# Patient Record
Sex: MALE | Race: WHITE | NOT HISPANIC OR LATINO | Employment: OTHER | URBAN - METROPOLITAN AREA
[De-identification: names, ages, dates, MRNs, and addresses within clinical notes are randomized per-mention and may not be internally consistent; named-entity substitution may affect disease eponyms.]

---

## 2017-01-10 ENCOUNTER — OFFICE VISIT (OUTPATIENT)
Dept: GASTROENTEROLOGY | Facility: CLINIC | Age: 62
End: 2017-01-10
Payer: COMMERCIAL

## 2017-01-10 VITALS
BODY MASS INDEX: 26.76 KG/M2 | SYSTOLIC BLOOD PRESSURE: 120 MMHG | HEART RATE: 54 BPM | WEIGHT: 191.13 LBS | DIASTOLIC BLOOD PRESSURE: 65 MMHG | HEIGHT: 71 IN

## 2017-01-10 DIAGNOSIS — K21.9 GASTROESOPHAGEAL REFLUX DISEASE WITHOUT ESOPHAGITIS: Primary | ICD-10-CM

## 2017-01-10 PROCEDURE — 1159F MED LIST DOCD IN RCRD: CPT | Mod: S$GLB,,, | Performed by: INTERNAL MEDICINE

## 2017-01-10 PROCEDURE — 99999 PR PBB SHADOW E&M-EST. PATIENT-LVL II: CPT | Mod: PBBFAC,,, | Performed by: INTERNAL MEDICINE

## 2017-01-10 PROCEDURE — 99213 OFFICE O/P EST LOW 20 MIN: CPT | Mod: S$GLB,,, | Performed by: INTERNAL MEDICINE

## 2017-01-10 NOTE — PROGRESS NOTES
Subjective:       Patient ID: Colt Rose is a 61 y.o. male.    This is an established patient.      Chief Complaint: GERD    PAST HISTORY:    Gastroesophageal Reflux   He complains of belching. He reports no chest pain, no coughing, no dysphagia, no heartburn, no nausea or no wheezing. mostly resolved but still with some mild breakthrough at night. This is a recurrent (First occurrence many years ago) problem. The current episode started more than 1 year ago. The problem occurs frequently. The problem has been gradually improving. The heartburn duration is several minutes. The heartburn is of moderate intensity. The heartburn wakes him from sleep. The heartburn does not limit his activity. The heartburn changes with position. The symptoms are aggravated by certain foods. Pertinent negatives include no anemia, fatigue, melena or weight loss. Risk factors include hiatal hernia (Had EGD about 10 years ago with hiatal hernia noted). He has tried a PPI for the symptoms. The treatment provided significant relief. Past procedures include an EGD (hiatal hernia 10 years ago.  No known history of ulcers.  Recent EGD with me showed esophagitis and hiatal hernia.  ).   He denies dysphagia, bleeding or weight loss.  Last colonoscopy was normal in 2011.    INTERVAL HISTORY:    Since last visit, he has had durable response to PPI with only occasional breakthrough.  No emesis, weight loss, GI bleeding, or dysphagia.  He denies any other complaints.  No change in bowel habits.      Review of Systems   Constitutional: Negative for chills, fatigue, fever and weight loss.   Eyes: Negative for pain and visual disturbance.   Respiratory: Negative for cough, shortness of breath and wheezing.    Cardiovascular: Negative for chest pain and palpitations.   Gastrointestinal: Negative for constipation, diarrhea, dysphagia, heartburn, melena, nausea and vomiting.   Musculoskeletal: Negative for arthralgias, myalgias, neck pain and neck  "stiffness.   Skin: Negative for color change and rash.   Neurological: Negative for dizziness, weakness and numbness.   Psychiatric/Behavioral: Negative for agitation and confusion.       Objective:       Vitals:    01/10/17 1608   BP: 120/65   Pulse: (!) 54   Weight: 86.7 kg (191 lb 2.2 oz)   Height: 5' 10.5" (1.791 m)       Physical Exam   Constitutional: He is oriented to person, place, and time. He appears well-developed and well-nourished.   HENT:   Head: Normocephalic and atraumatic.   Eyes: Pupils are equal, round, and reactive to light. No scleral icterus.   Cardiovascular: Normal rate and regular rhythm.    No murmur heard.  Pulmonary/Chest: Effort normal and breath sounds normal. He has no wheezes.   Abdominal: Soft. Bowel sounds are normal. He exhibits no distension. There is no tenderness.   Neurological: He is alert and oriented to person, place, and time.         Lab Results   Component Value Date    WBC 4.05 10/01/2016    HGB 14.2 10/01/2016    HCT 41.6 10/01/2016    MCV 93 10/01/2016     10/01/2016         Chemistry        Component Value Date/Time     10/01/2016 0826    K 4.8 10/01/2016 0826     10/01/2016 0826    CO2 25 10/01/2016 0826    BUN 16 10/01/2016 0826    CREATININE 0.8 10/01/2016 0826     10/01/2016 0826        Component Value Date/Time    CALCIUM 9.0 10/01/2016 0826    ALKPHOS 41 (L) 10/01/2016 0826    AST 23 10/01/2016 0826    ALT 21 10/01/2016 0826    BILITOT 0.4 10/01/2016 0826              Assessment:       1. Gastroesophageal reflux disease without esophagitis        Plan:       1.  Continue PPI  2.  Consider evening H2 blocker versus second dose of PPI for breakthrough symptoms  3.  Avoid NSAIDs  4.  Colonoscopy for screening due in 2021.  5.  Further recommendations to follow after above.  Follow up PRN            "

## 2017-03-29 ENCOUNTER — INITIAL CONSULT (OUTPATIENT)
Dept: ORTHOPEDICS | Facility: CLINIC | Age: 62
End: 2017-03-29
Attending: ORTHOPAEDIC SURGERY
Payer: COMMERCIAL

## 2017-03-29 VITALS — WEIGHT: 191 LBS | BODY MASS INDEX: 26.74 KG/M2 | HEIGHT: 71 IN

## 2017-03-29 DIAGNOSIS — M77.11 LATERAL EPICONDYLITIS OF BOTH ELBOWS: ICD-10-CM

## 2017-03-29 DIAGNOSIS — M77.12 LATERAL EPICONDYLITIS OF BOTH ELBOWS: ICD-10-CM

## 2017-03-29 PROCEDURE — 99203 OFFICE O/P NEW LOW 30 MIN: CPT | Mod: 25,S$GLB,, | Performed by: ORTHOPAEDIC SURGERY

## 2017-03-29 PROCEDURE — 20551 NJX 1 TENDON ORIGIN/INSJ: CPT | Mod: 51,LT,S$GLB, | Performed by: ORTHOPAEDIC SURGERY

## 2017-03-29 PROCEDURE — 99999 PR PBB SHADOW E&M-EST. PATIENT-LVL III: CPT | Mod: PBBFAC,,, | Performed by: ORTHOPAEDIC SURGERY

## 2017-03-29 PROCEDURE — 1160F RVW MEDS BY RX/DR IN RCRD: CPT | Mod: S$GLB,,, | Performed by: ORTHOPAEDIC SURGERY

## 2017-03-29 RX ORDER — TRIAMCINOLONE ACETONIDE 40 MG/ML
40 INJECTION, SUSPENSION INTRA-ARTICULAR; INTRAMUSCULAR
Status: COMPLETED | OUTPATIENT
Start: 2017-03-29 | End: 2017-03-29

## 2017-03-29 RX ADMIN — TRIAMCINOLONE ACETONIDE 40 MG: 40 INJECTION, SUSPENSION INTRA-ARTICULAR; INTRAMUSCULAR at 02:03

## 2017-03-29 NOTE — PROGRESS NOTES
INITIAL VISIT HISTORY:  A 61-year-old male presents for evaluation of bilateral   elbow symptoms.  He reports pain in both elbows for the past 6 to 8 months.    Symptoms seem to be getting worse, particularly in the left elbow.  He did have   an injection about six months ago with some improvement on the left side, but   still having some weakness in both hands.  No numbness or tingling is reported.    No recent trauma reported.    PAST MEDICAL HISTORY:  Significant for cervical disk problems, GERD,   hyperlipidemia and shingles.    PAST SURGICAL HISTORY:  Includes tonsillectomy and cervical fusion.    FAMILY HISTORY:  Positive for coronary artery disease, diabetes and   hypertension.    SOCIAL HISTORY:  The patient does not smoke, drinks about six beers per week.    REVIEW OF SYSTEMS:  Negative for fever, chills or rashes.    CURRENT MEDICATIONS:  Reviewed on chart.    ALLERGIES:  None.    PHYSICAL EXAMINATION:  GENERAL:  A well-developed, well-nourished male, in no acute distress, alert and   oriented x3.  EXTREMITIES:  Examination of the upper extremities significant for the elbows   demonstrating tenderness in both elbows over the lateral epicondylar areas.  No   swelling is noted.  Full range of motion of both elbows without popping,   clicking or instability.  Distally, he has some weakness, particularly in the   left hand with  strength slightly decreased.  There is no atrophy, some pain   with resisted wrist extension is noted.  NEUROLOGIC:  Intact.    X-RAYS:  AP and lateral of the elbows demonstrate no significant abnormalities.    Previous cervical x-rays demonstrate a fusion at C5-C6 level.    IMPRESSION:  1.  Bilateral tennis elbow (lateral epicondylitis).  2.  Possible superimposed cervical radiculopathy.    PLAN:  I explained the nature of problem to the patient.  Recommended and   performed an injection into each elbow laterally with a combination of Kenalog   10 mg and 0.5 mL of Xylocaine,  sterile technique.    I also think he would benefit from some therapy, so I have ordered this for both   elbows to include hands, bilateral for strengthening.    Continue anti-inflammatory medication by mouth.  Follow up in six weeks for   recheck.  If symptoms fail to improve, then we may need to get a nerve   conduction study to evaluate for possible cervical symptoms as an overlay.      DADA/JOSE  dd: 03/29/2017 14:35:57 (CDT)  td: 03/30/2017 13:26:59 (CDT)  Doc ID   #[id number]  Job ID #[id number]    CC:

## 2017-03-29 NOTE — LETTER
March 29, 2017        Aly Godinez MD  [address] [facility] [address]  [facility] [address]             [facility]  [address], [address]  [facility] [address]  Phone: [phone]   Patient: Colt Rsoe   MR Number: [id number]   YOB: 1955   Date of Visit: 3/29/2017       Dear Dr. Godinez:    Thank you for referring Colt Rose to me for evaluation. Below are the relevant portions of my assessment and plan of care.            If you have questions, please do not hesitate to call me. I look forward to following Colt along with you.    Sincerely,      Juan Diego Lopez Jr., MD           CC  No Recipients

## 2017-03-29 NOTE — LETTER
March 29, 2017        Aly Godinez MD  [address] [facility] [address]  [facility] [address]             [facility]  [address]  [facility] [address]  Phone: [phone]   Patient: Colt Rose   MR Number: [id number]   YOB: 1955   Date of Visit: 3/29/2017       Dear Dr. Godinez:    Thank you for referring Colt Rose to me for evaluation. Below are the relevant portions of my assessment and plan of care.            If you have questions, please do not hesitate to call me. I look forward to following Colt along with you.    Sincerely,      Juan Diego Lopez Jr., MD           CC  No Recipients

## 2017-03-29 NOTE — Clinical Note
March 29, 2017      Aly Godinez MD  [address] [facility] [address] [place] [address]           [facility]  [address]  [facility] [address]  Phone: [phone]          Patient: Colt Rose   MR Number: [id number]   YOB: 1955   Date of Visit: 3/29/2017       Dear Dr. Aly Godinez:    Thank you for referring Colt Rose to me for evaluation. Attached you will find relevant portions of my assessment and plan of care.    If you have questions, please do not hesitate to call me. I look forward to following Colt Rose along with you.    Sincerely,    Juan Diego Lopez Jr., MD    Enclosure  CC:  No Recipients    If you would like to receive this communication electronically, please contact [email or web address] or [phone] to request more information on [facility] [employer] access.    For providers and/or their staff who would like to refer a patient to [facility], please contact us through our one-stop-shop provider referral line, [facility], at [phone].    If you feel you have received this communication in error or would no longer like to receive these types of communications, please e-mail [email or web address]

## 2017-04-13 ENCOUNTER — CLINICAL SUPPORT (OUTPATIENT)
Dept: REHABILITATION | Facility: HOSPITAL | Age: 62
End: 2017-04-13
Attending: ORTHOPAEDIC SURGERY
Payer: COMMERCIAL

## 2017-04-13 DIAGNOSIS — M77.12 LATERAL EPICONDYLITIS OF BOTH ELBOWS: Primary | ICD-10-CM

## 2017-04-13 DIAGNOSIS — M77.11 LATERAL EPICONDYLITIS OF BOTH ELBOWS: Primary | ICD-10-CM

## 2017-04-13 PROCEDURE — 97165 OT EVAL LOW COMPLEX 30 MIN: CPT | Mod: PN

## 2017-04-13 PROCEDURE — G8990 OTHER PT/OT CURRENT STATUS: HCPCS | Mod: CJ,PN

## 2017-04-13 PROCEDURE — G8991 OTHER PT/OT GOAL STATUS: HCPCS | Mod: CI,PN

## 2017-04-14 NOTE — PLAN OF CARE
"TIME RECORD    Date: 04/13/2017    Start Time:  1505  Stop Time:  1548    PROCEDURES:    TIMED  Procedure Time Min.   33 Start:1505  Stop:1548 43    Start:  Stop:     Start:  Stop:     Start:  Stop:          UNTIMED  Procedure Time Min.    Start:  Stop:     Start:  Stop:      Total Timed Minutes:  0  Total Timed Units:  0  Total Untimed Units:  1  Charges Billed/# of units:  1    OCCUPATIONAL THERAPY INITIAL EVALUATION & PLAN OF TREATMENT    Patient Name: Colt HCRIS Milton  Physician Name:  John  Primary Diagnosis:  B lateral epicondylitis elbows  Treatment Diagnosis:  B UE weakness, pain  Onset Date:    Eval Date:  4-13-17  Certification Period:  4-13-17 to 7-13-17  Past Medical History:   Past Medical History:   Diagnosis Date    Cervical disc displacement     Degeneration of lumbar intervertebral disc     GERD (gastroesophageal reflux disease)     Hyperlipidemia     Shingles 11/2013     Precautions:  universal  Prior Therapy:  no  Signs of Abuse: no  Medications: Colt Rose has a current medication list which includes the following prescription(s): atorvastatin, methocarbamol, nabumetone, and pantoprazole.  Nutrition:  WNL  Pain: Pt reports L elbow 6/10 pain R elbow 2/10    Prior Level of Function: Independent  Social History:  Pt works full time as a building maintenance supervisior. Heavy, overhead work at times. Independent, active.  Place of Residence (steps/adaptations):  No concerns  Functional Deficits Leading to Referral/Nature of Injury:  Debilitating pain B elbows  Patient Therapy Goals:  Pain reduction and strengthening  Initial Assessment:  Pt received B steroid injections 2 weeks ago. Conservative manual evaluation 2/2 to this fact. Pt with history of cervical fusion 1991: pt states, " I went to the doctor and they said I am due for another surgery." Pt reports B lateral elbow pain for about 1 year, received injections 6 months ago but pain became debilitating again. Relief of pain " initially, but pt reports that L elbow has been more painful in past few days. Pt R dominant.  R  110#     L  80#  R pinch 28#    L pinch 24#  R shoulder MMT 4/5  L shoulder MMT 5/5  Elbow strength 5/5  5/5 Supination/pronation  Rehab Potential: good  Short Term Goals (2x week 4 weeks: 1. Independent with HEP 2. R shoulder strength 5/5 3. Pain decreased in L elbow 4/10 per pt report.  Long Term Goals (8 weeks 2x week: 1. 0/10 pain B UE  Recommended Treatment Plan (2x times per week for 12 weeks): Therapeutic Exercise, Functional Activities, Patient Education, Home Exercise Program,  Ultrasound/Phonophoresis, Electrical Stimulation/TENS/Interferential and Moist Heat/Ice      Therapist's Name: ODILON Cordon   Date: 04/14/2017    I CERTIFY THE NEED FOR THESE SERVICES FURNISHED UNDER THIS PLAN OF TREATMENT AND WHILE UNDER MY CARE    Physician's comments: ________________________________________________________________________________________________________________________________________________      Physician's Name: ___________________________________

## 2017-04-25 ENCOUNTER — CLINICAL SUPPORT (OUTPATIENT)
Dept: REHABILITATION | Facility: HOSPITAL | Age: 62
End: 2017-04-25
Attending: ORTHOPAEDIC SURGERY
Payer: COMMERCIAL

## 2017-04-25 DIAGNOSIS — M25.521 RIGHT ELBOW PAIN: ICD-10-CM

## 2017-04-25 DIAGNOSIS — M25.522 LEFT ELBOW PAIN: Primary | ICD-10-CM

## 2017-04-25 PROCEDURE — 98960 EDU&TRN PT SELF-MGMT NQHP 1: CPT | Mod: PN

## 2017-04-25 NOTE — PROGRESS NOTES
Time in 5  Time out 530      Timed units  1 on 1 education                              Time:5-530        S:understands basic of soft tissue healing and how current HEP will reinforce this  Pain: on r very rare, lateral elbow ache; on l mild with some use, lateral elbow ache    O:  R: resisted wrist df (-)    L: resisted wrist df (+), rd (+), ud (-); palpation ecrl origin (-), ecrb origin (-), ecrb tendon (+), ecrb mt junction (-), edc origin (-)    Issued current HEP for l:    -apply heating pad x 15 min. 2 x day   -do below stretch 10 times, 2 x day, at least 1 min. hold, mild discomfort only  *with elbow by side in L shape and left hand facing up, use right hand to bend left wrist up  -every other day gently massage across sore spot in one direction x 7 min.  -avoid painful use, forceful use  -minimize repetitive elbow bending   -minimize sustained gripping  -decrease caffeine as much as possible      Told to follow activity modifications on r as well        A:eccentric traing on l in future for l wrist dorsiflexors may be beneficial to discourage symptoms in future            P:upgrade HEP prn

## 2017-04-26 ENCOUNTER — OFFICE VISIT (OUTPATIENT)
Dept: ORTHOPEDICS | Facility: CLINIC | Age: 62
End: 2017-04-26
Attending: ORTHOPAEDIC SURGERY
Payer: COMMERCIAL

## 2017-04-26 VITALS — BODY MASS INDEX: 26.74 KG/M2 | HEIGHT: 71 IN | WEIGHT: 191 LBS

## 2017-04-26 DIAGNOSIS — M77.11 LATERAL EPICONDYLITIS OF BOTH ELBOWS: Primary | ICD-10-CM

## 2017-04-26 DIAGNOSIS — M77.12 LATERAL EPICONDYLITIS OF BOTH ELBOWS: Primary | ICD-10-CM

## 2017-04-26 PROCEDURE — 1160F RVW MEDS BY RX/DR IN RCRD: CPT | Mod: S$GLB,,, | Performed by: ORTHOPAEDIC SURGERY

## 2017-04-26 PROCEDURE — 99213 OFFICE O/P EST LOW 20 MIN: CPT | Mod: S$GLB,,, | Performed by: ORTHOPAEDIC SURGERY

## 2017-04-26 PROCEDURE — 99999 PR PBB SHADOW E&M-EST. PATIENT-LVL II: CPT | Mod: PBBFAC,,, | Performed by: ORTHOPAEDIC SURGERY

## 2017-04-26 NOTE — PROGRESS NOTES
Mr. Rose in followup of bilateral elbow symptoms, is doing much better.  He   had about 90% improvement after the previous injection last month.  He is   currently in therapy, but has only had one visit.  Pain is much better.  No neck   problems reported.    PHYSICAL EXAMINATION:  Both elbows look good.  No swelling.  Full range of   motion, minimal tenderness, left worse than right.  Strength is improved.    PLAN:  I would like him to continue therapy for both arms, for stretching,   strengthening and modality treatments.  Additionally, we will start him on some   Pennsaid topical anti-inflammatory cream for both elbows.   I do not think he   needs an injection today.  He is in agreement.  Follow up in six weeks.      DARIUSZ  dd: 04/26/2017 15:39:42 (CDT)  td: 04/27/2017 09:26:21 (CDT)  Doc ID   #[id number]  Job ID #[id number]    CC:

## 2017-04-26 NOTE — MR AVS SNAPSHOT
[facility]  [address]  [facility] [address]  Phone: [phone]                  Colt Rose   2017 3:20 PM   Office Visit    Description:  Male : 1955   Provider:  Juan Diego Lopez Jr., MD   Department:  [facility]           Reason for Visit     Left Elbow - Follow-up     Right Elbow - Follow-up           Diagnoses this Visit        Comments    Lateral epicondylitis of both elbows    -  Primary            To Do List           Future Appointments        Provider Department Dept Phone    2017 5:00 PM Maurizio Damon OT [facility] [phone]    2017 3:00 PM DANIEL Pelaez [facility] [phone]      Goals (5 Years of Data)     None      [facility]     [facility] Nurse Care Line -  Assistance  Unless otherwise directed by your provider, please contact [facility], our nurse care line that is available for  assistance.     Registered nurses in the [facility] provide: appointment scheduling, clinical advisement, health education, and other advisory services.  Call: [phone] (toll free)               Medications           Message regarding Medications     Verify the changes and/or additions to your medication regime listed below are the same as discussed with your clinician today.  If any of these changes or additions are incorrect, please notify your healthcare provider.             Verify that the below list of medications is an accurate representation of the medications you are currently taking.  If none reported, the list may be blank. If incorrect, please contact your healthcare provider. Carry this list with you in case of emergency.           Current Medications     atorvastatin (LIPITOR) 10 MG tablet Take 1 tablet (10 mg total) by mouth once daily.    methocarbamol (ROBAXIN) 750 MG Tab Take 1 tablet (750 mg total) by mouth 4 (four) times daily. As needed for  "back pain    nabumetone (RELAFEN) 500 MG tablet Take 1 tablet (500 mg total) by mouth 2 (two) times daily.    pantoprazole (PROTONIX) 40 MG tablet Take 1 tablet (40 mg total) by mouth once daily.           Clinical Reference Information           Your Vitals Were     Height Weight BMI          5' 10.51" (1.791 m) 86.6 kg (191 lb) 27.01 kg/m2        Allergies as of 4/26/2017     No Known Drug Allergies      Immunizations Administered on Date of Encounter - 4/26/2017     None      Language Assistance Services     ATTENTION: Language assistance services are available, free of charge. Please call [phone].      ATENCIÓN: Si habla jessica, tiene a escobedo disposición servicios gratuitos de asistencia lingüística. Llame al [phone].     CHÚ Ý: N?u b?n nói Ti?ng Vi?t, có các d?ch v? h? tr? ngôn ng? mi?n phí dành cho b?n. G?i s? [phone].         [facility] complies with applicable Federal civil rights laws and does not discriminate on the basis of race, color, national origin, age, disability, or sex.        "

## 2017-05-08 ENCOUNTER — CLINICAL SUPPORT (OUTPATIENT)
Dept: REHABILITATION | Facility: HOSPITAL | Age: 62
End: 2017-05-08
Attending: ORTHOPAEDIC SURGERY
Payer: COMMERCIAL

## 2017-05-08 DIAGNOSIS — M25.522 LEFT ELBOW PAIN: Primary | ICD-10-CM

## 2017-05-08 DIAGNOSIS — M25.521 RIGHT ELBOW PAIN: ICD-10-CM

## 2017-05-08 PROCEDURE — 97110 THERAPEUTIC EXERCISES: CPT | Mod: PN

## 2017-05-08 PROCEDURE — 97035 APP MDLTY 1+ULTRASOUND EA 15: CPT | Mod: PN

## 2017-05-08 NOTE — PROGRESS NOTES
Time in 5  Time out 530  untimed units:   U/s               Time: 5-515    Timed units  1 therex                              Time:515-530    S:says doing tfm daily  Pain: continues to decrease in intensity and frequency    O:  Told pt. To do tfm every other day as previously advised    u/s 15' 0.8 w/cm sqrd pulsed 50% large 3 mhz to lateral epicondyle    Extensor mass stretches x 10        A:eccentric training may be helpful once palpation (-)            P:special tests and palpation

## 2017-05-25 ENCOUNTER — CLINICAL SUPPORT (OUTPATIENT)
Dept: REHABILITATION | Facility: HOSPITAL | Age: 62
End: 2017-05-25
Attending: ORTHOPAEDIC SURGERY
Payer: COMMERCIAL

## 2017-05-25 DIAGNOSIS — M25.522 LEFT ELBOW PAIN: Primary | ICD-10-CM

## 2017-05-25 DIAGNOSIS — M25.521 RIGHT ELBOW PAIN: ICD-10-CM

## 2017-05-25 PROCEDURE — 97035 APP MDLTY 1+ULTRASOUND EA 15: CPT | Mod: PN

## 2017-05-25 PROCEDURE — 97022 WHIRLPOOL THERAPY: CPT | Mod: PN

## 2017-05-25 PROCEDURE — 97140 MANUAL THERAPY 1/> REGIONS: CPT | Mod: PN

## 2017-05-25 NOTE — PROGRESS NOTES
Time in 5  Time out 6    untimed units    fluido                               Time:5-520  u/s                               Time:520-530    Timed units  2 manual                              Time:530-6      S: doing HEP, understands basic concept of soft tissue healing  Pain:reports good reduction in intensity and frequency since last visit    O:  resisted df (+), rd (+), ud (-)    palpation of 5 tennis elbow spots reveals ecrb origin (+)    fluido  U/s as before  tfm to ecrb origin x 10'  Transverse stretching as tolerated-pain free  Reviewed longitudinal stretches        A: continued tx to promote soft tissue healing then transition to eccentric wrist extensor training should help to maximize painless and strong use long term            P:cont POC, consider MWM and transverse stretches

## 2017-05-26 ENCOUNTER — CLINICAL SUPPORT (OUTPATIENT)
Dept: REHABILITATION | Facility: HOSPITAL | Age: 62
End: 2017-05-26
Attending: ORTHOPAEDIC SURGERY
Payer: COMMERCIAL

## 2017-05-26 DIAGNOSIS — M25.522 LEFT ELBOW PAIN: Primary | ICD-10-CM

## 2017-05-26 PROCEDURE — 97035 APP MDLTY 1+ULTRASOUND EA 15: CPT | Mod: PN

## 2017-05-26 PROCEDURE — 97110 THERAPEUTIC EXERCISES: CPT | Mod: PN

## 2017-05-26 PROCEDURE — 97022 WHIRLPOOL THERAPY: CPT | Mod: PN

## 2017-05-26 NOTE — PROGRESS NOTES
Time in 445  Time out 530    untimed units    fluido                               Time:445-505  u/s                               Time:505-515    Timed units  1 therex                              Time:515-530      S: understands rationale of current care and probable tx progression  Pain: continues to decrease in intensity and frequency    O:  Fluido, u/s; transverse stretching, longitudinal stretching as tolerated-pain free            A: correct progression of soft tissue healing should continue with current tx            P: test resisted df then do classic 5 palpation tests; d/c once pain gone and d/c HEP issued

## 2017-06-06 ENCOUNTER — CLINICAL SUPPORT (OUTPATIENT)
Dept: REHABILITATION | Facility: HOSPITAL | Age: 62
End: 2017-06-06
Attending: ORTHOPAEDIC SURGERY
Payer: COMMERCIAL

## 2017-06-06 DIAGNOSIS — M25.522 LEFT ELBOW PAIN: Primary | ICD-10-CM

## 2017-06-06 DIAGNOSIS — M25.521 RIGHT ELBOW PAIN: ICD-10-CM

## 2017-06-06 PROCEDURE — 97035 APP MDLTY 1+ULTRASOUND EA 15: CPT | Mod: PN

## 2017-06-06 PROCEDURE — 97022 WHIRLPOOL THERAPY: CPT | Mod: PN

## 2017-06-06 PROCEDURE — 97110 THERAPEUTIC EXERCISES: CPT | Mod: PN

## 2017-06-06 NOTE — PROGRESS NOTES
Time in 450  Time out 550    untimed units    fluido                               Time:450-510  u/s                               Time:510-520    Timed units  2 therex                              Time:520-550        S: doing all required tasks, says pain is much better than at onset  Pain:1/10 with some tasks    O:  resisted wrist df (+), rd (+), ud (-)  classic palpation of 5 tennis elbow sites shows mild pain at ecrb origin  fluido, tfm ecrb origin x10', longitudinal extensor mass stretch, transverse extensor mass stretch    issued eccentric training to be started in 1 month; told to continue current HEP for 1 month; told to progress use as tolerated-pain free        A: all goals met            P:d/c

## 2017-06-12 ENCOUNTER — OFFICE VISIT (OUTPATIENT)
Dept: ORTHOPEDICS | Facility: CLINIC | Age: 62
End: 2017-06-12
Payer: COMMERCIAL

## 2017-06-12 VITALS
HEART RATE: 58 BPM | BODY MASS INDEX: 26.74 KG/M2 | SYSTOLIC BLOOD PRESSURE: 122 MMHG | DIASTOLIC BLOOD PRESSURE: 71 MMHG | WEIGHT: 191 LBS | RESPIRATION RATE: 18 BRPM | HEIGHT: 71 IN

## 2017-06-12 DIAGNOSIS — M77.12 LATERAL EPICONDYLITIS OF BOTH ELBOWS: Primary | ICD-10-CM

## 2017-06-12 DIAGNOSIS — L08.9: ICD-10-CM

## 2017-06-12 DIAGNOSIS — T49.95XA: ICD-10-CM

## 2017-06-12 DIAGNOSIS — M77.11 LATERAL EPICONDYLITIS OF BOTH ELBOWS: Primary | ICD-10-CM

## 2017-06-12 PROCEDURE — 99999 PR PBB SHADOW E&M-EST. PATIENT-LVL III: CPT | Mod: PBBFAC,,, | Performed by: PHYSICIAN ASSISTANT

## 2017-06-12 PROCEDURE — 99213 OFFICE O/P EST LOW 20 MIN: CPT | Mod: S$GLB,,, | Performed by: PHYSICIAN ASSISTANT

## 2017-06-12 RX ORDER — SILVER SULFADIAZINE 10 G/1000G
CREAM TOPICAL 2 TIMES DAILY
Qty: 1 TUBE | Refills: 1 | Status: SHIPPED | OUTPATIENT
Start: 2017-06-12 | End: 2018-04-10

## 2017-06-20 NOTE — PROGRESS NOTES
Subjective:      Patient ID: Colt Rose is a 61 y.o. male.    Chief Complaint: Pain of the Left Elbow and Pain of the Right Elbow      HPI  Colt Rose is a 61 y.o. male presenting today for follow up of bilateral elbow pain.  He reports that he has noticed improvement in his elbow pain with his injections a few months ago and occupational therapy.  He does report that he was using the Pennsaid cream for 3-4 weeks, but discontinued it 2 weeks ago due to skin irritation in the area of application on the left elbow and forearm.  He says that the skin irritation has increased over the past 2 weeks since he first noticed it and he now has blistering of the skin.  He reports itching in the area of irritation but only mild pain.  He also reports some increased stiffness in the left elbow over the past 2 weeks due to the skin irritation. He was using the Pennsaid predominantly on the left arm, rarely on the right.  He denies any skin problems on the right arm. He denies any known drug allergies.  He denies any exposure to poisonous plants.      Review of patient's allergies indicates:   Allergen Reactions    No known drug allergies          Current Outpatient Prescriptions   Medication Sig Dispense Refill    atorvastatin (LIPITOR) 10 MG tablet Take 1 tablet (10 mg total) by mouth once daily. 90 tablet 3    methocarbamol (ROBAXIN) 750 MG Tab Take 1 tablet (750 mg total) by mouth 4 (four) times daily. As needed for back pain 30 tablet 0    nabumetone (RELAFEN) 500 MG tablet Take 1 tablet (500 mg total) by mouth 2 (two) times daily. 180 tablet 3    pantoprazole (PROTONIX) 40 MG tablet Take 1 tablet (40 mg total) by mouth once daily. 90 tablet 3    silver sulfADIAZINE 1% (SILVADENE) 1 % cream Apply topically 2 (two) times daily. 1 Tube 1     No current facility-administered medications for this visit.        Past Medical History:   Diagnosis Date    Cervical disc displacement     Degeneration of lumbar  "intervertebral disc     GERD (gastroesophageal reflux disease)     Hyperlipidemia     Shingles 11/2013       Past Surgical History:   Procedure Laterality Date    CERVICAL FUSION      bone graft    TONSILLECTOMY, ADENOIDECTOMY           Review of Systems:  Review of Systems   Constitution: Negative for chills and fever.   HENT: Negative for headaches.    Skin: Positive for color change, itching and rash. Negative for suspicious lesions.        See HPI    Musculoskeletal:        See HPI   Neurological: Negative for dizziness, light-headedness, numbness and paresthesias.   Psychiatric/Behavioral: Negative for depression. The patient is not nervous/anxious.          OBJECTIVE:     PHYSICAL EXAM:  Height: 5' 10.51" (179.1 cm) Weight: 86.6 kg (191 lb)     Vitals:    06/12/17 1454   BP: 122/71   Pulse: (!) 58   Resp: 18     General    Vitals reviewed.  Constitutional: He is oriented to person, place, and time. He appears well-developed and well-nourished.   HENT:   Head: Normocephalic and atraumatic.   Neck: Normal range of motion.   Cardiovascular: Normal rate.    Pulmonary/Chest: Effort normal. No respiratory distress.   Neurological: He is alert and oriented to person, place, and time.   Psychiatric: He has a normal mood and affect. His behavior is normal. Judgment and thought content normal.           Musculoskeletal: Full extension noted bilateral elbows. Full flexion of right elbow, decreased flexion of left elbow due to pain and edema associated with rash.  Medial and lateral epicondyles non-tender bilaterally. NVI.  Skin: Erythematous rash with multiple small blisters on the left arm involving the AC fossa, elbow, and proximal forearm. No discharge noted.          ASSESSMENT/PLAN:   Colt was seen today for pain and pain.    Diagnoses and all orders for this visit:    Lateral epicondylitis of both elbows    Skin inflammation due to a topically applied medication  -     silver sulfADIAZINE 1% (SILVADENE) 1 % " cream; Apply topically 2 (two) times daily.        - He will make an urgent appointment with his dermatologist.   - Silver Sulfadiazine cream prescribed to apply to rash  - Call with any increase in rash or if it doesn't improve  - Call if unable to get appointment with Dermatology  - Follow up in 4-6 weeks

## 2017-07-24 ENCOUNTER — OFFICE VISIT (OUTPATIENT)
Dept: ORTHOPEDICS | Facility: CLINIC | Age: 62
End: 2017-07-24
Payer: COMMERCIAL

## 2017-07-24 VITALS
DIASTOLIC BLOOD PRESSURE: 76 MMHG | SYSTOLIC BLOOD PRESSURE: 131 MMHG | WEIGHT: 191 LBS | BODY MASS INDEX: 26.74 KG/M2 | RESPIRATION RATE: 18 BRPM | HEIGHT: 71 IN | HEART RATE: 101 BPM

## 2017-07-24 DIAGNOSIS — R20.0 FINGER NUMBNESS: Primary | ICD-10-CM

## 2017-07-24 DIAGNOSIS — M25.521 PAIN OF BOTH ELBOWS: ICD-10-CM

## 2017-07-24 DIAGNOSIS — M54.2 NECK PAIN: ICD-10-CM

## 2017-07-24 DIAGNOSIS — M25.522 PAIN OF BOTH ELBOWS: ICD-10-CM

## 2017-07-24 PROCEDURE — 99213 OFFICE O/P EST LOW 20 MIN: CPT | Mod: S$GLB,,, | Performed by: PHYSICIAN ASSISTANT

## 2017-07-24 PROCEDURE — 99999 PR PBB SHADOW E&M-EST. PATIENT-LVL III: CPT | Mod: PBBFAC,,, | Performed by: PHYSICIAN ASSISTANT

## 2017-07-24 NOTE — PROGRESS NOTES
Subjective:      Patient ID: Colt Rose is a 62 y.o. male.    Chief Complaint: Pain of the Left Elbow and Pain of the Right Elbow      HPI  Colt Rose is a 62 y.o. male presenting today for follow up of bilateral elbow pain.  He reports that his left arm rash from Pennsaid use has resolved - he was treated in dermatology.  He says that he is still experiencing bilateral elbow pain, but currently the right elbow is more painful than the left.  Right elbow pain is rated as 3-4/10 and left elbow pain is 1-2/10.  He denies any regular finger tingling or numbness.  Pain seems to be improved with rest.  He is taking Robaxin for back pain.      Review of patient's allergies indicates:   Allergen Reactions    No known drug allergies          Current Outpatient Prescriptions   Medication Sig Dispense Refill    atorvastatin (LIPITOR) 10 MG tablet Take 1 tablet (10 mg total) by mouth once daily. 90 tablet 3    methocarbamol (ROBAXIN) 750 MG Tab Take 1 tablet (750 mg total) by mouth 4 (four) times daily. As needed for back pain 30 tablet 0    nabumetone (RELAFEN) 500 MG tablet Take 1 tablet (500 mg total) by mouth 2 (two) times daily. 180 tablet 3    pantoprazole (PROTONIX) 40 MG tablet Take 1 tablet (40 mg total) by mouth once daily. 90 tablet 3    silver sulfADIAZINE 1% (SILVADENE) 1 % cream Apply topically 2 (two) times daily. 1 Tube 1     No current facility-administered medications for this visit.        Past Medical History:   Diagnosis Date    Cervical disc displacement     Degeneration of lumbar intervertebral disc     GERD (gastroesophageal reflux disease)     Hyperlipidemia     Shingles 11/2013       Past Surgical History:   Procedure Laterality Date    CERVICAL FUSION      bone graft    TONSILLECTOMY, ADENOIDECTOMY           Review of Systems:  Review of Systems   Constitution: Negative for chills and fever.   HENT: Negative for headaches.    Skin: Negative for suspicious lesions.        See  "HPI    Musculoskeletal:        See HPI   Neurological: Negative for dizziness, light-headedness, numbness and paresthesias.   Psychiatric/Behavioral: Negative for depression. The patient is not nervous/anxious.          OBJECTIVE:     PHYSICAL EXAM:  Height: 5' 10.5" (179.1 cm) Weight: 86.6 kg (191 lb)     Vitals:    07/24/17 1453   BP: 131/76   Pulse: 101   Resp: 18     General    Vitals reviewed.  Constitutional: He is oriented to person, place, and time. He appears well-developed and well-nourished.   HENT:   Head: Normocephalic and atraumatic.   Neck: Normal range of motion.   Cardiovascular: Normal rate.    Pulmonary/Chest: Effort normal. No respiratory distress.   Neurological: He is alert and oriented to person, place, and time.   Psychiatric: He has a normal mood and affect. His behavior is normal. Judgment and thought content normal.           Musculoskeletal: Full extension and flexion noted bilateral elbows.  Left lateral epicondyle mildly tender to palpation. NVI - good motor and sensory function.  Positive Tinel's at the elbow bilaterally.  Negative Tinel's and the wrist and negative carpal tunnel compression test bilaterally.  Skin: Rash on the left arm has resolved since prior visit.          ASSESSMENT/PLAN:   Colt was seen today for pain and pain.    Diagnoses and all orders for this visit:    Finger numbness  -     EMG w/ Ultrasound; Future    Pain of both elbows  -     EMG w/ Ultrasound; Future    Neck pain  -     EMG w/ Ultrasound; Future        - EMG/US ordered, patient prefers closer location so new order placed for outside clinic  - Continue NSAIDs for pain  - Discussed cubital tunnel brace/night-time elbow blocking with a towel.  - Follow up with Dr. Lopez after EMG    "

## 2017-07-28 ENCOUNTER — TELEPHONE (OUTPATIENT)
Dept: ORTHOPEDICS | Facility: CLINIC | Age: 62
End: 2017-07-28

## 2017-07-28 NOTE — TELEPHONE ENCOUNTER
----- Message from Kristen Young LPN sent at 7/28/2017  7:46 AM CDT -----  Contact: Colt Rose      ----- Message -----  From: Slick Marley  Sent: 7/27/2017   2:27 PM  To: [facility] Staff    X_  1st Request  _  2nd Request  _  3rd Request        Who: Colt Rose    Why: Patient called to state he is still awaiting to set up his nerve test. Please call back to follow up.    What Number to Call Back: [phone]    When to Expect a call back: (With in 24 hours)

## 2017-09-27 ENCOUNTER — PROCEDURE VISIT (OUTPATIENT)
Dept: NEUROLOGY | Facility: CLINIC | Age: 62
End: 2017-09-27
Payer: COMMERCIAL

## 2017-09-27 DIAGNOSIS — M25.521 PAIN OF BOTH ELBOWS: ICD-10-CM

## 2017-09-27 DIAGNOSIS — M54.2 NECK PAIN: ICD-10-CM

## 2017-09-27 DIAGNOSIS — M25.522 PAIN OF BOTH ELBOWS: ICD-10-CM

## 2017-09-27 DIAGNOSIS — R20.0 FINGER NUMBNESS: ICD-10-CM

## 2017-09-27 PROCEDURE — 95912 NRV CNDJ TEST 11-12 STUDIES: CPT | Mod: S$GLB,,, | Performed by: NEUROMUSCULOSKELETAL MEDICINE & OMM

## 2017-09-27 PROCEDURE — 95886 MUSC TEST DONE W/N TEST COMP: CPT | Mod: S$GLB,,, | Performed by: NEUROMUSCULOSKELETAL MEDICINE & OMM

## 2017-09-29 ENCOUNTER — DOCUMENTATION ONLY (OUTPATIENT)
Dept: FAMILY MEDICINE | Facility: CLINIC | Age: 62
End: 2017-09-29

## 2017-09-29 NOTE — PROGRESS NOTES
Pre-Visit Chart Review  For Appointment Scheduled on 10/6/17.    Health Maintenance Due   Topic Date Due    Pneumococcal PCV13 (High Risk) (1 - PCV13 Required) 07/22/1956    Pneumococcal PPSV23 (High Risk) (1) 07/22/1975    Zoster Vaccine  07/22/2017    Influenza Vaccine  08/01/2017

## 2017-10-04 ENCOUNTER — OFFICE VISIT (OUTPATIENT)
Dept: ORTHOPEDICS | Facility: CLINIC | Age: 62
End: 2017-10-04
Attending: ORTHOPAEDIC SURGERY
Payer: COMMERCIAL

## 2017-10-04 VITALS — BODY MASS INDEX: 26.74 KG/M2 | HEIGHT: 71 IN | WEIGHT: 191 LBS

## 2017-10-04 DIAGNOSIS — M19.049 ARTHRITIS OF HAND: ICD-10-CM

## 2017-10-04 DIAGNOSIS — M25.521 RIGHT ELBOW PAIN: Primary | ICD-10-CM

## 2017-10-04 PROCEDURE — 99999 PR PBB SHADOW E&M-EST. PATIENT-LVL II: CPT | Mod: PBBFAC,,, | Performed by: ORTHOPAEDIC SURGERY

## 2017-10-04 PROCEDURE — 20551 NJX 1 TENDON ORIGIN/INSJ: CPT | Mod: RT,S$GLB,, | Performed by: ORTHOPAEDIC SURGERY

## 2017-10-04 PROCEDURE — 99213 OFFICE O/P EST LOW 20 MIN: CPT | Mod: 25,S$GLB,, | Performed by: ORTHOPAEDIC SURGERY

## 2017-10-04 RX ORDER — NABUMETONE 500 MG/1
500 TABLET, FILM COATED ORAL 2 TIMES DAILY
Qty: 180 TABLET | Refills: 3 | Status: SHIPPED | OUTPATIENT
Start: 2017-10-04 | End: 2017-11-08 | Stop reason: SDUPTHER

## 2017-10-04 RX ORDER — TRIAMCINOLONE ACETONIDE 40 MG/ML
40 INJECTION, SUSPENSION INTRA-ARTICULAR; INTRAMUSCULAR
Status: COMPLETED | OUTPATIENT
Start: 2017-10-04 | End: 2017-10-04

## 2017-10-04 RX ADMIN — TRIAMCINOLONE ACETONIDE 40 MG: 40 INJECTION, SUSPENSION INTRA-ARTICULAR; INTRAMUSCULAR at 02:10

## 2017-10-04 NOTE — PROGRESS NOTES
HISTORY OF PRESENT ILLNESS:  Mr. Rose in followup of right elbow symptoms,   recently had a nerve conduction study, the results of which were normal.  I went   over that with him today.  No further numbness or tingling is reported, but he   is still having some elbow pain.  Previously, we tried an injection, which   helped out for a while.  He has also had physical therapy on the elbow in the   past.    PHYSICAL EXAMINATION:  RIGHT ELBOW:  There is tenderness laterally over the lateral epicondylar area,   slight swelling.  Range of motion full.  Some pain with resisted wrist   extension.  No instability.  Tinel sign negative.  Strength intact.    IMPRESSION:  Lateral epicondylitis, right elbow.    PLAN:  We discussed options for treatment including injection versus surgery.    The patient would like to try another injection, which helped previously.  After   pause for timeout, the patient identified the right elbow injected laterally   with combination of Kenalog 10 mg, 0.5 mL Xylocaine, sterile technique.  He   tolerated the procedure well.  Recommended ice, stretching and strengthening for   the elbow and I will go ahead and refill the Relafen, which he was on in the   past for the same problem.  Follow up in 2-3 months for recheck.  If symptoms   persist, we may think about surgical treatment for the right elbow.      DARIUSZ  dd: 10/04/2017 14:06:00 (CDT)  td: 10/05/2017 09:16:11 (CDT)  Doc ID   #[id number]  Job ID #[id number]    CC:

## 2017-10-06 ENCOUNTER — OFFICE VISIT (OUTPATIENT)
Dept: FAMILY MEDICINE | Facility: CLINIC | Age: 62
End: 2017-10-06
Payer: COMMERCIAL

## 2017-10-06 VITALS
BODY MASS INDEX: 27 KG/M2 | HEART RATE: 52 BPM | WEIGHT: 192.88 LBS | HEIGHT: 71 IN | DIASTOLIC BLOOD PRESSURE: 70 MMHG | TEMPERATURE: 98 F | SYSTOLIC BLOOD PRESSURE: 121 MMHG

## 2017-10-06 DIAGNOSIS — Z12.5 SCREENING FOR PROSTATE CANCER: ICD-10-CM

## 2017-10-06 DIAGNOSIS — Z23 IMMUNIZATION DUE: ICD-10-CM

## 2017-10-06 DIAGNOSIS — Z00.00 ROUTINE MEDICAL EXAM: Primary | ICD-10-CM

## 2017-10-06 PROCEDURE — 99396 PREV VISIT EST AGE 40-64: CPT | Mod: 25,S$GLB,, | Performed by: FAMILY MEDICINE

## 2017-10-06 PROCEDURE — 90736 HZV VACCINE LIVE SUBQ: CPT | Mod: S$GLB,,, | Performed by: FAMILY MEDICINE

## 2017-10-06 PROCEDURE — 90686 IIV4 VACC NO PRSV 0.5 ML IM: CPT | Mod: S$GLB,,, | Performed by: FAMILY MEDICINE

## 2017-10-06 PROCEDURE — 90471 IMMUNIZATION ADMIN: CPT | Mod: S$GLB,,, | Performed by: FAMILY MEDICINE

## 2017-10-06 PROCEDURE — 99999 PR PBB SHADOW E&M-EST. PATIENT-LVL III: CPT | Mod: PBBFAC,,, | Performed by: FAMILY MEDICINE

## 2017-10-06 PROCEDURE — 90472 IMMUNIZATION ADMIN EACH ADD: CPT | Mod: S$GLB,,, | Performed by: FAMILY MEDICINE

## 2017-10-06 RX ORDER — PANTOPRAZOLE SODIUM 40 MG/1
40 TABLET, DELAYED RELEASE ORAL DAILY
Qty: 90 TABLET | Refills: 3 | Status: SHIPPED | OUTPATIENT
Start: 2017-10-06 | End: 2017-12-08 | Stop reason: SDUPTHER

## 2017-10-06 RX ORDER — OMEPRAZOLE 20 MG/1
20 TABLET, DELAYED RELEASE ORAL DAILY
COMMUNITY
End: 2017-12-08

## 2017-10-06 RX ORDER — ATORVASTATIN CALCIUM 10 MG/1
10 TABLET, FILM COATED ORAL DAILY
Qty: 90 TABLET | Refills: 3 | Status: SHIPPED | OUTPATIENT
Start: 2017-10-06 | End: 2017-12-08 | Stop reason: SDUPTHER

## 2017-10-06 NOTE — PROGRESS NOTES
CHIEF COMPLAINT:  Routine medical exam      HISTORY OF PRESENT ILLNESS:  Colt Rose is a 62 y.o. male who presents to clinic for routine medical exam. He requests screening PSA. He follows up with his dentist, optometrist. He is due for influenza vaccine and zostavax. He exercises regularly and eats a low fat diet.    REVIEW OF SYSTEMS:  The patient denies any fever, chills, night sweats, headaches, vision changes, difficulty speaking or swallowing, decreased hearing, weight loss, weight gain, chest pain, palpitations, shortness of breath, cough, nausea, vomiting, abdominal pain, dysuria, diarrhea, constipation, hematuria, hematochezia, melena, changes in his hair, skin, nails, numbness or weakness in his extremities, erythema, swelling over of his joints, myalgia, swollen glands, easy bruising, fatigue, edema. He denies any scrotal/testicular masses, penile discharge. He denies any symptoms of anxiety or depression.      MEDICATIONS:   Reviewed and/or reconciled in EPIC    ALLERGIES:  Reviewed and/or reconciled in [facility]    PAST MEDICAL/SURGICAL HISTORY:   Past Medical History:   Diagnosis Date    Cervical disc displacement     Degeneration of lumbar intervertebral disc     GERD (gastroesophageal reflux disease)     Hyperlipidemia     Shingles 11/2013      Past Surgical History:   Procedure Laterality Date    CERVICAL FUSION      bone graft    TONSILLECTOMY, ADENOIDECTOMY         FAMILY HISTORY:    Family History   Problem Relation Age of Onset    Diabetes Mother     Hypertension Mother     Stroke Mother     Coronary artery disease Mother        SOCIAL HISTORY:    Social History     Social History    Marital status:      Spouse name: N/A    Number of children: N/A    Years of education: N/A     Occupational History    Not on file.     Social History Main Topics    Smoking status: Never Smoker    Smokeless tobacco: Never Used    Alcohol use 3.6 oz/week     6 Cans of beer per week       "Comment: weekly    Drug use: No    Sexual activity: Yes     Partners: Female     Other Topics Concern    Not on file     Social History Narrative    No narrative on file        He tries to eat a healthy diet.    PHYSICAL EXAM:  VITAL SIGNS:   Vitals:    10/06/17 1345   BP: 121/70   BP Location: Right arm   Patient Position: Sitting   BP Method: Small (Automatic)   Pulse: (!) 52   Temp: 98.1 °F (36.7 °C)   TempSrc: Oral   Weight: 87.5 kg (192 lb 14.4 oz)   Height: 5' 10.5" (1.791 m)     GENERAL:  Patient appears well nourished, sitting on exam table, in no acute distress.  HEENT:  Atraumatic, normocephalic, PERRLA, EOMI, no conjunctival injection, sclerae are anicteric, normal external auditory canals,TMs clear b/l, gross hearing intact to whisper, MMM, no oropharygneal erythema or exudate.  NECK:  Supple, normal ROM, trachea is midline , no supraclavicular or cervical LAD or masses palpated.  Thyroid gland not palpable.  CARDIOVASCULAR:  RRR, normal S1 and S2, no m/r/g.  RESPIRATORY:  CTA b/l, no wheezes, rhonchi, rales.  No increased work of breathing, no  use of accessory muscles.  ABDOMEN:  Soft, nontender, nondistended, normoactive bowel sounds in all four quadrants, no rebound or guarding, no HSM or masses palpated.  Normal percussion.  EXTREMITIES:  2+ DP pulses b/l, no edema.  SKIN:  Warm, no lesions on exposed skin.  NEUROMUSCULAR:  Cranial nerves II-XII grossly intact.  Strength is 4+/5 over upper and lower extremity flexors/extensors b/l, 2+ biceps and patellar reflexes b/l. No clubbing or cyanosis of digits/nails.  Steady gait.  PSYCH:  Patient is alert and oriented to person, time, place. They are appropriately dressed and groomed. There is normal eye contact. Rate and tone of speech is normal. Normal insight, judgement. Normal thought content and process.     LABORATORY/IMAGING STUDIES: pending    ASSESSMENT/PLAN: This is a 62 y.o. male who presents to clinic for evaluation of hand pain    1. " Routine medical exam, screening prostate cancer: We will check a screening CBC, CMP, TSH, fasting lipid panel, PSA.   Patient will be notified of these results and the need for any further evaluation and treatment. He will follow up with his dentist/optometrist as scheduled. We discussed the importance of continuing with  his exercise, continuing with a healthy, well-balanced diet.   2. immunization due: will receive influenza vaccine and zostavax in clinic today    Anca Owen MD

## 2017-10-14 ENCOUNTER — LAB VISIT (OUTPATIENT)
Dept: LAB | Facility: HOSPITAL | Age: 62
End: 2017-10-14
Attending: FAMILY MEDICINE
Payer: COMMERCIAL

## 2017-10-14 DIAGNOSIS — Z12.5 SCREENING FOR PROSTATE CANCER: ICD-10-CM

## 2017-10-14 DIAGNOSIS — Z00.00 ROUTINE MEDICAL EXAM: ICD-10-CM

## 2017-10-14 LAB
ALBUMIN SERPL BCP-MCNC: 3.6 G/DL
ALP SERPL-CCNC: 45 U/L
ALT SERPL W/O P-5'-P-CCNC: 26 U/L
ANION GAP SERPL CALC-SCNC: 10 MMOL/L
AST SERPL-CCNC: 25 U/L
BASOPHILS # BLD AUTO: 0.05 K/UL
BASOPHILS NFR BLD: 1.2 %
BILIRUB SERPL-MCNC: 0.5 MG/DL
BUN SERPL-MCNC: 26 MG/DL
CALCIUM SERPL-MCNC: 9.7 MG/DL
CHLORIDE SERPL-SCNC: 106 MMOL/L
CHOLEST SERPL-MCNC: 186 MG/DL
CHOLEST/HDLC SERPL: 3.4 {RATIO}
CO2 SERPL-SCNC: 26 MMOL/L
COMPLEXED PSA SERPL-MCNC: 0.44 NG/ML
CREAT SERPL-MCNC: 0.9 MG/DL
DIFFERENTIAL METHOD: ABNORMAL
EOSINOPHIL # BLD AUTO: 0.1 K/UL
EOSINOPHIL NFR BLD: 2.9 %
ERYTHROCYTE [DISTWIDTH] IN BLOOD BY AUTOMATED COUNT: 13.5 %
EST. GFR  (AFRICAN AMERICAN): >60 ML/MIN/1.73 M^2
EST. GFR  (NON AFRICAN AMERICAN): >60 ML/MIN/1.73 M^2
GLUCOSE SERPL-MCNC: 102 MG/DL
HCT VFR BLD AUTO: 41.3 %
HDLC SERPL-MCNC: 55 MG/DL
HDLC SERPL: 29.6 %
HGB BLD-MCNC: 14.1 G/DL
LDLC SERPL CALC-MCNC: 118.6 MG/DL
LYMPHOCYTES # BLD AUTO: 1.6 K/UL
LYMPHOCYTES NFR BLD: 39.3 %
MCH RBC QN AUTO: 31.3 PG
MCHC RBC AUTO-ENTMCNC: 34.1 G/DL
MCV RBC AUTO: 92 FL
MONOCYTES # BLD AUTO: 0.4 K/UL
MONOCYTES NFR BLD: 10.4 %
NEUTROPHILS # BLD AUTO: 1.9 K/UL
NEUTROPHILS NFR BLD: 46.2 %
NONHDLC SERPL-MCNC: 131 MG/DL
PLATELET # BLD AUTO: 208 K/UL
PMV BLD AUTO: 10.7 FL
POTASSIUM SERPL-SCNC: 4.4 MMOL/L
PROT SERPL-MCNC: 6.8 G/DL
RBC # BLD AUTO: 4.51 M/UL
SODIUM SERPL-SCNC: 142 MMOL/L
TRIGL SERPL-MCNC: 62 MG/DL
TSH SERPL DL<=0.005 MIU/L-ACNC: 2.48 UIU/ML
WBC # BLD AUTO: 4.12 K/UL

## 2017-10-14 PROCEDURE — 80061 LIPID PANEL: CPT

## 2017-10-14 PROCEDURE — 84153 ASSAY OF PSA TOTAL: CPT

## 2017-10-14 PROCEDURE — 85025 COMPLETE CBC W/AUTO DIFF WBC: CPT

## 2017-10-14 PROCEDURE — 84443 ASSAY THYROID STIM HORMONE: CPT

## 2017-10-14 PROCEDURE — 36415 COLL VENOUS BLD VENIPUNCTURE: CPT | Mod: PO

## 2017-10-14 PROCEDURE — 80053 COMPREHEN METABOLIC PANEL: CPT

## 2017-11-08 ENCOUNTER — OFFICE VISIT (OUTPATIENT)
Dept: ORTHOPEDICS | Facility: CLINIC | Age: 62
End: 2017-11-08
Attending: ORTHOPAEDIC SURGERY
Payer: COMMERCIAL

## 2017-11-08 VITALS — HEIGHT: 71 IN | BODY MASS INDEX: 26.88 KG/M2 | WEIGHT: 192 LBS

## 2017-11-08 DIAGNOSIS — M77.11 LATERAL EPICONDYLITIS OF BOTH ELBOWS: Primary | ICD-10-CM

## 2017-11-08 DIAGNOSIS — M77.12 LATERAL EPICONDYLITIS OF BOTH ELBOWS: Primary | ICD-10-CM

## 2017-11-08 DIAGNOSIS — M19.049 ARTHRITIS OF HAND: ICD-10-CM

## 2017-11-08 PROCEDURE — 99999 PR PBB SHADOW E&M-EST. PATIENT-LVL II: CPT | Mod: PBBFAC,,, | Performed by: ORTHOPAEDIC SURGERY

## 2017-11-08 PROCEDURE — 99213 OFFICE O/P EST LOW 20 MIN: CPT | Mod: S$GLB,,, | Performed by: ORTHOPAEDIC SURGERY

## 2017-11-08 RX ORDER — NABUMETONE 500 MG/1
500 TABLET, FILM COATED ORAL 2 TIMES DAILY
Qty: 180 TABLET | Refills: 3 | Status: SHIPPED | OUTPATIENT
Start: 2017-11-08 | End: 2018-09-26 | Stop reason: SDUPTHER

## 2017-11-08 NOTE — PROGRESS NOTES
HISTORY OF PRESENT ILLNESS:  Mr. Rose in followup of right elbow lateral   epicondylitis.  He is doing better since the injection last visit.  He is also   on anti-inflammatory medication by mouth.    PHYSICAL EXAMINATION:  RIGHT ELBOW:  Slight tenderness.  No swelling.  Range of motion full.  No pain   with resisted wrist extension.  Sensation intact.    PLAN:  I have recommended that he continue with thumb with anti-inflammatory   medication by mouth, gentle range of motion and observation of symptoms, avoid   repetitive lifting or gripping.  Follow up in two months or p.r.n.      DADA/JOSE  dd: 11/08/2017 13:41:48 (CST)  td: 11/09/2017 08:29:52 (CST)  Doc ID   #[id number]  Job ID #[id number]    CC:

## 2017-12-08 RX ORDER — PANTOPRAZOLE SODIUM 40 MG/1
40 TABLET, DELAYED RELEASE ORAL DAILY
Qty: 90 TABLET | Refills: 3 | Status: SHIPPED | OUTPATIENT
Start: 2017-12-08 | End: 2018-04-10

## 2017-12-08 RX ORDER — ATORVASTATIN CALCIUM 10 MG/1
10 TABLET, FILM COATED ORAL DAILY
Qty: 90 TABLET | Refills: 3 | Status: SHIPPED | OUTPATIENT
Start: 2017-12-08 | End: 2018-12-11 | Stop reason: SDUPTHER

## 2018-04-04 ENCOUNTER — DOCUMENTATION ONLY (OUTPATIENT)
Dept: FAMILY MEDICINE | Facility: CLINIC | Age: 63
End: 2018-04-04

## 2018-04-04 NOTE — PROGRESS NOTES
Pre-Visit Chart Review  For Appointment Scheduled on 4/10/18    There are no preventive care reminders to display for this patient.

## 2018-04-10 ENCOUNTER — OFFICE VISIT (OUTPATIENT)
Dept: FAMILY MEDICINE | Facility: CLINIC | Age: 63
End: 2018-04-10
Payer: COMMERCIAL

## 2018-04-10 ENCOUNTER — TELEPHONE (OUTPATIENT)
Dept: FAMILY MEDICINE | Facility: CLINIC | Age: 63
End: 2018-04-10

## 2018-04-10 VITALS
SYSTOLIC BLOOD PRESSURE: 117 MMHG | BODY MASS INDEX: 27.59 KG/M2 | TEMPERATURE: 98 F | WEIGHT: 197.06 LBS | DIASTOLIC BLOOD PRESSURE: 74 MMHG | HEART RATE: 54 BPM | HEIGHT: 71 IN

## 2018-04-10 DIAGNOSIS — R00.2 PALPITATIONS: ICD-10-CM

## 2018-04-10 DIAGNOSIS — E78.5 HYPERLIPIDEMIA, UNSPECIFIED HYPERLIPIDEMIA TYPE: Primary | ICD-10-CM

## 2018-04-10 PROCEDURE — 93000 ELECTROCARDIOGRAM COMPLETE: CPT | Mod: S$GLB,,, | Performed by: INTERNAL MEDICINE

## 2018-04-10 PROCEDURE — 99999 PR PBB SHADOW E&M-EST. PATIENT-LVL III: CPT | Mod: PBBFAC,,, | Performed by: FAMILY MEDICINE

## 2018-04-10 PROCEDURE — 99215 OFFICE O/P EST HI 40 MIN: CPT | Mod: 25,S$GLB,, | Performed by: FAMILY MEDICINE

## 2018-04-10 NOTE — PROGRESS NOTES
CHIEF COMPLAINT:  Follow up hyperlipdiemia      HISTORY OF PRESENT ILLNESS:  Colt Rose is a 62 y.o. male who presents to clinic for evaluation of the following concerns    1. Hyperlipidemia: patient is on lipitor 10 mg daily. He denies any myalgia, dark colored urine. He gets regular exercise    2. He describes occasional palpitations. He denies nay SOB, CP, lightheadedness, tachycardia.     REVIEW OF SYSTEMS: The patient denies any fever, chills, night sweats, headaches, vision changes, difficulty speaking or swallowing, decreased hearing, weight loss, weight gain, chest pain, shortness of breath, cough, nausea, vomiting, abdominal pain, dysuria, diarrhea, constipation, hematuria, hematochezia, melena, changes in his hair, skin, nails, numbness or weakness in his extremities, erythema, swelling or pain over any of his joints, myalgia, swollen glands, easy bruising, fatigue, edema. He denies any scrotal/testicular masses, penile discharge. He denies any symptoms of anxiety or depression.        MEDICATIONS:   Reviewed and/or reconciled in EPIC    ALLERGIES:  Reviewed and/or reconciled in [facility]    PAST MEDICAL/SURGICAL HISTORY:   Past Medical History:   Diagnosis Date    Cervical disc displacement     Degeneration of lumbar intervertebral disc     GERD (gastroesophageal reflux disease)     Hyperlipidemia     Shingles 11/2013      Past Surgical History:   Procedure Laterality Date    CERVICAL FUSION      bone graft    TONSILLECTOMY, ADENOIDECTOMY         FAMILY HISTORY:    Family History   Problem Relation Age of Onset    Diabetes Mother     Hypertension Mother     Stroke Mother     Coronary artery disease Mother     Stroke Father        SOCIAL HISTORY:    Social History     Social History    Marital status:      Spouse name: N/A    Number of children: N/A    Years of education: N/A     Occupational History    Not on file.     Social History Main Topics    Smoking status: Never Smoker     Smokeless tobacco: Never Used    Alcohol use 3.6 oz/week     6 Cans of beer per week      Comment: weekly    Drug use: No    Sexual activity: Yes     Partners: Female     Other Topics Concern    Not on file     Social History Narrative    No narrative on file       PHYSICAL EXAM:  VITAL SIGNS:   There were no vitals filed for this visit.  GENERAL:  Patient appears well nourished, sitting on exam table, in no acute distress.  HEENT:  Atraumatic, normocephalic, PERRLA, EOMI, no conjunctival injection, sclerae are anicteric, normal external auditory canals,TMs clear b/l, gross hearing intact to whisper, MMM, no oropharygneal erythema or exudate.  NECK:  Supple, normal ROM, trachea is midline , no supraclavicular or cervical LAD or masses palpated.  Thyroid gland not palpable.  CARDIOVASCULAR:  RRR, normal S1 and S2, no m/r/g.  RESPIRATORY:  CTA b/l, no wheezes, rhonchi, rales.  No increased work of breathing, no  use of accessory muscles.  ABDOMEN:  Soft, nontender, nondistended, normoactive bowel sounds in all four quadrants, no rebound or guarding, no HSM or masses palpated.  Normal percussion.  EXTREMITIES:  2+ DP pulses b/l, no edema.  SKIN:  Warm, no lesions on exposed skin.  NEUROMUSCULAR:  Cranial nerves II-XII grossly intact.   2+ biceps and patellar reflexes b/l. No clubbing or cyanosis of digits/nails.  Steady gait.  PSYCH:  Patient is alert and oriented to person, time, place. They are appropriately dressed and groomed. There is normal eye contact. Rate and tone of speech is normal. Normal insight, judgement. Normal thought content and process.     LABORATORY/IMAGING STUDIES: I have reviewed a 12 lead EKG which demonstrates a rate of 61 bpm, sinus bradycardia    ASSESSMENT/PLAN: This is a 62 y.o. male who presents to clinic for evaluation of the following concerns   1. Hyperlipidemia: cmp, lipid panel  2. Palpitations: cmp, magnesium level, if these continue may need echocardiogram, holter monitor.            Anca Owen MD

## 2018-04-10 NOTE — TELEPHONE ENCOUNTER
Patient states that protonix is no longer covered by his insurance, can we call them and find out what alternative I can prescribe.

## 2018-04-11 NOTE — TELEPHONE ENCOUNTER
Spoke with patient, advised him to call ins[employer] Co and find out what medication is covered, then let us know.

## 2018-04-21 ENCOUNTER — LAB VISIT (OUTPATIENT)
Dept: LAB | Facility: HOSPITAL | Age: 63
End: 2018-04-21
Attending: FAMILY MEDICINE
Payer: COMMERCIAL

## 2018-04-21 ENCOUNTER — TELEPHONE (OUTPATIENT)
Dept: FAMILY MEDICINE | Facility: CLINIC | Age: 63
End: 2018-04-21

## 2018-04-21 DIAGNOSIS — E78.5 HYPERLIPIDEMIA, UNSPECIFIED HYPERLIPIDEMIA TYPE: ICD-10-CM

## 2018-04-21 DIAGNOSIS — R00.2 PALPITATIONS: ICD-10-CM

## 2018-04-21 LAB
ALBUMIN SERPL BCP-MCNC: 3.8 G/DL
ALP SERPL-CCNC: 47 U/L
ALT SERPL W/O P-5'-P-CCNC: 30 U/L
ANION GAP SERPL CALC-SCNC: 7 MMOL/L
AST SERPL-CCNC: 25 U/L
BILIRUB SERPL-MCNC: 0.6 MG/DL
BUN SERPL-MCNC: 15 MG/DL
CALCIUM SERPL-MCNC: 9.2 MG/DL
CHLORIDE SERPL-SCNC: 105 MMOL/L
CHOLEST SERPL-MCNC: 203 MG/DL
CHOLEST/HDLC SERPL: 3.5 {RATIO}
CO2 SERPL-SCNC: 28 MMOL/L
CREAT SERPL-MCNC: 0.8 MG/DL
EST. GFR  (AFRICAN AMERICAN): >60 ML/MIN/1.73 M^2
EST. GFR  (NON AFRICAN AMERICAN): >60 ML/MIN/1.73 M^2
GLUCOSE SERPL-MCNC: 100 MG/DL
HDLC SERPL-MCNC: 58 MG/DL
HDLC SERPL: 28.6 %
LDLC SERPL CALC-MCNC: 132.4 MG/DL
MAGNESIUM SERPL-MCNC: 2.1 MG/DL
NONHDLC SERPL-MCNC: 145 MG/DL
POTASSIUM SERPL-SCNC: 4.2 MMOL/L
PROT SERPL-MCNC: 6.8 G/DL
SODIUM SERPL-SCNC: 140 MMOL/L
TRIGL SERPL-MCNC: 63 MG/DL

## 2018-04-21 PROCEDURE — 83735 ASSAY OF MAGNESIUM: CPT

## 2018-04-21 PROCEDURE — 36415 COLL VENOUS BLD VENIPUNCTURE: CPT | Mod: PO

## 2018-04-21 PROCEDURE — 80061 LIPID PANEL: CPT

## 2018-04-21 PROCEDURE — 80053 COMPREHEN METABOLIC PANEL: CPT

## 2018-04-21 NOTE — TELEPHONE ENCOUNTER
Final EKG read demonstrates a wandering atrial rhythm, which means that the center of electrical conduction in the heart switches from the SA to the AV node. This is usually not a cause of concern. However if he continues to notice palpitations, irregular heart beat, very fast or slow heart beat he needs to let me know and will need to see cardiology.

## 2018-09-26 DIAGNOSIS — M19.049 ARTHRITIS OF HAND: ICD-10-CM

## 2018-09-26 RX ORDER — NABUMETONE 500 MG/1
500 TABLET, FILM COATED ORAL 2 TIMES DAILY
Qty: 180 TABLET | Refills: 3 | Status: SHIPPED | OUTPATIENT
Start: 2018-09-26 | End: 2018-12-11 | Stop reason: SDUPTHER

## 2018-09-28 ENCOUNTER — TELEPHONE (OUTPATIENT)
Dept: FAMILY MEDICINE | Facility: CLINIC | Age: 63
End: 2018-09-28

## 2018-10-08 ENCOUNTER — DOCUMENTATION ONLY (OUTPATIENT)
Dept: FAMILY MEDICINE | Facility: CLINIC | Age: 63
End: 2018-10-08

## 2018-10-10 ENCOUNTER — OFFICE VISIT (OUTPATIENT)
Dept: FAMILY MEDICINE | Facility: CLINIC | Age: 63
End: 2018-10-10
Payer: COMMERCIAL

## 2018-10-10 VITALS
DIASTOLIC BLOOD PRESSURE: 70 MMHG | BODY MASS INDEX: 26.29 KG/M2 | TEMPERATURE: 98 F | HEIGHT: 71 IN | HEART RATE: 46 BPM | SYSTOLIC BLOOD PRESSURE: 117 MMHG | WEIGHT: 187.81 LBS

## 2018-10-10 DIAGNOSIS — Z23 IMMUNIZATION DUE: ICD-10-CM

## 2018-10-10 DIAGNOSIS — H61.21 IMPACTED CERUMEN OF RIGHT EAR: ICD-10-CM

## 2018-10-10 DIAGNOSIS — Z00.00 ROUTINE MEDICAL EXAM: Primary | ICD-10-CM

## 2018-10-10 DIAGNOSIS — Z12.5 SCREENING FOR PROSTATE CANCER: ICD-10-CM

## 2018-10-10 PROCEDURE — 99999 PR PBB SHADOW E&M-EST. PATIENT-LVL IV: CPT | Mod: PBBFAC,,, | Performed by: FAMILY MEDICINE

## 2018-10-10 PROCEDURE — 90471 IMMUNIZATION ADMIN: CPT | Mod: S$GLB,,, | Performed by: FAMILY MEDICINE

## 2018-10-10 PROCEDURE — 90686 IIV4 VACC NO PRSV 0.5 ML IM: CPT | Mod: S$GLB,,, | Performed by: FAMILY MEDICINE

## 2018-10-10 PROCEDURE — 99396 PREV VISIT EST AGE 40-64: CPT | Mod: 25,S$GLB,, | Performed by: FAMILY MEDICINE

## 2018-10-10 RX ORDER — PANTOPRAZOLE SODIUM 40 MG/1
40 TABLET, DELAYED RELEASE ORAL DAILY
Refills: 3 | COMMUNITY
Start: 2018-10-04 | End: 2019-01-01 | Stop reason: SDUPTHER

## 2018-10-10 NOTE — PROGRESS NOTES
CHIEF COMPLAINT:  Routine medical exam      HISTORY OF PRESENT ILLNESS:  Colt Rose is a 63 y.o. male who presents to clinic for routine medical exam. He requests a screening PSA. He follows up with his dentist, optometrist. He is due for influenza vaccine. He exercises regularly and eats a low fat diet.    REVIEW OF SYSTEMS:  The patient denies any fever, chills, night sweats, headaches, vision changes, difficulty speaking or swallowing, decreased hearing, weight loss, weight gain, chest pain, palpitations, shortness of breath, cough, nausea, vomiting, abdominal pain, dysuria, diarrhea, constipation, hematuria, hematochezia, melena, changes in his hair, skin, nails, numbness or weakness in his extremities, erythema, swelling over of his joints, myalgia, swollen glands, easy bruising, fatigue, edema. He denies any scrotal/testicular masses, penile discharge. He denies any symptoms of anxiety or depression.      MEDICATIONS:   Reviewed and/or reconciled in EPIC    ALLERGIES:  Reviewed and/or reconciled in [facility]    PAST MEDICAL/SURGICAL HISTORY:   Past Medical History:   Diagnosis Date    Cervical disc displacement     Degeneration of lumbar intervertebral disc     GERD (gastroesophageal reflux disease)     Hyperlipidemia     Shingles 11/2013      Past Surgical History:   Procedure Laterality Date    CERVICAL FUSION      bone graft    ESOPHAGOGASTRODUODENOSCOPY (EGD) N/A 9/9/2015    Performed by Tyler [facility] at [facility]    TONSILLECTOMY, ADENOIDECTOMY         FAMILY HISTORY:    Family History   Problem Relation Age of Onset    Diabetes Mother     Hypertension Mother     Stroke Mother     Coronary artery disease Mother     Stroke Father        SOCIAL HISTORY:    Social History     Socioeconomic History    Marital status:      Spouse name: Not on file    Number of children: Not on file    Years of education: Not on file    Highest education level: Not on file   Social Needs     "Financial resource strain: Not on file    Food insecurity - worry: Not on file    Food insecurity - inability: Not on file    Transportation needs - medical: Not on file    Transportation needs - non-medical: Not on file   Occupational History    Not on file   Tobacco Use    Smoking status: Never Smoker    Smokeless tobacco: Never Used   Substance and Sexual Activity    Alcohol use: Yes     Alcohol/week: 3.6 oz     Types: 6 Cans of beer per week     Comment: weekly    Drug use: No    Sexual activity: Yes     Partners: Female   Other Topics Concern    Not on file   Social History Narrative    Not on file        He tries to eat a healthy diet.    PHYSICAL EXAM:  VITAL SIGNS:   Vitals:    10/10/18 1559   BP: 117/70   BP Location: Left arm   Patient Position: Sitting   BP Method: Medium (Automatic)   Pulse: (!) 46   Temp: 98.2 °F (36.8 °C)   TempSrc: Oral   Weight: 85.2 kg (187 lb 13.3 oz)   Height: 5' 10.5" (1.791 m)     GENERAL:  Patient appears well nourished, sitting on exam table, in no acute distress.  HEENT:  Atraumatic, normocephalic, PERRLA, EOMI, no conjunctival injection, sclerae are anicteric, normal external auditory canals,left TM clear, right cerumen impaction gross hearing intact to whisper, MMM, no oropharygneal erythema or exudate.  NECK:  Supple, normal ROM, trachea is midline , no supraclavicular or cervical LAD or masses palpated.  Thyroid gland not palpable.  CARDIOVASCULAR:  RRR, normal S1 and S2, no m/r/g.  RESPIRATORY:  CTA b/l, no wheezes, rhonchi, rales.  No increased work of breathing, no  use of accessory muscles.  ABDOMEN:  Soft, nontender, nondistended, normoactive bowel sounds in all four quadrants, no rebound or guarding, no HSM or masses palpated.  Normal percussion.  EXTREMITIES:  2+ DP pulses b/l, no edema.  SKIN:  Warm, no lesions on exposed skin.  NEUROMUSCULAR:  Cranial nerves II-XII grossly intact.  Strength is 4+/5 over upper and lower extremity flexors/extensors b/l, " 2+ biceps and patellar reflexes b/l. No clubbing or cyanosis of digits/nails.  Steady gait.  PSYCH:  Patient is alert and oriented to person, time, place. They are appropriately dressed and groomed. There is normal eye contact. Rate and tone of speech is normal. Normal insight, judgement. Normal thought content and process.     LABORATORY/IMAGING STUDIES: pending    ASSESSMENT/PLAN: This is a 63 y.o. male who presents to clinic for evaluation of hand pain    1. Routine medical exam, screening prostate cancer: We will check a screening CBC, CMP, TSH, fasting lipid panel, PSA.   Patient will be notified of these results and the need for any further evaluation and treatment. He will follow up with his dentist/optometrist as scheduled. We discussed the importance of continuing with  his exercise, continuing with a healthy, well-balanced diet.   2. immunization due: will receive influenza vaccine in  inic today  3. Cerumen impaction: refer to ENT      Anca Owen MD

## 2018-10-10 NOTE — PROGRESS NOTES
Administered Flu IM. Patient tolerated well. No bleeding at insertion site noted. Pain scale 0/10 with injection. Two patient identifiers used (Name and ). Asceptic technique maintained.

## 2018-10-11 DIAGNOSIS — M19.049 ARTHRITIS OF HAND: ICD-10-CM

## 2018-10-11 RX ORDER — NABUMETONE 500 MG/1
TABLET, FILM COATED ORAL
Qty: 180 TABLET | Refills: 0 | Status: SHIPPED | OUTPATIENT
Start: 2018-10-11 | End: 2018-10-16 | Stop reason: SDUPTHER

## 2018-10-16 ENCOUNTER — OFFICE VISIT (OUTPATIENT)
Dept: OTOLARYNGOLOGY | Facility: CLINIC | Age: 63
End: 2018-10-16
Payer: COMMERCIAL

## 2018-10-16 VITALS — HEIGHT: 71 IN | BODY MASS INDEX: 26.33 KG/M2 | WEIGHT: 188.06 LBS

## 2018-10-16 DIAGNOSIS — R09.A9: ICD-10-CM

## 2018-10-16 DIAGNOSIS — H61.23 BILATERAL IMPACTED CERUMEN: Primary | ICD-10-CM

## 2018-10-16 PROCEDURE — 69210 REMOVE IMPACTED EAR WAX UNI: CPT | Mod: S$GLB,,, | Performed by: NURSE PRACTITIONER

## 2018-10-16 PROCEDURE — 3008F BODY MASS INDEX DOCD: CPT | Mod: CPTII,S$GLB,, | Performed by: NURSE PRACTITIONER

## 2018-10-16 PROCEDURE — 99999 PR PBB SHADOW E&M-EST. PATIENT-LVL III: CPT | Mod: PBBFAC,,, | Performed by: NURSE PRACTITIONER

## 2018-10-16 PROCEDURE — 99203 OFFICE O/P NEW LOW 30 MIN: CPT | Mod: 25,S$GLB,, | Performed by: NURSE PRACTITIONER

## 2018-10-16 NOTE — PROGRESS NOTES
Subjective:       Patient ID: Colt Rose is a 63 y.o. male.    Chief Complaint: Cerumen Impaction    HPI   Patient states he feels his ears drain. When he is doing his floor exercises and lays on his side, he can feel something run from his ear. When he digs in his ears, he gets out dry flaky skin/wax. No otalgia. No gracie otorrhea. No hearing loss. No other ENT complaint at this time.     Review of Systems   Constitutional: Negative.    HENT: Negative.    Eyes: Negative.    Respiratory: Negative.    Cardiovascular: Negative.    Gastrointestinal: Negative.    Musculoskeletal: Negative.    Skin: Negative.    Neurological: Negative.    Hematological: Negative.    Psychiatric/Behavioral: Negative.        Objective:      Physical Exam   Constitutional: He is oriented to person, place, and time. Vital signs are normal. He appears well-developed and well-nourished. He is cooperative. He does not appear ill. No distress.   HENT:   Head: Normocephalic and atraumatic.   Right Ear: Hearing, tympanic membrane, external ear and ear canal normal. Tympanic membrane is not erythematous. No middle ear effusion.   Left Ear: Hearing, tympanic membrane, external ear and ear canal normal. Tympanic membrane is not erythematous.  No middle ear effusion.   Nose: Nose normal.   Mouth/Throat: Uvula is midline, oropharynx is clear and moist and mucous membranes are normal.     SEPARATE PROCEDURE IN OFFICE:   Procedure: Removal of impacted cerumen, bilateral   Pre Procedure Diagnosis: Cerumen Impaction   Post Procedure Diagnosis: Cerumen Impaction   Verbal informed consent in regards to risk of trauma to ear canal, ear drum or hearing, discomfort during procedure and/or inability to remove cerumen impaction in one session or unforeseen events or complications.   No anesthesia.     Procedure in detail:   Ear canal visualized bilateral with appropriate size ear speculum utilizing Operating Head Binocular Otomicroscope   Utilizing the  following: Ring curet, delicate alligator forceps, and/or suction cannula. The impacted cerumen of the ear canals was removed atraumatically. The TM and EAC were then inspected and found to be clear of wax. See description of TMs/EACs in PE above.   Complications: No   Condition: Improved/Good     Eyes: EOM and lids are normal. Right eye exhibits no discharge. Left eye exhibits no discharge. No scleral icterus.   Neck: Trachea normal and normal range of motion. Neck supple. No tracheal deviation present.   Cardiovascular: Normal rate.   Pulmonary/Chest: Effort normal. No stridor. No respiratory distress. He has no wheezes.   Musculoskeletal: Normal range of motion.   Neurological: He is alert and oriented to person, place, and time. He has normal strength. Coordination and gait normal.   Skin: Skin is warm, dry and intact. He is not diaphoretic. No cyanosis. No pallor.   Psychiatric: He has a normal mood and affect. His speech is normal and behavior is normal. Judgment and thought content normal. Cognition and memory are normal.   Nursing note and vitals reviewed.      Assessment:     Bilateral cerumen impactions removed      Plan:     Reassured patient no evidence of OM or OE. No otorrhea. Sensation may have been wax production or dried wax moving around in ear.     Warm gylcerin prn to prevent hard dried ear wax.   Return to clinic as needed for further ENT concerns

## 2018-10-18 ENCOUNTER — LAB VISIT (OUTPATIENT)
Dept: LAB | Facility: HOSPITAL | Age: 63
End: 2018-10-18
Attending: FAMILY MEDICINE
Payer: COMMERCIAL

## 2018-10-18 DIAGNOSIS — Z00.00 ROUTINE MEDICAL EXAM: ICD-10-CM

## 2018-10-18 DIAGNOSIS — Z12.5 SCREENING FOR PROSTATE CANCER: ICD-10-CM

## 2018-10-18 LAB
ALBUMIN SERPL BCP-MCNC: 4 G/DL
ALP SERPL-CCNC: 49 U/L
ALT SERPL W/O P-5'-P-CCNC: 23 U/L
ANION GAP SERPL CALC-SCNC: 7 MMOL/L
AST SERPL-CCNC: 23 U/L
BASOPHILS # BLD AUTO: 0.05 K/UL
BASOPHILS NFR BLD: 1.3 %
BILIRUB SERPL-MCNC: 0.7 MG/DL
BUN SERPL-MCNC: 16 MG/DL
CALCIUM SERPL-MCNC: 10 MG/DL
CHLORIDE SERPL-SCNC: 104 MMOL/L
CHOLEST SERPL-MCNC: 202 MG/DL
CHOLEST/HDLC SERPL: 3.3 {RATIO}
CO2 SERPL-SCNC: 29 MMOL/L
COMPLEXED PSA SERPL-MCNC: 0.46 NG/ML
CREAT SERPL-MCNC: 0.8 MG/DL
DIFFERENTIAL METHOD: ABNORMAL
EOSINOPHIL # BLD AUTO: 0.1 K/UL
EOSINOPHIL NFR BLD: 2.5 %
ERYTHROCYTE [DISTWIDTH] IN BLOOD BY AUTOMATED COUNT: 13.1 %
EST. GFR  (AFRICAN AMERICAN): >60 ML/MIN/1.73 M^2
EST. GFR  (NON AFRICAN AMERICAN): >60 ML/MIN/1.73 M^2
GLUCOSE SERPL-MCNC: 105 MG/DL
HCT VFR BLD AUTO: 43.4 %
HDLC SERPL-MCNC: 61 MG/DL
HDLC SERPL: 30.2 %
HGB BLD-MCNC: 14.8 G/DL
IMM GRANULOCYTES # BLD AUTO: 0 K/UL
IMM GRANULOCYTES NFR BLD AUTO: 0 %
LDLC SERPL CALC-MCNC: 129 MG/DL
LYMPHOCYTES # BLD AUTO: 1.5 K/UL
LYMPHOCYTES NFR BLD: 38.6 %
MCH RBC QN AUTO: 32 PG
MCHC RBC AUTO-ENTMCNC: 34.1 G/DL
MCV RBC AUTO: 94 FL
MONOCYTES # BLD AUTO: 0.5 K/UL
MONOCYTES NFR BLD: 11.3 %
NEUTROPHILS # BLD AUTO: 1.9 K/UL
NEUTROPHILS NFR BLD: 46.3 %
NONHDLC SERPL-MCNC: 141 MG/DL
NRBC BLD-RTO: 0 /100 WBC
PLATELET # BLD AUTO: 217 K/UL
PMV BLD AUTO: 10.9 FL
POTASSIUM SERPL-SCNC: 4.3 MMOL/L
PROT SERPL-MCNC: 7.1 G/DL
RBC # BLD AUTO: 4.63 M/UL
SODIUM SERPL-SCNC: 140 MMOL/L
TRIGL SERPL-MCNC: 60 MG/DL
TSH SERPL DL<=0.005 MIU/L-ACNC: 2.52 UIU/ML
WBC # BLD AUTO: 3.99 K/UL

## 2018-10-18 PROCEDURE — 80053 COMPREHEN METABOLIC PANEL: CPT

## 2018-10-18 PROCEDURE — 36415 COLL VENOUS BLD VENIPUNCTURE: CPT | Mod: PO

## 2018-10-18 PROCEDURE — 85025 COMPLETE CBC W/AUTO DIFF WBC: CPT

## 2018-10-18 PROCEDURE — 80061 LIPID PANEL: CPT

## 2018-10-18 PROCEDURE — 84153 ASSAY OF PSA TOTAL: CPT

## 2018-10-18 PROCEDURE — 84443 ASSAY THYROID STIM HORMONE: CPT

## 2018-12-11 DIAGNOSIS — M19.049 ARTHRITIS OF HAND: ICD-10-CM

## 2018-12-11 RX ORDER — ATORVASTATIN CALCIUM 10 MG/1
10 TABLET, FILM COATED ORAL DAILY
Qty: 90 TABLET | Refills: 3 | Status: SHIPPED | OUTPATIENT
Start: 2018-12-11 | End: 2019-11-08 | Stop reason: SDUPTHER

## 2018-12-11 RX ORDER — NABUMETONE 500 MG/1
500 TABLET, FILM COATED ORAL 2 TIMES DAILY
Qty: 180 TABLET | Refills: 3 | Status: SHIPPED | OUTPATIENT
Start: 2018-12-11 | End: 2019-11-26 | Stop reason: ALTCHOICE

## 2019-01-02 RX ORDER — PANTOPRAZOLE SODIUM 40 MG/1
TABLET, DELAYED RELEASE ORAL
Qty: 90 TABLET | Refills: 3 | Status: SHIPPED | OUTPATIENT
Start: 2019-01-02 | End: 2019-11-08 | Stop reason: SDUPTHER

## 2019-04-12 ENCOUNTER — PATIENT OUTREACH (OUTPATIENT)
Dept: ADMINISTRATIVE | Facility: HOSPITAL | Age: 64
End: 2019-04-12

## 2019-04-29 ENCOUNTER — OFFICE VISIT (OUTPATIENT)
Dept: FAMILY MEDICINE | Facility: CLINIC | Age: 64
End: 2019-04-29
Payer: COMMERCIAL

## 2019-04-29 VITALS
TEMPERATURE: 99 F | BODY MASS INDEX: 26.23 KG/M2 | WEIGHT: 187.38 LBS | DIASTOLIC BLOOD PRESSURE: 67 MMHG | HEART RATE: 48 BPM | HEIGHT: 71 IN | SYSTOLIC BLOOD PRESSURE: 121 MMHG

## 2019-04-29 DIAGNOSIS — M54.50 CHRONIC RIGHT-SIDED LOW BACK PAIN WITHOUT SCIATICA: ICD-10-CM

## 2019-04-29 DIAGNOSIS — E78.5 HYPERLIPIDEMIA, UNSPECIFIED HYPERLIPIDEMIA TYPE: Primary | ICD-10-CM

## 2019-04-29 DIAGNOSIS — G89.29 CHRONIC RIGHT-SIDED LOW BACK PAIN WITHOUT SCIATICA: ICD-10-CM

## 2019-04-29 PROCEDURE — 3008F BODY MASS INDEX DOCD: CPT | Mod: CPTII,S$GLB,, | Performed by: FAMILY MEDICINE

## 2019-04-29 PROCEDURE — 99999 PR PBB SHADOW E&M-EST. PATIENT-LVL IV: CPT | Mod: PBBFAC,,, | Performed by: FAMILY MEDICINE

## 2019-04-29 PROCEDURE — 99999 PR PBB SHADOW E&M-EST. PATIENT-LVL IV: ICD-10-PCS | Mod: PBBFAC,,, | Performed by: FAMILY MEDICINE

## 2019-04-29 PROCEDURE — 99214 OFFICE O/P EST MOD 30 MIN: CPT | Mod: S$GLB,,, | Performed by: FAMILY MEDICINE

## 2019-04-29 PROCEDURE — 3008F PR BODY MASS INDEX (BMI) DOCUMENTED: ICD-10-PCS | Mod: CPTII,S$GLB,, | Performed by: FAMILY MEDICINE

## 2019-04-29 PROCEDURE — 99214 PR OFFICE/OUTPT VISIT, EST, LEVL IV, 30-39 MIN: ICD-10-PCS | Mod: S$GLB,,, | Performed by: FAMILY MEDICINE

## 2019-04-29 RX ORDER — CYCLOBENZAPRINE HCL 5 MG
TABLET ORAL
Qty: 60 TABLET | Refills: 0 | Status: SHIPPED | OUTPATIENT
Start: 2019-04-29 | End: 2019-11-08

## 2019-04-29 RX ORDER — FLUOCINOLONE ACETONIDE 0.11 MG/ML
5 OIL AURICULAR (OTIC) 2 TIMES DAILY PRN
Refills: 5 | COMMUNITY
Start: 2019-03-21

## 2019-04-29 NOTE — PROGRESS NOTES
CHIEF COMPLAINT:  Follow up hyperlipdiemia      HISTORY OF PRESENT ILLNESS:  Colt Rose is a 63 y.o. male who presents to clinic for follow up on hyperlipidemia    1. Hyperlipidemia: He is on lipitor 10 mg daily. He denies any myalgia, dark colored urine. He gets regular exercise. He is due for a CMP, lipid panel.     2. He has chronic low back pain which has recently worsened. He also endorses b/l hi pain at night.         REVIEW OF SYSTEMS: The patient denies any fever, chills, night sweats, headaches, vision changes, difficulty speaking or swallowing, decreased hearing, weight loss, weight gain, chest pain, shortness of breath, cough, nausea, vomiting, abdominal pain, dysuria, diarrhea, constipation, hematuria, hematochezia, melena, changes in his hair, skin, nails, numbness or weakness in his extremities, erythema, swelling or pain over any of his joints, myalgia, swollen glands, easy bruising, fatigue, edema. He denies any scrotal/testicular masses, penile discharge. He denies any symptoms of anxiety or depression.        MEDICATIONS:   Reviewed and/or reconciled in EPIC    ALLERGIES:  Reviewed and/or reconciled in [facility]    PAST MEDICAL/SURGICAL HISTORY:   Past Medical History:   Diagnosis Date    Arthritis of hand 8/6/2015    Cervical disc displacement     Degeneration of lumbar intervertebral disc     GERD (gastroesophageal reflux disease)     Hip pain 2/22/2016    Hyperlipidemia     Shingles 11/2013      Past Surgical History:   Procedure Laterality Date    CERVICAL FUSION      bone graft    ESOPHAGOGASTRODUODENOSCOPY (EGD) N/A 9/9/2015    [facility]    TONSILLECTOMY, ADENOIDECTOMY         FAMILY HISTORY:    Family History   Problem Relation Age of Onset    Diabetes Mother     Hypertension Mother     Stroke Mother     Coronary artery disease Mother     Stroke Father     Heart disease Brother         CAD       SOCIAL HISTORY:    Social History  "    Socioeconomic History    Marital status:      Spouse name: Not on file    Number of children: Not on file    Years of education: Not on file    Highest education level: Not on file   Occupational History    Not on file   Social Needs    Financial resource strain: Not on file    Food insecurity:     Worry: Not on file     Inability: Not on file    Transportation needs:     Medical: Not on file     Non-medical: Not on file   Tobacco Use    Smoking status: Never Smoker    Smokeless tobacco: Never Used   Substance and Sexual Activity    Alcohol use: Yes     Alcohol/week: 3.6 oz     Types: 6 Cans of beer per week     Comment: weekly    Drug use: No    Sexual activity: Yes     Partners: Female   Lifestyle    Physical activity:     Days per week: Not on file     Minutes per session: Not on file    Stress: Not on file   Relationships    Social connections:     Talks on phone: Not on file     Gets together: Not on file     Attends Caodaism service: Not on file     Active member of club or organization: Not on file     Attends meetings of clubs or organizations: Not on file     Relationship status: Not on file   Other Topics Concern    Not on file   Social History Narrative    Not on file       PHYSICAL EXAM:  VITAL SIGNS:   Vitals:    04/29/19 1554   BP: 121/67   Pulse: (!) 48   Temp: 98.6 °F (37 °C)   Weight: 85 kg (187 lb 6.3 oz)   Height: 5' 10.5" (1.791 m)     GENERAL:  Patient appears well nourished, sitting on exam table, in no acute distress.  HEENT:  Atraumatic, normocephalic, PERRLA, EOMI, no conjunctival injection, sclerae are anicteric, normal external auditory canals,TMs clear b/l, gross hearing intact to whisper, MMM, no oropharygneal erythema or exudate.  NECK:  Supple, normal ROM, trachea is midline , no supraclavicular or cervical LAD or masses palpated.  Thyroid gland not palpable.  CARDIOVASCULAR:  RRR, normal S1 and S2, no m/r/g.  RESPIRATORY:  CTA b/l, no wheezes, rhonchi, " rales.  No increased work of breathing, no  use of accessory muscles.  ABDOMEN:  Soft, nontender, nondistended, normoactive bowel sounds in all four quadrants, no rebound or guarding, no HSM or masses palpated.  Normal percussion.  EXTREMITIES:  2+ DP pulses b/l, no edema.  SKIN:  Warm, no lesions on exposed skin.  NEUROMUSCULAR:  Cranial nerves II-XII grossly intact.   2+ biceps and patellar reflexes b/l. No clubbing or cyanosis of digits/nails.  Steady gait.  PSYCH:  Patient is alert and oriented to person, time, place. They are appropriately dressed and groomed. There is normal eye contact. Rate and tone of speech is normal. Normal insight, judgement. Normal thought content and process.     LABORATORY/IMAGING STUDIES: I have reviewed a 12 lead EKG which demonstrates a rate of 61 bpm, sinus bradycardia    ASSESSMENT/PLAN: This is a 63 y.o. male who presents to clinic for evaluation of the following concerns     1. Hyperlipidemia, unspecified hyperlipidemia type  - Comprehensive metabolic panel; Future  - Lipid panel; Future    2. Chronic right-sided low back pain without sciatica  -continue with relafen, add prn flexeril and refer to back and spine clinic.           Anca Owen MD

## 2019-05-01 ENCOUNTER — TELEPHONE (OUTPATIENT)
Dept: SPINE | Facility: CLINIC | Age: 64
End: 2019-05-01

## 2019-05-02 ENCOUNTER — INITIAL CONSULT (OUTPATIENT)
Dept: SPINE | Facility: CLINIC | Age: 64
End: 2019-05-02
Payer: COMMERCIAL

## 2019-05-02 VITALS
WEIGHT: 188.38 LBS | HEIGHT: 70 IN | BODY MASS INDEX: 26.97 KG/M2 | SYSTOLIC BLOOD PRESSURE: 113 MMHG | HEART RATE: 54 BPM | DIASTOLIC BLOOD PRESSURE: 66 MMHG

## 2019-05-02 DIAGNOSIS — M54.50 CHRONIC LOW BACK PAIN WITHOUT SCIATICA, UNSPECIFIED BACK PAIN LATERALITY: Primary | ICD-10-CM

## 2019-05-02 DIAGNOSIS — G89.29 CHRONIC LOW BACK PAIN WITHOUT SCIATICA, UNSPECIFIED BACK PAIN LATERALITY: Primary | ICD-10-CM

## 2019-05-02 PROCEDURE — 3008F BODY MASS INDEX DOCD: CPT | Mod: ,,, | Performed by: PHYSICAL MEDICINE & REHABILITATION

## 2019-05-02 PROCEDURE — 99204 PR OFFICE/OUTPT VISIT, NEW, LEVL IV, 45-59 MIN: ICD-10-PCS | Mod: ,,, | Performed by: PHYSICAL MEDICINE & REHABILITATION

## 2019-05-02 PROCEDURE — 3008F PR BODY MASS INDEX (BMI) DOCUMENTED: ICD-10-PCS | Mod: ,,, | Performed by: PHYSICAL MEDICINE & REHABILITATION

## 2019-05-02 PROCEDURE — 99204 OFFICE O/P NEW MOD 45 MIN: CPT | Mod: ,,, | Performed by: PHYSICAL MEDICINE & REHABILITATION

## 2019-05-02 NOTE — PROGRESS NOTES
SUBJECTIVE:    Patient ID: Colt Rose is a 63 y.o. male.    Chief Complaint: Back Pain (lower back pain since weekend )    This is a 63-year-old man who sees Dr. Owen for his primary care.  He has no chronic major medical problems.  He does however have a long history of low back pain.  In the past he was seen by  in [facility].  He was noted to have severe disc space narrowing at L4-5.  He was not considered a surgical candidate at that time.  Patient also had interventional pain procedures in form of medial branch blocks on the left at L3-4 L4-5 and L5-S1 and also interlaminar epidural steroid injections x2 at L5-S1.  Patient seems to think that those injections helped him somewhat at that time.  He presents now with the onset of primarily right-sided low back pain at the lumbosacral junction that happened over the weekend for no apparent reason.  He has some discomfort radiating into the left anterior thigh but no distal radicular symptoms.  He denies any weakness bowel or bladder dysfunction fever chills sweats or unexpected weight loss.  Current pain level is 5/10.  He is actually feeling better.  He saw Dr. Owen on 04/29/2019 and she started him on Flexeril in addition to Relafen which she was already taking.        Past Medical History:   Diagnosis Date    Arthritis of hand 8/6/2015    Cervical disc displacement     Degeneration of lumbar intervertebral disc     GERD (gastroesophageal reflux disease)     Hip pain 2/22/2016    Hyperlipidemia     Shingles 11/2013     Social History     Socioeconomic History    Marital status:      Spouse name: Not on file    Number of children: Not on file    Years of education: Not on file    Highest education level: Not on file   Occupational History    Not on file   Social Needs    Financial resource strain: Not on file    Food insecurity:     Worry: Not on file     Inability: Not on file    Transportation  "needs:     Medical: Not on file     Non-medical: Not on file   Tobacco Use    Smoking status: Never Smoker    Smokeless tobacco: Never Used   Substance and Sexual Activity    Alcohol use: Yes     Alcohol/week: 3.6 oz     Types: 6 Cans of beer per week     Comment: weekly    Drug use: No    Sexual activity: Yes     Partners: Female   Lifestyle    Physical activity:     Days per week: Not on file     Minutes per session: Not on file    Stress: Not on file   Relationships    Social connections:     Talks on phone: Not on file     Gets together: Not on file     Attends Pentecostalism service: Not on file     Active member of club or organization: Not on file     Attends meetings of clubs or organizations: Not on file     Relationship status: Not on file   Other Topics Concern    Not on file   Social History Narrative    Not on file     Past Surgical History:   Procedure Laterality Date    CERVICAL FUSION      bone graft    ESOPHAGOGASTRODUODENOSCOPY (EGD) N/A 9/9/2015    [facility]O    TONSILLECTOMY, ADENOIDECTOMY       Family History   Problem Relation Age of Onset    Diabetes Mother     Hypertension Mother     Stroke Mother     Coronary artery disease Mother     Stroke Father     Heart disease Brother         CAD     Vitals:    05/02/19 1407   BP: 113/66   Pulse: (!) 54   Weight: 85.5 kg (188 lb 6.1 oz)   Height: 5' 10" (1.778 m)       Review of Systems   Constitutional: Negative for chills, diaphoresis, fatigue, fever and unexpected weight change.   HENT: Negative for trouble swallowing.    Eyes: Negative for visual disturbance.   Respiratory: Negative for shortness of breath.    Cardiovascular: Negative for chest pain.   Gastrointestinal: Negative for abdominal pain, constipation, diarrhea, nausea and vomiting.   Genitourinary: Negative for difficulty urinating.   Musculoskeletal: Negative for arthralgias, back pain, gait problem, joint swelling, myalgias, neck pain " and neck stiffness.   Neurological: Negative for dizziness, speech difficulty, weakness, light-headedness, numbness and headaches.          Objective:      Physical Exam   Constitutional: He is oriented to person, place, and time. He appears well-developed and well-nourished.   Neurological: He is alert and oriented to person, place, and time.   He is awake and  In no acute distress  No point tenderness or palpable masses about the lumbar spine  For flexion is to about 60° before complains of pain at the lumbosacral junction.  Extension at 10° causes mild pain at the lumbosacral junction  Deep tendon reflexes +2 at both knees and +1 at both ankles  Strength is normal in both lower extremities  Straight leg raising negative bilaterally    X-rays of the lumbar spine from 2016 show advanced degenerative disc disease at L4-5 and moderate degeneration at L3-4           Assessment:       1. Chronic low back pain without sciatica, unspecified back pain laterality           Plan:     he is having an acute exacerbation chronic low back pain.  He has and a nonfocal examination from a neurological standpoint and no historical red flags.  He is getting better with the treatment already rendered.  We will add outpatient physical therapy.  I will follow-up with him in 6 weeks.  Consider repeating epidural steroid injections      Chronic low back pain without sciatica, unspecified back pain laterality  -     Ambulatory Referral to Physical/Occupational Therapy

## 2019-05-02 NOTE — LETTER
May 2, 2019      Anca Owen MD  [address]  [place] [address]           [place] [facility]  [address] [place] [address] [address]  [place] [address]  Phone: [phone]  Fax: [phone]          Patient: Colt Rose   MR Number: [id number]   YOB: 1955   Date of Visit: 5/2/2019       Dear Dr. Anca Owen:    Thank you for referring Colt Rose to me for evaluation. Attached you will find relevant portions of my assessment and plan of care.    If you have questions, please do not hesitate to call me. I look forward to following Colt Rose along with you.    Sincerely,    Ron Mayes MD    Enclosure  CC:  No Recipients    If you would like to receive this communication electronically, please contact [email or web address] or [phone] to request more information on Telemedicine Clinic Link access.    For providers and/or their staff who would like to refer a patient to [facility], please contact us through our one-stop-shop provider referral line, [facility], at [phone].    If you feel you have received this communication in error or would no longer like to receive these types of communications, please e-mail [email or web address]

## 2019-05-04 ENCOUNTER — LAB VISIT (OUTPATIENT)
Dept: LAB | Facility: HOSPITAL | Age: 64
End: 2019-05-04
Attending: FAMILY MEDICINE
Payer: COMMERCIAL

## 2019-05-04 DIAGNOSIS — E78.5 HYPERLIPIDEMIA, UNSPECIFIED HYPERLIPIDEMIA TYPE: ICD-10-CM

## 2019-05-04 LAB
ALBUMIN SERPL BCP-MCNC: 3.7 G/DL (ref 3.5–5.2)
ALP SERPL-CCNC: 40 U/L (ref 55–135)
ALT SERPL W/O P-5'-P-CCNC: 21 U/L (ref 10–44)
ANION GAP SERPL CALC-SCNC: 8 MMOL/L (ref 8–16)
AST SERPL-CCNC: 24 U/L (ref 10–40)
BILIRUB SERPL-MCNC: 0.7 MG/DL (ref 0.1–1)
BUN SERPL-MCNC: 17 MG/DL (ref 8–23)
CALCIUM SERPL-MCNC: 9.5 MG/DL (ref 8.7–10.5)
CHLORIDE SERPL-SCNC: 105 MMOL/L (ref 95–110)
CHOLEST SERPL-MCNC: 178 MG/DL (ref 120–199)
CHOLEST/HDLC SERPL: 3 {RATIO} (ref 2–5)
CO2 SERPL-SCNC: 26 MMOL/L (ref 23–29)
CREAT SERPL-MCNC: 0.9 MG/DL (ref 0.5–1.4)
EST. GFR  (AFRICAN AMERICAN): >60 ML/MIN/1.73 M^2
EST. GFR  (NON AFRICAN AMERICAN): >60 ML/MIN/1.73 M^2
GLUCOSE SERPL-MCNC: 100 MG/DL (ref 70–110)
HDLC SERPL-MCNC: 59 MG/DL (ref 40–75)
HDLC SERPL: 33.1 % (ref 20–50)
LDLC SERPL CALC-MCNC: 108.4 MG/DL (ref 63–159)
NONHDLC SERPL-MCNC: 119 MG/DL
POTASSIUM SERPL-SCNC: 4.6 MMOL/L (ref 3.5–5.1)
PROT SERPL-MCNC: 6.6 G/DL (ref 6–8.4)
SODIUM SERPL-SCNC: 139 MMOL/L (ref 136–145)
TRIGL SERPL-MCNC: 53 MG/DL (ref 30–150)

## 2019-05-04 PROCEDURE — 80053 COMPREHEN METABOLIC PANEL: CPT

## 2019-05-04 PROCEDURE — 36415 COLL VENOUS BLD VENIPUNCTURE: CPT | Mod: PO

## 2019-05-04 PROCEDURE — 80061 LIPID PANEL: CPT

## 2019-05-14 ENCOUNTER — CLINICAL SUPPORT (OUTPATIENT)
Dept: REHABILITATION | Facility: HOSPITAL | Age: 64
End: 2019-05-14
Payer: COMMERCIAL

## 2019-05-14 DIAGNOSIS — M54.50 CHRONIC MIDLINE LOW BACK PAIN WITHOUT SCIATICA: Primary | ICD-10-CM

## 2019-05-14 DIAGNOSIS — G89.29 CHRONIC MIDLINE LOW BACK PAIN WITHOUT SCIATICA: Primary | ICD-10-CM

## 2019-05-14 PROCEDURE — 97110 THERAPEUTIC EXERCISES: CPT | Mod: PN

## 2019-05-14 PROCEDURE — 97010 HOT OR COLD PACKS THERAPY: CPT | Mod: PN

## 2019-05-14 PROCEDURE — 97014 ELECTRIC STIMULATION THERAPY: CPT | Mod: PN

## 2019-05-14 PROCEDURE — 97161 PT EVAL LOW COMPLEX 20 MIN: CPT | Mod: PN

## 2019-05-14 NOTE — PLAN OF CARE
[facility]  Physical Therapy Initial Evaluation    Name: Colt Rose  Clinic Number: [id number]    Therapy Diagnosis: Low Back Pain   Physician: Ron Mayes MD    Physician Orders: PT Eval and Treat   Medical Diagnosis from Referral: Chronic Low Back Pain  Evaluation Date: 5/14/2019  Authorization Period Expiration: 12/31/19   Plan of Care Expiration: 6/28/19   Visit # / Visits authorized: 1/ 12    Time In: 5:00 PM   Time Out: 6:00 PM   Total Billable Time: 60  minutes    Precautions: Standard    Subjective   Date of onset: > 2 weeks ago  History of current condition - Colt reports: acute flare up of low back pain sx; difficulty squatting and bending while at work.      Medical History:   Past Medical History:   Diagnosis Date    Arthritis of hand 8/6/2015    Cervical disc displacement     Degeneration of lumbar intervertebral disc     GERD (gastroesophageal reflux disease)     Hip pain 2/22/2016    Hyperlipidemia     Shingles 11/2013       Surgical History:   Colt Rose  has a past surgical history that includes TONSILLECTOMY, ADENOIDECTOMY and Cervical fusion.    Medications:   Colt has a current medication list which includes the following prescription(s): atorvastatin, cyclobenzaprine, fluocinolone acetonide oil, nabumetone, and pantoprazole.    Allergies:   Review of patient's allergies indicates:   Allergen Reactions    Pennsaid [diclofenac sodium] Rash and Blisters        Imaging, See Epic     Prior Therapy: PT for low back ~ cont with current exercise routine  Social History:  lives with their family  Occupation: Building maintenance for [employer]   Prior Level of Function: no pain with daily activity   Current Level of Function: increased pain at the end of the day with bending and squatting    Pain:  Current 2/10, worst 5/10, best 0/10   Location: midline  back   Description: Tight  Aggravating Factors: Bending, Lifting and squatting  Easing Factors:  pain medication and exercise       Objective     Posture: decreased lordosis   Palpation:  Tight lumbar paraspinals   Sensation: intact     Lumbar AROM:    Flexion 25% limited   Extension *pain at EROM   Right side bending    Left side bending        Hip Flexion ~ R hip pain > 90       LE MMT  Left Right  Hip flexion  4+*P 4+*P  Hip abduction  4 4  Hip adduction  5 5  Hip ER   4 4   Hip IR   5 5  Knee extension 5 5  Knee flexion  5 5      Flexibility:   Left Right  Hamstrings  45 45   Achilles  5 5    Gait: Independent   Analysis:     Special Tests:    SLR -  JOSE + R   Piriformis -   Slump -     Other:   Oswestry 10% impairment      TREATMENT   Treatment Time In: 5:20 PM   Treatment Time Out: 6:00 PM   Total Treatment time separate from Evaluation: 40  minutes    Colt received therapeutic exercises to develop strength, endurance, ROM, flexibility, posture and core stabilization for 30 minutes including:  -Tball LTR x 20   -Tball Hip flex/ext x 20   -Iso tball crunch x 20   -QP cat/camel x 20   -QP rocking  X 20   -Bird Dogs x 20           Colt received the following supervised modalities after being cleared for contradictions: IFC Electrical Stimulation:  Colt received IFC Electrical Stimulation for pain control applied to the low back . Pt received stimulation and moist heat  for 10  minutes. Colt tolderated treatment well without any adverse effects.          Home Exercises and Patient Education Provided    Education provided:   - HEP     Written Home Exercises Provided: yes.  Exercises were reviewed and Colt was able to demonstrate them prior to the end of the session.  Colt demonstrated good  understanding of the education provided.     See EMR under Media for exercises provided 5/14/2019.    Assessment   Colt is a 63 y.o. male referred to outpatient Physical Therapy with a medical diagnosis of Low back pain.   Pt presents with decreased ROM, decreased strength and flexbility with  "decreased standing activity tolerance.    Pt prognosis is Good.   Pt will benefit from skilled outpatient Physical Therapy to address the deficits stated above and in the chart below, provide pt/family education, and to maximize pt's level of independence.     Plan of care discussed with patient: Yes  Pt's spiritual, cultural and educational needs considered and patient is agreeable to the plan of care and goals as stated below:     Anticipated Barriers for therapy: none    Medical Necessity is demonstrated by the following  History  Co-morbidities and personal factors that may impact the plan of care Co-morbidities:   DDD Lumbar and neck    Personal Factors:   no deficits     low   Examination  Body Structures and Functions, activity limitations and participation restrictions that may impact the plan of care Body Regions:   back    Body Systems:    gross symmetry  ROM  strength  motor control    Participation Restrictions:    none     Activity limitations:   Learning and applying knowledge  no deficits    General Tasks and Commands  no deficits    Communication  no deficits    Mobility  lifting and carrying objects    Self care  no deficits    Domestic Life  no deficits    Interactions/Relationships  no deficits    Life Areas  no deficits    Community and Social Life  no deficits         low   Clinical Presentation stable and uncomplicated low   Decision Making/ Complexity Score: low       Pt functional goal:  "Get back to where I was" ~ working without increased pain      Short Term Goals: 3 weeks   - Tolerate PT exercises with minimal increase in pain for improved strength and conditioning   - Report 50% improvement in pain sx for improved tolerance with daily activities at home and in community.    Long Term Goals: 6 weeks  - Restore full painfree ROM to lumbar spine all planes for improved functional mobility   - Demonstrate improved flexibility of Bilateral Hamstring  muscle groups +10-15 degrees with 90/90  " for improved posture and increased activity tolerance.   - Demonstrate improved strength of Bilateral hips all planes  to 4+/5 or greater  for improved stability with standing activities   - Report MCID with IVAN  demonstrating return to PLOF with daily activities.   - Be engaged in HEP/fitness routine for continued strength and conditioning upon d/c from PT.       Plan   Plan of care Certification: 5/14/2019 to 6/28/19 .    Outpatient Physical Therapy 1 -2  times weekly for 6 weeks to include the following interventions: Electrical Stimulation IFC, Manual Therapy, Moist Heat/ Ice, Neuromuscular Re-ed, Patient Education, Therapeutic Activites, Therapeutic Exercise and Ultrasound.     Manjit Allred, PT

## 2019-05-20 ENCOUNTER — CLINICAL SUPPORT (OUTPATIENT)
Dept: REHABILITATION | Facility: HOSPITAL | Age: 64
End: 2019-05-20
Payer: COMMERCIAL

## 2019-05-20 DIAGNOSIS — G89.29 CHRONIC MIDLINE LOW BACK PAIN WITHOUT SCIATICA: ICD-10-CM

## 2019-05-20 DIAGNOSIS — M54.50 CHRONIC MIDLINE LOW BACK PAIN WITHOUT SCIATICA: ICD-10-CM

## 2019-05-20 PROCEDURE — 97110 THERAPEUTIC EXERCISES: CPT | Mod: PN

## 2019-05-20 NOTE — PROGRESS NOTES
Physical Therapy Daily Treatment Note     Name: Colt Rose  Clinic Number: [id number]    Therapy Diagnosis:   Encounter Diagnosis   Name Primary?    Chronic midline low back pain without sciatica      Physician: Ron Mayes MD    Visit Date: 5/20/2019  Physician Orders: PT Eval and Treat   Medical Diagnosis from Referral: Chronic Low Back Pain  Evaluation Date: 5/14/2019  Authorization Period Expiration: 12/31/19   Plan of Care Expiration: 6/28/19   Visit # / Visits authorized: 2/ 12      Time In: 1500  Time Out: 1555  Total Billable Time: 55 minutes    Precautions: Standard    Subjective     Pt reports: no complaints.  He was compliant with home exercise program.  Response to previous treatment: first visit after initial evaluation  Functional change:     Pain: 3/10  Location: bilateral LB      Objective     Colt received therapeutic exercises to develop strength, flexibility, posture and core stabilization for 55 minutes including:    Bike x 10 minutes  Supine hamstring stretch 3 x 20 sec B  Supine PB crunch x 30  Supine PB obliques x 30 B  LTR with ball under LE's x 30 B  DKC w/ball x 30  Dying bug x 30 B  Clamshell GTB x 30  Ball squeeze x 30  Bridge x 30  QP cat/camel x 30  QP rocking back 5 sec x 20  Bird dog x 30  Sitting on PB: marches; 2# shoulder flexion to 90 deg, 2# shoulder abd to 90 deg; ant/post hip x 30 each      Home Exercises Provided and Patient Education Provided     Education provided:   - cont HEP, VC for tall posture on PB    Written Home Exercises Provided: Patient instructed to cont prior HEP.  Exercises were reviewed and Colt was able to demonstrate them prior to the end of the session.  Colt demonstrated good  understanding of the education provided.     See EMR under Patient Instructions for exercises provided prior visit.    Assessment     Difficulty with a/p hips on ball.  Good overall tolerance for activity.    Colt is progressing well towards his goals.   Pt  prognosis is Good.     Pt will continue to benefit from skilled outpatient physical therapy to address the deficits listed in the problem list box on initial evaluation, provide pt/family education and to maximize pt's level of independence in the home and community environment.     Pt's spiritual, cultural and educational needs considered and pt agreeable to plan of care and goals.    Anticipated barriers to physical therapy: none    Short Term Goals: 3 weeks   - Tolerate PT exercises with minimal increase in pain for improved strength and conditioning   - Report 50% improvement in pain sx for improved tolerance with daily activities at home and in community.     Long Term Goals: 6 weeks  - Restore full painfree ROM to lumbar spine all planes for improved functional mobility   - Demonstrate improved flexibility of Bilateral Hamstring  muscle groups +10-15 degrees with 90/90  for improved posture and increased activity tolerance.   - Demonstrate improved strength of Bilateral hips all planes  to 4+/5 or greater  for improved stability with standing activities   - Report MCID with IVAN  demonstrating return to PLOF with daily activities.   - Be engaged in HEP/fitness routine for continued strength and conditioning upon d/c from PT.       Plan     Cont per POC    Angie Downs, PTA

## 2019-05-30 ENCOUNTER — CLINICAL SUPPORT (OUTPATIENT)
Dept: REHABILITATION | Facility: HOSPITAL | Age: 64
End: 2019-05-30
Payer: COMMERCIAL

## 2019-05-30 DIAGNOSIS — G89.29 CHRONIC MIDLINE LOW BACK PAIN WITHOUT SCIATICA: ICD-10-CM

## 2019-05-30 DIAGNOSIS — M54.50 CHRONIC MIDLINE LOW BACK PAIN WITHOUT SCIATICA: ICD-10-CM

## 2019-05-30 PROCEDURE — 97110 THERAPEUTIC EXERCISES: CPT | Mod: PN

## 2019-05-30 NOTE — PROGRESS NOTES
Physical Therapy Daily Treatment Note     Name: Colt Rose  Clinic Number: [id number]    Therapy Diagnosis:   Encounter Diagnosis   Name Primary?    Chronic midline low back pain without sciatica      Physician: Ron Mayes MD    Visit Date: 5/30/2019  Physician Orders: PT Eval and Treat   Medical Diagnosis from Referral: Chronic Low Back Pain  Evaluation Date: 5/14/2019  Authorization Period Expiration: 12/31/19   Plan of Care Expiration: 6/28/19   Visit # / Visits authorized: 3/ 12      Time In: 400 PM   Time Out: 500 PM   Total Billable Time: 60  minutes    Precautions: Standard    Subjective     Pt reports: no complaints.  He was compliant with home exercise program.  Response to previous treatment: first visit after initial evaluation  Functional change:     Pain: 3/10  Location: bilateral LB      Objective     Colt received therapeutic exercises to develop strength, flexibility, posture and core stabilization for 55 minutes including:    Supine hamstring stretch 3 x 20 sec B  LTR with ball under LE's x 30 B  DKC w/ball x 30  Dying bug x 30 B  Clamshell GTB x 20  Ball squeeze x 30  Bridge x 30  QP cat/camel x 30  QP rocking back 5 sec x 20  Bird dog x 20    Shuttle 75# 2 x 20   Shuttle hip ext 12# x 20   Lateral band walk x 1 lap   CC Rows 13# x 20; Squat to Row 13# x 10         Home Exercises Provided and Patient Education Provided     Education provided:   - cont HEP, VC for tall posture on PB    Written Home Exercises Provided: Patient instructed to cont prior HEP.  Exercises were reviewed and Colt was able to demonstrate them prior to the end of the session.  Colt demonstrated good  understanding of the education provided.     See EMR under Patient Instructions for exercises provided prior visit.    Assessment     Tolerated exercise progression well today.     Colt is progressing well towards his goals.   Pt prognosis is Good.     Pt will continue to benefit from skilled  outpatient physical therapy to address the deficits listed in the problem list box on initial evaluation, provide pt/family education and to maximize pt's level of independence in the home and community environment.     Pt's spiritual, cultural and educational needs considered and pt agreeable to plan of care and goals.    Anticipated barriers to physical therapy: none    Short Term Goals: 3 weeks   - Tolerate PT exercises with minimal increase in pain for improved strength and conditioning   - Report 50% improvement in pain sx for improved tolerance with daily activities at home and in community.     Long Term Goals: 6 weeks  - Restore full painfree ROM to lumbar spine all planes for improved functional mobility   - Demonstrate improved flexibility of Bilateral Hamstring  muscle groups +10-15 degrees with 90/90  for improved posture and increased activity tolerance.   - Demonstrate improved strength of Bilateral hips all planes  to 4+/5 or greater  for improved stability with standing activities   - Report MCID with IVAN  demonstrating return to PLOF with daily activities.   - Be engaged in HEP/fitness routine for continued strength and conditioning upon d/c from PT.       Plan     Cont per POC    Manjit Allred, PT

## 2019-06-04 ENCOUNTER — CLINICAL SUPPORT (OUTPATIENT)
Dept: REHABILITATION | Facility: HOSPITAL | Age: 64
End: 2019-06-04
Payer: COMMERCIAL

## 2019-06-04 DIAGNOSIS — M54.50 CHRONIC MIDLINE LOW BACK PAIN WITHOUT SCIATICA: ICD-10-CM

## 2019-06-04 DIAGNOSIS — G89.29 CHRONIC MIDLINE LOW BACK PAIN WITHOUT SCIATICA: ICD-10-CM

## 2019-06-04 PROCEDURE — 97110 THERAPEUTIC EXERCISES: CPT | Mod: PN

## 2019-06-04 NOTE — PROGRESS NOTES
Physical Therapy Daily Treatment Note     Name: Colt Rose  Clinic Number: [id number]    Therapy Diagnosis:   Low back pain   Physician: Ron Mayes MD    Visit Date: 6/4/2019  Physician Orders: PT Eval and Treat   Medical Diagnosis from Referral: Chronic Low Back Pain  Evaluation Date: 5/14/2019  Authorization Period Expiration: 12/31/19   Plan of Care Expiration: 6/28/19   Visit # / Visits authorized: 4/ 12    Time In: 450  PM   Time Out: 550  PM   Total Billable Time: 60  minutes    Precautions: Standard    Subjective     Pt reports: continues with intermittent stiffness and pain with bending and lifting.   He was compliant with home exercise program.  Response to previous treatment: muscle soreness after last session  Functional change:     Pain: 3/10  Location: bilateral LB      Objective     Colt received therapeutic exercises to develop strength, flexibility, posture and core stabilization for 55 minutes including:    Supine hamstring stretch 3 x 20 sec B  LTR with ball under LE's x 30 B  DKC w/ball x 30  Dying bug x 30 B  Clamshell GTB x 20  Ball squeeze x 30  Bridge x 30  QP cat/camel x 30  QP rocking back 5 sec x 20  Bird dog x 20    Shuttle 83#  x 20   Shuttle hip ext 25# x 20   Lateral band walk x 1 lap   CC Rows 13# x 20; Squat to Row 13# x 10   Bent over Row 10# x 20   Down Dog press ups.   Chops/lifts 13#/10# x 10   OH curl to press 8# x 10         Home Exercises Provided and Patient Education Provided     Education provided:   - cont HEP, VC for tall posture on PB    Written Home Exercises Provided: Patient instructed to cont prior HEP.  Exercises were reviewed and Colt was able to demonstrate them prior to the end of the session.  Colt demonstrated good  understanding of the education provided.     See EMR under Patient Instructions for exercises provided prior visit.    Assessment     Tolerated exercise progressions well, added new exercises for core and glute strength  without increased back pain    Colt is progressing well towards his goals.   Pt prognosis is Good.     Pt will continue to benefit from skilled outpatient physical therapy to address the deficits listed in the problem list box on initial evaluation, provide pt/family education and to maximize pt's level of independence in the home and community environment.     Pt's spiritual, cultural and educational needs considered and pt agreeable to plan of care and goals.    Anticipated barriers to physical therapy: none    Short Term Goals: 3 weeks   - Tolerate PT exercises with minimal increase in pain for improved strength and conditioning   - Report 50% improvement in pain sx for improved tolerance with daily activities at home and in community.     Long Term Goals: 6 weeks  - Restore full painfree ROM to lumbar spine all planes for improved functional mobility   - Demonstrate improved flexibility of Bilateral Hamstring  muscle groups +10-15 degrees with 90/90  for improved posture and increased activity tolerance.   - Demonstrate improved strength of Bilateral hips all planes  to 4+/5 or greater  for improved stability with standing activities   - Report MCID with IVAN  demonstrating return to PLOF with daily activities.   - Be engaged in HEP/fitness routine for continued strength and conditioning upon d/c from PT.       Plan     Cont per POC    Manjit Allred, PT

## 2019-06-18 ENCOUNTER — CLINICAL SUPPORT (OUTPATIENT)
Dept: REHABILITATION | Facility: HOSPITAL | Age: 64
End: 2019-06-18
Payer: COMMERCIAL

## 2019-06-18 DIAGNOSIS — G89.29 CHRONIC MIDLINE LOW BACK PAIN WITHOUT SCIATICA: ICD-10-CM

## 2019-06-18 DIAGNOSIS — M54.50 CHRONIC MIDLINE LOW BACK PAIN WITHOUT SCIATICA: ICD-10-CM

## 2019-06-18 PROCEDURE — 97110 THERAPEUTIC EXERCISES: CPT | Mod: PN

## 2019-06-18 NOTE — PROGRESS NOTES
Physical Therapy Daily Treatment Note     Name: Colt Rose  Clinic Number: [id number]    Therapy Diagnosis:   Low back pain   Physician: Ron Mayes MD    Visit Date: 6/18/2019  Physician Orders: PT Eval and Treat   Medical Diagnosis from Referral: Chronic Low Back Pain  Evaluation Date: 5/14/2019  Authorization Period Expiration: 12/31/19   Plan of Care Expiration: 6/28/19   Visit # / Visits authorized: 5/ 12    Time In: 500   PM   Time Out: 545  PM   Total Billable Time: 45  minutes    Precautions: Standard    Subjective     Pt reports: cont with intermittent pain, overall feeling better, he has been helping out his wife at home.   He was compliant with home exercise program.  Response to previous treatment: muscle soreness after last session  Functional change:     Pain: 3/10  Location: bilateral LB      Objective     Colt received therapeutic exercises to develop strength, flexibility, posture and core stabilization for 45 minutes including:    Supine hamstring stretch 3 x 20 sec B  LTR with ball under LE's x 30 B  DKC w/ball x 30  Dying bug x 30 B  Clamshell GTB x 20  Ball squeeze x 30  Bridge x 30  QP cat/camel x 30  QP rocking back 5 sec x 20  Bird dog x 20      Shuttle hip ext 25# x 20   Lateral band walk x 1 lap   CC Rows 13# x 20; Squat to Row 13# x 10   Down Dog press ups.   Chops/lifts 13#/10# x 10   OH curl to press 8# x 10         Home Exercises Provided and Patient Education Provided     Education provided:   - cont HEP, VC for tall posture on PB    Written Home Exercises Provided: Patient instructed to cont prior HEP.  Exercises were reviewed and Colt was able to demonstrate them prior to the end of the session.  Colt demonstrated good  understanding of the education provided.     See EMR under Patient Instructions for exercises provided prior visit.    Assessment     Tolerated exercise progressions well, mild fatigue after session, no complaints of pain with activity.      Colt is progressing well towards his goals.   Pt prognosis is Good.     Pt will continue to benefit from skilled outpatient physical therapy to address the deficits listed in the problem list box on initial evaluation, provide pt/family education and to maximize pt's level of independence in the home and community environment.     Pt's spiritual, cultural and educational needs considered and pt agreeable to plan of care and goals.    Anticipated barriers to physical therapy: none    Short Term Goals: 3 weeks   - Tolerate PT exercises with minimal increase in pain for improved strength and conditioning   - Report 50% improvement in pain sx for improved tolerance with daily activities at home and in community.     Long Term Goals: 6 weeks  - Restore full painfree ROM to lumbar spine all planes for improved functional mobility   - Demonstrate improved flexibility of Bilateral Hamstring  muscle groups +10-15 degrees with 90/90  for improved posture and increased activity tolerance.   - Demonstrate improved strength of Bilateral hips all planes  to 4+/5 or greater  for improved stability with standing activities   - Report MCID with IVAN  demonstrating return to PLOF with daily activities.   - Be engaged in HEP/fitness routine for continued strength and conditioning upon d/c from PT.       Plan     Cont per POC    Manjit Allred, PT

## 2019-06-20 ENCOUNTER — CLINICAL SUPPORT (OUTPATIENT)
Dept: REHABILITATION | Facility: HOSPITAL | Age: 64
End: 2019-06-20
Payer: COMMERCIAL

## 2019-06-20 DIAGNOSIS — M54.50 CHRONIC MIDLINE LOW BACK PAIN WITHOUT SCIATICA: ICD-10-CM

## 2019-06-20 DIAGNOSIS — G89.29 CHRONIC MIDLINE LOW BACK PAIN WITHOUT SCIATICA: ICD-10-CM

## 2019-06-20 PROCEDURE — 97110 THERAPEUTIC EXERCISES: CPT | Mod: PN

## 2019-06-20 NOTE — PROGRESS NOTES
Physical Therapy Daily Treatment Note     Name: Colt Rose  Clinic Number: [id number]    Therapy Diagnosis:   Low back pain   Physician: Ron Mayes MD    Visit Date: 6/20/2019  Physician Orders: PT Eval and Treat   Medical Diagnosis from Referral: Chronic Low Back Pain  Evaluation Date: 5/14/2019  Authorization Period Expiration: 12/31/19   Plan of Care Expiration: 6/28/19   Visit # / Visits authorized: 6/ 12    Time In: 1000 AM   Time Out: 1100  AM   Total Billable Time: 60  minutes    Precautions: Standard    Subjective     Pt reports: cont with intermittent pain, overall feeling better,   He was compliant with home exercise program.  Response to previous treatment: muscle soreness after last session  Functional change: improving ROM     Pain: 3/10  Location: bilateral LB      Objective     Colt received therapeutic exercises to develop strength, flexibility, posture and core stabilization for 45 minutes including:    Supine hamstring stretch 3 x 20 sec B  LTR with ball under LE's x 30 B  DKC w/ball x 30  Dying bug x 30 B  Clamshell GTB x 20  Ball squeeze x 30  Bridge on PB ball x 30  QP cat/camel x 30  QP rocking back 5 sec x 20  Bird dog x 20      Shuttle hip ext 25# x 20   Lateral band walk x 1 lap   CC Rows 13# x 20; Squat to Row 13# x 10   Down Dog press ups.   Chops/lifts 13#/10# x 10   Hip hinge 10# bar x 20; CC pull throughs 10# x 20            Home Exercises Provided and Patient Education Provided     Education provided:   - cont HEP, VC for tall posture on PB    Written Home Exercises Provided: Patient instructed to cont prior HEP.  Exercises were reviewed and Colt was able to demonstrate them prior to the end of the session.  Colt demonstrated good  understanding of the education provided.     See EMR under Patient Instructions for exercises provided prior visit.    Assessment     Tolerated exercise progressions well    Colt is progressing well towards his goals.   Pt  prognosis is Good.     Pt will continue to benefit from skilled outpatient physical therapy to address the deficits listed in the problem list box on initial evaluation, provide pt/family education and to maximize pt's level of independence in the home and community environment.     Pt's spiritual, cultural and educational needs considered and pt agreeable to plan of care and goals.    Anticipated barriers to physical therapy: none    Short Term Goals: 3 weeks   - Tolerate PT exercises with minimal increase in pain for improved strength and conditioning   - Report 50% improvement in pain sx for improved tolerance with daily activities at home and in community.     Long Term Goals: 6 weeks  - Restore full painfree ROM to lumbar spine all planes for improved functional mobility   - Demonstrate improved flexibility of Bilateral Hamstring  muscle groups +10-15 degrees with 90/90  for improved posture and increased activity tolerance.   - Demonstrate improved strength of Bilateral hips all planes  to 4+/5 or greater  for improved stability with standing activities   - Report MCID with IVAN  demonstrating return to PLOF with daily activities.   - Be engaged in HEP/fitness routine for continued strength and conditioning upon d/c from PT.       Plan     Cont per POC    Manjit Allred, PT

## 2019-06-21 ENCOUNTER — OFFICE VISIT (OUTPATIENT)
Dept: SPINE | Facility: CLINIC | Age: 64
End: 2019-06-21
Payer: COMMERCIAL

## 2019-06-21 VITALS — HEIGHT: 70 IN | BODY MASS INDEX: 26.99 KG/M2 | WEIGHT: 188.5 LBS

## 2019-06-21 DIAGNOSIS — M54.50 CHRONIC LOW BACK PAIN WITHOUT SCIATICA, UNSPECIFIED BACK PAIN LATERALITY: Primary | ICD-10-CM

## 2019-06-21 DIAGNOSIS — G89.29 CHRONIC LOW BACK PAIN WITHOUT SCIATICA, UNSPECIFIED BACK PAIN LATERALITY: Primary | ICD-10-CM

## 2019-06-21 PROCEDURE — 99213 OFFICE O/P EST LOW 20 MIN: CPT | Mod: ,,, | Performed by: PHYSICAL MEDICINE & REHABILITATION

## 2019-06-21 PROCEDURE — 3008F PR BODY MASS INDEX (BMI) DOCUMENTED: ICD-10-PCS | Mod: ,,, | Performed by: PHYSICAL MEDICINE & REHABILITATION

## 2019-06-21 PROCEDURE — 3008F BODY MASS INDEX DOCD: CPT | Mod: ,,, | Performed by: PHYSICAL MEDICINE & REHABILITATION

## 2019-06-21 PROCEDURE — 99213 PR OFFICE/OUTPT VISIT, EST, LEVL III, 20-29 MIN: ICD-10-PCS | Mod: ,,, | Performed by: PHYSICAL MEDICINE & REHABILITATION

## 2019-06-21 NOTE — PROGRESS NOTES
SUBJECTIVE:    Patient ID: Colt Rose is a 63 y.o. male.    Chief Complaint: Follow-up (post pt )    He is here for follow-up.  He has completed all but 1 of his physical therapy sessions.  Overall I think he is better but still having right-sided low back pain at the lumbosacral junction out to the right posterolateral hip.  Pain level has decreased overall.  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    Cervical disc displacement     Degeneration of lumbar intervertebral disc     GERD (gastroesophageal reflux disease)     Hip pain 2/22/2016    Hyperlipidemia     Shingles 11/2013     Social History     Socioeconomic History    Marital status:      Spouse name: Not on file    Number of children: Not on file    Years of education: Not on file    Highest education level: Not on file   Occupational History    Not on file   Social Needs    Financial resource strain: Not on file    Food insecurity:     Worry: Not on file     Inability: Not on file    Transportation needs:     Medical: Not on file     Non-medical: Not on file   Tobacco Use    Smoking status: Never Smoker    Smokeless tobacco: Never Used   Substance and Sexual Activity    Alcohol use: Yes     Alcohol/week: 3.6 oz     Types: 6 Cans of beer per week     Comment: weekly    Drug use: No    Sexual activity: Yes     Partners: Female   Lifestyle    Physical activity:     Days per week: Not on file     Minutes per session: Not on file    Stress: Not on file   Relationships    Social connections:     Talks on phone: Not on file     Gets together: Not on file     Attends Scientologist service: Not on file     Active member of club or organization: Not on file     Attends meetings of clubs or organizations: Not on file     Relationship status: Not on file   Other Topics Concern    Not on file   Social History Narrative    Not on file     Past Surgical History:  "  Procedure Laterality Date    CERVICAL FUSION      bone graft    ESOPHAGOGASTRODUODENOSCOPY (EGD) N/A 9/9/2015    Performed [facility]    TONSILLECTOMY, ADENOIDECTOMY       Family History   Problem Relation Age of Onset    Diabetes Mother     Hypertension Mother     Stroke Mother     Coronary artery disease Mother     Stroke Father     Heart disease Brother         CAD     Vitals:    06/21/19 1515   Weight: 85.5 kg (188 lb 7.9 oz)   Height: 5' 10" (1.778 m)       Review of Systems   Constitutional: Negative for chills, diaphoresis, fatigue, fever and unexpected weight change.   HENT: Negative for trouble swallowing.    Eyes: Negative for visual disturbance.   Respiratory: Negative for shortness of breath.    Cardiovascular: Negative for chest pain.   Gastrointestinal: Negative for abdominal pain, constipation, diarrhea, nausea and vomiting.   Genitourinary: Negative for difficulty urinating.   Musculoskeletal: Negative for arthralgias, back pain, gait problem, joint swelling, myalgias, neck pain and neck stiffness.   Neurological: Negative for dizziness, speech difficulty, weakness, light-headedness, numbness and headaches.          Objective:      Physical Exam   Constitutional: He is oriented to person, place, and time. He appears well-developed and well-nourished.   Neurological: He is alert and oriented to person, place, and time.           Assessment:       1. Chronic low back pain without sciatica, unspecified back pain laterality           Plan:     today we talked about the concept medial branch blocks and radiofrequency ablation using a diagram.  I would recommend radiofrequency ablation of the right L4-5 and L5-S1 facet joints.  I gave him a pamphlet about the procedure.  He wants to think about it.  He can follow up here on an as-needed basis      Chronic low back pain without sciatica, unspecified back pain laterality        "

## 2019-06-25 ENCOUNTER — CLINICAL SUPPORT (OUTPATIENT)
Dept: REHABILITATION | Facility: HOSPITAL | Age: 64
End: 2019-06-25
Payer: COMMERCIAL

## 2019-06-25 DIAGNOSIS — M54.50 CHRONIC MIDLINE LOW BACK PAIN WITHOUT SCIATICA: ICD-10-CM

## 2019-06-25 DIAGNOSIS — G89.29 CHRONIC MIDLINE LOW BACK PAIN WITHOUT SCIATICA: ICD-10-CM

## 2019-06-25 PROCEDURE — 97110 THERAPEUTIC EXERCISES: CPT | Mod: PN

## 2019-06-25 NOTE — PROGRESS NOTES
Physical Therapy Daily Treatment Note     Name: Colt Rose  Clinic Number: [id number]    Therapy Diagnosis:   Low back pain   Physician: Ron Mayes MD    Visit Date: 6/25/2019  Physician Orders: PT Eval and Treat   Medical Diagnosis from Referral: Chronic Low Back Pain  Evaluation Date: 5/14/2019  Authorization Period Expiration: 12/31/19   Plan of Care Expiration: 6/28/19   Visit # / Visits authorized: 7/ 12    Time In: 500 PM   Time Out: 600   PM   Total Billable Time: 60  minutes    Precautions: Standard    Subjective     Pt reports: n, overall feeling better,   He was compliant with home exercise program.  Response to previous treatment: muscle soreness after last session  Functional change: improving ROM and strength     Pain: 1/10  Location: R low back and hip       Objective     Posture: decreased lordosis   Palpation:  Tight lumbar paraspinals   Sensation: intact      Lumbar AROM:     Flexion 25% limited   Extension *WNL    Right side bending  WNL   Left side bending  WNL                 LE MMT                      Left      Right  Hip flexion                   5           5  Hip abduction              5          4+  Hip adduction              5          5  Hip ER                         5         4+            Hip IR                          5          5  Knee extension           5          5  Knee flexion                5          5        Flexibility:                  Left      Right  Hamstrings                  25         30         Achilles                        5          5     Gait: Independent   Analysis:      Special Tests:              SLR -  JOSE + R (tight anterior hip)   Piriformis -   Slump -      Other:   Oswestry         10% impairment      Colt received therapeutic exercises to develop strength, flexibility, posture and core stabilization for 45 minutes including:    Supine hamstring stretch 3 x 20 sec B  LTR with ball under LE's x 30 B  DKC w/ball x 30  Dying bug x 30  [facility]    B  Clamshell GTB x 20  Ball squeeze x 30  Bridge on PB ball x 30  QP cat/camel x 30  QP rocking back 5 sec x 20  Bird dog x 20      Shuttle hip ext 25# x 20   Lateral band walk x 1 lap   CC Rows 13# x 20; Squat to Row 13# x 10   Down Dog press ups.   Chops/lifts 13#/10# x 10   Hip hinge 10# bar x 20; CC pull throughs 10# x 20      Home Exercises Provided and Patient Education Provided     Education provided:   - cont HEP, VC for tall posture on PB    Written Home Exercises Provided: Patient instructed to cont prior HEP.  Exercises were reviewed and Colt was able to demonstrate them prior to the end of the session.  Colt demonstrated good  understanding of the education provided.     See EMR under Patient Instructions for exercises provided prior visit.    Assessment     Tolerated exercise progressions well. Progressed flexibility, strength and activity tolerance.     Colt is progressed well towards his goals.   Pt prognosis is Good.       Short Term Goals: 3 weeks ~ MET   - Tolerate PT exercises with minimal increase in pain for improved strength and conditioning   - Report 50% improvement in pain sx for improved tolerance with daily activities at home and in community.     Long Term Goals: 6 weeks  - Restore full painfree ROM to lumbar spine all planes for improved functional mobility ~MET   - Demonstrate improved flexibility of Bilateral Hamstring  muscle groups +10-15 degrees with 90/90  for improved posture and increased activity tolerance. ~MET  - Demonstrate improved strength of Bilateral hips all planes  to 4+/5 or greater  for improved stability with standing activities ~MET  - Report MCID with IVAN  demonstrating return to PLOF with daily activities. ~ NOT MET   - Be engaged in HEP/fitness routine for continued strength and conditioning upon d/c from PT. ~MET      Plan     D/C to HEP     Manjit Allred, PT   Citlali Lino MD

## 2019-10-25 ENCOUNTER — DOCUMENTATION ONLY (OUTPATIENT)
Dept: FAMILY MEDICINE | Facility: CLINIC | Age: 64
End: 2019-10-25

## 2019-10-25 NOTE — PROGRESS NOTES
Pre-Visit Chart Review  For Appointment Scheduled on (10/29/19)    There are no preventive care reminders to display for this patient.

## 2019-11-07 ENCOUNTER — DOCUMENTATION ONLY (OUTPATIENT)
Dept: FAMILY MEDICINE | Facility: CLINIC | Age: 64
End: 2019-11-07

## 2019-11-08 ENCOUNTER — HOSPITAL ENCOUNTER (OUTPATIENT)
Dept: RADIOLOGY | Facility: CLINIC | Age: 64
Discharge: HOME | End: 2019-11-08
Attending: PHYSICIAN ASSISTANT
Payer: COMMERCIAL

## 2019-11-08 ENCOUNTER — OFFICE VISIT (OUTPATIENT)
Dept: FAMILY MEDICINE | Facility: CLINIC | Age: 64
End: 2019-11-08
Payer: COMMERCIAL

## 2019-11-08 VITALS
DIASTOLIC BLOOD PRESSURE: 62 MMHG | TEMPERATURE: 99 F | WEIGHT: 191.81 LBS | HEART RATE: 82 BPM | HEIGHT: 70 IN | SYSTOLIC BLOOD PRESSURE: 112 MMHG | BODY MASS INDEX: 27.46 KG/M2 | OXYGEN SATURATION: 98 %

## 2019-11-08 DIAGNOSIS — Z00.00 ANNUAL PHYSICAL EXAM: Primary | ICD-10-CM

## 2019-11-08 DIAGNOSIS — M25.562 CHRONIC PAIN OF LEFT KNEE: ICD-10-CM

## 2019-11-08 DIAGNOSIS — E78.5 HYPERLIPIDEMIA, UNSPECIFIED HYPERLIPIDEMIA TYPE: ICD-10-CM

## 2019-11-08 DIAGNOSIS — M54.50 CHRONIC MIDLINE LOW BACK PAIN WITHOUT SCIATICA: ICD-10-CM

## 2019-11-08 DIAGNOSIS — G89.29 CHRONIC MIDLINE LOW BACK PAIN WITHOUT SCIATICA: ICD-10-CM

## 2019-11-08 DIAGNOSIS — G89.29 CHRONIC PAIN OF LEFT KNEE: ICD-10-CM

## 2019-11-08 DIAGNOSIS — K21.9 GASTROESOPHAGEAL REFLUX DISEASE, ESOPHAGITIS PRESENCE NOT SPECIFIED: ICD-10-CM

## 2019-11-08 DIAGNOSIS — Z12.5 SCREENING FOR PROSTATE CANCER: ICD-10-CM

## 2019-11-08 DIAGNOSIS — Z23 IMMUNIZATION DUE: ICD-10-CM

## 2019-11-08 PROCEDURE — 73562 X-RAY EXAM OF KNEE 3: CPT | Mod: 26,LT,S$GLB, | Performed by: RADIOLOGY

## 2019-11-08 PROCEDURE — 90686 IIV4 VACC NO PRSV 0.5 ML IM: CPT | Mod: S$GLB,,, | Performed by: PHYSICIAN ASSISTANT

## 2019-11-08 PROCEDURE — 90471 FLU VACCINE (QUAD) GREATER THAN OR EQUAL TO 3YO PRESERVATIVE FREE IM: ICD-10-PCS | Mod: S$GLB,,, | Performed by: PHYSICIAN ASSISTANT

## 2019-11-08 PROCEDURE — 99396 PREV VISIT EST AGE 40-64: CPT | Mod: 25,S$GLB,, | Performed by: PHYSICIAN ASSISTANT

## 2019-11-08 PROCEDURE — 73560 X-RAY EXAM OF KNEE 1 OR 2: CPT | Mod: TC,FY,PO,RT

## 2019-11-08 PROCEDURE — 99396 PR PREVENTIVE VISIT,EST,40-64: ICD-10-PCS | Mod: 25,S$GLB,, | Performed by: PHYSICIAN ASSISTANT

## 2019-11-08 PROCEDURE — 73562 XR KNEE ORTHO LEFT: ICD-10-PCS | Mod: 26,LT,S$GLB, | Performed by: RADIOLOGY

## 2019-11-08 PROCEDURE — 73560 XR KNEE ORTHO LEFT: ICD-10-PCS | Mod: 26,59,RT,S$GLB | Performed by: RADIOLOGY

## 2019-11-08 PROCEDURE — 73560 X-RAY EXAM OF KNEE 1 OR 2: CPT | Mod: 26,59,RT,S$GLB | Performed by: RADIOLOGY

## 2019-11-08 PROCEDURE — 90471 IMMUNIZATION ADMIN: CPT | Mod: S$GLB,,, | Performed by: PHYSICIAN ASSISTANT

## 2019-11-08 PROCEDURE — 73562 X-RAY EXAM OF KNEE 3: CPT | Mod: TC,FY,PO,LT

## 2019-11-08 PROCEDURE — 90686 FLU VACCINE (QUAD) GREATER THAN OR EQUAL TO 3YO PRESERVATIVE FREE IM: ICD-10-PCS | Mod: S$GLB,,, | Performed by: PHYSICIAN ASSISTANT

## 2019-11-08 PROCEDURE — 99999 PR PBB SHADOW E&M-EST. PATIENT-LVL IV: CPT | Mod: PBBFAC,,, | Performed by: PHYSICIAN ASSISTANT

## 2019-11-08 PROCEDURE — 99999 PR PBB SHADOW E&M-EST. PATIENT-LVL IV: ICD-10-PCS | Mod: PBBFAC,,, | Performed by: PHYSICIAN ASSISTANT

## 2019-11-08 RX ORDER — OMEPRAZOLE 40 MG/1
CAPSULE, DELAYED RELEASE ORAL
COMMUNITY
Start: 2014-01-04 | End: 2019-11-08

## 2019-11-08 RX ORDER — PANTOPRAZOLE SODIUM 40 MG/1
40 TABLET, DELAYED RELEASE ORAL DAILY
Qty: 90 TABLET | Refills: 3 | Status: SHIPPED | OUTPATIENT
Start: 2019-11-08 | End: 2019-11-13

## 2019-11-08 RX ORDER — ATORVASTATIN CALCIUM 10 MG/1
10 TABLET, FILM COATED ORAL DAILY
Qty: 90 TABLET | Refills: 3 | Status: SHIPPED | OUTPATIENT
Start: 2019-11-08 | End: 2020-05-14 | Stop reason: SDUPTHER

## 2019-11-08 NOTE — PROGRESS NOTES
Subjective:       Patient ID: Colt Rose is a 64 y.o. male.    Chief Complaint: Annual Exam    Patient is new to me.  He presents for routine annual exam.  Patient has hyperlipidemia and is compliant with statin medication.  He has GERD and takes PPI with control of his symptoms.  He follows a diet which is heart healthy.  He does not exercise.  He continues to have some low back pain which he describes as stiffness.  Pain does not radiate into the extremities.  He was seen by Dr. Mayes in the past and nerve block was discussed.  He is complaining of new left-sided knee pain.  He denies injury.  Pain occurs while squatting.  He has not tried anything for the pain  Patients patient medical/surgical, social and family histories have been reviewed       Review of Systems   Constitutional: Negative for activity change, appetite change, fatigue, fever and unexpected weight change.   Respiratory: Negative for cough, chest tightness and shortness of breath.    Cardiovascular: Negative for chest pain, palpitations and leg swelling.   Gastrointestinal: Negative for abdominal pain, anal bleeding, blood in stool, constipation, diarrhea and nausea.   Endocrine: Negative for polydipsia and polyuria.   Genitourinary: Negative for decreased urine volume, difficulty urinating, dysuria, enuresis, flank pain, frequency, hematuria and urgency.   Musculoskeletal: Positive for arthralgias and back pain. Negative for gait problem and joint swelling.   Skin: Negative for rash and wound.   Neurological: Negative for dizziness, weakness, numbness and headaches.   Psychiatric/Behavioral: Negative for dysphoric mood. The patient is not nervous/anxious.        Objective:      Physical Exam   Constitutional: He appears well-developed and well-nourished. He is cooperative. No distress.   HENT:   Head: Normocephalic and atraumatic.   Cardiovascular: Normal rate, regular rhythm and normal heart sounds.   Pulmonary/Chest: Effort normal and  breath sounds normal.   Musculoskeletal:        Left knee: He exhibits normal range of motion and no swelling. Tenderness found. Medial joint line tenderness noted.        Right lower leg: He exhibits no edema.        Left lower leg: He exhibits no edema.   Neurological: He is alert.   Skin: Skin is warm and dry. Capillary refill takes less than 2 seconds.       Assessment:       1. Annual physical exam    2. Screening for prostate cancer    3. Hyperlipidemia, unspecified hyperlipidemia type    4. Gastroesophageal reflux disease, esophagitis presence not specified    5. Chronic midline low back pain without sciatica    6. Chronic pain of left knee    7. Immunization due        Plan:       Colt was seen today for annual exam.    Diagnoses and all orders for this visit:    Annual physical exam  -     Hemoglobin A1c; Future  -     Lipid panel; Future  -     CBC auto differential; Future  -     Comprehensive metabolic panel; Future  -     Urinalysis; Future  -     PSA, Screening; Future    Screening for prostate cancer  -     PSA, Screening; Future    Hyperlipidemia, unspecified hyperlipidemia type  Atorvastatin   Gastroesophageal reflux disease, esophagitis presence not specified  Protonix refilled          Follow up for Established with Dr. Mena in 6 months, labs one week prior.   DISCLAIMER: This note was prepared with [employer] voice recognition transcription software. Garbled syntax, mangled pronouns, and other bizarre constructions may be attributed to that software system

## 2019-11-11 ENCOUNTER — TELEPHONE (OUTPATIENT)
Dept: FAMILY MEDICINE | Facility: CLINIC | Age: 64
End: 2019-11-11

## 2019-11-11 ENCOUNTER — OFFICE VISIT (OUTPATIENT)
Dept: SPINE | Facility: CLINIC | Age: 64
End: 2019-11-11
Payer: COMMERCIAL

## 2019-11-11 VITALS — BODY MASS INDEX: 27.46 KG/M2 | WEIGHT: 191.81 LBS | HEIGHT: 70 IN

## 2019-11-11 DIAGNOSIS — G89.29 CHRONIC MIDLINE LOW BACK PAIN WITHOUT SCIATICA: Primary | ICD-10-CM

## 2019-11-11 DIAGNOSIS — M25.562 LEFT KNEE PAIN, UNSPECIFIED CHRONICITY: Primary | ICD-10-CM

## 2019-11-11 DIAGNOSIS — M25.569 KNEE PAIN, UNSPECIFIED CHRONICITY, UNSPECIFIED LATERALITY: Primary | ICD-10-CM

## 2019-11-11 DIAGNOSIS — M54.50 CHRONIC MIDLINE LOW BACK PAIN WITHOUT SCIATICA: Primary | ICD-10-CM

## 2019-11-11 PROCEDURE — 99213 OFFICE O/P EST LOW 20 MIN: CPT | Mod: S$GLB,,, | Performed by: PHYSICAL MEDICINE & REHABILITATION

## 2019-11-11 PROCEDURE — 3008F BODY MASS INDEX DOCD: CPT | Mod: S$GLB,,, | Performed by: PHYSICAL MEDICINE & REHABILITATION

## 2019-11-11 PROCEDURE — 3008F PR BODY MASS INDEX (BMI) DOCUMENTED: ICD-10-PCS | Mod: S$GLB,,, | Performed by: PHYSICAL MEDICINE & REHABILITATION

## 2019-11-11 PROCEDURE — 99213 PR OFFICE/OUTPT VISIT, EST, LEVL III, 20-29 MIN: ICD-10-PCS | Mod: S$GLB,,, | Performed by: PHYSICAL MEDICINE & REHABILITATION

## 2019-11-11 NOTE — TELEPHONE ENCOUNTER
Patient notified of results, at this time he agrees to follow up with orthopedics.           ----- Message from DANIEL Richards sent at 11/11/2019  9:48 AM CST -----  Knee x-ray shows a small amount of fluid within the joint space and some arthritic changes.  We can refer him to orthopedics if he would like.

## 2019-11-11 NOTE — PROGRESS NOTES
SUBJECTIVE:    Patient ID: Colt Rose is a 64 y.o. male.    Chief Complaint: Follow-up    He is here for follow-up.  He presents with complaints of low back pain at the lumbosacral junction both right and left side.  No radicular leg pain.  He is having some discomfort around his left knee.  Has some mild arthritic changes on x-ray.  Is interested in interventional pain procedures.  Last time we talked about medial branch blocks and radiofrequency ablation at L4-5 and L5-S1 on the right because he was mostly having right-sided low back pain.  Now he has bilateral pain.  He thinks is worse when he bends forward than with extension.  Pain level is 3/10        Past Medical History:   Diagnosis Date    Arthritis of hand 8/6/2015    Cervical disc displacement     Degeneration of lumbar intervertebral disc     GERD (gastroesophageal reflux disease)     Hip pain 2/22/2016    Hyperlipidemia     Shingles 11/2013     Social History     Socioeconomic History    Marital status:      Spouse name: Not on file    Number of children: Not on file    Years of education: Not on file    Highest education level: Not on file   Occupational History    Not on file   Social Needs    Financial resource strain: Not on file    Food insecurity:     Worry: Not on file     Inability: Not on file    Transportation needs:     Medical: Not on file     Non-medical: Not on file   Tobacco Use    Smoking status: Never Smoker    Smokeless tobacco: Never Used   Substance and Sexual Activity    Alcohol use: Yes     Alcohol/week: 6.0 standard drinks     Types: 6 Cans of beer per week     Comment: weekly    Drug use: No    Sexual activity: Yes     Partners: Female   Lifestyle    Physical activity:     Days per week: Not on file     Minutes per session: Not on file    Stress: Not on file   Relationships    Social connections:     Talks on phone: Not on file     Gets together: Not on file     Attends Voodoo service: Not  "on file     Active member of club or organization: Not on file     Attends meetings of clubs or organizations: Not on file     Relationship status: Not on file   Other Topics Concern    Not on file   Social History Narrative    Not on file     Past Surgical History:   Procedure Laterality Date    CERVICAL FUSION      bone graft    TONSILLECTOMY, ADENOIDECTOMY       Family History   Problem Relation Age of Onset    Diabetes Mother     Hypertension Mother     Stroke Mother     Coronary artery disease Mother     Stroke Father     Heart disease Brother         CAD     Vitals:    11/11/19 1409   Weight: 87 kg (191 lb 12.8 oz)   Height: 5' 10" (1.778 m)       Review of Systems   Constitutional: Negative for chills, diaphoresis, fatigue, fever and unexpected weight change.   HENT: Negative for trouble swallowing.    Eyes: Negative for visual disturbance.   Respiratory: Negative for shortness of breath.    Cardiovascular: Negative for chest pain.   Gastrointestinal: Negative for abdominal pain, constipation, diarrhea, nausea and vomiting.   Genitourinary: Negative for difficulty urinating.   Musculoskeletal: Negative for arthralgias, back pain, gait problem, joint swelling, myalgias, neck pain and neck stiffness.   Neurological: Negative for dizziness, speech difficulty, weakness, light-headedness, numbness and headaches.          Objective:      Physical Exam   Constitutional: He is oriented to person, place, and time. He appears well-developed and well-nourished.   Neurological: He is alert and oriented to person, place, and time.   No point tenderness or palpable masses about the lumbar spine.  Forward flexion is to about 30° before complains of pain at the lumbosacral junction.  Extension at 10° causes mild pain at the lumbosacral junction           Assessment:       1. Chronic midline low back pain without sciatica           Plan:     going to get an updated MRI of the lumbar spine.  His symptoms sound more " discogenic then facetogenic at this particular time.  I note that he has completed a course physical therapy and is still not satisfied with his quality of life and that he would be interested in interventional pain procedures.  Follow-up with me after the scan      Chronic midline low back pain without sciatica  -     MRI Lumbar Spine Without Contrast; Future; Expected date: 11/11/2019

## 2019-11-11 NOTE — LETTER
November 11, 2019      DANIEL Richards  [address] [place] [address]           [facility]  [address] [place] [address] [address]  [place] [address]  Phone: [phone]  Fax: [phone]          Patient: Colt Rose   MR Number: [id number]   YOB: 1955   Date of Visit: 11/11/2019       Dear Mary Barrios:    Thank you for referring Colt Rose to me for evaluation. Attached you will find relevant portions of my assessment and plan of care.    If you have questions, please do not hesitate to call me. I look forward to following Colt Rose along with you.    Sincerely,    Ron Mayes MD    Enclosure  CC:  No Recipients    If you would like to receive this communication electronically, please contact [email or web address] or [phone] to request more information on [employer] access.    For providers and/or their staff who would like to refer a patient to [facility], please contact us through our one-stop-shop provider referral line, [facility], at [phone].    If you feel you have received this communication in error or would no longer like to receive these types of communications, please e-mail [email or web address]

## 2019-11-12 ENCOUNTER — OFFICE VISIT (OUTPATIENT)
Dept: ORTHOPEDICS | Facility: CLINIC | Age: 64
End: 2019-11-12
Payer: COMMERCIAL

## 2019-11-12 ENCOUNTER — HOSPITAL ENCOUNTER (OUTPATIENT)
Dept: RADIOLOGY | Facility: HOSPITAL | Age: 64
Discharge: HOME | End: 2019-11-12
Attending: ORTHOPAEDIC SURGERY
Payer: COMMERCIAL

## 2019-11-12 VITALS
HEIGHT: 70 IN | DIASTOLIC BLOOD PRESSURE: 71 MMHG | WEIGHT: 191.81 LBS | HEART RATE: 56 BPM | BODY MASS INDEX: 27.46 KG/M2 | SYSTOLIC BLOOD PRESSURE: 145 MMHG

## 2019-11-12 DIAGNOSIS — M25.562 CHRONIC PAIN OF LEFT KNEE: ICD-10-CM

## 2019-11-12 DIAGNOSIS — M25.562 LEFT KNEE PAIN, UNSPECIFIED CHRONICITY: ICD-10-CM

## 2019-11-12 DIAGNOSIS — G89.29 CHRONIC PAIN OF LEFT KNEE: ICD-10-CM

## 2019-11-12 PROCEDURE — 73562 X-RAY EXAM OF KNEE 3: CPT | Mod: 26,59,RT, | Performed by: RADIOLOGY

## 2019-11-12 PROCEDURE — 73564 X-RAY EXAM KNEE 4 OR MORE: CPT | Mod: 26,LT,, | Performed by: RADIOLOGY

## 2019-11-12 PROCEDURE — 3008F PR BODY MASS INDEX (BMI) DOCUMENTED: ICD-10-PCS | Mod: CPTII,S$GLB,, | Performed by: ORTHOPAEDIC SURGERY

## 2019-11-12 PROCEDURE — 73562 X-RAY EXAM OF KNEE 3: CPT | Mod: TC,PN,RT,59

## 2019-11-12 PROCEDURE — 3008F BODY MASS INDEX DOCD: CPT | Mod: CPTII,S$GLB,, | Performed by: ORTHOPAEDIC SURGERY

## 2019-11-12 PROCEDURE — 99204 OFFICE O/P NEW MOD 45 MIN: CPT | Mod: 25,S$GLB,, | Performed by: ORTHOPAEDIC SURGERY

## 2019-11-12 PROCEDURE — 73562 XR KNEE ORTHO LEFT WITH FLEXION: ICD-10-PCS | Mod: 26,59,RT, | Performed by: RADIOLOGY

## 2019-11-12 PROCEDURE — 20610 DRAIN/INJ JOINT/BURSA W/O US: CPT | Mod: LT,S$GLB,, | Performed by: ORTHOPAEDIC SURGERY

## 2019-11-12 PROCEDURE — 99999 PR PBB SHADOW E&M-EST. PATIENT-LVL III: ICD-10-PCS | Mod: PBBFAC,,, | Performed by: ORTHOPAEDIC SURGERY

## 2019-11-12 PROCEDURE — 99204 PR OFFICE/OUTPT VISIT, NEW, LEVL IV, 45-59 MIN: ICD-10-PCS | Mod: 25,S$GLB,, | Performed by: ORTHOPAEDIC SURGERY

## 2019-11-12 PROCEDURE — 20610 LARGE JOINT ASPIRATION/INJECTION: L KNEE: ICD-10-PCS | Mod: LT,S$GLB,, | Performed by: ORTHOPAEDIC SURGERY

## 2019-11-12 PROCEDURE — 73564 XR KNEE ORTHO LEFT WITH FLEXION: ICD-10-PCS | Mod: 26,LT,, | Performed by: RADIOLOGY

## 2019-11-12 PROCEDURE — 99999 PR PBB SHADOW E&M-EST. PATIENT-LVL III: CPT | Mod: PBBFAC,,, | Performed by: ORTHOPAEDIC SURGERY

## 2019-11-12 RX ORDER — METHYLPREDNISOLONE ACETATE 40 MG/ML
40 INJECTION, SUSPENSION INTRA-ARTICULAR; INTRALESIONAL; INTRAMUSCULAR; SOFT TISSUE
Status: DISCONTINUED | OUTPATIENT
Start: 2019-11-12 | End: 2019-11-12 | Stop reason: HOSPADM

## 2019-11-12 RX ADMIN — METHYLPREDNISOLONE ACETATE 40 MG: 40 INJECTION, SUSPENSION INTRA-ARTICULAR; INTRALESIONAL; INTRAMUSCULAR; SOFT TISSUE at 03:11

## 2019-11-12 NOTE — PROCEDURES
Large Joint Aspiration/Injection: L knee  Date/Time: 11/12/2019 3:30 PM  Performed by: Geoff Colvin II, MD  Authorized by: Geoff Colvin II, MD     Consent Done?:  Yes (Verbal)  Indications:  Pain  Timeout: Prior to procedure the correct patient, procedure, and site was verified    Anesthesia  Local anesthesia used  Anesthetic: topical anesthetic    Location:  Knee  Site:  L knee  Prep: Patient was prepped and draped in usual sterile fashion    Needle size:  22 G  Approach:  Anteromedial  Medications:  40 mg methylPREDNISolone acetate 40 mg/mL  Patient tolerance:  Patient tolerated the procedure well with no immediate complications

## 2019-11-12 NOTE — PATIENT INSTRUCTIONS
Cortisone Injections     Your pain may be relieved by a cortisone injection.   Cortisone is a type of steroid. It can greatly reduce swelling, redness, and irritation (inflammation) and pain. Being injected with cortisone is simple and doesnt take long. Your doctor may ask you questions about your health. Certain health conditions, such as diabetes, can be affected by cortisone.  Why have a cortisone injection?  Injecting cortisone can relieve pain for anything from a sports injury to arthritis. Your doctor may suggest an injection if rest, splints, or oral medicine doesnt relieve your pain. Injecting cortisone is simpler than having surgery. And cortisone may provide the lasting pain relief that can help you get out and enjoy life again.  Getting the injection  Your doctor will start by cleaning and occasionally numbing your skin at the injection site. Next youll be injected with local anesthetics (for short-term pain relief) and cortisone. The injection may last a few moments. A small bandage will be put over the injection site. Youll then be ready to go home.  After your injection  After being injected, make sure you dont injure the treated region. But stay active. Enjoy a walk or some other mild activity. Just be careful not to strain the region that gave you trouble.  The next day  Some people feel more pain after being injected. This is normal, and it will go away soon. Applying ice for 20 minutes at a time to your injury may reduce the increased pain. Rest for the first day or two. You dont need to stay in bed. But avoid tasks that may strain the injured region.  If you have diabetes  Cortisone injections can cause blood sugar to be increased for several days after the injection. If you have diabetes, you should follow your blood  sugar closely during this time. Follow your regular plan for what to do when your blood sugar is elevated.   Date Last Reviewed: 9/9/2015  © 1889-2728 [employer] [address] [place] [id number]. All rights reserved. This information is not intended as a substitute for professional medical care. Always follow your healthcare professional's instructions.

## 2019-11-12 NOTE — PROGRESS NOTES
CC:  64-year-old male presents for evaluation of left knee pain. Patient has had an insidious onset of pain in the left knee that is really related to a bending and kneeling that started about 1-2 months ago.  The patient states that he started doing some physical therapy exercises for his back that required to be on his hands and knees and after restarted that his left knee began to have pain with bending and kneeling.  He rates the pain as a 3/10.  He does not recall any specific injury.  He denies popping, locking, and buckling of the knee.    ROS:    Constitution: Denies chills, fever, and sweats.  HENT: Denies headaches or blurry vision.  Cardiovascular: Denies chest pain or irregular heart beat.  Respiratory: Denies cough or shortness of breath.  Gastrointestinal: Denies abdominal pain, nausea, or vomiting.  Genitourinary:  Denies urinary incontinence, bladder and kidney issues  Musculoskeletal:  Denies muscle cramps.  Positive for left knee pain  Neurological: Denies dizziness or focal weakness.  Psychiatric/Behavioral: Normal mental status.  Hematologic/Lymphatic: Denies bleeding problem or easy bruising/bleeding.  Skin: Denies rash or suspicious lesions.    Physical examination     Gen - No acute distress   Eyes - Extraoccular motions intact, pupils equally round and reactive to light and accommodation   ENT - normocephalic, atruamtic, oropharynx clear   Neck - Supple, no abnormal masses   Cardiovascular - regular rate and rhythm   Pulmonary - clear to auscultation bilaterally   Abdomen - soft, non-tender, non-distended, positive bowel sounds   Psych - The patient is alert and oriented x3 with normal mood and affect    Examination of the Left Lower Extremity:     Motor function is intact distally EHL/FHL/TA/kylie   +2 dorsalis pedis and posterior tibial pulses   Sensation to light touch intact distally dorsal, plantar, and first web space     Examination of the Left knee:    ROM 0 - 150   Effusion  positive  Tenderness to palpation at the joint line negative  Pain during range of motion negative  Crepitation during range of motion negative     negative increased pain noted with flexion past 90   negative antalgic gait noted   negative Lachman's Test   negative Anterior Drawer Test   negative Posterior Drawer Test   negative McMurrays Test   negative Disco Test   negative Varus/Valgus instability    X-rays were examined and personally reviewed by me.  Three views of the left knee dated 11/08/2019 available for review.  It shows that the joint spaces are well maintained.  There is no advanced osteoarthritic changes.  There is some early periarticular osteophyte formation in the patellofemoral compartment mostly seen on lateral.  Otherwise normal exam with no acute fractures.    Dx:  Very mild osteoarthritis of the left knee, mostly patellofemoral.    Plan:  Recommended an injection into the knee. The patient agreed we injected the left knee with a mixture of 2, 2, 1.  He tolerated it well.  He can ice tonight and use Motrin and Tylenol and he can follow up in a week to make sure he has gotten better.

## 2019-11-13 ENCOUNTER — TELEPHONE (OUTPATIENT)
Dept: FAMILY MEDICINE | Facility: CLINIC | Age: 64
End: 2019-11-13

## 2019-11-13 RX ORDER — OMEPRAZOLE 40 MG/1
40 CAPSULE, DELAYED RELEASE ORAL DAILY
Qty: 90 CAPSULE | Refills: 3 | Status: SHIPPED | OUTPATIENT
Start: 2019-11-13 | End: 2020-05-14 | Stop reason: SDUPTHER

## 2019-11-15 NOTE — PROGRESS NOTES
CC:  64-year-old male follows up with left knee pain. On his last visit we had injected the knee with steroid mixture.  He states he did not get any relief with the pain. He now states that his pain really localizes on the medial aspect of the knee and the medial popliteal fossa.    Examination of the Left Lower Extremity:     Motor function is intact distally EHL/FHL/TA/kylie   +2 dorsalis pedis and posterior tibial pulses   Sensation to light touch intact distally dorsal, plantar, and first web space     Examination of the Left knee:    ROM 0 - 150   Effusion positive  Tenderness to palpation at the joint line positive  Pain during range of motion positive  Crepitation during range of motion negative     positive increased pain noted with flexion past 90   positive antalgic gait noted   negative Lachman's Test   negative Anterior Drawer Test   negative Posterior Drawer Test   positive McMurrays Test   positive Disco Test   negative Varus/Valgus instability    Dx:  Continued knee pain despite p.o. medications and intra-articular steroid injection.    Plan:  Recommendations for an MRI of the left knee.  Follow-up after the MRI is complete.

## 2019-11-19 ENCOUNTER — OFFICE VISIT (OUTPATIENT)
Dept: ORTHOPEDICS | Facility: CLINIC | Age: 64
End: 2019-11-19
Payer: COMMERCIAL

## 2019-11-19 VITALS
SYSTOLIC BLOOD PRESSURE: 115 MMHG | HEART RATE: 56 BPM | WEIGHT: 191.81 LBS | HEIGHT: 70 IN | BODY MASS INDEX: 27.46 KG/M2 | DIASTOLIC BLOOD PRESSURE: 68 MMHG

## 2019-11-19 DIAGNOSIS — M25.562 LEFT KNEE PAIN, UNSPECIFIED CHRONICITY: Primary | ICD-10-CM

## 2019-11-19 DIAGNOSIS — M23.92 INTERNAL DERANGEMENT OF LEFT KNEE: Primary | ICD-10-CM

## 2019-11-19 PROCEDURE — 99999 PR PBB SHADOW E&M-EST. PATIENT-LVL III: CPT | Mod: PBBFAC,,, | Performed by: ORTHOPAEDIC SURGERY

## 2019-11-19 PROCEDURE — 99212 OFFICE O/P EST SF 10 MIN: CPT | Mod: S$GLB,,, | Performed by: ORTHOPAEDIC SURGERY

## 2019-11-19 PROCEDURE — 3008F PR BODY MASS INDEX (BMI) DOCUMENTED: ICD-10-PCS | Mod: CPTII,S$GLB,, | Performed by: ORTHOPAEDIC SURGERY

## 2019-11-19 PROCEDURE — 99212 PR OFFICE/OUTPT VISIT, EST, LEVL II, 10-19 MIN: ICD-10-PCS | Mod: S$GLB,,, | Performed by: ORTHOPAEDIC SURGERY

## 2019-11-19 PROCEDURE — 3008F BODY MASS INDEX DOCD: CPT | Mod: CPTII,S$GLB,, | Performed by: ORTHOPAEDIC SURGERY

## 2019-11-19 PROCEDURE — 99999 PR PBB SHADOW E&M-EST. PATIENT-LVL III: ICD-10-PCS | Mod: PBBFAC,,, | Performed by: ORTHOPAEDIC SURGERY

## 2019-11-20 ENCOUNTER — HOSPITAL ENCOUNTER (OUTPATIENT)
Dept: RADIOLOGY | Facility: HOSPITAL | Age: 64
Discharge: HOME | End: 2019-11-20
Attending: PHYSICAL MEDICINE & REHABILITATION
Payer: COMMERCIAL

## 2019-11-20 DIAGNOSIS — M54.50 CHRONIC MIDLINE LOW BACK PAIN WITHOUT SCIATICA: ICD-10-CM

## 2019-11-20 DIAGNOSIS — G89.29 CHRONIC MIDLINE LOW BACK PAIN WITHOUT SCIATICA: ICD-10-CM

## 2019-11-20 PROCEDURE — 72148 MRI LUMBAR SPINE W/O DYE: CPT | Mod: TC

## 2019-11-20 PROCEDURE — 72148 MRI LUMBAR SPINE WITHOUT CONTRAST: ICD-10-PCS | Mod: 26,,, | Performed by: RADIOLOGY

## 2019-11-20 PROCEDURE — 72148 MRI LUMBAR SPINE W/O DYE: CPT | Mod: 26,,, | Performed by: RADIOLOGY

## 2019-11-22 ENCOUNTER — OFFICE VISIT (OUTPATIENT)
Dept: SPINE | Facility: CLINIC | Age: 64
End: 2019-11-22
Payer: COMMERCIAL

## 2019-11-22 ENCOUNTER — TELEPHONE (OUTPATIENT)
Dept: PAIN MEDICINE | Facility: CLINIC | Age: 64
End: 2019-11-22

## 2019-11-22 VITALS
BODY MASS INDEX: 27.35 KG/M2 | HEIGHT: 70 IN | HEART RATE: 57 BPM | DIASTOLIC BLOOD PRESSURE: 66 MMHG | SYSTOLIC BLOOD PRESSURE: 116 MMHG | WEIGHT: 191 LBS

## 2019-11-22 DIAGNOSIS — M54.50 CHRONIC MIDLINE LOW BACK PAIN WITHOUT SCIATICA: Primary | ICD-10-CM

## 2019-11-22 DIAGNOSIS — G89.29 CHRONIC MIDLINE LOW BACK PAIN WITHOUT SCIATICA: Primary | ICD-10-CM

## 2019-11-22 PROCEDURE — 99213 PR OFFICE/OUTPT VISIT, EST, LEVL III, 20-29 MIN: ICD-10-PCS | Mod: S$GLB,,, | Performed by: PHYSICAL MEDICINE & REHABILITATION

## 2019-11-22 PROCEDURE — 3008F PR BODY MASS INDEX (BMI) DOCUMENTED: ICD-10-PCS | Mod: S$GLB,,, | Performed by: PHYSICAL MEDICINE & REHABILITATION

## 2019-11-22 PROCEDURE — 3008F BODY MASS INDEX DOCD: CPT | Mod: S$GLB,,, | Performed by: PHYSICAL MEDICINE & REHABILITATION

## 2019-11-22 PROCEDURE — 99213 OFFICE O/P EST LOW 20 MIN: CPT | Mod: S$GLB,,, | Performed by: PHYSICAL MEDICINE & REHABILITATION

## 2019-11-22 NOTE — TELEPHONE ENCOUNTER
----- Message from Ron Mayes MD sent at 11/22/2019  3:18 PM CST -----  Please schedule for interlaminar epidural steroid injection at L5-S1

## 2019-11-22 NOTE — H&P (VIEW-ONLY)
SUBJECTIVE:    Patient ID: Colt Rose is a 64 y.o. male.    Chief Complaint: Follow-up (MRI results)    He is here to review his lumbar MRI which was done on 11/20/2019 to evaluate his complaint of midline low back pain at the lumbosacral junction.  The MRI summarized below:    Impression      1. There is multilevel degenerative disc and facet disease.  There is no fracture.  Disc space narrowing is most severe at the L3-4 and L4-5 levels.  There is also trace degenerative listhesis at these levels.  There is some degree of disc bulge with osteophytic ridging with a without superimposed small disc protrusion in addition to facet joint arthropathy.  These findings are discussed in detail by level above.  The pertinent findings are summarized below.  2. At the L4-5 level there is multifactorial moderate spinal stenosis with mild-to-moderate right and mild left foraminal stenosis.  There is also mild crowding of the right lateral recess.  3. At the L3-4 level there is only borderline spinal stenosis but there is moderate left lateral recess stenosis.  There is moderate-to-marked left foraminal stenosis.  4. At the L2-3 level there is mild crowding of the left lateral recess and there is mild left foraminal stenosis without spinal stenosis.  5. At the L5-S1 level there is moderate-to-marked facet joint arthropathy but there is no spinal canal or significant foraminal stenosis.    Clinically is about the same.  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    Cervical disc displacement     Degeneration of lumbar intervertebral disc     GERD (gastroesophageal reflux disease)     Hip pain 2/22/2016    Hyperlipidemia     Shingles 11/2013     Social History     Socioeconomic History    Marital status:      Spouse name: Not on file    Number of children: Not on file    Years of education: Not on file    Highest education level: Not on file  "  Occupational History    Not on file   Social Needs    Financial resource strain: Not on file    Food insecurity:     Worry: Not on file     Inability: Not on file    Transportation needs:     Medical: Not on file     Non-medical: Not on file   Tobacco Use    Smoking status: Never Smoker    Smokeless tobacco: Never Used   Substance and Sexual Activity    Alcohol use: Yes     Alcohol/week: 6.0 standard drinks     Types: 6 Cans of beer per week     Comment: weekly    Drug use: No    Sexual activity: Yes     Partners: Female   Lifestyle    Physical activity:     Days per week: Not on file     Minutes per session: Not on file    Stress: Not on file   Relationships    Social connections:     Talks on phone: Not on file     Gets together: Not on file     Attends Restorationist service: Not on file     Active member of club or organization: Not on file     Attends meetings of clubs or organizations: Not on file     Relationship status: Not on file   Other Topics Concern    Not on file   Social History Narrative    Not on file     Past Surgical History:   Procedure Laterality Date    CERVICAL FUSION      bone graft    TONSILLECTOMY, ADENOIDECTOMY       Family History   Problem Relation Age of Onset    Diabetes Mother     Hypertension Mother     Stroke Mother     Coronary artery disease Mother     Stroke Father     Heart disease Brother         CAD     Vitals:    11/22/19 1503   BP: 116/66   Pulse: (!) 57   Weight: 86.6 kg (191 lb)   Height: 5' 10" (1.778 m)       Review of Systems   Constitutional: Negative for chills, diaphoresis, fatigue, fever and unexpected weight change.   HENT: Negative for trouble swallowing.    Eyes: Negative for visual disturbance.   Respiratory: Negative for shortness of breath.    Cardiovascular: Negative for chest pain.   Gastrointestinal: Negative for abdominal pain, constipation, diarrhea, nausea and vomiting.   Genitourinary: Negative for difficulty urinating. "   Musculoskeletal: Negative for arthralgias, back pain, gait problem, joint swelling, myalgias, neck pain and neck stiffness.   Neurological: Negative for dizziness, speech difficulty, weakness, light-headedness, numbness and headaches.          Objective:      Physical Exam   Constitutional: He is oriented to person, place, and time. He appears well-developed and well-nourished.   Neurological: He is alert and oriented to person, place, and time.           Assessment:       1. Chronic midline low back pain without sciatica           Plan:     we will get him scheduled for interlaminar epidural steroid injections at L5-S1.  Consider medial branch blocks and radiofrequency ablation of the L4-5 and L5-S1 facet joints bilaterally.  Follow-up with me after the procedure      Chronic midline low back pain without sciatica

## 2019-11-22 NOTE — PROGRESS NOTES
SUBJECTIVE:    Patient ID: Colt Rose is a 64 y.o. male.    Chief Complaint: Follow-up (MRI results)    He is here to review his lumbar MRI which was done on 11/20/2019 to evaluate his complaint of midline low back pain at the lumbosacral junction.  The MRI summarized below:    Impression      1. There is multilevel degenerative disc and facet disease.  There is no fracture.  Disc space narrowing is most severe at the L3-4 and L4-5 levels.  There is also trace degenerative listhesis at these levels.  There is some degree of disc bulge with osteophytic ridging with a without superimposed small disc protrusion in addition to facet joint arthropathy.  These findings are discussed in detail by level above.  The pertinent findings are summarized below.  2. At the L4-5 level there is multifactorial moderate spinal stenosis with mild-to-moderate right and mild left foraminal stenosis.  There is also mild crowding of the right lateral recess.  3. At the L3-4 level there is only borderline spinal stenosis but there is moderate left lateral recess stenosis.  There is moderate-to-marked left foraminal stenosis.  4. At the L2-3 level there is mild crowding of the left lateral recess and there is mild left foraminal stenosis without spinal stenosis.  5. At the L5-S1 level there is moderate-to-marked facet joint arthropathy but there is no spinal canal or significant foraminal stenosis.    Clinically is about the same.  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    Cervical disc displacement     Degeneration of lumbar intervertebral disc     GERD (gastroesophageal reflux disease)     Hip pain 2/22/2016    Hyperlipidemia     Shingles 11/2013     Social History     Socioeconomic History    Marital status:      Spouse name: Not on file    Number of children: Not on file    Years of education: Not on file    Highest education level: Not on file  "  Occupational History    Not on file   Social Needs    Financial resource strain: Not on file    Food insecurity:     Worry: Not on file     Inability: Not on file    Transportation needs:     Medical: Not on file     Non-medical: Not on file   Tobacco Use    Smoking status: Never Smoker    Smokeless tobacco: Never Used   Substance and Sexual Activity    Alcohol use: Yes     Alcohol/week: 6.0 standard drinks     Types: 6 Cans of beer per week     Comment: weekly    Drug use: No    Sexual activity: Yes     Partners: Female   Lifestyle    Physical activity:     Days per week: Not on file     Minutes per session: Not on file    Stress: Not on file   Relationships    Social connections:     Talks on phone: Not on file     Gets together: Not on file     Attends Jew service: Not on file     Active member of club or organization: Not on file     Attends meetings of clubs or organizations: Not on file     Relationship status: Not on file   Other Topics Concern    Not on file   Social History Narrative    Not on file     Past Surgical History:   Procedure Laterality Date    CERVICAL FUSION      bone graft    TONSILLECTOMY, ADENOIDECTOMY       Family History   Problem Relation Age of Onset    Diabetes Mother     Hypertension Mother     Stroke Mother     Coronary artery disease Mother     Stroke Father     Heart disease Brother         CAD     Vitals:    11/22/19 1503   BP: 116/66   Pulse: (!) 57   Weight: 86.6 kg (191 lb)   Height: 5' 10" (1.778 m)       Review of Systems   Constitutional: Negative for chills, diaphoresis, fatigue, fever and unexpected weight change.   HENT: Negative for trouble swallowing.    Eyes: Negative for visual disturbance.   Respiratory: Negative for shortness of breath.    Cardiovascular: Negative for chest pain.   Gastrointestinal: Negative for abdominal pain, constipation, diarrhea, nausea and vomiting.   Genitourinary: Negative for difficulty urinating. "   Musculoskeletal: Negative for arthralgias, back pain, gait problem, joint swelling, myalgias, neck pain and neck stiffness.   Neurological: Negative for dizziness, speech difficulty, weakness, light-headedness, numbness and headaches.          Objective:      Physical Exam   Constitutional: He is oriented to person, place, and time. He appears well-developed and well-nourished.   Neurological: He is alert and oriented to person, place, and time.           Assessment:       1. Chronic midline low back pain without sciatica           Plan:     we will get him scheduled for interlaminar epidural steroid injections at L5-S1.  Consider medial branch blocks and radiofrequency ablation of the L4-5 and L5-S1 facet joints bilaterally.  Follow-up with me after the procedure      Chronic midline low back pain without sciatica

## 2019-11-25 ENCOUNTER — HOSPITAL ENCOUNTER (OUTPATIENT)
Dept: RADIOLOGY | Facility: HOSPITAL | Age: 64
Discharge: HOME | End: 2019-11-25
Attending: ORTHOPAEDIC SURGERY
Payer: COMMERCIAL

## 2019-11-25 ENCOUNTER — TELEPHONE (OUTPATIENT)
Dept: PAIN MEDICINE | Facility: CLINIC | Age: 64
End: 2019-11-25

## 2019-11-25 DIAGNOSIS — M54.16 LUMBAR RADICULOPATHY: Primary | ICD-10-CM

## 2019-11-25 DIAGNOSIS — M25.562 LEFT KNEE PAIN, UNSPECIFIED CHRONICITY: ICD-10-CM

## 2019-11-25 PROCEDURE — 73721 MRI KNEE WITHOUT CONTRAST LEFT: ICD-10-PCS | Mod: 26,LT,, | Performed by: RADIOLOGY

## 2019-11-25 PROCEDURE — 73721 MRI JNT OF LWR EXTRE W/O DYE: CPT | Mod: 26,LT,, | Performed by: RADIOLOGY

## 2019-11-25 PROCEDURE — 73721 MRI JNT OF LWR EXTRE W/O DYE: CPT | Mod: TC,LT

## 2019-11-25 NOTE — TELEPHONE ENCOUNTER
----- Message from Sherly Sanderson sent at 11/25/2019  9:23 AM CST -----  Contact: Pt   Pt returning call to schedule procedure. He can be reached at [phone] thanks

## 2019-11-26 ENCOUNTER — OFFICE VISIT (OUTPATIENT)
Dept: ORTHOPEDICS | Facility: CLINIC | Age: 64
End: 2019-11-26
Payer: COMMERCIAL

## 2019-11-26 VITALS
HEART RATE: 94 BPM | DIASTOLIC BLOOD PRESSURE: 65 MMHG | WEIGHT: 191 LBS | HEIGHT: 70 IN | BODY MASS INDEX: 27.35 KG/M2 | SYSTOLIC BLOOD PRESSURE: 134 MMHG

## 2019-11-26 DIAGNOSIS — M23.92 INTERNAL DERANGEMENT OF LEFT KNEE: Primary | ICD-10-CM

## 2019-11-26 PROCEDURE — 99999 PR PBB SHADOW E&M-EST. PATIENT-LVL III: CPT | Mod: PBBFAC,,, | Performed by: ORTHOPAEDIC SURGERY

## 2019-11-26 PROCEDURE — 3008F PR BODY MASS INDEX (BMI) DOCUMENTED: ICD-10-PCS | Mod: CPTII,S$GLB,, | Performed by: ORTHOPAEDIC SURGERY

## 2019-11-26 PROCEDURE — 99213 OFFICE O/P EST LOW 20 MIN: CPT | Mod: S$GLB,,, | Performed by: ORTHOPAEDIC SURGERY

## 2019-11-26 PROCEDURE — 99999 PR PBB SHADOW E&M-EST. PATIENT-LVL III: ICD-10-PCS | Mod: PBBFAC,,, | Performed by: ORTHOPAEDIC SURGERY

## 2019-11-26 PROCEDURE — 99213 PR OFFICE/OUTPT VISIT, EST, LEVL III, 20-29 MIN: ICD-10-PCS | Mod: S$GLB,,, | Performed by: ORTHOPAEDIC SURGERY

## 2019-11-26 PROCEDURE — 3008F BODY MASS INDEX DOCD: CPT | Mod: CPTII,S$GLB,, | Performed by: ORTHOPAEDIC SURGERY

## 2019-11-26 RX ORDER — MELOXICAM 7.5 MG/1
7.5 TABLET ORAL DAILY
Qty: 30 TABLET | Refills: 11 | Status: CANCELLED | OUTPATIENT
Start: 2019-11-26

## 2019-11-26 RX ORDER — MELOXICAM 15 MG/1
15 TABLET ORAL DAILY
Qty: 30 TABLET | Refills: 11 | Status: SHIPPED | OUTPATIENT
Start: 2019-11-26 | End: 2020-05-14 | Stop reason: SDUPTHER

## 2019-11-26 NOTE — PROGRESS NOTES
CC:  64-year-old male follows up for MRI results of his left knee. He continues to have pain only with deep squatting and bending of the left knee.    Examination of the Left Lower Extremity:     Motor function is intact distally EHL/FHL/TA/kylie   +2 dorsalis pedis and posterior tibial pulses   Sensation to light touch intact distally dorsal, plantar, and first web space     Examination of the Left knee:    ROM 0 - 150   Effusion negative  Tenderness to palpation at the joint line positive  Pain during range of motion negative  Crepitation during range of motion negative     positive increased pain noted with flexion past 90   negative antalgic gait noted   negative Lachman's Test   negative Anterior Drawer Test   negative Posterior Drawer Test   positive McMurrays Test   positive Disco Test   negative Varus/Valgus instability    MRI images dated 11/25/2019 were examined and personally reviewed by me.  There is signal change in the posterior horn of the medial meniscus indicative of intrasubstance degeneration.  It does not obviously exit the surface.    Dx:  Degenerative changes in the posterior horn of the medial meniscus in this patient with pain localized to the medial knee and posterior medial popliteal fossa that has failed and intra-articular injections.    Plan:  We discussed treatment options.  At this point the patient is not ready to consider any surgical intervention. I am going to start him on Mobic once a day and he can follow up in 4-6 weeks.

## 2019-12-05 ENCOUNTER — HOSPITAL ENCOUNTER (OUTPATIENT)
Facility: AMBULARY SURGERY CENTER | Age: 64
Discharge: HOME | End: 2019-12-05
Attending: ANESTHESIOLOGY | Admitting: ANESTHESIOLOGY
Payer: COMMERCIAL

## 2019-12-05 DIAGNOSIS — M54.16 LUMBAR RADICULITIS: Primary | ICD-10-CM

## 2019-12-05 PROCEDURE — 62323 NJX INTERLAMINAR LMBR/SAC: CPT | Mod: ,,, | Performed by: ANESTHESIOLOGY

## 2019-12-05 PROCEDURE — 62323 PR INJ LUMBAR/SACRAL, W/IMAGING GUIDANCE: ICD-10-PCS | Mod: ,,, | Performed by: ANESTHESIOLOGY

## 2019-12-05 PROCEDURE — 62323 NJX INTERLAMINAR LMBR/SAC: CPT | Performed by: ANESTHESIOLOGY

## 2019-12-05 RX ORDER — DEXAMETHASONE SODIUM PHOSPHATE 10 MG/ML
INJECTION INTRAMUSCULAR; INTRAVENOUS
Status: DISCONTINUED | OUTPATIENT
Start: 2019-12-05 | End: 2019-12-05 | Stop reason: HOSPADM

## 2019-12-05 RX ORDER — SODIUM CHLORIDE, SODIUM LACTATE, POTASSIUM CHLORIDE, CALCIUM CHLORIDE 600; 310; 30; 20 MG/100ML; MG/100ML; MG/100ML; MG/100ML
INJECTION, SOLUTION INTRAVENOUS ONCE AS NEEDED
Status: DISCONTINUED | OUTPATIENT
Start: 2019-12-05 | End: 2019-12-05 | Stop reason: HOSPADM

## 2019-12-05 RX ORDER — LIDOCAINE HYDROCHLORIDE 10 MG/ML
INJECTION, SOLUTION EPIDURAL; INFILTRATION; INTRACAUDAL; PERINEURAL
Status: DISCONTINUED | OUTPATIENT
Start: 2019-12-05 | End: 2019-12-05 | Stop reason: HOSPADM

## 2019-12-05 RX ORDER — SODIUM CHLORIDE 9 MG/ML
INJECTION, SOLUTION INTRAMUSCULAR; INTRAVENOUS; SUBCUTANEOUS
Status: DISCONTINUED | OUTPATIENT
Start: 2019-12-05 | End: 2019-12-05 | Stop reason: HOSPADM

## 2019-12-05 NOTE — DISCHARGE SUMMARY
[facility]  Discharge Note  Short Stay    Admit Date: 12/5/2019    Discharge Date and Time: 12/5/2019    Attending Physician: Adarsh Kraft MD     Discharge Provider: Adarsh Kraft    Diagnoses:  Active Hospital Problems    Diagnosis  POA    *Lumbar radiculitis [M54.16]  Yes      Resolved Hospital Problems   No resolved problems to display.       Hospital Course: Lumbar RENETTA  Discharged Condition: Good    Final Diagnoses:   Active Hospital Problems    Diagnosis  POA    *Lumbar radiculitis [M54.16]  Yes      Resolved Hospital Problems   No resolved problems to display.       Disposition: Home or Self Care    Follow up/Patient Instructions:    Medications:  Reconciled Home Medications:      Medication List      CONTINUE taking these medications    atorvastatin 10 MG tablet  Commonly known as:  Lipitor  Take 1 tablet (10 mg total) by mouth once daily.     fluocinolone acetonide oil 0.01 % Drop     meloxicam 15 MG tablet  Commonly known as:  MOBIC  Take 1 tablet (15 mg total) by mouth once daily.     omeprazole 40 MG capsule  Commonly known as:  PRILOSEC  Take 1 capsule (40 mg total) by mouth once daily.          Discharge Procedure Orders   Call MD for:  temperature >100.4     Call MD for:  persistent nausea and vomiting or diarrhea     Call MD for:  severe uncontrolled pain     Call MD for:  redness, tenderness, or signs of infection (pain, swelling, redness, odor or green/yellow discharge around incision site)     Call MD for:  difficulty breathing or increased cough     Call MD for:  severe persistent headache        Follow up with MD in 2-3 weeks    Discharge Procedure Orders (must include Diet, Follow-up, Activity):   Discharge Procedure Orders (must include Diet, Follow-up, Activity)   Call MD for:  temperature >100.4     Call MD for:  persistent nausea and vomiting or diarrhea     Call MD for:  severe uncontrolled pain     Call MD for:  redness, tenderness, or signs of infection (pain, swelling, redness,  odor or green/yellow discharge around incision site)     Call MD for:  difficulty breathing or increased cough     Call MD for:  severe persistent headache

## 2019-12-05 NOTE — PLAN OF CARE
Pt states ready to go home , stable, tolerating fluids. Denies pain. Injection site REED no drainage noted. Ambulated to car accompanied by nurse. Home w/ spouse.

## 2019-12-05 NOTE — DISCHARGE INSTRUCTIONS
Home care instructions  You may apply ice pack to the injection site for 20 minutes periods for the first 24hrs, NO HEAT for 2 days   You may shower today but dont soak in a tub above the injection site for 2 days  Resume Aspirin, Plavix, or Coumadin the day after the procedure unless otherwise instructed.  Gradually increase activity  Do not drive for 24 hr  If diabetic monitor your glucose carefully. Follow up with your primary MD if this is a problem    SEEK  IMMEDIATE MEDICAL HELP FOR:  Severe increase in your usual pain or appearance of new pain  Prolonged or increasing weakness or numbness in the legs or arms       -numbing medicine was injected that may affect the nerves that carry information from  Your muscles to your brain and vice versa. Numbness can last up to 6 hours , so be very careful walking.    Drainage, redness, active bleeding, or increased swelling at the injection site  Temperature over 100.0 degrees F.  Headache that increases when your head is upright and decreases when you lie flat  Shortness of breath, chest pain, or problems breathing

## 2019-12-05 NOTE — OP NOTE
PROCEDURE DATE: 12/5/2019    Procedure:   Interlaminar epidural steroid injection at L5-S1 under fluoroscopic guidance.    Diagnosis: lUMBAR DISC DISPLACEMENT WITHOUT MYELOPATHY  pOSTOP DIAGNOSIS: sAME    Physician: Adarsh Kraft M.D.    Medications injected:10 mg dexamethasone with 4 ml of preservative free NaCl    Local anesthetic injected:    Lidocaine 1% 2 ml total    Sedation Medications: None    Estimated blood loss:  None    Complications:  None    Technique:  Time-out taken to identify patient and procedure prior to starting the procedure.  With the patient laying in a prone position, the area was prepped and draped in the usual sterile fashion using ChloraPrep and a fenestrated drape.  After determining the target level with an AP fluoroscopic view, local anesthetic was given using a 25-gauge 1.5 inch needle by raising a wheal and then infiltrating toward the interlaminar entry space.  A 3.5inch 20-gauge Touhy needle was introduced under AP fluoroscopic guidance to the interlaminar space of L5-S1. Once the trajectory was established, the needle was visualized in the lateral view and advanced using loss of resistance technique. Once in the desired position, 1ml contrast was injected to confirm placement and there was no vascular uptake nor intrathecal spread.  The medication was then injected slowly. The patient tolerated the procedure well.      The patient was monitored after the procedure.   They were given post-procedure and discharge instructions to follow at home.  The patient was discharged in a stable condition.

## 2019-12-09 VITALS
TEMPERATURE: 99 F | HEART RATE: 56 BPM | HEIGHT: 70 IN | OXYGEN SATURATION: 95 % | SYSTOLIC BLOOD PRESSURE: 124 MMHG | DIASTOLIC BLOOD PRESSURE: 75 MMHG | BODY MASS INDEX: 27.35 KG/M2 | RESPIRATION RATE: 18 BRPM | WEIGHT: 191 LBS

## 2019-12-31 ENCOUNTER — OFFICE VISIT (OUTPATIENT)
Dept: SPINE | Facility: CLINIC | Age: 64
End: 2019-12-31
Payer: COMMERCIAL

## 2019-12-31 VITALS — WEIGHT: 190.94 LBS | BODY MASS INDEX: 27.34 KG/M2 | HEIGHT: 70 IN

## 2019-12-31 DIAGNOSIS — G89.29 CHRONIC MIDLINE LOW BACK PAIN WITHOUT SCIATICA: Primary | ICD-10-CM

## 2019-12-31 DIAGNOSIS — M54.50 CHRONIC MIDLINE LOW BACK PAIN WITHOUT SCIATICA: Primary | ICD-10-CM

## 2019-12-31 PROCEDURE — 3008F PR BODY MASS INDEX (BMI) DOCUMENTED: ICD-10-PCS | Mod: S$GLB,,, | Performed by: PHYSICAL MEDICINE & REHABILITATION

## 2019-12-31 PROCEDURE — 99213 PR OFFICE/OUTPT VISIT, EST, LEVL III, 20-29 MIN: ICD-10-PCS | Mod: S$GLB,,, | Performed by: PHYSICAL MEDICINE & REHABILITATION

## 2019-12-31 PROCEDURE — 99213 OFFICE O/P EST LOW 20 MIN: CPT | Mod: S$GLB,,, | Performed by: PHYSICAL MEDICINE & REHABILITATION

## 2019-12-31 PROCEDURE — 3008F BODY MASS INDEX DOCD: CPT | Mod: S$GLB,,, | Performed by: PHYSICAL MEDICINE & REHABILITATION

## 2019-12-31 RX ORDER — METHOCARBAMOL 500 MG/1
500 TABLET, FILM COATED ORAL 3 TIMES DAILY PRN
Qty: 60 TABLET | Refills: 2 | Status: SHIPPED | OUTPATIENT
Start: 2019-12-31 | End: 2020-05-14 | Stop reason: SDUPTHER

## 2019-12-31 RX ORDER — METHOCARBAMOL 750 MG/1
500 TABLET, FILM COATED ORAL 4 TIMES DAILY
COMMUNITY
End: 2019-12-31 | Stop reason: SDUPTHER

## 2019-12-31 NOTE — PROGRESS NOTES
SUBJECTIVE:    Patient ID: Colt Rose is a 64 y.o. male.    Chief Complaint: Follow-up (Post Proc)    He is here for follow-up status post interlaminar epidural steroid injection at L5-S1 on 12/05/2019 with Dr. Kraft.  About 20% improvement in his pain overall.  Still bothered by discomfort especially if he over does it especially in flexion but also has some stiffness 1st thing in the morning.  Today we talked again about medial branch blocks and radiofrequency ablation which he politely declines at this time.  He says he is doing okay.  Requested refills on Relafen but I sees already on Mobic for knee pain and he really should take those to together which she has been doing and is suffering little bit from acid reflux.  The he should stick with the Mobic.  I will refill his Robaxin prescription because that seems to help.  Current pain level is 2/10        Past Medical History:   Diagnosis Date    Arthritis of hand 8/6/2015    Cervical disc displacement     Degeneration of lumbar intervertebral disc     GERD (gastroesophageal reflux disease)     Hip pain 2/22/2016    Hyperlipidemia     Shingles 11/2013     Social History     Socioeconomic History    Marital status:      Spouse name: Not on file    Number of children: Not on file    Years of education: Not on file    Highest education level: Not on file   Occupational History    Not on file   Social Needs    Financial resource strain: Not on file    Food insecurity:     Worry: Not on file     Inability: Not on file    Transportation needs:     Medical: Not on file     Non-medical: Not on file   Tobacco Use    Smoking status: Never Smoker    Smokeless tobacco: Never Used   Substance and Sexual Activity    Alcohol use: Yes     Alcohol/week: 6.0 standard drinks     Types: 6 Cans of beer per week     Comment: weekly    Drug use: No    Sexual activity: Yes     Partners: Female   Lifestyle    Physical activity:     Days per week: Not on  "file     Minutes per session: Not on file    Stress: Not on file   Relationships    Social connections:     Talks on phone: Not on file     Gets together: Not on file     Attends [employer] service: Not on file     Active member of club or organization: Not on file     Attends meetings of clubs or organizations: Not on file     Relationship status: Not on file   Other Topics Concern    Not on file   Social History Narrative    Not on file     Past Surgical History:   Procedure Laterality Date    CERVICAL FUSION      bone graft    EPIDURAL STEROID INJECTION INTO LUMBAR SPINE N/A 12/5/2019    Procedure: Injection-steroid-epidural-lumbar;  Surgeon: Adarsh Kraft MD;  Location: [facility] OR;  Service: Pain Management;  Laterality: N/A;  L5-S1    TONSILLECTOMY, ADENOIDECTOMY       Family History   Problem Relation Age of Onset    Diabetes Mother     Hypertension Mother     Stroke Mother     Coronary artery disease Mother     Stroke Father     Heart disease Brother         CAD     Vitals:    12/31/19 1151   Weight: 86.6 kg (190 lb 14.7 oz)   Height: 5' 10" (1.778 m)       Review of Systems   Constitutional: Negative for chills, diaphoresis, fatigue, fever and unexpected weight change.   HENT: Negative for trouble swallowing.    Eyes: Negative for visual disturbance.   Respiratory: Negative for shortness of breath.    Cardiovascular: Negative for chest pain.   Gastrointestinal: Negative for abdominal pain, constipation, diarrhea, nausea and vomiting.   Genitourinary: Negative for difficulty urinating.   Musculoskeletal: Negative for arthralgias, back pain, gait problem, joint swelling, myalgias, neck pain and neck stiffness.   Neurological: Negative for dizziness, speech difficulty, weakness, light-headedness, numbness and headaches.          Objective:      Physical Exam   Constitutional: He is oriented to person, place, and time. He appears well-developed and well-nourished.   Neurological: He is alert and oriented " to person, place, and time.           Assessment:       1. Chronic midline low back pain without sciatica           Plan:     consider medial branch blocks and radiofrequency ablation of the L3-4 and L4-5 facet joints bilaterally.  Otherwise continue activity as tolerated.  Follow-up here as needed      Chronic midline low back pain without sciatica    Other orders  -     methocarbamol (ROBAXIN) 500 MG Tab; Take 1 tablet (500 mg total) by mouth 3 (three) times daily as needed.  Dispense: 60 tablet; Refill: 2

## 2020-01-07 ENCOUNTER — OFFICE VISIT (OUTPATIENT)
Dept: ORTHOPEDICS | Facility: CLINIC | Age: 65
End: 2020-01-07
Payer: COMMERCIAL

## 2020-01-07 VITALS
WEIGHT: 190 LBS | SYSTOLIC BLOOD PRESSURE: 142 MMHG | BODY MASS INDEX: 27.2 KG/M2 | DIASTOLIC BLOOD PRESSURE: 84 MMHG | HEIGHT: 70 IN

## 2020-01-07 DIAGNOSIS — M23.92 INTERNAL DERANGEMENT OF LEFT KNEE: Primary | ICD-10-CM

## 2020-01-07 PROCEDURE — 99999 PR PBB SHADOW E&M-EST. PATIENT-LVL III: ICD-10-PCS | Mod: PBBFAC,,, | Performed by: ORTHOPAEDIC SURGERY

## 2020-01-07 PROCEDURE — 99999 PR PBB SHADOW E&M-EST. PATIENT-LVL III: CPT | Mod: PBBFAC,,, | Performed by: ORTHOPAEDIC SURGERY

## 2020-01-07 PROCEDURE — 3008F PR BODY MASS INDEX (BMI) DOCUMENTED: ICD-10-PCS | Mod: CPTII,S$GLB,, | Performed by: ORTHOPAEDIC SURGERY

## 2020-01-07 PROCEDURE — 99212 OFFICE O/P EST SF 10 MIN: CPT | Mod: S$GLB,,, | Performed by: ORTHOPAEDIC SURGERY

## 2020-01-07 PROCEDURE — 99212 PR OFFICE/OUTPT VISIT, EST, LEVL II, 10-19 MIN: ICD-10-PCS | Mod: S$GLB,,, | Performed by: ORTHOPAEDIC SURGERY

## 2020-01-07 PROCEDURE — 3008F BODY MASS INDEX DOCD: CPT | Mod: CPTII,S$GLB,, | Performed by: ORTHOPAEDIC SURGERY

## 2020-01-07 NOTE — PROGRESS NOTES
CC:  64-year-old male follows up with left knee pain.  On his last visit we got an MRI of his knee which showed signal change in the posterior horn of the medial meniscus.  On his last visit we had started him on Mobic.  The patient states that his pain has improved.  He now only has occasional pain when he mehdi and bends the knee.    Examination of the Left Lower Extremity:     Motor function is intact distally EHL/FHL/TA/kylie   +2 dorsalis pedis and posterior tibial pulses   Sensation to light touch intact distally dorsal, plantar, and first web space     Examination of the Left knee:    ROM 0 - 150   Effusion negative  Tenderness to palpation at the joint line negative  Pain during range of motion negative  Crepitation during range of motion negative     negative increased pain noted with flexion past 90   negative antalgic gait noted   negative Lachman's Test   negative Anterior Drawer Test   negative Posterior Drawer Test   negative McMurrays Test   negative Disco Test   negative Varus/Valgus instability    Dx:  Degenerative changes in the posterior horn of the medial meniscus of the left knee, stable    Plan:  Continue Mobic.  We discussed avoiding deep flexion activities with the knee. We also discussed using knee pads if he does need to get down his knees for work.  He verbalized understanding.  He can follow up p.r.n..

## 2020-03-09 NOTE — LETTER
October 16, 2018      Anca Owen MD  [address] [facility] [place] [address]           [place] [facility]  [address] [facility] [address] [place] [address]  Phone: [phone]  Fax: [phone]          Patient: Colt Rose   MR Number: [id number]   YOB: 1955   Date of Visit: 10/16/2018       Dear Dr. Anca Owen:    Thank you for referring Colt Rose to me for evaluation. Attached you will find relevant portions of my assessment and plan of care.    If you have questions, please do not hesitate to call me. I look forward to following Colt Rose along with you.    Sincerely,    Dorinda David, NP    Enclosure  CC:  No Recipients    If you would like to receive this communication electronically, please contact [email or web address] or [phone] to request more information on [employer] access.    For providers and/or their staff who would like to refer a patient to [facility], please contact us through our one-stop-shop provider referral line, [facility], at [phone].    If you feel you have received this communication in error or would no longer like to receive these types of communications, please e-mail [email or web address]          Patient reports living alone in a single story home. Uses walker and cane for ambulation. Pt/Ot recommending HH, discussed with patient to obtain choices. Patient adamant she will not require an ongoing assistance once she is discharged. PCP and Pharmacy verified. Primary CM will continue to follow for discharge needs.     03/09/20 1434   Discharge Assessment   Assessment Type Discharge Planning Assessment   Confirmed/corrected address and phone number on facesheet? Yes   Assessment information obtained from? Patient   Expected Length of Stay (days)   (6)   Communicated expected length of stay with patient/caregiver yes   Prior to hospitilization cognitive status: Alert/Oriented   Prior to hospitalization functional status: Assistive Equipment   Current cognitive status: Alert/Oriented   Current Functional Status: Assistive Equipment;Needs Assistance   Facility Arrived From: Home   Lives With alone   Able to Return to Prior Arrangements yes   Is patient able to care for self after discharge? Yes   Who are your caregiver(s) and their phone number(s)?   (Norma Rm (Grand child) [phone])   Patient's perception of discharge disposition home health   Readmission Within the Last 30 Days no previous admission in last 30 days   Patient currently being followed by outpatient case management? No   Patient currently receives any other outside agency services? No   Equipment Currently Used at Home cane, straight   Do you have any problems affording any of your prescribed medications? No   Is the patient taking medications as prescribed? yes   Does the patient have transportation home? Yes   Transportation Anticipated family or friend will provide   Does the patient receive services at the [facility]? No   Discharge Plan A Home with family;Home Health   Discharge Plan B Home with family;Home Health   DME Needed Upon Discharge  none   Patient/Family in Agreement with Plan yes

## 2020-05-09 ENCOUNTER — LAB VISIT (OUTPATIENT)
Dept: LAB | Facility: HOSPITAL | Age: 65
End: 2020-05-09
Attending: PHYSICIAN ASSISTANT
Payer: COMMERCIAL

## 2020-05-09 DIAGNOSIS — Z00.00 ANNUAL PHYSICAL EXAM: ICD-10-CM

## 2020-05-09 DIAGNOSIS — Z12.5 SCREENING FOR PROSTATE CANCER: ICD-10-CM

## 2020-05-09 LAB
ALBUMIN SERPL BCP-MCNC: 4.3 G/DL (ref 3.5–5.2)
ALP SERPL-CCNC: 50 U/L (ref 55–135)
ALT SERPL W/O P-5'-P-CCNC: 43 U/L (ref 10–44)
ANION GAP SERPL CALC-SCNC: 8 MMOL/L (ref 8–16)
AST SERPL-CCNC: 33 U/L (ref 10–40)
BASOPHILS # BLD AUTO: 0.06 K/UL (ref 0–0.2)
BASOPHILS NFR BLD: 1.3 % (ref 0–1.9)
BILIRUB SERPL-MCNC: 0.6 MG/DL (ref 0.1–1)
BUN SERPL-MCNC: 16 MG/DL (ref 8–23)
CALCIUM SERPL-MCNC: 9.7 MG/DL (ref 8.7–10.5)
CHLORIDE SERPL-SCNC: 103 MMOL/L (ref 95–110)
CHOLEST SERPL-MCNC: 216 MG/DL (ref 120–199)
CHOLEST/HDLC SERPL: 3.7 {RATIO} (ref 2–5)
CO2 SERPL-SCNC: 28 MMOL/L (ref 23–29)
COMPLEXED PSA SERPL-MCNC: 0.46 NG/ML (ref 0–4)
CREAT SERPL-MCNC: 0.8 MG/DL (ref 0.5–1.4)
DIFFERENTIAL METHOD: NORMAL
EOSINOPHIL # BLD AUTO: 0.1 K/UL (ref 0–0.5)
EOSINOPHIL NFR BLD: 1.8 % (ref 0–8)
ERYTHROCYTE [DISTWIDTH] IN BLOOD BY AUTOMATED COUNT: 12.8 % (ref 11.5–14.5)
EST. GFR  (AFRICAN AMERICAN): >60 ML/MIN/1.73 M^2
EST. GFR  (NON AFRICAN AMERICAN): >60 ML/MIN/1.73 M^2
ESTIMATED AVG GLUCOSE: 117 MG/DL (ref 68–131)
GLUCOSE SERPL-MCNC: 102 MG/DL (ref 70–110)
HBA1C MFR BLD HPLC: 5.7 % (ref 4–5.6)
HCT VFR BLD AUTO: 45.7 % (ref 40–54)
HDLC SERPL-MCNC: 59 MG/DL (ref 40–75)
HDLC SERPL: 27.3 % (ref 20–50)
HGB BLD-MCNC: 14.9 G/DL (ref 14–18)
IMM GRANULOCYTES # BLD AUTO: 0.01 K/UL (ref 0–0.04)
IMM GRANULOCYTES NFR BLD AUTO: 0.2 % (ref 0–0.5)
LDLC SERPL CALC-MCNC: 138.4 MG/DL (ref 63–159)
LYMPHOCYTES # BLD AUTO: 1.9 K/UL (ref 1–4.8)
LYMPHOCYTES NFR BLD: 41.3 % (ref 18–48)
MCH RBC QN AUTO: 30.8 PG (ref 27–31)
MCHC RBC AUTO-ENTMCNC: 32.6 G/DL (ref 32–36)
MCV RBC AUTO: 95 FL (ref 82–98)
MONOCYTES # BLD AUTO: 0.5 K/UL (ref 0.3–1)
MONOCYTES NFR BLD: 11.2 % (ref 4–15)
NEUTROPHILS # BLD AUTO: 2 K/UL (ref 1.8–7.7)
NEUTROPHILS NFR BLD: 44.2 % (ref 38–73)
NONHDLC SERPL-MCNC: 157 MG/DL
NRBC BLD-RTO: 0 /100 WBC
PLATELET # BLD AUTO: 221 K/UL (ref 150–350)
PMV BLD AUTO: 10.7 FL (ref 9.2–12.9)
POTASSIUM SERPL-SCNC: 4.3 MMOL/L (ref 3.5–5.1)
PROT SERPL-MCNC: 7.6 G/DL (ref 6–8.4)
RBC # BLD AUTO: 4.83 M/UL (ref 4.6–6.2)
SODIUM SERPL-SCNC: 139 MMOL/L (ref 136–145)
TRIGL SERPL-MCNC: 93 MG/DL (ref 30–150)
WBC # BLD AUTO: 4.55 K/UL (ref 3.9–12.7)

## 2020-05-09 PROCEDURE — 85025 COMPLETE CBC W/AUTO DIFF WBC: CPT

## 2020-05-09 PROCEDURE — 83036 HEMOGLOBIN GLYCOSYLATED A1C: CPT

## 2020-05-09 PROCEDURE — 84153 ASSAY OF PSA TOTAL: CPT

## 2020-05-09 PROCEDURE — 36415 COLL VENOUS BLD VENIPUNCTURE: CPT | Mod: PO

## 2020-05-09 PROCEDURE — 80061 LIPID PANEL: CPT

## 2020-05-09 PROCEDURE — 80053 COMPREHEN METABOLIC PANEL: CPT

## 2020-05-13 ENCOUNTER — DOCUMENTATION ONLY (OUTPATIENT)
Dept: FAMILY MEDICINE | Facility: CLINIC | Age: 65
End: 2020-05-13

## 2020-05-13 NOTE — PROGRESS NOTES
Pre-Visit Chart Review  For Appointment Scheduled on 5/14/2020    There are no preventive care reminders to display for this patient.

## 2020-05-14 ENCOUNTER — OFFICE VISIT (OUTPATIENT)
Dept: FAMILY MEDICINE | Facility: CLINIC | Age: 65
End: 2020-05-14
Payer: COMMERCIAL

## 2020-05-14 VITALS
HEIGHT: 70 IN | RESPIRATION RATE: 14 BRPM | DIASTOLIC BLOOD PRESSURE: 58 MMHG | OXYGEN SATURATION: 98 % | BODY MASS INDEX: 27.67 KG/M2 | SYSTOLIC BLOOD PRESSURE: 108 MMHG | TEMPERATURE: 97 F | HEART RATE: 66 BPM | WEIGHT: 193.31 LBS

## 2020-05-14 DIAGNOSIS — M23.92 INTERNAL DERANGEMENT OF LEFT KNEE: ICD-10-CM

## 2020-05-14 PROCEDURE — 99213 PR OFFICE/OUTPT VISIT, EST, LEVL III, 20-29 MIN: ICD-10-PCS | Mod: S$GLB,,, | Performed by: FAMILY MEDICINE

## 2020-05-14 PROCEDURE — 99999 PR PBB SHADOW E&M-EST. PATIENT-LVL III: ICD-10-PCS | Mod: PBBFAC,,, | Performed by: FAMILY MEDICINE

## 2020-05-14 PROCEDURE — 3008F BODY MASS INDEX DOCD: CPT | Mod: CPTII,S$GLB,, | Performed by: FAMILY MEDICINE

## 2020-05-14 PROCEDURE — 99213 OFFICE O/P EST LOW 20 MIN: CPT | Mod: S$GLB,,, | Performed by: FAMILY MEDICINE

## 2020-05-14 PROCEDURE — 3008F PR BODY MASS INDEX (BMI) DOCUMENTED: ICD-10-PCS | Mod: CPTII,S$GLB,, | Performed by: FAMILY MEDICINE

## 2020-05-14 PROCEDURE — 99999 PR PBB SHADOW E&M-EST. PATIENT-LVL III: CPT | Mod: PBBFAC,,, | Performed by: FAMILY MEDICINE

## 2020-05-14 RX ORDER — OMEPRAZOLE 40 MG/1
40 CAPSULE, DELAYED RELEASE ORAL DAILY
Qty: 90 CAPSULE | Refills: 3 | Status: SHIPPED | OUTPATIENT
Start: 2020-05-14 | End: 2020-12-16 | Stop reason: SDUPTHER

## 2020-05-14 RX ORDER — ATORVASTATIN CALCIUM 10 MG/1
10 TABLET, FILM COATED ORAL DAILY
Qty: 90 TABLET | Refills: 3 | Status: SHIPPED | OUTPATIENT
Start: 2020-05-14 | End: 2020-10-16 | Stop reason: SDUPTHER

## 2020-05-14 RX ORDER — MELOXICAM 15 MG/1
15 TABLET ORAL DAILY PRN
Qty: 90 TABLET | Refills: 3 | Status: SHIPPED | OUTPATIENT
Start: 2020-05-14 | End: 2021-01-04 | Stop reason: SDUPTHER

## 2020-05-14 RX ORDER — METHOCARBAMOL 500 MG/1
500 TABLET, FILM COATED ORAL 3 TIMES DAILY PRN
Qty: 270 TABLET | Refills: 3 | Status: SHIPPED | OUTPATIENT
Start: 2020-05-14 | End: 2021-05-17

## 2020-05-15 NOTE — PROGRESS NOTES
Subjective:   Patient ID: Colt Rose is a 64 y.o. male     Chief Complaint:Establish Care and Cough      Patient here for checkup and establish care.  Patient doing well.  Patient with no new complaints.  Patient does have issues with joint pain.  Otherwise patient doing well.    Review of Systems   Respiratory: Negative for shortness of breath.    Cardiovascular: Negative for chest pain.   Gastrointestinal: Negative for abdominal pain.   Genitourinary: Negative for dysuria.     Past Medical History:   Diagnosis Date    Arthritis of hand 8/6/2015    Cervical disc displacement     Degeneration of lumbar intervertebral disc     GERD (gastroesophageal reflux disease)     Hip pain 2/22/2016    Hyperlipidemia     Shingles 11/2013     Past Surgical History:   Procedure Laterality Date    CERVICAL FUSION      bone graft    EPIDURAL STEROID INJECTION INTO LUMBAR SPINE N/A 12/5/2019    Procedure: Injection-steroid-epidural-lumbar;  Surgeon: Adarsh Kraft MD;  Location: [facility];  Service: Pain Management;  Laterality: N/A;  L5-S1    TONSILLECTOMY, ADENOIDECTOMY       Objective:     Vitals:    05/14/20 1427   BP: (!) 108/58   Pulse: 66   Resp: 14   Temp: 96.6 °F (35.9 °C)     Body mass index is 27.74 kg/m².  Physical Exam   Constitutional: He is oriented to person, place, and time. He appears well-developed and well-nourished.   HENT:   Head: Normocephalic and atraumatic.   Eyes: Conjunctivae are normal. No scleral icterus.   Neck: Normal range of motion. Neck supple.   Pulmonary/Chest: Effort normal. No respiratory distress.   Musculoskeletal: Normal range of motion. He exhibits no deformity.   Neurological: He is alert and oriented to person, place, and time.   Skin: No rash noted. No pallor.   Psychiatric: He has a normal mood and affect. His behavior is normal. Judgment and thought content normal.   Nursing note and vitals reviewed.    Assessment:     1. Internal derangement of left knee      Plan:    Internal derangement of left knee  -     meloxicam (MOBIC) 15 MG tablet; Take 1 tablet (15 mg total) by mouth daily as needed for Pain.  Dispense: 90 tablet; Refill: 3    Other orders  -     atorvastatin (LIPITOR) 10 MG tablet; Take 1 tablet (10 mg total) by mouth once daily.  Dispense: 90 tablet; Refill: 3  -     omeprazole (PRILOSEC) 40 MG capsule; Take 1 capsule (40 mg total) by mouth once daily.  Dispense: 90 capsule; Refill: 3  -     methocarbamoL (ROBAXIN) 500 MG Tab; Take 1 tablet (500 mg total) by mouth 3 (three) times daily as needed.  Dispense: 270 tablet; Refill: 3        Time spent with patient: 15 minutes and over half of that time was spent on counseling an coordination of care.    Jeffery Mena MD  05/15/2020    Portions of this note have been dictated with CHRIS Orozco

## 2020-08-28 ENCOUNTER — IMMUNIZATION (OUTPATIENT)
Dept: PHARMACY | Facility: CLINIC | Age: 65
End: 2020-08-28

## 2020-08-28 ENCOUNTER — IMMUNIZATION (OUTPATIENT)
Dept: PHARMACY | Facility: CLINIC | Age: 65
End: 2020-08-28
Payer: COMMERCIAL

## 2020-09-23 ENCOUNTER — OFFICE VISIT (OUTPATIENT)
Dept: FAMILY MEDICINE | Facility: CLINIC | Age: 65
End: 2020-09-23
Payer: COMMERCIAL

## 2020-09-23 ENCOUNTER — LAB VISIT (OUTPATIENT)
Dept: LAB | Facility: HOSPITAL | Age: 65
End: 2020-09-23
Attending: NURSE PRACTITIONER
Payer: COMMERCIAL

## 2020-09-23 VITALS
OXYGEN SATURATION: 97 % | RESPIRATION RATE: 15 BRPM | TEMPERATURE: 98 F | WEIGHT: 193.31 LBS | HEIGHT: 70 IN | DIASTOLIC BLOOD PRESSURE: 80 MMHG | SYSTOLIC BLOOD PRESSURE: 132 MMHG | HEART RATE: 56 BPM | BODY MASS INDEX: 27.67 KG/M2

## 2020-09-23 DIAGNOSIS — R73.03 PREDIABETES: ICD-10-CM

## 2020-09-23 DIAGNOSIS — R00.2 PALPITATIONS: ICD-10-CM

## 2020-09-23 DIAGNOSIS — R00.2 PALPITATIONS: Primary | ICD-10-CM

## 2020-09-23 LAB
ALBUMIN SERPL BCP-MCNC: 4.1 G/DL (ref 3.5–5.2)
ALP SERPL-CCNC: 49 U/L (ref 55–135)
ALT SERPL W/O P-5'-P-CCNC: 28 U/L (ref 10–44)
ANION GAP SERPL CALC-SCNC: 8 MMOL/L (ref 8–16)
AST SERPL-CCNC: 27 U/L (ref 10–40)
BASOPHILS # BLD AUTO: 0.05 K/UL (ref 0–0.2)
BASOPHILS NFR BLD: 1 % (ref 0–1.9)
BILIRUB SERPL-MCNC: 0.4 MG/DL (ref 0.1–1)
BUN SERPL-MCNC: 15 MG/DL (ref 8–23)
CALCIUM SERPL-MCNC: 9.3 MG/DL (ref 8.7–10.5)
CHLORIDE SERPL-SCNC: 104 MMOL/L (ref 95–110)
CO2 SERPL-SCNC: 28 MMOL/L (ref 23–29)
CREAT SERPL-MCNC: 0.8 MG/DL (ref 0.5–1.4)
DIFFERENTIAL METHOD: ABNORMAL
EOSINOPHIL # BLD AUTO: 0.1 K/UL (ref 0–0.5)
EOSINOPHIL NFR BLD: 1.6 % (ref 0–8)
ERYTHROCYTE [DISTWIDTH] IN BLOOD BY AUTOMATED COUNT: 13.2 % (ref 11.5–14.5)
EST. GFR  (AFRICAN AMERICAN): >60 ML/MIN/1.73 M^2
EST. GFR  (NON AFRICAN AMERICAN): >60 ML/MIN/1.73 M^2
GLUCOSE SERPL-MCNC: 93 MG/DL (ref 70–110)
HCT VFR BLD AUTO: 45.5 % (ref 40–54)
HGB BLD-MCNC: 14.7 G/DL (ref 14–18)
IMM GRANULOCYTES # BLD AUTO: 0.01 K/UL (ref 0–0.04)
IMM GRANULOCYTES NFR BLD AUTO: 0.2 % (ref 0–0.5)
LYMPHOCYTES # BLD AUTO: 1.9 K/UL (ref 1–4.8)
LYMPHOCYTES NFR BLD: 38 % (ref 18–48)
MAGNESIUM SERPL-MCNC: 1.9 MG/DL (ref 1.6–2.6)
MCH RBC QN AUTO: 31.2 PG (ref 27–31)
MCHC RBC AUTO-ENTMCNC: 32.3 G/DL (ref 32–36)
MCV RBC AUTO: 97 FL (ref 82–98)
MONOCYTES # BLD AUTO: 0.5 K/UL (ref 0.3–1)
MONOCYTES NFR BLD: 9.4 % (ref 4–15)
NEUTROPHILS # BLD AUTO: 2.4 K/UL (ref 1.8–7.7)
NEUTROPHILS NFR BLD: 49.8 % (ref 38–73)
NRBC BLD-RTO: 0 /100 WBC
PLATELET # BLD AUTO: 217 K/UL (ref 150–350)
PMV BLD AUTO: 10.6 FL (ref 9.2–12.9)
POTASSIUM SERPL-SCNC: 4.2 MMOL/L (ref 3.5–5.1)
PROT SERPL-MCNC: 7 G/DL (ref 6–8.4)
RBC # BLD AUTO: 4.71 M/UL (ref 4.6–6.2)
SODIUM SERPL-SCNC: 140 MMOL/L (ref 136–145)
TSH SERPL DL<=0.005 MIU/L-ACNC: 2.36 UIU/ML (ref 0.4–4)
WBC # BLD AUTO: 4.87 K/UL (ref 3.9–12.7)

## 2020-09-23 PROCEDURE — 93010 EKG 12-LEAD: ICD-10-PCS | Mod: S$GLB,,, | Performed by: INTERNAL MEDICINE

## 2020-09-23 PROCEDURE — 99214 OFFICE O/P EST MOD 30 MIN: CPT | Mod: S$GLB,,, | Performed by: PHYSICIAN ASSISTANT

## 2020-09-23 PROCEDURE — 93005 EKG 12-LEAD: ICD-10-PCS | Mod: S$GLB,,, | Performed by: PHYSICIAN ASSISTANT

## 2020-09-23 PROCEDURE — 83735 ASSAY OF MAGNESIUM: CPT

## 2020-09-23 PROCEDURE — 93005 ELECTROCARDIOGRAM TRACING: CPT | Mod: S$GLB,,, | Performed by: PHYSICIAN ASSISTANT

## 2020-09-23 PROCEDURE — 84443 ASSAY THYROID STIM HORMONE: CPT

## 2020-09-23 PROCEDURE — 85025 COMPLETE CBC W/AUTO DIFF WBC: CPT

## 2020-09-23 PROCEDURE — 80053 COMPREHEN METABOLIC PANEL: CPT

## 2020-09-23 PROCEDURE — 93010 ELECTROCARDIOGRAM REPORT: CPT | Mod: S$GLB,,, | Performed by: INTERNAL MEDICINE

## 2020-09-23 PROCEDURE — 99214 PR OFFICE/OUTPT VISIT, EST, LEVL IV, 30-39 MIN: ICD-10-PCS | Mod: S$GLB,,, | Performed by: PHYSICIAN ASSISTANT

## 2020-09-23 PROCEDURE — 99999 PR PBB SHADOW E&M-EST. PATIENT-LVL IV: ICD-10-PCS | Mod: PBBFAC,,, | Performed by: PHYSICIAN ASSISTANT

## 2020-09-23 PROCEDURE — 36415 COLL VENOUS BLD VENIPUNCTURE: CPT | Mod: PO

## 2020-09-23 PROCEDURE — 99999 PR PBB SHADOW E&M-EST. PATIENT-LVL IV: CPT | Mod: PBBFAC,,, | Performed by: PHYSICIAN ASSISTANT

## 2020-09-23 PROCEDURE — 83036 HEMOGLOBIN GLYCOSYLATED A1C: CPT

## 2020-09-23 NOTE — PROGRESS NOTES
Subjective:       Patient ID: Colt Rose is a 65 y.o. male.    Chief Complaint: Chest discomfort/ Palpitations    Colt Rose is a 66 yo M pt w/ HLD, prediabetes, GERD, and chronic back pain that presents to clinic today for bothersome left sided chest discomfort and feelings of his heart racing for three days. The palpitations episodically worsen and improve throughout the day. He finds that stress aggravates the symptoms and he cannot define anything that relieves the symptoms. He reports that his peripheral pulses do not match the rate of his heart beat. He states that the discomfort and palpitations are not associated w/ any particular position.     Review of patient's allergies indicates:   Allergen Reactions    Pennsaid [diclofenac sodium] Rash and Blisters         Current Outpatient Medications:     atorvastatin (LIPITOR) 10 MG tablet, Take 1 tablet (10 mg total) by mouth once daily., Disp: 90 tablet, Rfl: 3    fluocinolone acetonide oil 0.01 % Drop, , Disp: , Rfl: 5    meloxicam (MOBIC) 15 MG tablet, Take 1 tablet (15 mg total) by mouth daily as needed for Pain., Disp: 90 tablet, Rfl: 3    methocarbamoL (ROBAXIN) 500 MG Tab, Take 1 tablet (500 mg total) by mouth 3 (three) times daily as needed., Disp: 270 tablet, Rfl: 3    omeprazole (PRILOSEC) 40 MG capsule, Take 1 capsule (40 mg total) by mouth once daily., Disp: 90 capsule, Rfl: 3    pneumococcal 23-monster ps (PNEUMOVAX-23) 25 mcg/0.5 mL vaccine, Inject into the muscle., Disp: 0.5 mL, Rfl: 0    Lab Results   Component Value Date    WBC 4.55 05/09/2020    HGB 14.9 05/09/2020    HCT 45.7 05/09/2020     05/09/2020    CHOL 216 (H) 05/09/2020    TRIG 93 05/09/2020    HDL 59 05/09/2020    ALT 43 05/09/2020    AST 33 05/09/2020     05/09/2020    K 4.3 05/09/2020     05/09/2020    CREATININE 0.8 05/09/2020    BUN 16 05/09/2020    CO2 28 05/09/2020    TSH 2.515 10/18/2018    PSA 0.46 05/09/2020    HGBA1C 5.7 (H) 05/09/2020        Review of Systems   Constitutional: Negative for activity change, appetite change, chills, diaphoresis, fatigue, fever and unexpected weight change.   HENT: Negative for congestion, ear discharge, ear pain, hearing loss, postnasal drip, rhinorrhea, sinus pressure, sinus pain, sore throat, tinnitus and trouble swallowing.    Eyes: Negative for visual disturbance.   Respiratory: Positive for shortness of breath. Negative for apnea, cough and chest tightness.    Cardiovascular: Positive for palpitations. Negative for chest pain and leg swelling.   Gastrointestinal: Negative for abdominal distention, abdominal pain, blood in stool, constipation, diarrhea, nausea and vomiting.   Endocrine: Negative for cold intolerance and heat intolerance.   Genitourinary: Negative for difficulty urinating and dysuria.   Musculoskeletal: Negative for arthralgias and myalgias.   Neurological: Negative for dizziness, syncope, weakness, light-headedness, numbness and headaches.   Hematological: Negative for adenopathy.   Psychiatric/Behavioral: The patient is not nervous/anxious.        Objective:      Physical Exam  Vitals signs reviewed.   Constitutional:       General: He is not in acute distress.     Appearance: Normal appearance. He is normal weight. He is not ill-appearing, toxic-appearing or diaphoretic.   HENT:      Head: Normocephalic and atraumatic.      Right Ear: Tympanic membrane, ear canal and external ear normal. There is no impacted cerumen.      Left Ear: Tympanic membrane, ear canal and external ear normal. There is no impacted cerumen.      Nose: Nose normal. No congestion or rhinorrhea.      Mouth/Throat:      Mouth: Mucous membranes are moist.      Pharynx: Oropharynx is clear. No oropharyngeal exudate or posterior oropharyngeal erythema.   Eyes:      General: No scleral icterus.     Conjunctiva/sclera: Conjunctivae normal.      Pupils: Pupils are equal, round, and reactive to light.   Neck:      Musculoskeletal:  Normal range of motion. No muscular tenderness.      Vascular: No carotid bruit.   Cardiovascular:      Rate and Rhythm: Bradycardia present. Rhythm irregular.      Heart sounds: Normal heart sounds. No murmur. No gallop.    Pulmonary:      Effort: Pulmonary effort is normal. No respiratory distress.      Breath sounds: Normal breath sounds. No wheezing, rhonchi or rales.   Chest:      Chest wall: No tenderness.   Abdominal:      General: Bowel sounds are normal. There is no distension.      Palpations: Abdomen is soft.      Tenderness: There is no abdominal tenderness.   Musculoskeletal: Normal range of motion.         General: No swelling or tenderness.      Right lower leg: No edema.      Left lower leg: No edema.   Lymphadenopathy:      Cervical: No cervical adenopathy.   Skin:     General: Skin is warm and dry.      Capillary Refill: Capillary refill takes less than 2 seconds.      Coloration: Skin is not jaundiced or pale.      Findings: No bruising or erythema.   Neurological:      Mental Status: He is alert and oriented to person, place, and time.      Motor: No weakness.   Psychiatric:         Behavior: Behavior normal.         Assessment:       1. Palpitations    2. Prediabetes        Plan:       Colt was seen today for dizziness.    Diagnoses and all orders for this visit:    Palpitations  -     EKG 12-lead  -     Comprehensive metabolic panel; Future  -     CBC auto differential; Future  -     TSH; Future  -     Magnesium; Future  -     Holter monitor - 24 hour; Future    Prediabetes  -     Hemoglobin A1C; Future    Pt instructed to report to ED for new onset chest pain or worsening of symptoms.    Pt will be notified of lab and Holter monitor results as they become available. Further recommendations to follow.

## 2020-09-24 ENCOUNTER — TELEPHONE (OUTPATIENT)
Dept: CARDIOLOGY | Facility: HOSPITAL | Age: 65
End: 2020-09-24

## 2020-09-24 LAB
ESTIMATED AVG GLUCOSE: 114 MG/DL (ref 68–131)
HBA1C MFR BLD HPLC: 5.6 % (ref 4–5.6)

## 2020-09-25 ENCOUNTER — CLINICAL SUPPORT (OUTPATIENT)
Dept: CARDIOLOGY | Facility: HOSPITAL | Age: 65
End: 2020-09-25
Attending: PHYSICIAN ASSISTANT
Payer: COMMERCIAL

## 2020-09-25 DIAGNOSIS — R00.2 PALPITATIONS: ICD-10-CM

## 2020-09-25 PROCEDURE — 93227 XTRNL ECG REC<48 HR R&I: CPT | Mod: ,,, | Performed by: SPECIALIST

## 2020-09-25 PROCEDURE — 93225 XTRNL ECG REC<48 HRS REC: CPT

## 2020-09-25 PROCEDURE — 93227 HOLTER MONITOR - 24 HOUR (CUPID ONLY): ICD-10-PCS | Mod: ,,, | Performed by: SPECIALIST

## 2020-09-28 ENCOUNTER — HOSPITAL ENCOUNTER (OUTPATIENT)
Facility: HOSPITAL | Age: 65
Discharge: HOME | End: 2020-09-29
Attending: EMERGENCY MEDICINE | Admitting: INTERNAL MEDICINE
Payer: COMMERCIAL

## 2020-09-28 ENCOUNTER — CLINICAL SUPPORT (OUTPATIENT)
Dept: CARDIOLOGY | Facility: HOSPITAL | Age: 65
End: 2020-09-28
Attending: INTERNAL MEDICINE
Payer: COMMERCIAL

## 2020-09-28 DIAGNOSIS — R07.9 CHEST PAIN: ICD-10-CM

## 2020-09-28 DIAGNOSIS — R00.2 PALPITATION: Primary | ICD-10-CM

## 2020-09-28 LAB
ALBUMIN SERPL BCP-MCNC: 4.2 G/DL (ref 3.5–5.2)
ALP SERPL-CCNC: 45 U/L (ref 55–135)
ALT SERPL W/O P-5'-P-CCNC: 31 U/L (ref 10–44)
ANION GAP SERPL CALC-SCNC: 9 MMOL/L (ref 8–16)
AORTIC ROOT ANNULUS: 3.1 CM
AORTIC VALVE CUSP SEPERATION: 2.34 CM
APTT PPP: 27.1 SEC (ref 23.6–33.3)
AST SERPL-CCNC: 27 U/L (ref 10–40)
AV INDEX (PROSTH): 0.62
AV MEAN GRADIENT: 5 MMHG
AV PEAK GRADIENT: 9 MMHG
AV VALVE AREA: 1.95 CM2
AV VELOCITY RATIO: 63.19
BASOPHILS # BLD AUTO: 0.04 K/UL (ref 0–0.2)
BASOPHILS NFR BLD: 1.1 % (ref 0–1.9)
BILIRUB SERPL-MCNC: 0.8 MG/DL (ref 0.1–1)
BILIRUB UR QL STRIP: NEGATIVE
BNP SERPL-MCNC: 23 PG/ML (ref 0–99)
BSA FOR ECHO PROCEDURE: 2.07 M2
BUN SERPL-MCNC: 16 MG/DL (ref 8–23)
CALCIUM SERPL-MCNC: 9.1 MG/DL (ref 8.7–10.5)
CHLORIDE SERPL-SCNC: 105 MMOL/L (ref 95–110)
CK MB SERPL-MCNC: 2 NG/ML (ref 0.1–6.5)
CK MB SERPL-MCNC: 4.6 NG/ML (ref 0.1–6.5)
CK SERPL-CCNC: 101 U/L (ref 20–200)
CLARITY UR: CLEAR
CO2 SERPL-SCNC: 26 MMOL/L (ref 23–29)
COLOR UR: YELLOW
CREAT SERPL-MCNC: 0.8 MG/DL (ref 0.5–1.4)
CV ECHO LV RWT: 0.48 CM
DIFFERENTIAL METHOD: ABNORMAL
DOP CALC AO PEAK VEL: 1.47 M/S
DOP CALC AO VTI: 31.17 CM
DOP CALC LVOT AREA: 3.1 CM2
DOP CALC LVOT DIAMETER: 2 CM
DOP CALC LVOT PEAK VEL: 92.89 M/S
DOP CALC LVOT STROKE VOLUME: 60.85 CM3
DOP CALCLVOT PEAK VEL VTI: 19.38 CM
E WAVE DECELERATION TIME: 425.93 MSEC
E/A RATIO: 1.17
E/E' RATIO: 5.39 M/S
ECHO LV POSTERIOR WALL: 1.22 CM (ref 0.6–1.1)
EOSINOPHIL # BLD AUTO: 0.1 K/UL (ref 0–0.5)
EOSINOPHIL NFR BLD: 1.3 % (ref 0–8)
ERYTHROCYTE [DISTWIDTH] IN BLOOD BY AUTOMATED COUNT: 12.8 % (ref 11.5–14.5)
EST. GFR  (AFRICAN AMERICAN): >60 ML/MIN/1.73 M^2
EST. GFR  (NON AFRICAN AMERICAN): >60 ML/MIN/1.73 M^2
FRACTIONAL SHORTENING: 40 % (ref 28–44)
GLUCOSE SERPL-MCNC: 115 MG/DL (ref 70–110)
GLUCOSE UR QL STRIP: NEGATIVE
HCT VFR BLD AUTO: 42.9 % (ref 40–54)
HGB BLD-MCNC: 14.6 G/DL (ref 14–18)
HGB UR QL STRIP: NEGATIVE
IMM GRANULOCYTES # BLD AUTO: 0.01 K/UL (ref 0–0.04)
IMM GRANULOCYTES NFR BLD AUTO: 0.3 % (ref 0–0.5)
INR PPP: 1.1
INTERVENTRICULAR SEPTUM: 1.22 CM (ref 0.6–1.1)
KETONES UR QL STRIP: NEGATIVE
LEFT ATRIUM SIZE: 3.29 CM
LEFT INTERNAL DIMENSION IN SYSTOLE: 3.02 CM (ref 2.1–4)
LEFT VENTRICLE MASS INDEX: 119 G/M2
LEFT VENTRICULAR INTERNAL DIMENSION IN DIASTOLE: 5.07 CM (ref 3.5–6)
LEFT VENTRICULAR MASS: 244.58 G
LEUKOCYTE ESTERASE UR QL STRIP: NEGATIVE
LV LATERAL E/E' RATIO: 4.77 M/S
LV SEPTAL E/E' RATIO: 6.2 M/S
LYMPHOCYTES # BLD AUTO: 1.6 K/UL (ref 1–4.8)
LYMPHOCYTES NFR BLD: 41.3 % (ref 18–48)
MAGNESIUM SERPL-MCNC: 2 MG/DL (ref 1.6–2.6)
MCH RBC QN AUTO: 31.3 PG (ref 27–31)
MCHC RBC AUTO-ENTMCNC: 34 G/DL (ref 32–36)
MCV RBC AUTO: 92 FL (ref 82–98)
MONOCYTES # BLD AUTO: 0.4 K/UL (ref 0.3–1)
MONOCYTES NFR BLD: 9.3 % (ref 4–15)
MV PEAK A VEL: 0.53 M/S
MV PEAK E VEL: 0.62 M/S
NEUTROPHILS # BLD AUTO: 1.8 K/UL (ref 1.8–7.7)
NEUTROPHILS NFR BLD: 46.7 % (ref 38–73)
NITRITE UR QL STRIP: NEGATIVE
NRBC BLD-RTO: 0 /100 WBC
PH UR STRIP: 7 [PH] (ref 5–8)
PISA TR MAX VEL: 2.77 M/S
PLATELET # BLD AUTO: 199 K/UL (ref 150–350)
PMV BLD AUTO: 10.4 FL (ref 9.2–12.9)
POTASSIUM SERPL-SCNC: 4.4 MMOL/L (ref 3.5–5.1)
PROT SERPL-MCNC: 7 G/DL (ref 6–8.4)
PROT UR QL STRIP: NEGATIVE
PROTHROMBIN TIME: 13.5 SEC (ref 10.6–14.8)
PV PEAK VELOCITY: 107.56 CM/S
RA PRESSURE: 3 MMHG
RBC # BLD AUTO: 4.67 M/UL (ref 4.6–6.2)
RIGHT VENTRICULAR END-DIASTOLIC DIMENSION: 346 CM
SARS-COV-2 RDRP RESP QL NAA+PROBE: NEGATIVE
SODIUM SERPL-SCNC: 140 MMOL/L (ref 136–145)
SP GR UR STRIP: 1.01 (ref 1–1.03)
TDI LATERAL: 0.13 M/S
TDI SEPTAL: 0.1 M/S
TDI: 0.12 M/S
TR MAX PG: 31 MMHG
TROPONIN I SERPL DL<=0.01 NG/ML-MCNC: <0.03 NG/ML
TROPONIN I SERPL DL<=0.01 NG/ML-MCNC: <0.03 NG/ML
TSH SERPL DL<=0.005 MIU/L-ACNC: 2.37 UIU/ML (ref 0.34–5.6)
TV REST PULMONARY ARTERY PRESSURE: 34 MMHG
URN SPEC COLLECT METH UR: NORMAL
UROBILINOGEN UR STRIP-ACNC: NEGATIVE EU/DL
WBC # BLD AUTO: 3.78 K/UL (ref 3.9–12.7)

## 2020-09-28 PROCEDURE — G0378 HOSPITAL OBSERVATION PER HR: HCPCS

## 2020-09-28 PROCEDURE — 80053 COMPREHEN METABOLIC PANEL: CPT

## 2020-09-28 PROCEDURE — 83735 ASSAY OF MAGNESIUM: CPT

## 2020-09-28 PROCEDURE — 81003 URINALYSIS AUTO W/O SCOPE: CPT

## 2020-09-28 PROCEDURE — 93306 ECHO (CUPID ONLY): ICD-10-PCS | Mod: 26,,, | Performed by: SPECIALIST

## 2020-09-28 PROCEDURE — 63600175 PHARM REV CODE 636 W HCPCS: Performed by: INTERNAL MEDICINE

## 2020-09-28 PROCEDURE — 82550 ASSAY OF CK (CPK): CPT

## 2020-09-28 PROCEDURE — 85025 COMPLETE CBC W/AUTO DIFF WBC: CPT

## 2020-09-28 PROCEDURE — 93010 EKG 12-LEAD: ICD-10-PCS | Mod: ,,, | Performed by: SPECIALIST

## 2020-09-28 PROCEDURE — 93010 ELECTROCARDIOGRAM REPORT: CPT | Mod: ,,, | Performed by: SPECIALIST

## 2020-09-28 PROCEDURE — 99285 EMERGENCY DEPT VISIT HI MDM: CPT | Mod: 25

## 2020-09-28 PROCEDURE — 84484 ASSAY OF TROPONIN QUANT: CPT | Mod: 91

## 2020-09-28 PROCEDURE — 93005 ELECTROCARDIOGRAM TRACING: CPT | Performed by: SPECIALIST

## 2020-09-28 PROCEDURE — 96372 THER/PROPH/DIAG INJ SC/IM: CPT | Mod: 59

## 2020-09-28 PROCEDURE — 93306 TTE W/DOPPLER COMPLETE: CPT | Mod: 26,,, | Performed by: SPECIALIST

## 2020-09-28 PROCEDURE — 25000003 PHARM REV CODE 250: Performed by: INTERNAL MEDICINE

## 2020-09-28 PROCEDURE — U0002 COVID-19 LAB TEST NON-CDC: HCPCS

## 2020-09-28 PROCEDURE — 82553 CREATINE MB FRACTION: CPT

## 2020-09-28 PROCEDURE — 85730 THROMBOPLASTIN TIME PARTIAL: CPT

## 2020-09-28 PROCEDURE — 93306 TTE W/DOPPLER COMPLETE: CPT

## 2020-09-28 PROCEDURE — 83880 ASSAY OF NATRIURETIC PEPTIDE: CPT

## 2020-09-28 PROCEDURE — 85610 PROTHROMBIN TIME: CPT

## 2020-09-28 PROCEDURE — 82553 CREATINE MB FRACTION: CPT | Mod: 91

## 2020-09-28 PROCEDURE — 36415 COLL VENOUS BLD VENIPUNCTURE: CPT

## 2020-09-28 PROCEDURE — 84443 ASSAY THYROID STIM HORMONE: CPT

## 2020-09-28 PROCEDURE — 84484 ASSAY OF TROPONIN QUANT: CPT

## 2020-09-28 RX ORDER — POTASSIUM CHLORIDE 1.5 G/1.58G
40 POWDER, FOR SOLUTION ORAL
Status: DISCONTINUED | OUTPATIENT
Start: 2020-09-28 | End: 2020-09-29 | Stop reason: HOSPADM

## 2020-09-28 RX ORDER — MULTIVITAMIN
1 TABLET ORAL DAILY
COMMUNITY

## 2020-09-28 RX ORDER — ATORVASTATIN CALCIUM 40 MG/1
40 TABLET, FILM COATED ORAL NIGHTLY
Status: DISCONTINUED | OUTPATIENT
Start: 2020-09-28 | End: 2020-09-29 | Stop reason: HOSPADM

## 2020-09-28 RX ORDER — LANOLIN ALCOHOL/MO/W.PET/CERES
800 CREAM (GRAM) TOPICAL
Status: DISCONTINUED | OUTPATIENT
Start: 2020-09-28 | End: 2020-09-29 | Stop reason: HOSPADM

## 2020-09-28 RX ORDER — ATORVASTATIN CALCIUM 10 MG/1
10 TABLET, FILM COATED ORAL DAILY
Status: DISCONTINUED | OUTPATIENT
Start: 2020-09-28 | End: 2020-09-28

## 2020-09-28 RX ORDER — ONDANSETRON 2 MG/ML
4 INJECTION INTRAMUSCULAR; INTRAVENOUS EVERY 6 HOURS PRN
Status: DISCONTINUED | OUTPATIENT
Start: 2020-09-28 | End: 2020-09-29 | Stop reason: HOSPADM

## 2020-09-28 RX ORDER — CARVEDILOL 3.12 MG/1
3.12 TABLET ORAL 2 TIMES DAILY
Status: DISCONTINUED | OUTPATIENT
Start: 2020-09-28 | End: 2020-09-28

## 2020-09-28 RX ORDER — ASPIRIN 325 MG
325 TABLET, DELAYED RELEASE (ENTERIC COATED) ORAL DAILY
Status: DISCONTINUED | OUTPATIENT
Start: 2020-09-28 | End: 2020-09-29 | Stop reason: HOSPADM

## 2020-09-28 RX ORDER — ENOXAPARIN SODIUM 100 MG/ML
40 INJECTION SUBCUTANEOUS
Status: DISCONTINUED | OUTPATIENT
Start: 2020-09-28 | End: 2020-09-29 | Stop reason: HOSPADM

## 2020-09-28 RX ORDER — PANTOPRAZOLE SODIUM 40 MG/1
40 TABLET, DELAYED RELEASE ORAL DAILY
Status: DISCONTINUED | OUTPATIENT
Start: 2020-09-29 | End: 2020-09-29 | Stop reason: HOSPADM

## 2020-09-28 RX ORDER — ACETAMINOPHEN 325 MG/1
650 TABLET ORAL EVERY 4 HOURS PRN
Status: DISCONTINUED | OUTPATIENT
Start: 2020-09-28 | End: 2020-09-29 | Stop reason: HOSPADM

## 2020-09-28 RX ORDER — NEOMYCIN SULFATE AND FLUOCINOLONE ACETONIDE 3.5; .25 MG/G; MG/G
1 CREAM TOPICAL 2 TIMES DAILY
COMMUNITY

## 2020-09-28 RX ADMIN — ASPIRIN 325 MG: 325 TABLET, COATED ORAL at 06:09

## 2020-09-28 RX ADMIN — ATORVASTATIN CALCIUM 40 MG: 40 TABLET, FILM COATED ORAL at 09:09

## 2020-09-28 RX ADMIN — ENOXAPARIN SODIUM 40 MG: 100 INJECTION SUBCUTANEOUS at 06:09

## 2020-09-28 NOTE — ED PROVIDER NOTES
Encounter Date: 9/28/2020       History     Chief Complaint   Patient presents with    Chest Pain     started this AM     Patient with 7 days history of palpitations.  Feels like his heart is racing.  When this happens he has left-sided chest pain.  No initiating factors.  Each states symptoms are worse.  Patient with severe episode this morning.  Wife does check blood pressure and pulse with blood pressure cough.  Patient's pulses always normal.  Patient reports he is currently chest pain-free.  No history of coronary artery disease in the past.  Cardiac risk factors include hyperlipidemia and family history.        Review of patient's allergies indicates:   Allergen Reactions    Pennsaid [diclofenac sodium] Rash and Blisters     Past Medical History:   Diagnosis Date    Arthritis of hand 8/6/2015    Cervical disc displacement     Degeneration of lumbar intervertebral disc     GERD (gastroesophageal reflux disease)     Hip pain 2/22/2016    Hyperlipidemia     Shingles 11/2013     Past Surgical History:   Procedure Laterality Date    CERVICAL FUSION      bone graft    EPIDURAL STEROID INJECTION INTO LUMBAR SPINE N/A 12/5/2019    Procedure: Injection-steroid-epidural-lumbar;  Surgeon: Adarsh Kraft MD;  [facility]: [facility];  Service: Pain Management;  Laterality: N/A;  L5-S1    TONSILLECTOMY, ADENOIDECTOMY       Family History   Problem Relation Age of Onset    Diabetes Mother     Hypertension Mother     Stroke Mother     Coronary artery disease Mother     Stroke Father     Heart disease Brother         CAD     Social History     Tobacco Use    Smoking status: Never Smoker    Smokeless tobacco: Never Used   Substance Use Topics    Alcohol use: Yes     Alcohol/week: 6.0 standard drinks     Types: 6 Cans of beer per week     Comment: weekly    Drug use: No     Review of Systems   Constitutional: Negative for chills and fever.   HENT: Negative for sore throat.    Eyes: Negative for photophobia and  visual disturbance.   Respiratory: Negative for shortness of breath.    Cardiovascular: Positive for chest pain and palpitations.   Gastrointestinal: Negative for abdominal pain and vomiting.   Genitourinary: Negative for dysuria.   Musculoskeletal: Negative for joint swelling.   Skin: Negative for rash.   Neurological: Negative for weakness and headaches.   Psychiatric/Behavioral: Negative for confusion.       Physical Exam     Initial Vitals [09/28/20 1026]   BP Pulse Resp Temp SpO2   (!) 142/74 60 13 97.6 °F (36.4 °C) 97 %      MAP       --         Physical Exam    Nursing note and vitals reviewed.  Constitutional: He is not diaphoretic. No distress.   HENT:   Head: Normocephalic and atraumatic.   Eyes: Conjunctivae are normal.   Neck: Normal range of motion.   Cardiovascular: Normal rate.   Slightly irregular rhythm   Pulmonary/Chest: Breath sounds normal.   Abdominal: Soft. There is no abdominal tenderness.   Musculoskeletal: Normal range of motion.   Skin: No rash noted.   Psychiatric: He has a normal mood and affect.         ED Course   Procedures  Labs Reviewed   CBC W/ AUTO DIFFERENTIAL - Abnormal; Notable for the following components:       Result Value    WBC 3.78 (*)     Mean Corpuscular Hemoglobin 31.3 (*)     All other components within normal limits   COMPREHENSIVE METABOLIC PANEL - Abnormal; Notable for the following components:    Glucose 115 (*)     Alkaline Phosphatase 45 (*)     All other components within normal limits   B-TYPE NATRIURETIC PEPTIDE   TROPONIN I   PROTIME-INR   APTT   SARS-COV-2 RNA AMPLIFICATION, QUAL   CK-MB   CK   MAGNESIUM   URINALYSIS, REFLEX TO URINE CULTURE        ECG Results          EKG 12-lead (In process)  Result time 09/28/20 10:55:29    In process by Interface, Lab In [facility] (09/28/20 10:55:29)                 Narrative:    Test Reason : R07.9,    Vent. Rate : 060 BPM     Atrial Rate : 060 BPM     P-R Int : 160 ms          QRS Dur : 096 ms      QT Int : 396 ms        P-R-T Axes : 050 -06 028 degrees     QTc Int : 396 ms    Sinus rhythm with Premature atrial complexes  Otherwise normal ECG  When compared with ECG of 23-SEP-2020 15:07,  No significant change was found    Referred By: [email or web address]   SELF           Confirmed By:                             Imaging Results          X-Ray Chest AP Portable (Final result)  Result time 09/28/20 10:51:35    Final result by Franklin Yoon MD (09/28/20 10:51:35)                 Narrative:    CLINICAL HISTORY:  65 years (1955) Male chest pain Chest pain Hx-; 2/22/2016 Hip  pain; 8/6/2015 Arthritis of hand; 11/2013 Shingles; Date Unknown  Cervical disc displacement; Date Unknown Degeneration of lumbar  intervertebral disc; Date Unknown GERD (gastroesophageal reflux  disease); Date Unknown Hyperlipidemia    TECHNIQUE:  Portable AP radiograph the chest.    COMPARISON:  None available.    FINDINGS:  The lungs are clear. Costophrenic angles are seen without effusion. No  pneumothorax is identified. The heart is top normal in size. The  mediastinum is within normal limits. Osseous structures show  degenerative changes in the spine. The visualized upper abdomen is  unremarkable.    IMPRESSION:  No acute cardiac or pulmonary process.                  .            Electronically Signed by EMILI Soto on 9/28/2020 10:54 AM                               Medical Decision Making:   History:   Old Medical Records: I decided to obtain old medical records.  Clinical Tests:   Lab Tests: Reviewed  Radiological Study: Reviewed  Medical Tests: Reviewed  ED Management:  Patient presents with palpitations and chest pain of unclear etiology.  Patient does have a couple risk factors for coronary artery disease.  Symptoms are worsening every day.  Giving worsening symptoms aspirin given.  Hospitalist consulted for possible admission.  On telemetry patient does have frequent PACs with no ventricular ectopy.                             Clinical  Impression:       ICD-10-CM ICD-9-CM   1. Palpitation  R00.2 785.1   2. Chest pain  R07.9 786.50                                               Jordan Alex MD  09/28/20 1236

## 2020-09-28 NOTE — ED NOTES
Report given for pt to transfer to 3014. Pt is AOX4 and aware of admission. Labs, meds, history reviewed. Pt will be transported with tele box

## 2020-09-29 ENCOUNTER — HOSPITAL ENCOUNTER (OUTPATIENT)
Dept: RADIOLOGY | Facility: HOSPITAL | Age: 65
Discharge: HOME | End: 2020-09-29
Attending: INTERNAL MEDICINE
Payer: COMMERCIAL

## 2020-09-29 ENCOUNTER — CLINICAL SUPPORT (OUTPATIENT)
Dept: CARDIOLOGY | Facility: HOSPITAL | Age: 65
End: 2020-09-29
Attending: INTERNAL MEDICINE
Payer: COMMERCIAL

## 2020-09-29 VITALS
WEIGHT: 187.19 LBS | OXYGEN SATURATION: 97 % | HEIGHT: 71 IN | SYSTOLIC BLOOD PRESSURE: 127 MMHG | TEMPERATURE: 97 F | DIASTOLIC BLOOD PRESSURE: 76 MMHG | HEART RATE: 52 BPM | BODY MASS INDEX: 26.21 KG/M2 | RESPIRATION RATE: 16 BRPM

## 2020-09-29 VITALS — HEIGHT: 71 IN | BODY MASS INDEX: 25.93 KG/M2 | WEIGHT: 185.19 LBS

## 2020-09-29 PROBLEM — R00.2 PALPITATIONS: Status: RESOLVED | Noted: 2020-09-28 | Resolved: 2020-09-29

## 2020-09-29 PROBLEM — R07.9 CHEST PAIN: Status: RESOLVED | Noted: 2020-09-28 | Resolved: 2020-09-29

## 2020-09-29 LAB
ALBUMIN SERPL BCP-MCNC: 3.9 G/DL (ref 3.5–5.2)
ALP SERPL-CCNC: 42 U/L (ref 55–135)
ALT SERPL W/O P-5'-P-CCNC: 26 U/L (ref 10–44)
ANION GAP SERPL CALC-SCNC: 8 MMOL/L (ref 8–16)
AST SERPL-CCNC: 25 U/L (ref 10–40)
BASOPHILS # BLD AUTO: 0.06 K/UL (ref 0–0.2)
BASOPHILS NFR BLD: 1 % (ref 0–1.9)
BILIRUB SERPL-MCNC: 1.1 MG/DL (ref 0.1–1)
BUN SERPL-MCNC: 14 MG/DL (ref 8–23)
CALCIUM SERPL-MCNC: 9 MG/DL (ref 8.7–10.5)
CHLORIDE SERPL-SCNC: 104 MMOL/L (ref 95–110)
CK MB SERPL-MCNC: 8.1 NG/ML (ref 0.1–6.5)
CO2 SERPL-SCNC: 27 MMOL/L (ref 23–29)
CREAT SERPL-MCNC: 0.7 MG/DL (ref 0.5–1.4)
DIFFERENTIAL METHOD: ABNORMAL
EOSINOPHIL # BLD AUTO: 0.2 K/UL (ref 0–0.5)
EOSINOPHIL NFR BLD: 2.6 % (ref 0–8)
ERYTHROCYTE [DISTWIDTH] IN BLOOD BY AUTOMATED COUNT: 12.7 % (ref 11.5–14.5)
EST. GFR  (AFRICAN AMERICAN): >60 ML/MIN/1.73 M^2
EST. GFR  (NON AFRICAN AMERICAN): >60 ML/MIN/1.73 M^2
GLUCOSE SERPL-MCNC: 106 MG/DL (ref 70–110)
HCT VFR BLD AUTO: 42.7 % (ref 40–54)
HGB BLD-MCNC: 14.7 G/DL (ref 14–18)
IMM GRANULOCYTES # BLD AUTO: 0.01 K/UL (ref 0–0.04)
IMM GRANULOCYTES NFR BLD AUTO: 0.2 % (ref 0–0.5)
LYMPHOCYTES # BLD AUTO: 2.7 K/UL (ref 1–4.8)
LYMPHOCYTES NFR BLD: 47.2 % (ref 18–48)
MAGNESIUM SERPL-MCNC: 2 MG/DL (ref 1.6–2.6)
MCH RBC QN AUTO: 31.2 PG (ref 27–31)
MCHC RBC AUTO-ENTMCNC: 34.4 G/DL (ref 32–36)
MCV RBC AUTO: 91 FL (ref 82–98)
MONOCYTES # BLD AUTO: 0.5 K/UL (ref 0.3–1)
MONOCYTES NFR BLD: 9 % (ref 4–15)
NEUTROPHILS # BLD AUTO: 2.3 K/UL (ref 1.8–7.7)
NEUTROPHILS NFR BLD: 40 % (ref 38–73)
NRBC BLD-RTO: 0 /100 WBC
PLATELET # BLD AUTO: 212 K/UL (ref 150–350)
PMV BLD AUTO: 10.4 FL (ref 9.2–12.9)
POTASSIUM SERPL-SCNC: 4.1 MMOL/L (ref 3.5–5.1)
PROT SERPL-MCNC: 6.8 G/DL (ref 6–8.4)
RBC # BLD AUTO: 4.71 M/UL (ref 4.6–6.2)
SODIUM SERPL-SCNC: 139 MMOL/L (ref 136–145)
WBC # BLD AUTO: 5.81 K/UL (ref 3.9–12.7)

## 2020-09-29 PROCEDURE — 93018 NUCLEAR STRESS TEST (CUPID ONLY): ICD-10-PCS | Mod: ,,, | Performed by: INTERNAL MEDICINE

## 2020-09-29 PROCEDURE — 36415 COLL VENOUS BLD VENIPUNCTURE: CPT

## 2020-09-29 PROCEDURE — 93017 CV STRESS TEST TRACING ONLY: CPT

## 2020-09-29 PROCEDURE — 93016 NUCLEAR STRESS TEST (CUPID ONLY): ICD-10-PCS | Mod: ,,, | Performed by: INTERNAL MEDICINE

## 2020-09-29 PROCEDURE — A9502 TC99M TETROFOSMIN: HCPCS

## 2020-09-29 PROCEDURE — 85025 COMPLETE CBC W/AUTO DIFF WBC: CPT

## 2020-09-29 PROCEDURE — G0378 HOSPITAL OBSERVATION PER HR: HCPCS

## 2020-09-29 PROCEDURE — 93018 CV STRESS TEST I&R ONLY: CPT | Mod: ,,, | Performed by: INTERNAL MEDICINE

## 2020-09-29 PROCEDURE — 93016 CV STRESS TEST SUPVJ ONLY: CPT | Mod: ,,, | Performed by: INTERNAL MEDICINE

## 2020-09-29 PROCEDURE — 80053 COMPREHEN METABOLIC PANEL: CPT

## 2020-09-29 PROCEDURE — 83735 ASSAY OF MAGNESIUM: CPT

## 2020-09-29 PROCEDURE — 25000003 PHARM REV CODE 250: Performed by: INTERNAL MEDICINE

## 2020-09-29 RX ORDER — NEBIVOLOL HYDROCHLORIDE 10 MG/1
10 TABLET ORAL DAILY
Qty: 30 TABLET | Refills: 11 | Status: SHIPPED | OUTPATIENT
Start: 2020-09-29 | End: 2020-10-02 | Stop reason: ALTCHOICE

## 2020-09-29 RX ADMIN — PANTOPRAZOLE SODIUM 40 MG: 40 TABLET, DELAYED RELEASE ORAL at 05:09

## 2020-09-29 NOTE — DISCHARGE SUMMARY
"[facility] Medicine  Discharge Summary      Patient Name: Colt Rose  MRN: [id number]  Admission Date: 9/28/2020  Hospital Length of Stay: 0 days  Discharge Date and Time:  09/29/2020 2:42 PM  Attending Physician: Michael Wong MD   Discharging Provider: Michael Wong MD  Primary Care Provider: Jeffery Mena MD      HPI:   65 year old patient getting admitted with chest pain and palpitations  Per Patient he is suffering from on and off palpitations for past 1 week  His PCP arranged a holter monitor and pt was supposed to return to PCP office today  He felt severe pain on L side with associated pain/numbness on LUE  Pt denies any diaphoresis/Nausea  Pt Never saw any Cardio MD before and has got an appt with Cardio Md within 3 weeks as an OP Pt denies any other c/o or issues      * No surgery found *      Hospital Course:   65 year old patient getting admitted with chest pain and palpitations  Per Patient he is suffering from on and off palpitations for past 1 week  His PCP arranged a holter monitor and pt was supposed to return to PCP office today  He felt severe pain on L side with associated pain/numbness on LUE  Pt denies any diaphoresis/Nausea  Pt Never saw any Cardio MD before and has got an appt with Cardio Md within 3 weeks as an OP Pt denies any other c/o or issues    09/29 Assumed care. Chart reviewed. No further CP nor palpitations since admission. Feels "Good" and would like to discharged with outpatient follow-up. Dicussed with Cardiology: make close follow-up appointment; start Bystolic 2.5 mg q day.  Lungs: clear  Heart: S1S2  Abdo soft     Consults:   Consults (From admission, onward)        Status Ordering Provider     Inpatient consult to Cardiology  Once     Provider:  Rylan Freeman MD    Acknowledged NILE WHITEHEAD     Inpatient consult to Hospitalist  Once     Provider:  Nile Whitehead MD    Acknowledged NILE WHITEHEAD          No new Assessment & Plan notes have been " filed under this hospital service since the last note was generated.  Service: Hospital Medicine    Final Active Diagnoses:    Diagnosis Date Noted POA    GERD (gastroesophageal reflux disease) [K21.9]  Yes      Problems Resolved During this Admission:    Diagnosis Date Noted Date Resolved POA    PRINCIPAL PROBLEM:  Chest pain [R07.9] 09/28/2020 09/29/2020 Yes    Palpitations [R00.2] 09/28/2020 09/29/2020 Yes       Discharged Condition: good    Disposition: Home or Self Care    Follow Up:  Follow-up Information     Schedule an appointment as soon as possible for a visit with Daljit Freeman MD.    Specialties: Cardiovascular Disease, Cardiology, INTERVENTIONAL CARDIOLOGY  Why: Post discharge follow-up  Contact information:  [address] [place] [address]  [address]  [place] [id number] [phone]                 Patient Instructions:      Diet Cardiac     Activity as tolerated       Significant Diagnostic Studies: Labs:   BMP:   Recent Labs   Lab 09/28/20  1113 09/29/20  0324   * 106    139   K 4.4 4.1    104   CO2 26 27   BUN 16 14   CREATININE 0.8 0.7   CALCIUM 9.1 9.0   MG 2.0 2.0   , CBC   Recent Labs   Lab 09/28/20  1113 09/29/20  0323   WBC 3.78* 5.81   HGB 14.6 14.7   HCT 42.9 42.7    212    and Troponin   Recent Labs   Lab 09/28/20  1113 09/28/20  1840   TROPONINI <0.030 <0.030       Pending Diagnostic Studies:     Procedure Component Value Units Date/Time    Echo Color Flow Doppler? Yes [[id number]]     Order Status: Sent Lab Status: No result          Medications:  Reconciled Home Medications:      Medication List      START taking these medications    BYSTOLIC 10 MG Tab  Generic drug: nebivoloL  Take 1 tablet (10 mg total) by mouth once daily.        CONTINUE taking these medications    atorvastatin 10 MG tablet  Commonly known as: LIPITOR  Take 1 tablet (10 mg total) by mouth once daily.     CO Q-10 ORAL  Take 1 capsule by mouth once daily.     FISH OIL ORAL  Take 1 capsule by mouth once  daily.     fluocinolone acetonide oiL 0.01 % Drop  Place 5 drops into both eyes 2 (two) times daily as needed.     meloxicam 15 MG tablet  Commonly known as: MOBIC  Take 1 tablet (15 mg total) by mouth daily as needed for Pain.     methocarbamoL 500 MG Tab  Commonly known as: ROBAXIN  Take 1 tablet (500 mg total) by mouth 3 (three) times daily as needed.     MANUEL-SYNALAR 0.5 % (0.35 % base)-0.025 % Crea  Generic drug: neomycin-fluocinolone  Apply 1 application topically 2 (two) times daily.     ONE DAILY MULTIVITAMIN per tablet  Generic drug: multivitamin  Take 1 tablet by mouth once daily.     PNEUMOVAX-23 25 mcg/0.5 mL vaccine  Generic drug: pneumococcal 23-monster ps  Inject into the muscle.        ASK your doctor about these medications    omeprazole 40 MG capsule  Commonly known as: PRILOSEC  Take 1 capsule (40 mg total) by mouth once daily.            Indwelling Lines/Drains at time of discharge:   Lines/Drains/Airways     None                 Time spent on the discharge of patient: 32  minutes  Patient was seen and examined on the date of discharge and determined to be suitable for discharge.         Michael Wong MD  Department of Hospital Medicine  [facility]

## 2020-09-29 NOTE — PLAN OF CARE
Pharmacy used is [facility] on [address] and [address] in [place], and his PCP is Dr Jeffery Mena.       09/29/20 1529   Discharge Assessment   Assessment Type Discharge Planning Assessment   Confirmed/corrected address and phone number on facesheet? Yes   Assessment information obtained from? Patient   Expected Length of Stay (days) 1   Communicated expected length of stay with patient/caregiver yes   Prior to hospitilization cognitive status: Alert/Oriented   Prior to hospitalization functional status: Independent   Current cognitive status: Alert/Oriented   Current Functional Status: Independent   Facility Arrived From: Home   Lives With spouse   Able to Return to Prior Arrangements yes   Is patient able to care for self after discharge? Yes   Who are your caregiver(s) and their phone number(s)? Spouse Mrs Yenny Rose at cell [phone]   Readmission Within the Last 30 Days no previous admission in last 30 days   Patient currently being followed by outpatient case management? No   Patient currently receives any other outside agency services? No   Equipment Currently Used at Home none   Part D Coverage [employer]   Do you have any problems affording any of your prescribed medications? No   Is the patient taking medications as prescribed? yes   Does the patient have transportation home? Yes   Transportation Anticipated family or friend will provide   Dialysis Name and Scheduled days NA   Does the patient receive services at the Coumadin Clinic? No   Discharge Plan A Home with family   Discharge Plan B Home with family   DME Needed Upon Discharge  none   Patient/Family in Agreement with Plan yes

## 2020-09-29 NOTE — PROGRESS NOTES
-Myocardial perfusion Stress injection RAC arm IV at 10:42    Treadmill stress    -Patient released from NPO status    EMILI FLOWERS

## 2020-09-29 NOTE — SUBJECTIVE & OBJECTIVE
Past Medical History:   Diagnosis Date    Arthritis of hand 8/6/2015    Cervical disc displacement     Degeneration of lumbar intervertebral disc     GERD (gastroesophageal reflux disease)     Hip pain 2/22/2016    Hyperlipidemia     Shingles 11/2013       Past Surgical History:   Procedure Laterality Date    CERVICAL FUSION      bone graft    EPIDURAL STEROID INJECTION INTO LUMBAR SPINE N/A 12/5/2019    Procedure: Injection-steroid-epidural-lumbar;  Surgeon: Adarsh Kraft MD;  Location: [facility] OR;  Service: Pain Management;  Laterality: N/A;  L5-S1    TONSILLECTOMY, ADENOIDECTOMY         Review of patient's allergies indicates:   Allergen Reactions    Pennsaid [diclofenac sodium] Rash and Blisters       No current facility-administered medications on file prior to encounter.      Current Outpatient Medications on File Prior to Encounter   Medication Sig    atorvastatin (LIPITOR) 10 MG tablet Take 1 tablet (10 mg total) by mouth once daily.    docosahexaenoic acid/epa (FISH OIL ORAL) Take 1 capsule by mouth once daily.    fluocinolone acetonide oil 0.01 % Drop Place 5 drops into both eyes 2 (two) times daily as needed.     meloxicam (MOBIC) 15 MG tablet Take 1 tablet (15 mg total) by mouth daily as needed for Pain.    methocarbamoL (ROBAXIN) 500 MG Tab Take 1 tablet (500 mg total) by mouth 3 (three) times daily as needed.    multivitamin (ONE DAILY MULTIVITAMIN) per tablet Take 1 tablet by mouth once daily.    neomycin-fluocinolone (MANUEL-SYNALAR) 0.5 % (0.35 % base)-0.025 % Crea Apply 1 application topically 2 (two) times daily.    omeprazole (PRILOSEC) 40 MG capsule Take 1 capsule (40 mg total) by mouth once daily.    ubidecarenone (CO Q-10 ORAL) Take 1 capsule by mouth once daily.    pneumococcal 23-monster ps (PNEUMOVAX-23) 25 mcg/0.5 mL vaccine Inject into the muscle.     Family History     Problem Relation (Age of Onset)    Coronary artery disease Mother    Diabetes Mother    Heart disease Brother     Hypertension Mother    Stroke Mother, Father        Tobacco Use    Smoking status: Never Smoker    Smokeless tobacco: Never Used   Substance and Sexual Activity    Alcohol use: Yes     Alcohol/week: 6.0 standard drinks     Types: 6 Cans of beer per week     Comment: weekly    Drug use: No    Sexual activity: Yes     Partners: Female     Review of Systems   Constitutional: Negative for activity change and appetite change.   HENT: Negative for congestion and dental problem.    Eyes: Negative for discharge and itching.   Respiratory: Negative for shortness of breath.    Cardiovascular: Positive for chest pain and palpitations.   Gastrointestinal: Negative for abdominal distention and abdominal pain.   Endocrine: Negative for cold intolerance.   Genitourinary: Negative for difficulty urinating and dysuria.   Musculoskeletal: Negative for arthralgias and back pain.   Skin: Negative for color change.   Neurological: Negative for dizziness and facial asymmetry.   Hematological: Negative for adenopathy.   Psychiatric/Behavioral: Negative for agitation and behavioral problems.     Objective:     Vital Signs (Most Recent):  Temp: 98.1 °F (36.7 °C) (09/28/20 1943)  Pulse: (!) 49 (09/28/20 1943)  Resp: 17 (09/28/20 1943)  BP: 120/66 (09/28/20 1943)  SpO2: 99 % (09/28/20 1943) Vital Signs (24h Range):  Temp:  [97.6 °F (36.4 °C)-98.1 °F (36.7 °C)] 98.1 °F (36.7 °C)  Pulse:  [46-71] 49  Resp:  [13-20] 17  SpO2:  [97 %-99 %] 99 %  BP: (120-169)/(66-83) 120/66     Weight: 85.8 kg (189 lb 3.2 oz)  Body mass index is 26.39 kg/m².    Physical Exam  Vitals signs and nursing note reviewed.   Constitutional:       Appearance: He is well-developed.   HENT:      Head: Normocephalic and atraumatic.      Right Ear: External ear normal.      Left Ear: External ear normal.      Nose: Nose normal.      Mouth/Throat:      Mouth: Mucous membranes are moist.   Eyes:      Conjunctiva/sclera: Conjunctivae normal.   Neck:      Musculoskeletal:  Full passive range of motion without pain and normal range of motion.   Cardiovascular:      Rate and Rhythm: Normal rate.   Pulmonary:      Effort: Pulmonary effort is normal.   Abdominal:      General: There is no distension.   Musculoskeletal: Normal range of motion.   Skin:     General: Skin is warm.   Neurological:      Mental Status: He is alert and oriented to person, place, and time.   Psychiatric:         Behavior: Behavior normal.             Significant Labs:   CBC:   Recent Labs   Lab 09/28/20  1113   WBC 3.78*   HGB 14.6   HCT 42.9        CMP:   Recent Labs   Lab 09/28/20  1113      K 4.4      CO2 26   *   BUN 16   CREATININE 0.8   CALCIUM 9.1   PROT 7.0   ALBUMIN 4.2   BILITOT 0.8   ALKPHOS 45*   AST 27   ALT 31   ANIONGAP 9   EGFRNONAA >60.0       Significant Imaging: I have reviewed all pertinent imaging results/findings within the past 24 hours.

## 2020-09-29 NOTE — PROGRESS NOTES
Myocardial perfusion  Stress Images in progress at 11:30    -Myocardial  Perfusion images will be completed at 11:40    [facility]

## 2020-09-29 NOTE — NURSING
Pt given discharge instructions. Removed 1 PIV tip intact, without difficulty. Removed and returned 1 cardiac monitor. Pt left with smart phone and bag of belongings, shoes, shirt, shorts, and underwear. Pt left via wheelchair to wife's car to return home without incident.

## 2020-09-29 NOTE — PLAN OF CARE
[address]   11/28/2018   Anticoagulation Therapy Visit    Description:  81 year old male   Provider:  Sherry Georges, RN   Department:  [facility]           INR as of 11/28/2018     Today's INR 1.2!      Anticoagulation Summary as of 11/28/2018     INR goal 2.0-3.0   Today's INR 1.2!   Full warfarin instructions 11/28: 5 mg; 11/29: 5 mg; 11/30: 2.5 mg; 12/1: 2.5 mg; 12/2: 2.5 mg   Next INR check 12/3/2018    Indications   Acute kidney injury (H) [N17.9]  Deep vein thrombosis (DVT) (H) [I82.409] [I82.409]  Long-term (current) use of anticoagulants [Z79.01] [Z79.01]         November 2018 Details    Sun Mon Tue Wed Thu Fri Sat         1               2               3                 4               5               6               7               8               9               10                 11               12               13               14               15               16               17                 18               19               20               21               22               23               24                 25               26               27               28      5 mg   See details      29      5 mg         30      2.5 mg           Date Details   11/28 This INR check               How to take your warfarin dose     To take:  2.5 mg Take 1 of the 2.5 mg tablets.    To take:  5 mg Take 2 of the 2.5 mg tablets.           December 2018 Details    Sun Mon Tue Wed Thu Fri Sat           1      2.5 mg           2      2.5 mg         3            4               5               6               7               8                 9               10               11               12               13               14               15                 16               17               18               19               20               21               22                 23               24               25               26               27               28               29           Pt is being discharged    Problem: Adult Inpatient Plan of Care  Goal: Plan of Care Review  Outcome: Met  Goal: Patient-Specific Goal (Individualization)  Outcome: Met  Goal: Absence of Hospital-Acquired Illness or Injury  Outcome: Met  Goal: Optimal Comfort and Wellbeing  Outcome: Met  Goal: Readiness for Transition of Care  Outcome: Met  Goal: Rounds/Family Conference  Outcome: Met     Problem: Fall Injury Risk  Goal: Absence of Fall and Fall-Related Injury  Outcome: Met             30               31                     Date Details   No additional details    Date of next INR:  12/3/2018         How to take your warfarin dose     To take:  2.5 mg Take 1 of the 2.5 mg tablets.

## 2020-09-29 NOTE — ASSESSMENT & PLAN NOTE
Serial Cardiac enzymes  Cardio MD consult  2D ECHO  NPO from Midnight  Patient may need stress test

## 2020-09-29 NOTE — HOSPITAL COURSE
"65 year old patient getting admitted with chest pain and palpitations  Per Patient he is suffering from on and off palpitations for past 1 week  His PCP arranged a holter monitor and pt was supposed to return to PCP office today  He felt severe pain on L side with associated pain/numbness on LUE  Pt denies any diaphoresis/Nausea  Pt Never saw any Cardio MD before and has got an appt with Cardio Md within 3 weeks as an OP Pt denies any other c/o or issues    09/29 Assumed care. Chart reviewed. No further CP nor palpitations since admission. Feels "Good" and would like to discharged with outpatient follow-up. Dicussed with Cardiology: make close follow-up appointment; start Bystolic 2.5 mg q day.  Lungs: clear  Heart: S1S2  Abdo soft  "

## 2020-09-29 NOTE — HPI
65 year old patient getting admitted with chest pain and palpitations  Per Patient he is suffering from on and off palpitations for past 1 week  His PCP arranged a holter monitor and pt was supposed to return to PCP office today  He felt severe pain on L side with associated pain/numbness on LUE  Pt denies any diaphoresis/Nausea  Pt Never saw any Cardio MD before and has got an appt with Cardio Md within 3 weeks as an OP  Pt denies any other c/o or issues

## 2020-09-29 NOTE — H&P
Critical access hospital Medicine  History & Physical    Patient Name: Colt Rose  MRN: [id number]  Admission Date: 9/28/2020  Attending Physician: Nile Whitehead MD   Primary Care Provider: Jeffery Mena MD         Patient information was obtained from patient and ER records.     Subjective:     Principal Problem:Chest pain    Chief Complaint:   Chief Complaint   Patient presents with    Chest Pain     started this AM        HPI: 65 year old patient getting admitted with chest pain and palpitations  Per Patient he is suffering from on and off palpitations for past 1 week  His PCP arranged a holter monitor and pt was supposed to return to PCP office today  He felt severe pain on L side with associated pain/numbness on LUE  Pt denies any diaphoresis/Nausea  Pt Never saw any Cardio MD before and has got an appt with Cardio Md within 3 weeks as an OP Pt denies any other c/o or issues      Past Medical History:   Diagnosis Date    Arthritis of hand 8/6/2015    Cervical disc displacement     Degeneration of lumbar intervertebral disc     GERD (gastroesophageal reflux disease)     Hip pain 2/22/2016    Hyperlipidemia     Shingles 11/2013       Past Surgical History:   Procedure Laterality Date    CERVICAL FUSION      bone graft    EPIDURAL STEROID INJECTION INTO LUMBAR SPINE N/A 12/5/2019    Procedure: Injection-steroid-epidural-lumbar;  Surgeon: Adarsh Kraft MD;  Location: [facility] OR;  Service: Pain Management;  Laterality: N/A;  L5-S1    TONSILLECTOMY, ADENOIDECTOMY         Review of patient's allergies indicates:   Allergen Reactions    Pennsaid [diclofenac sodium] Rash and Blisters       No current facility-administered medications on file prior to encounter.      Current Outpatient Medications on File Prior to Encounter   Medication Sig    atorvastatin (LIPITOR) 10 MG tablet Take 1 tablet (10 mg total) by mouth once daily.    docosahexaenoic acid/epa (FISH OIL ORAL) Take 1 capsule by mouth once  daily.    fluocinolone acetonide oil 0.01 % Drop Place 5 drops into both eyes 2 (two) times daily as needed.     meloxicam (MOBIC) 15 MG tablet Take 1 tablet (15 mg total) by mouth daily as needed for Pain.    methocarbamoL (ROBAXIN) 500 MG Tab Take 1 tablet (500 mg total) by mouth 3 (three) times daily as needed.    multivitamin (ONE DAILY MULTIVITAMIN) per tablet Take 1 tablet by mouth once daily.    neomycin-fluocinolone (MANUEL-SYNALAR) 0.5 % (0.35 % base)-0.025 % Crea Apply 1 application topically 2 (two) times daily.    omeprazole (PRILOSEC) 40 MG capsule Take 1 capsule (40 mg total) by mouth once daily.    ubidecarenone (CO Q-10 ORAL) Take 1 capsule by mouth once daily.    pneumococcal 23-monster ps (PNEUMOVAX-23) 25 mcg/0.5 mL vaccine Inject into the muscle.     Family History     Problem Relation (Age of Onset)    Coronary artery disease Mother    Diabetes Mother    Heart disease Brother    Hypertension Mother    Stroke Mother, Father        Tobacco Use    Smoking status: Never Smoker    Smokeless tobacco: Never Used   Substance and Sexual Activity    Alcohol use: Yes     Alcohol/week: 6.0 standard drinks     Types: 6 Cans of beer per week     Comment: weekly    Drug use: No    Sexual activity: Yes     Partners: Female     Review of Systems   Constitutional: Negative for activity change and appetite change.   HENT: Negative for congestion and dental problem.    Eyes: Negative for discharge and itching.   Respiratory: Negative for shortness of breath.    Cardiovascular: Positive for chest pain and palpitations.   Gastrointestinal: Negative for abdominal distention and abdominal pain.   Endocrine: Negative for cold intolerance.   Genitourinary: Negative for difficulty urinating and dysuria.   Musculoskeletal: Negative for arthralgias and back pain.   Skin: Negative for color change.   Neurological: Negative for dizziness and facial asymmetry.   Hematological: Negative for adenopathy.    Psychiatric/Behavioral: Negative for agitation and behavioral problems.     Objective:     Vital Signs (Most Recent):  Temp: 98.1 °F (36.7 °C) (09/28/20 1943)  Pulse: (!) 49 (09/28/20 1943)  Resp: 17 (09/28/20 1943)  BP: 120/66 (09/28/20 1943)  SpO2: 99 % (09/28/20 1943) Vital Signs (24h Range):  Temp:  [97.6 °F (36.4 °C)-98.1 °F (36.7 °C)] 98.1 °F (36.7 °C)  Pulse:  [46-71] 49  Resp:  [13-20] 17  SpO2:  [97 %-99 %] 99 %  BP: (120-169)/(66-83) 120/66     Weight: 85.8 kg (189 lb 3.2 oz)  Body mass index is 26.39 kg/m².    Physical Exam  Vitals signs and nursing note reviewed.   Constitutional:       Appearance: He is well-developed.   HENT:      Head: Normocephalic and atraumatic.      Right Ear: External ear normal.      Left Ear: External ear normal.      Nose: Nose normal.      Mouth/Throat:      Mouth: Mucous membranes are moist.   Eyes:      Conjunctiva/sclera: Conjunctivae normal.   Neck:      Musculoskeletal: Full passive range of motion without pain and normal range of motion.   Cardiovascular:      Rate and Rhythm: Normal rate.   Pulmonary:      Effort: Pulmonary effort is normal.   Abdominal:      General: There is no distension.   Musculoskeletal: Normal range of motion.   Skin:     General: Skin is warm.   Neurological:      Mental Status: He is alert and oriented to person, place, and time.   Psychiatric:         Behavior: Behavior normal.             Significant Labs:   CBC:   Recent Labs   Lab 09/28/20  1113   WBC 3.78*   HGB 14.6   HCT 42.9        CMP:   Recent Labs   Lab 09/28/20  1113      K 4.4      CO2 26   *   BUN 16   CREATININE 0.8   CALCIUM 9.1   PROT 7.0   ALBUMIN 4.2   BILITOT 0.8   ALKPHOS 45*   AST 27   ALT 31   ANIONGAP 9   EGFRNONAA >60.0       Significant Imaging: I have reviewed all pertinent imaging results/findings within the past 24 hours.    Assessment/Plan:     * Chest pain  Serial Cardiac enzymes  Cardio MD consult  2D ECHO  NPO from Midnight  Patient  may need stress test      Palpitations  HR on Normal range with relative bradycardia      GERD (gastroesophageal reflux disease)  H/o GERD  On PPI  Pt Takes NSAIDs PRN basis      VTE Risk Mitigation (From admission, onward)         Ordered     enoxaparin injection 40 mg  Every 24 hours (non-standard times)      09/28/20 1304     IP VTE HIGH RISK PATIENT  Once      09/28/20 1301     Place sequential compression device  Until discontinued      09/28/20 1301                   Nile Whitehead MD  Department of Hospital Medicine   [facility]

## 2020-09-29 NOTE — PLAN OF CARE
09/29/20 1624   Final Note   Assessment Type Final Discharge Note   Anticipated Discharge Disposition Home   Post-Acute Status   Post-Acute Authorization Other   Part D Coverage [employer]   Other Status No Post-Acute Service Needs   Discharge Delays None known at this time

## 2020-09-30 LAB
OHS CV EVENT MONITOR DAY: 0
OHS CV HOLTER LENGTH DECIMAL HOURS: 24
OHS CV HOLTER LENGTH HOURS: 24
OHS CV HOLTER LENGTH MINUTES: 0

## 2020-10-02 ENCOUNTER — PATIENT MESSAGE (OUTPATIENT)
Dept: FAMILY MEDICINE | Facility: CLINIC | Age: 65
End: 2020-10-02

## 2020-10-02 DIAGNOSIS — R00.2 PALPITATIONS: Primary | ICD-10-CM

## 2020-10-02 DIAGNOSIS — I49.3 PVC (PREMATURE VENTRICULAR CONTRACTION): ICD-10-CM

## 2020-10-02 DIAGNOSIS — I49.1 PAC (PREMATURE ATRIAL CONTRACTION): ICD-10-CM

## 2020-10-02 RX ORDER — METOPROLOL SUCCINATE 25 MG/1
25 TABLET, EXTENDED RELEASE ORAL DAILY
Qty: 30 TABLET | Refills: 2 | Status: SHIPPED | OUTPATIENT
Start: 2020-10-02 | End: 2021-01-05

## 2020-10-08 LAB
CV STRESS BASE HR: 66 BPM
DIASTOLIC BLOOD PRESSURE: 78 MMHG
OHS CV CPX 1 MINUTE RECOVERY HEART RATE: 71 BPM
OHS CV CPX 85 PERCENT MAX PREDICTED HEART RATE MALE: 133
OHS CV CPX ESTIMATED METS: 10.3
OHS CV CPX MAX PREDICTED HEART RATE: 157
OHS CV CPX PATIENT IS FEMALE: 0
OHS CV CPX PATIENT IS MALE: 1
OHS CV CPX PEAK DIASTOLIC BLOOD PRESSURE: 72 MMHG
OHS CV CPX PEAK HEAR RATE: 103 BPM
OHS CV CPX PEAK RATE PRESSURE PRODUCT: NORMAL
OHS CV CPX PEAK SYSTOLIC BLOOD PRESSURE: 206 MMHG
OHS CV CPX PERCENT MAX PREDICTED HEART RATE ACHIEVED: 66
OHS CV CPX RATE PRESSURE PRODUCT PRESENTING: 7722
STRESS ECHO POST EXERCISE DUR MIN: 8 MINUTES
SYSTOLIC BLOOD PRESSURE: 117 MMHG

## 2020-10-14 ENCOUNTER — OFFICE VISIT (OUTPATIENT)
Dept: GASTROENTEROLOGY | Facility: CLINIC | Age: 65
End: 2020-10-14
Payer: COMMERCIAL

## 2020-10-14 VITALS
HEIGHT: 71 IN | WEIGHT: 190.94 LBS | SYSTOLIC BLOOD PRESSURE: 111 MMHG | HEART RATE: 40 BPM | DIASTOLIC BLOOD PRESSURE: 65 MMHG | BODY MASS INDEX: 26.73 KG/M2

## 2020-10-14 DIAGNOSIS — F41.9 ANXIETY: ICD-10-CM

## 2020-10-14 DIAGNOSIS — K21.9 GASTROESOPHAGEAL REFLUX DISEASE WITHOUT ESOPHAGITIS: Primary | ICD-10-CM

## 2020-10-14 DIAGNOSIS — R10.13 EPIGASTRIC PAIN: ICD-10-CM

## 2020-10-14 PROCEDURE — 99214 PR OFFICE/OUTPT VISIT, EST, LEVL IV, 30-39 MIN: ICD-10-PCS | Mod: S$GLB,,, | Performed by: INTERNAL MEDICINE

## 2020-10-14 PROCEDURE — 99999 PR PBB SHADOW E&M-EST. PATIENT-LVL III: CPT | Mod: PBBFAC,,, | Performed by: INTERNAL MEDICINE

## 2020-10-14 PROCEDURE — 99214 OFFICE O/P EST MOD 30 MIN: CPT | Mod: S$GLB,,, | Performed by: INTERNAL MEDICINE

## 2020-10-14 PROCEDURE — 99999 PR PBB SHADOW E&M-EST. PATIENT-LVL III: ICD-10-PCS | Mod: PBBFAC,,, | Performed by: INTERNAL MEDICINE

## 2020-10-14 RX ORDER — NEBIVOLOL 10 MG/1
10 TABLET ORAL DAILY
COMMUNITY
End: 2021-01-05 | Stop reason: ALTCHOICE

## 2020-10-14 RX ORDER — FLUOROURACIL 50 MG/G
CREAM TOPICAL
COMMUNITY
Start: 2020-09-22 | End: 2023-06-14

## 2020-10-14 NOTE — H&P (VIEW-ONLY)
"Subjective:       Patient ID: Colt Rose is a 65 y.o. male.    This is an established patient.      Chief Complaint: Gastroesophageal Reflux    Gastroesophageal Reflux  He complains of abdominal pain. He reports no chest pain, no coughing, no nausea or no wheezing. This is a new problem. The current episode started more than 1 month ago. The problem occurs frequently. The problem has been gradually worsening. The symptoms are aggravated by stress. Pertinent negatives include no anemia, fatigue, melena or weight loss. Risk factors include hiatal hernia. He has tried a PPI for the symptoms. The treatment provided mild relief. Past procedures include an EGD. Last EGD 3 years ago.  No recent ultrasound.   He feels that his symptoms may be due to anxiety/stress as of late.    Review of Systems   Constitutional: Negative for chills, fatigue, fever and weight loss.   HENT: Negative for trouble swallowing.    Respiratory: Negative for cough, shortness of breath and wheezing.    Cardiovascular: Negative for chest pain and palpitations.   Gastrointestinal: Positive for abdominal pain and reflux. Negative for constipation, diarrhea, melena, nausea and vomiting.   Musculoskeletal: Negative for arthralgias and myalgias.   Integumentary:  Negative for color change and rash.   Neurological: Negative for dizziness, weakness and numbness.   Psychiatric/Behavioral: Negative for confusion. The patient is not nervous/anxious.    All other systems reviewed and are negative.        Objective:       Vitals:    10/14/20 1458   BP: 111/65   Pulse: (!) 40   Weight: 86.6 kg (190 lb 14.7 oz)   Height: 5' 11" (1.803 m)       Physical Exam  Constitutional:       Appearance: He is well-developed.   HENT:      Head: Normocephalic and atraumatic.   Eyes:      General: No scleral icterus.     Pupils: Pupils are equal, round, and reactive to light.   Neck:      Musculoskeletal: Normal range of motion and neck supple.      Thyroid: No " thyromegaly.   Cardiovascular:      Rate and Rhythm: Normal rate and regular rhythm.      Heart sounds: No murmur.   Pulmonary:      Effort: Pulmonary effort is normal.      Breath sounds: Normal breath sounds. No wheezing.   Abdominal:      General: Bowel sounds are normal. There is no distension.      Palpations: Abdomen is soft.      Tenderness: There is no abdominal tenderness.   Lymphadenopathy:      Cervical: No cervical adenopathy.   Skin:     General: Skin is warm and dry.      Findings: No erythema or rash.   Neurological:      Mental Status: He is alert and oriented to person, place, and time.   Psychiatric:         Behavior: Behavior normal.           Lab Results   Component Value Date    WBC 5.81 09/29/2020    HGB 14.7 09/29/2020    HCT 42.7 09/29/2020    MCV 91 09/29/2020     09/29/2020         CMP  Sodium   Date Value Ref Range Status   09/29/2020 139 136 - 145 mmol/L Final     Potassium   Date Value Ref Range Status   09/29/2020 4.1 3.5 - 5.1 mmol/L Final     Chloride   Date Value Ref Range Status   09/29/2020 104 95 - 110 mmol/L Final     CO2   Date Value Ref Range Status   09/29/2020 27 23 - 29 mmol/L Final     Glucose   Date Value Ref Range Status   09/29/2020 106 70 - 110 mg/dL Final     BUN, Bld   Date Value Ref Range Status   09/29/2020 14 8 - 23 mg/dL Final     Creatinine   Date Value Ref Range Status   09/29/2020 0.7 0.5 - 1.4 mg/dL Final     Calcium   Date Value Ref Range Status   09/29/2020 9.0 8.7 - 10.5 mg/dL Final     Total Protein   Date Value Ref Range Status   09/29/2020 6.8 6.0 - 8.4 g/dL Final     Albumin   Date Value Ref Range Status   09/29/2020 3.9 3.5 - 5.2 g/dL Final     Total Bilirubin   Date Value Ref Range Status   09/29/2020 1.1 (H) 0.1 - 1.0 mg/dL Final     Comment:     For infants and newborns, interpretation of results should be based  on gestational age, weight and in agreement with clinical  observations.  Premature Infant recommended reference ranges:  Up to 24  hours.............<8.0 mg/dL  Up to 48 hours............<12.0 mg/dL  3-5 days..................<15.0 mg/dL  6-29 days.................<15.0 mg/dL       Alkaline Phosphatase   Date Value Ref Range Status   09/29/2020 42 (L) 55 - 135 U/L Final     AST   Date Value Ref Range Status   09/29/2020 25 10 - 40 U/L Final     ALT   Date Value Ref Range Status   09/29/2020 26 10 - 44 U/L Final     Anion Gap   Date Value Ref Range Status   09/29/2020 8 8 - 16 mmol/L Final     eGFR if    Date Value Ref Range Status   09/29/2020 >60.0 >60 mL/min/1.73 m^2 Final     eGFR if non    Date Value Ref Range Status   09/29/2020 >60.0 >60 mL/min/1.73 m^2 Final     Comment:     Calculation used to obtain the estimated glomerular filtration  rate (eGFR) is the CKD-EPI equation.          Cardiology studies reviewed and showed no significant abnormality.      Assessment:       1. Gastroesophageal reflux disease without esophagitis    2. Epigastric pain    3. Anxiety        Plan:       1.  Antireflux precautions including avoidance of late night eating and lying down soon after eating.     2.  EGD to further evaluate  3.  Ultrasound to evaluate GB  4.  Further recommendations to follow after above.

## 2020-10-15 DIAGNOSIS — R10.13 EPIGASTRIC PAIN: Primary | ICD-10-CM

## 2020-10-15 DIAGNOSIS — Z01.818 PRE-OP TESTING: ICD-10-CM

## 2020-10-16 ENCOUNTER — HOSPITAL ENCOUNTER (OUTPATIENT)
Dept: RADIOLOGY | Facility: HOSPITAL | Age: 65
Discharge: HOME | End: 2020-10-16
Attending: INTERNAL MEDICINE
Payer: COMMERCIAL

## 2020-10-16 DIAGNOSIS — K21.9 GASTROESOPHAGEAL REFLUX DISEASE WITHOUT ESOPHAGITIS: ICD-10-CM

## 2020-10-16 DIAGNOSIS — F41.9 ANXIETY: ICD-10-CM

## 2020-10-16 DIAGNOSIS — R10.13 EPIGASTRIC PAIN: ICD-10-CM

## 2020-10-16 PROCEDURE — 76700 US ABDOMEN COMPLETE: ICD-10-PCS | Mod: 26,,, | Performed by: RADIOLOGY

## 2020-10-16 PROCEDURE — 76700 US EXAM ABDOM COMPLETE: CPT | Mod: TC

## 2020-10-16 PROCEDURE — 76700 US EXAM ABDOM COMPLETE: CPT | Mod: 26,,, | Performed by: RADIOLOGY

## 2020-10-16 RX ORDER — ATORVASTATIN CALCIUM 10 MG/1
10 TABLET, FILM COATED ORAL DAILY
Qty: 90 TABLET | Refills: 3 | Status: SHIPPED | OUTPATIENT
Start: 2020-10-16 | End: 2020-10-19

## 2020-10-16 NOTE — TELEPHONE ENCOUNTER
Prescription refill request.   LOV: 09/23/2020  NOV: 05/19/2021  LDR: 05/14/2020  Last Labs: 09/29/2020

## 2020-10-16 NOTE — TELEPHONE ENCOUNTER
----- Message from Shireen Montano sent at 10/16/2020 10:20 AM CDT -----  Regarding: Med Refill  Contact: pt  Type:  RX Refill Request    Who Called:  Patient   Refill or New Rx:  refill   RX Name and Strength:  atorvastatin (LIPITOR) 10 MG tablet  How is the patient currently taking it? (ex. 1XDay):  once a day  Is this a 30 day or 90 day RX:  90  Preferred [facility] with phone number:  [facility]   Local or Mail Order:  mail  Ordering Provider:    Best Call Back Number:  [phone]  Additional Information:  Patient is asking if you will [facility] this will be   The new pharmacy for all of his medications.

## 2020-10-20 ENCOUNTER — OFFICE VISIT (OUTPATIENT)
Dept: CARDIOLOGY | Facility: CLINIC | Age: 65
End: 2020-10-20
Payer: COMMERCIAL

## 2020-10-20 VITALS
WEIGHT: 191.81 LBS | SYSTOLIC BLOOD PRESSURE: 124 MMHG | BODY MASS INDEX: 26.85 KG/M2 | OXYGEN SATURATION: 98 % | DIASTOLIC BLOOD PRESSURE: 76 MMHG | RESPIRATION RATE: 20 BRPM | HEART RATE: 46 BPM | HEIGHT: 71 IN

## 2020-10-20 DIAGNOSIS — E78.2 MIXED HYPERLIPIDEMIA: ICD-10-CM

## 2020-10-20 DIAGNOSIS — K21.9 GASTROESOPHAGEAL REFLUX DISEASE, UNSPECIFIED WHETHER ESOPHAGITIS PRESENT: ICD-10-CM

## 2020-10-20 DIAGNOSIS — R00.1 BRADYCARDIA: ICD-10-CM

## 2020-10-20 DIAGNOSIS — R07.9 CHEST PAIN, UNSPECIFIED TYPE: Primary | ICD-10-CM

## 2020-10-20 DIAGNOSIS — R00.2 PALPITATIONS: ICD-10-CM

## 2020-10-20 DIAGNOSIS — M50.20 CERVICAL DISC DISPLACEMENT: ICD-10-CM

## 2020-10-20 DIAGNOSIS — I49.1 PREMATURE ATRIAL CONTRACTIONS: ICD-10-CM

## 2020-10-20 DIAGNOSIS — M47.26 OSTEOARTHRITIS OF SPINE WITH RADICULOPATHY, LUMBAR REGION: ICD-10-CM

## 2020-10-20 PROCEDURE — 93000 ELECTROCARDIOGRAM COMPLETE: CPT | Mod: S$GLB,,, | Performed by: SPECIALIST

## 2020-10-20 PROCEDURE — 99205 PR OFFICE/OUTPT VISIT, NEW, LEVL V, 60-74 MIN: ICD-10-PCS | Mod: S$GLB,,, | Performed by: INTERNAL MEDICINE

## 2020-10-20 PROCEDURE — 99205 OFFICE O/P NEW HI 60 MIN: CPT | Mod: S$GLB,,, | Performed by: INTERNAL MEDICINE

## 2020-10-20 PROCEDURE — 93000 EKG 12-LEAD: ICD-10-PCS | Mod: S$GLB,,, | Performed by: SPECIALIST

## 2020-10-20 NOTE — PROGRESS NOTES
Subjective:     Patient ID:  Colt Rose is a 65 y.o. male who presents with:    Chief Complaint:   Chief Complaint   Patient presents with    Hyperlipidemia    Palpitations    Shortness of Breath    Chest Pain       HPI:  Mr Rose is here for initial consultation.  Patient was admitted to the hospital in the last week of September and was evaluated for chest pain.  At that time patient had a sensation of the left side of the chest going to the left arm felt numb he also felt that his heart was racing at that time.  And had a fluttering sensation going on and patient was admitted essentially underwent cardiac testing including stress test and echocardiogram and was sent home on medication.  Patient has been feeling relatively better than he did before.  And he recently started taking his medication metoprolol succinate 25 mg p.o. q.day  Patient sometimes feels like his heart is racing but when he checks his pulse it is not his heart is back and normal speed and rate time.  He also has some back aches and neck aches.  For which he takes a medication for the pain.  And he is also being evaluated for his stomach for reflux.  At the present time patient is comfortable not any acute distress denies any chest pain or tightness or heaviness.      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    Cervical disc displacement     Degeneration of lumbar intervertebral disc     GERD (gastroesophageal reflux disease)     Hip pain 2/22/2016    Hyperlipidemia     Shingles 11/2013     Past Surgical History:   Procedure Laterality Date    CERVICAL FUSION      bone graft    EPIDURAL STEROID INJECTION INTO LUMBAR SPINE N/A 12/5/2019    Procedure: Injection-steroid-epidural-lumbar;  Surgeon: Adarsh Kraft MD;  Location: [facility];  Service: Pain Management;  Laterality: N/A;  L5-S1     TONSILLECTOMY, ADENOIDECTOMY       Social History     Tobacco Use    Smoking status: Never Smoker    Smokeless tobacco: Never Used   Substance Use Topics    Alcohol use: Yes     Alcohol/week: 6.0 standard drinks     Types: 6 Cans of beer per week     Comment: weekly    Drug use: No      Family History      Relation Status Problems (Age of Onset) - (Comment)   Brother Alive(4);  at age 45 (AIDS) Heart disease - (CAD)   Father Alive Stroke   Mother  at age 89 (Heart Failure) Coronary artery disease; Diabetes; Hypertension; Stroke   Sister Alive(2)             REVIEW OF SYSTEMS  CONSTITUTIONAL: Negative for chills, fatigue and fever.   EYES: No double vision, No blurred vision  NEURO: No headaches, No dizziness  RESPIRATORY: Negative for cough, shortness of breath and wheezing.    CARDIOVASCULAR: Negative for chest pain. Negative for palpitations and leg swelling.   GI: Negative for abdominal pain, No melena, diarrhea, nausea and vomiting.   : Negative for dysuria and frequency, Negative for hematuria  SKIN: Negative for bruising, Negative for edema or discoloration noted.   ENDOCRINE: Negative for polyphagia, Negative for heat intolerance, Negative for cold intolerance  PSYCHIATRIC: Negative for depression, Negative for anxiety, Negative for memory loss  MUSCULOSKELETAL: Negative for neck pain, Negative for muscle weakness, Negative for back pain          Objective:        Vitals:    10/20/20 1646   BP: 124/76   Pulse: (!) 46   Resp: 20       Lab Results   Component Value Date    WBC 5.81 2020    HGB 14.7 2020    HCT 42.7 2020     2020    CHOL 216 (H) 2020    TRIG 93 2020    HDL 59 2020    ALT 26 2020    AST 25 2020     2020    K 4.1 2020     2020    CREATININE 0.7 2020    BUN 14 2020    CO2 27 2020    TSH 2.370 2020    PSA 0.46 2020    INR 1.1 2020    HGBA1C 5.6  09/23/2020        ECHOCARDIOGRAM RESULTS  Results for orders placed during the hospital encounter of 09/28/20   Echo Color Flow Doppler? Yes    Narrative · The estimated PA systolic pressure is 34 mmHg.  · There is moderate left ventricular concentric hypertrophy.  · The left ventricle is normal in size with normal systolic function. The   estimated ejection fraction is 78%.  · Normal left ventricular diastolic function.  · Normal right ventricular systolic function.  · There is mild pulmonary hypertension.  · Mild to moderate tricuspid regurgitation.  · Mild pulmonic regurgitation.  · Normal central venous pressure (3 mmHg).            CURRENT/PREVIOUS VISIT EKG  Results for orders placed or performed during the hospital encounter of 09/28/20   EKG 12-lead    Collection Time: 09/28/20 10:27 AM    Narrative    Test Reason : R07.9,    Vent. Rate : 060 BPM     Atrial Rate : 060 BPM     P-R Int : 160 ms          QRS Dur : 096 ms      QT Int : 396 ms       P-R-T Axes : 050 -06 028 degrees     QTc Int : 396 ms    Sinus rhythm with Premature atrial complexes  Otherwise normal ECG  When compared with ECG of 23-SEP-2020 15:07,  No significant change was found  Confirmed by Cisco HA, Ron RIVAS ([id number]) on 9/29/2020 10:54:03 PM    Referred By: [email or web address]   SELF           Confirmed By:Ron Peck MD     No procedure found.   Results for orders placed during the hospital encounter of 09/28/20   Treadmill Stress Test    Narrative   The EKG portion of this study is negative for ischemia.    The patient reported no chest pain during the stress test.           PHYSICAL EXAM  GENERAL: well built, well nourished, well-developed in no apparent distress alert and oriented.   HEENT: Normocephalic. Pupils normal and conjunctivae normal.  Mucous membranes normal, no cyanosis or icterus, trachea central,no pallor or icterus is noted..   NECK: No JVD. No bruit..   THYROID: Thyroid not enlarged. No nodules present..   CARDIAC: Regular  rate and rhythm. S1 is normal.S2 is normal.No gallops, clicks or murmurs noted at this time.  CHEST ANATOMY: normal.   LUNGS: Clear to auscultation. No wheezing or rhonchi..   ABDOMEN: Soft no masses or organomegaly.  No abdomen pulsations or bruits.  Normal bowel sounds. No pulsations and no masses felt, No guarding or rebound.   URINARY: No castro catheter   EXTREMITIES: No cyanosis, clubbing or edema noted at this time., no calf tenderness bilaterally.   PERIPHERAL VASCULAR SYSTEM: Good palpable distal pulses.   CENTRAL NERVOUS SYSTEM: No focal motor or sensory deficits noted.   SKIN: Skin without lesions, moist, well perfused.   MUSCLE STRENGTH & TONE: No noteable weakness, atrophy or abnormal movement.       Medication List with Changes/Refills   Current Medications    ATORVASTATIN (LIPITOR) 10 MG TABLET    TAKE 1 TABLET(10 MG) BY MOUTH EVERY DAY    DOCOSAHEXAENOIC ACID/EPA (FISH OIL ORAL)    Take 1 capsule by mouth once daily.    FLUOCINOLONE ACETONIDE OIL 0.01 % DROP    Place 5 drops into both eyes 2 (two) times daily as needed.     FLUOROURACIL (EFUDEX) 5 % CREAM        MELOXICAM (MOBIC) 15 MG TABLET    Take 1 tablet (15 mg total) by mouth daily as needed for Pain.    METHOCARBAMOL (ROBAXIN) 500 MG TAB    Take 1 tablet (500 mg total) by mouth 3 (three) times daily as needed.    METOPROLOL SUCCINATE (TOPROL-XL) 25 MG 24 HR TABLET    Take 1 tablet (25 mg total) by mouth once daily.    MULTIVITAMIN (ONE DAILY MULTIVITAMIN) PER TABLET    Take 1 tablet by mouth once daily.    NEBIVOLOL (BYSTOLIC) 10 MG TAB    Take 10 mg by mouth once daily.    NEOMYCIN-FLUOCINOLONE (MANUEL-SYNALAR) 0.5 % (0.35 % BASE)-0.025 % CREA    Apply 1 application topically 2 (two) times daily.    OMEPRAZOLE (PRILOSEC) 40 MG CAPSULE    Take 1 capsule (40 mg total) by mouth once daily.    PNEUMOCOCCAL 23-CASH PS (PNEUMOVAX-23) 25 MCG/0.5 ML VACCINE    Inject into the muscle.    UBIDECARENONE (CO Q-10 ORAL)    Take 1 capsule by mouth once daily.        The EKG portion of this study is negative for ischemia.    The patient reported no chest pain during the stress test.        Assessment:       1. Chest pain, unspecified type    2. Palpitations    3. Mixed hyperlipidemia    4. Cervical disc displacement    5. Osteoarthritis of spine with radiculopathy, lumbar region    6. Gastroesophageal reflux disease, unspecified whether esophagitis present    7. Bradycardia    8. Premature atrial contractions         Plan:   1.  Patient had been essentially ruled out at the hospital for any myocardial infarction.  He underwent exercise stress test and he had no chest pain during the stress test.  And there were no diagnostic EKG changes suggest any ischemia.  2.  He had an echocardiogram done which was essentially showed normal LV function with ejection fraction of 78%.  And normal RV function.  And normal diastolic function.  3.  Patient is bradycardic with frequent premature atrial complexes will cut down on metoprolol succinate to 12.5 mg p.o. q.day and see how he does.  4..  If patient should have more frequent PACs he will need for further workup if he has further fluttering in the chest..  5.  Patient to take m patient to eat a banana or drink some orange juice on regular basis.  magnesium 100 mg p.o. q.day at night and is going to help him with frequent PACs.    6.  Continue current management and I will see him back in the office in 4-6 months.    Problem List Items Addressed This Visit        Neuro    Cervical disc displacement    Osteoarthritis of spine with radiculopathy, lumbar region       Cardiac/Vascular    Hyperlipidemia    Palpitations    Premature atrial contractions    Bradycardia       GI    Gastroesophageal reflux disease       Other    Chest pain - Primary    Relevant Orders    IN OFFICE EKG 12-LEAD (to Muse)          Follow up in about 6 months (around 4/20/2021).

## 2020-10-24 ENCOUNTER — LAB VISIT (OUTPATIENT)
Dept: PRIMARY CARE CLINIC | Facility: CLINIC | Age: 65
End: 2020-10-24
Payer: COMMERCIAL

## 2020-10-24 DIAGNOSIS — Z01.818 PRE-OP TESTING: ICD-10-CM

## 2020-10-24 PROCEDURE — U0003 INFECTIOUS AGENT DETECTION BY NUCLEIC ACID (DNA OR RNA); SEVERE ACUTE RESPIRATORY SYNDROME CORONAVIRUS 2 (SARS-COV-2) (CORONAVIRUS DISEASE [COVID-19]), AMPLIFIED PROBE TECHNIQUE, MAKING USE OF HIGH THROUGHPUT TECHNOLOGIES AS DESCRIBED BY CMS-2020-01-R: HCPCS

## 2020-10-25 LAB — SARS-COV-2 RNA RESP QL NAA+PROBE: NOT DETECTED

## 2020-10-27 ENCOUNTER — HOSPITAL ENCOUNTER (OUTPATIENT)
Facility: HOSPITAL | Age: 65
Discharge: HOME | End: 2020-10-27
Attending: INTERNAL MEDICINE | Admitting: INTERNAL MEDICINE
Payer: COMMERCIAL

## 2020-10-27 ENCOUNTER — ANESTHESIA (OUTPATIENT)
Dept: ENDOSCOPY | Facility: HOSPITAL | Age: 65
End: 2020-10-27
Payer: COMMERCIAL

## 2020-10-27 ENCOUNTER — ANESTHESIA EVENT (OUTPATIENT)
Dept: ENDOSCOPY | Facility: HOSPITAL | Age: 65
End: 2020-10-27
Payer: COMMERCIAL

## 2020-10-27 DIAGNOSIS — K44.9 HIATAL HERNIA: ICD-10-CM

## 2020-10-27 DIAGNOSIS — K20.90 ESOPHAGITIS: ICD-10-CM

## 2020-10-27 DIAGNOSIS — K31.7 GASTRIC POLYPS: Primary | ICD-10-CM

## 2020-10-27 DIAGNOSIS — K29.70 GASTRITIS, PRESENCE OF BLEEDING UNSPECIFIED, UNSPECIFIED CHRONICITY, UNSPECIFIED GASTRITIS TYPE: ICD-10-CM

## 2020-10-27 DIAGNOSIS — R10.13 EPIGASTRIC PAIN: ICD-10-CM

## 2020-10-27 PROCEDURE — 43251 EGD REMOVE LESION SNARE: CPT | Performed by: INTERNAL MEDICINE

## 2020-10-27 PROCEDURE — D9220A PRA ANESTHESIA: Mod: CRNA,,, | Performed by: NURSE ANESTHETIST, CERTIFIED REGISTERED

## 2020-10-27 PROCEDURE — D9220A PRA ANESTHESIA: Mod: ANES,,, | Performed by: ANESTHESIOLOGY

## 2020-10-27 PROCEDURE — 25000003 PHARM REV CODE 250: Performed by: NURSE ANESTHETIST, CERTIFIED REGISTERED

## 2020-10-27 PROCEDURE — 27201012 HC FORCEPS, HOT/COLD, DISP: Performed by: INTERNAL MEDICINE

## 2020-10-27 PROCEDURE — 25000003 PHARM REV CODE 250: Performed by: INTERNAL MEDICINE

## 2020-10-27 PROCEDURE — 37000009 HC ANESTHESIA EA ADD 15 MINS: Performed by: INTERNAL MEDICINE

## 2020-10-27 PROCEDURE — 37000008 HC ANESTHESIA 1ST 15 MINUTES: Performed by: INTERNAL MEDICINE

## 2020-10-27 PROCEDURE — D9220A PRA ANESTHESIA: ICD-10-PCS | Mod: CRNA,,, | Performed by: NURSE ANESTHETIST, CERTIFIED REGISTERED

## 2020-10-27 PROCEDURE — 43239 PR EGD, FLEX, W/BIOPSY, SGL/MULTI: ICD-10-PCS | Mod: 59,,, | Performed by: INTERNAL MEDICINE

## 2020-10-27 PROCEDURE — 43239 EGD BIOPSY SINGLE/MULTIPLE: CPT | Performed by: INTERNAL MEDICINE

## 2020-10-27 PROCEDURE — 27201042 HC RETRIEVAL NET: Performed by: INTERNAL MEDICINE

## 2020-10-27 PROCEDURE — D9220A PRA ANESTHESIA: ICD-10-PCS | Mod: ANES,,, | Performed by: ANESTHESIOLOGY

## 2020-10-27 PROCEDURE — 43251 PR EGD, FLEX, W/REMOVAL, TUMOR/POLYP/LESION(S), SNARE: ICD-10-PCS | Mod: ,,, | Performed by: INTERNAL MEDICINE

## 2020-10-27 PROCEDURE — 88305 TISSUE EXAM BY PATHOLOGIST: CPT | Mod: 59 | Performed by: PATHOLOGY

## 2020-10-27 PROCEDURE — 88305 TISSUE EXAM BY PATHOLOGIST: ICD-10-PCS | Mod: 26,,, | Performed by: PATHOLOGY

## 2020-10-27 PROCEDURE — 88305 TISSUE EXAM BY PATHOLOGIST: CPT | Mod: 26,,, | Performed by: PATHOLOGY

## 2020-10-27 PROCEDURE — 63600175 PHARM REV CODE 636 W HCPCS: Performed by: NURSE ANESTHETIST, CERTIFIED REGISTERED

## 2020-10-27 PROCEDURE — 43239 EGD BIOPSY SINGLE/MULTIPLE: CPT | Mod: 59,,, | Performed by: INTERNAL MEDICINE

## 2020-10-27 PROCEDURE — 27201089 HC SNARE, DISP (ANY): Performed by: INTERNAL MEDICINE

## 2020-10-27 PROCEDURE — 43251 EGD REMOVE LESION SNARE: CPT | Mod: ,,, | Performed by: INTERNAL MEDICINE

## 2020-10-27 RX ORDER — SODIUM CHLORIDE 9 MG/ML
INJECTION, SOLUTION INTRAVENOUS CONTINUOUS
Status: DISCONTINUED | OUTPATIENT
Start: 2020-10-27 | End: 2020-10-27 | Stop reason: HOSPADM

## 2020-10-27 RX ORDER — PROPOFOL 10 MG/ML
VIAL (ML) INTRAVENOUS
Status: DISCONTINUED | OUTPATIENT
Start: 2020-10-27 | End: 2020-10-27

## 2020-10-27 RX ORDER — LIDOCAINE HCL/PF 100 MG/5ML
SYRINGE (ML) INTRAVENOUS
Status: DISCONTINUED | OUTPATIENT
Start: 2020-10-27 | End: 2020-10-27

## 2020-10-27 RX ADMIN — LIDOCAINE HYDROCHLORIDE 75 MG: 20 INJECTION INTRAVENOUS at 11:10

## 2020-10-27 RX ADMIN — PROPOFOL 30 MG: 10 INJECTION, EMULSION INTRAVENOUS at 11:10

## 2020-10-27 RX ADMIN — PROPOFOL 100 MG: 10 INJECTION, EMULSION INTRAVENOUS at 11:10

## 2020-10-27 RX ADMIN — SODIUM CHLORIDE: 0.9 INJECTION, SOLUTION INTRAVENOUS at 10:10

## 2020-10-27 NOTE — ANESTHESIA POSTPROCEDURE EVALUATION
Anesthesia Post Evaluation    Patient: Colt Rose    Procedure(s) Performed: Procedure(s) (LRB):  EGD (ESOPHAGOGASTRODUODENOSCOPY) (N/A)    Final Anesthesia Type: general    Patient location during evaluation: PACU  Patient participation: Yes- Able to Participate  Level of consciousness: awake and alert  Post-procedure vital signs: reviewed and stable  Pain management: adequate  Airway patency: patent    PONV status at discharge: No PONV  Anesthetic complications: no      Cardiovascular status: hemodynamically stable  Respiratory status: unassisted and room air  Hydration status: euvolemic  Follow-up not needed.            No case tracking events are documented in the log.      Pain/Leyda Score: No data recorded

## 2020-10-27 NOTE — PLAN OF CARE
Awake, alert, wife at bedside, drinking apple juice, denies pain/nausea, pleasant mood, VS WDL, abd soft, nondistended.  Dr Story discussed findings, AVS and Provation reports given to pt in blue folder. Reports will continue on Omeprazole and has meds already.   Questions answered and d/c via w/c in [facility] transport downstairs, passenger front seat, wife .

## 2020-10-27 NOTE — [employer] PATIENT INSTRUCTIONS
Discharge Summary/Instructions after an Endoscopic Procedure  Patient Name: Colt Rose  Patient MRN: [id number]  Patient YOB: 1955 Tuesday, October 27, 2020  Tyler Covarrubias MD  RESTRICTIONS:  During your procedure today, you received medications for sedation.  These   medications may affect your judgment, balance and coordination.  Therefore,   for 24 hours, you have the following restrictions:   - DO NOT drive a car, operate machinery, make legal/financial decisions,   sign important papers or drink alcohol.    ACTIVITY:  Today: no heavy lifting, straining or running due to procedural   sedation/anesthesia.  The following day: return to full activity including work.  DIET:  Eat and drink normally unless instructed otherwise.     TREATMENT FOR COMMON SIDE EFFECTS:  - Mild abdominal pain, nausea, belching, bloating or excessive gas:  rest,   eat lightly and use a heating pad.  - Sore Throat: treat with throat lozenges and/or gargle with warm salt   water.  - Because air was used during the procedure, expelling large amounts of air   from your rectum or belching is normal.  - If a bowel prep was taken, you may not have a bowel movement for 1-3 days.    This is normal.  SYMPTOMS TO WATCH FOR AND REPORT TO YOUR PHYSICIAN:  1. Abdominal pain or bloating, other than gas cramps.  2. Chest pain.  3. Back pain.  4. Signs of infection such as: chills or fever occurring within 24 hours   after the procedure.  5. Rectal bleeding, which would show as bright red, maroon, or black stools.   (A tablespoon of blood from the rectum is not serious, especially if   hemorrhoids are present.)  6. Vomiting.  7. Weakness or dizziness.  GO DIRECTLY TO THE NEAREST EMERGENCY ROOM IF YOU HAVE ANY OF THE FOLLOWING:      Difficulty breathing              Chills and/or fever over 101 F   Persistent vomiting and/or vomiting blood   Severe abdominal pain   Severe chest pain   Black, tarry stools   Bleeding- more than one  tablespoon   Any other symptom or condition that you feel may need urgent attention  Your doctor recommends these additional instructions:  If any biopsies were taken, your doctors clinic will contact you in 1 to 2   weeks with any results.  - Patient has a contact number available for emergencies.  The signs and   symptoms of potential delayed complications were discussed with the   patient.  Return to normal activities tomorrow.  Written discharge   instructions were provided to the patient.   - Resume previous diet.   - Continue present medications.   - No aspirin, ibuprofen, naproxen, or other non-steroidal anti-inflammatory   drugs.   - Await pathology results.   - Discharge patient to home (ambulatory).   - Follow an antireflux regimen.   - Return to my office after studies are complete.  For questions, problems or results please call your physician - Tyler Covarrubias MD at Work:  [phone].  [facility], EMERGENCY ROOM PHONE NUMBER: [phone]  IF A COMPLICATION OR EMERGENCY SITUATION ARISES AND YOU ARE UNABLE TO REACH   YOUR PHYSICIAN - GO DIRECTLY TO THE EMERGENCY ROOM.  Tyler Covarrubias MD  10/27/2020 11:19:48 AM  This report has been verified and signed electronically.  [employer]

## 2020-10-27 NOTE — ANESTHESIA PREPROCEDURE EVALUATION
10/27/2020  Colt Rose is a 65 y.o., male.    Pre-op Assessment    I have reviewed the Patient Summary Reports.     I have reviewed the Nursing Notes. I have reviewed the NPO Status.   I have reviewed the Medications.     Review of Systems  Anesthesia Hx:  No problems with previous Anesthesia    Social:  Non-Smoker    Hematology/Oncology:  Hematology Normal   Oncology Normal     EENT/Dental:EENT/Dental Normal   Cardiovascular:   hyperlipidemia    Pulmonary:  Pulmonary Normal    Hepatic/GI:   GERD    Musculoskeletal:   Arthritis     Neurological:   Neuromuscular Disease,        Physical Exam  General:  Well nourished    Airway/Jaw/Neck:  Airway Findings: Mouth Opening: Normal Tongue: Normal  General Airway Assessment: Adult  Mallampati: III  TM Distance: Normal, at least 6 cm  Jaw/Neck Findings:  Neck ROM: Extension Decreased, Mild      Dental:  Dental Findings: molar caps   Chest/Lungs:  Chest/Lungs Findings: Clear to auscultation, Normal Respiratory Rate     Heart/Vascular:  Heart Findings: Rate: Normal  Rhythm: Regular Rhythm             Anesthesia Plan  Type of Anesthesia, risks & benefits discussed:  Anesthesia Type:  general  Patient's Preference:   Intra-op Monitoring Plan: standard ASA monitors  Intra-op Monitoring Plan Comments:   Post Op Pain Control Plan:   Post Op Pain Control Plan Comments:   Induction:   IV  Beta Blocker:  Patient is on a Beta-Blocker and has received one dose within the past 24 hours (No further documentation required).       Informed Consent: Patient understands risks and agrees with Anesthesia plan.  Questions answered. Anesthesia consent signed with patient.  ASA Score: 2     Day of Surgery Review of History & Physical: I have interviewed and examined the patient. I have reviewed the patient's H&P dated:    H&P update referred to the provider.         Ready For Surgery  From Anesthesia Perspective.

## 2020-10-27 NOTE — TRANSFER OF CARE
"Anesthesia Transfer of Care Note    Patient: Colt Rose    Procedure(s) Performed: Procedure(s) (LRB):  EGD (ESOPHAGOGASTRODUODENOSCOPY) (N/A)    Patient location: PACU    Anesthesia Type: general    Transport from OR: Transported from OR on room air with adequate spontaneous ventilation    Post pain: adequate analgesia    Post assessment: no apparent anesthetic complications    Post vital signs: stable    Level of consciousness: awake    Nausea/Vomiting: no nausea/vomiting    Complications: none    Transfer of care protocol was followed      Last vitals:   Visit Vitals  BP (!) 132/57 (BP Location: Left arm, Patient Position: Lying)   Pulse 77   Temp 36.4 °C (97.5 °F) (Skin)   Resp 16   Ht 5' 10.5" (1.791 m)   Wt 86.6 kg (191 lb)   SpO2 98%   BMI 27.02 kg/m²     "

## 2020-10-27 NOTE — DISCHARGE INSTRUCTIONS
gastritis  Understanding Gastritis    Gastritis is a painful inflammation of the stomach lining. It has a number of causes. Gastritis and its symptoms can be relieved with treatment. Work with your healthcare provider to find ways to treat your symptoms.  The Stomach  To digest the food you eat, your stomach makes strong acids and enzymes. A healthy stomach has built-in defenses that protect its lining from damage by these acids and enzymes.  When you have gastritis  Acids may damage the stomach lining when the built-in defenses of the stomach dont function as they should. The stomach lining can then become inflamed. When this happens, it is called gastritis.  Causes of gastritis  Gastritis has many causes. They may include:  · Aspirin and anti-inflammatory medicines  · Tobacco use  · Alcohol use  · Helicobacter pylori (H. pylori) bacteria  · Trauma from injuries, burns, or major surgery  · Critical illness or autoimmune disorders  Common symptoms  With gastritis, you may notice one or more of the following:  · A burning feeling in your upper belly  · Pain that happens after eating certain foods  · Gas or a bloated feeling in your stomach  · Frequent belching  · Nausea with or without vomiting  · Loss of appetite  · Feeling full quickly  · Fatigue  Date Last Reviewed: 7/1/2016  © 6244-5189 [employer]. [address] [place], [place] [id number]. All rights reserved. This information is not intended as a substitute for professional medical care. Always follow your healthcare professional's instructions.        What Is a Hiatal Hernia?    Hiatal hernia is when the area where the stomach and esophagus meet bulges up through the diaphragm into the chest cavity. In some cases, part of the stomach may bulge above the diaphragm. Stomach acid may move up into the esophagus and cause symptoms. The symptoms are often blamed on gastroesophageal reflux disease (GERD). You may only know about the hernia when it  shows up on an X-ray taken for other reasons.   What you may feel  The hiatus is a normal hole in the diaphragm. The esophagus passes through this hole and leads to the stomach. In some cases, part of the stomach may bulge above the diaphragm. This bulge is called a hernia. Stomach acid may move up into the esophagus and cause symptoms.  When you eat, the muscle at the hiatus relaxes to allow food to pass into the stomach. It tightens again to keep food and digestive acids in the stomach.  Many people with hiatal hernias have mild symptoms. You may notice the following GERD symptoms:  · Heartburn or other chest discomfort  · A feeling of chest fullness after a meal  · Frequent burping  · Acid taste in the mouth  · Trouble swallowing  Treating symptoms  If you have been diagnosed with hiatal hernia, these suggestions may help improve symptoms:  · Lose excess weight. Extra weight puts pressure on the stomach and esophagus.  · Dont lie down after eating. Sit up for at least an hour after eating. Lying down after eating can increase symptoms.  · Avoid certain foods and drinks. These include fatty foods, chocolate, coffee, mint, and other foods that cause symptoms for you.  · Dont smoke or drink alcohol. These can worsen symptoms.  · Look at your medicines. Discuss your medicines with your healthcare provider. Many medicines can cause symptoms.  · Consider an antacid medicine. Ask your healthcare provider about over-the-counter and prescription medicines that may help.  · Ask about surgery, if needed. Surgery is a treatment choice for some people. Your healthcare provider can determine if surgery is an option for you.    Date Last Reviewed: 10/1/2016  © 4055-6444 [employer]. [address] [place] [address], [place] [address]. All rights reserved. This information is not intended as a substitute for professional medical care. Always follow your healthcare professional's instructions.      Discharge Instructions:  "After Your Surgery/Procedure  Youve just had surgery. During surgery you were given medicine called anesthesia to keep you relaxed and free of pain. After surgery you may have some pain or nausea. This is common. Here are some tips for feeling better and getting well after surgery.     Stay on schedule with your medication.   Going home  Your doctor or nurse will show you how to take care of yourself when you go home. He or she will also answer your questions. Have an adult family member or friend drive you home.      For your safety we recommend these precaution for the first 24 hours after your procedure:  · Do not drive or use heavy equipment.  · Do not make important decisions or sign legal papers.  · Do not drink alcohol.  · Have someone stay with you, if needed. He or she can watch for problems and help keep you safe.  · Your concentration, balance, coordination, and judgement may be impaired for many hours after anesthesia.  Use caution when ambulating or standing up.     · You may feel weak and "washed out" after anesthesia and surgery.      Subtle residual effects of general anesthesia or sedation with regional / local anesthesia can last more than 24 hours.  Rest for the remainder of the day or longer if your Doctor/Surgeon has advised you to do so.  Although you may feel normal within the first 24 hours, your reflexes and mental ability may be impaired without you realizing it.  You may feel dizzy, lightheaded or sleepy for 24 hours or longer.      Be sure to go to all follow-up visits with your doctor. And rest after your surgery for as long as your doctor tells you to.  Coping with pain  If you have pain after surgery, pain medicine will help you feel better. Take it as told, before pain becomes severe. Also, ask your doctor or pharmacist about other ways to control pain. This might be with heat, ice, or relaxation. And follow any other instructions your surgeon or nurse gives you.  Tips for taking " pain medicine  To get the best relief possible, remember these points:  · Pain medicines can upset your stomach. Taking them with a little food may help.  · Most pain relievers taken by mouth need at least 20 to 30 minutes to start to work.  · Taking medicine on a schedule can help you remember to take it. Try to time your medicine so that you can take it before starting an activity. This might be before you get dressed, go for a walk, or sit down for dinner.  · Constipation is a common side effect of pain medicines. Call your doctor before taking any medicines such as laxatives or stool softeners to help ease constipation. Also ask if you should skip any foods. Drinking lots of fluids and eating foods such as fruits and vegetables that are high in fiber can also help. Remember, do not take laxatives unless your surgeon has prescribed them.  · Drinking alcohol and taking pain medicine can cause dizziness and slow your breathing. It can even be deadly. Do not drink alcohol while taking pain medicine.  · Pain medicine can make you react more slowly to things. Do not drive or run machinery while taking pain medicine.  Your health care provider may tell you to take acetaminophen to help ease your pain. Ask him or her how much you are supposed to take each day. Acetaminophen or other pain relievers may interact with your prescription medicines or other over-the-counter (OTC) drugs. Some prescription medicines have acetaminophen and other ingredients. Using both prescription and OTC acetaminophen for pain can cause you to overdose. Read the labels on your OTC medicines with care. This will help you to clearly know the list of ingredients, how much to take, and any warnings. It may also help you not take too much acetaminophen. If you have questions or do not understand the information, ask your pharmacist or health care provider to explain it to you before you take the OTC medicine.  Managing nausea  Some people have an  upset stomach after surgery. This is often because of anesthesia, pain, or pain medicine, or the stress of surgery. These tips will help you handle nausea and eat healthy foods as you get better. If you were on a special food plan before surgery, ask your doctor if you should follow it while you get better. These tips may help:  · Do not push yourself to eat. Your body will tell you when to eat and how much.  · Start off with clear liquids and soup. They are easier to digest.  · Next try semi-solid foods, such as mashed potatoes, applesauce, and gelatin, as you feel ready.  · Slowly move to solid foods. Dont eat fatty, rich, or spicy foods at first.  · Do not force yourself to have 3 large meals a day. Instead eat smaller amounts more often.  · Take pain medicines with a small amount of solid food, such as crackers or toast, to avoid nausea.     Call your surgeon if  · You still have pain an hour after taking medicine. The medicine may not be strong enough.  · You feel too sleepy, dizzy, or groggy. The medicine may be too strong.  · You have side effects like nausea, vomiting, or skin changes, such as rash, itching, or hives.       If you have obstructive sleep apnea  You were given anesthesia medicine during surgery to keep you comfortable and free of pain. After surgery, you may have more apnea spells because of this medicine and other medicines you were given. The spells may last longer than usual.   At home:  · Keep using the continuous positive airway pressure (CPAP) device when you sleep. Unless your health care provider tells you not to, use it when you sleep, day or night. CPAP is a common device used to treat obstructive sleep apnea.  · Talk with your provider before taking any pain medicine, muscle relaxants, or sedatives. Your provider will tell you about the possible dangers of taking these medicines.  © 5057-8501 [employer]. [address] [place] [address], [place] [address]. All rights  reserved. This information is not intended as a substitute for professional medical care. Always follow your healthcare professional's instructions.

## 2020-10-28 VITALS
HEIGHT: 71 IN | TEMPERATURE: 98 F | SYSTOLIC BLOOD PRESSURE: 121 MMHG | BODY MASS INDEX: 26.74 KG/M2 | HEART RATE: 56 BPM | OXYGEN SATURATION: 99 % | RESPIRATION RATE: 16 BRPM | WEIGHT: 191 LBS | DIASTOLIC BLOOD PRESSURE: 74 MMHG

## 2020-10-30 ENCOUNTER — PATIENT MESSAGE (OUTPATIENT)
Dept: GASTROENTEROLOGY | Facility: CLINIC | Age: 65
End: 2020-10-30

## 2020-10-30 LAB
FINAL PATHOLOGIC DIAGNOSIS: NORMAL
GROSS: NORMAL
Lab: NORMAL

## 2020-11-09 ENCOUNTER — TELEPHONE (OUTPATIENT)
Dept: PAIN MEDICINE | Facility: CLINIC | Age: 65
End: 2020-11-09

## 2020-11-09 ENCOUNTER — OFFICE VISIT (OUTPATIENT)
Dept: SPINE | Facility: CLINIC | Age: 65
End: 2020-11-09
Payer: COMMERCIAL

## 2020-11-09 VITALS — HEIGHT: 71 IN | WEIGHT: 190.94 LBS | BODY MASS INDEX: 26.73 KG/M2

## 2020-11-09 DIAGNOSIS — G89.29 CHRONIC MIDLINE LOW BACK PAIN WITHOUT SCIATICA: Primary | ICD-10-CM

## 2020-11-09 DIAGNOSIS — M54.50 CHRONIC MIDLINE LOW BACK PAIN WITHOUT SCIATICA: Primary | ICD-10-CM

## 2020-11-09 DIAGNOSIS — M47.816 LUMBAR SPONDYLOSIS: ICD-10-CM

## 2020-11-09 DIAGNOSIS — Z01.818 PRE-OP TESTING: Primary | ICD-10-CM

## 2020-11-09 PROCEDURE — 99203 OFFICE O/P NEW LOW 30 MIN: CPT | Mod: S$GLB,,, | Performed by: PHYSICAL MEDICINE & REHABILITATION

## 2020-11-09 PROCEDURE — 99203 PR OFFICE/OUTPT VISIT, NEW, LEVL III, 30-44 MIN: ICD-10-PCS | Mod: S$GLB,,, | Performed by: PHYSICAL MEDICINE & REHABILITATION

## 2020-11-09 NOTE — PROGRESS NOTES
SUBJECTIVE:    Patient ID: Colt Rose is a 65 y.o. male.    Chief Complaint: Back Pain    She presents for follow-up with persistent low back pain at the lumbosacral junction.  Pain level is 3/10 at the moment and 7/10 at the worst.  Described as tightness.  Usually response to stretching.  No new or progressive problems.  He is interested in proceeding with the radiofrequency ablation.        Past Medical History:   Diagnosis Date    Arthritis of hand 8/6/2015    Cervical disc displacement     Degeneration of lumbar intervertebral disc     GERD (gastroesophageal reflux disease)     Hip pain 2/22/2016    Hyperlipidemia     Shingles 11/2013     Social History     Socioeconomic History    Marital status:      Spouse name: Not on file    Number of children: Not on file    Years of education: Not on file    Highest education level: Not on file   Occupational History    Not on file   Social Needs    Financial resource strain: Not on file    Food insecurity     Worry: Not on file     Inability: Not on file    Transportation needs     Medical: Not on file     Non-medical: Not on file   Tobacco Use    Smoking status: Never Smoker    Smokeless tobacco: Never Used   Substance and Sexual Activity    Alcohol use: Yes     Alcohol/week: 6.0 standard drinks     Types: 6 Cans of beer per week     Comment: weekly    Drug use: No    Sexual activity: Yes     Partners: Female   Lifestyle    Physical activity     Days per week: Not on file     Minutes per session: Not on file    Stress: Only a little   Relationships    Social connections     Talks on phone: Not on file     Gets together: Not on file     Attends [employer] service: Not on file     Active member of club or organization: Not on file     Attends meetings of clubs or organizations: Not on file     Relationship status: Not on file   Other Topics Concern    Not on file   Social History Narrative    Not on file     Past Surgical History:  "  Procedure Laterality Date    CERVICAL FUSION      bone graft    EPIDURAL STEROID INJECTION INTO LUMBAR SPINE N/A 12/5/2019    Procedure: Injection-steroid-epidural-lumbar;  Surgeon: Adarsh Kraft MD;  [facility];  Service: Pain Management;  Laterality: N/A;  L5-S1    ESOPHAGOGASTRODUODENOSCOPY N/A 10/27/2020    Procedure: EGD (ESOPHAGOGASTRODUODENOSCOPY);  Surgeon: Tyler Story MD;  Location: [facility];  Service: Endoscopy;  Laterality: N/A;    TONSILLECTOMY, ADENOIDECTOMY       Family History   Problem Relation Age of Onset    Diabetes Mother     Hypertension Mother     Stroke Mother     Coronary artery disease Mother     Stroke Father     Heart disease Brother         CAD     Vitals:    11/09/20 1341   Weight: 86.6 kg (190 lb 14.7 oz)   Height: 5' 10.5" (1.791 m)       Review of Systems   Constitutional: Negative for chills, diaphoresis, fatigue, fever and unexpected weight change.   HENT: Negative for trouble swallowing.    Eyes: Negative for visual disturbance.   Respiratory: Negative for shortness of breath.    Cardiovascular: Negative for chest pain.   Gastrointestinal: Negative for abdominal pain, constipation, diarrhea, nausea and vomiting.   Genitourinary: Negative for difficulty urinating.   Musculoskeletal: Negative for arthralgias, back pain, gait problem, joint swelling, myalgias, neck pain and neck stiffness.   Neurological: Negative for dizziness, speech difficulty, weakness, light-headedness, numbness and headaches.          Objective:      Physical Exam  Neurological:      Mental Status: He is alert and oriented to person, place, and time.      Comments: No point tenderness or palpable masses about the lumbar spine             Assessment:       1. Chronic midline low back pain without sciatica           Plan:     will get him scheduled for medial branch blocks and subsequent radiofrequency ablation as indicated of the L3-4 and L4-5 facet joints bilaterally.  Follow-up with me " after the procedure      Chronic midline low back pain without sciatica

## 2020-11-09 NOTE — H&P (VIEW-ONLY)
SUBJECTIVE:    Patient ID: Colt Rose is a 65 y.o. male.    Chief Complaint: Back Pain    She presents for follow-up with persistent low back pain at the lumbosacral junction.  Pain level is 3/10 at the moment and 7/10 at the worst.  Described as tightness.  Usually response to stretching.  No new or progressive problems.  He is interested in proceeding with the radiofrequency ablation.        Past Medical History:   Diagnosis Date    Arthritis of hand 8/6/2015    Cervical disc displacement     Degeneration of lumbar intervertebral disc     GERD (gastroesophageal reflux disease)     Hip pain 2/22/2016    Hyperlipidemia     Shingles 11/2013     Social History     Socioeconomic History    Marital status:      Spouse name: Not on file    Number of children: Not on file    Years of education: Not on file    Highest education level: Not on file   Occupational History    Not on file   Social Needs    Financial resource strain: Not on file    Food insecurity     Worry: Not on file     Inability: Not on file    Transportation needs     Medical: Not on file     Non-medical: Not on file   Tobacco Use    Smoking status: Never Smoker    Smokeless tobacco: Never Used   Substance and Sexual Activity    Alcohol use: Yes     Alcohol/week: 6.0 standard drinks     Types: 6 Cans of beer per week     Comment: weekly    Drug use: No    Sexual activity: Yes     Partners: Female   Lifestyle    Physical activity     Days per week: Not on file     Minutes per session: Not on file    Stress: Only a little   Relationships    Social connections     Talks on phone: Not on file     Gets together: Not on file     Attends Muslim service: Not on file     Active member of club or organization: Not on file     Attends meetings of clubs or organizations: Not on file     Relationship status: Not on file   Other Topics Concern    Not on file   Social History Narrative    Not on file     Past Surgical History:  "  Procedure Laterality Date    CERVICAL FUSION      bone graft    EPIDURAL STEROID INJECTION INTO LUMBAR SPINE N/A 12/5/2019    Procedure: Injection-steroid-epidural-lumbar;  Surgeon: Adarsh Kraft MD;  Location: [facility] OR;  Service: Pain Management;  Laterality: N/A;  L5-S1    ESOPHAGOGASTRODUODENOSCOPY N/A 10/27/2020    Procedure: EGD (ESOPHAGOGASTRODUODENOSCOPY);  Surgeon: Tyler Story MD;  Location: [facility];  Service: Endoscopy;  Laterality: N/A;    TONSILLECTOMY, ADENOIDECTOMY       Family History   Problem Relation Age of Onset    Diabetes Mother     Hypertension Mother     Stroke Mother     Coronary artery disease Mother     Stroke Father     Heart disease Brother         CAD     Vitals:    11/09/20 1341   Weight: 86.6 kg (190 lb 14.7 oz)   Height: 5' 10.5" (1.791 m)       Review of Systems   Constitutional: Negative for chills, diaphoresis, fatigue, fever and unexpected weight change.   HENT: Negative for trouble swallowing.    Eyes: Negative for visual disturbance.   Respiratory: Negative for shortness of breath.    Cardiovascular: Negative for chest pain.   Gastrointestinal: Negative for abdominal pain, constipation, diarrhea, nausea and vomiting.   Genitourinary: Negative for difficulty urinating.   Musculoskeletal: Negative for arthralgias, back pain, gait problem, joint swelling, myalgias, neck pain and neck stiffness.   Neurological: Negative for dizziness, speech difficulty, weakness, light-headedness, numbness and headaches.          Objective:      Physical Exam  Neurological:      Mental Status: He is alert and oriented to person, place, and time.      Comments: No point tenderness or palpable masses about the lumbar spine             Assessment:       1. Chronic midline low back pain without sciatica           Plan:     will get him scheduled for medial branch blocks and subsequent radiofrequency ablation as indicated of the L3-4 and L4-5 facet joints bilaterally.  Follow-up with me " after the procedure      Chronic midline low back pain without sciatica

## 2020-11-09 NOTE — TELEPHONE ENCOUNTER
----- Message from Ron Mayes MD sent at 11/9/2020  1:53 PM CST -----  Please schedule for medial branch blocks and subsequent radiofrequency ablation as indicated of the L3-4 and L4-5 facet joints bilaterally

## 2020-11-15 ENCOUNTER — LAB VISIT (OUTPATIENT)
Dept: PRIMARY CARE CLINIC | Facility: CLINIC | Age: 65
End: 2020-11-15
Payer: COMMERCIAL

## 2020-11-15 DIAGNOSIS — Z01.818 PRE-OP TESTING: ICD-10-CM

## 2020-11-15 PROCEDURE — U0003 INFECTIOUS AGENT DETECTION BY NUCLEIC ACID (DNA OR RNA); SEVERE ACUTE RESPIRATORY SYNDROME CORONAVIRUS 2 (SARS-COV-2) (CORONAVIRUS DISEASE [COVID-19]), AMPLIFIED PROBE TECHNIQUE, MAKING USE OF HIGH THROUGHPUT TECHNOLOGIES AS DESCRIBED BY CMS-2020-01-R: HCPCS

## 2020-11-16 LAB — SARS-COV-2 RNA RESP QL NAA+PROBE: NOT DETECTED

## 2020-11-18 ENCOUNTER — HOSPITAL ENCOUNTER (OUTPATIENT)
Facility: AMBULARY SURGERY CENTER | Age: 65
Discharge: HOME | End: 2020-11-18
Attending: ANESTHESIOLOGY | Admitting: ANESTHESIOLOGY
Payer: COMMERCIAL

## 2020-11-18 DIAGNOSIS — M47.896 OTHER SPONDYLOSIS, LUMBAR REGION: Primary | ICD-10-CM

## 2020-11-18 PROCEDURE — 64494 PR INJ DX/THER AGNT PARAVERT FACET JOINT,IMG GUIDE,LUMBAR/SAC, 2ND LEVEL: ICD-10-PCS | Mod: 50,,, | Performed by: ANESTHESIOLOGY

## 2020-11-18 PROCEDURE — 64493 INJ PARAVERT F JNT L/S 1 LEV: CPT | Mod: 50,,, | Performed by: ANESTHESIOLOGY

## 2020-11-18 PROCEDURE — 64494 INJ PARAVERT F JNT L/S 2 LEV: CPT | Mod: 50,,, | Performed by: ANESTHESIOLOGY

## 2020-11-18 PROCEDURE — 64493 PR INJ DX/THER AGNT PARAVERT FACET JOINT,IMG GUIDE,LUMBAR/SAC,1ST LVL: ICD-10-PCS | Mod: 50,,, | Performed by: ANESTHESIOLOGY

## 2020-11-18 PROCEDURE — 64493 INJ PARAVERT F JNT L/S 1 LEV: CPT | Mod: RT | Performed by: ANESTHESIOLOGY

## 2020-11-18 PROCEDURE — 64494 INJ PARAVERT F JNT L/S 2 LEV: CPT | Mod: RT | Performed by: ANESTHESIOLOGY

## 2020-11-18 RX ORDER — BUPIVACAINE HYDROCHLORIDE 5 MG/ML
INJECTION, SOLUTION EPIDURAL; INTRACAUDAL
Status: DISCONTINUED | OUTPATIENT
Start: 2020-11-18 | End: 2020-11-18 | Stop reason: HOSPADM

## 2020-11-18 RX ORDER — SODIUM CHLORIDE, SODIUM LACTATE, POTASSIUM CHLORIDE, CALCIUM CHLORIDE 600; 310; 30; 20 MG/100ML; MG/100ML; MG/100ML; MG/100ML
INJECTION, SOLUTION INTRAVENOUS ONCE AS NEEDED
Status: DISCONTINUED | OUTPATIENT
Start: 2020-11-18 | End: 2020-11-18 | Stop reason: HOSPADM

## 2020-11-18 NOTE — DISCHARGE SUMMARY
[facility]  Discharge Note  Short Stay    Procedure(s) (LRB):  BLOCK, NERVE, LUMBAR, MEDIAL BRANCH Bilateral L3,4,5 (Bilateral)    OUTCOME: Patient tolerated treatment/procedure well without complication and is now ready for discharge.    DISPOSITION: Home or Self Care    FINAL DIAGNOSIS:  Other spondylosis, lumbar region    FOLLOWUP: In clinic    DISCHARGE INSTRUCTIONS:    Discharge Procedure Orders   Notify your health care provider if you experience any of the following:  temperature >100.4     Notify your health care provider if you experience any of the following:  severe uncontrolled pain     Notify your health care provider if you experience any of the following:  redness, tenderness, or signs of infection (pain, swelling, redness, odor or green/yellow discharge around incision site)     Activity as tolerated

## 2020-11-18 NOTE — OP NOTE
PROCEDURE DATE: 11/18/2020    PROCEDURE:  Bilateral L3,4,5 medial branch nerve blocks    DIAGNOSIS:  Other lumbar spondylosis    Post Op diagnosis: Same    PHYSICIAN: Adarsh Kraft MD    MEDICATIONS INJECTED: 0.5% bupivicaine, 0.5 ml at each level    SEDATION MEDICATIONS:None    LOCAL ANESTHETIC USED: None    ESTIMATED BLOOD LOSS:  None    COMPLICATIONS:  None    TECHNIQUE: A time out was taken to identify the patient, procedure and side of the procedure. The patient was placed in a prone position, then prepped and draped in the usual sterile fashion using ChloraPrep and sterile towels.  The levels were determined under fluoroscopic guidance and then marked.  A 25-gauge 3.5 inch needle was introduced to the anatomic location of the L3,4,5 medial branch nerves on the Bilateral side. The above medication was then injected. The patient tolerated the procedure well.     The patient was monitored after the procedure. Patient was given pain diary to record pain levels at home.     If found to have greater than a 50% recovery and so will be scheduled for a radiofrequency ablation of the corresponding nerves.  Patient was given post procedure and discharge instructions to follow at home.  The patient was discharged in a stable condition.

## 2020-11-18 NOTE — DISCHARGE INSTRUCTIONS
Before leaving, please make sure you have all your personal belongings such as glasses, purses, wallets, keys, cell phones, jewelry, jackets etc   Nerve Block  This was a test, not a treatment. Your pain may return.  Keep your pain journal Dr office will call to check your pain levels within 3 days   Perform activities that normally cause you pain during this testing period    Home care instructions:   You may get some pain relief from the local anesthetic initally.   No driving for 24 hrs.   Activity as tolerated- gradually increase activities.    No heat at injection sites for 2 full days. No heating pads, hot tubs, saunas, or swimming in any body of water or pool for 2 full days.  Use ice pack for mild swelling and for comfort , apply for 20 minutes, remove for 20 minute intervals. No direct contact of ice itself  to skin.  May shower today.  Do not allow shower water to beat on injections site(s) for 2 full days. No tub baths for two full days.      Resume Aspirin, Plavix, or Coumadin the day after the procedure unless otherwise instructed.   If diabetic,monitor your glucose carefully as steroids can increase your glucose level    Seek immediate medical help for:   Severe increase in your usual pain or appearance of new pain.  Prolonged (more than 8 hours) or increasing weakness or numbness in the legs or arms.   .    Fever above 100.4 degrees F ,Drainage,redness,active bleeding, or increased swelling at the injection site.  Headache, shortness of breath, chest pain, or breathing problems.    After Surgery:  Always be aware that any surgery can cause these symptoms:    Pain- Medication can be prescribed for pain to decrease your pain but may not completely take your pain away. Over the Counter pain medicine my be enough and you can always use Ice and rest to help ease pain.    Bleeding- a little bleeding after a surgery is usually within normal.  If there is a lot of blood you need to notify your MD.  Emergency  treatments of bleeding are cold application, elevation of the bleeding site and compression.    Infection- Infection after surgery is NOT a normal occurrence.  Signs of infection are fever, swelling, hot to touch the incision.  If this occurs notify your MD immediately.    Nausea- this can be common after a surgery especially if you have had anesthesia medicine or are taking pain medicine.  Steroids have a side effect of nausea sometimes. Staying on clear liquids, bland foods, gingerale, or over the counter anti nausea medicines can help.  If you vomit more than once, notify your MD.  Anti Nausea medicines can be prescribed.

## 2020-11-19 VITALS
DIASTOLIC BLOOD PRESSURE: 68 MMHG | WEIGHT: 191 LBS | RESPIRATION RATE: 18 BRPM | TEMPERATURE: 98 F | SYSTOLIC BLOOD PRESSURE: 144 MMHG | HEART RATE: 58 BPM | BODY MASS INDEX: 26.74 KG/M2 | HEIGHT: 71 IN | OXYGEN SATURATION: 97 %

## 2020-12-16 ENCOUNTER — OFFICE VISIT (OUTPATIENT)
Dept: SPINE | Facility: CLINIC | Age: 65
End: 2020-12-16
Payer: COMMERCIAL

## 2020-12-16 ENCOUNTER — TELEPHONE (OUTPATIENT)
Dept: PAIN MEDICINE | Facility: CLINIC | Age: 65
End: 2020-12-16

## 2020-12-16 VITALS — HEIGHT: 71 IN | BODY MASS INDEX: 26.73 KG/M2 | WEIGHT: 190.94 LBS

## 2020-12-16 DIAGNOSIS — Z01.818 PRE-OP TESTING: Primary | ICD-10-CM

## 2020-12-16 DIAGNOSIS — M47.816 LUMBAR SPONDYLOSIS: ICD-10-CM

## 2020-12-16 DIAGNOSIS — G89.29 CHRONIC MIDLINE LOW BACK PAIN WITHOUT SCIATICA: Primary | ICD-10-CM

## 2020-12-16 DIAGNOSIS — M54.50 CHRONIC MIDLINE LOW BACK PAIN WITHOUT SCIATICA: Primary | ICD-10-CM

## 2020-12-16 PROCEDURE — 99213 PR OFFICE/OUTPT VISIT, EST, LEVL III, 20-29 MIN: ICD-10-PCS | Mod: S$GLB,,, | Performed by: PHYSICAL MEDICINE & REHABILITATION

## 2020-12-16 PROCEDURE — 99213 OFFICE O/P EST LOW 20 MIN: CPT | Mod: S$GLB,,, | Performed by: PHYSICAL MEDICINE & REHABILITATION

## 2020-12-16 RX ORDER — OMEPRAZOLE 40 MG/1
40 CAPSULE, DELAYED RELEASE ORAL DAILY
Qty: 90 CAPSULE | Refills: 3 | Status: SHIPPED | OUTPATIENT
Start: 2020-12-16 | End: 2021-12-27

## 2020-12-16 NOTE — TELEPHONE ENCOUNTER
----- Message from Ron Mayes MD sent at 12/16/2020  8:13 AM CST -----  Please schedule for radiofrequency ablation bilaterally L4-5 and L5-S1.  Note the patient had greater than 80% improvement from medial branch blocks

## 2020-12-16 NOTE — PROGRESS NOTES
SUBJECTIVE:    Patient ID: Colt Rose is a 65 y.o. male.    Chief Complaint: Follow-up (medial branch nerve block)    He is here for follow-up status post medial branch blocks of the L4-5 and L5-S1 facet joints bilaterally on 11/18/2020 with Dr. Kraft.  Better than 80% improvement from that procedure lasted for about 1 week.  He is interested in moving forward radiofrequency ablation.  Has no new or progressive problems.  Pain level currently is 3/10        Past Medical History:   Diagnosis Date    Arthritis of hand 8/6/2015    Cervical disc displacement     Degeneration of lumbar intervertebral disc     GERD (gastroesophageal reflux disease)     Hip pain 2/22/2016    Hyperlipidemia     Shingles 11/2013     Social History     Socioeconomic History    Marital status:      Spouse name: Not on file    Number of children: Not on file    Years of education: Not on file    Highest education level: Not on file   Occupational History    Not on file   Social Needs    Financial resource strain: Not on file    Food insecurity     Worry: Not on file     Inability: Not on file    Transportation needs     Medical: Not on file     Non-medical: Not on file   Tobacco Use    Smoking status: Never Smoker    Smokeless tobacco: Never Used   Substance and Sexual Activity    Alcohol use: Yes     Alcohol/week: 6.0 standard drinks     Types: 6 Cans of beer per week     Comment: weekly    Drug use: No    Sexual activity: Yes     Partners: Female   Lifestyle    Physical activity     Days per week: Not on file     Minutes per session: Not on file    Stress: Only a little   Relationships    Social connections     Talks on phone: Not on file     Gets together: Not on file     Attends Tenriism service: Not on file     Active member of club or organization: Not on file     Attends meetings of clubs or organizations: Not on file     Relationship status: Not on file   Other Topics Concern    Not on file   Social  "History Narrative    Not on file     Past Surgical History:   Procedure Laterality Date    CERVICAL FUSION      bone graft    EPIDURAL STEROID INJECTION INTO LUMBAR SPINE N/A 12/5/2019    Procedure: Injection-steroid-epidural-lumbar;  Surgeon: Adarsh Kraft MD;  Location: [facility] OR;  Service: Pain Management;  Laterality: N/A;  L5-S1    ESOPHAGOGASTRODUODENOSCOPY N/A 10/27/2020    Procedure: EGD (ESOPHAGOGASTRODUODENOSCOPY);  Surgeon: Tyler Story MD;  Location: [facility];  Service: Endoscopy;  Laterality: N/A;    INJECTION OF ANESTHETIC AGENT AROUND MEDIAL BRANCH NERVES INNERVATING LUMBAR FACET JOINT Bilateral 11/18/2020    Procedure: BLOCK, NERVE, LUMBAR, MEDIAL BRANCH Bilateral L3,4,5;  Surgeon: Adarsh Kraft MD;  Location: [facility] OR;  Service: Pain Management;  Laterality: Bilateral;    TONSILLECTOMY, ADENOIDECTOMY       Family History   Problem Relation Age of Onset    Diabetes Mother     Hypertension Mother     Stroke Mother     Coronary artery disease Mother     Stroke Father     Heart disease Brother         CAD     Vitals:    12/16/20 0805   Weight: 86.6 kg (190 lb 14.7 oz)   Height: 5' 10.5" (1.791 m)       Review of Systems   Constitutional: Negative for chills, diaphoresis, fatigue, fever and unexpected weight change.   HENT: Negative for trouble swallowing.    Eyes: Negative for visual disturbance.   Respiratory: Negative for shortness of breath.    Cardiovascular: Negative for chest pain.   Gastrointestinal: Negative for abdominal pain, constipation, diarrhea, nausea and vomiting.   Genitourinary: Negative for difficulty urinating.   Musculoskeletal: Negative for arthralgias, back pain, gait problem, joint swelling, myalgias, neck pain and neck stiffness.   Neurological: Negative for dizziness, speech difficulty, weakness, light-headedness, numbness and headaches.          Objective:      Physical Exam  Neurological:      Mental Status: He is alert and oriented to person, place, and time.      "        Assessment:       1. Chronic midline low back pain without sciatica           Plan:     proceed with radiofrequency ablation of the L4-5 and L5-S1 facet joints bilaterally.  Follow-up with me afterwards      Chronic midline low back pain without sciatica

## 2020-12-20 ENCOUNTER — LAB VISIT (OUTPATIENT)
Dept: PRIMARY CARE CLINIC | Facility: CLINIC | Age: 65
End: 2020-12-20
Payer: COMMERCIAL

## 2020-12-20 DIAGNOSIS — Z01.818 PRE-OP TESTING: ICD-10-CM

## 2020-12-20 PROCEDURE — U0003 INFECTIOUS AGENT DETECTION BY NUCLEIC ACID (DNA OR RNA); SEVERE ACUTE RESPIRATORY SYNDROME CORONAVIRUS 2 (SARS-COV-2) (CORONAVIRUS DISEASE [COVID-19]), AMPLIFIED PROBE TECHNIQUE, MAKING USE OF HIGH THROUGHPUT TECHNOLOGIES AS DESCRIBED BY CMS-2020-01-R: HCPCS

## 2020-12-21 LAB — SARS-COV-2 RNA RESP QL NAA+PROBE: NOT DETECTED

## 2020-12-22 ENCOUNTER — TELEPHONE (OUTPATIENT)
Dept: PAIN MEDICINE | Facility: CLINIC | Age: 65
End: 2020-12-22

## 2020-12-23 ENCOUNTER — TELEPHONE (OUTPATIENT)
Dept: PAIN MEDICINE | Facility: CLINIC | Age: 65
End: 2020-12-23

## 2020-12-23 NOTE — TELEPHONE ENCOUNTER
Pt was informed his surgery is not approved and we rescheduled him to 30th until we get an approval. Pt stated he spoke with WC and they told him he didn't need approval. I explained it is showing not approved on our end. Pt is scheduled for 12/30 for now

## 2020-12-24 DIAGNOSIS — Z01.818 PREOP TESTING: ICD-10-CM

## 2020-12-27 ENCOUNTER — LAB VISIT (OUTPATIENT)
Dept: PRIMARY CARE CLINIC | Facility: CLINIC | Age: 65
End: 2020-12-27
Payer: COMMERCIAL

## 2020-12-27 DIAGNOSIS — Z01.818 PREOP TESTING: ICD-10-CM

## 2020-12-27 PROCEDURE — U0003 INFECTIOUS AGENT DETECTION BY NUCLEIC ACID (DNA OR RNA); SEVERE ACUTE RESPIRATORY SYNDROME CORONAVIRUS 2 (SARS-COV-2) (CORONAVIRUS DISEASE [COVID-19]), AMPLIFIED PROBE TECHNIQUE, MAKING USE OF HIGH THROUGHPUT TECHNOLOGIES AS DESCRIBED BY CMS-2020-01-R: HCPCS

## 2020-12-28 LAB — SARS-COV-2 RNA RESP QL NAA+PROBE: NOT DETECTED

## 2021-01-04 DIAGNOSIS — M23.92 INTERNAL DERANGEMENT OF LEFT KNEE: ICD-10-CM

## 2021-01-05 ENCOUNTER — TELEPHONE (OUTPATIENT)
Dept: FAMILY MEDICINE | Facility: CLINIC | Age: 66
End: 2021-01-05

## 2021-01-05 RX ORDER — MELOXICAM 15 MG/1
15 TABLET ORAL DAILY PRN
Qty: 90 TABLET | Refills: 3 | Status: SHIPPED | OUTPATIENT
Start: 2021-01-05 | End: 2021-02-18 | Stop reason: SDUPTHER

## 2021-01-25 ENCOUNTER — TELEPHONE (OUTPATIENT)
Dept: PAIN MEDICINE | Facility: CLINIC | Age: 66
End: 2021-01-25

## 2021-01-25 DIAGNOSIS — Z01.818 PREOP TESTING: Primary | ICD-10-CM

## 2021-01-26 ENCOUNTER — LAB VISIT (OUTPATIENT)
Dept: PRIMARY CARE CLINIC | Facility: CLINIC | Age: 66
End: 2021-01-26
Payer: COMMERCIAL

## 2021-01-26 DIAGNOSIS — Z01.818 PREOP TESTING: ICD-10-CM

## 2021-01-26 PROCEDURE — U0003 INFECTIOUS AGENT DETECTION BY NUCLEIC ACID (DNA OR RNA); SEVERE ACUTE RESPIRATORY SYNDROME CORONAVIRUS 2 (SARS-COV-2) (CORONAVIRUS DISEASE [COVID-19]), AMPLIFIED PROBE TECHNIQUE, MAKING USE OF HIGH THROUGHPUT TECHNOLOGIES AS DESCRIBED BY CMS-2020-01-R: HCPCS

## 2021-01-27 LAB — SARS-COV-2 RNA RESP QL NAA+PROBE: NOT DETECTED

## 2021-01-29 ENCOUNTER — HOSPITAL ENCOUNTER (OUTPATIENT)
Facility: AMBULARY SURGERY CENTER | Age: 66
Discharge: HOME | End: 2021-01-29
Attending: ANESTHESIOLOGY | Admitting: ANESTHESIOLOGY
Payer: COMMERCIAL

## 2021-01-29 DIAGNOSIS — M47.896 OTHER SPONDYLOSIS, LUMBAR REGION: Primary | ICD-10-CM

## 2021-01-29 PROCEDURE — 64636 DESTROY L/S FACET JNT ADDL: CPT | Mod: 50,,, | Performed by: ANESTHESIOLOGY

## 2021-01-29 PROCEDURE — 64635 PR DESTROY LUMB/SAC FACET JNT: ICD-10-PCS | Mod: 50,,, | Performed by: ANESTHESIOLOGY

## 2021-01-29 PROCEDURE — 64636 DESTROY L/S FACET JNT ADDL: CPT | Mod: LT | Performed by: ANESTHESIOLOGY

## 2021-01-29 PROCEDURE — 64635 DESTROY LUMB/SAC FACET JNT: CPT | Mod: LT | Performed by: ANESTHESIOLOGY

## 2021-01-29 PROCEDURE — 64636 PR DESTROY L/S FACET JNT ADDL: ICD-10-PCS | Mod: 50,,, | Performed by: ANESTHESIOLOGY

## 2021-01-29 PROCEDURE — 64635 DESTROY LUMB/SAC FACET JNT: CPT | Mod: 50,,, | Performed by: ANESTHESIOLOGY

## 2021-01-29 RX ORDER — METHYLPREDNISOLONE ACETATE 80 MG/ML
INJECTION, SUSPENSION INTRA-ARTICULAR; INTRALESIONAL; INTRAMUSCULAR; SOFT TISSUE
Status: DISCONTINUED | OUTPATIENT
Start: 2021-01-29 | End: 2021-01-29 | Stop reason: HOSPADM

## 2021-01-29 RX ORDER — BUPIVACAINE HYDROCHLORIDE 2.5 MG/ML
INJECTION, SOLUTION EPIDURAL; INFILTRATION; INTRACAUDAL
Status: DISCONTINUED | OUTPATIENT
Start: 2021-01-29 | End: 2021-01-29 | Stop reason: HOSPADM

## 2021-01-29 RX ORDER — LIDOCAINE HYDROCHLORIDE 10 MG/ML
INJECTION, SOLUTION EPIDURAL; INFILTRATION; INTRACAUDAL; PERINEURAL
Status: DISCONTINUED | OUTPATIENT
Start: 2021-01-29 | End: 2021-01-29 | Stop reason: HOSPADM

## 2021-01-29 RX ORDER — MIDAZOLAM HYDROCHLORIDE 2 MG/2ML
INJECTION, SOLUTION INTRAMUSCULAR; INTRAVENOUS
Status: DISCONTINUED | OUTPATIENT
Start: 2021-01-29 | End: 2021-01-29 | Stop reason: HOSPADM

## 2021-01-29 RX ORDER — SODIUM CHLORIDE, SODIUM LACTATE, POTASSIUM CHLORIDE, CALCIUM CHLORIDE 600; 310; 30; 20 MG/100ML; MG/100ML; MG/100ML; MG/100ML
INJECTION, SOLUTION INTRAVENOUS ONCE AS NEEDED
Status: COMPLETED | OUTPATIENT
Start: 2021-01-29 | End: 2021-01-29

## 2021-01-29 RX ORDER — FENTANYL CITRATE 50 UG/ML
INJECTION, SOLUTION INTRAMUSCULAR; INTRAVENOUS
Status: DISCONTINUED | OUTPATIENT
Start: 2021-01-29 | End: 2021-01-29 | Stop reason: HOSPADM

## 2021-01-29 RX ORDER — LIDOCAINE HYDROCHLORIDE 20 MG/ML
INJECTION, SOLUTION EPIDURAL; INFILTRATION; INTRACAUDAL; PERINEURAL
Status: DISCONTINUED | OUTPATIENT
Start: 2021-01-29 | End: 2021-01-29 | Stop reason: HOSPADM

## 2021-01-29 RX ADMIN — SODIUM CHLORIDE, SODIUM LACTATE, POTASSIUM CHLORIDE, CALCIUM CHLORIDE: 600; 310; 30; 20 INJECTION, SOLUTION INTRAVENOUS at 01:01

## 2021-02-01 VITALS
OXYGEN SATURATION: 96 % | RESPIRATION RATE: 18 BRPM | HEIGHT: 71 IN | SYSTOLIC BLOOD PRESSURE: 120 MMHG | DIASTOLIC BLOOD PRESSURE: 65 MMHG | BODY MASS INDEX: 26.73 KG/M2 | TEMPERATURE: 98 F | WEIGHT: 190.94 LBS | HEART RATE: 66 BPM

## 2021-02-04 DIAGNOSIS — R00.2 PALPITATIONS: ICD-10-CM

## 2021-02-04 DIAGNOSIS — I49.1 PAC (PREMATURE ATRIAL CONTRACTION): ICD-10-CM

## 2021-02-04 DIAGNOSIS — I49.3 PVC (PREMATURE VENTRICULAR CONTRACTION): ICD-10-CM

## 2021-02-05 RX ORDER — METOPROLOL SUCCINATE 25 MG/1
25 TABLET, EXTENDED RELEASE ORAL DAILY
Qty: 90 TABLET | Refills: 1 | Status: SHIPPED | OUTPATIENT
Start: 2021-02-05 | End: 2021-04-08

## 2021-02-08 ENCOUNTER — TELEPHONE (OUTPATIENT)
Dept: PRIMARY CARE CLINIC | Facility: CLINIC | Age: 66
End: 2021-02-08

## 2021-02-18 DIAGNOSIS — M23.92 INTERNAL DERANGEMENT OF LEFT KNEE: ICD-10-CM

## 2021-02-18 RX ORDER — MELOXICAM 15 MG/1
15 TABLET ORAL DAILY PRN
Qty: 90 TABLET | Refills: 3 | Status: SHIPPED | OUTPATIENT
Start: 2021-02-18 | End: 2021-07-30

## 2021-02-25 ENCOUNTER — OFFICE VISIT (OUTPATIENT)
Dept: SPINE | Facility: CLINIC | Age: 66
End: 2021-02-25
Payer: COMMERCIAL

## 2021-02-25 DIAGNOSIS — M54.50 CHRONIC MIDLINE LOW BACK PAIN WITHOUT SCIATICA: Primary | ICD-10-CM

## 2021-02-25 DIAGNOSIS — G89.29 CHRONIC MIDLINE LOW BACK PAIN WITHOUT SCIATICA: Primary | ICD-10-CM

## 2021-02-25 PROCEDURE — 99213 PR OFFICE/OUTPT VISIT, EST, LEVL III, 20-29 MIN: ICD-10-PCS | Mod: S$GLB,,, | Performed by: PHYSICAL MEDICINE & REHABILITATION

## 2021-02-25 PROCEDURE — 99213 OFFICE O/P EST LOW 20 MIN: CPT | Mod: S$GLB,,, | Performed by: PHYSICAL MEDICINE & REHABILITATION

## 2021-02-26 ENCOUNTER — IMMUNIZATION (OUTPATIENT)
Dept: HEMATOLOGY/ONCOLOGY | Facility: CLINIC | Age: 66
End: 2021-02-26
Payer: COMMERCIAL

## 2021-02-26 DIAGNOSIS — Z23 NEED FOR VACCINATION: Primary | ICD-10-CM

## 2021-02-26 PROCEDURE — 91301 COVID-19, MRNA, LNP-S, PF, 100 MCG/0.5 ML DOSE VACCINE: CPT | Mod: S$GLB,,, | Performed by: FAMILY MEDICINE

## 2021-02-26 PROCEDURE — 0011A COVID-19, MRNA, LNP-S, PF, 100 MCG/0.5 ML DOSE VACCINE: ICD-10-PCS | Mod: CV19,S$GLB,, | Performed by: FAMILY MEDICINE

## 2021-02-26 PROCEDURE — 0011A COVID-19, MRNA, LNP-S, PF, 100 MCG/0.5 ML DOSE VACCINE: CPT | Mod: CV19,S$GLB,, | Performed by: FAMILY MEDICINE

## 2021-02-26 PROCEDURE — 91301 COVID-19, MRNA, LNP-S, PF, 100 MCG/0.5 ML DOSE VACCINE: ICD-10-PCS | Mod: S$GLB,,, | Performed by: FAMILY MEDICINE

## 2021-03-26 ENCOUNTER — IMMUNIZATION (OUTPATIENT)
Dept: HEMATOLOGY/ONCOLOGY | Facility: CLINIC | Age: 66
End: 2021-03-26
Payer: COMMERCIAL

## 2021-03-26 DIAGNOSIS — Z23 NEED FOR VACCINATION: Primary | ICD-10-CM

## 2021-03-26 PROCEDURE — 91301 COVID-19, MRNA, LNP-S, PF, 100 MCG/0.5 ML DOSE VACCINE: CPT | Mod: S$GLB,,, | Performed by: FAMILY MEDICINE

## 2021-03-26 PROCEDURE — 0012A COVID-19, MRNA, LNP-S, PF, 100 MCG/0.5 ML DOSE VACCINE: ICD-10-PCS | Mod: CV19,S$GLB,, | Performed by: FAMILY MEDICINE

## 2021-03-26 PROCEDURE — 0012A COVID-19, MRNA, LNP-S, PF, 100 MCG/0.5 ML DOSE VACCINE: CPT | Mod: CV19,S$GLB,, | Performed by: FAMILY MEDICINE

## 2021-03-26 PROCEDURE — 91301 COVID-19, MRNA, LNP-S, PF, 100 MCG/0.5 ML DOSE VACCINE: ICD-10-PCS | Mod: S$GLB,,, | Performed by: FAMILY MEDICINE

## 2021-04-30 ENCOUNTER — OFFICE VISIT (OUTPATIENT)
Dept: CARDIOLOGY | Facility: CLINIC | Age: 66
End: 2021-04-30
Payer: COMMERCIAL

## 2021-04-30 VITALS
RESPIRATION RATE: 16 BRPM | OXYGEN SATURATION: 98 % | WEIGHT: 193 LBS | HEART RATE: 60 BPM | BODY MASS INDEX: 27.63 KG/M2 | DIASTOLIC BLOOD PRESSURE: 84 MMHG | SYSTOLIC BLOOD PRESSURE: 128 MMHG | HEIGHT: 70 IN

## 2021-04-30 DIAGNOSIS — G89.29 CHRONIC MIDLINE LOW BACK PAIN WITHOUT SCIATICA: ICD-10-CM

## 2021-04-30 DIAGNOSIS — I49.1 PREMATURE ATRIAL CONTRACTIONS: ICD-10-CM

## 2021-04-30 DIAGNOSIS — E78.5 HYPERLIPIDEMIA, UNSPECIFIED HYPERLIPIDEMIA TYPE: ICD-10-CM

## 2021-04-30 DIAGNOSIS — M54.50 CHRONIC MIDLINE LOW BACK PAIN WITHOUT SCIATICA: ICD-10-CM

## 2021-04-30 DIAGNOSIS — R00.2 PALPITATIONS: Primary | ICD-10-CM

## 2021-04-30 DIAGNOSIS — K21.9 GASTROESOPHAGEAL REFLUX DISEASE WITHOUT ESOPHAGITIS: ICD-10-CM

## 2021-04-30 DIAGNOSIS — R06.83 SNORING: ICD-10-CM

## 2021-04-30 DIAGNOSIS — G47.8 SLEEP AROUSAL DISORDER: ICD-10-CM

## 2021-04-30 PROCEDURE — 99214 OFFICE O/P EST MOD 30 MIN: CPT | Mod: S$GLB,,, | Performed by: NURSE PRACTITIONER

## 2021-04-30 PROCEDURE — 99214 PR OFFICE/OUTPT VISIT, EST, LEVL IV, 30-39 MIN: ICD-10-PCS | Mod: S$GLB,,, | Performed by: NURSE PRACTITIONER

## 2021-05-19 ENCOUNTER — OFFICE VISIT (OUTPATIENT)
Dept: FAMILY MEDICINE | Facility: CLINIC | Age: 66
End: 2021-05-19
Payer: COMMERCIAL

## 2021-05-19 VITALS
BODY MASS INDEX: 28.15 KG/M2 | SYSTOLIC BLOOD PRESSURE: 124 MMHG | WEIGHT: 196.63 LBS | DIASTOLIC BLOOD PRESSURE: 68 MMHG | HEART RATE: 58 BPM | TEMPERATURE: 98 F | HEIGHT: 70 IN | OXYGEN SATURATION: 98 %

## 2021-05-19 DIAGNOSIS — I49.3 PVC (PREMATURE VENTRICULAR CONTRACTION): ICD-10-CM

## 2021-05-19 DIAGNOSIS — E78.5 HYPERLIPIDEMIA, UNSPECIFIED HYPERLIPIDEMIA TYPE: ICD-10-CM

## 2021-05-19 DIAGNOSIS — I49.1 PAC (PREMATURE ATRIAL CONTRACTION): ICD-10-CM

## 2021-05-19 DIAGNOSIS — R00.2 PALPITATIONS: ICD-10-CM

## 2021-05-19 DIAGNOSIS — Z12.11 SCREENING FOR COLON CANCER: Primary | ICD-10-CM

## 2021-05-19 PROCEDURE — 99999 PR PBB SHADOW E&M-EST. PATIENT-LVL IV: ICD-10-PCS | Mod: PBBFAC,,, | Performed by: FAMILY MEDICINE

## 2021-05-19 PROCEDURE — 99396 PR PREVENTIVE VISIT,EST,40-64: ICD-10-PCS | Mod: S$GLB,,, | Performed by: FAMILY MEDICINE

## 2021-05-19 PROCEDURE — 99999 PR PBB SHADOW E&M-EST. PATIENT-LVL IV: CPT | Mod: PBBFAC,,, | Performed by: FAMILY MEDICINE

## 2021-05-19 PROCEDURE — 99396 PREV VISIT EST AGE 40-64: CPT | Mod: S$GLB,,, | Performed by: FAMILY MEDICINE

## 2021-05-19 RX ORDER — METOPROLOL SUCCINATE 25 MG/1
25 TABLET, EXTENDED RELEASE ORAL DAILY
Qty: 90 TABLET | Refills: 3 | Status: SHIPPED | OUTPATIENT
Start: 2021-05-19 | End: 2021-05-19

## 2021-05-19 RX ORDER — METHOCARBAMOL 500 MG/1
500 TABLET, FILM COATED ORAL DAILY PRN
Qty: 90 TABLET | Refills: 3 | Status: SHIPPED | OUTPATIENT
Start: 2021-05-19 | End: 2021-11-03

## 2021-05-19 RX ORDER — METOPROLOL SUCCINATE 25 MG/1
12.5 TABLET, EXTENDED RELEASE ORAL DAILY
Qty: 45 TABLET | Refills: 3 | Status: SHIPPED | OUTPATIENT
Start: 2021-05-19 | End: 2021-09-20

## 2021-05-19 RX ORDER — ATORVASTATIN CALCIUM 10 MG/1
10 TABLET, FILM COATED ORAL DAILY
Qty: 90 TABLET | Refills: 3 | Status: SHIPPED | OUTPATIENT
Start: 2021-05-19 | End: 2022-07-19

## 2021-05-20 DIAGNOSIS — G47.19 EXCESSIVE DAYTIME SLEEPINESS: ICD-10-CM

## 2021-05-20 DIAGNOSIS — R53.83 LOSS OF ENERGY: ICD-10-CM

## 2021-05-20 DIAGNOSIS — G47.33 OSA (OBSTRUCTIVE SLEEP APNEA): Primary | ICD-10-CM

## 2021-05-20 DIAGNOSIS — R06.83 SNORING: ICD-10-CM

## 2021-05-20 DIAGNOSIS — Z01.818 OTHER SPECIFIED PRE-OPERATIVE EXAMINATION: ICD-10-CM

## 2021-05-24 ENCOUNTER — LAB VISIT (OUTPATIENT)
Dept: LAB | Facility: HOSPITAL | Age: 66
End: 2021-05-24
Attending: FAMILY MEDICINE
Payer: COMMERCIAL

## 2021-05-24 DIAGNOSIS — E78.5 HYPERLIPIDEMIA, UNSPECIFIED HYPERLIPIDEMIA TYPE: ICD-10-CM

## 2021-05-24 LAB
ALBUMIN SERPL BCP-MCNC: 4.1 G/DL (ref 3.5–5.2)
ALP SERPL-CCNC: 46 U/L (ref 55–135)
ALT SERPL W/O P-5'-P-CCNC: 32 U/L (ref 10–44)
ANION GAP SERPL CALC-SCNC: 9 MMOL/L (ref 8–16)
AST SERPL-CCNC: 29 U/L (ref 10–40)
BILIRUB SERPL-MCNC: 0.7 MG/DL (ref 0.1–1)
BUN SERPL-MCNC: 24 MG/DL (ref 8–23)
CALCIUM SERPL-MCNC: 9.7 MG/DL (ref 8.7–10.5)
CHLORIDE SERPL-SCNC: 105 MMOL/L (ref 95–110)
CHOLEST SERPL-MCNC: 181 MG/DL (ref 120–199)
CHOLEST/HDLC SERPL: 3.1 {RATIO} (ref 2–5)
CO2 SERPL-SCNC: 27 MMOL/L (ref 23–29)
COMPLEXED PSA SERPL-MCNC: 0.49 NG/ML (ref 0–4)
CREAT SERPL-MCNC: 0.8 MG/DL (ref 0.5–1.4)
EST. GFR  (AFRICAN AMERICAN): >60 ML/MIN/1.73 M^2
EST. GFR  (NON AFRICAN AMERICAN): >60 ML/MIN/1.73 M^2
GLUCOSE SERPL-MCNC: 99 MG/DL (ref 70–110)
HDLC SERPL-MCNC: 59 MG/DL (ref 40–75)
HDLC SERPL: 32.6 % (ref 20–50)
LDLC SERPL CALC-MCNC: 103.4 MG/DL (ref 63–159)
NONHDLC SERPL-MCNC: 122 MG/DL
POTASSIUM SERPL-SCNC: 4.3 MMOL/L (ref 3.5–5.1)
PROT SERPL-MCNC: 7.3 G/DL (ref 6–8.4)
SODIUM SERPL-SCNC: 141 MMOL/L (ref 136–145)
TRIGL SERPL-MCNC: 93 MG/DL (ref 30–150)

## 2021-05-24 PROCEDURE — 36415 COLL VENOUS BLD VENIPUNCTURE: CPT | Mod: PO | Performed by: FAMILY MEDICINE

## 2021-05-24 PROCEDURE — 80061 LIPID PANEL: CPT | Performed by: FAMILY MEDICINE

## 2021-05-24 PROCEDURE — 80053 COMPREHEN METABOLIC PANEL: CPT | Performed by: FAMILY MEDICINE

## 2021-05-24 PROCEDURE — 84153 ASSAY OF PSA TOTAL: CPT | Performed by: FAMILY MEDICINE

## 2021-05-26 ENCOUNTER — PATIENT MESSAGE (OUTPATIENT)
Dept: GASTROENTEROLOGY | Facility: CLINIC | Age: 66
End: 2021-05-26

## 2021-05-27 ENCOUNTER — PATIENT MESSAGE (OUTPATIENT)
Dept: GASTROENTEROLOGY | Facility: CLINIC | Age: 66
End: 2021-05-27

## 2021-05-27 ENCOUNTER — TELEPHONE (OUTPATIENT)
Dept: GASTROENTEROLOGY | Facility: CLINIC | Age: 66
End: 2021-05-27

## 2021-05-27 DIAGNOSIS — Z12.11 SCREENING FOR COLON CANCER: Primary | ICD-10-CM

## 2021-07-29 ENCOUNTER — ANESTHESIA (OUTPATIENT)
Dept: ENDOSCOPY | Facility: HOSPITAL | Age: 66
End: 2021-07-29
Payer: COMMERCIAL

## 2021-07-29 ENCOUNTER — HOSPITAL ENCOUNTER (OUTPATIENT)
Facility: HOSPITAL | Age: 66
Discharge: HOME | End: 2021-07-29
Attending: INTERNAL MEDICINE | Admitting: INTERNAL MEDICINE
Payer: COMMERCIAL

## 2021-07-29 ENCOUNTER — ANESTHESIA EVENT (OUTPATIENT)
Dept: ENDOSCOPY | Facility: HOSPITAL | Age: 66
End: 2021-07-29
Payer: COMMERCIAL

## 2021-07-29 DIAGNOSIS — K57.90 DIVERTICULOSIS: ICD-10-CM

## 2021-07-29 DIAGNOSIS — K63.5 POLYP OF COLON, UNSPECIFIED PART OF COLON, UNSPECIFIED TYPE: Primary | ICD-10-CM

## 2021-07-29 DIAGNOSIS — K64.8 INTERNAL HEMORRHOIDS: ICD-10-CM

## 2021-07-29 DIAGNOSIS — Z13.9 ENCOUNTER FOR SCREENING: ICD-10-CM

## 2021-07-29 PROCEDURE — 45380 COLONOSCOPY AND BIOPSY: CPT | Mod: 59,,, | Performed by: INTERNAL MEDICINE

## 2021-07-29 PROCEDURE — 45380 COLONOSCOPY AND BIOPSY: CPT | Performed by: INTERNAL MEDICINE

## 2021-07-29 PROCEDURE — 27201012 HC FORCEPS, HOT/COLD, DISP: Performed by: INTERNAL MEDICINE

## 2021-07-29 PROCEDURE — 88305 TISSUE EXAM BY PATHOLOGIST: CPT | Performed by: PATHOLOGY

## 2021-07-29 PROCEDURE — 88305 TISSUE EXAM BY PATHOLOGIST: CPT | Mod: 26,,, | Performed by: PATHOLOGY

## 2021-07-29 PROCEDURE — D9220A PRA ANESTHESIA: ICD-10-PCS | Mod: 33,,, | Performed by: ANESTHESIOLOGY

## 2021-07-29 PROCEDURE — D9220A PRA ANESTHESIA: Mod: 33,,, | Performed by: ANESTHESIOLOGY

## 2021-07-29 PROCEDURE — 27201089 HC SNARE, DISP (ANY): Performed by: INTERNAL MEDICINE

## 2021-07-29 PROCEDURE — 63600175 PHARM REV CODE 636 W HCPCS: Performed by: NURSE ANESTHETIST, CERTIFIED REGISTERED

## 2021-07-29 PROCEDURE — 25000003 PHARM REV CODE 250: Performed by: INTERNAL MEDICINE

## 2021-07-29 PROCEDURE — 45385 PR COLONOSCOPY,REMV LESN,SNARE: ICD-10-PCS | Mod: 33,,, | Performed by: INTERNAL MEDICINE

## 2021-07-29 PROCEDURE — 45380 PR COLONOSCOPY,BIOPSY: ICD-10-PCS | Mod: 59,,, | Performed by: INTERNAL MEDICINE

## 2021-07-29 PROCEDURE — 45385 COLONOSCOPY W/LESION REMOVAL: CPT | Performed by: INTERNAL MEDICINE

## 2021-07-29 PROCEDURE — 37000008 HC ANESTHESIA 1ST 15 MINUTES: Performed by: INTERNAL MEDICINE

## 2021-07-29 PROCEDURE — 37000009 HC ANESTHESIA EA ADD 15 MINS: Performed by: INTERNAL MEDICINE

## 2021-07-29 PROCEDURE — 88305 TISSUE EXAM BY PATHOLOGIST: ICD-10-PCS | Mod: 26,,, | Performed by: PATHOLOGY

## 2021-07-29 PROCEDURE — 45385 COLONOSCOPY W/LESION REMOVAL: CPT | Mod: 33,,, | Performed by: INTERNAL MEDICINE

## 2021-07-29 PROCEDURE — 25000003 PHARM REV CODE 250: Performed by: NURSE ANESTHETIST, CERTIFIED REGISTERED

## 2021-07-29 RX ORDER — LIDOCAINE HCL/PF 100 MG/5ML
SYRINGE (ML) INTRAVENOUS
Status: DISCONTINUED | OUTPATIENT
Start: 2021-07-29 | End: 2021-07-29

## 2021-07-29 RX ORDER — PROPOFOL 10 MG/ML
VIAL (ML) INTRAVENOUS
Status: DISCONTINUED | OUTPATIENT
Start: 2021-07-29 | End: 2021-07-29

## 2021-07-29 RX ORDER — SODIUM CHLORIDE 9 MG/ML
INJECTION, SOLUTION INTRAVENOUS CONTINUOUS
Status: DISCONTINUED | OUTPATIENT
Start: 2021-07-29 | End: 2021-07-29 | Stop reason: HOSPADM

## 2021-07-29 RX ADMIN — PROPOFOL 40 MG: 10 INJECTION, EMULSION INTRAVENOUS at 08:07

## 2021-07-29 RX ADMIN — SODIUM CHLORIDE: 0.9 INJECTION, SOLUTION INTRAVENOUS at 07:07

## 2021-07-29 RX ADMIN — LIDOCAINE HYDROCHLORIDE 100 MG: 20 INJECTION INTRAVENOUS at 08:07

## 2021-07-29 RX ADMIN — PROPOFOL 100 MG: 10 INJECTION, EMULSION INTRAVENOUS at 08:07

## 2021-07-30 VITALS
SYSTOLIC BLOOD PRESSURE: 126 MMHG | DIASTOLIC BLOOD PRESSURE: 76 MMHG | BODY MASS INDEX: 27.26 KG/M2 | TEMPERATURE: 98 F | RESPIRATION RATE: 17 BRPM | WEIGHT: 190 LBS | OXYGEN SATURATION: 97 % | HEART RATE: 65 BPM

## 2021-08-03 LAB
FINAL PATHOLOGIC DIAGNOSIS: NORMAL
GROSS: NORMAL
Lab: NORMAL

## 2021-08-10 ENCOUNTER — PROCEDURE VISIT (OUTPATIENT)
Dept: SLEEP MEDICINE | Facility: HOSPITAL | Age: 66
End: 2021-08-10
Attending: INTERNAL MEDICINE
Payer: COMMERCIAL

## 2021-08-10 DIAGNOSIS — R53.83 LOSS OF ENERGY: ICD-10-CM

## 2021-08-10 DIAGNOSIS — R06.83 SNORING: ICD-10-CM

## 2021-08-10 DIAGNOSIS — G47.33 OSA (OBSTRUCTIVE SLEEP APNEA): ICD-10-CM

## 2021-08-10 DIAGNOSIS — G47.19 EXCESSIVE DAYTIME SLEEPINESS: ICD-10-CM

## 2021-08-10 PROCEDURE — 95810 POLYSOM 6/> YRS 4/> PARAM: CPT

## 2021-09-03 DIAGNOSIS — G47.33 OSA (OBSTRUCTIVE SLEEP APNEA): Primary | ICD-10-CM

## 2021-09-03 DIAGNOSIS — Z01.818 OTHER SPECIFIED PRE-OPERATIVE EXAMINATION: ICD-10-CM

## 2021-09-03 DIAGNOSIS — G47.19 EXCESSIVE DAYTIME SLEEPINESS: ICD-10-CM

## 2021-09-03 DIAGNOSIS — R06.83 SNORING: ICD-10-CM

## 2021-09-03 DIAGNOSIS — I49.9 ARRHYTHMIA: ICD-10-CM

## 2021-09-03 DIAGNOSIS — I10 HYPERTENSION: ICD-10-CM

## 2021-11-01 PROBLEM — Z13.9 ENCOUNTER FOR SCREENING: Status: RESOLVED | Noted: 2021-07-29 | Resolved: 2021-11-01

## 2021-11-02 ENCOUNTER — OFFICE VISIT (OUTPATIENT)
Dept: CARDIOLOGY | Facility: CLINIC | Age: 66
End: 2021-11-02
Payer: COMMERCIAL

## 2021-11-02 VITALS
OXYGEN SATURATION: 98 % | SYSTOLIC BLOOD PRESSURE: 132 MMHG | HEART RATE: 66 BPM | WEIGHT: 192 LBS | RESPIRATION RATE: 16 BRPM | DIASTOLIC BLOOD PRESSURE: 74 MMHG | HEIGHT: 70 IN | BODY MASS INDEX: 27.49 KG/M2

## 2021-11-02 DIAGNOSIS — R00.1 BRADYCARDIA: ICD-10-CM

## 2021-11-02 DIAGNOSIS — E78.5 HYPERLIPIDEMIA, UNSPECIFIED HYPERLIPIDEMIA TYPE: ICD-10-CM

## 2021-11-02 DIAGNOSIS — E78.2 MIXED HYPERLIPIDEMIA: ICD-10-CM

## 2021-11-02 DIAGNOSIS — K21.9 GASTROESOPHAGEAL REFLUX DISEASE WITHOUT ESOPHAGITIS: ICD-10-CM

## 2021-11-02 DIAGNOSIS — R94.31 NONSPECIFIC ABNORMAL ELECTROCARDIOGRAM (ECG) (EKG): ICD-10-CM

## 2021-11-02 DIAGNOSIS — I49.8 WANDERING ATRIAL PACEMAKER BY ELECTROCARDIOGRAM: ICD-10-CM

## 2021-11-02 DIAGNOSIS — R00.2 PALPITATIONS: Primary | ICD-10-CM

## 2021-11-02 DIAGNOSIS — I49.1 PREMATURE ATRIAL CONTRACTIONS: ICD-10-CM

## 2021-11-02 PROCEDURE — 93000 EKG 12-LEAD: ICD-10-PCS | Mod: S$GLB,,, | Performed by: INTERNAL MEDICINE

## 2021-11-02 PROCEDURE — 99214 PR OFFICE/OUTPT VISIT, EST, LEVL IV, 30-39 MIN: ICD-10-PCS | Mod: 25,S$GLB,, | Performed by: INTERNAL MEDICINE

## 2021-11-02 PROCEDURE — 93000 ELECTROCARDIOGRAM COMPLETE: CPT | Mod: S$GLB,,, | Performed by: INTERNAL MEDICINE

## 2021-11-02 PROCEDURE — 99214 OFFICE O/P EST MOD 30 MIN: CPT | Mod: 25,S$GLB,, | Performed by: INTERNAL MEDICINE

## 2021-11-02 RX ORDER — HYDROCORTISONE 25 MG/G
1 CREAM TOPICAL 2 TIMES DAILY
COMMUNITY
Start: 2021-10-21 | End: 2023-06-14

## 2021-11-15 ENCOUNTER — HOSPITAL ENCOUNTER (OUTPATIENT)
Dept: PREADMISSION TESTING | Facility: HOSPITAL | Age: 66
Discharge: HOME | End: 2021-11-15
Attending: INTERNAL MEDICINE
Payer: COMMERCIAL

## 2021-11-15 DIAGNOSIS — Z01.818 OTHER SPECIFIED PRE-OPERATIVE EXAMINATION: ICD-10-CM

## 2021-11-15 LAB — SARS-COV-2 RDRP RESP QL NAA+PROBE: NEGATIVE

## 2021-11-15 PROCEDURE — U0002 COVID-19 LAB TEST NON-CDC: HCPCS | Performed by: INTERNAL MEDICINE

## 2021-12-27 RX ORDER — OMEPRAZOLE 40 MG/1
CAPSULE, DELAYED RELEASE ORAL
Qty: 90 CAPSULE | Refills: 3 | Status: SHIPPED | OUTPATIENT
Start: 2021-12-27 | End: 2022-07-19

## 2021-12-27 RX ORDER — METHOCARBAMOL 500 MG/1
TABLET, FILM COATED ORAL
Qty: 90 TABLET | Refills: 11 | Status: SHIPPED | OUTPATIENT
Start: 2021-12-27 | End: 2023-06-14 | Stop reason: SDUPTHER

## 2021-12-28 ENCOUNTER — HOSPITAL ENCOUNTER (OUTPATIENT)
Dept: RADIOLOGY | Facility: CLINIC | Age: 66
Discharge: HOME | End: 2021-12-28
Attending: INTERNAL MEDICINE
Payer: COMMERCIAL

## 2021-12-28 ENCOUNTER — HOSPITAL ENCOUNTER (OUTPATIENT)
Dept: CARDIOLOGY | Facility: CLINIC | Age: 66
Discharge: HOME | End: 2021-12-28
Attending: INTERNAL MEDICINE
Payer: COMMERCIAL

## 2021-12-28 DIAGNOSIS — R94.31 NONSPECIFIC ABNORMAL ELECTROCARDIOGRAM (ECG) (EKG): ICD-10-CM

## 2021-12-28 DIAGNOSIS — E78.5 HYPERLIPIDEMIA, UNSPECIFIED HYPERLIPIDEMIA TYPE: ICD-10-CM

## 2021-12-28 DIAGNOSIS — I49.1 PREMATURE ATRIAL CONTRACTIONS: ICD-10-CM

## 2021-12-28 DIAGNOSIS — R00.1 BRADYCARDIA: ICD-10-CM

## 2021-12-28 DIAGNOSIS — I49.8 WANDERING ATRIAL PACEMAKER BY ELECTROCARDIOGRAM: ICD-10-CM

## 2021-12-28 DIAGNOSIS — R00.2 PALPITATIONS: ICD-10-CM

## 2021-12-28 DIAGNOSIS — E78.2 MIXED HYPERLIPIDEMIA: ICD-10-CM

## 2021-12-28 DIAGNOSIS — K21.9 GASTROESOPHAGEAL REFLUX DISEASE WITHOUT ESOPHAGITIS: ICD-10-CM

## 2021-12-28 PROCEDURE — 93015 STRESS TEST WITH MYOCARDIAL PERFUSION (CUPID ONLY): ICD-10-PCS | Mod: S$GLB,,, | Performed by: INTERNAL MEDICINE

## 2021-12-28 PROCEDURE — 78452 HT MUSCLE IMAGE SPECT MULT: CPT | Mod: S$GLB,,, | Performed by: INTERNAL MEDICINE

## 2021-12-28 PROCEDURE — 93015 CV STRESS TEST SUPVJ I&R: CPT | Mod: S$GLB,,, | Performed by: INTERNAL MEDICINE

## 2021-12-28 PROCEDURE — 78452 STRESS TEST WITH MYOCARDIAL PERFUSION (CUPID ONLY): ICD-10-PCS | Mod: S$GLB,,, | Performed by: INTERNAL MEDICINE

## 2021-12-28 PROCEDURE — A9502 STRESS TEST WITH MYOCARDIAL PERFUSION (CUPID ONLY): ICD-10-PCS | Mod: S$GLB,,, | Performed by: INTERNAL MEDICINE

## 2021-12-28 PROCEDURE — A9502 TC99M TETROFOSMIN: HCPCS | Mod: S$GLB,,, | Performed by: INTERNAL MEDICINE

## 2022-01-13 ENCOUNTER — OFFICE VISIT (OUTPATIENT)
Dept: CARDIOLOGY | Facility: CLINIC | Age: 67
End: 2022-01-13
Payer: COMMERCIAL

## 2022-01-13 VITALS
OXYGEN SATURATION: 96 % | SYSTOLIC BLOOD PRESSURE: 148 MMHG | HEART RATE: 89 BPM | WEIGHT: 197 LBS | DIASTOLIC BLOOD PRESSURE: 80 MMHG | HEIGHT: 70 IN | BODY MASS INDEX: 28.2 KG/M2

## 2022-01-13 DIAGNOSIS — R00.1 BRADYCARDIA: ICD-10-CM

## 2022-01-13 DIAGNOSIS — E78.2 MIXED HYPERLIPIDEMIA: ICD-10-CM

## 2022-01-13 DIAGNOSIS — R00.2 PALPITATIONS: ICD-10-CM

## 2022-01-13 DIAGNOSIS — R94.39 ABNORMAL FINDING ON CARDIOVASCULAR STRESS TEST: Primary | ICD-10-CM

## 2022-01-13 DIAGNOSIS — G47.8 SLEEP AROUSAL DISORDER: ICD-10-CM

## 2022-01-13 DIAGNOSIS — R94.31 NONSPECIFIC ABNORMAL ELECTROCARDIOGRAM (ECG) (EKG): ICD-10-CM

## 2022-01-13 DIAGNOSIS — I49.8 WANDERING ATRIAL PACEMAKER BY ELECTROCARDIOGRAM: ICD-10-CM

## 2022-01-13 DIAGNOSIS — E78.5 HYPERLIPIDEMIA, UNSPECIFIED HYPERLIPIDEMIA TYPE: ICD-10-CM

## 2022-01-13 DIAGNOSIS — K21.9 GASTROESOPHAGEAL REFLUX DISEASE, UNSPECIFIED WHETHER ESOPHAGITIS PRESENT: ICD-10-CM

## 2022-01-13 DIAGNOSIS — R06.83 SNORING: ICD-10-CM

## 2022-01-13 DIAGNOSIS — I49.1 FINDING OF MULTIPLE PREMATURE ATRIAL CONTRACTIONS BY ELECTROCARDIOGRAPHY: ICD-10-CM

## 2022-01-13 LAB
CV STRESS BASE HR: 54 BPM
DIASTOLIC BLOOD PRESSURE: 74 MMHG
EJECTION FRACTION- HIGH: 65 %
END DIASTOLIC INDEX-HIGH: 158 ML/M2
END DIASTOLIC INDEX-LOW: 94 ML/M2
END SYSTOLIC INDEX-HIGH: 71 ML/M2
END SYSTOLIC INDEX-LOW: 33 ML/M2
NUC STRESS DIASTOLIC VOLUME INDEX: 72
NUC STRESS EJECTION FRACTION: 57 %
NUC STRESS SYSTOLIC VOLUME INDEX: 31
OHS CV CPX 1 MINUTE RECOVERY HEART RATE: 115 BPM
OHS CV CPX 85 PERCENT MAX PREDICTED HEART RATE MALE: 133
OHS CV CPX ESTIMATED METS: 10
OHS CV CPX MAX PREDICTED HEART RATE: 156
OHS CV CPX PATIENT IS FEMALE: 0
OHS CV CPX PATIENT IS MALE: 1
OHS CV CPX PEAK DIASTOLIC BLOOD PRESSURE: 88 MMHG
OHS CV CPX PEAK HEAR RATE: 137 BPM
OHS CV CPX PEAK RATE PRESSURE PRODUCT: NORMAL
OHS CV CPX PEAK SYSTOLIC BLOOD PRESSURE: 162 MMHG
OHS CV CPX PERCENT MAX PREDICTED HEART RATE ACHIEVED: 88
OHS CV CPX RATE PRESSURE PRODUCT PRESENTING: 6696
RETIRED EF AND QEF - SEE NOTES: 53 %
STRESS ECHO POST EXERCISE DUR MIN: 8 MINUTES
STRESS ECHO POST EXERCISE DUR SEC: 21 SECONDS
STRESS ST DEPRESSION: 1.1 MM
SYSTOLIC BLOOD PRESSURE: 124 MMHG

## 2022-01-13 PROCEDURE — 1159F MED LIST DOCD IN RCRD: CPT | Mod: CPTII,S$GLB,, | Performed by: INTERNAL MEDICINE

## 2022-01-13 PROCEDURE — 3079F PR MOST RECENT DIASTOLIC BLOOD PRESSURE 80-89 MM HG: ICD-10-PCS | Mod: CPTII,S$GLB,, | Performed by: INTERNAL MEDICINE

## 2022-01-13 PROCEDURE — 1159F PR MEDICATION LIST DOCUMENTED IN MEDICAL RECORD: ICD-10-PCS | Mod: CPTII,S$GLB,, | Performed by: INTERNAL MEDICINE

## 2022-01-13 PROCEDURE — 3008F BODY MASS INDEX DOCD: CPT | Mod: CPTII,S$GLB,, | Performed by: INTERNAL MEDICINE

## 2022-01-13 PROCEDURE — 99214 PR OFFICE/OUTPT VISIT, EST, LEVL IV, 30-39 MIN: ICD-10-PCS | Mod: S$GLB,,, | Performed by: INTERNAL MEDICINE

## 2022-01-13 PROCEDURE — 3008F PR BODY MASS INDEX (BMI) DOCUMENTED: ICD-10-PCS | Mod: CPTII,S$GLB,, | Performed by: INTERNAL MEDICINE

## 2022-01-13 PROCEDURE — 3079F DIAST BP 80-89 MM HG: CPT | Mod: CPTII,S$GLB,, | Performed by: INTERNAL MEDICINE

## 2022-01-13 PROCEDURE — 3077F PR MOST RECENT SYSTOLIC BLOOD PRESSURE >= 140 MM HG: ICD-10-PCS | Mod: CPTII,S$GLB,, | Performed by: INTERNAL MEDICINE

## 2022-01-13 PROCEDURE — 3077F SYST BP >= 140 MM HG: CPT | Mod: CPTII,S$GLB,, | Performed by: INTERNAL MEDICINE

## 2022-01-13 PROCEDURE — 99214 OFFICE O/P EST MOD 30 MIN: CPT | Mod: S$GLB,,, | Performed by: INTERNAL MEDICINE

## 2022-01-13 NOTE — PROGRESS NOTES
Subjective:    Patient ID:  Colt Rose is a 66 y.o. male     Chief Complaint   Patient presents with    Hyperlipidemia    Palpitations    Results     Stress & holter       HPI:  Mr Colt Rose is a 66 y.o. male is here for follow-up.  He patient had a stress test done recently.  Patient has been doing well no specific complaints at the present time his breathing is good denies any shortness of breath or difficulty in breathing denies any chest pain or tightness or heaviness denies any dizziness or lightheadedness or loss of consciousness falls or head injury.      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    Cervical disc displacement     Degeneration of lumbar intervertebral disc     GERD (gastroesophageal reflux disease)     gastric polyps    Hip pain 2/22/2016    Hyperlipidemia     Shingles 11/2013     Past Surgical History:   Procedure Laterality Date    CERVICAL FUSION      bone graft    COLONOSCOPY N/A 7/29/2021    Procedure: COLONOSCOPY;  Surgeon: Tyler Story MD;  Location: [facility] ENDO;  Service: Endoscopy;  Laterality: N/A;    EPIDURAL STEROID INJECTION INTO LUMBAR SPINE N/A 12/5/2019    Procedure: Injection-steroid-epidural-lumbar;  Surgeon: Adarsh Kraft MD;  Location: [facility] OR;  Service: Pain Management;  Laterality: N/A;  L5-S1    ESOPHAGOGASTRODUODENOSCOPY N/A 10/27/2020    Procedure: EGD (ESOPHAGOGASTRODUODENOSCOPY);  Surgeon: Tyler Story MD;  Location: [facility] ENDO;  Service: Endoscopy;  Laterality: N/A;    INJECTION OF ANESTHETIC AGENT AROUND MEDIAL BRANCH NERVES INNERVATING LUMBAR FACET JOINT Bilateral 11/18/2020    Procedure: BLOCK, NERVE, LUMBAR, MEDIAL BRANCH Bilateral L3,4,5;  Surgeon: Adarsh Kraft MD;  Location: [facility] OR;  Service: Pain Management;  Laterality: Bilateral;    RADIOFREQUENCY ABLATION OF LUMBAR MEDIAL BRANCH  NERVE AT SINGLE LEVEL Bilateral 1/29/2021    Procedure: Radiofrequency Ablation, Nerve, Spinal, Lumbar, Medial Branch, 1 Level;  Surgeon: Adarsh Kraft MD;  Location: [facility] OR;  Service: Pain Management;  Laterality: Bilateral;  L3,4,5    TONSILLECTOMY, ADENOIDECTOMY       Social History     Tobacco Use    Smoking status: Never Smoker    Smokeless tobacco: Never Used   Substance Use Topics    Alcohol use: Yes     Alcohol/week: 6.0 standard drinks     Types: 6 Cans of beer per week     Comment: weekly or less    Drug use: No     Family History   Problem Relation Age of Onset    Diabetes Mother     Hypertension Mother     Stroke Mother     Coronary artery disease Mother     Stroke Father     Heart disease Brother         CAD        Review of Systems:   Constitution: Negative for diaphoresis and fever.   HEENT: Negative for nosebleeds.    Cardiovascular: Negative for chest pain       No dyspnea on exertion       No leg swelling        No palpitations  Respiratory: Negative for shortness of breath and wheezing.    Hematologic/Lymphatic: Negative for bleeding problem. Does not bruise/bleed easily.   Skin: Negative for color change and rash.   Musculoskeletal: Negative for falls and myalgias.   Gastrointestinal: Negative for hematemesis and hematochezia.   Genitourinary: Negative for hematuria.   Neurological: Negative for dizziness and light-headedness.   Psychiatric/Behavioral: Negative for altered mental status and memory loss.          Objective:        Vitals:    01/13/22 1505   BP: (!) 148/80   Pulse: 89       Lab Results   Component Value Date    WBC 5.81 09/29/2020    HGB 14.7 09/29/2020    HCT 42.7 09/29/2020     09/29/2020    CHOL 181 05/24/2021    TRIG 93 05/24/2021    HDL 59 05/24/2021    ALT 32 05/24/2021    AST 29 05/24/2021     05/24/2021    K 4.3 05/24/2021     05/24/2021    CREATININE 0.8 05/24/2021    BUN 24 (H) 05/24/2021    CO2 27 05/24/2021    TSH 2.370 09/28/2020    PSA  0.49 05/24/2021    INR 1.1 09/28/2020    HGBA1C 5.6 09/23/2020        ECHOCARDIOGRAM RESULTS  Results for orders placed during the hospital encounter of 09/28/20    Echo Color Flow Doppler? Yes    Interpretation Summary  · The estimated PA systolic pressure is 34 mmHg.  · There is moderate left ventricular concentric hypertrophy.  · The left ventricle is normal in size with normal systolic function. The estimated ejection fraction is 78%.  · Normal left ventricular diastolic function.  · Normal right ventricular systolic function.  · There is mild pulmonary hypertension.  · Mild to moderate tricuspid regurgitation.  · Mild pulmonic regurgitation.  · Normal central venous pressure (3 mmHg).        CURRENT/PREVIOUS VISIT EKG  Results for orders placed or performed in visit on 11/02/21   IN OFFICE EKG 12-LEAD (to [place])    Collection Time: 11/02/21  2:02 PM    Narrative    Test Reason : R00.2,E78.5,    Vent. Rate : 066 BPM     Atrial Rate : 066 BPM     P-R Int : 162 ms          QRS Dur : 098 ms      QT Int : 392 ms       P-R-T Axes : 043 072 005 degrees     QTc Int : 410 ms    Sinus rhythm with Blocked Premature atrial complexes  Possible Anterior infarct ,age undetermined  Abnormal ECG  When compared with ECG of 20-OCT-2020 16:51,  No significant change was found  Confirmed by Daljit Freeman MD ([id number]) on 12/1/2021 3:46:53 PM    Referred By: ABBE   SELF           Confirmed By:Daljit Freeman MD     No valid procedures specified.   Results for orders placed during the hospital encounter of 12/28/21    Nuclear Stress - Cardiology Interpreted    Interpretation Summary    Normal myocardial perfusion scan. There is no evidence of myocardial ischemia or infarction.    The gated perfusion images showed an ejection fraction of 57% post stress. Normal ejection fraction is greater than 53%.    LV cavity size is normal at rest and normal at stress.There is abnormal wall motion at rest and post stress.    The EKG portion of  this study is positive for ischemia.    The patient reported no chest pain during the stress test.    Patient exercised on a Shaan protocol for 8 minutes and 21 seconds and attained a maximum heart rate of 137 beats per minute which is 87% of the maximum predicted heart rate and attained 10 point 1 METs.  And had normal blood pressure response to exercise.      Physical Exam:  CONSTITUTIONAL: No fever, no chills  HEENT: Normocephalic, atraumatic,pupils reactive to light                 NECK:  No JVD no carotid bruit  CVS: S1S2+, RRR, no murmurs,   LUNGS: Clear  ABDOMEN: Soft, NT, BS+  EXTREMITIES: No cyanosis, edema  : No castro catheter  NEURO: AAO X 3  PSY: Normal affect      Medication List with Changes/Refills   Current Medications    ATORVASTATIN (LIPITOR) 10 MG TABLET    Take 1 tablet (10 mg total) by mouth once daily.    DOCOSAHEXAENOIC ACID/EPA (FISH OIL ORAL)    Take 1 capsule by mouth once daily.    FLUOCINOLONE ACETONIDE OIL 0.01 % DROP    Place 5 drops into both eyes 2 (two) times daily as needed.     FLUOROURACIL (EFUDEX) 5 % CREAM        HYDROCORTISONE 2.5 % CREAM    Apply 1 application topically 2 (two) times daily.    MELOXICAM (MOBIC) 15 MG TABLET    TAKE 1 TABLET DAILY    METHOCARBAMOL (ROBAXIN) 500 MG TAB    TAKE 1 TABLET THREE TIMES A DAY AS NEEDED    METOPROLOL SUCCINATE (TOPROL-XL) 25 MG 24 HR TABLET    TAKE 1 TABLET DAILY    MULTIVITAMIN (THERAGRAN) PER TABLET    Take 1 tablet by mouth once daily.    NEOMYCIN-FLUOCINOLONE (MANUEL-SYNALAR) 0.5 % (0.35 % BASE)-0.025 % CREA    Apply 1 application topically 2 (two) times daily.    OMEPRAZOLE (PRILOSEC) 40 MG CAPSULE    TAKE 1 CAPSULE DAILY    PNEUMOCOCCAL 23-CASH PS (PNEUMOVAX-23) 25 MCG/0.5 ML VACCINE    Inject into the muscle.    UBIDECARENONE (CO Q-10 ORAL)    Take 1 capsule by mouth once daily.             Assessment:       1. Abnormal finding on cardiovascular stress test    2. Finding of multiple premature atrial contractions by  electrocardiography    3. Palpitations    4. Hyperlipidemia, unspecified hyperlipidemia type    5. 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    6. Mixed hyperlipidemia    7. Nonspecific abnormal electrocardiogram (ECG) (EKG)    8. Gastroesophageal reflux disease, unspecified whether esophagitis present    9. Bradycardia    10. Snoring    11. Sleep arousal disorder         Plan:   1. Patient had undergone exercise stress Cardiolite study.  And he actually exercised on a Shaan protocol for 8 minutes and 21 seconds and attained a heart rate of 137 beats per minute with his 87% of the maximum predicted heart rate.  During that time patient did not have any chest pain and had normal blood pressure response.  2. There is no reversible focal perfusion defect on his Cardiolite portion of the study.  However the EKG portion of the study shows and ST depression per somewhat upward sloping however it was 1.5 mm ST-depression.  Etiology is unclear at the present time.  3. The stress echo the gated images show that there was and apical wall abnormal wall motion with overall left ventricle ejection fraction of 57%.  4. Discussed with patient in regards with the about testing and options include but not limited to medical management versus coronary angiography versus exercise stress echo.  Patient would prefer to proceed with the exercise stress echo.  5. If the exercise stress echo is positive for ischemia he would need coronary angiography.    6. Patient had a Holter monitor done and he has frequent PACs almost up to 20,000. And he had short runs of rapid heart rate because of it.  He also had atrial bigeminy and atrial trigeminy.    7. Patient is on metoprolol succinate 25 mg p.o. q.day continue the same.  May need to check his potassium and magnesium levels.        Problem List Items Addressed This Visit        Cardiac/Vascular    Hyperlipidemia    Relevant Orders    Stress Echo Which stress agent will be used? Treadmill  Exercise; Color Flow Doppler? No    Basic Metabolic Panel    Magnesium    Stress Echo Which stress agent will be used? Treadmill Exercise; Color Flow Doppler? No    Basic Metabolic Panel    Magnesium    Palpitations    Relevant Orders    Stress Echo Which stress agent will be used? Treadmill Exercise; Color Flow Doppler? No    Basic Metabolic Panel    Magnesium    Bradycardia    Relevant Orders    Stress Echo Which stress agent will be used? Treadmill Exercise; Color Flow Doppler? No    Basic Metabolic Panel    Magnesium    Nonspecific abnormal electrocardiogram (ECG) (EKG)    Relevant Orders    Stress Echo Which stress agent will be used? Treadmill Exercise; Color Flow Doppler? No    Basic Metabolic Panel    Magnesium    Mixed hyperlipidemia    Relevant Orders    Stress Echo Which stress agent will be used? Treadmill Exercise; Color Flow Doppler? No    Basic Metabolic Panel    Magnesium    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    Relevant Orders    Stress Echo Which stress agent will be used? Treadmill Exercise; Color Flow Doppler? No    Basic Metabolic Panel    Magnesium    Abnormal finding on cardiovascular stress test - Primary    Relevant Orders    Stress Echo Which stress agent will be used? Treadmill Exercise; Color Flow Doppler? No    Basic Metabolic Panel    Magnesium       GI    Gastroesophageal reflux disease    Relevant Orders    Stress Echo Which stress agent will be used? Treadmill Exercise; Color Flow Doppler? No    Basic Metabolic Panel    Magnesium       Other    Sleep arousal disorder    Relevant Orders    Stress Echo Which stress agent will be used? Treadmill Exercise; Color Flow Doppler? No    Basic Metabolic Panel    Magnesium    Snoring    Relevant Orders    Stress Echo Which stress agent will be used? Treadmill Exercise; Color Flow Doppler? No    Basic Metabolic Panel    Magnesium      Other Visit Diagnoses     Finding of multiple premature atrial contractions by electrocardiography               No follow-ups on file.

## 2022-01-14 ENCOUNTER — LAB VISIT (OUTPATIENT)
Dept: LAB | Facility: HOSPITAL | Age: 67
End: 2022-01-14
Attending: INTERNAL MEDICINE
Payer: COMMERCIAL

## 2022-01-14 DIAGNOSIS — E78.2 MIXED HYPERLIPIDEMIA: ICD-10-CM

## 2022-01-14 DIAGNOSIS — R00.2 PALPITATIONS: ICD-10-CM

## 2022-01-14 DIAGNOSIS — E78.5 HYPERLIPIDEMIA, UNSPECIFIED HYPERLIPIDEMIA TYPE: ICD-10-CM

## 2022-01-14 DIAGNOSIS — R94.31 NONSPECIFIC ABNORMAL ELECTROCARDIOGRAM (ECG) (EKG): ICD-10-CM

## 2022-01-14 DIAGNOSIS — K21.9 GASTROESOPHAGEAL REFLUX DISEASE, UNSPECIFIED WHETHER ESOPHAGITIS PRESENT: ICD-10-CM

## 2022-01-14 DIAGNOSIS — R06.83 SNORING: ICD-10-CM

## 2022-01-14 DIAGNOSIS — R00.1 BRADYCARDIA: ICD-10-CM

## 2022-01-14 DIAGNOSIS — R94.39 ABNORMAL FINDING ON CARDIOVASCULAR STRESS TEST: ICD-10-CM

## 2022-01-14 DIAGNOSIS — G47.8 SLEEP AROUSAL DISORDER: ICD-10-CM

## 2022-01-14 DIAGNOSIS — I49.8 WANDERING ATRIAL PACEMAKER BY ELECTROCARDIOGRAM: ICD-10-CM

## 2022-01-14 LAB
ANION GAP SERPL CALC-SCNC: 9 MMOL/L (ref 8–16)
BUN SERPL-MCNC: 18 MG/DL (ref 8–23)
CALCIUM SERPL-MCNC: 9.3 MG/DL (ref 8.7–10.5)
CHLORIDE SERPL-SCNC: 102 MMOL/L (ref 95–110)
CO2 SERPL-SCNC: 26 MMOL/L (ref 23–29)
CREAT SERPL-MCNC: 0.8 MG/DL (ref 0.5–1.4)
EST. GFR  (AFRICAN AMERICAN): >60 ML/MIN/1.73 M^2
EST. GFR  (NON AFRICAN AMERICAN): >60 ML/MIN/1.73 M^2
GLUCOSE SERPL-MCNC: 120 MG/DL (ref 70–110)
MAGNESIUM SERPL-MCNC: 1.9 MG/DL (ref 1.6–2.6)
POTASSIUM SERPL-SCNC: 4.3 MMOL/L (ref 3.5–5.1)
SODIUM SERPL-SCNC: 137 MMOL/L (ref 136–145)

## 2022-01-14 PROCEDURE — 83735 ASSAY OF MAGNESIUM: CPT | Performed by: INTERNAL MEDICINE

## 2022-01-14 PROCEDURE — 36415 COLL VENOUS BLD VENIPUNCTURE: CPT | Performed by: INTERNAL MEDICINE

## 2022-01-14 PROCEDURE — 80048 BASIC METABOLIC PNL TOTAL CA: CPT | Performed by: INTERNAL MEDICINE

## 2022-01-31 ENCOUNTER — CLINICAL SUPPORT (OUTPATIENT)
Dept: CARDIOLOGY | Facility: HOSPITAL | Age: 67
End: 2022-01-31
Attending: INTERNAL MEDICINE
Payer: COMMERCIAL

## 2022-01-31 DIAGNOSIS — K21.9 GASTROESOPHAGEAL REFLUX DISEASE, UNSPECIFIED WHETHER ESOPHAGITIS PRESENT: ICD-10-CM

## 2022-01-31 DIAGNOSIS — R00.2 PALPITATIONS: ICD-10-CM

## 2022-01-31 DIAGNOSIS — E78.5 HYPERLIPIDEMIA, UNSPECIFIED HYPERLIPIDEMIA TYPE: ICD-10-CM

## 2022-01-31 DIAGNOSIS — R94.31 NONSPECIFIC ABNORMAL ELECTROCARDIOGRAM (ECG) (EKG): ICD-10-CM

## 2022-01-31 DIAGNOSIS — G47.8 SLEEP AROUSAL DISORDER: ICD-10-CM

## 2022-01-31 DIAGNOSIS — I49.8 WANDERING ATRIAL PACEMAKER BY ELECTROCARDIOGRAM: ICD-10-CM

## 2022-01-31 DIAGNOSIS — R06.83 SNORING: ICD-10-CM

## 2022-01-31 DIAGNOSIS — R94.39 ABNORMAL FINDING ON CARDIOVASCULAR STRESS TEST: ICD-10-CM

## 2022-01-31 DIAGNOSIS — R00.1 BRADYCARDIA: ICD-10-CM

## 2022-01-31 DIAGNOSIS — E78.2 MIXED HYPERLIPIDEMIA: ICD-10-CM

## 2022-01-31 LAB
CV STRESS BASE HR: 61 BPM
DIASTOLIC BLOOD PRESSURE: 78 MMHG
E WAVE DECELERATION TIME: 242.45 MSEC
E/A RATIO: 1.41
EJECTION FRACTION: 65 %
LV LATERAL E/E' RATIO: 4.14 M/S
MV PEAK A VEL: 0.41 M/S
MV PEAK E VEL: 0.58 M/S
OHS CV CPX 1 MINUTE RECOVERY HEART RATE: 112 BPM
OHS CV CPX 85 PERCENT MAX PREDICTED HEART RATE MALE: 133
OHS CV CPX ESTIMATED METS: 12
OHS CV CPX MAX PREDICTED HEART RATE: 156
OHS CV CPX PATIENT IS FEMALE: 0
OHS CV CPX PATIENT IS MALE: 1
OHS CV CPX PEAK DIASTOLIC BLOOD PRESSURE: 76 MMHG
OHS CV CPX PEAK HEAR RATE: 138 BPM
OHS CV CPX PEAK RATE PRESSURE PRODUCT: NORMAL
OHS CV CPX PEAK SYSTOLIC BLOOD PRESSURE: 229 MMHG
OHS CV CPX PERCENT MAX PREDICTED HEART RATE ACHIEVED: 88
OHS CV CPX RATE PRESSURE PRODUCT PRESENTING: 7747
PISA TR MAX VEL: 2.52 M/S
STRESS ANGINA INDEX: 0
STRESS ECHO POST EXERCISE DUR MIN: 10 MINUTES
STRESS ECHO POST EXERCISE DUR SEC: 30 SECONDS
SYSTOLIC BLOOD PRESSURE: 127 MMHG
TDI LATERAL: 0.14 M/S
TR MAX PG: 25 MMHG

## 2022-01-31 PROCEDURE — 93351 STRESS ECHO (CUPID ONLY): ICD-10-PCS | Mod: 26,,, | Performed by: SPECIALIST

## 2022-01-31 PROCEDURE — 93351 STRESS TTE COMPLETE: CPT | Mod: 26,,, | Performed by: SPECIALIST

## 2022-01-31 PROCEDURE — 93351 STRESS TTE COMPLETE: CPT

## 2022-02-09 ENCOUNTER — TELEPHONE (OUTPATIENT)
Dept: CARDIOLOGY | Facility: CLINIC | Age: 67
End: 2022-02-09
Payer: COMMERCIAL

## 2022-02-18 ENCOUNTER — OFFICE VISIT (OUTPATIENT)
Dept: CARDIOLOGY | Facility: CLINIC | Age: 67
End: 2022-02-18
Payer: COMMERCIAL

## 2022-02-18 VITALS
HEIGHT: 70 IN | HEART RATE: 71 BPM | OXYGEN SATURATION: 99 % | SYSTOLIC BLOOD PRESSURE: 126 MMHG | DIASTOLIC BLOOD PRESSURE: 78 MMHG | BODY MASS INDEX: 28.2 KG/M2 | WEIGHT: 197 LBS

## 2022-02-18 DIAGNOSIS — R94.31 NONSPECIFIC ABNORMAL ELECTROCARDIOGRAM (ECG) (EKG): ICD-10-CM

## 2022-02-18 DIAGNOSIS — K21.9 GASTROESOPHAGEAL REFLUX DISEASE WITHOUT ESOPHAGITIS: ICD-10-CM

## 2022-02-18 DIAGNOSIS — R00.2 PALPITATIONS: ICD-10-CM

## 2022-02-18 DIAGNOSIS — I49.1 PAC (PREMATURE ATRIAL CONTRACTION): ICD-10-CM

## 2022-02-18 DIAGNOSIS — E78.2 MIXED HYPERLIPIDEMIA: ICD-10-CM

## 2022-02-18 DIAGNOSIS — R94.39 ABNORMAL FINDING ON CARDIOVASCULAR STRESS TEST: Primary | ICD-10-CM

## 2022-02-18 DIAGNOSIS — I49.8 WANDERING ATRIAL PACEMAKER BY ELECTROCARDIOGRAM: ICD-10-CM

## 2022-02-18 DIAGNOSIS — E78.5 HYPERLIPIDEMIA, UNSPECIFIED HYPERLIPIDEMIA TYPE: ICD-10-CM

## 2022-02-18 DIAGNOSIS — I49.3 PVC (PREMATURE VENTRICULAR CONTRACTION): ICD-10-CM

## 2022-02-18 DIAGNOSIS — I49.1 PREMATURE ATRIAL CONTRACTIONS: ICD-10-CM

## 2022-02-18 PROCEDURE — 3008F BODY MASS INDEX DOCD: CPT | Mod: CPTII,S$GLB,, | Performed by: NURSE PRACTITIONER

## 2022-02-18 PROCEDURE — 3074F PR MOST RECENT SYSTOLIC BLOOD PRESSURE < 130 MM HG: ICD-10-PCS | Mod: CPTII,S$GLB,, | Performed by: NURSE PRACTITIONER

## 2022-02-18 PROCEDURE — 3078F PR MOST RECENT DIASTOLIC BLOOD PRESSURE < 80 MM HG: ICD-10-PCS | Mod: CPTII,S$GLB,, | Performed by: NURSE PRACTITIONER

## 2022-02-18 PROCEDURE — 1159F MED LIST DOCD IN RCRD: CPT | Mod: CPTII,S$GLB,, | Performed by: NURSE PRACTITIONER

## 2022-02-18 PROCEDURE — 99214 PR OFFICE/OUTPT VISIT, EST, LEVL IV, 30-39 MIN: ICD-10-PCS | Mod: S$GLB,,, | Performed by: NURSE PRACTITIONER

## 2022-02-18 PROCEDURE — 99214 OFFICE O/P EST MOD 30 MIN: CPT | Mod: S$GLB,,, | Performed by: NURSE PRACTITIONER

## 2022-02-18 PROCEDURE — 1159F PR MEDICATION LIST DOCUMENTED IN MEDICAL RECORD: ICD-10-PCS | Mod: CPTII,S$GLB,, | Performed by: NURSE PRACTITIONER

## 2022-02-18 PROCEDURE — 1160F RVW MEDS BY RX/DR IN RCRD: CPT | Mod: CPTII,S$GLB,, | Performed by: NURSE PRACTITIONER

## 2022-02-18 PROCEDURE — 1160F PR REVIEW ALL MEDS BY PRESCRIBER/CLIN PHARMACIST DOCUMENTED: ICD-10-PCS | Mod: CPTII,S$GLB,, | Performed by: NURSE PRACTITIONER

## 2022-02-18 PROCEDURE — 3074F SYST BP LT 130 MM HG: CPT | Mod: CPTII,S$GLB,, | Performed by: NURSE PRACTITIONER

## 2022-02-18 PROCEDURE — 3008F PR BODY MASS INDEX (BMI) DOCUMENTED: ICD-10-PCS | Mod: CPTII,S$GLB,, | Performed by: NURSE PRACTITIONER

## 2022-02-18 PROCEDURE — 3078F DIAST BP <80 MM HG: CPT | Mod: CPTII,S$GLB,, | Performed by: NURSE PRACTITIONER

## 2022-02-18 RX ORDER — LANOLIN ALCOHOL/MO/W.PET/CERES
200 CREAM (GRAM) TOPICAL DAILY
COMMUNITY
End: 2022-12-01

## 2022-02-18 RX ORDER — METOPROLOL SUCCINATE 25 MG/1
12.5 TABLET, EXTENDED RELEASE ORAL DAILY
Qty: 90 TABLET | Refills: 3
Start: 2022-02-18 | End: 2022-07-26 | Stop reason: SDUPTHER

## 2022-02-18 NOTE — PROGRESS NOTES
Subjective:    Patient ID:  Colt Rose is a 66 y.o. male     HPI:  Patient presents today for follow-up appointment.  Patient denies any chest pain, dizziness, shortness of breath, or syncope. Patient states he has intermittent transient palpitations. Patient has been doing well and taking medications as ordered.  Denies any falls or head injuries.  Denies any blood in the stool or in the urine.        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    Cervical disc displacement     Degeneration of lumbar intervertebral disc     GERD (gastroesophageal reflux disease)     gastric polyps    Hip pain 2/22/2016    Hyperlipidemia     Shingles 11/2013     Past Surgical History:   Procedure Laterality Date    CERVICAL FUSION      bone graft    COLONOSCOPY N/A 7/29/2021    Procedure: COLONOSCOPY;  Surgeon: Tyler Story MD;  Location: [facility];  Service: Endoscopy;  Laterality: N/A;    EPIDURAL STEROID INJECTION INTO LUMBAR SPINE N/A 12/5/2019    Procedure: Injection-steroid-epidural-lumbar;  Surgeon: Adarsh Kraft MD;  Location: [facility];  Service: Pain Management;  Laterality: N/A;  L5-S1    ESOPHAGOGASTRODUODENOSCOPY N/A 10/27/2020    Procedure: EGD (ESOPHAGOGASTRODUODENOSCOPY);  Surgeon: Tyler Story MD;  Location: [facility];  Service: Endoscopy;  Laterality: N/A;    INJECTION OF ANESTHETIC AGENT AROUND MEDIAL BRANCH NERVES INNERVATING LUMBAR FACET JOINT Bilateral 11/18/2020    Procedure: BLOCK, NERVE, LUMBAR, MEDIAL BRANCH Bilateral L3,4,5;  Surgeon: Adarsh Kraft MD;  Location: [facility];  Service: Pain Management;  Laterality: Bilateral;    RADIOFREQUENCY ABLATION OF LUMBAR MEDIAL BRANCH NERVE AT SINGLE LEVEL Bilateral 1/29/2021    Procedure: Radiofrequency Ablation, Nerve, Spinal, Lumbar, Medial Branch, 1 Level;  Surgeon: Adarsh Kraft MD;  Location: [facility];   Service: Pain Management;  Laterality: Bilateral;  L3,4,5    TONSILLECTOMY, ADENOIDECTOMY       Social History     Tobacco Use    Smoking status: Never Smoker    Smokeless tobacco: Never Used   Substance Use Topics    Alcohol use: Yes     Alcohol/week: 6.0 standard drinks     Types: 6 Cans of beer per week     Comment: weekly or less    Drug use: No     Family History   Problem Relation Age of Onset    Diabetes Mother     Hypertension Mother     Stroke Mother     Coronary artery disease Mother     Stroke Father     Heart disease Brother         CAD        Review of Systems:   Constitution: Negative for diaphoresis and fever.   HEENT: Negative for nosebleeds.    Cardiovascular: Negative for chest pain       No dyspnea on exertion       No leg swelling        + intermittent transient palpitations  Respiratory: Negative for shortness of breath and wheezing.    Hematologic/Lymphatic: Negative for bleeding problem. Does not bruise/bleed easily.   Skin: Negative for color change and rash.   Musculoskeletal: Negative for falls and myalgias.   Gastrointestinal: Negative for hematemesis and hematochezia.   Genitourinary: Negative for hematuria.   Neurological: Negative for dizziness and light-headedness.   Psychiatric/Behavioral: Negative for altered mental status and memory loss.          Objective:        Vitals:    02/18/22 1516   BP: 126/78   Pulse: 71       Lab Results   Component Value Date    WBC 5.81 09/29/2020    HGB 14.7 09/29/2020    HCT 42.7 09/29/2020     09/29/2020    CHOL 181 05/24/2021    TRIG 93 05/24/2021    HDL 59 05/24/2021    ALT 32 05/24/2021    AST 29 05/24/2021     01/14/2022    K 4.3 01/14/2022     01/14/2022    CREATININE 0.8 01/14/2022    BUN 18 01/14/2022    CO2 26 01/14/2022    TSH 2.370 09/28/2020    PSA 0.49 05/24/2021    INR 1.1 09/28/2020    HGBA1C 5.6 09/23/2020        ECHOCARDIOGRAM RESULTS  Results for orders placed in visit on 01/31/22    Stress Echo Which  stress agent will be used? Treadmill Exercise; Color Flow Doppler? No    Interpretation Summary  · The patient reached the end of the protocol.  · There were no arrhythmias during stress.  · The left ventricle is normal in size with  · The estimated ejection fraction is 65%.  · Normal left ventricular diastolic function.  · Atrial fibrillation not observed.  · Normal right ventricular size with normal right ventricular systolic function.  · The stress echo portion of this study is negative for myocardial ischemia.  · The ECG portion of this study is negative for myocardial ischemia.  · Pre exercise tricuspid regurgitation was 2.5 m/sec postexercise was 3.4 which is slightly abnormal  · Nonspecific J-point depressions noted with no diagnostic ischemic ratio        CURRENT/PREVIOUS VISIT EKG  Results for orders placed or performed in visit on 11/02/21   IN OFFICE EKG 12-LEAD (to [employer])    Collection Time: 11/02/21  2:02 PM    Narrative    Test Reason : R00.2,E78.5,    Vent. Rate : 066 BPM     Atrial Rate : 066 BPM     P-R Int : 162 ms          QRS Dur : 098 ms      QT Int : 392 ms       P-R-T Axes : 043 072 005 degrees     QTc Int : 410 ms    Sinus rhythm with Blocked Premature atrial complexes  Possible Anterior infarct ,age undetermined  Abnormal ECG  When compared with ECG of 20-OCT-2020 16:51,  No significant change was found  Confirmed by Daljit Freeman MD ([id number]) on 12/1/2021 3:46:53 PM    Referred By: ABBE   SELF           Confirmed By:Daljit Freeman MD     No valid procedures specified.   Results for orders placed during the hospital encounter of 12/28/21    Nuclear Stress - Cardiology Interpreted    Interpretation Summary    Normal myocardial perfusion scan. There is no evidence of myocardial ischemia or infarction.    The gated perfusion images showed an ejection fraction of 57% post stress. Normal ejection fraction is greater than 53%.    LV cavity size is normal at rest and normal at stress.There is  abnormal wall motion at rest and post stress.    The EKG portion of this study is positive for ischemia.    The patient reported no chest pain during the stress test.    Patient exercised on a Shaan protocol for 8 minutes and 21 seconds and attained a maximum heart rate of 137 beats per minute which is 87% of the maximum predicted heart rate and attained 10 point 1 METs.  And had normal blood pressure response to exercise.      Physical Exam:  CONSTITUTIONAL: No fever, no chills  HEENT: Normocephalic, atraumatic,pupils reactive to light                 NECK:  No JVD no carotid bruit  CVS: S1S2+, RRR  LUNGS: Clear  ABDOMEN: Soft, NT, BS+  EXTREMITIES: No cyanosis, edema  : No castro catheter  NEURO: AAO X 3  PSY: Normal affect      Medication List with Changes/Refills   Current Medications    ATORVASTATIN (LIPITOR) 10 MG TABLET    Take 1 tablet (10 mg total) by mouth once daily.    DOCOSAHEXAENOIC ACID/EPA (FISH OIL ORAL)    Take 1 capsule by mouth once daily.    FLUOCINOLONE ACETONIDE OIL 0.01 % DROP    Place 5 drops into both eyes 2 (two) times daily as needed.     FLUOROURACIL (EFUDEX) 5 % CREAM        HYDROCORTISONE 2.5 % CREAM    Apply 1 application topically 2 (two) times daily.    MAGNESIUM OXIDE (MAG-OX) 400 MG (241.3 MG MAGNESIUM) TABLET    Take 200 mg by mouth once daily.    MELOXICAM (MOBIC) 15 MG TABLET    TAKE 1 TABLET DAILY    METHOCARBAMOL (ROBAXIN) 500 MG TAB    TAKE 1 TABLET THREE TIMES A DAY AS NEEDED    MULTIVITAMIN (THERAGRAN) PER TABLET    Take 1 tablet by mouth once daily.    NEOMYCIN-FLUOCINOLONE (MANUEL-SYNALAR) 0.5 % (0.35 % BASE)-0.025 % CREA    Apply 1 application topically 2 (two) times daily.    OMEPRAZOLE (PRILOSEC) 40 MG CAPSULE    TAKE 1 CAPSULE DAILY    PNEUMOCOCCAL 23-CASH PS (PNEUMOVAX-23) 25 MCG/0.5 ML VACCINE    Inject into the muscle.    UBIDECARENONE (CO Q-10 ORAL)    Take 1 capsule by mouth once daily.   Changed and/or Refilled Medications    Modified Medication Previous  Medication    METOPROLOL SUCCINATE (TOPROL-XL) 25 MG 24 HR TABLET metoprolol succinate (TOPROL-XL) 25 MG 24 hr tablet       Take 0.5 tablets (12.5 mg total) by mouth once daily.    TAKE 1 TABLET DAILY             Assessment:       1. Abnormal finding on cardiovascular stress test    2. 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    3. Mixed hyperlipidemia    4. Nonspecific abnormal electrocardiogram (ECG) (EKG)    5. Premature atrial contractions    6. Gastroesophageal reflux disease without esophagitis    7. Hyperlipidemia, unspecified hyperlipidemia type    8. Palpitations    9. PVC (premature ventricular contraction)    10. PAC (premature atrial contraction)         Plan:     1. Stress echocardiogram reviewed. Stress echocardiogram was negative for ischemia.   2. Patient has been using his cpap at home and feeling good overall. He noticed his HR has improved.   3. Continue metoprolol succinate 12.5 mg po daily.   4. Increase mag ox to 200 mg po daily.   5. Will see him back for follow up in about 6 months. May call or return sooner if problems arise.     Problem List Items Addressed This Visit        Unprioritized    Gastroesophageal reflux disease    Hyperlipidemia    Palpitations    Relevant Medications    metoprolol succinate (TOPROL-XL) 25 MG 24 hr tablet    Premature atrial contractions    Nonspecific abnormal electrocardiogram (ECG) (EKG)    Mixed hyperlipidemia    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    Abnormal finding on cardiovascular stress test - Primary      Other Visit Diagnoses     PVC (premature ventricular contraction)        Relevant Medications    metoprolol succinate (TOPROL-XL) 25 MG 24 hr tablet    PAC (premature atrial contraction)        Relevant Medications    metoprolol succinate (TOPROL-XL) 25 MG 24 hr tablet          Follow up in about 6 months (around 8/18/2022).

## 2022-06-13 ENCOUNTER — OFFICE VISIT (OUTPATIENT)
Dept: FAMILY MEDICINE | Facility: CLINIC | Age: 67
End: 2022-06-13
Payer: COMMERCIAL

## 2022-06-13 VITALS
DIASTOLIC BLOOD PRESSURE: 72 MMHG | SYSTOLIC BLOOD PRESSURE: 130 MMHG | HEART RATE: 88 BPM | HEIGHT: 70 IN | BODY MASS INDEX: 28.37 KG/M2 | WEIGHT: 198.19 LBS | OXYGEN SATURATION: 97 %

## 2022-06-13 DIAGNOSIS — M23.92 INTERNAL DERANGEMENT OF LEFT KNEE: ICD-10-CM

## 2022-06-13 DIAGNOSIS — E78.2 MIXED HYPERLIPIDEMIA: Primary | ICD-10-CM

## 2022-06-13 PROCEDURE — 3078F PR MOST RECENT DIASTOLIC BLOOD PRESSURE < 80 MM HG: ICD-10-PCS | Mod: CPTII,S$GLB,, | Performed by: FAMILY MEDICINE

## 2022-06-13 PROCEDURE — 99999 PR PBB SHADOW E&M-EST. PATIENT-LVL III: CPT | Mod: PBBFAC,,, | Performed by: FAMILY MEDICINE

## 2022-06-13 PROCEDURE — 3008F BODY MASS INDEX DOCD: CPT | Mod: CPTII,S$GLB,, | Performed by: FAMILY MEDICINE

## 2022-06-13 PROCEDURE — 99396 PR PREVENTIVE VISIT,EST,40-64: ICD-10-PCS | Mod: S$GLB,,, | Performed by: FAMILY MEDICINE

## 2022-06-13 PROCEDURE — 3075F PR MOST RECENT SYSTOLIC BLOOD PRESS GE 130-139MM HG: ICD-10-PCS | Mod: CPTII,S$GLB,, | Performed by: FAMILY MEDICINE

## 2022-06-13 PROCEDURE — 1159F MED LIST DOCD IN RCRD: CPT | Mod: CPTII,S$GLB,, | Performed by: FAMILY MEDICINE

## 2022-06-13 PROCEDURE — 3078F DIAST BP <80 MM HG: CPT | Mod: CPTII,S$GLB,, | Performed by: FAMILY MEDICINE

## 2022-06-13 PROCEDURE — 3008F PR BODY MASS INDEX (BMI) DOCUMENTED: ICD-10-PCS | Mod: CPTII,S$GLB,, | Performed by: FAMILY MEDICINE

## 2022-06-13 PROCEDURE — 1159F PR MEDICATION LIST DOCUMENTED IN MEDICAL RECORD: ICD-10-PCS | Mod: CPTII,S$GLB,, | Performed by: FAMILY MEDICINE

## 2022-06-13 PROCEDURE — 3075F SYST BP GE 130 - 139MM HG: CPT | Mod: CPTII,S$GLB,, | Performed by: FAMILY MEDICINE

## 2022-06-13 PROCEDURE — 99396 PREV VISIT EST AGE 40-64: CPT | Mod: S$GLB,,, | Performed by: FAMILY MEDICINE

## 2022-06-13 PROCEDURE — 99999 PR PBB SHADOW E&M-EST. PATIENT-LVL III: ICD-10-PCS | Mod: PBBFAC,,, | Performed by: FAMILY MEDICINE

## 2022-06-13 RX ORDER — MELOXICAM 15 MG/1
15 TABLET ORAL DAILY PRN
Qty: 90 TABLET | Refills: 3 | Status: SHIPPED | OUTPATIENT
Start: 2022-06-13 | End: 2023-02-13

## 2022-06-13 NOTE — PATIENT INSTRUCTIONS
Homero Gregory,     If you are due for any health screening(s) below please notify me so we can arrange them to be ordered and scheduled to maintain your health. Most healthy patients complete it. Don't lose out on improving your health.     Health Maintenance   Topic Date Due    PROSTATE-SPECIFIC ANTIGEN  05/24/2022    Lipid Panel  05/24/2022    TETANUS VACCINE  10/20/2025    Hepatitis C Screening  Completed

## 2022-06-14 ENCOUNTER — LAB VISIT (OUTPATIENT)
Dept: LAB | Facility: HOSPITAL | Age: 67
End: 2022-06-14
Attending: FAMILY MEDICINE
Payer: COMMERCIAL

## 2022-06-14 DIAGNOSIS — E78.2 MIXED HYPERLIPIDEMIA: ICD-10-CM

## 2022-06-14 LAB
ALBUMIN SERPL BCP-MCNC: 4 G/DL (ref 3.5–5.2)
ALP SERPL-CCNC: 47 U/L (ref 55–135)
ALT SERPL W/O P-5'-P-CCNC: 25 U/L (ref 10–44)
ANION GAP SERPL CALC-SCNC: 13 MMOL/L (ref 8–16)
AST SERPL-CCNC: 21 U/L (ref 10–40)
BILIRUB SERPL-MCNC: 0.8 MG/DL (ref 0.1–1)
BUN SERPL-MCNC: 21 MG/DL (ref 8–23)
CALCIUM SERPL-MCNC: 9.4 MG/DL (ref 8.7–10.5)
CHLORIDE SERPL-SCNC: 102 MMOL/L (ref 95–110)
CHOLEST SERPL-MCNC: 180 MG/DL (ref 120–199)
CHOLEST/HDLC SERPL: 3.5 {RATIO} (ref 2–5)
CO2 SERPL-SCNC: 24 MMOL/L (ref 23–29)
COMPLEXED PSA SERPL-MCNC: 0.54 NG/ML (ref 0–4)
CREAT SERPL-MCNC: 0.8 MG/DL (ref 0.5–1.4)
EST. GFR  (AFRICAN AMERICAN): >60 ML/MIN/1.73 M^2
EST. GFR  (NON AFRICAN AMERICAN): >60 ML/MIN/1.73 M^2
ESTIMATED AVG GLUCOSE: 117 MG/DL (ref 68–131)
GLUCOSE SERPL-MCNC: 100 MG/DL (ref 70–110)
HBA1C MFR BLD: 5.7 % (ref 4–5.6)
HDLC SERPL-MCNC: 51 MG/DL (ref 40–75)
HDLC SERPL: 28.3 % (ref 20–50)
LDLC SERPL CALC-MCNC: 107.6 MG/DL (ref 63–159)
NONHDLC SERPL-MCNC: 129 MG/DL
POTASSIUM SERPL-SCNC: 4.2 MMOL/L (ref 3.5–5.1)
PROT SERPL-MCNC: 6.9 G/DL (ref 6–8.4)
SODIUM SERPL-SCNC: 139 MMOL/L (ref 136–145)
TRIGL SERPL-MCNC: 107 MG/DL (ref 30–150)
URATE SERPL-MCNC: 5.2 MG/DL (ref 3.4–7)

## 2022-06-14 PROCEDURE — 84153 ASSAY OF PSA TOTAL: CPT | Performed by: FAMILY MEDICINE

## 2022-06-14 PROCEDURE — 36415 COLL VENOUS BLD VENIPUNCTURE: CPT | Mod: PO | Performed by: FAMILY MEDICINE

## 2022-06-14 PROCEDURE — 80061 LIPID PANEL: CPT | Performed by: FAMILY MEDICINE

## 2022-06-14 PROCEDURE — 83036 HEMOGLOBIN GLYCOSYLATED A1C: CPT | Performed by: FAMILY MEDICINE

## 2022-06-14 PROCEDURE — 84550 ASSAY OF BLOOD/URIC ACID: CPT | Performed by: FAMILY MEDICINE

## 2022-06-14 PROCEDURE — 80053 COMPREHEN METABOLIC PANEL: CPT | Performed by: FAMILY MEDICINE

## 2022-06-14 NOTE — PROGRESS NOTES
Subjective:   Patient ID: Colt Rose is a 66 y.o. male     Chief Complaint:Annual Exam      Here for checkup    Review of Systems   Constitutional: Negative for chills and fever.   HENT: Negative for sore throat and trouble swallowing.    Respiratory: Negative for cough and shortness of breath.    Cardiovascular: Negative for chest pain and leg swelling.   Gastrointestinal: Negative for abdominal distention and abdominal pain.   Genitourinary: Negative for dysuria and flank pain.   Musculoskeletal: Negative for arthralgias and back pain.   Skin: Negative for color change and pallor.   Neurological: Negative for weakness and headaches.   Psychiatric/Behavioral: Negative for agitation and confusion.     Past Medical History:   Diagnosis Date    Arthritis of hand 8/6/2015    Cervical disc displacement     Degeneration of lumbar intervertebral disc     GERD (gastroesophageal reflux disease)     gastric polyps    Hip pain 2/22/2016    Hyperlipidemia     Shingles 11/2013     Past Surgical History:   Procedure Laterality Date    CERVICAL FUSION      bone graft    COLONOSCOPY N/A 7/29/2021    Procedure: COLONOSCOPY;  Surgeon: Tyler Story MD;  Location: [facility];  Service: Endoscopy;  Laterality: N/A;    EPIDURAL STEROID INJECTION INTO LUMBAR SPINE N/A 12/5/2019    Procedure: Injection-steroid-epidural-lumbar;  Surgeon: Adarsh Kraft MD;  Location: [facility] OR;  Service: Pain Management;  Laterality: N/A;  L5-S1    ESOPHAGOGASTRODUODENOSCOPY N/A 10/27/2020    Procedure: EGD (ESOPHAGOGASTRODUODENOSCOPY);  Surgeon: Tyler Story MD;  Location: [facility];  Service: Endoscopy;  Laterality: N/A;    INJECTION OF ANESTHETIC AGENT AROUND MEDIAL BRANCH NERVES INNERVATING LUMBAR FACET JOINT Bilateral 11/18/2020    Procedure: BLOCK, NERVE, LUMBAR, MEDIAL BRANCH Bilateral L3,4,5;  Surgeon: Adarsh Kraft MD;  Location: [facility] OR;  Service: Pain Management;  Laterality: Bilateral;    RADIOFREQUENCY ABLATION OF LUMBAR  MEDIAL BRANCH NERVE AT SINGLE LEVEL Bilateral 1/29/2021    Procedure: Radiofrequency Ablation, Nerve, Spinal, Lumbar, Medial Branch, 1 Level;  Surgeon: Adarsh Kraft MD;  Location: [facility] OR;  Service: Pain Management;  Laterality: Bilateral;  L3,4,5    TONSILLECTOMY, ADENOIDECTOMY       Objective:     Vitals:    06/13/22 0840   BP: 130/72   Pulse: 88     Body mass index is 28.44 kg/m².  Physical Exam  Vitals and nursing note reviewed.   Constitutional:       Appearance: He is well-developed.   HENT:      Head: Normocephalic and atraumatic.   Eyes:      General: No scleral icterus.     Conjunctiva/sclera: Conjunctivae normal.   Cardiovascular:      Heart sounds: No murmur heard.  Pulmonary:      Effort: Pulmonary effort is normal. No respiratory distress.   Musculoskeletal:         General: No deformity. Normal range of motion.      Cervical back: Normal range of motion and neck supple.   Skin:     Coloration: Skin is not pale.      Findings: No rash.   Neurological:      Mental Status: He is alert and oriented to person, place, and time.   Psychiatric:         Behavior: Behavior normal.         Thought Content: Thought content normal.         Judgment: Judgment normal.       Assessment:     1. Mixed hyperlipidemia    2. Internal derangement of left knee      Plan:   Mixed hyperlipidemia  -     Lipid Panel; Future; Expected date: 06/13/2022  -     Comprehensive Metabolic Panel; Future; Expected date: 06/13/2022  -     Hemoglobin A1C; Future; Expected date: 06/13/2022  -     PSA, SCREENING; Future; Expected date: 06/13/2022  -     URIC ACID; Future; Expected date: 06/13/2022    Internal derangement of left knee  -     meloxicam (MOBIC) 15 MG tablet; Take 1 tablet (15 mg total) by mouth daily as needed for Pain.  Dispense: 90 tablet; Refill: 3        Jeffery Mena MD  06/14/2022    Portions of this note have been dictated with CHRIS Fernandez.

## 2022-07-11 ENCOUNTER — IMMUNIZATION (OUTPATIENT)
Dept: PRIMARY CARE CLINIC | Facility: CLINIC | Age: 67
End: 2022-07-11
Payer: MEDICARE

## 2022-07-11 DIAGNOSIS — Z23 NEED FOR VACCINATION: Primary | ICD-10-CM

## 2022-07-11 PROCEDURE — 91306 COVID-19, MRNA, LNP-S, PF, 100 MCG/0.25 ML DOSE VACCINE (MODERNA BOOSTER): ICD-10-PCS | Mod: S$GLB,,, | Performed by: FAMILY MEDICINE

## 2022-07-11 PROCEDURE — 0064A COVID-19, MRNA, LNP-S, PF, 100 MCG/0.25 ML DOSE VACCINE (MODERNA BOOSTER): CPT | Mod: S$GLB,,, | Performed by: FAMILY MEDICINE

## 2022-07-11 PROCEDURE — 0064A COVID-19, MRNA, LNP-S, PF, 100 MCG/0.25 ML DOSE VACCINE (MODERNA BOOSTER): ICD-10-PCS | Mod: S$GLB,,, | Performed by: FAMILY MEDICINE

## 2022-07-11 PROCEDURE — 91306 COVID-19, MRNA, LNP-S, PF, 100 MCG/0.25 ML DOSE VACCINE (MODERNA BOOSTER): CPT | Mod: S$GLB,,, | Performed by: FAMILY MEDICINE

## 2022-07-18 NOTE — TELEPHONE ENCOUNTER
No new care gaps identified.  [facility] Embedded Care Gaps. Reference number: [id number]. 7/18/2022   10:57:34 AM CAMILAT

## 2022-07-18 NOTE — TELEPHONE ENCOUNTER
No new care gaps identified.  [facility] Embedded Care Gaps. Reference number: [id number]. 7/18/2022   10:57:03 AM CAMILAT

## 2022-07-19 RX ORDER — OMEPRAZOLE 40 MG/1
CAPSULE, DELAYED RELEASE ORAL
Qty: 90 CAPSULE | Refills: 3 | Status: SHIPPED | OUTPATIENT
Start: 2022-07-19 | End: 2023-05-08

## 2022-07-19 RX ORDER — ATORVASTATIN CALCIUM 10 MG/1
TABLET, FILM COATED ORAL
Qty: 90 TABLET | Refills: 3 | Status: SHIPPED | OUTPATIENT
Start: 2022-07-19 | End: 2023-05-08

## 2022-07-19 NOTE — TELEPHONE ENCOUNTER
Refill Decision Note   Colt Rose  is requesting a refill authorization.  Brief Assessment and Rationale for Refill:  Approve     Medication Therapy Plan:       Medication Reconciliation Completed: No   Comments:     No Care Gaps recommended.     Note composed:11:36 AM 07/19/2022

## 2022-07-19 NOTE — TELEPHONE ENCOUNTER
Refill Decision Note   Colt Rose  is requesting a refill authorization.  Brief Assessment and Rationale for Refill:  Approve     Medication Therapy Plan:       Medication Reconciliation Completed: No   Comments:     No Care Gaps recommended.     Note composed:11:31 AM 07/19/2022

## 2022-07-26 DIAGNOSIS — R00.2 PALPITATIONS: ICD-10-CM

## 2022-07-26 DIAGNOSIS — I49.3 PVC (PREMATURE VENTRICULAR CONTRACTION): ICD-10-CM

## 2022-07-26 DIAGNOSIS — I49.1 PAC (PREMATURE ATRIAL CONTRACTION): ICD-10-CM

## 2022-07-26 RX ORDER — METOPROLOL SUCCINATE 25 MG/1
12.5 TABLET, EXTENDED RELEASE ORAL DAILY
Qty: 90 TABLET | Refills: 3
Start: 2022-07-26 | End: 2022-12-01

## 2022-07-26 NOTE — TELEPHONE ENCOUNTER
No new care gaps identified.  [facility] Embedded Care Gaps. Reference number: [id number]. 7/26/2022   8:37:06 AM CDT

## 2022-07-27 ENCOUNTER — PATIENT MESSAGE (OUTPATIENT)
Dept: GASTROENTEROLOGY | Facility: CLINIC | Age: 67
End: 2022-07-27
Payer: MEDICARE

## 2022-07-27 NOTE — TELEPHONE ENCOUNTER
Reached out to patient to follow up with his questions about medication. Patient wants providers office to contact medicare and deem Methocarbamol medically necessary since the medication is easier on his stomach. Explained to patient that our provider didn't originally prescribe this medication and would need to contact his PCP. Patient denies. Provided patient with 2 prior dates that medication was prescribed by his PCP. Explained to patient that since the medication was not a GI medication and our provider did ever prescribe the medication he would not be able to provide such do documentation. Patient encourage to reach out to PCP office for assistance.

## 2022-07-28 RX ORDER — ATORVASTATIN CALCIUM 10 MG/1
TABLET, FILM COATED ORAL
Qty: 90 TABLET | Refills: 3 | OUTPATIENT
Start: 2022-07-28

## 2022-07-28 NOTE — TELEPHONE ENCOUNTER
Refill Decision Note   Colt Rose  is requesting a refill authorization.  Brief Assessment and Rationale for Refill:  Quick Discontinue     Medication Therapy Plan:  ORDERED 7/19/22    Medication Reconciliation Completed: No   Comments:     No Care Gaps recommended.     Note composed:10:19 AM 07/28/2022

## 2022-07-28 NOTE — TELEPHONE ENCOUNTER
No new care gaps identified.  [facility] Embedded Care Gaps. Reference number: [id number]. 7/28/2022   12:09:26 AM CAMILAT

## 2022-12-01 ENCOUNTER — LAB VISIT (OUTPATIENT)
Dept: LAB | Facility: HOSPITAL | Age: 67
End: 2022-12-01
Attending: NURSE PRACTITIONER
Payer: MEDICARE

## 2022-12-01 ENCOUNTER — TELEPHONE (OUTPATIENT)
Dept: CARDIOLOGY | Facility: CLINIC | Age: 67
End: 2022-12-01

## 2022-12-01 ENCOUNTER — OFFICE VISIT (OUTPATIENT)
Dept: CARDIOLOGY | Facility: CLINIC | Age: 67
End: 2022-12-01
Payer: MEDICARE

## 2022-12-01 VITALS
BODY MASS INDEX: 28.72 KG/M2 | HEIGHT: 70 IN | DIASTOLIC BLOOD PRESSURE: 90 MMHG | SYSTOLIC BLOOD PRESSURE: 138 MMHG | WEIGHT: 200.63 LBS | HEART RATE: 64 BPM

## 2022-12-01 DIAGNOSIS — R73.03 PREDIABETES: ICD-10-CM

## 2022-12-01 DIAGNOSIS — I49.1 PREMATURE ATRIAL COMPLEXES: ICD-10-CM

## 2022-12-01 DIAGNOSIS — I49.1 PREMATURE ATRIAL COMPLEXES: Primary | ICD-10-CM

## 2022-12-01 DIAGNOSIS — I49.8 WANDERING ATRIAL PACEMAKER BY ELECTROCARDIOGRAM: ICD-10-CM

## 2022-12-01 DIAGNOSIS — R00.1 BRADYCARDIA: ICD-10-CM

## 2022-12-01 LAB
ANION GAP SERPL CALC-SCNC: 6 MMOL/L (ref 8–16)
BUN SERPL-MCNC: 24 MG/DL (ref 8–23)
CALCIUM SERPL-MCNC: 9.2 MG/DL (ref 8.7–10.5)
CHLORIDE SERPL-SCNC: 103 MMOL/L (ref 95–110)
CO2 SERPL-SCNC: 27 MMOL/L (ref 23–29)
CREAT SERPL-MCNC: 0.7 MG/DL (ref 0.5–1.4)
EST. GFR  (NO RACE VARIABLE): >60 ML/MIN/1.73 M^2
GLUCOSE SERPL-MCNC: 98 MG/DL (ref 70–110)
MAGNESIUM SERPL-MCNC: 1.9 MG/DL (ref 1.6–2.6)
POTASSIUM SERPL-SCNC: 4.3 MMOL/L (ref 3.5–5.1)
SODIUM SERPL-SCNC: 136 MMOL/L (ref 136–145)
T4 FREE SERPL-MCNC: 0.75 NG/DL (ref 0.71–1.51)
TSH SERPL DL<=0.005 MIU/L-ACNC: 2.77 UIU/ML (ref 0.34–5.6)

## 2022-12-01 PROCEDURE — 99213 OFFICE O/P EST LOW 20 MIN: CPT | Mod: PBBFAC,PN | Performed by: NURSE PRACTITIONER

## 2022-12-01 PROCEDURE — 99999 PR PBB SHADOW E&M-EST. PATIENT-LVL III: ICD-10-PCS | Mod: PBBFAC,,, | Performed by: NURSE PRACTITIONER

## 2022-12-01 PROCEDURE — 99214 PR OFFICE/OUTPT VISIT, EST, LEVL IV, 30-39 MIN: ICD-10-PCS | Mod: S$PBB,,, | Performed by: NURSE PRACTITIONER

## 2022-12-01 PROCEDURE — 36415 COLL VENOUS BLD VENIPUNCTURE: CPT | Performed by: NURSE PRACTITIONER

## 2022-12-01 PROCEDURE — 83735 ASSAY OF MAGNESIUM: CPT | Performed by: NURSE PRACTITIONER

## 2022-12-01 PROCEDURE — 99999 PR PBB SHADOW E&M-EST. PATIENT-LVL III: CPT | Mod: PBBFAC,,, | Performed by: NURSE PRACTITIONER

## 2022-12-01 PROCEDURE — 80048 BASIC METABOLIC PNL TOTAL CA: CPT | Performed by: NURSE PRACTITIONER

## 2022-12-01 PROCEDURE — 84443 ASSAY THYROID STIM HORMONE: CPT | Performed by: NURSE PRACTITIONER

## 2022-12-01 PROCEDURE — 93010 ELECTROCARDIOGRAM REPORT: CPT | Mod: S$PBB,,, | Performed by: INTERNAL MEDICINE

## 2022-12-01 PROCEDURE — 99214 OFFICE O/P EST MOD 30 MIN: CPT | Mod: S$PBB,,, | Performed by: NURSE PRACTITIONER

## 2022-12-01 PROCEDURE — 93005 ELECTROCARDIOGRAM TRACING: CPT | Mod: PBBFAC,PN | Performed by: INTERNAL MEDICINE

## 2022-12-01 PROCEDURE — 93010 EKG 12-LEAD: ICD-10-PCS | Mod: S$PBB,,, | Performed by: INTERNAL MEDICINE

## 2022-12-01 PROCEDURE — 84439 ASSAY OF FREE THYROXINE: CPT | Performed by: NURSE PRACTITIONER

## 2022-12-01 RX ORDER — LANOLIN ALCOHOL/MO/W.PET/CERES
400 CREAM (GRAM) TOPICAL DAILY
Qty: 90 TABLET | Refills: 3 | Status: SHIPPED | OUTPATIENT
Start: 2022-12-01 | End: 2023-12-14

## 2022-12-01 RX ORDER — METOPROLOL TARTRATE 25 MG/1
12.5 TABLET, FILM COATED ORAL 2 TIMES DAILY
Qty: 90 TABLET | Refills: 3 | Status: SHIPPED | OUTPATIENT
Start: 2022-12-01 | End: 2023-02-01

## 2022-12-01 NOTE — ASSESSMENT & PLAN NOTE
EKG today shows sinus rhythm with frequent PACs. Increase mag ox to 400 mg po Daily.   Change to metoprolol tartrate 12.5 mg po BID.   Will have him come for an EKG in about 2 weeks to evaluate PAC presence.

## 2022-12-01 NOTE — PROGRESS NOTES
Subjective:    Patient ID:  Colt Rose is a 67 y.o. male   Chief Complaint   Patient presents with    Hyperlipidemia       HPI:  Patient presents today for follow-up appointment.  Patient has no complaints of chest pain, dizziness, shortness of breath, palpitations, or syncope.  Patient has been doing well and taking medications as ordered.  Denies any falls or head injuries.  Denies any blood in the stool or in the urine.      Review of patient's allergies indicates:   Allergen Reactions    Pennsaid [diclofenac sodium] Rash and Blisters       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    Cervical disc displacement     Degeneration of lumbar intervertebral disc     GERD (gastroesophageal reflux disease)     gastric polyps    Hip pain 2/22/2016    Hyperlipidemia     Shingles 11/2013     Past Surgical History:   Procedure Laterality Date    CERVICAL FUSION      bone graft    COLONOSCOPY N/A 7/29/2021    Procedure: COLONOSCOPY;  Surgeon: Tyler Story MD;  Location: [facility];  Service: Endoscopy;  Laterality: N/A;    EPIDURAL STEROID INJECTION INTO LUMBAR SPINE N/A 12/5/2019    Procedure: Injection-steroid-epidural-lumbar;  Surgeon: Adarsh Kraft MD;  Location: [facility] OR;  Service: Pain Management;  Laterality: N/A;  L5-S1    ESOPHAGOGASTRODUODENOSCOPY N/A 10/27/2020    Procedure: EGD (ESOPHAGOGASTRODUODENOSCOPY);  Surgeon: Tyler Story MD;  Location: [facility];  Service: Endoscopy;  Laterality: N/A;    INJECTION OF ANESTHETIC AGENT AROUND MEDIAL BRANCH NERVES INNERVATING LUMBAR FACET JOINT Bilateral 11/18/2020    Procedure: BLOCK, NERVE, LUMBAR, MEDIAL BRANCH Bilateral L3,4,5;  Surgeon: Adarsh Kraft MD;  Location: [facility] OR;  Service: Pain Management;  Laterality: Bilateral;    RADIOFREQUENCY ABLATION OF LUMBAR MEDIAL BRANCH NERVE AT SINGLE LEVEL Bilateral 1/29/2021    Procedure: Radiofrequency Ablation, Nerve, Spinal, Lumbar, Medial Branch, 1 Level;  Surgeon: Adarsh Kraft MD;   Location: [facility] OR;  Service: Pain Management;  Laterality: Bilateral;  L3,4,5    TONSILLECTOMY, ADENOIDECTOMY       Social History     Tobacco Use    Smoking status: Never    Smokeless tobacco: Never   Substance Use Topics    Alcohol use: Yes     Alcohol/week: 6.0 standard drinks     Types: 6 Cans of beer per week     Comment: weekly or less    Drug use: No     Family History   Problem Relation Age of Onset    Diabetes Mother     Hypertension Mother     Stroke Mother     Coronary artery disease Mother     Stroke Father     Heart disease Brother         CAD        Review of Systems:   Constitution: Negative for diaphoresis and fever.   HEENT: Negative for nosebleeds.    Cardiovascular: Negative for chest pain       No dyspnea on exertion       No leg swelling        No palpitations  Respiratory: Negative for shortness of breath and wheezing.    Hematologic/Lymphatic: Negative for bleeding problem. Does not bruise/bleed easily.   Skin: Negative for color change and rash.   Musculoskeletal: Negative for falls and myalgias.   Gastrointestinal: Negative for hematemesis and hematochezia.   Genitourinary: Negative for hematuria.   Neurological: Negative for dizziness and light-headedness.   Psychiatric/Behavioral: Negative for altered mental status and memory loss.          Objective:        Vitals:    12/01/22 0914   BP: (!) 138/90   Pulse: 64       Lab Results   Component Value Date    WBC 5.81 09/29/2020    HGB 14.7 09/29/2020    HCT 42.7 09/29/2020     09/29/2020    CHOL 180 06/14/2022    TRIG 107 06/14/2022    HDL 51 06/14/2022    ALT 25 06/14/2022    AST 21 06/14/2022     06/14/2022    K 4.2 06/14/2022     06/14/2022    CREATININE 0.8 06/14/2022    BUN 21 06/14/2022    CO2 24 06/14/2022    TSH 2.370 09/28/2020    PSA 0.54 06/14/2022    INR 1.1 09/28/2020    HGBA1C 5.7 (H) 06/14/2022        ECHOCARDIOGRAM RESULTS  Results for orders placed in visit on 01/31/22    Stress Echo Which stress agent will  be used? Treadmill Exercise; Color Flow Doppler? No    Interpretation Summary  · The patient reached the end of the protocol.  · There were no arrhythmias during stress.  · The left ventricle is normal in size with  · The estimated ejection fraction is 65%.  · Normal left ventricular diastolic function.  · Atrial fibrillation not observed.  · Normal right ventricular size with normal right ventricular systolic function.  · The stress echo portion of this study is negative for myocardial ischemia.  · The ECG portion of this study is negative for myocardial ischemia.  · Pre exercise tricuspid regurgitation was 2.5 m/sec postexercise was 3.4 which is slightly abnormal  · Nonspecific J-point depressions noted with no diagnostic ischemic ratio        CURRENT/PREVIOUS VISIT EKG  Results for orders placed or performed in visit on 11/02/21   IN OFFICE EKG 12-LEAD (to jellyfish)    Collection Time: 11/02/21  2:02 PM    Narrative    Test Reason : R00.2,E78.5,    Vent. Rate : 066 BPM     Atrial Rate : 066 BPM     P-R Int : 162 ms          QRS Dur : 098 ms      QT Int : 392 ms       P-R-T Axes : 043 072 005 degrees     QTc Int : 410 ms    Sinus rhythm with Blocked Premature atrial complexes  Possible Anterior infarct ,age undetermined  Abnormal ECG  When compared with ECG of 20-OCT-2020 16:51,  No significant change was found  Confirmed by Daljit Freeman MD ([id number]) on 12/1/2021 3:46:53 PM    Referred By: ABBE   SELF           Confirmed By:Daljit Freeman MD     No valid procedures specified.   Results for orders placed during the hospital encounter of 12/28/21    Nuclear Stress - Cardiology Interpreted    Interpretation Summary    Normal myocardial perfusion scan. There is no evidence of myocardial ischemia or infarction.    The gated perfusion images showed an ejection fraction of 57% post stress. Normal ejection fraction is greater than 53%.    LV cavity size is normal at rest and normal at stress.There is abnormal wall motion  at rest and post stress.    The EKG portion of this study is positive for ischemia.    The patient reported no chest pain during the stress test.    Patient exercised on a Shaan protocol for 8 minutes and 21 seconds and attained a maximum heart rate of 137 beats per minute which is 87% of the maximum predicted heart rate and attained 10 point 1 METs.  And had normal blood pressure response to exercise.      Physical Exam:  CONSTITUTIONAL: No fever, no chills  HEENT: Normocephalic, atraumatic,pupils reactive to light                 NECK:  No JVD no carotid bruit  CVS: S1S2+, RRR  LUNGS: Clear  ABDOMEN: Soft, NT, BS+  EXTREMITIES: No cyanosis, edema  : No castro catheter  NEURO: AAO X 3  PSY: Normal affect      Medication List with Changes/Refills   New Medications    MAGNESIUM OXIDE (MAG-OX) 400 MG (241.3 MG MAGNESIUM) TABLET    Take 1 tablet (400 mg total) by mouth once daily.    METOPROLOL TARTRATE (LOPRESSOR) 25 MG TABLET    Take 0.5 tablets (12.5 mg total) by mouth 2 (two) times daily.   Current Medications    ATORVASTATIN (LIPITOR) 10 MG TABLET    Take one tablet by mouth daily    DOCOSAHEXAENOIC ACID/EPA (FISH OIL ORAL)    Take 1 capsule by mouth once daily.    FLUOCINOLONE ACETONIDE OIL 0.01 % DROP    Place 5 drops into both eyes 2 (two) times daily as needed.     FLUOROURACIL (EFUDEX) 5 % CREAM        HYDROCORTISONE 2.5 % CREAM    Apply 1 application topically 2 (two) times daily.    MELOXICAM (MOBIC) 15 MG TABLET    Take 1 tablet (15 mg total) by mouth daily as needed for Pain.    METHOCARBAMOL (ROBAXIN) 500 MG TAB    TAKE 1 TABLET THREE TIMES A DAY AS NEEDED    MULTIVITAMIN (THERAGRAN) PER TABLET    Take 1 tablet by mouth once daily.    NEOMYCIN-FLUOCINOLONE (MANUEL-SYNALAR) 0.5 % (0.35 % BASE)-0.025 % CREA    Apply 1 application topically 2 (two) times daily.    OMEPRAZOLE (PRILOSEC) 40 MG CAPSULE    Take one capsule by mouth daily    PNEUMOCOCCAL 23-CASH PS (PNEUMOVAX-23) 25 MCG/0.5 ML VACCINE    Inject  into the muscle.    UBIDECARENONE (CO Q-10 ORAL)    Take 1 capsule by mouth once daily.   Discontinued Medications    MAGNESIUM OXIDE (MAG-OX) 400 MG (241.3 MG MAGNESIUM) TABLET    Take 200 mg by mouth once daily.    METOPROLOL SUCCINATE (TOPROL-XL) 25 MG 24 HR TABLET    Take 0.5 tablets (12.5 mg total) by mouth once daily.             Assessment:       1. Premature atrial complexes    2. Prediabetes    3. Bradycardia    4. 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 Increase mag ox to 400 mg po Daily.   Change to metoprolol tartrate 12.5 mg po BID.   Will have him come for an EKG in about 2 weeks to evaluate PAC presence.          Relevant Medications    magnesium oxide (MAG-OX) 400 mg (241.3 mg magnesium) tablet    Other Relevant Orders    Basic Metabolic Panel    TSH    T4, Free    Magnesium    EKG 12-lead    Bradycardia    Current Assessment & Plan     Stable. He states his resting HR has been more in the 70s recently.          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

## 2022-12-01 NOTE — TELEPHONE ENCOUNTER
----- Message from Odalis Jhaveri NP sent at 12/1/2022  1:07 PM CST -----  Labs look good. Continue with plan from today's visit.

## 2022-12-15 ENCOUNTER — CLINICAL SUPPORT (OUTPATIENT)
Dept: CARDIOLOGY | Facility: CLINIC | Age: 67
End: 2022-12-15
Payer: MEDICARE

## 2022-12-15 DIAGNOSIS — R00.2 PALPITATIONS: Primary | ICD-10-CM

## 2022-12-15 DIAGNOSIS — R94.39 ABNORMAL FINDING ON CARDIOVASCULAR STRESS TEST: ICD-10-CM

## 2022-12-15 DIAGNOSIS — I49.8 BIGEMINY: ICD-10-CM

## 2022-12-15 DIAGNOSIS — R00.1 BRADYCARDIA: ICD-10-CM

## 2022-12-15 PROCEDURE — 99212 OFFICE O/P EST SF 10 MIN: CPT | Mod: PBBFAC,PN

## 2022-12-15 PROCEDURE — 99999 PR PBB SHADOW E&M-EST. PATIENT-LVL II: ICD-10-PCS | Mod: PBBFAC,,,

## 2022-12-15 PROCEDURE — 93005 ELECTROCARDIOGRAM TRACING: CPT | Mod: PBBFAC,PN | Performed by: INTERNAL MEDICINE

## 2022-12-15 PROCEDURE — 93010 EKG 12-LEAD: ICD-10-PCS | Mod: S$PBB,,, | Performed by: INTERNAL MEDICINE

## 2022-12-15 PROCEDURE — 99999 PR PBB SHADOW E&M-EST. PATIENT-LVL II: CPT | Mod: PBBFAC,,,

## 2022-12-15 PROCEDURE — 93010 ELECTROCARDIOGRAM REPORT: CPT | Mod: S$PBB,,, | Performed by: INTERNAL MEDICINE

## 2022-12-20 ENCOUNTER — PATIENT MESSAGE (OUTPATIENT)
Dept: ADMINISTRATIVE | Facility: HOSPITAL | Age: 67
End: 2022-12-20
Payer: MEDICARE

## 2022-12-21 ENCOUNTER — TELEPHONE (OUTPATIENT)
Dept: FAMILY MEDICINE | Facility: CLINIC | Age: 67
End: 2022-12-21
Payer: MEDICARE

## 2022-12-21 VITALS — DIASTOLIC BLOOD PRESSURE: 66 MMHG | SYSTOLIC BLOOD PRESSURE: 118 MMHG

## 2022-12-28 ENCOUNTER — IMMUNIZATION (OUTPATIENT)
Dept: PHARMACY | Facility: CLINIC | Age: 67
End: 2022-12-28
Payer: MEDICARE

## 2022-12-28 DIAGNOSIS — Z23 NEED FOR VACCINATION: Primary | ICD-10-CM

## 2023-01-30 NOTE — PROGRESS NOTES
Subjective:     HPI    I had the pleasure of seeing Colt Rose in consultation at your request for the evaluation of PACs. He is a 67M with HLD, who was incidentally noted to have frequent PACs on 12-lead ECG at a recent cardiology visit. This prompted a 48 hour Holter monitor (12/2022) which showed sinus rhythm with an average HR of 62 bpm (range 41-98 bpm), PAC burden 27%. Currently on lopressor 12.5 mg bid.    An exercise stress echo in 1/2022 showed EF 65%, no ischemia.    My interpretation of today's ECG is sinus rhythm with PACs occurring in a bigeminy pattern (HR 52 bpm).    Review of Systems   Constitutional: Positive for malaise/fatigue. Negative for decreased appetite, weight gain and weight loss.   HENT:  Negative for sore throat.    Eyes:  Negative for blurred vision.   Cardiovascular:  Negative for chest pain, dyspnea on exertion, irregular heartbeat, leg swelling, near-syncope, orthopnea, palpitations, paroxysmal nocturnal dyspnea and syncope.   Respiratory:  Negative for shortness of breath.    Skin:  Negative for rash.   Musculoskeletal:  Negative for arthritis.   Gastrointestinal:  Negative for abdominal pain.   Neurological:  Negative for focal weakness.   Psychiatric/Behavioral:  Negative for altered mental status.       Objective:    Physical Exam  Constitutional:       General: He is not in acute distress.     Appearance: He is well-developed.   HENT:      Head: Normocephalic and atraumatic.   Eyes:      General: No scleral icterus.     Pupils: Pupils are equal, round, and reactive to light.   Neck:      Thyroid: No thyromegaly.   Cardiovascular:      Rate and Rhythm: Regular rhythm.      Pulses: Normal pulses.      Heart sounds: Normal heart sounds. No murmur heard.    No friction rub. No gallop.   Pulmonary:      Effort: Pulmonary effort is normal.      Breath sounds: Normal breath sounds.   Abdominal:      General: Bowel sounds are normal. There is no distension.      Palpations:  Abdomen is soft.      Tenderness: There is no abdominal tenderness.   Musculoskeletal:      Cervical back: Neck supple.   Skin:     General: Skin is warm and dry.      Findings: No rash.   Neurological:      Mental Status: He is alert and oriented to person, place, and time.   Psychiatric:         Behavior: Behavior normal.         Assessment:       1. Palpitations    2. Premature atrial complexes    3. Mixed hyperlipidemia         Plan:       In summary, Colt Rose is a 67M with a history of frequent PACs. No symptoms apart from possible fatigue. Discussed pathophysiology of PACs as well as treatment options including meds and ablation. For now plan is to replace lopressor with diltiazem 120 mg daily. Follow-up 3 months.    Thank you for allowing me to participate in the care of this patient. Please do not hesitate to call me with any questions or concerns.

## 2023-02-01 ENCOUNTER — OFFICE VISIT (OUTPATIENT)
Dept: CARDIOLOGY | Facility: CLINIC | Age: 68
End: 2023-02-01
Payer: MEDICARE

## 2023-02-01 VITALS
RESPIRATION RATE: 16 BRPM | SYSTOLIC BLOOD PRESSURE: 120 MMHG | DIASTOLIC BLOOD PRESSURE: 64 MMHG | HEART RATE: 50 BPM | WEIGHT: 199.31 LBS | HEIGHT: 70 IN | OXYGEN SATURATION: 92 % | BODY MASS INDEX: 28.53 KG/M2

## 2023-02-01 DIAGNOSIS — E78.2 MIXED HYPERLIPIDEMIA: ICD-10-CM

## 2023-02-01 DIAGNOSIS — R00.2 PALPITATIONS: Primary | ICD-10-CM

## 2023-02-01 DIAGNOSIS — I49.1 PREMATURE ATRIAL COMPLEXES: ICD-10-CM

## 2023-02-01 PROCEDURE — 99205 PR OFFICE/OUTPT VISIT, NEW, LEVL V, 60-74 MIN: ICD-10-PCS | Mod: S$GLB,,, | Performed by: INTERNAL MEDICINE

## 2023-02-01 PROCEDURE — 99205 OFFICE O/P NEW HI 60 MIN: CPT | Mod: S$GLB,,, | Performed by: INTERNAL MEDICINE

## 2023-02-01 PROCEDURE — 93005 ELECTROCARDIOGRAM TRACING: CPT | Mod: S$GLB,,, | Performed by: INTERNAL MEDICINE

## 2023-02-01 PROCEDURE — 93010 EKG 12-LEAD: ICD-10-PCS | Mod: S$PBB,,, | Performed by: INTERNAL MEDICINE

## 2023-02-01 PROCEDURE — 93005 EKG 12-LEAD: ICD-10-PCS | Mod: S$GLB,,, | Performed by: INTERNAL MEDICINE

## 2023-02-01 PROCEDURE — 93010 ELECTROCARDIOGRAM REPORT: CPT | Mod: S$PBB,,, | Performed by: INTERNAL MEDICINE

## 2023-02-01 RX ORDER — DILTIAZEM HYDROCHLORIDE 120 MG/1
120 CAPSULE, EXTENDED RELEASE ORAL DAILY
Qty: 30 CAPSULE | Refills: 11 | Status: ON HOLD | OUTPATIENT
Start: 2023-02-01 | End: 2023-05-25 | Stop reason: HOSPADM

## 2023-03-23 ENCOUNTER — OFFICE VISIT (OUTPATIENT)
Dept: FAMILY MEDICINE | Facility: CLINIC | Age: 68
End: 2023-03-23
Payer: MEDICARE

## 2023-03-23 VITALS
SYSTOLIC BLOOD PRESSURE: 110 MMHG | OXYGEN SATURATION: 97 % | HEART RATE: 60 BPM | DIASTOLIC BLOOD PRESSURE: 74 MMHG | HEIGHT: 70 IN | BODY MASS INDEX: 27.21 KG/M2 | WEIGHT: 190.06 LBS | TEMPERATURE: 98 F

## 2023-03-23 DIAGNOSIS — M25.571 ACUTE RIGHT ANKLE PAIN: Primary | ICD-10-CM

## 2023-03-23 PROCEDURE — 99213 OFFICE O/P EST LOW 20 MIN: CPT | Mod: S$PBB,,, | Performed by: NURSE PRACTITIONER

## 2023-03-23 PROCEDURE — 99999 PR PBB SHADOW E&M-EST. PATIENT-LVL IV: CPT | Mod: PBBFAC,,, | Performed by: NURSE PRACTITIONER

## 2023-03-23 PROCEDURE — 99999 PR PBB SHADOW E&M-EST. PATIENT-LVL IV: ICD-10-PCS | Mod: PBBFAC,,, | Performed by: NURSE PRACTITIONER

## 2023-03-23 PROCEDURE — 99214 OFFICE O/P EST MOD 30 MIN: CPT | Mod: PBBFAC,PO | Performed by: NURSE PRACTITIONER

## 2023-03-23 PROCEDURE — 99213 PR OFFICE/OUTPT VISIT, EST, LEVL III, 20-29 MIN: ICD-10-PCS | Mod: S$PBB,,, | Performed by: NURSE PRACTITIONER

## 2023-03-23 NOTE — PROGRESS NOTES
Subjective:       Patient ID: Colt Rose is a 67 y.o. male.    Chief Complaint: Ankle Pain    Ankle Pain   The incident occurred 2 days ago. The incident occurred at home. There was no injury mechanism. The pain is present in the left ankle. The patient is experiencing no pain. Associated symptoms include numbness and tingling. Pertinent negatives include no muscle weakness.        fell on right ankle   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    Cervical disc displacement     Degeneration of lumbar intervertebral disc     GERD (gastroesophageal reflux disease)     gastric polyps    Hip pain 2/22/2016    Hyperlipidemia     Shingles 11/2013       Review of patient's allergies indicates:   Allergen Reactions    Pennsaid [diclofenac sodium] Rash and Blisters         Current Outpatient Medications:     atorvastatin (LIPITOR) 10 MG tablet, Take one tablet by mouth daily, Disp: 90 tablet, Rfl: 3    diltiaZEM (DILACOR XR) 120 MG CDCR, Take 1 capsule (120 mg total) by mouth once daily., Disp: 30 capsule, Rfl: 11    docosahexaenoic acid/epa (FISH OIL ORAL), Take 1 capsule by mouth once daily., Disp: , Rfl:     fluocinolone acetonide oil 0.01 % Drop, Place 5 drops into both eyes 2 (two) times daily as needed. , Disp: , Rfl: 5    fluorouraciL (EFUDEX) 5 % cream, , Disp: , Rfl:     hydrocortisone 2.5 % cream, Apply 1 application topically 2 (two) times daily., Disp: , Rfl:     magnesium oxide (MAG-OX) 400 mg (241.3 mg magnesium) tablet, Take 1 tablet (400 mg total) by mouth once daily., Disp: 90 tablet, Rfl: 3    meloxicam (MOBIC) 15 MG tablet, TAKE 1 TABLET BY MOUTH  DAILY AS NEEDED FOR PAIN, Disp: 90 tablet, Rfl: 3    methocarbamoL (ROBAXIN) 500 MG Tab, TAKE 1 TABLET THREE TIMES A DAY AS NEEDED, Disp: 90 tablet, Rfl: 11    multivitamin (THERAGRAN) per tablet, Take 1 tablet by mouth once daily., Disp: , Rfl:     neomycin-fluocinolone (MANUEL-SYNALAR) 0.5 % (0.35 % base)-0.025 % Crea,  "Apply 1 application topically 2 (two) times daily., Disp: , Rfl:     omeprazole (PRILOSEC) 40 MG capsule, Take one capsule by mouth daily, Disp: 90 capsule, Rfl: 3    pneumococcal 23-monster ps (PNEUMOVAX-23) 25 mcg/0.5 mL vaccine, Inject into the muscle., Disp: 0.5 mL, Rfl: 0    ubidecarenone (CO Q-10 ORAL), Take 1 capsule by mouth once daily., Disp: , Rfl:     Review of Systems   Constitutional:  Negative for unexpected weight change.   HENT:  Negative for trouble swallowing.    Eyes:  Negative for visual disturbance.   Respiratory:  Negative for shortness of breath.    Cardiovascular:  Negative for chest pain, palpitations and leg swelling.   Gastrointestinal:  Negative for blood in stool.   Genitourinary:  Negative for hematuria.   Skin:  Negative for rash.   Allergic/Immunologic: Negative for immunocompromised state.   Neurological:  Positive for tingling and numbness. Negative for headaches.   Hematological:  Does not bruise/bleed easily.   Psychiatric/Behavioral:  Negative for agitation. The patient is not nervous/anxious.      Objective:      /74 (BP Location: Left arm, Patient Position: Sitting, BP Method: Small (Manual))   Pulse 60   Temp 97.7 °F (36.5 °C) (Oral)   Ht 5' 10" (1.778 m)   Wt 86.2 kg (190 lb 0.6 oz)   SpO2 97%   BMI 27.27 kg/m²   Physical Exam  Constitutional:       Appearance: He is well-developed.   Eyes:      Conjunctiva/sclera: Conjunctivae normal.      Pupils: Pupils are equal, round, and reactive to light.   Pulmonary:      Effort: Pulmonary effort is normal.   Musculoskeletal:         General: Normal range of motion.      Left ankle: Laceration present. No tenderness. Normal range of motion.        Legs:       Comments: Healing laceration   Neurological:      Mental Status: He is alert and oriented to person, place, and time.   Psychiatric:         Behavior: Behavior normal.         Thought Content: Thought content normal.         Judgment: Judgment normal.       Assessment:    "    1. Acute right ankle pain        Plan:       Acute right ankle pain  Patient decline xray at this time. He reports pain has improved        Time spent with patient: 20 minutes    Patient with be reevaluated in 4 weeks or sooner prn    Greater than 50% of this visit was spent counseling as described in above documentation:Yes

## 2023-05-15 NOTE — PROGRESS NOTES
Subjective:     HPI    I had the pleasure of seeing Colt Rose in follow-up for PACs. Last seen in 2/2023. He is a 67M with HLD, who was incidentally noted to have frequent PACs on 12-lead ECG at a recent cardiology visit. This prompted a 48 hour Holter monitor (12/2022) which showed sinus rhythm with an average HR of 62 bpm (range 41-98 bpm), PAC burden 27%. Was on lopressor 12.5 mg bid at the time.     An exercise stress echo in 1/2022 showed EF 65%, no ischemia.    At his initial visit pt was in atrial bigeminy. Lopressor was replaced with diltiazem 120 mg daily.    Today in the office Mr. Rose denies any changes to his symptoms.    My interpretation of today's ECG is sinus rhythm with PACs occurring in a bigeminy pattern (HR 60 bpm).    Review of Systems   Constitutional: Positive for malaise/fatigue. Negative for decreased appetite, weight gain and weight loss.   HENT:  Negative for sore throat.    Eyes:  Negative for blurred vision.   Cardiovascular:  Negative for chest pain, dyspnea on exertion, irregular heartbeat, leg swelling, near-syncope, orthopnea, palpitations, paroxysmal nocturnal dyspnea and syncope.   Respiratory:  Negative for shortness of breath.    Skin:  Negative for rash.   Musculoskeletal:  Negative for arthritis.   Gastrointestinal:  Negative for abdominal pain.   Neurological:  Negative for focal weakness.   Psychiatric/Behavioral:  Negative for altered mental status.       Objective:    Physical Exam  Constitutional:       General: He is not in acute distress.     Appearance: He is well-developed.   HENT:      Head: Normocephalic and atraumatic.   Eyes:      General: No scleral icterus.     Pupils: Pupils are equal, round, and reactive to light.   Neck:      Thyroid: No thyromegaly.   Cardiovascular:      Rate and Rhythm: Regular rhythm.      Pulses: Normal pulses.      Heart sounds: Normal heart sounds. No murmur heard.    No friction rub. No gallop.   Pulmonary:      Effort:  Pulmonary effort is normal.      Breath sounds: Normal breath sounds.   Abdominal:      General: Bowel sounds are normal. There is no distension.      Palpations: Abdomen is soft.      Tenderness: There is no abdominal tenderness.   Musculoskeletal:      Cervical back: Neck supple.   Skin:     General: Skin is warm and dry.      Findings: No rash.   Neurological:      Mental Status: He is alert and oriented to person, place, and time.   Psychiatric:         Behavior: Behavior normal.         Assessment:       1. Premature atrial complexes    2. Palpitations    3. Mixed hyperlipidemia         Plan:       In summary, Colt Rose is a 67M with a history of frequent PACs, possibly leading to fatigue. No difference with switching BB to CCB.    Discussed risks, benefits, indications, alternatives to PAC ablation. After considering his options he has agreed to proceed.    CARTO. Hold diltiazem 48 hours prior to procedure.    Thank you for allowing me to participate in the care of this patient. Please do not hesitate to call me with any questions or concerns.

## 2023-05-17 ENCOUNTER — OFFICE VISIT (OUTPATIENT)
Dept: CARDIOLOGY | Facility: CLINIC | Age: 68
End: 2023-05-17
Payer: MEDICARE

## 2023-05-17 VITALS
RESPIRATION RATE: 16 BRPM | SYSTOLIC BLOOD PRESSURE: 124 MMHG | WEIGHT: 182 LBS | HEIGHT: 70 IN | BODY MASS INDEX: 26.05 KG/M2 | OXYGEN SATURATION: 98 % | DIASTOLIC BLOOD PRESSURE: 80 MMHG | HEART RATE: 47 BPM

## 2023-05-17 DIAGNOSIS — I49.1 PREMATURE ATRIAL COMPLEXES: Primary | ICD-10-CM

## 2023-05-17 DIAGNOSIS — E78.2 MIXED HYPERLIPIDEMIA: ICD-10-CM

## 2023-05-17 DIAGNOSIS — R00.2 PALPITATIONS: ICD-10-CM

## 2023-05-17 PROCEDURE — 93005 ELECTROCARDIOGRAM TRACING: CPT | Mod: S$GLB,,, | Performed by: INTERNAL MEDICINE

## 2023-05-17 PROCEDURE — 93005 EKG 12-LEAD: ICD-10-PCS | Mod: S$GLB,,, | Performed by: INTERNAL MEDICINE

## 2023-05-17 PROCEDURE — 99215 PR OFFICE/OUTPT VISIT, EST, LEVL V, 40-54 MIN: ICD-10-PCS | Mod: S$GLB,,, | Performed by: INTERNAL MEDICINE

## 2023-05-17 PROCEDURE — 99215 OFFICE O/P EST HI 40 MIN: CPT | Mod: S$GLB,,, | Performed by: INTERNAL MEDICINE

## 2023-05-17 PROCEDURE — 93010 ELECTROCARDIOGRAM REPORT: CPT | Mod: S$PBB,,, | Performed by: INTERNAL MEDICINE

## 2023-05-17 PROCEDURE — 93010 EKG 12-LEAD: ICD-10-PCS | Mod: S$PBB,,, | Performed by: INTERNAL MEDICINE

## 2023-05-22 ENCOUNTER — TELEPHONE (OUTPATIENT)
Dept: FAMILY MEDICINE | Facility: CLINIC | Age: 68
End: 2023-05-22
Payer: MEDICARE

## 2023-05-22 ENCOUNTER — OFFICE VISIT (OUTPATIENT)
Dept: FAMILY MEDICINE | Facility: CLINIC | Age: 68
End: 2023-05-22
Payer: MEDICARE

## 2023-05-22 VITALS
WEIGHT: 187.38 LBS | BODY MASS INDEX: 26.82 KG/M2 | SYSTOLIC BLOOD PRESSURE: 100 MMHG | HEIGHT: 70 IN | TEMPERATURE: 98 F | HEART RATE: 55 BPM | DIASTOLIC BLOOD PRESSURE: 60 MMHG | OXYGEN SATURATION: 96 %

## 2023-05-22 DIAGNOSIS — E78.2 MIXED HYPERLIPIDEMIA: ICD-10-CM

## 2023-05-22 DIAGNOSIS — Z01.818 PRE-OP EXAMINATION: Primary | ICD-10-CM

## 2023-05-22 DIAGNOSIS — R73.03 PREDIABETES: Primary | ICD-10-CM

## 2023-05-22 PROCEDURE — 99214 OFFICE O/P EST MOD 30 MIN: CPT | Mod: S$PBB,,, | Performed by: PHYSICIAN ASSISTANT

## 2023-05-22 PROCEDURE — 99214 PR OFFICE/OUTPT VISIT, EST, LEVL IV, 30-39 MIN: ICD-10-PCS | Mod: S$PBB,,, | Performed by: PHYSICIAN ASSISTANT

## 2023-05-22 PROCEDURE — 99999 PR PBB SHADOW E&M-EST. PATIENT-LVL IV: ICD-10-PCS | Mod: PBBFAC,,, | Performed by: PHYSICIAN ASSISTANT

## 2023-05-22 PROCEDURE — 99999 PR PBB SHADOW E&M-EST. PATIENT-LVL IV: CPT | Mod: PBBFAC,,, | Performed by: PHYSICIAN ASSISTANT

## 2023-05-22 PROCEDURE — 99214 OFFICE O/P EST MOD 30 MIN: CPT | Mod: PBBFAC,PO | Performed by: PHYSICIAN ASSISTANT

## 2023-05-22 RX ORDER — PREDNISOLONE ACETATE 10 MG/ML
1 SUSPENSION/ DROPS OPHTHALMIC 4 TIMES DAILY
COMMUNITY
Start: 2023-05-15

## 2023-05-22 NOTE — TELEPHONE ENCOUNTER
----- Message from May Quintana sent at 5/22/2023  8:51 AM CDT -----  Contact: Pt @ [phone]  Type:  Needs Medical Advice    Who Called: Pt  Would the patient rather a call back or a response via [facility]? call  Best Call Back Number: [phone]    Additional Information: Pt states that the nurse called him and changed his appt from 06/14/2023 to 06/02/2023. He would like to move his appt back to 06/14/2023. Please call pt back to advise.

## 2023-05-22 NOTE — PROGRESS NOTES
Subjective     Patient ID: Colt Rose is a 67 y.o. male.    Chief Complaint: Pre-op Exam    HPI  Cataract surgery 6 - 6 - 2023  Dr. JARAD Ivey will perform the surgery  Pt. Feels well  Review of Systems   Constitutional: Negative.  Negative for activity change, appetite change, chills, diaphoresis, fatigue, fever and unexpected weight change.   HENT: Negative.     Eyes: Negative.    Respiratory: Negative.  Negative for cough and shortness of breath.    Cardiovascular: Negative.  Negative for chest pain and leg swelling.   Gastrointestinal: Negative.    Endocrine: Negative.    Genitourinary: Negative.    Musculoskeletal: Negative.    Integumentary:  Negative for rash. Negative.   Neurological: Negative.         Objective     Physical Exam  Vitals reviewed.   Constitutional:       General: He is not in acute distress.     Appearance: Normal appearance. He is normal weight. He is not ill-appearing, toxic-appearing or diaphoretic.   HENT:      Head: Normocephalic and atraumatic.      Right Ear: Tympanic membrane, ear canal and external ear normal. There is no impacted cerumen.      Left Ear: Tympanic membrane, ear canal and external ear normal. There is no impacted cerumen.      Nose: Nose normal.      Mouth/Throat:      Mouth: Mucous membranes are moist.      Pharynx: Oropharynx is clear. No oropharyngeal exudate or posterior oropharyngeal erythema.   Eyes:      General: No scleral icterus.     Conjunctiva/sclera: Conjunctivae normal.   Neck:      Vascular: No carotid bruit.   Cardiovascular:      Rate and Rhythm: Normal rate and regular rhythm.      Pulses: Normal pulses.      Heart sounds: Normal heart sounds. No murmur heard.    No friction rub. No gallop.   Pulmonary:      Effort: Pulmonary effort is normal. No respiratory distress.      Breath sounds: Normal breath sounds. No stridor. No wheezing, rhonchi or rales.   Abdominal:      General: Abdomen is flat. Bowel sounds are normal. There is no distension.       Palpations: Abdomen is soft. There is no mass.      Tenderness: There is no abdominal tenderness. There is no guarding or rebound.      Hernia: No hernia is present.   Musculoskeletal:         General: No swelling.      Cervical back: Normal range of motion and neck supple. No rigidity or tenderness.      Right lower leg: No edema.      Left lower leg: No edema.   Lymphadenopathy:      Cervical: No cervical adenopathy.   Skin:     General: Skin is warm and dry.      Findings: No rash.   Neurological:      General: No focal deficit present.      Mental Status: He is alert and oriented to person, place, and time.          Assessment and Plan     Problem List Items Addressed This Visit    None      Colt was seen today for pre-op exam.    Diagnoses and all orders for this visit:    Pre-op examination           Pt. Cleared for cataract surgery and local anesthesia   Dr. Ivey notified

## 2023-05-22 NOTE — TELEPHONE ENCOUNTER
Called and spoke to patient, assisted with rescheduling pre op to sooner date, (DANIEL Root had opening today), moved annual back to original date per patient request. Patient verbalized understanding of all./

## 2023-05-22 NOTE — TELEPHONE ENCOUNTER
Patient is requesting labs done prior to upcoming appointment, has pre op this afternoon, requesting labs prior to annual with you June 14th. Please advise.

## 2023-05-23 ENCOUNTER — LAB VISIT (OUTPATIENT)
Dept: LAB | Facility: HOSPITAL | Age: 68
End: 2023-05-23
Attending: INTERNAL MEDICINE
Payer: MEDICARE

## 2023-05-23 ENCOUNTER — PATIENT MESSAGE (OUTPATIENT)
Dept: ELECTROPHYSIOLOGY | Facility: CLINIC | Age: 68
End: 2023-05-23
Payer: MEDICARE

## 2023-05-23 DIAGNOSIS — I49.1 PAC (PREMATURE ATRIAL CONTRACTION): ICD-10-CM

## 2023-05-23 DIAGNOSIS — I49.9 CARDIAC ARRHYTHMIA, UNSPECIFIED CARDIAC ARRHYTHMIA TYPE: ICD-10-CM

## 2023-05-23 DIAGNOSIS — I49.1 PAC (PREMATURE ATRIAL CONTRACTION): Primary | ICD-10-CM

## 2023-05-23 LAB
ANION GAP SERPL CALC-SCNC: 11 MMOL/L (ref 8–16)
BASOPHILS # BLD AUTO: 0.04 K/UL (ref 0–0.2)
BASOPHILS NFR BLD: 0.8 % (ref 0–1.9)
BUN SERPL-MCNC: 15 MG/DL (ref 8–23)
CALCIUM SERPL-MCNC: 9.4 MG/DL (ref 8.7–10.5)
CHLORIDE SERPL-SCNC: 105 MMOL/L (ref 95–110)
CO2 SERPL-SCNC: 24 MMOL/L (ref 23–29)
CREAT SERPL-MCNC: 0.9 MG/DL (ref 0.5–1.4)
DIFFERENTIAL METHOD: ABNORMAL
EOSINOPHIL # BLD AUTO: 0.2 K/UL (ref 0–0.5)
EOSINOPHIL NFR BLD: 3.8 % (ref 0–8)
ERYTHROCYTE [DISTWIDTH] IN BLOOD BY AUTOMATED COUNT: 13 % (ref 11.5–14.5)
EST. GFR  (NO RACE VARIABLE): >60 ML/MIN/1.73 M^2
GLUCOSE SERPL-MCNC: 89 MG/DL (ref 70–110)
HCT VFR BLD AUTO: 41.6 % (ref 40–54)
HGB BLD-MCNC: 13.6 G/DL (ref 14–18)
IMM GRANULOCYTES # BLD AUTO: 0.01 K/UL (ref 0–0.04)
IMM GRANULOCYTES NFR BLD AUTO: 0.2 % (ref 0–0.5)
LYMPHOCYTES # BLD AUTO: 2 K/UL (ref 1–4.8)
LYMPHOCYTES NFR BLD: 40.2 % (ref 18–48)
MCH RBC QN AUTO: 30.9 PG (ref 27–31)
MCHC RBC AUTO-ENTMCNC: 32.7 G/DL (ref 32–36)
MCV RBC AUTO: 95 FL (ref 82–98)
MONOCYTES # BLD AUTO: 0.5 K/UL (ref 0.3–1)
MONOCYTES NFR BLD: 9.5 % (ref 4–15)
NEUTROPHILS # BLD AUTO: 2.3 K/UL (ref 1.8–7.7)
NEUTROPHILS NFR BLD: 45.5 % (ref 38–73)
NRBC BLD-RTO: 0 /100 WBC
PLATELET # BLD AUTO: 204 K/UL (ref 150–450)
PMV BLD AUTO: 10.6 FL (ref 9.2–12.9)
POTASSIUM SERPL-SCNC: 4.3 MMOL/L (ref 3.5–5.1)
RBC # BLD AUTO: 4.4 M/UL (ref 4.6–6.2)
SODIUM SERPL-SCNC: 140 MMOL/L (ref 136–145)
WBC # BLD AUTO: 4.97 K/UL (ref 3.9–12.7)

## 2023-05-23 PROCEDURE — 85730 THROMBOPLASTIN TIME PARTIAL: CPT | Performed by: INTERNAL MEDICINE

## 2023-05-23 PROCEDURE — 85610 PROTHROMBIN TIME: CPT | Performed by: INTERNAL MEDICINE

## 2023-05-23 PROCEDURE — 85025 COMPLETE CBC W/AUTO DIFF WBC: CPT | Performed by: INTERNAL MEDICINE

## 2023-05-23 PROCEDURE — 36415 COLL VENOUS BLD VENIPUNCTURE: CPT | Mod: PO | Performed by: INTERNAL MEDICINE

## 2023-05-23 PROCEDURE — 80048 BASIC METABOLIC PNL TOTAL CA: CPT | Performed by: INTERNAL MEDICINE

## 2023-05-24 ENCOUNTER — TELEPHONE (OUTPATIENT)
Dept: ELECTROPHYSIOLOGY | Facility: CLINIC | Age: 68
End: 2023-05-24
Payer: MEDICARE

## 2023-05-24 LAB
APTT PPP: 26.5 SEC (ref 21–32)
INR PPP: 1 (ref 0.8–1.2)
PROTHROMBIN TIME: 10.8 SEC (ref 9–12.5)

## 2023-05-24 NOTE — TELEPHONE ENCOUNTER
Attempted to reach pt, no answer. LVM confirming  procedure arrival time on 5/25 at 7 am    Reiterated instructions including:  -Directions to check in desk  -NPO after midnight night prior to procedure  - Pt last dose of Cardizem was on 5/23 in the am  -Pre-procedure LABS completed and reviewed.  -Advised pt to call with any fever, cough, or shortness of breath in the past 30 days  -Advised pt to call with any redness, rash, irritation, or yeast infection to groin area.     LVM with number for pt to return call with any procedure related questions or concerns.

## 2023-05-25 ENCOUNTER — HOSPITAL ENCOUNTER (OUTPATIENT)
Facility: HOSPITAL | Age: 68
Discharge: HOME | End: 2023-05-25
Attending: INTERNAL MEDICINE | Admitting: INTERNAL MEDICINE
Payer: MEDICARE

## 2023-05-25 ENCOUNTER — ANESTHESIA EVENT (OUTPATIENT)
Dept: MEDSURG UNIT | Facility: HOSPITAL | Age: 68
End: 2023-05-25
Payer: MEDICARE

## 2023-05-25 ENCOUNTER — ANESTHESIA (OUTPATIENT)
Dept: MEDSURG UNIT | Facility: HOSPITAL | Age: 68
End: 2023-05-25
Payer: MEDICARE

## 2023-05-25 VITALS
HEIGHT: 70 IN | HEART RATE: 60 BPM | WEIGHT: 184 LBS | DIASTOLIC BLOOD PRESSURE: 57 MMHG | BODY MASS INDEX: 26.34 KG/M2 | SYSTOLIC BLOOD PRESSURE: 120 MMHG | OXYGEN SATURATION: 95 % | TEMPERATURE: 98 F | RESPIRATION RATE: 18 BRPM

## 2023-05-25 DIAGNOSIS — Z98.890 S/P RADIOFREQUENCY ABLATION OPERATION FOR ARRHYTHMIA: ICD-10-CM

## 2023-05-25 DIAGNOSIS — I49.1 PAC (PREMATURE ATRIAL CONTRACTION): ICD-10-CM

## 2023-05-25 DIAGNOSIS — Z86.79 S/P RADIOFREQUENCY ABLATION OPERATION FOR ARRHYTHMIA: ICD-10-CM

## 2023-05-25 DIAGNOSIS — I49.1 PREMATURE ATRIAL COMPLEXES: Primary | ICD-10-CM

## 2023-05-25 PROCEDURE — 27201037 HC PRESSURE MONITORING SET UP

## 2023-05-25 PROCEDURE — 93005 ELECTROCARDIOGRAM TRACING: CPT | Mod: 59

## 2023-05-25 PROCEDURE — C1730 CATH, EP, 19 OR FEW ELECT: HCPCS | Performed by: INTERNAL MEDICINE

## 2023-05-25 PROCEDURE — 25000003 PHARM REV CODE 250: Performed by: INTERNAL MEDICINE

## 2023-05-25 PROCEDURE — 93010 EKG 12-LEAD: ICD-10-PCS | Mod: ,,, | Performed by: INTERNAL MEDICINE

## 2023-05-25 PROCEDURE — 63600175 PHARM REV CODE 636 W HCPCS: Performed by: NURSE ANESTHETIST, CERTIFIED REGISTERED

## 2023-05-25 PROCEDURE — 93010 ELECTROCARDIOGRAM REPORT: CPT | Mod: 76,,, | Performed by: INTERNAL MEDICINE

## 2023-05-25 PROCEDURE — 25000003 PHARM REV CODE 250: Performed by: NURSE ANESTHETIST, CERTIFIED REGISTERED

## 2023-05-25 PROCEDURE — 37000008 HC ANESTHESIA 1ST 15 MINUTES: Performed by: INTERNAL MEDICINE

## 2023-05-25 PROCEDURE — D9220A PRA ANESTHESIA: ICD-10-PCS | Mod: ANES,,, | Performed by: INTERNAL MEDICINE

## 2023-05-25 PROCEDURE — 93653 COMPRE EP EVAL TX SVT: CPT | Performed by: INTERNAL MEDICINE

## 2023-05-25 PROCEDURE — 93653 PR ELECTROPHYS EVAL, COMPREHEN, W/SUPRAVENT TACHYCARD TRMT: ICD-10-PCS | Mod: 22,,, | Performed by: INTERNAL MEDICINE

## 2023-05-25 PROCEDURE — C1731 CATH, EP, 20 OR MORE ELEC: HCPCS | Performed by: INTERNAL MEDICINE

## 2023-05-25 PROCEDURE — D9220A PRA ANESTHESIA: Mod: CRNA,,, | Performed by: NURSE ANESTHETIST, CERTIFIED REGISTERED

## 2023-05-25 PROCEDURE — 93653 COMPRE EP EVAL TX SVT: CPT | Mod: 22,,, | Performed by: INTERNAL MEDICINE

## 2023-05-25 PROCEDURE — D9220A PRA ANESTHESIA: ICD-10-PCS | Mod: CRNA,,, | Performed by: NURSE ANESTHETIST, CERTIFIED REGISTERED

## 2023-05-25 PROCEDURE — D9220A PRA ANESTHESIA: Mod: ANES,,, | Performed by: INTERNAL MEDICINE

## 2023-05-25 PROCEDURE — C1894 INTRO/SHEATH, NON-LASER: HCPCS | Performed by: INTERNAL MEDICINE

## 2023-05-25 PROCEDURE — 93010 ELECTROCARDIOGRAM REPORT: CPT | Mod: ,,, | Performed by: INTERNAL MEDICINE

## 2023-05-25 PROCEDURE — 37000009 HC ANESTHESIA EA ADD 15 MINS: Performed by: INTERNAL MEDICINE

## 2023-05-25 PROCEDURE — C1732 CATH, EP, DIAG/ABL, 3D/VECT: HCPCS | Performed by: INTERNAL MEDICINE

## 2023-05-25 PROCEDURE — 27201423 OPTIME MED/SURG SUP & DEVICES STERILE SUPPLY: Performed by: INTERNAL MEDICINE

## 2023-05-25 RX ORDER — SODIUM CHLORIDE 0.9 % (FLUSH) 0.9 %
10 SYRINGE (ML) INJECTION
Status: DISCONTINUED | OUTPATIENT
Start: 2023-05-25 | End: 2023-05-25 | Stop reason: HOSPADM

## 2023-05-25 RX ORDER — HYDROMORPHONE HYDROCHLORIDE 1 MG/ML
0.2 INJECTION, SOLUTION INTRAMUSCULAR; INTRAVENOUS; SUBCUTANEOUS EVERY 5 MIN PRN
Status: DISCONTINUED | OUTPATIENT
Start: 2023-05-25 | End: 2023-05-25 | Stop reason: HOSPADM

## 2023-05-25 RX ORDER — HEPARIN SOD,PORCINE/0.9 % NACL 1000/500ML
INTRAVENOUS SOLUTION INTRAVENOUS
Status: DISCONTINUED | OUTPATIENT
Start: 2023-05-25 | End: 2023-05-25 | Stop reason: HOSPADM

## 2023-05-25 RX ORDER — ASPIRIN 81 MG/1
81 TABLET ORAL DAILY
Refills: 0
Start: 2023-05-25 | End: 2023-12-29

## 2023-05-25 RX ORDER — PROPOFOL 10 MG/ML
VIAL (ML) INTRAVENOUS CONTINUOUS PRN
Status: DISCONTINUED | OUTPATIENT
Start: 2023-05-25 | End: 2023-05-25

## 2023-05-25 RX ORDER — OXYCODONE HYDROCHLORIDE 5 MG/1
5 TABLET ORAL
Status: DISCONTINUED | OUTPATIENT
Start: 2023-05-25 | End: 2023-05-25 | Stop reason: HOSPADM

## 2023-05-25 RX ORDER — PHENYLEPHRINE HYDROCHLORIDE 10 MG/ML
INJECTION INTRAVENOUS
Status: DISCONTINUED | OUTPATIENT
Start: 2023-05-25 | End: 2023-05-25

## 2023-05-25 RX ORDER — PHENYLEPHRINE HYDROCHLORIDE 10 MG/ML
INJECTION INTRAVENOUS CONTINUOUS PRN
Status: DISCONTINUED | OUTPATIENT
Start: 2023-05-25 | End: 2023-05-25

## 2023-05-25 RX ORDER — HALOPERIDOL 5 MG/ML
0.5 INJECTION INTRAMUSCULAR EVERY 10 MIN PRN
Status: DISCONTINUED | OUTPATIENT
Start: 2023-05-25 | End: 2023-05-25 | Stop reason: HOSPADM

## 2023-05-25 RX ORDER — LIDOCAINE HYDROCHLORIDE 20 MG/ML
INJECTION, SOLUTION INFILTRATION; PERINEURAL
Status: DISCONTINUED | OUTPATIENT
Start: 2023-05-25 | End: 2023-05-25 | Stop reason: HOSPADM

## 2023-05-25 RX ORDER — ACETAMINOPHEN 325 MG/1
650 TABLET ORAL EVERY 4 HOURS PRN
Status: DISCONTINUED | OUTPATIENT
Start: 2023-05-25 | End: 2023-05-25 | Stop reason: HOSPADM

## 2023-05-25 RX ORDER — PROPOFOL 10 MG/ML
VIAL (ML) INTRAVENOUS
Status: DISCONTINUED | OUTPATIENT
Start: 2023-05-25 | End: 2023-05-25

## 2023-05-25 RX ADMIN — SODIUM CHLORIDE: 9 INJECTION, SOLUTION INTRAVENOUS at 11:05

## 2023-05-25 RX ADMIN — PROPOFOL 50 MG: 10 INJECTION, EMULSION INTRAVENOUS at 11:05

## 2023-05-25 RX ADMIN — Medication 100 MCG/KG/MIN: at 11:05

## 2023-05-25 RX ADMIN — ACETAMINOPHEN 650 MG: 325 TABLET ORAL at 05:05

## 2023-05-25 RX ADMIN — PHENYLEPHRINE HYDROCHLORIDE 100 MCG: 10 INJECTION INTRAVENOUS at 11:05

## 2023-05-25 RX ADMIN — PHENYLEPHRINE HYDROCHLORIDE 0.4 MCG/KG/MIN: 10 INJECTION INTRAVENOUS at 11:05

## 2023-05-25 NOTE — ASSESSMENT & PLAN NOTE
Patient here for PAC RFA    Anticoagulation: none  Antiplatelets: none  EF (most recent): 65%  AAD/AVN agents: dilt 120 mg daily    The risks, benefits and alternatives of the procedure were explained to the patient, patient's family and/or surrogate decision maker. Risks include (but not limited to) bleeding, hematoma, infection, pain, vascular damage, damage to structures surrounding the vasculature, myocardial damage [perforation, valvular damage], cardiac tamponade, phrenic nerve damage, injury to conduction system which may require permanent pacemaker implantation, CVA, MI, and death. All questions were answered. Patient is understanding of these risks, and would like to proceed. Consents signed.

## 2023-05-25 NOTE — ANESTHESIA PREPROCEDURE EVALUATION
Pre-operative evaluation for Procedure(s) (LRB):  Ablation, SVT, Accessory Pathway (N/A)    Colt Rose is a 67 y.o. male     Patient Active Problem List   Diagnosis    Gastroesophageal reflux disease    Hyperlipidemia    Cervical disc displacement    Degeneration of lumbar intervertebral disc    Left-sided low back pain without sciatica    Chronic midline low back pain without sciatica    Skin cancer    Lateral epicondylitis of both elbows    Left elbow pain    Right elbow pain    Lumbar radiculitis    Chest pain    Palpitations    Premature atrial complexes    Bradycardia    Osteoarthritis of spine with radiculopathy, lumbar region    Epigastric pain    Other spondylosis, lumbar region    Sleep arousal disorder    Snoring    Nonspecific abnormal electrocardiogram (ECG) (EKG)    Mixed hyperlipidemia    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     Current Outpatient Medications on File Prior to Encounter   Medication Sig Dispense Refill    atorvastatin (LIPITOR) 10 MG tablet TAKE 1 TABLET BY MOUTH  DAILY 90 tablet 0    docosahexaenoic acid/epa (FISH OIL ORAL) Take 1 capsule by mouth once daily.      magnesium oxide (MAG-OX) 400 mg (241.3 mg magnesium) tablet Take 1 tablet (400 mg total) by mouth once daily. 90 tablet 3    meloxicam (MOBIC) 15 MG tablet TAKE 1 TABLET BY MOUTH  DAILY AS NEEDED FOR PAIN 90 tablet 3    methocarbamoL (ROBAXIN) 500 MG Tab TAKE 1 TABLET THREE TIMES A DAY AS NEEDED 90 tablet 11    multivitamin (THERAGRAN) per tablet Take 1 tablet by mouth once daily.      omeprazole (PRILOSEC) 40 MG capsule TAKE 1 CAPSULE BY MOUTH  DAILY 90 capsule 0    diltiaZEM (DILACOR XR) 120 MG  CDCR Take 1 capsule (120 mg total) by mouth once daily. 30 capsule 11    fluocinolone acetonide oil 0.01 % Drop Place 5 drops into both eyes 2 (two) times daily as needed.   5    fluorouraciL (EFUDEX) 5 % cream       hydrocortisone 2.5 % cream Apply 1 application topically 2 (two) times daily.      neomycin-fluocinolone (MANUEL-SYNALAR) 0.5 % (0.35 % base)-0.025 % Crea Apply 1 application topically 2 (two) times daily.      prednisoLONE acetate (PRED FORTE) 1 % DrpS Place 1 drop into both eyes 4 (four) times daily.      ubidecarenone (CO Q-10 ORAL) Take 1 capsule by mouth once daily.         Past Surgical History:   Procedure Laterality Date    CERVICAL FUSION      bone graft    COLONOSCOPY N/A 7/29/2021    Procedure: COLONOSCOPY;  Surgeon: Tyler Story MD;  Location: [facility];  Service: Endoscopy;  Laterality: N/A;    EPIDURAL STEROID INJECTION INTO LUMBAR SPINE N/A 12/5/2019    Procedure: Injection-steroid-epidural-lumbar;  Surgeon: Adarsh Kraft MD;  Location: [facility] OR;  Service: Pain Management;  Laterality: N/A;  L5-S1    ESOPHAGOGASTRODUODENOSCOPY N/A 10/27/2020    Procedure: EGD (ESOPHAGOGASTRODUODENOSCOPY);  Surgeon: Tyler Story MD;  Location: [facility];  Service: Endoscopy;  Laterality: N/A;    INJECTION OF ANESTHETIC AGENT AROUND MEDIAL BRANCH NERVES INNERVATING LUMBAR FACET JOINT Bilateral 11/18/2020    Procedure: BLOCK, NERVE, LUMBAR, MEDIAL BRANCH Bilateral L3,4,5;  Surgeon: Adarsh Kraft MD;  Location: [facility] OR;  Service: Pain Management;  Laterality: Bilateral;    RADIOFREQUENCY ABLATION OF LUMBAR MEDIAL BRANCH NERVE AT SINGLE LEVEL Bilateral 1/29/2021    Procedure: Radiofrequency Ablation, Nerve, Spinal, Lumbar, Medial Branch, 1 Level;  Surgeon: Adarsh Kraft MD;  Location: [facility] OR;  Service: Pain Management;  Laterality: Bilateral;  L3,4,5    TONSILLECTOMY, ADENOIDECTOMY         Social History     Socioeconomic History    Marital status:    Tobacco Use    Smoking status: Never     Smokeless tobacco: Never   Substance and Sexual Activity    Alcohol use: Yes     Alcohol/week: 6.0 standard drinks     Types: 6 Cans of beer per week     Comment: weekly or less    Drug use: No    Sexual activity: Yes     Partners: Female         CBC:   Recent Labs     23  1512   WBC 4.97   RBC 4.40*   HGB 13.6*   HCT 41.6      MCV 95   MCH 30.9   MCHC 32.7       CMP:   Recent Labs     23  1512      K 4.3      CO2 24   BUN 15   CREATININE 0.9   GLU 89   CALCIUM 9.4       INR  Recent Labs     23  1512   INR 1.0   APTT 26.5           Diagnostic Studies:      EK  Vent. Rate : 060 BPM     Atrial Rate : 060 BPM      P-R Int : 168 ms          QRS Dur : 106 ms       QT Int : 422 ms       P-R-T Axes : 038 020 040 degrees      QTc Int : 422 ms     Sinus rhythm with Premature atrial complexes in a pattern of bigeminy   Otherwise normal ECG   When compared with ECG of 2023 08:03,   No significant change was found      2D Echo:  ECHO    The estimated PA systolic pressure is 34 mmHg.   There is moderate left ventricular concentric hypertrophy.   The left ventricle is normal in size with normal systolic function. The estimated ejection fraction is 78%.   Normal left ventricular diastolic function.   Normal right ventricular systolic function.   There is mild pulmonary hypertension.   Mild to moderate tricuspid regurgitation.   Mild pulmonic regurgitation.   Normal central venous pressure (3 mmHg).      Pre-op Assessment    I have reviewed the Patient Summary Reports.     I have reviewed the Nursing Notes. I have reviewed the NPO Status.   I have reviewed the Medications.     Review of Systems  Anesthesia Hx:  No problems with previous Anesthesia    Social:  Alcohol Use, Non-Smoker    Cardiovascular:  Cardiovascular Normal     Pulmonary:   Sleep Apnea, CPAP    Renal/:  Renal/ Normal     Hepatic/GI:   GERD, well controlled    Neurological:   Neuromuscular  Disease,        Physical Exam  General: Well nourished, Cooperative, Alert and Oriented    Airway:  Mallampati: II   Mouth Opening: Normal  TM Distance: Normal  Tongue: Normal  Neck ROM: Normal ROM    Dental:  Intact    Chest/Lungs:  Clear to auscultation, Normal Respiratory Rate    Heart:  Rate: Normal  Rhythm: Regular Rhythm        Anesthesia Plan  Type of Anesthesia, risks & benefits discussed:    Anesthesia Type: MAC, Gen Natural Airway  Intra-op Monitoring Plan: Standard ASA Monitors  Post Op Pain Control Plan: multimodal analgesia  Induction:  IV  ASA Score: 2    Ready For Surgery From Anesthesia Perspective.     .

## 2023-05-25 NOTE — PLAN OF CARE
Pt arrived to unit via stretcher. Received report from [place] PACU RN. Patient s/p SVT ablation with bilateral groin access, AAOx3. VSS, mild back discomfort/repositioned at this time, resp even and unlabored on RA. Gauze/tegaderm dressing to bilateral groin site is clean, dry, and intact. No active bleeding. No hematoma noted. Post procedure protocol reviewed with patient and patient's family. Understanding verbalized. Family members called to bedside. Nurse call bell within reach. Monitoring per post procedure protocol.

## 2023-05-25 NOTE — ANESTHESIA POSTPROCEDURE EVALUATION
Anesthesia Post Evaluation    Patient: Colt Rose    Procedure(s) Performed: Procedure(s) (LRB):  Ablation, SVT, Accessory Pathway (N/A)    Final Anesthesia Type: general      Patient location during evaluation: PACU  Patient participation: Yes- Able to Participate  Level of consciousness: awake and alert  Post-procedure vital signs: reviewed and stable  Pain management: adequate  Airway patency: patent    PONV status at discharge: No PONV  Anesthetic complications: no      Cardiovascular status: blood pressure returned to baseline  Respiratory status: spontaneous ventilation and unassisted  Hydration status: euvolemic  Follow-up not needed.          Vitals Value Taken Time   /66 05/25/23 1447   Temp 36.2 °C (97.2 °F) 05/25/23 1417   Pulse 53 05/25/23 1459   Resp 9 05/25/23 1459   SpO2 97 % 05/25/23 1459   Vitals shown include unvalidated device data.      No case tracking events are documented in the log.      Pain/Leyda Score: Leyda Score: 7 (5/25/2023  2:45 PM)

## 2023-05-25 NOTE — PLAN OF CARE
Pt arrived to unit accompanied by his wife.  Preop orders and assessment initiated.  Pt remains npo.  Call bell within reach.

## 2023-05-25 NOTE — HPI
67M with frequent PACs presents for PAC RFA. Noted to have PACs at cardiology visit. This prompted a 48 hour Holter monitor (12/2022) which showed sinus rhythm with an average HR of 62 bpm (range 41-98 bpm), PAC burden 27%. Was on lopressor 12.5 mg bid at the time. An exercise stress echo in 1/2022 showed EF 65%, no ischemia. At his initial visit pt was in atrial bigeminy. Lopressor was replaced with diltiazem 120 mg daily.    Today, no complaints. Held dilt x 48 hours. ECG today shows NSR with PACs

## 2023-05-25 NOTE — H&P
[facility] - Short Stay Cardiac Unit  Cardiac Electrophysiology  History and Physical     Admission Date: 5/25/2023  Code Status: Prior   Attending Provider: Goyo Sky MD   Principal Problem:<principal problem not specified>    Subjective:     Chief Complaint:  PAC     HPI:  67M with frequent PACs presents for PAC RFA. Noted to have PACs at cardiology visit. This prompted a 48 hour Holter monitor (12/2022) which showed sinus rhythm with an average HR of 62 bpm (range 41-98 bpm), PAC burden 27%. Was on lopressor 12.5 mg bid at the time. An exercise stress echo in 1/2022 showed EF 65%, no ischemia. At his initial visit pt was in atrial bigeminy. Lopressor was replaced with diltiazem 120 mg daily.    Today, no complaints. Held dilt x 48 hours. 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/2015    Cervical disc displacement     Degeneration of lumbar intervertebral disc     GERD (gastroesophageal reflux disease)     gastric polyps    Hip pain 2/22/2016    Hyperlipidemia     Shingles 11/2013       Past Surgical History:   Procedure Laterality Date    CERVICAL FUSION      bone graft    COLONOSCOPY N/A 7/29/2021    Procedure: COLONOSCOPY;  Surgeon: Tyler Story MD;  Location: [facility] ENDO;  Service: Endoscopy;  Laterality: N/A;    EPIDURAL STEROID INJECTION INTO LUMBAR SPINE N/A 12/5/2019    Procedure: Injection-steroid-epidural-lumbar;  Surgeon: Adarsh Kraft MD;  Location: [facility] OR;  Service: Pain Management;  Laterality: N/A;  L5-S1    ESOPHAGOGASTRODUODENOSCOPY N/A 10/27/2020    Procedure: EGD (ESOPHAGOGASTRODUODENOSCOPY);  Surgeon: Tyler Story MD;  Location: [facility] ENDO;  Service: Endoscopy;  Laterality: N/A;    INJECTION OF ANESTHETIC AGENT AROUND MEDIAL BRANCH NERVES INNERVATING LUMBAR FACET JOINT Bilateral 11/18/2020    Procedure: BLOCK, NERVE, LUMBAR, MEDIAL BRANCH Bilateral L3,4,5;  Surgeon: Adarsh Kraft MD;  Location: [facility] OR;  Service:  Pain Management;  Laterality: Bilateral;    RADIOFREQUENCY ABLATION OF LUMBAR MEDIAL BRANCH NERVE AT SINGLE LEVEL Bilateral 1/29/2021    Procedure: Radiofrequency Ablation, Nerve, Spinal, Lumbar, Medial Branch, 1 Level;  Surgeon: Adarsh Kraft MD;  [facility];  Service: Pain Management;  Laterality: Bilateral;  L3,4,5    TONSILLECTOMY, ADENOIDECTOMY         Review of patient's allergies indicates:   Allergen Reactions    Pennsaid [diclofenac sodium] Rash and Blisters       No current facility-administered medications on file prior to encounter.     Current Outpatient Medications on File Prior to Encounter   Medication Sig    atorvastatin (LIPITOR) 10 MG tablet TAKE 1 TABLET BY MOUTH  DAILY    diltiaZEM (DILACOR XR) 120 MG CDCR Take 1 capsule (120 mg total) by mouth once daily.    docosahexaenoic acid/epa (FISH OIL ORAL) Take 1 capsule by mouth once daily.    fluocinolone acetonide oil 0.01 % Drop Place 5 drops into both eyes 2 (two) times daily as needed.     fluorouraciL (EFUDEX) 5 % cream     hydrocortisone 2.5 % cream Apply 1 application topically 2 (two) times daily.    magnesium oxide (MAG-OX) 400 mg (241.3 mg magnesium) tablet Take 1 tablet (400 mg total) by mouth once daily.    meloxicam (MOBIC) 15 MG tablet TAKE 1 TABLET BY MOUTH  DAILY AS NEEDED FOR PAIN    methocarbamoL (ROBAXIN) 500 MG Tab TAKE 1 TABLET THREE TIMES A DAY AS NEEDED    multivitamin (THERAGRAN) per tablet Take 1 tablet by mouth once daily.    neomycin-fluocinolone (MANUEL-SYNALAR) 0.5 % (0.35 % base)-0.025 % Crea Apply 1 application topically 2 (two) times daily.    omeprazole (PRILOSEC) 40 MG capsule TAKE 1 CAPSULE BY MOUTH  DAILY    prednisoLONE acetate (PRED FORTE) 1 % DrpS Place 1 drop into both eyes 4 (four) times daily.    ubidecarenone (CO Q-10 ORAL) Take 1 capsule by mouth once daily.     Family History       Problem Relation (Age of Onset)    Coronary artery disease Mother    Diabetes Mother    Heart disease  Brother    Hypertension Mother    Stroke Mother, Father          Tobacco Use    Smoking status: Never    Smokeless tobacco: Never   Substance and Sexual Activity    Alcohol use: Yes     Alcohol/week: 6.0 standard drinks     Types: 6 Cans of beer per week     Comment: weekly or less    Drug use: No    Sexual activity: Yes     Partners: Female     Review of Systems   All other systems reviewed and are negative.  Objective:     Vital Signs (Most Recent):    Vital Signs (24h Range):             There is no height or weight on file to calculate BMI.             Physical Exam  General: NAD. AAO  HENT: EOMI  Neck: supple. No JVD  CV: RRR. Normal S1/S2. No gallops, rubs, or murmurs. 2+ radial pulses  Resp: CTAB. No increased work of breathing  Ext: warm. No edema.         Significant Labs: All pertinent lab results from the last 24 hours have been reviewed.    Significant Imaging:  Reviewed    Assessment and Plan:     Premature atrial complexes  Patient here for PAC RFA    Anticoagulation: none  Antiplatelets: none  EF (most recent): 65%  AAD/AVN agents: dilt 120 mg daily    The risks, benefits and alternatives of the procedure were explained to the patient, patient's family and/or surrogate decision maker. Risks include (but not limited to) bleeding, hematoma, infection, pain, vascular damage, damage to structures surrounding the vasculature, myocardial damage [perforation, valvular damage], cardiac tamponade, phrenic nerve damage, injury to conduction system which may require permanent pacemaker implantation, CVA, MI, and death. All questions were answered. Patient is understanding of these risks, and would like to proceed. Consents signed.        Dimas Bruce MD  Cardiac Electrophysiology  [facility] - Short Stay Cardiac Unit

## 2023-05-25 NOTE — TRANSFER OF CARE
"Anesthesia Transfer of Care Note    Patient: Colt Rose    Procedure(s) Performed: Procedure(s) (LRB):  Ablation, SVT, Accessory Pathway (N/A)    Patient location: PACU    Anesthesia Type: general    Transport from OR: Transported from OR on 6-10 L/min O2 by face mask with adequate spontaneous ventilation    Post pain: adequate analgesia    Post assessment: no apparent anesthetic complications    Post vital signs: stable    Level of consciousness: responds to stimulation and sedated    Nausea/Vomiting: no nausea/vomiting    Complications: none    Transfer of care protocol was followed      Last vitals:   Visit Vitals  /60 (BP Location: Left arm, Patient Position: Lying)   Pulse (!) 42   Temp 36.5 °C (97.7 °F) (Temporal)   Resp 18   Ht 5' 10" (1.778 m)   Wt 83.5 kg (184 lb)   SpO2 97%   BMI 26.40 kg/m²     "

## 2023-05-25 NOTE — Clinical Note
Dr Sky met with pt's family in the waiting room and updated wife on procedure outcome and pt status

## 2023-05-25 NOTE — Clinical Note
dry, intact, no bleeding and no hematoma. Manual pressure applied for 10 mins, hemostasis achieved, dressing applied to bilateral groins

## 2023-05-25 NOTE — NURSING TRANSFER
Nursing Transfer Note      5/25/2023     Reason patient is being transferred: to short stay post recovery    Transfer To: short stay 11    Transfer via stretcher    Transfer with cardiac monitoring    Transported by RN    Telemetry: Box Number #[id number]    Medicines sent: none    Any special needs or follow-up needed: continue frequent checks and bedrest w/ lay flat    Chart send with patient: Yes    Notified: spouse    Patient reassessed at: to be done by receiving RN (date, time)

## 2023-05-25 NOTE — SUBJECTIVE & OBJECTIVE
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    Cervical disc displacement     Degeneration of lumbar intervertebral disc     GERD (gastroesophageal reflux disease)     gastric polyps    Hip pain 2/22/2016    Hyperlipidemia     Shingles 11/2013       Past Surgical History:   Procedure Laterality Date    CERVICAL FUSION      bone graft    COLONOSCOPY N/A 7/29/2021    Procedure: COLONOSCOPY;  Surgeon: Tyler Story MD;  Location: [facility] ENDO;  Service: Endoscopy;  Laterality: N/A;    EPIDURAL STEROID INJECTION INTO LUMBAR SPINE N/A 12/5/2019    Procedure: Injection-steroid-epidural-lumbar;  Surgeon: Adarsh Kraft MD;  Location: [facility] OR;  Service: Pain Management;  Laterality: N/A;  L5-S1    ESOPHAGOGASTRODUODENOSCOPY N/A 10/27/2020    Procedure: EGD (ESOPHAGOGASTRODUODENOSCOPY);  Surgeon: Tyler Story MD;  Location: [facility] ENDO;  Service: Endoscopy;  Laterality: N/A;    INJECTION OF ANESTHETIC AGENT AROUND MEDIAL BRANCH NERVES INNERVATING LUMBAR FACET JOINT Bilateral 11/18/2020    Procedure: BLOCK, NERVE, LUMBAR, MEDIAL BRANCH Bilateral L3,4,5;  Surgeon: Adarsh Kraft MD;  Location: [facility] OR;  Service: Pain Management;  Laterality: Bilateral;    RADIOFREQUENCY ABLATION OF LUMBAR MEDIAL BRANCH NERVE AT SINGLE LEVEL Bilateral 1/29/2021    Procedure: Radiofrequency Ablation, Nerve, Spinal, Lumbar, Medial Branch, 1 Level;  Surgeon: Adarsh Kraft MD;  Location: [facility] OR;  Service: Pain Management;  Laterality: Bilateral;  L3,4,5    TONSILLECTOMY, ADENOIDECTOMY         Review of patient's allergies indicates:   Allergen Reactions    Pennsaid [diclofenac sodium] Rash and Blisters       No current facility-administered medications on file prior to encounter.     Current Outpatient Medications on File Prior to Encounter   Medication Sig    atorvastatin (LIPITOR) 10 MG tablet TAKE 1 TABLET BY MOUTH  DAILY    diltiaZEM (DILACOR XR) 120 MG CDCR Take 1 capsule (120 mg total) by mouth once daily.     docosahexaenoic acid/epa (FISH OIL ORAL) Take 1 capsule by mouth once daily.    fluocinolone acetonide oil 0.01 % Drop Place 5 drops into both eyes 2 (two) times daily as needed.     fluorouraciL (EFUDEX) 5 % cream     hydrocortisone 2.5 % cream Apply 1 application topically 2 (two) times daily.    magnesium oxide (MAG-OX) 400 mg (241.3 mg magnesium) tablet Take 1 tablet (400 mg total) by mouth once daily.    meloxicam (MOBIC) 15 MG tablet TAKE 1 TABLET BY MOUTH  DAILY AS NEEDED FOR PAIN    methocarbamoL (ROBAXIN) 500 MG Tab TAKE 1 TABLET THREE TIMES A DAY AS NEEDED    multivitamin (THERAGRAN) per tablet Take 1 tablet by mouth once daily.    neomycin-fluocinolone (MANUEL-SYNALAR) 0.5 % (0.35 % base)-0.025 % Crea Apply 1 application topically 2 (two) times daily.    omeprazole (PRILOSEC) 40 MG capsule TAKE 1 CAPSULE BY MOUTH  DAILY    prednisoLONE acetate (PRED FORTE) 1 % DrpS Place 1 drop into both eyes 4 (four) times daily.    ubidecarenone (CO Q-10 ORAL) Take 1 capsule by mouth once daily.     Family History       Problem Relation (Age of Onset)    Coronary artery disease Mother    Diabetes Mother    Heart disease Brother    Hypertension Mother    Stroke Mother, Father          Tobacco Use    Smoking status: Never    Smokeless tobacco: Never   Substance and Sexual Activity    Alcohol use: Yes     Alcohol/week: 6.0 standard drinks     Types: 6 Cans of beer per week     Comment: weekly or less    Drug use: No    Sexual activity: Yes     Partners: Female     Review of Systems   All other systems reviewed and are negative.  Objective:     Vital Signs (Most Recent):    Vital Signs (24h Range):             There is no height or weight on file to calculate BMI.             Physical Exam  General: NAD. AAO  HENT: EOMI  Neck: supple. No JVD  CV: RRR. Normal S1/S2. No gallops, rubs, or murmurs. 2+ radial pulses  Resp: CTAB. No increased work of breathing  Ext: warm. No edema.         Significant Labs: All pertinent lab results  from the last 24 hours have been reviewed.    Significant Imaging:  Reviewed

## 2023-05-25 NOTE — PLAN OF CARE
Patient discharged per MD orders. Instructions given on medications, wound care, activity, signs of infection, when to call MD, and follow up appointments. Pt verbalized understanding. PIV removed. Patient and family used wc off unit to private vehicle. Bilateral groins remain clean, dry, and intact.

## 2023-05-25 NOTE — PLAN OF CARE
Pt ambulated on unit without issues. Strong and steady gait noted. Pt voided in restroom without issue. Bilateral groins remain clean, dry, and intact.   During ambulation HR noted 60-70 range.

## 2023-05-26 PROBLEM — I48.3 TYPICAL ATRIAL FLUTTER: Status: ACTIVE | Noted: 2023-05-26

## 2023-05-26 NOTE — DISCHARGE SUMMARY
[facility] - Short Stay Cardiac Unit  Cardiac Electrophysiology  Discharge Summary      Patient Name: Colt Rose  MRN: [id number]  Admission Date: 5/25/2023  Hospital Length of Stay: 0 days  Discharge Date and Time: 5/25/2023  6:36 PM  Attending Physician: No att. providers found    Discharging Provider: Dimas Bruce MD  Primary Care Physician: Jeffery Mena MD    HPI:   67M with frequent PACs presents for PAC RFA. Noted to have PACs at cardiology visit. This prompted a 48 hour Holter monitor (12/2022) which showed sinus rhythm with an average HR of 62 bpm (range 41-98 bpm), PAC burden 27%. Was on lopressor 12.5 mg bid at the time. An exercise stress echo in 1/2022 showed EF 65%, no ischemia. At his initial visit pt was in atrial bigeminy. Lopressor was replaced with diltiazem 120 mg daily.    Today, no complaints. Held dilt x 48 hours. ECG today shows NSR with PACs           Procedure(s) (LRB):  Ablation, SVT, Accessory Pathway (N/A)     Indwelling Lines/Drains at time of discharge:  Lines/Drains/Airways     None                 Hospital Course:  S/p RFA of CTI AFL and PACs localized to superior lateral RA. Will obtain holter in 2 weeks to assess HR curve.       Goals of Care Treatment Preferences:  Code Status: Full Code      Consults:     Significant Diagnostic Studies: Labs: All labs within the past 24 hours have been reviewed    Pending Diagnostic Studies:     None          Final Active Diagnoses:    Diagnosis Date Noted POA    Typical atrial flutter [I48.3] 05/26/2023 Yes    Premature atrial complexes [I49.1] 10/20/2020 Yes      Problems Resolved During this Admission:     Cardiac/Vascular  Premature atrial complexes  Patient here for PAC RFA    Anticoagulation: none  Antiplatelets: none  EF (most recent): 65%  AAD/AVN agents: dilt 120 mg daily    The risks, benefits and alternatives of the procedure were explained to the patient, patient's family and/or surrogate decision maker. Risks include (but  not limited to) bleeding, hematoma, infection, pain, vascular damage, damage to structures surrounding the vasculature, myocardial damage [perforation, valvular damage], cardiac tamponade, phrenic nerve damage, injury to conduction system which may require permanent pacemaker implantation, CVA, MI, and death. All questions were answered. Patient is understanding of these risks, and would like to proceed. Consents signed.        Discharged Condition: stable    Disposition: Home or Self Care    Follow Up:   Follow-up Information     Goyo Sky MD Follow up in 6 week(s).    Specialties: Electrophysiology, Cardiovascular Disease, Cardiology  Contact information:  [address]  [facility] [address]  [phone]                       Patient Instructions:   Discharge Instructions and Care of Your Groin      Catheter Insertion Site  · The morning after your procedure, you may take the dressing off. The easiest way to do this is when you are showering, get the tape and dressing wet and remove it.  · After the bandage is removed, cover the area with a small adhesive bandage. It is normal for the catheter insertion site to be black and blue for a couple of days. The site may also be slightly swollen and pink, and there may be a small lump (about the size of a quarter) at the site.  · Wash the catheter insertion site at least once daily with soap and water. Place soapy water on your hand or washcloth and gently wash the insertion site; do not rub.  · Keep the area clean and dry when you are not showering.  · Do not use creams, lotions or ointment on the wound site.  · Wear loose clothes and loose underwear.  · Do not take a bath, tub soak, go in a Jacuzzi, or swim in a pool or lake for one week after the procedure.    Activity Instructions  · Do not strain during bowel movements for the first 3 to 4 days after the procedure to prevent bleeding from the catheter insertion site.  · Avoid heavy lifting (more than 10  pounds) and pushing or pulling heavy objects for the first 5 to 7 days after the procedure.  · Do not participate in strenuous activities for 5 days after the procedure. This includes most sports - jogging, golfing, play tennis, and bowling.  · You may climb stairs if needed, but walk up and down the stairs more slowly than usual.  · Gradually increase your activities until you reach your normal activity level within one week after the procedure.      If bleeding should occur following discharge:   Sit down and apply firm pressure to the puncture site with your fingers for 10 minutes    If the bleeding stops, continue to sit quietly, keeping your leg straight for 2 hours. Notify your physician as soon as possible    If bleeding does not stop after 10 minutes or if there is a large amount of bleeding or spurting, call 911 immediately.  Do not drive yourself to the hospital.     Notify your physician if these symptoms persist or if you experience:   Change in color or temperature of the leg   Redness, heat, or pus at the puncture site   Chills or fever greater than 101.0 F     Medications:  Reconciled Home Medications:      Medication List      START taking these medications    aspirin 81 MG EC tablet  Commonly known as: ECOTRIN  Take 1 tablet (81 mg total) by mouth once daily.        CONTINUE taking these medications    atorvastatin 10 MG tablet  Commonly known as: LIPITOR  TAKE 1 TABLET BY MOUTH  DAILY     CO Q-10 ORAL  Take 1 capsule by mouth once daily.     FISH OIL ORAL  Take 1 capsule by mouth once daily.     fluocinolone acetonide oiL 0.01 % Drop  Place 5 drops into both eyes 2 (two) times daily as needed.     fluorouraciL 5 % cream  Commonly known as: EFUDEX     hydrocortisone 2.5 % cream  Apply 1 application topically 2 (two) times daily.     magnesium oxide 400 mg (241.3 mg magnesium) tablet  Commonly known as: MAG-OX  Take 1 tablet (400 mg total) by mouth once daily.     meloxicam 15 MG  tablet  Commonly known as: MOBIC  TAKE 1 TABLET BY MOUTH  DAILY AS NEEDED FOR PAIN     methocarbamoL 500 MG Tab  Commonly known as: ROBAXIN  TAKE 1 TABLET THREE TIMES A DAY AS NEEDED     multivitamin per tablet  Commonly known as: THERAGRAN  Take 1 tablet by mouth once daily.     MANUEL-SYNALAR 0.5 % (0.35 % base)-0.025 % Crea  Generic drug: neomycin-fluocinolone  Apply 1 application topically 2 (two) times daily.     omeprazole 40 MG capsule  Commonly known as: PRILOSEC  TAKE 1 CAPSULE BY MOUTH  DAILY     prednisoLONE acetate 1 % Drps  Commonly known as: PRED FORTE  Place 1 drop into both eyes 4 (four) times daily.        STOP taking these medications    diltiaZEM 120 MG Cdcr  Commonly known as: DILACOR XR            Time spent on the discharge of patient: 15 minutes    Dimas Bruce MD  Cardiac Electrophysiology  [facility] - Short Stay Cardiac Unit

## 2023-05-26 NOTE — HOSPITAL COURSE
S/p RFA of CTI AFL and PACs localized to superior lateral RA. Will obtain holter in 2 weeks to assess HR curve.

## 2023-06-01 ENCOUNTER — PATIENT MESSAGE (OUTPATIENT)
Dept: CARDIOLOGY | Facility: CLINIC | Age: 68
End: 2023-06-01
Payer: MEDICARE

## 2023-06-08 ENCOUNTER — LAB VISIT (OUTPATIENT)
Dept: LAB | Facility: HOSPITAL | Age: 68
End: 2023-06-08
Attending: FAMILY MEDICINE
Payer: MEDICARE

## 2023-06-08 DIAGNOSIS — E78.2 MIXED HYPERLIPIDEMIA: ICD-10-CM

## 2023-06-08 DIAGNOSIS — R73.03 PREDIABETES: ICD-10-CM

## 2023-06-08 LAB
ALBUMIN SERPL BCP-MCNC: 4 G/DL (ref 3.5–5.2)
ALP SERPL-CCNC: 45 U/L (ref 55–135)
ALT SERPL W/O P-5'-P-CCNC: 22 U/L (ref 10–44)
ANION GAP SERPL CALC-SCNC: 9 MMOL/L (ref 8–16)
AST SERPL-CCNC: 22 U/L (ref 10–40)
BILIRUB SERPL-MCNC: 0.4 MG/DL (ref 0.1–1)
BUN SERPL-MCNC: 20 MG/DL (ref 8–23)
CALCIUM SERPL-MCNC: 9.6 MG/DL (ref 8.7–10.5)
CHLORIDE SERPL-SCNC: 105 MMOL/L (ref 95–110)
CHOLEST SERPL-MCNC: 169 MG/DL (ref 120–199)
CHOLEST/HDLC SERPL: 3.3 {RATIO} (ref 2–5)
CO2 SERPL-SCNC: 27 MMOL/L (ref 23–29)
CREAT SERPL-MCNC: 0.8 MG/DL (ref 0.5–1.4)
EST. GFR  (NO RACE VARIABLE): >60 ML/MIN/1.73 M^2
GLUCOSE SERPL-MCNC: 96 MG/DL (ref 70–110)
HDLC SERPL-MCNC: 51 MG/DL (ref 40–75)
HDLC SERPL: 30.2 % (ref 20–50)
HGB BLD-MCNC: 14 G/DL (ref 14–18)
LDLC SERPL CALC-MCNC: 108.6 MG/DL (ref 63–159)
NONHDLC SERPL-MCNC: 118 MG/DL
PLATELET # BLD AUTO: 207 K/UL (ref 150–450)
PMV BLD AUTO: 11 FL (ref 9.2–12.9)
POTASSIUM SERPL-SCNC: 4.3 MMOL/L (ref 3.5–5.1)
PROT SERPL-MCNC: 7 G/DL (ref 6–8.4)
SODIUM SERPL-SCNC: 141 MMOL/L (ref 136–145)
TRIGL SERPL-MCNC: 47 MG/DL (ref 30–150)
TSH SERPL DL<=0.005 MIU/L-ACNC: 3.09 UIU/ML (ref 0.4–4)
WBC # BLD AUTO: 4.29 K/UL (ref 3.9–12.7)

## 2023-06-08 PROCEDURE — 83036 HEMOGLOBIN GLYCOSYLATED A1C: CPT | Performed by: FAMILY MEDICINE

## 2023-06-08 PROCEDURE — 85018 HEMOGLOBIN: CPT | Performed by: FAMILY MEDICINE

## 2023-06-08 PROCEDURE — 84443 ASSAY THYROID STIM HORMONE: CPT | Performed by: FAMILY MEDICINE

## 2023-06-08 PROCEDURE — 80053 COMPREHEN METABOLIC PANEL: CPT | Performed by: FAMILY MEDICINE

## 2023-06-08 PROCEDURE — 85048 AUTOMATED LEUKOCYTE COUNT: CPT | Performed by: FAMILY MEDICINE

## 2023-06-08 PROCEDURE — 36415 COLL VENOUS BLD VENIPUNCTURE: CPT | Mod: PO | Performed by: FAMILY MEDICINE

## 2023-06-08 PROCEDURE — 80061 LIPID PANEL: CPT | Performed by: FAMILY MEDICINE

## 2023-06-08 PROCEDURE — 85049 AUTOMATED PLATELET COUNT: CPT | Performed by: FAMILY MEDICINE

## 2023-06-09 LAB
ESTIMATED AVG GLUCOSE: 108 MG/DL (ref 68–131)
HBA1C MFR BLD: 5.4 % (ref 4–5.6)

## 2023-06-14 ENCOUNTER — OFFICE VISIT (OUTPATIENT)
Dept: FAMILY MEDICINE | Facility: CLINIC | Age: 68
End: 2023-06-14
Payer: MEDICARE

## 2023-06-14 ENCOUNTER — LAB VISIT (OUTPATIENT)
Dept: LAB | Facility: HOSPITAL | Age: 68
End: 2023-06-14
Attending: FAMILY MEDICINE
Payer: MEDICARE

## 2023-06-14 VITALS
DIASTOLIC BLOOD PRESSURE: 62 MMHG | RESPIRATION RATE: 18 BRPM | TEMPERATURE: 98 F | SYSTOLIC BLOOD PRESSURE: 110 MMHG | HEIGHT: 70 IN | OXYGEN SATURATION: 96 % | WEIGHT: 185.19 LBS | BODY MASS INDEX: 26.51 KG/M2 | HEART RATE: 65 BPM

## 2023-06-14 DIAGNOSIS — K21.9 GASTROESOPHAGEAL REFLUX DISEASE WITHOUT ESOPHAGITIS: ICD-10-CM

## 2023-06-14 DIAGNOSIS — E78.2 MIXED HYPERLIPIDEMIA: Primary | ICD-10-CM

## 2023-06-14 DIAGNOSIS — Z12.5 SCREENING FOR PROSTATE CANCER: ICD-10-CM

## 2023-06-14 DIAGNOSIS — G89.29 CHRONIC RIGHT SHOULDER PAIN: ICD-10-CM

## 2023-06-14 DIAGNOSIS — M54.50 CHRONIC MIDLINE LOW BACK PAIN WITHOUT SCIATICA: ICD-10-CM

## 2023-06-14 DIAGNOSIS — G89.29 CHRONIC MIDLINE LOW BACK PAIN WITHOUT SCIATICA: ICD-10-CM

## 2023-06-14 DIAGNOSIS — M25.511 CHRONIC RIGHT SHOULDER PAIN: ICD-10-CM

## 2023-06-14 LAB — COMPLEXED PSA SERPL-MCNC: 0.5 NG/ML (ref 0–4)

## 2023-06-14 PROCEDURE — 84153 ASSAY OF PSA TOTAL: CPT | Performed by: FAMILY MEDICINE

## 2023-06-14 PROCEDURE — 99214 PR OFFICE/OUTPT VISIT, EST, LEVL IV, 30-39 MIN: ICD-10-PCS | Mod: S$PBB,,, | Performed by: FAMILY MEDICINE

## 2023-06-14 PROCEDURE — 99999 PR PBB SHADOW E&M-EST. PATIENT-LVL IV: CPT | Mod: PBBFAC,,, | Performed by: FAMILY MEDICINE

## 2023-06-14 PROCEDURE — 99214 OFFICE O/P EST MOD 30 MIN: CPT | Mod: S$PBB,,, | Performed by: FAMILY MEDICINE

## 2023-06-14 PROCEDURE — 36415 COLL VENOUS BLD VENIPUNCTURE: CPT | Mod: PO | Performed by: FAMILY MEDICINE

## 2023-06-14 PROCEDURE — 99999 PR PBB SHADOW E&M-EST. PATIENT-LVL IV: ICD-10-PCS | Mod: PBBFAC,,, | Performed by: FAMILY MEDICINE

## 2023-06-14 PROCEDURE — 99214 OFFICE O/P EST MOD 30 MIN: CPT | Mod: PBBFAC,PO | Performed by: FAMILY MEDICINE

## 2023-06-14 RX ORDER — OMEPRAZOLE 40 MG/1
40 CAPSULE, DELAYED RELEASE ORAL DAILY
Qty: 90 CAPSULE | Refills: 2 | Status: SHIPPED | OUTPATIENT
Start: 2023-06-14

## 2023-06-14 RX ORDER — ATORVASTATIN CALCIUM 10 MG/1
10 TABLET, FILM COATED ORAL DAILY
Qty: 90 TABLET | Refills: 2 | Status: SHIPPED | OUTPATIENT
Start: 2023-06-14 | End: 2024-02-16

## 2023-06-14 RX ORDER — METHOCARBAMOL 500 MG/1
500 TABLET, FILM COATED ORAL NIGHTLY PRN
Qty: 90 TABLET | Refills: 3 | Status: SHIPPED | OUTPATIENT
Start: 2023-06-14 | End: 2023-12-09

## 2023-06-14 NOTE — PROGRESS NOTES
Subjective:   Patient ID: Colt Rose is a 67 y.o. male     Chief Complaint:Annual Exam      Here for checkup    Review of Systems   Respiratory:  Negative for shortness of breath.    Cardiovascular:  Negative for chest pain.   Gastrointestinal:  Negative for abdominal pain.   Genitourinary:  Negative for dysuria.   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    Cervical disc displacement     Degeneration of lumbar intervertebral disc     GERD (gastroesophageal reflux disease)     gastric polyps    Hip pain 02/22/2016    Hyperlipidemia     Shingles 11/2013    Sleep apnea      Past Surgical History:   Procedure Laterality Date    ABLATION, SVT, ACCESSORY PATHWAY N/A 5/25/2023    Procedure: Ablation, SVT, Accessory Pathway;  Surgeon: Goyo Sky MD;  Location: [facility] EP LAB;  Service: Cardiology;  Laterality: N/A;  PAC, RFA, CARTO, MAC, GP, 3 PREP    CERVICAL FUSION      bone graft    COLONOSCOPY N/A 7/29/2021    Procedure: COLONOSCOPY;  Surgeon: Tyler Story MD;  Location: [facility] ENDO;  Service: Endoscopy;  Laterality: N/A;    EPIDURAL STEROID INJECTION INTO LUMBAR SPINE N/A 12/5/2019    Procedure: Injection-steroid-epidural-lumbar;  Surgeon: Adarsh Kraft MD;  Location: [facility] OR;  Service: Pain Management;  Laterality: N/A;  L5-S1    ESOPHAGOGASTRODUODENOSCOPY N/A 10/27/2020    Procedure: EGD (ESOPHAGOGASTRODUODENOSCOPY);  Surgeon: Tyler Story MD;  Location: [facility] ENDO;  Service: Endoscopy;  Laterality: N/A;    INJECTION OF ANESTHETIC AGENT AROUND MEDIAL BRANCH NERVES INNERVATING LUMBAR FACET JOINT Bilateral 11/18/2020    Procedure: BLOCK, NERVE, LUMBAR, MEDIAL BRANCH Bilateral L3,4,5;  Surgeon: Adarsh Kraft MD;  Location: [facility] OR;  Service: Pain Management;  Laterality: Bilateral;    RADIOFREQUENCY ABLATION OF LUMBAR MEDIAL BRANCH NERVE AT SINGLE LEVEL Bilateral 1/29/2021    Procedure: Radiofrequency Ablation, Nerve, Spinal, Lumbar, Medial Branch, 1 Level;  Surgeon: Adarsh ARRINGTON  MD Swapnil;  Location: [facility] OR;  Service: Pain Management;  Laterality: Bilateral;  L3,4,5    TONSILLECTOMY, ADENOIDECTOMY       Objective:     Vitals:    06/14/23 0750   BP: 110/62   Pulse: 65   Resp: 18   Temp: 97.9 °F (36.6 °C)     Body mass index is 26.57 kg/m².  Physical Exam  Vitals and nursing note reviewed.   Constitutional:       Appearance: He is well-developed.   HENT:      Head: Normocephalic and atraumatic.   Eyes:      General: No scleral icterus.     Conjunctiva/sclera: Conjunctivae normal.   Pulmonary:      Effort: Pulmonary effort is normal. No respiratory distress.   Musculoskeletal:         General: No deformity. Normal range of motion.      Cervical back: Normal range of motion and neck supple.   Skin:     Coloration: Skin is not pale.      Findings: No rash.   Neurological:      Mental Status: He is alert and oriented to person, place, and time.   Psychiatric:         Behavior: Behavior normal.         Thought Content: Thought content normal.         Judgment: Judgment normal.     Assessment:     1. Mixed hyperlipidemia    2. Screening for prostate cancer    3. Chronic right shoulder pain    4. Gastroesophageal reflux disease without esophagitis    5. Chronic midline low back pain without sciatica      Plan:   Mixed hyperlipidemia  -     atorvastatin (LIPITOR) 10 MG tablet; Take 1 tablet (10 mg total) by mouth once daily.  Dispense: 90 tablet; Refill: 2    Screening for prostate cancer  -     PSA, SCREENING; Future; Expected date: 06/14/2023    Chronic right shoulder pain  -     Ambulatory referral/consult to Physical/Occupational Therapy; Future; Expected date: 06/21/2023    Gastroesophageal reflux disease without esophagitis  -     omeprazole (PRILOSEC) 40 MG capsule; Take 1 capsule (40 mg total) by mouth once daily.  Dispense: 90 capsule; Refill: 2    Chronic midline low back pain without sciatica  -     methocarbamoL (ROBAXIN) 500 MG Tab; Take 1 tablet (500 mg total) by mouth nightly as needed.   Dispense: 90 tablet; Refill: 3        Total time spent of Less than 30 minutes minutes on the day of the visit.This includes face to face time and preparing to see the patient, obtaining and reviewing separately obtained history, documenting clinical information in the electronic or other health record, independently interpreting results and communicating results to the patient/family/caregiver, or care coordinator.    Established patient with me has been instructed that must see me at least 1 time yearly (every 365 days) for refills of medications. Seeing other providers in this clinic is fine but expectation is to see me yearly.    Jeffery Mena MD  06/14/2023    Portions of this note have been dictated with CHRIS Orozco

## 2023-06-14 NOTE — PATIENT INSTRUCTIONS
Homero Gregory,     If you are due for any health screening(s) below please notify me so we can arrange them to be ordered and scheduled to maintain your health. Most healthy patients complete it. Don't lose out on improving your health.     All of your core healthy metrics are met.

## 2023-06-16 ENCOUNTER — TELEPHONE (OUTPATIENT)
Dept: FAMILY MEDICINE | Facility: CLINIC | Age: 68
End: 2023-06-16
Payer: MEDICARE

## 2023-08-18 ENCOUNTER — CLINICAL SUPPORT (OUTPATIENT)
Dept: REHABILITATION | Facility: HOSPITAL | Age: 68
End: 2023-08-18
Payer: MEDICARE

## 2023-08-18 DIAGNOSIS — M40.03 ACQUIRED KYPHOSIS OF CERVICOTHORACIC SPINE DUE TO POOR POSTURE: ICD-10-CM

## 2023-08-18 DIAGNOSIS — R68.89 IMPAIRED TOLERANCE OF ACTIVITY: ICD-10-CM

## 2023-08-18 PROCEDURE — 97110 THERAPEUTIC EXERCISES: CPT | Mod: PN

## 2023-08-18 PROCEDURE — 97161 PT EVAL LOW COMPLEX 20 MIN: CPT | Mod: PN

## 2023-08-18 NOTE — PROGRESS NOTES
[facility]  Physical Therapy Initial Evaluation    Name: Colt Rose  Clinic Number: [id number]    Therapy Diagnosis:   Encounter Diagnoses   Name Primary?    Acquired kyphosis of cervicothoracic spine due to poor posture     Impaired tolerance of activity      Physician: Jeffery Mena MD    Physician Orders: PT Eval and Treat   Medical Diagnosis from Referral: M25.511,G89.29 (ICD-10-CM) - Chronic right shoulder pain  Evaluation Date: 8/18/2023  Authorization Period Expiration: 6/13/2024  Plan of Care Expiration: 10/13/2023 or 8v post eval  Visit # (total)/ Visits authorized: 1/ eval  (POC 0 / 8)  2nd FOTO visit 5  3rd FOTO visit 12  Progress Note Due: 9/18/23    Time In: 100  Time Out: 150  Total Billable Time: 50 minutes    Precautions: C/s 5-6 fusion; DDD lumbar; Cardiac hx  Insurance: Payor: MEDICARE / Plan: MEDICARE PART A & B / Product Type: Government /     Subjective   Date of onset: in the last year  History of current condition - Colt reports: no known incident. Has a history of neck pain; had a surgery back in 1991; C5-6 fusion. Was told by a surgeon he could get another surgery on his neck but does not want to do that. Has not had a shoulder surgery. Neck has not really been bothering him and continues to do exercises daily. Reports he Does not have full range of motion or strength of his right arm. Left arm is doing good. Had eye and heart surgery fairly recently, so he is getting back into his exercise routine. He Rides his bike and walks, uses a weight machine and Does stretches on floor.      Medical History:   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    Cervical disc displacement     Degeneration of lumbar intervertebral disc     GERD (gastroesophageal reflux disease)     gastric polyps    Hip pain 02/22/2016    Hyperlipidemia     Shingles 11/2013    Sleep apnea        Surgical History:   Colt Rose  has a past surgical  history that includes TONSILLECTOMY, ADENOIDECTOMY; Cervical fusion; Epidural steroid injection into lumbar spine (N/A, 12/5/2019); Esophagogastroduodenoscopy (N/A, 10/27/2020); Injection of anesthetic agent around medial branch nerves innervating lumbar facet joint (Bilateral, 11/18/2020); Radiofrequency ablation of lumbar medial branch nerve at single level (Bilateral, 1/29/2021); Colonoscopy (N/A, 7/29/2021); and ablation, svt, accessory pathway (N/A, 5/25/2023).    Medications:   Colt has a current medication list which includes the following prescription(s): aspirin, atorvastatin, docosahexaenoic acid/epa, fluocinolone acetonide oil, magnesium oxide, meloxicam, methocarbamol, multivitamin, radha-synalar, omeprazole, prednisolone acetate, and ubidecarenone.    Allergies:   Review of patient's allergies indicates:   Allergen Reactions    Pennsaid [diclofenac sodium] Rash and Blisters        Imaging, none in EPIC    Prior Therapy: yes  Social History: lives with wife  Limb Dominance / HA: Right / none  Current Smoker: no  Cancer Hx: no  Recent Weight Loss: no  Occupation: retired [employer]  Prior Level of Function: independent   Current Level of Function: difficult/pain with reach around chest or behind back; end range OH; hunting-lifting tree stand;  20# or more difficult to lift    Pain:  Current 0/10, worst 5/10, best 0/10   Location: Right A/c joint region and deltoid  Description: aching; denies n/t  Aggravating Factors:  reach around chest or behind back; end range OH; hunting-lifting tree stand;  20# or more difficult to lift  Easing Factors: medication    Pts goals: see where the pain is coming from    Objective     Posture: Bilateral hip External Rotation; Right shoulder depression; upper sway back posture; mild kyphosis and moderate FHP; excessive upward rotation of scapulae (>Right)  Palpation: TTP right lateral neck, Upper Trapezius and pectoralis    Sensation: increased sensation Right C5 dermatome /  diminished Left C5 dermatome    Range of Motion/Strength:     Cervical AROM: in degrees  Flx-ext: 40 / 40  Left SB / Right SB: 15 deg Bilateral   Left Rot 55 / Right 40    Shoulder AROM:    Left Right Pain/Dysfunction with Movement   Flexion (180 norm)  WNL  WNL pn    Extension (60 norm)  WNL  WNL    Abduction (180 norm)  WNL  WNL pn          Internal rotation (T8 norm)  T8  T11    ER at 0° abd (90 norm)  WNL  WNL pn        UE MMT: 5/5 Left Right Pain/Dysfunction with Movement   Shoulder Flexion 5/5. 4+/5.pn    Shoulder Extension 5/5. 5/5.    Shoulder Abduction 5/5. 4+/5.pn          Shoulder IR 5/5. 5/5.    Shoulder ER  @ 0* Abduction 5/5. 5/5.    Serratus Anterior: 4+/5 Bilateral   Middle Trapezius: 3/5 Bilateral   Lower Trapezius: 2/5 Bilateral   Elbow Flexion: 5/5 Bilateral   Elbow Extension: 5/5 Bilateral     Special Tests Left Right Comments         Speed negative Pn in posterior deltoid    Drop arm  90 deg support  negative Pn coming down          Lift off negative negative    AC Crossover  Hor Add negative negative    AC Resist  Hor Abd negative negative        Other:     Cervical musculature flexibility: (restriction: mild, moderate, severe grading)   Muscle Flexibility   Upper trapezius moderate   Pectoralis  severe     Endfeel / Mobility: NT     Strength (in pounds, setting II one maximum trial):    Left Right     lbs 100 lbs      Normal  Average Values  Female Right Left Male: Right Left   20-29 66 lbs 62 lbs         94 lbs 86 lbs   30-39 68 lbs 64 lbs 90 lbs 82 lbs   40-49 64 lbs 62 lbs 80 lbs 74 lbs   50-59 62 lbs 57 lbs 72 lbs 65 lbs   60-69 53 lbs 51 lbs 63 lbs 56 lbs   70+ 44 lbs 42 lbs 54 lbs 48 lbs            TREATMENT   Treatment Time In: 130  Treatment Time Out: 150  Total Treatment time separate from Evaluation: 20 minutes    Colt received therapeutic exercises to develop strength, ROM, and flexibility for 15 minutes including:  NMRE utilized to activate appropriate mm to  "improve posture, shoulder stabilization, and smooth scapular glide: 5 minutes     SEATED:  Shoulder retro rolls, 2x10  T/s Extension stretch, [id number]  Posterior deltoid stretch, holding wrist and pull across, Upper Trapezius relaxed    STAND:  Right Upper Trapezius stretch @ bars, [id number]  Red Theraband Bilateral External Rotation, [id number]    TABLE:  Prone extension, 2#, 20-30x      Add NEXT:  Supine Green Theraband horizontal abduction  Prone T, towel under forehead, palms face floor  Wall clocks (add to HEP)  Sleeper or Sidelying horiz adduction stretch  Cable Column: Ext / Add  Bicep curls  Tricep extension  Genny Depression ball and 4" stool  Shoulder taps with shoulders depressed  Body Blade: A-P / Med-Lat, [id number] ea      Home Exercises and Patient Education Provided    Education provided:   - daily HEP    Written Home Exercises Provided: yes.  Exercises were reviewed and Colt was able to demonstrate them prior to the end of the session.  Colt demonstrated good  understanding of the education provided.     See EMR under Patient Instructions for exercises provided 8/18/2023.    Assessment   Colt is a 68 y.o. male referred to outpatient Physical Therapy with a medical diagnosis of chronic right shoulder pain. Pt presents with pain palpable in the right lateral neck, Upper Trapezius and pectoralis, normal Range of Motion with end range pain, decreased periscapular muscle and Right Upper Extremity strength, posture deficits and impaired function.     Pt prognosis is Good.   Pt will benefit from skilled outpatient Physical Therapy to address the deficits stated above and in the chart below, provide pt/family education, and to maximize pt's level of independence.     Plan of care discussed with patient: Yes  Pt's spiritual, cultural and educational needs considered and patient is agreeable to the plan of care and goals as stated below:     Anticipated Barriers for therapy: chronicity of condition    Medical " Necessity is demonstrated by the following  History  Co-morbidities and personal factors that may impact the plan of care Co-morbidities:   C/s 5-6 fusion; DDD lumbar; Cardiac history    Personal Factors:   age  coping style  lifestyle     low   Examination  Body Structures and Functions, activity limitations and participation restrictions that may impact the plan of care Body Regions:   neck  upper extremities    Body Systems:    gross symmetry  strength  motor control  motor learning  posture    Participation Restrictions:   Recreational, Reaching, Lifting    Activity limitations:   Learning and applying knowledge  no deficits    General Tasks and Commands  no deficits    Communication  no deficits    Mobility  lifting and carrying objects    Self care  dressing    Domestic Life  doing house work (cleaning house, washing dishes, laundry)  assisting others    Interactions/Relationships  no deficits    Life Areas  no deficits    Community and Social Life  community life  recreation and leisure         low   Clinical Presentation stable and uncomplicated low   Decision Making/ Complexity Score: low     Goals:  Short Term Goals (3 Weeks):  1. Pt will decrease pain to 2/10 while performing daily activities.  2. Pt demonstrates independence with postural awareness.   3. Pt performs HEP.      Long Term Goals (6 Weeks):  1. Pt will have full range shoulder abduction without pain.   2. Pt will increase  strength by 10 pds to improve tolerance to all daily activities.   3. Pt demonstrates independence with HEP.  4. Pt will increase RUE strength 1/2 grade or more in order to return to pain-free chores.  5. Pt will improve FOTO function score to </= 73% to show improvement in condition and functional mobility.     Plan   Plan of care Certification: 8/18/2023 to 10/13/2023.    Outpatient Physical Therapy 2 times weekly for 4 weeks to include the following interventions: Electrical Stimulation ,, Iontophoresis (with DEX),  Manual Therapy, Moist Heat/ Ice, Neuromuscular Re-ed, Patient Education, Self Care, Therapeutic Activities, Therapeutic Exercise, and Ultrasound.     Antonella Huffman, PT

## 2023-08-21 NOTE — PROGRESS NOTES
"[facility]   Physical Therapy Treatment Note      Name: Colt Rose  Clinic Number: [id number]    Therapy Diagnosis: No diagnosis found.  Physician: Jeffery Mena MD    Visit Date: 8/22/2023    Physician Orders: PT Eval and Treat   Medical Diagnosis from Referral: M25.511,G89.29 (ICD-10-CM) - Chronic right shoulder pain  Evaluation Date: 8/18/2023  Authorization Period Expiration: 6/13/2024  Plan of Care Expiration: 10/13/2023 or 8v post eval  Visit # (total)/ Visits authorized: 2/ eval + 8 (POC 1 / 8)  2nd FOTO visit 5  3rd FOTO visit 12  Progress Note Due: 9/18/23  PTA Visit #: 1/5     Time in:  Time out:  Total billable minutes:     Precautions: C/s 5-6 fusion; DDD lumbar; Cardiac hx  Insurance: Payor: MEDICARE / Plan: MEDICARE PART A & B / Product Type: Government /       Subjective     Pt reports: ***.  He {Actions; was/was not:72631} compliant with home exercise program.  Response to previous treatment: ***  Functional change: ***    Pain: {0-10:49022::"0"}/10  Location: {RIGHT/LEFT/BILATERAL:49305} {LOCATION ON BODY:26684}     Objective      Objective Measures updated at progress report unless specified.     Treatment     Colt received the treatments listed below:      Colt received therapeutic exercises to develop strength, ROM, and flexibility for 15 minutes including:  NMRE utilized to activate appropriate mm to improve posture, shoulder stabilization, and smooth scapular glide: 5 minutes      SEATED:  Shoulder retro rolls, 2x10  T/s Extension stretch, 76n4vxv  Posterior deltoid stretch, holding wrist and pull across, Upper Trapezius relaxed     STAND:  Right Upper Trapezius stretch @ bars, 5r50wxn  Red Theraband Bilateral External Rotation, 23l3qsh     TABLE:  Prone extension, 2#, 20-30x       Add NEXT:  Supine Green Theraband horizontal abduction  Prone T, towel under forehead, palms face floor  Wall clocks (add to HEP)  Sleeper or Sidelying horiz adduction " "stretch  Cable Column: Ext / Add  Bicep curls  Tricep extension  Genny Depression ball and 4" stool  Shoulder taps with shoulders depressed  Body Blade: A-P / Med-Lat, [id number] ea       Patient Education and Home Exercises       Education provided:   - home exercises, postural awareness     Written Home Exercises Provided: Patient instructed to cont prior HEP. Exercises were reviewed and Colt was able to demonstrate them prior to the end of the session.  Colt demonstrated good  understanding of the education provided. See EMR under Patient Instructions for exercises provided during therapy sessions    Assessment     ***    Colt Is progressing well towards his goals.   Pt prognosis is Good.     Pt will continue to benefit from skilled outpatient physical therapy to address the deficits listed in the problem list box on initial evaluation, provide pt/family education and to maximize pt's level of independence in the home and community environment.     Pt's spiritual, cultural and educational needs considered and pt agreeable to plan of care and goals.     Anticipated barriers to physical therapy: chronicity of condition    Goals:   Short Term Goals (3 Weeks): Ongoing 8/22/2023   1. Pt will decrease pain to 2/10 while performing daily activities.  2. Pt demonstrates independence with postural awareness.   3. Pt performs HEP.      Long Term Goals (6 Weeks): Ongoing 8/22/2023   1. Pt will have full range shoulder abduction without pain.   2. Pt will increase  strength by 10 pds to improve tolerance to all daily activities.   3. Pt demonstrates independence with HEP.  4. Pt will increase RUE strength 1/2 grade or more in order to return to pain-free chores.  5. Pt will improve FOTO function score to </= 73% to show improvement in condition and functional mobility.     Plan     Continue per Plan of Care     Jael Bhardwaj, SINGH       "

## 2023-08-21 NOTE — PLAN OF CARE
[facility]  Physical Therapy Initial Evaluation    Name: Colt Rose  Clinic Number: [id number]    Therapy Diagnosis:   Encounter Diagnoses   Name Primary?    Acquired kyphosis of cervicothoracic spine due to poor posture     Impaired tolerance of activity      Physician: Jeffery Mena MD    Physician Orders: PT Eval and Treat   Medical Diagnosis from Referral: M25.511,G89.29 (ICD-10-CM) - Chronic right shoulder pain  Evaluation Date: 8/18/2023  Authorization Period Expiration: 6/13/2024  Plan of Care Expiration: 10/13/2023 or 8v post eval  Visit # (total)/ Visits authorized: 1/ eval  (POC 0 / 8)  2nd FOTO visit 5  3rd FOTO visit 12  Progress Note Due: 9/18/23    Time In: 100  Time Out: 150  Total Billable Time: 50 minutes    Precautions: C/s 5-6 fusion; DDD lumbar; Cardiac hx  Insurance: Payor: MEDICARE / Plan: MEDICARE PART A & B / Product Type: Government /     Subjective   Date of onset: in the last year  History of current condition - Colt reports: no known incident. Has a history of neck pain; had a surgery back in 1991; C5-6 fusion. Was told by a surgeon he could get another surgery on his neck but does not want to do that. Has not had a shoulder surgery. Neck has not really been bothering him and continues to do exercises daily. Reports he Does not have full range of motion or strength of his right arm. Left arm is doing good. Had eye and heart surgery fairly recently, so he is getting back into his exercise routine. He Rides his bike and walks, uses a weight machine and Does stretches on floor.      Medical History:   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    Cervical disc displacement     Degeneration of lumbar intervertebral disc     GERD (gastroesophageal reflux disease)     gastric polyps    Hip pain 02/22/2016    Hyperlipidemia     Shingles 11/2013    Sleep apnea        Surgical History:   Colt Rose  has a past surgical  history that includes TONSILLECTOMY, ADENOIDECTOMY; Cervical fusion; Epidural steroid injection into lumbar spine (N/A, 12/5/2019); Esophagogastroduodenoscopy (N/A, 10/27/2020); Injection of anesthetic agent around medial branch nerves innervating lumbar facet joint (Bilateral, 11/18/2020); Radiofrequency ablation of lumbar medial branch nerve at single level (Bilateral, 1/29/2021); Colonoscopy (N/A, 7/29/2021); and ablation, svt, accessory pathway (N/A, 5/25/2023).    Medications:   Colt has a current medication list which includes the following prescription(s): aspirin, atorvastatin, docosahexaenoic acid/epa, fluocinolone acetonide oil, magnesium oxide, meloxicam, methocarbamol, multivitamin, radha-synalar, omeprazole, prednisolone acetate, and ubidecarenone.    Allergies:   Review of patient's allergies indicates:   Allergen Reactions    Pennsaid [diclofenac sodium] Rash and Blisters        Imaging, none in EPIC    Prior Therapy: yes  Social History: lives with wife  Limb Dominance / HA: Right / none  Current Smoker: no  Cancer Hx: no  Recent Weight Loss: no  Occupation: retired [employer]  Prior Level of Function: independent   Current Level of Function: difficult/pain with reach around chest or behind back; end range [place]; hunting-lifting tree stand;  20# or more difficult to lift    Pain:  Current 0/10, worst 5/10, best 0/10   Location: Right A/c joint region and deltoid  Description: aching; denies n/t  Aggravating Factors:  reach around chest or behind back; end range [place]; hunting-lifting tree stand;  20# or more difficult to lift  Easing Factors: medication    Pts goals: see where the pain is coming from    Objective     Posture: Bilateral hip External Rotation; Right shoulder depression; upper sway back posture; mild kyphosis and moderate FHP; excessive upward rotation of scapulae (>Right)  Palpation: TTP right lateral neck, Upper Trapezius and pectoralis    Sensation: increased sensation Right C5 dermatome /  diminished Left C5 dermatome    Range of Motion/Strength:     Cervical AROM: in degrees  Flx-ext: 40 / 40  Left SB / Right SB: 15 deg Bilateral   Left Rot 55 / Right 40    Shoulder AROM:    Left Right Pain/Dysfunction with Movement   Flexion (180 norm)  WNL  WNL pn    Extension (60 norm)  WNL  WNL    Abduction (180 norm)  WNL  WNL pn          Internal rotation (T8 norm)  T8  T11    ER at 0° abd (90 norm)  WNL  WNL pn        UE MMT: 5/5 Left Right Pain/Dysfunction with Movement   Shoulder Flexion 5/5. 4+/5.pn    Shoulder Extension 5/5. 5/5.    Shoulder Abduction 5/5. 4+/5.pn          Shoulder IR 5/5. 5/5.    Shoulder ER  @ 0* Abduction 5/5. 5/5.    Serratus Anterior: 4+/5 Bilateral   Middle Trapezius: 3/5 Bilateral   Lower Trapezius: 2/5 Bilateral   Elbow Flexion: 5/5 Bilateral   Elbow Extension: 5/5 Bilateral     Special Tests Left Right Comments         Speed negative Pn in posterior deltoid    Drop arm  90 deg support  negative Pn coming down          Lift off negative negative    AC Crossover  Hor Add negative negative    AC Resist  Hor Abd negative negative        Other:     Cervical musculature flexibility: (restriction: mild, moderate, severe grading)   Muscle Flexibility   Upper trapezius moderate   Pectoralis  severe     Endfeel / Mobility: NT     Strength (in pounds, setting II one maximum trial):    Left Right     lbs 100 lbs      Normal  Average Values  Female Right Left Male: Right Left   20-29 66 lbs 62 lbs         94 lbs 86 lbs   30-39 68 lbs 64 lbs 90 lbs 82 lbs   40-49 64 lbs 62 lbs 80 lbs 74 lbs   50-59 62 lbs 57 lbs 72 lbs 65 lbs   60-69 53 lbs 51 lbs 63 lbs 56 lbs   70+ 44 lbs 42 lbs 54 lbs 48 lbs            TREATMENT   Treatment Time In: 130  Treatment Time Out: 150  Total Treatment time separate from Evaluation: 20 minutes    Colt received therapeutic exercises to develop strength, ROM, and flexibility for 15 minutes including:  NMRE utilized to activate appropriate mm to  "improve posture, shoulder stabilization, and smooth scapular glide: 5 minutes     SEATED:  Shoulder retro rolls, 2x10  T/s Extension stretch, 42l8dzr  Posterior deltoid stretch, holding wrist and pull across, Upper Trapezius relaxed    STAND:  Right Upper Trapezius stretch @ bars, 7t62ypf  Red Theraband Bilateral External Rotation, 41c4rtf    TABLE:  Prone extension, 2#, 20-30x      Add NEXT:  Supine Green Theraband horizontal abduction  Prone T, towel under forehead, palms face floor  Wall clocks (add to HEP)  Sleeper or Sidelying horiz adduction stretch  Cable Column: Ext / Add  Bicep curls  Tricep extension  Genny Depression ball and 4" stool  Shoulder taps with shoulders depressed  Body Blade: A-P / Med-Lat, 7o82oap ea      Home Exercises and Patient Education Provided    Education provided:   - daily HEP    Written Home Exercises Provided: yes.  Exercises were reviewed and Colt was able to demonstrate them prior to the end of the session.  Colt demonstrated good  understanding of the education provided.     See EMR under Patient Instructions for exercises provided 8/18/2023.    Assessment   Colt is a 68 y.o. male referred to outpatient Physical Therapy with a medical diagnosis of chronic right shoulder pain. Pt presents with pain palpable in the right lateral neck, Upper Trapezius and pectoralis, normal Range of Motion with end range pain, decreased periscapular muscle and Right Upper Extremity strength, posture deficits and impaired function.     Pt prognosis is Good.   Pt will benefit from skilled outpatient Physical Therapy to address the deficits stated above and in the chart below, provide pt/family education, and to maximize pt's level of independence.     Plan of care discussed with patient: Yes  Pt's spiritual, cultural and educational needs considered and patient is agreeable to the plan of care and goals as stated below:     Anticipated Barriers for therapy: chronicity of condition    Medical " Necessity is demonstrated by the following  History  Co-morbidities and personal factors that may impact the plan of care Co-morbidities:   C/s 5-6 fusion; DDD lumbar; Cardiac history    Personal Factors:   age  coping style  lifestyle     low   Examination  Body Structures and Functions, activity limitations and participation restrictions that may impact the plan of care Body Regions:   neck  upper extremities    Body Systems:    gross symmetry  strength  motor control  motor learning  posture    Participation Restrictions:   Recreational, Reaching, Lifting    Activity limitations:   Learning and applying knowledge  no deficits    General Tasks and Commands  no deficits    Communication  no deficits    Mobility  lifting and carrying objects    Self care  dressing    Domestic Life  doing house work (cleaning house, washing dishes, laundry)  assisting others    Interactions/Relationships  no deficits    Life Areas  no deficits    Community and Social Life  community life  recreation and leisure         low   Clinical Presentation stable and uncomplicated low   Decision Making/ Complexity Score: low     Goals:  Short Term Goals (3 Weeks):  1. Pt will decrease pain to 2/10 while performing daily activities.  2. Pt demonstrates independence with postural awareness.   3. Pt performs HEP.      Long Term Goals (6 Weeks):  1. Pt will have full range shoulder abduction without pain.   2. Pt will increase  strength by 10 pds to improve tolerance to all daily activities.   3. Pt demonstrates independence with HEP.  4. Pt will increase RUE strength 1/2 grade or more in order to return to pain-free chores.  5. Pt will improve FOTO function score to </= 73% to show improvement in condition and functional mobility.     Plan   Plan of care Certification: 8/18/2023 to 10/13/2023.    Outpatient Physical Therapy 2 times weekly for 4 weeks to include the following interventions: Electrical Stimulation ,, Iontophoresis (with DEX),  Manual Therapy, Moist Heat/ Ice, Neuromuscular Re-ed, Patient Education, Self Care, Therapeutic Activities, Therapeutic Exercise, and Ultrasound.     Antonella Huffman, PT

## 2023-08-22 ENCOUNTER — CLINICAL SUPPORT (OUTPATIENT)
Dept: REHABILITATION | Facility: HOSPITAL | Age: 68
End: 2023-08-22
Payer: MEDICARE

## 2023-08-22 DIAGNOSIS — M40.03 ACQUIRED KYPHOSIS OF CERVICOTHORACIC SPINE DUE TO POOR POSTURE: Primary | ICD-10-CM

## 2023-08-22 DIAGNOSIS — R68.89 IMPAIRED TOLERANCE OF ACTIVITY: ICD-10-CM

## 2023-08-22 PROCEDURE — 97112 NEUROMUSCULAR REEDUCATION: CPT | Mod: PN

## 2023-08-22 PROCEDURE — 97110 THERAPEUTIC EXERCISES: CPT | Mod: PN

## 2023-08-22 NOTE — PROGRESS NOTES
[facility]   Physical Therapy Treatment Note      Name: Colt Rose  Clinic Number: [id number]    Therapy Diagnosis:   Encounter Diagnoses   Name Primary?    Acquired kyphosis of cervicothoracic spine due to poor posture Yes    Impaired tolerance of activity      Physician: Jeffery Mena MD    Visit Date: 8/22/2023    Physician Orders: PT Eval and Treat   Medical Diagnosis from Referral: M25.511,G89.29 (ICD-10-CM) - Chronic right shoulder pain  Evaluation Date: 8/18/2023  Authorization Period Expiration: 6/13/2024  Plan of Care Expiration: 10/13/2023 or 8v post eval  Visit # (total)/ Visits authorized: 2 / eval + 8 (POC 1 / 8)  2nd FOTO visit 4  3rd FOTO visit 8  Progress Note Due: 9/18/23     Time In: 900  Time Out: 955  Total Billable Time: 55 minutes     Precautions: C/s 5-6 fusion; DDD lumbar; Cardiac hx  Insurance: Payor: MEDICARE / Plan: MEDICARE PART A & B / Product Type: Government /     Subjective     Pt reports: he feels about the same. Did his HEP.    He was compliant with home exercise program.  Response to previous treatment: positive  Functional change: none reported    Pain: 2/10  Location: Right A/c joint region and deltoid    Objective      Objective Measures updated at progress report unless specified.     Treatment     Colt received the treatments listed below:     Monitor sway back posture: cue core activation     Colt received therapeutic exercises to develop strength, ROM, and flexibility for 26 minutes including:  NMRE utilized to activate appropriate mm to improve posture, shoulder stabilization, and smooth scapular glide: 26 minutes      SEATED:  Shoulder retro rolls, 2x10  Posterior deltoid stretch, holding wrist and pull across, Upper Trapezius relaxed, 7o92pcs  T/s Extension stretch, Hands behind head, 63o9jpg  Chin tucks, 20x     STAND:  RIGHT only Upper Trapezius stretch @ bars, 9l53fir  Red Theraband Bilateral External Rotation, slight chin  "tuck, 49m3ftp  Bicep curls, 3#, 30x (5# next)  Green Theraband Tricep extension, 30x  Shoulder taps with shoulders depressed  Isometric Green Theraband Isometric External Rotation / Internal Rotation  Cable Column: Ext / Add (45 deg ascent) 3#, 30x (try 7 next)     TABLE:  Seated Genny Depression ball and 4" stool, cue not to lean back, 86x1dik  Prone extension, 2#, 20-30x  Supine Green Theraband horizontal abduction, 30x  Sidelying horizontal adduction stretch, 2a12lud start      Manual tx: 3 min  Gr 1-2 inferior and posterior glides, 3x10         Add NEXT:  Isometric Abduction against wall, arm straight by side, 73m1onu  Red Theraband Wall clocks, cue elbows under, 10-20x (add to HEP)  Prone T, towel under forehead, palms face floor, 2x10  Body Blade: A-P / Med-Lat, 0i11umw ea    Patient Education and Home Exercises       Education provided:   - daily HEP    Written Home Exercises Provided: Patient instructed to cont prior HEP. Exercises were reviewed and Colt was able to demonstrate them prior to the end of the session.  Colt demonstrated good  understanding of the education provided. See EMR under Patient Instructions for exercises provided during therapy sessions    Assessment     Pt demonstrates same low pain levels; his pain is exacerbated by faulty mechanics and heavy lifting. Focus on periscapular muscles strength and shoulder stabilization. Utilized manual tx to reduce hypomobility in shoulder joint; responded well to glides. Continue to progress as tolerated.     Colt Is progressing well towards his goals.   Pt prognosis is Excellent.     Pt will continue to benefit from skilled outpatient physical therapy to address the deficits listed in the problem list box on initial evaluation, provide pt/family education and to maximize pt's level of independence in the home and community environment.     Pt's spiritual, cultural and educational needs considered and pt agreeable to plan of care and goals.   "   Anticipated barriers to physical therapy: chronicity of condition    Goals:   Short Term Goals (3 Weeks):  1. Pt will decrease pain to 2/10 while performing daily activities.  2. Pt demonstrates independence with postural awareness.   3. Pt performs HEP.      Long Term Goals (6 Weeks):  1. Pt will have full range shoulder abduction without pain.   2. Pt will increase  strength by 10 pds to improve tolerance to all daily activities.   3. Pt demonstrates independence with HEP.  4. Pt will increase RUE strength 1/2 grade or more in order to return to pain-free chores.  5. Pt will improve FOTO function score to </= 73% to show improvement in condition and functional mobility.     Plan     Continue PT as deemed per POC: posture, shoulder stabilization, lifting mechanics    Antonella Huffman, PT

## 2023-08-24 ENCOUNTER — CLINICAL SUPPORT (OUTPATIENT)
Dept: REHABILITATION | Facility: HOSPITAL | Age: 68
End: 2023-08-24
Payer: MEDICARE

## 2023-08-24 DIAGNOSIS — R68.89 IMPAIRED TOLERANCE OF ACTIVITY: ICD-10-CM

## 2023-08-24 DIAGNOSIS — M40.03 ACQUIRED KYPHOSIS OF CERVICOTHORACIC SPINE DUE TO POOR POSTURE: Primary | ICD-10-CM

## 2023-08-24 PROCEDURE — 97110 THERAPEUTIC EXERCISES: CPT | Mod: PN,CQ

## 2023-08-24 PROCEDURE — 97112 NEUROMUSCULAR REEDUCATION: CPT | Mod: PN,CQ

## 2023-08-24 NOTE — PROGRESS NOTES
[facility]   Physical Therapy Treatment Note      Name: Colt Rose  Clinic Number: [id number]    Therapy Diagnosis:   Encounter Diagnoses   Name Primary?    Acquired kyphosis of cervicothoracic spine due to poor posture Yes    Impaired tolerance of activity      Physician: Jeffery Mena MD    Visit Date: 8/24/2023    Physician Orders: PT Eval and Treat   Medical Diagnosis from Referral: M25.511,G89.29 (ICD-10-CM) - Chronic right shoulder pain  Evaluation Date: 8/18/2023  Authorization Period Expiration: 6/13/2024  Plan of Care Expiration: 10/13/2023 or 8v post eval  Visit # (total)/ Visits authorized: 3 / eval + 8 (POC 2 / 8)  2nd FOTO visit 4  3rd FOTO visit 8  Progress Note Due: 9/18/23     Time In: 1000  Time Out: 1055  Total Billable Time: 55 minutes     Precautions: C/s 5-6 fusion; DDD lumbar; Cardiac hx  Insurance: Payor: MEDICARE / Plan: MEDICARE PART A & B / Product Type: Government /     Subjective     Pt reports: he is doing alright, more discomfort when he does abduction.   He was compliant with home exercise program.  Response to previous treatment: positive  Functional change: none reported    Pain: 2/10  Location: Right A/c joint region and deltoid    Objective      Objective Measures updated at progress report unless specified.     Treatment     Colt received the treatments listed below:     Monitor sway back posture: cue core activation     Colt received therapeutic exercises to develop strength, ROM, and flexibility for 26 minutes including:  NMRE utilized to activate appropriate mm to improve posture, shoulder stabilization, and smooth scapular glide: 26 minutes        SEATED:  Shoulder retro rolls, 2x10  Posterior deltoid stretch, holding wrist and pull across, Upper Trapezius relaxed, 6m35tzj  T/s Extension stretch, Hands behind head, 66w1ldq  Chin tucks, 20x     STAND:  RIGHT only Upper Trapezius stretch @ bars, 6x93egf  Red Theraband Bilateral  "External Rotation, slight chin tuck, 30u8cdo  Bicep curls, 3#, 30x (5# next)  Green Theraband Tricep extension, 30x  Shoulder taps with shoulders depressed 10x   Green Theraband External Rotation / Internal Rotation 30x each   Cable Column: Ext / Add (45 deg ascent) 7#, 30x   Red Theraband Wall clocks, cue elbows under, 10-20x (add to HEP)     TABLE:  Seated Genny Depression ball and 4" stool, cue not to lean back, 16u1zfj  Prone extension, 2#, 20-30x  Supine Green Theraband horizontal abduction, 30x  Sidelying horizontal adduction stretch, 5x10 sec start  Side lying sleeper stretch 16x73aek       Manual tx: 0 min  Gr 1-2 inferior and posterior glides, 3x10      Add NEXT:  Isometric Abduction against wall, arm straight by side, 55s8jno  Prone T, towel under forehead, palms face floor, 2x10  Body Blade: A-P / Med-Lat, 2b61rvi ea    Patient Education and Home Exercises       Education provided:   - daily HEP    Written Home Exercises Provided: Patient instructed to cont prior HEP. Exercises were reviewed and Colt was able to demonstrate them prior to the end of the session.  Colt demonstrated good  understanding of the education provided. See EMR under Patient Instructions for exercises provided during therapy sessions    Assessment     Patient challenged with postural awareness with cervical and thoracic with increased tightness noted in left side shoulder. Right shoulder will continue to benefit from strength and stability. Continue to progress.     Colt Is progressing well towards his goals.   Pt prognosis is Excellent.     Pt will continue to benefit from skilled outpatient physical therapy to address the deficits listed in the problem list box on initial evaluation, provide pt/family education and to maximize pt's level of independence in the home and community environment.     Pt's spiritual, cultural and educational needs considered and pt agreeable to plan of care and goals.     Anticipated barriers to " physical therapy: chronicity of condition    Goals:   Short Term Goals (3 Weeks): Ongoing 8/24/2023   1. Pt will decrease pain to 2/10 while performing daily activities.  2. Pt demonstrates independence with postural awareness.   3. Pt performs HEP.      Long Term Goals (6 Weeks): Ongoing 8/24/2023   1. Pt will have full range shoulder abduction without pain.   2. Pt will increase  strength by 10 pds to improve tolerance to all daily activities.   3. Pt demonstrates independence with HEP.  4. Pt will increase RUE strength 1/2 grade or more in order to return to pain-free chores.  5. Pt will improve FOTO function score to </= 73% to show improvement in condition and functional mobility.     Plan     Continue PT as deemed per POC: posture, shoulder stabilization, lifting mechanics    Jael Bhardwaj, PTA

## 2023-08-29 ENCOUNTER — CLINICAL SUPPORT (OUTPATIENT)
Dept: REHABILITATION | Facility: HOSPITAL | Age: 68
End: 2023-08-29
Payer: MEDICARE

## 2023-08-29 DIAGNOSIS — M40.03 ACQUIRED KYPHOSIS OF CERVICOTHORACIC SPINE DUE TO POOR POSTURE: Primary | ICD-10-CM

## 2023-08-29 DIAGNOSIS — R68.89 IMPAIRED TOLERANCE OF ACTIVITY: ICD-10-CM

## 2023-08-29 PROCEDURE — 97112 NEUROMUSCULAR REEDUCATION: CPT | Mod: PN,CQ

## 2023-08-29 NOTE — PROGRESS NOTES
[facility]   Physical Therapy Treatment Note      Name: Colt Rose  Clinic Number: [id number]    Therapy Diagnosis:   Encounter Diagnoses   Name Primary?    Acquired kyphosis of cervicothoracic spine due to poor posture Yes    Impaired tolerance of activity      Physician: Jeffery Mena MD    Visit Date: 8/29/2023    Physician Orders: PT Eval and Treat   Medical Diagnosis from Referral: M25.511,G89.29 (ICD-10-CM) - Chronic right shoulder pain  Evaluation Date: 8/18/2023  Authorization Period Expiration: 6/13/2024  Plan of Care Expiration: 10/13/2023 or 8v post eval  Visit # (total)/ Visits authorized: 4 / eval + 8 (POC 3 / 8)  2nd FOTO visit 4  3rd FOTO visit 8  Progress Note Due: 9/18/23     Time In: 900  Time Out: 955  Total Billable Time: 30 minutes     Precautions: C/s 5-6 fusion; DDD lumbar; Cardiac hx  Insurance: Payor: MEDICARE / Plan: MEDICARE PART A & B / Product Type: Government /     Subjective     Pt reports: soreness, but no real pain reported.   He was compliant with home exercise program.  Response to previous treatment: positive  Functional change: none reported    Pain: 2/10  Location: Right A/c joint region and deltoid    Objective      Objective Measures updated at progress report unless specified.     Treatment     Colt received the treatments listed below:     Monitor sway back posture: cue core activation     Colt received therapeutic exercises to develop strength, ROM, and flexibility for 26 minutes including:  NMRE utilized to activate appropriate mm to improve posture, shoulder stabilization, and smooth scapular glide: 26 minutes        SEATED:  Shoulder retro rolls, 2x10  Posterior deltoid stretch, holding wrist and pull across, Upper Trapezius relaxed, 9k08alr  T/s Extension stretch, Hands behind head, 01o6ngr  Chin tucks, 20x     STAND:  RIGHT only Upper Trapezius stretch @ bars, 2y82fla  Green Theraband Bilateral External Rotation, slight  "chin tuck, 83o2mou  Bicep curls, 5#, 30x  Green Theraband Tricep extension, 30x  Shoulder taps with shoulders depressed 10x   Green Theraband External Rotation / Internal Rotation 30x each   Cable Column: Ext / Add (45 deg ascent) 7#, 30x   Red Theraband Wall clocks, cue elbows under, 10-20x (add to HEP)  SA rolls at wall 30x      TABLE:  Seated Genny Depression ball and 4" stool, cue not to lean back, 16x1coj  Prone extension, 2#, 30x  Side lying External Rotation 2# 30x   Supine Green Theraband horizontal abduction, 30x  Side lying sleeper stretch 41j57wtz       Add NEXT:  Isometric Abduction against wall, arm straight by side, 37i9mvm  Prone T, towel under forehead, palms face floor, 2x10  Body Blade: A-P / Med-Lat, 9v35kvz ea    Patient Education and Home Exercises       Education provided:   - daily HEP    Written Home Exercises Provided: Patient instructed to cont prior HEP. Exercises were reviewed and Colt was able to demonstrate them prior to the end of the session.  Colt demonstrated good  understanding of the education provided. See EMR under Patient Instructions for exercises provided during therapy sessions    Assessment     Patient challenged with External Rotation strength, but showing progress with tolerance. Continue to progress.     Colt Is progressing well towards his goals.   Pt prognosis is Excellent.     Pt will continue to benefit from skilled outpatient physical therapy to address the deficits listed in the problem list box on initial evaluation, provide pt/family education and to maximize pt's level of independence in the home and community environment.     Pt's spiritual, cultural and educational needs considered and pt agreeable to plan of care and goals.     Anticipated barriers to physical therapy: chronicity of condition    Goals:   Short Term Goals (3 Weeks): Ongoing 8/29/2023   1. Pt will decrease pain to 2/10 while performing daily activities.  2. Pt demonstrates independence " with postural awareness.   3. Pt performs HEP.      Long Term Goals (6 Weeks): Ongoing 8/29/2023   1. Pt will have full range shoulder abduction without pain.   2. Pt will increase  strength by 10 pds to improve tolerance to all daily activities.   3. Pt demonstrates independence with HEP.  4. Pt will increase RUE strength 1/2 grade or more in order to return to pain-free chores.  5. Pt will improve FOTO function score to </= 73% to show improvement in condition and functional mobility.     Plan     Continue PT as deemed per POC: posture, shoulder stabilization, lifting mechanics    Jael Bhardwaj, PTA

## 2023-08-29 NOTE — PROGRESS NOTES
Subjective:     HPI    I had the pleasure of seeing Colt Rose in follow-up for PACs. He is a 68M with HLD, who was incidentally noted to have frequent PACs on 12-lead ECG at a recent cardiology visit. This prompted a 48 hour Holter monitor (12/2022) which showed sinus rhythm with an average HR of 62 bpm (range 41-98 bpm), PAC burden 27%. Was on lopressor 12.5 mg bid at the time.     An exercise stress echo in 1/2022 showed EF 65%, no ischemia.    At his initial visit pt was in atrial bigeminy. Lopressor was replaced with diltiazem 120 mg daily.    On 5/25/2023 an EPS was performed. Per summary:  1) History of frequent symptomatic PACs.  2) Status-post successful PAC ablation (superior lateral/posterolateral RA).   3) Typical atrial flutter seen during the case, status-post CTI line with restoration of sinus rhythm.  4) Complex procedure given a) multiple PAC exit sites targeted during the case, b) difficulty in mapping PACs due to catheter ectopy.  5) Sinus bradycardia seen at conclusion of the case.    Pt discharged off diltiazem.    Today in the office Mr. Rose feels well, no complaints.     My interpretation of today's ECG is sinus rhythm 75 bpm, no PVCs seen. A 60 second pulse check showed no extrasystoles.    Review of Systems   Constitutional: Negative for decreased appetite, malaise/fatigue, weight gain and weight loss.   HENT:  Negative for sore throat.    Eyes:  Negative for blurred vision.   Cardiovascular:  Negative for chest pain, dyspnea on exertion, irregular heartbeat, leg swelling, near-syncope, orthopnea, palpitations, paroxysmal nocturnal dyspnea and syncope.   Respiratory:  Negative for shortness of breath.    Skin:  Negative for rash.   Musculoskeletal:  Negative for arthritis.   Gastrointestinal:  Negative for abdominal pain.   Neurological:  Negative for focal weakness.   Psychiatric/Behavioral:  Negative for altered mental status.         Objective:    Physical Exam  Constitutional:        General: He is not in acute distress.     Appearance: He is well-developed.   HENT:      Head: Normocephalic and atraumatic.   Eyes:      General: No scleral icterus.     Pupils: Pupils are equal, round, and reactive to light.   Neck:      Thyroid: No thyromegaly.   Cardiovascular:      Rate and Rhythm: Regular rhythm.      Pulses: Normal pulses.      Heart sounds: Normal heart sounds. No murmur heard.     No friction rub. No gallop.   Pulmonary:      Effort: Pulmonary effort is normal.      Breath sounds: Normal breath sounds.   Abdominal:      General: Bowel sounds are normal. There is no distension.      Palpations: Abdomen is soft.      Tenderness: There is no abdominal tenderness.   Musculoskeletal:      Cervical back: Neck supple.   Skin:     General: Skin is warm and dry.      Findings: No rash.   Neurological:      Mental Status: He is alert and oriented to person, place, and time.   Psychiatric:         Behavior: Behavior normal.         Assessment:       1. Typical atrial flutter    2. Mixed hyperlipidemia         Plan:       In summary, Colt Rose is a 68M with a history of frequent PACs, possibly leading to fatigue. No difference with switching BB to CCB. Pt now 3 months status-post PAC ablation. Feels well, no complaints.    Plan is PRN follow-up.    Thank you for allowing me to participate in the care of this patient. Please do not hesitate to call me with any questions or concerns.

## 2023-08-30 ENCOUNTER — OFFICE VISIT (OUTPATIENT)
Dept: CARDIOLOGY | Facility: CLINIC | Age: 68
End: 2023-08-30
Payer: MEDICARE

## 2023-08-30 VITALS
RESPIRATION RATE: 16 BRPM | HEART RATE: 80 BPM | DIASTOLIC BLOOD PRESSURE: 80 MMHG | OXYGEN SATURATION: 96 % | SYSTOLIC BLOOD PRESSURE: 136 MMHG | HEIGHT: 70 IN | BODY MASS INDEX: 27.65 KG/M2 | WEIGHT: 193.13 LBS

## 2023-08-30 DIAGNOSIS — I48.3 TYPICAL ATRIAL FLUTTER: Primary | ICD-10-CM

## 2023-08-30 DIAGNOSIS — E78.2 MIXED HYPERLIPIDEMIA: ICD-10-CM

## 2023-08-30 PROCEDURE — 93005 EKG 12-LEAD: ICD-10-PCS | Mod: S$GLB,,, | Performed by: INTERNAL MEDICINE

## 2023-08-30 PROCEDURE — 99214 PR OFFICE/OUTPT VISIT, EST, LEVL IV, 30-39 MIN: ICD-10-PCS | Mod: S$GLB,,, | Performed by: INTERNAL MEDICINE

## 2023-08-30 PROCEDURE — 93010 ELECTROCARDIOGRAM REPORT: CPT | Mod: S$PBB,,, | Performed by: INTERNAL MEDICINE

## 2023-08-30 PROCEDURE — 93010 EKG 12-LEAD: ICD-10-PCS | Mod: S$PBB,,, | Performed by: INTERNAL MEDICINE

## 2023-08-30 PROCEDURE — 99214 OFFICE O/P EST MOD 30 MIN: CPT | Mod: S$GLB,,, | Performed by: INTERNAL MEDICINE

## 2023-08-30 PROCEDURE — 93005 ELECTROCARDIOGRAM TRACING: CPT | Mod: S$GLB,,, | Performed by: INTERNAL MEDICINE

## 2023-08-31 ENCOUNTER — CLINICAL SUPPORT (OUTPATIENT)
Dept: REHABILITATION | Facility: HOSPITAL | Age: 68
End: 2023-08-31
Payer: MEDICARE

## 2023-08-31 DIAGNOSIS — M40.03 ACQUIRED KYPHOSIS OF CERVICOTHORACIC SPINE DUE TO POOR POSTURE: Primary | ICD-10-CM

## 2023-08-31 DIAGNOSIS — R68.89 IMPAIRED TOLERANCE OF ACTIVITY: ICD-10-CM

## 2023-08-31 PROCEDURE — 97110 THERAPEUTIC EXERCISES: CPT | Mod: PN,CQ

## 2023-08-31 PROCEDURE — 97112 NEUROMUSCULAR REEDUCATION: CPT | Mod: PN,CQ

## 2023-08-31 NOTE — PROGRESS NOTES
[facility]   Physical Therapy Treatment Note      Name: Colt Rose  Clinic Number: [id number]    Therapy Diagnosis:   Encounter Diagnoses   Name Primary?    Acquired kyphosis of cervicothoracic spine due to poor posture Yes    Impaired tolerance of activity      Physician: Jeffery Mena MD    Visit Date: 8/31/2023    Physician Orders: PT Eval and Treat   Medical Diagnosis from Referral: M25.511,G89.29 (ICD-10-CM) - Chronic right shoulder pain  Evaluation Date: 8/18/2023  Authorization Period Expiration: 6/13/2024  Plan of Care Expiration: 10/13/2023 or 8v post eval  Visit # (total)/ Visits authorized: 5 / eval + 8 (POC 4 / 8)  2nd FOTO visit 4  3rd FOTO visit 8  Progress Note Due: 9/18/23     Time In: 900  Time Out: 955  Total Billable Time: 30 minutes     Precautions: C/s 5-6 fusion; DDD lumbar; Cardiac hx  Insurance: Payor: MEDICARE / Plan: MEDICARE PART A & B / Product Type: Government /     Subjective     Pt reports: no issues stated.   He was compliant with home exercise program.  Response to previous treatment: no complaints   Functional change: none reported    Pain: 1/10  Location: Right A/c joint region and deltoid    Objective      Objective Measures updated at progress report unless specified.     Treatment     Colt received the treatments listed below:     Monitor sway back posture: cue core activation     Colt received therapeutic exercises to develop strength, ROM, and flexibility for 26 minutes including:  NMRE utilized to activate appropriate mm to improve posture, shoulder stabilization, and smooth scapular glide: 26 minutes        SEATED:  Shoulder retro rolls, 2x10  Posterior deltoid stretch, holding wrist and pull across, Upper Trapezius relaxed, 1z84rhv  T/s Extension stretch, Hands behind head, 53i7vmv  Chin tucks, 20x     STAND:  RIGHT only Upper Trapezius stretch @ bars, 2p59dzp  Green Theraband Bilateral External Rotation, slight chin tuck,  "49a4aje  Bicep curls, 5#, 30x  Green Theraband Tricep extension, 30x  Shoulder taps with shoulders depressed 10x   Green Theraband External Rotation / Internal Rotation 30x each   Cable Column: Ext / Add (45 deg ascent) 7#, 30x   Red Theraband Wall clocks, cue elbows under, 10-20x (add to HEP)  SA rolls at wall 30x      TABLE:  Seated Genny Depression ball and 4" stool, cue not to lean back, 12b5kcr  Prone extension, 2#, 30x  Side lying External Rotation 2# 30x   Supine Green Theraband horizontal abduction, 30x  Side lying sleeper stretch 24k11who       Add NEXT:  Isometric Abduction against wall, arm straight by side, 72m4hyg  Prone T, towel under forehead, palms face floor, 2x10  Body Blade: A-P / Med-Lat, 2u10ajs ea    Patient Education and Home Exercises       Education provided:   - daily HEP    Written Home Exercises Provided: Patient instructed to cont prior HEP. Exercises were reviewed and Colt was able to demonstrate them prior to the end of the session.  Colt demonstrated good  understanding of the education provided. See EMR under Patient Instructions for exercises provided during therapy sessions    Assessment     Patient challenged with External Rotation strength, but showing progress with tolerance. Continue to progress.     Colt Is progressing well towards his goals.   Pt prognosis is Excellent.     Pt will continue to benefit from skilled outpatient physical therapy to address the deficits listed in the problem list box on initial evaluation, provide pt/family education and to maximize pt's level of independence in the home and community environment.     Pt's spiritual, cultural and educational needs considered and pt agreeable to plan of care and goals.     Anticipated barriers to physical therapy: chronicity of condition    Goals:   Short Term Goals (3 Weeks): Ongoing 8/31/2023   1. Pt will decrease pain to 2/10 while performing daily activities.  2. Pt demonstrates independence with " postural awareness.   3. Pt performs HEP.      Long Term Goals (6 Weeks): Ongoing 8/31/2023   1. Pt will have full range shoulder abduction without pain.   2. Pt will increase  strength by 10 pds to improve tolerance to all daily activities.   3. Pt demonstrates independence with HEP.  4. Pt will increase RUE strength 1/2 grade or more in order to return to pain-free chores.  5. Pt will improve FOTO function score to </= 73% to show improvement in condition and functional mobility.     Plan     Continue PT as deemed per POC: posture, shoulder stabilization, lifting mechanics    Jael Bhardwaj, PTA

## 2023-09-05 ENCOUNTER — TELEPHONE (OUTPATIENT)
Dept: CARDIOLOGY | Facility: CLINIC | Age: 68
End: 2023-09-05
Payer: MEDICARE

## 2023-09-05 ENCOUNTER — CLINICAL SUPPORT (OUTPATIENT)
Dept: REHABILITATION | Facility: HOSPITAL | Age: 68
End: 2023-09-05
Payer: MEDICARE

## 2023-09-05 DIAGNOSIS — M40.03 ACQUIRED KYPHOSIS OF CERVICOTHORACIC SPINE DUE TO POOR POSTURE: Primary | ICD-10-CM

## 2023-09-05 DIAGNOSIS — R68.89 IMPAIRED TOLERANCE OF ACTIVITY: ICD-10-CM

## 2023-09-05 PROCEDURE — 97110 THERAPEUTIC EXERCISES: CPT | Mod: PN

## 2023-09-05 PROCEDURE — 97112 NEUROMUSCULAR REEDUCATION: CPT | Mod: PN

## 2023-09-05 NOTE — PROGRESS NOTES
[facility]   Physical Therapy Treatment Note      Name: Colt Rose  Clinic Number: [id number]    Therapy Diagnosis:   Encounter Diagnoses   Name Primary?    Acquired kyphosis of cervicothoracic spine due to poor posture Yes    Impaired tolerance of activity      Physician: Jeffery Mena MD    Visit Date: 9/5/2023    Physician Orders: PT Eval and Treat   Medical Diagnosis from Referral: M25.511,G89.29 (ICD-10-CM) - Chronic right shoulder pain  Evaluation Date: 8/18/2023  Authorization Period Expiration: 6/13/2024  Plan of Care Expiration: 10/13/2023 or 8v post eval  Visit # (total)/ Visits authorized: 5 / eval + 8 (POC 4 / 8)  2nd FOTO visit 4  3rd FOTO visit 8  Progress Note Due: 9/18/23     Time In: 930  Time Out: 1025  Total Billable Time: 25 minutes     Precautions: C/s 5-6 fusion; DDD lumbar; Cardiac hx  Insurance: Payor: MEDICARE / Plan: MEDICARE PART A & B / Product Type: Government /     Subjective     Pt reports: no issues stated. Still having pain with motion.    He was compliant with home exercise program.  Response to previous treatment: no complaints   Functional change: none reported    Pain: 1/10  Location: Right A/c joint region and deltoid    Objective      Objective Measures updated at progress report unless specified.     Treatment     Colt received the treatments listed below:     Monitor sway back posture: cue core activation     Colt received therapeutic exercises to develop strength, ROM, and flexibility for 26 minutes including:  NMRE utilized to activate appropriate mm to improve posture, shoulder stabilization, and smooth scapular glide: 29 minutes     SEATED:  Shoulder retro rolls, 2x10 - UBE instead 3/3  Posterior deltoid stretch, holding wrist and pull across, Upper Trapezius relaxed, 8f67gcp  T/s Extension stretch, Hands behind head, 88z8svt - 1/2 Foam Roller   Chin tucks, 20x     STAND:  +Body Blade: A-P / Med-Lat / 45 deg Abd, 2e90rkb  "ea  Isometric Abduction against wall, arm straight by side, 37z8luc  Green Theraband Bilateral External Rotation, slight chin tuck, 98d2mtn  Bicep curls, 6#, 30x  Green Theraband Tricep extension, 30x  Shoulder taps with shoulders depressed 10x   Green Theraband External Rotation / Internal Rotation 30x each   Cable Column: Ext / Add (45 deg ascent) 7#, 30x     Red Theraband Wall clocks, cue elbows under, 10-20x (add to HEP)  1/2 press down  SA rolls at wall 30x   RIGHT only Upper Trapezius stretch @ bars, 5i90nlt     TABLE:  Seated Genny Depression ball and 4" stool, cue not to lean back, 91m6oqr - Standing instead, hands on table, cue wrap elbows under and depress genny 20x  +Prone T, towel under forehead, palms face floor, 2x10  Supine Green Theraband horizontal abduction, 30x  Side lying sleeper stretch 83m82blt     Add NEXT:    Patient Education and Home Exercises       Education provided:   - daily HEP    Written Home Exercises Provided: Patient instructed to cont prior HEP. Exercises were reviewed and Colt was able to demonstrate them prior to the end of the session.  Colt demonstrated good  understanding of the education provided. See EMR under Patient Instructions for exercises provided during therapy sessions    Assessment     Patient demonstrates no change at this time. Able to perform all therex given; added additional dynamic proprioceptive training to improve shoulder stabilization. Continue to progress as tolerated.     Colt Is progressing well towards his goals.   Pt prognosis is Excellent.     Pt will continue to benefit from skilled outpatient physical therapy to address the deficits listed in the problem list box on initial evaluation, provide pt/family education and to maximize pt's level of independence in the home and community environment.     Pt's spiritual, cultural and educational needs considered and pt agreeable to plan of care and goals.     Anticipated barriers to physical therapy: " chronicity of condition    Goals:   Short Term Goals (3 Weeks): Ongoing 9/5/2023   1. Pt will decrease pain to 2/10 while performing daily activities.  2. Pt demonstrates independence with postural awareness.   3. Pt performs HEP.      Long Term Goals (6 Weeks): Ongoing 9/5/2023   1. Pt will have full range shoulder abduction without pain.   2. Pt will increase  strength by 10 pds to improve tolerance to all daily activities.   3. Pt demonstrates independence with HEP.  4. Pt will increase RUE strength 1/2 grade or more in order to return to pain-free chores.  5. Pt will improve FOTO function score to </= 73% to show improvement in condition and functional mobility.     Plan     Continue PT as deemed per POC: posture, shoulder stabilization, lifting mechanics    Antonella Huffman, PT

## 2023-09-07 ENCOUNTER — HOSPITAL ENCOUNTER (EMERGENCY)
Facility: HOSPITAL | Age: 68
Discharge: HOME | End: 2023-09-07
Attending: EMERGENCY MEDICINE
Payer: MEDICARE

## 2023-09-07 ENCOUNTER — PATIENT MESSAGE (OUTPATIENT)
Dept: FAMILY MEDICINE | Facility: CLINIC | Age: 68
End: 2023-09-07
Payer: MEDICARE

## 2023-09-07 VITALS
BODY MASS INDEX: 26.48 KG/M2 | DIASTOLIC BLOOD PRESSURE: 69 MMHG | TEMPERATURE: 99 F | SYSTOLIC BLOOD PRESSURE: 115 MMHG | WEIGHT: 185 LBS | HEART RATE: 72 BPM | OXYGEN SATURATION: 100 % | HEIGHT: 70 IN | RESPIRATION RATE: 17 BRPM

## 2023-09-07 DIAGNOSIS — M54.32 SCIATICA OF LEFT SIDE: Primary | ICD-10-CM

## 2023-09-07 PROCEDURE — 96372 THER/PROPH/DIAG INJ SC/IM: CPT | Performed by: EMERGENCY MEDICINE

## 2023-09-07 PROCEDURE — 99284 EMERGENCY DEPT VISIT MOD MDM: CPT

## 2023-09-07 PROCEDURE — 63600175 PHARM REV CODE 636 W HCPCS: Performed by: EMERGENCY MEDICINE

## 2023-09-07 RX ORDER — KETOROLAC TROMETHAMINE 30 MG/ML
15 INJECTION, SOLUTION INTRAMUSCULAR; INTRAVENOUS
Status: COMPLETED | OUTPATIENT
Start: 2023-09-07 | End: 2023-09-07

## 2023-09-07 RX ORDER — METHOCARBAMOL 500 MG/1
1000 TABLET, FILM COATED ORAL 3 TIMES DAILY
Qty: 30 TABLET | Refills: 0 | Status: SHIPPED | OUTPATIENT
Start: 2023-09-07 | End: 2023-09-12

## 2023-09-07 RX ORDER — HYDROCODONE BITARTRATE AND ACETAMINOPHEN 5; 325 MG/1; MG/1
1 TABLET ORAL EVERY 6 HOURS PRN
Qty: 12 TABLET | Refills: 0 | Status: SHIPPED | OUTPATIENT
Start: 2023-09-07 | End: 2024-02-08

## 2023-09-07 RX ORDER — PREDNISONE 20 MG/1
40 TABLET ORAL DAILY
Qty: 10 TABLET | Refills: 0 | Status: SHIPPED | OUTPATIENT
Start: 2023-09-07 | End: 2023-09-12

## 2023-09-07 RX ADMIN — KETOROLAC TROMETHAMINE 15 MG: 30 INJECTION, SOLUTION INTRAMUSCULAR; INTRAVENOUS at 10:09

## 2023-09-07 NOTE — ED PROVIDER NOTES
Encounter Date: 9/7/2023       History     Chief Complaint   Patient presents with    Low-back Pain     Chronic sciatic nerve pain     Patient presents emergency department with reported low back pain patient reports sciatica going down his left leg reports history of sciatica he has had nerve ablation in the past for similar symptoms he states that he is undergoing physical therapy for his shoulder and believes that the increased pulling lifting with his shoulder has caused exacerbation of the pain denies any bowel bladder incontinence or difficulty urinating or having a bowel movement no numbness or tingling reported no weakness reported the arrange the pain radiates down the left posterior thigh and occasionally into the foot laterally patient denies any recent trauma no falls no injuries reported        Review of patient's allergies indicates:   Allergen Reactions    Pennsaid [diclofenac sodium] Rash and Blisters     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    Cervical disc displacement     Degeneration of lumbar intervertebral disc     GERD (gastroesophageal reflux disease)     gastric polyps    Hip pain 02/22/2016    Hyperlipidemia     Shingles 11/2013    Sleep apnea      Past Surgical History:   Procedure Laterality Date    ABLATION, SVT, ACCESSORY PATHWAY N/A 5/25/2023    Procedure: Ablation, SVT, Accessory Pathway;  Surgeon: Goyo Sky MD;  Location: [facility] EP LAB;  Service: Cardiology;  Laterality: N/A;  PAC, RFA, CARTO, MAC, GP, 3 PREP    CERVICAL FUSION      bone graft    COLONOSCOPY N/A 7/29/2021    Procedure: COLONOSCOPY;  Surgeon: Tyler Story MD;  Location: [facility] ENDO;  Service: Endoscopy;  Laterality: N/A;    EPIDURAL STEROID INJECTION INTO LUMBAR SPINE N/A 12/5/2019    Procedure: Injection-steroid-epidural-lumbar;  Surgeon: Adarsh Kraft MD;  Location: [facility] OR;  Service: Pain Management;  Laterality: N/A;  L5-S1    ESOPHAGOGASTRODUODENOSCOPY N/A 10/27/2020     Procedure: EGD (ESOPHAGOGASTRODUODENOSCOPY);  Surgeon: Tyler Story MD;  Location: [facility];  Service: Endoscopy;  Laterality: N/A;    INJECTION OF ANESTHETIC AGENT AROUND MEDIAL BRANCH NERVES INNERVATING LUMBAR FACET JOINT Bilateral 11/18/2020    Procedure: BLOCK, NERVE, LUMBAR, MEDIAL BRANCH Bilateral L3,4,5;  Surgeon: Adarsh Kraft MD;  Location: [facility] OR;  Service: Pain Management;  Laterality: Bilateral;    RADIOFREQUENCY ABLATION OF LUMBAR MEDIAL BRANCH NERVE AT SINGLE LEVEL Bilateral 1/29/2021    Procedure: Radiofrequency Ablation, Nerve, Spinal, Lumbar, Medial Branch, 1 Level;  Surgeon: Adarsh Kraft MD;  Location: [facility] OR;  Service: Pain Management;  Laterality: Bilateral;  L3,4,5    TONSILLECTOMY, ADENOIDECTOMY       Family History   Problem Relation Age of Onset    Diabetes Mother     Hypertension Mother     Stroke Mother     Coronary artery disease Mother     Stroke Father     Heart disease Brother         CAD     Social History     Tobacco Use    Smoking status: Never    Smokeless tobacco: Never   Substance Use Topics    Alcohol use: Yes     Alcohol/week: 6.0 standard drinks of alcohol     Types: 6 Cans of beer per week     Comment: weekly or less    Drug use: No     Review of Systems   Gastrointestinal:  Negative for abdominal pain, constipation and diarrhea.   Genitourinary:  Negative for difficulty urinating, enuresis and frequency.   Musculoskeletal:  Positive for back pain and myalgias.   Skin:  Negative for rash.   Neurological:  Negative for weakness and numbness.       Physical Exam     Initial Vitals [09/07/23 0902]   BP Pulse Resp Temp SpO2   139/87 75 16 97.6 °F (36.4 °C) 98 %      MAP       --         Physical Exam    Constitutional: He appears well-developed and well-nourished. No distress.   HENT:   Head: Normocephalic and atraumatic.   Eyes: Pupils are equal, round, and reactive to light.   Neck: Neck supple.   Normal range of motion.  Cardiovascular:  Normal rate, regular rhythm, S1  normal, S2 normal and intact distal pulses.           Pulmonary/Chest: No respiratory distress.   Abdominal: Abdomen is soft. There is no abdominal tenderness.   Musculoskeletal:         General: Normal range of motion.      Cervical back: Normal range of motion and neck supple.     Neurological: He is alert and oriented to person, place, and time. He has normal strength. He displays normal reflexes. GCS score is 15. GCS eye subscore is 4. GCS verbal subscore is 5. GCS motor subscore is 6.   Skin: Skin is warm and dry. Capillary refill takes less than 2 seconds. No rash noted.   Psychiatric: He has a normal mood and affect. His behavior is normal.         ED Course   Procedures  Labs Reviewed - No data to display       Imaging Results    None          Medications   ketorolac injection 15 mg (has no administration in time range)     Medical Decision Making  Patient received a dose of Toradol emergency department will provide him a prescription for Robaxin prednisone Norco he has seen Dr. Mayes in the past recommend he follow up with Dr. Mayes again for re-evaluation possible institution of physical therapy return to the ER for any worsened symptoms or new symptoms    Risk  Prescription drug management.                               Clinical Impression:   Final diagnoses:  [M54.32] Sciatica of left side (Primary)        ED Disposition Condition    Discharge Stable          ED Prescriptions       Medication Sig Dispense Start Date End Date Auth. Provider    predniSONE (DELTASONE) 20 MG tablet Take 2 tablets (40 mg total) by mouth once daily. for 5 days 10 tablet 9/7/2023 9/12/2023 Rainer Miranda MD    methocarbamoL (ROBAXIN) 500 MG Tab Take 2 tablets (1,000 mg total) by mouth 3 (three) times daily. for 5 days 30 tablet 9/7/2023 9/12/2023 Rainer Miranda MD    HYDROcodone-acetaminophen (NORCO) 5-325 mg per tablet Take 1 tablet by mouth every 6 (six) hours as needed for Pain. 12 tablet 9/7/2023 -- Ruth  Rainer RIVAS MD          Follow-up Information       Follow up With Specialties Details Why Contact Info    Ron Mayes MD Physical Medicine and Rehabilitation Call in 1 day for re-examination of your symptoms [address] [place] [address] [address], [address]  [facility] [id number] [phone]               Rainer Miranda MD  09/07/23 8132

## 2023-09-07 NOTE — ED NOTES
To er with c/o low back pain. Pt states similar to his chronic sciatica pain. Maew. Speech clear. Skin w/dr/pk. Rr even/unlab. Aaox4. No drift, droop, weakness noted. No urinary complaint. He states + increase pain with movement.

## 2023-09-11 ENCOUNTER — OFFICE VISIT (OUTPATIENT)
Dept: SPINE | Facility: CLINIC | Age: 68
End: 2023-09-11
Payer: MEDICARE

## 2023-09-11 ENCOUNTER — TELEPHONE (OUTPATIENT)
Dept: PAIN MEDICINE | Facility: CLINIC | Age: 68
End: 2023-09-11
Payer: MEDICARE

## 2023-09-11 VITALS — HEIGHT: 70 IN | BODY MASS INDEX: 26.48 KG/M2 | WEIGHT: 184.94 LBS

## 2023-09-11 DIAGNOSIS — M54.42 ACUTE LEFT-SIDED LOW BACK PAIN WITH LEFT-SIDED SCIATICA: Primary | ICD-10-CM

## 2023-09-11 PROCEDURE — 99213 PR OFFICE/OUTPT VISIT, EST, LEVL III, 20-29 MIN: ICD-10-PCS | Mod: S$GLB,,, | Performed by: PHYSICAL MEDICINE & REHABILITATION

## 2023-09-11 PROCEDURE — 99213 OFFICE O/P EST LOW 20 MIN: CPT | Mod: S$GLB,,, | Performed by: PHYSICAL MEDICINE & REHABILITATION

## 2023-09-11 NOTE — TELEPHONE ENCOUNTER
----- Message from Ron Mayes MD sent at 9/11/2023  3:12 PM CDT -----  Please schedule for radiofrequency ablation left L4-5 and L5-S1.  Note he had an excellent response to that procedure in January of 2021. Pain improved by greater than 50% with improved functional mobility.  Symptom relief lasted for greater than 6 month

## 2023-09-11 NOTE — PROGRESS NOTES
SUBJECTIVE:    Patient ID: Colt Rose is a 68 y.o. male.    Chief Complaint: Low-back Pain    This is a 68-year-old man I saw in 2021 with complaints of low back pain without radicular symptoms.  Ultimately responded favorably to radiofrequency ablation bilaterally at L4-5 and L5-S1 in January of 2021. On follow-up he was improved to his satisfaction.  I have not seen him since February of 2021. Presents now with approximately 5 or 6 days history of spontaneously occurring left-sided low back pain at the lumbosacral junction that generally radiates out to the left lateral hip but at 1 point was radiating all the way down into the left calf and plantar portion of the left foot.  He went to the emergency room on 09/07/2023 with complaints of back and leg pain.  He was given a shot of Toradol and sent home on prednisone Robaxin and low-dose hydrocodone.  He presents to me feeling significantly better but still not satisfied with his quality of life.  He rates his pain as 6/10 and it interferes with his quality of life in terms of activity of daily living and recreation.  I note he is in physical therapy for right shoulder pain as well.  Bowel and bladder remain intact.  No fever chills sweats or unexpected weight loss          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    Cervical disc displacement     Degeneration of lumbar intervertebral disc     GERD (gastroesophageal reflux disease)     gastric polyps    Hip pain 02/22/2016    Hyperlipidemia     Shingles 11/2013    Sleep apnea      Social History     Socioeconomic History    Marital status:    Tobacco Use    Smoking status: Never    Smokeless tobacco: Never   Substance and Sexual Activity    Alcohol use: Yes     Alcohol/week: 6.0 standard drinks of alcohol     Types: 6 Cans of beer per week     Comment: weekly or less    Drug use: No    Sexual activity: Yes     Partners: Female     Social Determinants of Health     Stress:  "No Stress Concern Present (5/14/2020)    [employer] [place] [employer] - Occupational Stress Questionnaire     Feeling of Stress : Only a little     Past Surgical History:   Procedure Laterality Date    ABLATION, SVT, ACCESSORY PATHWAY N/A 5/25/2023    Procedure: Ablation, SVT, Accessory Pathway;  Surgeon: Goyo Sky MD;  Location: [facility] EP LAB;  Service: Cardiology;  Laterality: N/A;  PAC, RFA, CARTO, MAC, GP, 3 PREP    CERVICAL FUSION      bone graft    COLONOSCOPY N/A 7/29/2021    Procedure: COLONOSCOPY;  Surgeon: Tyler Story MD;  Location: [facility] ENDO;  Service: Endoscopy;  Laterality: N/A;    EPIDURAL STEROID INJECTION INTO LUMBAR SPINE N/A 12/5/2019    Procedure: Injection-steroid-epidural-lumbar;  Surgeon: Adarsh Kraft MD;  Location: [facility] OR;  Service: Pain Management;  Laterality: N/A;  L5-S1    ESOPHAGOGASTRODUODENOSCOPY N/A 10/27/2020    Procedure: EGD (ESOPHAGOGASTRODUODENOSCOPY);  Surgeon: Tyler Story MD;  Location: [facility] ENDO;  Service: Endoscopy;  Laterality: N/A;    INJECTION OF ANESTHETIC AGENT AROUND MEDIAL BRANCH NERVES INNERVATING LUMBAR FACET JOINT Bilateral 11/18/2020    Procedure: BLOCK, NERVE, LUMBAR, MEDIAL BRANCH Bilateral L3,4,5;  Surgeon: Adarsh Kraft MD;  Location: [facility] OR;  Service: Pain Management;  Laterality: Bilateral;    RADIOFREQUENCY ABLATION OF LUMBAR MEDIAL BRANCH NERVE AT SINGLE LEVEL Bilateral 1/29/2021    Procedure: Radiofrequency Ablation, Nerve, Spinal, Lumbar, Medial Branch, 1 Level;  Surgeon: Adarsh Kraft MD;  Location: [facility] OR;  Service: Pain Management;  Laterality: Bilateral;  L3,4,5    TONSILLECTOMY, ADENOIDECTOMY       Family History   Problem Relation Age of Onset    Diabetes Mother     Hypertension Mother     Stroke Mother     Coronary artery disease Mother     Stroke Father     Heart disease Brother         CAD     Vitals:    09/11/23 1447   Weight: 83.9 kg (184 lb 15.5 oz)   Height: 5' 10" (1.778 m)       Review of Systems   Constitutional:  " Negative for chills, diaphoresis, fatigue, fever and unexpected weight change.   HENT:  Negative for trouble swallowing.    Eyes:  Negative for visual disturbance.   Respiratory:  Negative for shortness of breath.    Cardiovascular:  Negative for chest pain.   Gastrointestinal:  Negative for abdominal pain, constipation, diarrhea, nausea and vomiting.   Genitourinary:  Negative for difficulty urinating.   Musculoskeletal:  Negative for arthralgias, back pain, gait problem, joint swelling, myalgias, neck pain and neck stiffness.   Neurological:  Negative for dizziness, speech difficulty, weakness, light-headedness, numbness and headaches.          Objective:      Physical Exam  Neurological:      Mental Status: He is alert and oriented to person, place, and time.      Comments: He is awake and in no acute distress  Mild tenderness palpation left lumbar paraspinous musculature at the lumbosacral junction with no palpable masses  Forward flexion of the lumbar spine is to about 60° before complains of pain on the left at the lumbosacral junction.  Extension likewise causes pain on left at the lumbosacral junction  He can heel and toe walk normally  Reflexes- +1-+2 reflexes at the following:   C5-Biceps   C6-Brachioradialis   C7-Triceps   L3/4-Patellar   S1-Achilles   Mason sign negative bilaterally  Strength testing- 5/5 strength in the following muscle groups:  C5-Elbow flexion  C6-Wrist extension  C7-Elbow extension  C8-Finger flexion  T1-Finger abduction  L2-Hip flexion  L3-Knee extension  L4-Ankle dorsiflexion  L5-Great toe extension  S1-Ankle plantar flexion                    Assessment:       1. Acute left-sided low back pain with left-sided sciatica           Plan:     He remains neurologically intact.  I suspect he has back pain on basis of degenerative disc disease and facet arthropathy.  Has pain with facet loading.  Historically favorable response to radiofrequency ablation bilaterally L4-5 and L5-S1.  He  received greater than 50% improvement from that procedure with improved functional mobility.  Symptom relief lasted greater than 6 months.  I recommend repeat radiofrequency ablation left L4-5 and L5-S1.  He can follow up with me after the procedure      Acute left-sided low back pain with left-sided sciatica

## 2023-09-12 ENCOUNTER — DOCUMENTATION ONLY (OUTPATIENT)
Dept: REHABILITATION | Facility: HOSPITAL | Age: 68
End: 2023-09-12
Payer: MEDICARE

## 2023-09-12 DIAGNOSIS — M47.896 OTHER SPONDYLOSIS, LUMBAR REGION: Primary | ICD-10-CM

## 2023-09-14 ENCOUNTER — CLINICAL SUPPORT (OUTPATIENT)
Dept: REHABILITATION | Facility: HOSPITAL | Age: 68
End: 2023-09-14
Payer: MEDICARE

## 2023-09-14 DIAGNOSIS — R68.89 IMPAIRED TOLERANCE OF ACTIVITY: ICD-10-CM

## 2023-09-14 DIAGNOSIS — M40.03 ACQUIRED KYPHOSIS OF CERVICOTHORACIC SPINE DUE TO POOR POSTURE: Primary | ICD-10-CM

## 2023-09-14 PROCEDURE — 97110 THERAPEUTIC EXERCISES: CPT | Mod: PN

## 2023-09-14 PROCEDURE — 97112 NEUROMUSCULAR REEDUCATION: CPT | Mod: PN

## 2023-09-14 NOTE — PROGRESS NOTES
[facility]   Physical Therapy Treatment Note      Name: Colt Rose  Clinic Number: [id number]    Therapy Diagnosis:   Encounter Diagnoses   Name Primary?    Acquired kyphosis of cervicothoracic spine due to poor posture Yes    Impaired tolerance of activity        Physician: Jeffery Mena MD    Visit Date: 9/14/2023    Physician Orders: PT Eval and Treat   Medical Diagnosis from Referral: M25.511,G89.29 (ICD-10-CM) - Chronic right shoulder pain  Evaluation Date: 8/18/2023  Authorization Period Expiration: 6/13/2024  Plan of Care Expiration: 10/13/2023 or 8v post eval  Visit # (total)/ Visits authorized: 7 / eval + 8 (POC 6 / 8)  2nd FOTO visit 6 - 9/14/23  3rd FOTO visit 8  Progress Note Due: 9/18/23     Time In: 930  Time Out: 1025  Total Billable Time: 45 minutes     Precautions: C/s 5-6 fusion; DDD lumbar; Cardiac hx  Insurance: Payor: MEDICARE / Plan: MEDICARE PART A & B / Product Type: Government /     Subjective     Pt reports: no new issues. Still having pain with motion. Has not been here due to sciatic flare-up; discussed making modifications with therex as needed today.    He was compliant with home exercise program.  Response to previous treatment: no complaints   Functional change: none reported    Pain: 3/10  Location: Right A/c joint region and deltoid    Objective      Objective Measures updated at progress report unless specified.     Treatment     Colt received the treatments listed below:     Monitor sway back posture: cue core activation     Colt received therapeutic exercises to develop strength, ROM, and flexibility for 26 minutes including:  NMRE utilized to activate appropriate mm to improve posture, shoulder stabilization, and smooth scapular glide: 29 minutes     SEATED:  Shoulder retro rolls, 2x10 - UBE instead 3/3  Posterior deltoid stretch, holding wrist and pull across, Upper Trapezius relaxed, [id number]  T/s Extension stretch, Hands behind  "head, [id number] - 1/2 Foam Roller   Chin tucks, 20x     STAND:  Body Blade: A-P / Med-Lat / 45 deg Abd, 8h90knm ea  Isometric Abduction against wall, arm straight by side, [id number]  Green Theraband Bilateral External Rotation, slight chin tuck, 81x5ifl  Bicep curls, 6#, 30x  Green Theraband Tricep extension, 30x  Shoulder taps with shoulders depressed 10x   Green Theraband External Rotation / Internal Rotation 30x each   Cable Column: Ext / Add (45 deg ascent) 7#, 30x     Red Theraband Wall clocks, cue elbows under, 10-20x (add to HEP)  1/2 Foam Roller press down, 20x, max cues for depression  SA rolls at wall 30x   RIGHT only Upper Trapezius stretch @ bars, [id number]     TABLE: NOT PERFORMED   Seated Genny Depression ball and 4" stool, cue not to lean back, 75i4wrf - Standing instead, hands on table, cue wrap elbows under and depress genny 20x  +Prone T, towel under forehead, palms face floor, 2x10  Supine Green Theraband horizontal abduction, 30x  Side lying sleeper stretch [id number]     Add NEXT:    Patient Education and Home Exercises       Education provided:   - daily HEP    Written Home Exercises Provided: Patient instructed to cont prior HEP. Exercises were reviewed and Colt was able to demonstrate them prior to the end of the session.  Colt demonstrated good  understanding of the education provided. See EMR under Patient Instructions for exercises provided during therapy sessions    Assessment     Patient demonstrates no change at this time. No modifications were needed for sciatic flare-up. Able to perform all therex given; reviewed dynamic proprioceptive training to improve shoulder stabilization. Continue to progress as tolerated.     Colt Is progressing well towards his goals.   Pt prognosis is Excellent.     Pt will continue to benefit from skilled outpatient physical therapy to address the deficits listed in the problem list box on initial evaluation, provide pt/family education and to maximize pt's " level of independence in the home and community environment.     Pt's spiritual, cultural and educational needs considered and pt agreeable to plan of care and goals.     Anticipated barriers to physical therapy: chronicity of condition    Goals:   Short Term Goals (3 Weeks): Ongoing 9/14/2023   1. Pt will decrease pain to 2/10 while performing daily activities.  2. Pt demonstrates independence with postural awareness.   3. Pt performs HEP.      Long Term Goals (6 Weeks): Ongoing 9/14/2023   1. Pt will have full range shoulder abduction without pain.   2. Pt will increase  strength by 10 pds to improve tolerance to all daily activities.   3. Pt demonstrates independence with HEP.  4. Pt will increase RUE strength 1/2 grade or more in order to return to pain-free chores.  5. Pt will improve FOTO function score to </= 73% to show improvement in condition and functional mobility.     Plan     Continue PT as deemed per POC: posture, shoulder stabilization, lifting mechanics    Antonella Huffman, PT

## 2023-09-19 ENCOUNTER — CLINICAL SUPPORT (OUTPATIENT)
Dept: REHABILITATION | Facility: HOSPITAL | Age: 68
End: 2023-09-19
Payer: MEDICARE

## 2023-09-19 DIAGNOSIS — M40.03 ACQUIRED KYPHOSIS OF CERVICOTHORACIC SPINE DUE TO POOR POSTURE: Primary | ICD-10-CM

## 2023-09-19 DIAGNOSIS — R68.89 IMPAIRED TOLERANCE OF ACTIVITY: ICD-10-CM

## 2023-09-19 PROCEDURE — 97110 THERAPEUTIC EXERCISES: CPT | Mod: PN,CQ

## 2023-09-19 PROCEDURE — 97112 NEUROMUSCULAR REEDUCATION: CPT | Mod: PN,CQ

## 2023-09-19 NOTE — PROGRESS NOTES
[facility]   Physical Therapy Treatment Note      Name: Colt Rose  Clinic Number: [id number]    Therapy Diagnosis:   Encounter Diagnoses   Name Primary?    Acquired kyphosis of cervicothoracic spine due to poor posture Yes    Impaired tolerance of activity        Physician: Jeffery Mena MD    Visit Date: 9/19/2023    Physician Orders: PT Eval and Treat   Medical Diagnosis from Referral: M25.511,G89.29 (ICD-10-CM) - Chronic right shoulder pain  Evaluation Date: 8/18/2023  Authorization Period Expiration: 6/13/2024  Plan of Care Expiration: 10/13/2023 or 8v post eval  Visit # (total)/ Visits authorized: 8 / eval + 8 (POC 7 / 8)  2nd FOTO visit 6 - 9/14/23  3rd FOTO visit 8  Progress Note Due: 9/18/23     Time In: 900  Time Out: 955  Total Billable Time: 55 minutes     Precautions: C/s 5-6 fusion; DDD lumbar; Cardiac hx  Insurance: Payor: MEDICARE / Plan: MEDICARE PART A & B / Product Type: Government /     Subjective     Pt reports: discomfort with laying on the shoulder.     He was compliant with home exercise program.  Response to previous treatment: no complaints   Functional change: none reported    Pain: 3/10  Location: Right A/c joint region and deltoid    Objective      Objective Measures updated at progress report unless specified.     Treatment     Colt received the treatments listed below:     Monitor sway back posture: cue core activation     Colt received therapeutic exercises to develop strength, ROM, and flexibility for 26 minutes including:  NMRE utilized to activate appropriate mm to improve posture, shoulder stabilization, and smooth scapular glide: 29 minutes     SEATED:  UBE  3/3  Posterior deltoid stretch, holding wrist and pull across, Upper Trapezius relaxed, [id number]  T/s Extension stretch, Hands behind head, 69i4ikz - 1/2 Foam Roller   Chin tucks, 20x     STAND:  Body Blade: A-P / Med-Lat / 45 deg Abd, 7t84qry ea  Isometric Abduction against wall,  "arm straight by side, 86w2gav  Green Theraband Bilateral External Rotation, slight chin tuck, 98h4aky  Bicep curls, 6#, 30x  Green Theraband Tricep extension, 30x  Shoulder taps with shoulders depressed 10x   Green Theraband External Rotation / Internal Rotation 30x each   Cable Column: Ext / Add (45 deg ascent) 7#, 30x     Red Theraband Wall clocks, cue elbows under, 10-20x (add to HEP)  1/2 Foam Roller press down, 20x, max cues for depression  SA rolls at wall 30x   RIGHT only Upper Trapezius stretch @ bars, 4t13kgw     TABLE:   Seated Genny Depression ball and 4" stool, cue not to lean back, [id number] - Standing instead, hands on table, cue wrap elbows under and depress genny 20x  Prone T, towel under forehead, palms face floor, 2x10  Supine Green Theraband horizontal abduction, 30x  Side lying sleeper stretch [id number]     Add NEXT:    Patient Education and Home Exercises       Education provided:   - daily HEP    Written Home Exercises Provided: Patient instructed to cont prior HEP. Exercises were reviewed and Colt was able to demonstrate them prior to the end of the session.  Colt demonstrated good  understanding of the education provided. See EMR under Patient Instructions for exercises provided during therapy sessions    Assessment     Patient demonstrates no change at this time, still discomfort with laying on right shoulder. Cueing to focus on midline with reducing right lateral lean to reduce compensations. Continue to progress shoulder strength and stability. Encouraged patient to reach out to orthopedist if symptoms do not seem to be changing with therapy.     Colt Is progressing well towards his goals.   Pt prognosis is Excellent.     Pt will continue to benefit from skilled outpatient physical therapy to address the deficits listed in the problem list box on initial evaluation, provide pt/family education and to maximize pt's level of independence in the home and community environment.     Pt's " spiritual, cultural and educational needs considered and pt agreeable to plan of care and goals.     Anticipated barriers to physical therapy: chronicity of condition    Goals:   Short Term Goals (3 Weeks): Ongoing 9/19/2023   1. Pt will decrease pain to 2/10 while performing daily activities.  2. Pt demonstrates independence with postural awareness.   3. Pt performs HEP.      Long Term Goals (6 Weeks): Ongoing 9/19/2023   1. Pt will have full range shoulder abduction without pain.   2. Pt will increase  strength by 10 pds to improve tolerance to all daily activities.   3. Pt demonstrates independence with HEP.  4. Pt will increase RUE strength 1/2 grade or more in order to return to pain-free chores.  5. Pt will improve FOTO function score to </= 73% to show improvement in condition and functional mobility.     Plan     Continue PT as deemed per POC: posture, shoulder stabilization, lifting mechanics    Jael Bhardwaj, PTA

## 2023-09-21 ENCOUNTER — CLINICAL SUPPORT (OUTPATIENT)
Dept: REHABILITATION | Facility: HOSPITAL | Age: 68
End: 2023-09-21
Payer: MEDICARE

## 2023-09-21 DIAGNOSIS — M40.03 ACQUIRED KYPHOSIS OF CERVICOTHORACIC SPINE DUE TO POOR POSTURE: Primary | ICD-10-CM

## 2023-09-21 DIAGNOSIS — R68.89 IMPAIRED TOLERANCE OF ACTIVITY: ICD-10-CM

## 2023-09-21 PROCEDURE — 97112 NEUROMUSCULAR REEDUCATION: CPT | Mod: PN

## 2023-09-21 PROCEDURE — 97110 THERAPEUTIC EXERCISES: CPT | Mod: PN

## 2023-09-21 PROCEDURE — 97530 THERAPEUTIC ACTIVITIES: CPT | Mod: PN

## 2023-09-21 NOTE — PROGRESS NOTES
[facility]   Physical Therapy Treatment Note & Outpatient Discharge Summary     Name: Colt Rose  Clinic Number: [id number]    Therapy Diagnosis:   Encounter Diagnoses   Name Primary?    Acquired kyphosis of cervicothoracic spine due to poor posture Yes    Impaired tolerance of activity        Physician: Jeffery Mena MD    Visit Date: 9/21/2023    Physician Orders: PT Eval and Treat   Medical Diagnosis from Referral: M25.511,G89.29 (ICD-10-CM) - Chronic right shoulder pain  Evaluation Date: 8/18/2023  Authorization Period Expiration: 6/13/2024  Plan of Care Expiration: 10/13/2023 or 8v post eval  Visit # (total)/ Visits authorized: 9 / eval + 8 (POC 8 / 8)  2nd FOTO visit 6 - 9/14/23  3rd FOTO visit 8 - 9/21/2023  Progress Note Due: 9/18/23     Time In: 100  Time Out: 155  Total Billable Time: 55 minutes     Precautions: C/s 5-6 fusion; DDD lumbar; Cardiac hx  Insurance: Payor: MEDICARE / Plan: MEDICARE PART A & B / Product Type: Government /     Subjective     Pt reports: it still hurts when he reaches across or reaches behind his back; still hurts when sleeping. Reports he is functioning at 70% normal function. Went to see his spine/back MD and asked if neck giving him his shoulder issues and said no. Pt thought he was feeling better and then hurt his back; not feeling like his shoulder has improved. Discussed pt f/u with an orthopedic specialist.     He was compliant with home exercise program.  Response to previous treatment: no complaints   Functional change: none reported    Pain: 3-5/10 range; 3/10 today  Location: Right A/c joint region and deltoid    Objective      Therapeutic Activity: Updated Measurements, 8 minutes    Sensation: increased sensation Right C5 dermatome / diminished Left C5 dermatome ->intact Bilateral 9/21/23     Range of Motion/Strength:      Cervical AROM: in degrees  Flx-ext: 40 / 40->50/50  Left SB / Right SB: 15 deg Bilateral ->30  Left Rot  55->60 / Right 40->50     Shoulder AROM:     Left Right Pain/Dysfunction with Movement   Flexion (180 norm)  WNL  WNL pn Same, pn on descent    Extension (60 norm)  WNL  WNL     Abduction (180 norm)  WNL  WNL pn Same, pn on descent              Internal rotation (T8 norm)  T8  T11->same     ER at 0° abd (90 norm)  WNL  WNL pn No pain          UE MMT: 5/5 Left Right Pain/Dysfunction with Movement   Shoulder Flexion 5/5. 4+/5.pn->5, no pain     Shoulder Extension 5/5. 5/5.     Shoulder Abduction 5/5. 4+/5.pn->same               Shoulder IR 5/5. 5/5.     Shoulder ER  @ 0* Abduction 5/5. 5/5.     Elbow Extension: 5/5 Bilateral      Special Tests Left Right Comments             Speed negative Pn in posterior deltoid     Drop arm  90 deg support  negative Pn coming down->neg               Lift off negative negative     AC Crossover  Hor Add negative negative     AC Resist  Hor Abd negative negative             Treatment     Colt received the treatments listed below:     Monitor sway back posture: cue core activation     Colt received therapeutic exercises to develop strength, ROM, and flexibility for 20 minutes including:  NMRE utilized to activate appropriate mm to improve posture, shoulder stabilization, and smooth scapular glide: 29 minutes     SEATED:  UBE  3/3  Posterior deltoid stretch, holding wrist and pull across, Upper Trapezius relaxed, [id number]  T/s Extension stretch, Hands behind head, [id number] - 1/2 Foam Roller   Chin tucks, 20x  Cervical AROM: Flx-ext / Rot / SB, 10x ea     STAND:  Body Blade: A-P / Med-Lat / 45 deg Abd, 1h22zxd ea  Isometric Abduction against wall, arm straight by side, 99s1puh  Isometric Adduction against wall,   Green Theraband Bilateral External Rotation, slight chin tuck, 08d9abh  Bicep curls, 6#, 30x  Green Theraband Tricep extension, 30x  Shoulder taps with shoulders depressed 10x   Green Theraband External Rotation / Internal Rotation 30x each   Cable Column: Ext / Add (45 deg  "ascent) 7#, 30x     Red Theraband Wall clocks, cue elbows under, 10-20x (add to HEP)  1/2 Foam Roller press down, 20x, max cues for depression  SA rolls at wall 30x   RIGHT only Upper Trapezius stretch @ bars, 3a58czy     TABLE:   Seated Genny Depression ball and 4" stool, cue not to lean back, 12x1hmk - Standing instead, hands on table, cue wrap elbows under and depress genny 20x  Prone T, towel under forehead, palms face floor, 2x10  Supine Green Theraband horizontal abduction, 30x  Side lying sleeper stretch [id number]     Add NEXT:    Patient Education and Home Exercises       Education provided:   - daily HEP    Written Home Exercises Provided: Patient instructed to cont prior HEP. Exercises were reviewed and Colt was able to demonstrate them prior to the end of the session.  Colt demonstrated good  understanding of the education provided. See EMR under Patient Instructions for exercises provided during therapy sessions    Assessment     Patient demonstrates no change in his pain or sxs at this time. Pt has made some clinical gains but not significant with PT. Pt has reached max rehab potential at this time and is ready for discharge and to work independently on HEP. He has not seen an orthopedist at this point so that is my recommendation and more imaging.     Colt Is progressing well towards his goals.   Pt prognosis is Excellent.     Pt will continue to benefit from skilled outpatient physical therapy to address the deficits listed in the problem list box on initial evaluation, provide pt/family education and to maximize pt's level of independence in the home and community environment.     Pt's spiritual, cultural and educational needs considered and pt agreeable to plan of care and goals.     Anticipated barriers to physical therapy: chronicity of condition    Goals:   Short Term Goals (3 Weeks): Ongoing 9/21/2023   1. Pt will decrease pain to 2/10 while performing daily activities.  2. Pt demonstrates " independence with postural awareness.   3. Pt performs HEP.      Long Term Goals (6 Weeks): Ongoing 9/21/2023   1. Pt will have full range shoulder abduction without pain.   2. Pt will increase  strength by 10 pds to improve tolerance to all daily activities.   3. Pt demonstrates independence with HEP.  4. Pt will increase RUE strength 1/2 grade or more in order to return to pain-free chores.  5. Pt will improve FOTO function score to </= 73% to show improvement in condition and functional mobility.     Plan     D/c today    Antonella Huffman, PT

## 2023-09-25 ENCOUNTER — TELEPHONE (OUTPATIENT)
Dept: PAIN MEDICINE | Facility: CLINIC | Age: 68
End: 2023-09-25
Payer: MEDICARE

## 2023-09-25 DIAGNOSIS — M47.896 OTHER SPONDYLOSIS, LUMBAR REGION: Primary | ICD-10-CM

## 2023-09-25 PROBLEM — R68.89 IMPAIRED TOLERANCE OF ACTIVITY: Status: RESOLVED | Noted: 2023-08-18 | Resolved: 2023-09-25

## 2023-09-25 PROBLEM — M40.03 ACQUIRED KYPHOSIS OF CERVICOTHORACIC SPINE DUE TO POOR POSTURE: Status: RESOLVED | Noted: 2023-08-18 | Resolved: 2023-09-25

## 2023-09-25 NOTE — TELEPHONE ENCOUNTER
----- Message from Ron Mayes MD sent at 9/25/2023  9:03 AM CDT -----  Please schedule for medial branch blocks and subsequent radiofrequency ablation as indicated of the left L4-5 and L5-S1 facet joint

## 2023-09-25 NOTE — PLAN OF CARE
[facility]   Physical Therapy Treatment Note & Outpatient Discharge Summary     Name: Colt Rose  Clinic Number: [id number]    Therapy Diagnosis:   Encounter Diagnoses   Name Primary?    Acquired kyphosis of cervicothoracic spine due to poor posture Yes    Impaired tolerance of activity        Physician: Jeffery Mena MD    Visit Date: 9/21/2023    Physician Orders: PT Eval and Treat   Medical Diagnosis from Referral: M25.511,G89.29 (ICD-10-CM) - Chronic right shoulder pain  Evaluation Date: 8/18/2023  Authorization Period Expiration: 6/13/2024  Plan of Care Expiration: 10/13/2023 or 8v post eval  Visit # (total)/ Visits authorized: 9 / eval + 8 (POC 8 / 8)  2nd FOTO visit 6 - 9/14/23  3rd FOTO visit 8 - 9/21/2023  Progress Note Due: 9/18/23     Time In: 100  Time Out: 155  Total Billable Time: 55 minutes     Precautions: C/s 5-6 fusion; DDD lumbar; Cardiac hx  Insurance: Payor: MEDICARE / Plan: MEDICARE PART A & B / Product Type: Government /     Subjective     Pt reports: it still hurts when he reaches across or reaches behind his back; still hurts when sleeping. Reports he is functioning at 70% normal function. Went to see his spine/back MD and asked if neck giving him his shoulder issues and said no. Pt thought he was feeling better and then hurt his back; not feeling like his shoulder has improved. Discussed pt f/u with an orthopedic specialist.     He was compliant with home exercise program.  Response to previous treatment: no complaints   Functional change: none reported    Pain: 3-5/10 range; 3/10 today  Location: Right A/c joint region and deltoid    Objective      Therapeutic Activity: Updated Measurements, 8 minutes    Sensation: increased sensation Right C5 dermatome / diminished Left C5 dermatome ->intact Bilateral 9/21/23     Range of Motion/Strength:      Cervical AROM: in degrees  Flx-ext: 40 / 40->50/50  Left SB / Right SB: 15 deg Bilateral ->30  Left Rot  55->60 / Right 40->50     Shoulder AROM:     Left Right Pain/Dysfunction with Movement   Flexion (180 norm)  WNL  WNL pn Same, pn on descent    Extension (60 norm)  WNL  WNL     Abduction (180 norm)  WNL  WNL pn Same, pn on descent              Internal rotation (T8 norm)  T8  T11->same     ER at 0° abd (90 norm)  WNL  WNL pn No pain          UE MMT: 5/5 Left Right Pain/Dysfunction with Movement   Shoulder Flexion 5/5. 4+/5.pn->5, no pain     Shoulder Extension 5/5. 5/5.     Shoulder Abduction 5/5. 4+/5.pn->same               Shoulder IR 5/5. 5/5.     Shoulder ER  @ 0* Abduction 5/5. 5/5.     Elbow Extension: 5/5 Bilateral      Special Tests Left Right Comments             Speed negative Pn in posterior deltoid     Drop arm  90 deg support  negative Pn coming down->neg               Lift off negative negative     AC Crossover  Hor Add negative negative     AC Resist  Hor Abd negative negative             Treatment     Colt received the treatments listed below:     Monitor sway back posture: cue core activation     Colt received therapeutic exercises to develop strength, ROM, and flexibility for 20 minutes including:  NMRE utilized to activate appropriate mm to improve posture, shoulder stabilization, and smooth scapular glide: 29 minutes     SEATED:  UBE  3/3  Posterior deltoid stretch, holding wrist and pull across, Upper Trapezius relaxed, [id number]  T/s Extension stretch, Hands behind head, [id number] - 1/2 Foam Roller   Chin tucks, 20x  Cervical AROM: Flx-ext / Rot / SB, 10x ea     STAND:  Body Blade: A-P / Med-Lat / 45 deg Abd, [id number] ea  Isometric Abduction against wall, arm straight by side, [id number]  Isometric Adduction against wall,   Green Theraband Bilateral External Rotation, slight chin tuck, [id number]  Bicep curls, 6#, 30x  Green Theraband Tricep extension, 30x  Shoulder taps with shoulders depressed 10x   Green Theraband External Rotation / Internal Rotation 30x each   Cable Column: Ext / Add (45 deg  "ascent) 7#, 30x     Red Theraband Wall clocks, cue elbows under, 10-20x (add to HEP)  1/2 Foam Roller press down, 20x, max cues for depression  SA rolls at wall 30x   RIGHT only Upper Trapezius stretch @ bars, 2m85yff     TABLE:   Seated Genny Depression ball and 4" stool, cue not to lean back, 52n2mcm - Standing instead, hands on table, cue wrap elbows under and depress genny 20x  Prone T, towel under forehead, palms face floor, 2x10  Supine Green Theraband horizontal abduction, 30x  Side lying sleeper stretch 45m16wws     Add NEXT:    Patient Education and Home Exercises       Education provided:   - daily HEP    Written Home Exercises Provided: Patient instructed to cont prior HEP. Exercises were reviewed and Colt was able to demonstrate them prior to the end of the session.  Colt demonstrated good  understanding of the education provided. See EMR under Patient Instructions for exercises provided during therapy sessions    Assessment     Patient demonstrates no change in his pain or sxs at this time. Pt has made some clinical gains but not significant with PT. Pt has reached max rehab potential at this time and is ready for discharge and to work independently on HEP. He has not seen an orthopedist at this point so that is my recommendation and more imaging.     Colt Is progressing well towards his goals.   Pt prognosis is Excellent.     Pt will continue to benefit from skilled outpatient physical therapy to address the deficits listed in the problem list box on initial evaluation, provide pt/family education and to maximize pt's level of independence in the home and community environment.     Pt's spiritual, cultural and educational needs considered and pt agreeable to plan of care and goals.     Anticipated barriers to physical therapy: chronicity of condition    Goals:   Short Term Goals (3 Weeks): Ongoing 9/21/2023   1. Pt will decrease pain to 2/10 while performing daily activities.  2. Pt demonstrates " independence with postural awareness.   3. Pt performs HEP.      Long Term Goals (6 Weeks): Ongoing 9/21/2023   1. Pt will have full range shoulder abduction without pain.   2. Pt will increase  strength by 10 pds to improve tolerance to all daily activities.   3. Pt demonstrates independence with HEP.  4. Pt will increase RUE strength 1/2 grade or more in order to return to pain-free chores.  5. Pt will improve FOTO function score to </= 73% to show improvement in condition and functional mobility.     Plan     D/c today    Antonella Huffman, PT

## 2023-09-25 NOTE — TELEPHONE ENCOUNTER
Spoke to pt and let him know procedure was changed from RFA to MBB and explained it was due to insurance. Pt educated on procedure and verbalized understanding.

## 2023-09-25 NOTE — TELEPHONE ENCOUNTER
----- Message from Helen Coon LPN sent at 9/25/2023  7:30 AM CDT -----  Regarding: Medicare auth  Good morning,   This patient is Medicare primary and according to their guidelines, since it has been over two years since the last MBB, they have to be repeated them before they will approve a RFA.    Please consider changing this to a MBB.  Thank you,   Helen Coon LPN  [facility]

## 2023-09-25 NOTE — TELEPHONE ENCOUNTER
Pt is scheduled for 10/17 forRFA it is denied needs to repeat blocks x 2 and then RFA okay to change?

## 2023-09-25 NOTE — TELEPHONE ENCOUNTER
Pt will need 2 block done before RFA according to medicare guidelines. Please call pt and inform him and if  he would like to change to MBB number one let me know I will keep him on same day and order it. Thank you.

## 2023-10-16 NOTE — TELEPHONE ENCOUNTER
Spoke with pt, advised his auth is not completed yet, he verbalizes understanding place him on 10/25

## 2023-10-25 ENCOUNTER — HOSPITAL ENCOUNTER (OUTPATIENT)
Facility: HOSPITAL | Age: 68
Discharge: HOME | End: 2023-10-25
Attending: ANESTHESIOLOGY | Admitting: ANESTHESIOLOGY
Payer: MEDICARE

## 2023-10-25 DIAGNOSIS — M47.896 OTHER SPONDYLOSIS, LUMBAR REGION: ICD-10-CM

## 2023-10-25 PROCEDURE — 64494 PR INJ DX/THER AGNT PARAVERT FACET JOINT,IMG GUIDE,LUMBAR/SAC, 2ND LEVEL: ICD-10-PCS | Mod: LT,,, | Performed by: ANESTHESIOLOGY

## 2023-10-25 PROCEDURE — 64494 INJ PARAVERT F JNT L/S 2 LEV: CPT | Mod: LT | Performed by: ANESTHESIOLOGY

## 2023-10-25 PROCEDURE — 64493 INJ PARAVERT F JNT L/S 1 LEV: CPT | Mod: LT,,, | Performed by: ANESTHESIOLOGY

## 2023-10-25 PROCEDURE — 64493 PR INJ DX/THER AGNT PARAVERT FACET JOINT,IMG GUIDE,LUMBAR/SAC,1ST LVL: ICD-10-PCS | Mod: LT,,, | Performed by: ANESTHESIOLOGY

## 2023-10-25 PROCEDURE — 64493 INJ PARAVERT F JNT L/S 1 LEV: CPT | Mod: LT | Performed by: ANESTHESIOLOGY

## 2023-10-25 PROCEDURE — 64494 INJ PARAVERT F JNT L/S 2 LEV: CPT | Mod: LT,,, | Performed by: ANESTHESIOLOGY

## 2023-10-25 PROCEDURE — 63600175 PHARM REV CODE 636 W HCPCS: Performed by: ANESTHESIOLOGY

## 2023-10-25 RX ORDER — SODIUM CHLORIDE, SODIUM LACTATE, POTASSIUM CHLORIDE, CALCIUM CHLORIDE 600; 310; 30; 20 MG/100ML; MG/100ML; MG/100ML; MG/100ML
INJECTION, SOLUTION INTRAVENOUS CONTINUOUS
Status: ACTIVE | OUTPATIENT
Start: 2023-10-25

## 2023-10-25 RX ORDER — BUPIVACAINE HYDROCHLORIDE 5 MG/ML
INJECTION, SOLUTION EPIDURAL; INTRACAUDAL
Status: DISCONTINUED | OUTPATIENT
Start: 2023-10-25 | End: 2023-10-25 | Stop reason: HOSPADM

## 2023-10-25 RX ORDER — LIDOCAINE HYDROCHLORIDE 10 MG/ML
1 INJECTION, SOLUTION EPIDURAL; INFILTRATION; INTRACAUDAL; PERINEURAL ONCE
Status: ACTIVE | OUTPATIENT
Start: 2023-10-25

## 2023-10-25 NOTE — DISCHARGE SUMMARY
[facility]SU - Periop Services  Discharge Note  Short Stay    Procedure(s) (LRB):  Block-nerve-medial branch-lumbar (Left)      OUTCOME: Patient tolerated treatment/procedure well without complication and is now ready for discharge.    DISPOSITION: Home or Self Care    FINAL DIAGNOSIS:  <principal problem not specified>    FOLLOWUP: In clinic    DISCHARGE INSTRUCTIONS:    Discharge Procedure Orders   Notify your health care provider if you experience any of the following:  temperature >100.4     Notify your health care provider if you experience any of the following:  severe uncontrolled pain     Notify your health care provider if you experience any of the following:  redness, tenderness, or signs of infection (pain, swelling, redness, odor or green/yellow discharge around incision site)     Activity as tolerated        TIME SPENT ON DISCHARGE: 30 minutes

## 2023-10-25 NOTE — OP NOTE
PROCEDURE DATE: 10/25/2023    PROCEDURE:  Diagnostic medial branch nerve blocks to the Left L4/5 and L5/S1 facets under fluoroscopy     DIAGNOSIS:  Other spondylosis, lumbar region    Post Op diagnosis: Same    PHYSICIAN: Adarsh Kraft MD    MEDICATIONS INJECTED: 0.5% bupivicaine, 0.5 ml at each level    SEDATION MEDICATIONS:none     LOCAL ANESTHETIC USED: None    ESTIMATED BLOOD LOSS:  None    COMPLICATIONS:  None    TECHNIQUE: A time out was taken to identify the patient, procedure and side of the procedure. The patient was placed in a prone position, then prepped and draped in the usual sterile fashion using ChloraPrep and sterile towels.  The levels were determined under fluoroscopic guidance and then marked.  A 25-gauge 3.5 inch needle was introduced to the anatomic location of the medial branch nerves that innervate the left L4/5 and L5/S1 facets. The above medication was then injected. The patient tolerated the procedure well.     The patient was monitored after the procedure. Patient was given pain diary to record pain levels at home.     If found to have greater than a 50% recovery and so will be scheduled for a radiofrequency ablation of the corresponding nerves.  Patient was given post procedure and discharge instructions to follow at home.  The patient was discharged in a stable condition.

## 2023-10-25 NOTE — H&P
CC: low back pain    HPI: The patient is a 68 y.o. male with a history of low back pain here for MBB. There are no major changes in history and physical from 9/11/23 by Gerber.    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    Cervical disc displacement     Degeneration of lumbar intervertebral disc     GERD (gastroesophageal reflux disease)     gastric polyps    Hip pain 02/22/2016    Hyperlipidemia     Shingles 11/2013    Sleep apnea        Past Surgical History:   Procedure Laterality Date    ABLATION, SVT, ACCESSORY PATHWAY N/A 5/25/2023    Procedure: Ablation, SVT, Accessory Pathway;  Surgeon: Goyo Sky MD;  Location: [facility] EP LAB;  Service: Cardiology;  Laterality: N/A;  PAC, RFA, CARTO, MAC, GP, 3 PREP    CERVICAL FUSION      bone graft    COLONOSCOPY N/A 7/29/2021    Procedure: COLONOSCOPY;  Surgeon: Tyler Story MD;  Location: [facility] ENDO;  Service: Endoscopy;  Laterality: N/A;    EPIDURAL STEROID INJECTION INTO LUMBAR SPINE N/A 12/5/2019    Procedure: Injection-steroid-epidural-lumbar;  Surgeon: Adarsh Kraft MD;  Location: [facility] OR;  Service: Pain Management;  Laterality: N/A;  L5-S1    ESOPHAGOGASTRODUODENOSCOPY N/A 10/27/2020    Procedure: EGD (ESOPHAGOGASTRODUODENOSCOPY);  Surgeon: Tyler Story MD;  Location: [facility] ENDO;  Service: Endoscopy;  Laterality: N/A;    INJECTION OF ANESTHETIC AGENT AROUND MEDIAL BRANCH NERVES INNERVATING LUMBAR FACET JOINT Bilateral 11/18/2020    Procedure: BLOCK, NERVE, LUMBAR, MEDIAL BRANCH Bilateral L3,4,5;  Surgeon: Adarsh Kraft MD;  Location: [facility] OR;  Service: Pain Management;  Laterality: Bilateral;    RADIOFREQUENCY ABLATION OF LUMBAR MEDIAL BRANCH NERVE AT SINGLE LEVEL Bilateral 1/29/2021    Procedure: Radiofrequency Ablation, Nerve, Spinal, Lumbar, Medial Branch, 1 Level;  Surgeon: Adarsh Kraft MD;  Location: [facility] OR;  Service: Pain Management;  Laterality: Bilateral;  L3,4,5    TONSILLECTOMY, ADENOIDECTOMY         Family History  "  Problem Relation Age of Onset    Diabetes Mother     Hypertension Mother     Stroke Mother     Coronary artery disease Mother     Stroke Father     Heart disease Brother         CAD       Social History     Socioeconomic History    Marital status:    Tobacco Use    Smoking status: Never    Smokeless tobacco: Never   Substance and Sexual Activity    Alcohol use: Yes     Alcohol/week: 6.0 standard drinks of alcohol     Types: 6 Cans of beer per week     Comment: weekly or less    Drug use: No    Sexual activity: Yes     Partners: Female     Social Determinants of Health     Stress: No Stress Concern Present (5/14/2020)    [facility] [employer] - Occupational Stress Questionnaire     Feeling of Stress : Only a little       Current Facility-Administered Medications   Medication Dose Route Frequency Provider Last Rate Last Admin    BUPivacaine (PF) 0.5% (5 mg/mL) injection    PRN Adarsh Kraft MD   5 mL at 10/25/23 1233    lactated ringers infusion   Intravenous Continuous Adarsh Kraft MD        LIDOcaine (PF) 10 mg/ml (1%) injection 10 mg  1 mL Intradermal Once Adarsh Kraft MD           Review of patient's allergies indicates:   Allergen Reactions    Pennsaid [diclofenac sodium] Rash and Blisters       Vitals:    10/12/23 1550 10/19/23 0959   Weight: 83.9 kg (184 lb 15.5 oz) 83.9 kg (184 lb 15.5 oz)   Height: 5' 10" (1.778 m) 5' 10" (1.778 m)       REVIEW OF SYSTEMS:     GENERAL: No weight loss, malaise or fevers.  HEENT:  No recent changes in vision or hearing  NECK: Negative for lumps, no difficulty with swallowing.  RESPIRATORY: Negative for cough, wheezing or shortness of breath, patient denies any recent URI.  CARDIOVASCULAR: Negative for chest pain, leg swelling or palpitations.  GI: Negative for abdominal discomfort, blood in stools or black stools or change in bowel habits.  MUSCULOSKELETAL: See HPI.  SKIN: Negative for lesions, rash, and itching.  PSYCH: No suicidal or homicidal " ideations, no current mood disturbances.  HEMATOLOGY/LYMPHOLOGY: Negative for prolonged bleeding, bruising easily or swollen nodes. Patient is not currently taking any anti-coagulants  ENDO: No history of diabetes or thyroid dysfunction  NEURO: No history of syncope, paralysis, seizures or tremors.All other reviewed and negative other than HPI.    Physical exam:  Gen: A and O x3, pleasant, well-groomed  Skin: No rashes or obvious lesions  HEENT: PERRLA, no obvious deformities on ears or in canals. No thyroid masses, trachea midline, no palpable lymph nodes in neck, axilla.  CVS: Regular rate and rhythm, normal S1 and S2, no murmurs.  Resp: Clear to auscultation bilaterally.  Abdomen: Soft, NT/ND, normal bowel sounds present.  Musculoskeletal/Neuro: Moving all extremities    Assessment:  Other spondylosis, lumbar region  -     Case Request Operating Room: Block-nerve-medial branch-lumbar    Other orders  -     FL Fluoro for Pain Management; Standing  -     BUPivacaine (PF) 0.5% (5 mg/mL) injection          PLAN: MBB      This patient has been cleared for surgery in an ambulatory surgical facility    ASA 3,  Mallampatti Score 3  No history of anesthetic complications  Plan for RN IV sedation

## 2023-10-26 ENCOUNTER — TELEPHONE (OUTPATIENT)
Dept: PAIN MEDICINE | Facility: CLINIC | Age: 68
End: 2023-10-26
Payer: MEDICARE

## 2023-10-26 VITALS
TEMPERATURE: 98 F | HEART RATE: 63 BPM | RESPIRATION RATE: 20 BRPM | OXYGEN SATURATION: 98 % | WEIGHT: 184.94 LBS | SYSTOLIC BLOOD PRESSURE: 118 MMHG | HEIGHT: 70 IN | DIASTOLIC BLOOD PRESSURE: 67 MMHG | BODY MASS INDEX: 26.48 KG/M2

## 2023-10-26 DIAGNOSIS — M47.896 OTHER SPONDYLOSIS, LUMBAR REGION: Primary | ICD-10-CM

## 2023-10-27 NOTE — TELEPHONE ENCOUNTER
Spoke with pt and he states that his procedure was 100% relief for 2 house score of 5 before the procedures and 0 after the procedure.

## 2023-10-27 NOTE — TELEPHONE ENCOUNTER
----- Message from Ruben Sanchez sent at 10/27/2023  8:56 AM CDT -----  Type: Needs Medical Advice  Who Called:  Patient    Best Call Back Number: [phone]  Additional Information: Patient states that he would like a callback from Kallie regarding his recent nerve block.

## 2023-11-24 DIAGNOSIS — M23.92 INTERNAL DERANGEMENT OF LEFT KNEE: ICD-10-CM

## 2023-11-24 NOTE — TELEPHONE ENCOUNTER
No care due was identified.  [facility] Embedded Care Due Messages. Reference number: [id number].   11/24/2023 2:57:19 PM CST

## 2023-11-27 RX ORDER — MELOXICAM 15 MG/1
TABLET ORAL
Qty: 90 TABLET | Refills: 0 | Status: SHIPPED | OUTPATIENT
Start: 2023-11-27 | End: 2024-01-22

## 2023-11-27 NOTE — DISCHARGE INSTRUCTIONS
Nerve Block  This was a test, not a treatment. Your pain may return.  Keep your pain journal - The Dr needs to see if you have some pain relief - even if the pain returns. Dr office will call to check your pain levels within 3 days or message you on the [facility] portal.  Most patients have to have 2 tests before the Radiofrequency procedure.   Perform activities that normally cause you pain during this testing period    Home care instructions  You may apply ice pack to the injection site for 2 days , NO HEAT for 2 days  You may take a shower but no soaking above level of injection in tub or pool for 2 days  Resume Aspirin, Plavix, or Coumadin the day after the procedure unless otherwise instructed.  Do not drive for 24 hr      SEEK  IMMEDIATE MEDICAL HELP FOR:  Severe increase in your usual pain or appearance of new pain  Prolonged  ( more than 8 hours)or increasing weakness or numbness in the legs or arms  Drainage, redness, active bleeding, or increased swelling at the injection site  Temperature over 100.0 degrees F.  Headache that increases when your head is upright and decreases when you lie flat  Shortness of breath, chest pain, or problems breathing      After Surgery:  Always be aware that any surgery can cause these symptoms:    Pain- After this  test, we expect your pain to decrease but  then it may return as the local anesthetic wears off. Try to NOT take your pain medicine during this testing period. Ice and rest can help with pain relief.    Bleeding- a little bleeding after a surgery is usually within normal.  If there is a lot of blood you need to notify your MD.  Emergency treatments of bleeding are cold application, elevation of the bleeding site and compression.    Infection- Infection after surgery is NOT a normal occurrence.  Signs of infection are fever, swelling, hot to touch the incision.  If this occurs notify your MD immediately.    Nausea- this can be common after a surgery especially if you  have had anesthesia medicine or are taking pain medicine.  Staying on clear liquids, bland foods, gingerale, or over the counter anti nausea medicines can help.  If you vomit more than once, notify your MD.  Anti Nausea medicines can be prescribed.

## 2023-11-28 ENCOUNTER — HOSPITAL ENCOUNTER (OUTPATIENT)
Facility: HOSPITAL | Age: 68
Discharge: HOME | End: 2023-11-28
Attending: ANESTHESIOLOGY | Admitting: ANESTHESIOLOGY
Payer: MEDICARE

## 2023-11-28 DIAGNOSIS — M47.896 OTHER SPONDYLOSIS, LUMBAR REGION: ICD-10-CM

## 2023-11-28 PROCEDURE — 64494 PR INJ DX/THER AGNT PARAVERT FACET JOINT,IMG GUIDE,LUMBAR/SAC, 2ND LEVEL: ICD-10-PCS | Mod: LT,,, | Performed by: ANESTHESIOLOGY

## 2023-11-28 PROCEDURE — 64494 INJ PARAVERT F JNT L/S 2 LEV: CPT | Mod: LT,,, | Performed by: ANESTHESIOLOGY

## 2023-11-28 PROCEDURE — 64494 INJ PARAVERT F JNT L/S 2 LEV: CPT | Mod: LT | Performed by: ANESTHESIOLOGY

## 2023-11-28 PROCEDURE — 64493 PR INJ DX/THER AGNT PARAVERT FACET JOINT,IMG GUIDE,LUMBAR/SAC,1ST LVL: ICD-10-PCS | Mod: LT,,, | Performed by: ANESTHESIOLOGY

## 2023-11-28 PROCEDURE — 64493 INJ PARAVERT F JNT L/S 1 LEV: CPT | Mod: LT,,, | Performed by: ANESTHESIOLOGY

## 2023-11-28 PROCEDURE — 64493 INJ PARAVERT F JNT L/S 1 LEV: CPT | Mod: LT | Performed by: ANESTHESIOLOGY

## 2023-11-28 PROCEDURE — 63600175 PHARM REV CODE 636 W HCPCS: Performed by: ANESTHESIOLOGY

## 2023-11-28 RX ORDER — BUPIVACAINE HYDROCHLORIDE 5 MG/ML
INJECTION, SOLUTION EPIDURAL; INTRACAUDAL
Status: DISCONTINUED | OUTPATIENT
Start: 2023-11-28 | End: 2023-11-28 | Stop reason: HOSPADM

## 2023-11-28 RX ORDER — LIDOCAINE HYDROCHLORIDE 10 MG/ML
1 INJECTION, SOLUTION EPIDURAL; INFILTRATION; INTRACAUDAL; PERINEURAL ONCE
Status: ACTIVE | OUTPATIENT
Start: 2023-11-28

## 2023-11-28 RX ORDER — SODIUM CHLORIDE, SODIUM LACTATE, POTASSIUM CHLORIDE, CALCIUM CHLORIDE 600; 310; 30; 20 MG/100ML; MG/100ML; MG/100ML; MG/100ML
INJECTION, SOLUTION INTRAVENOUS CONTINUOUS
Status: ACTIVE | OUTPATIENT
Start: 2023-11-28

## 2023-11-28 NOTE — H&P
CC: low back pain    HPI: The patient is a 68 y.o. male with a history of low back pain here for mBB. There are no major changes in history and physical from 9/11/23 by Gerber.    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    Cervical disc displacement     Degeneration of lumbar intervertebral disc     GERD (gastroesophageal reflux disease)     gastric polyps    Hip pain 02/22/2016    Hyperlipidemia     Shingles 11/2013    Sleep apnea        Past Surgical History:   Procedure Laterality Date    ABLATION, SVT, ACCESSORY PATHWAY N/A 5/25/2023    Procedure: Ablation, SVT, Accessory Pathway;  Surgeon: Goyo Sky MD;  Location: [facility] EP LAB;  Service: Cardiology;  Laterality: N/A;  PAC, RFA, CARTO, MAC, GP, 3 PREP    CERVICAL FUSION      bone graft    COLONOSCOPY N/A 7/29/2021    Procedure: COLONOSCOPY;  Surgeon: Tyler Story MD;  Location: [facility] ENDO;  Service: Endoscopy;  Laterality: N/A;    EPIDURAL STEROID INJECTION INTO LUMBAR SPINE N/A 12/5/2019    Procedure: Injection-steroid-epidural-lumbar;  Surgeon: Adarsh Kraft MD;  Location: [facility] OR;  Service: Pain Management;  Laterality: N/A;  L5-S1    ESOPHAGOGASTRODUODENOSCOPY N/A 10/27/2020    Procedure: EGD (ESOPHAGOGASTRODUODENOSCOPY);  Surgeon: Tyler Story MD;  Location: [facility] ENDO;  Service: Endoscopy;  Laterality: N/A;    INJECTION OF ANESTHETIC AGENT AROUND MEDIAL BRANCH NERVES INNERVATING LUMBAR FACET JOINT Bilateral 11/18/2020    Procedure: BLOCK, NERVE, LUMBAR, MEDIAL BRANCH Bilateral L3,4,5;  Surgeon: Adarsh Kraft MD;  Location: [facility] OR;  Service: Pain Management;  Laterality: Bilateral;    INJECTION OF ANESTHETIC AGENT AROUND MEDIAL BRANCH NERVES INNERVATING LUMBAR FACET JOINT Left 10/25/2023    Procedure: Block-nerve-medial branch-lumbar;  Surgeon: Adarsh Kraft MD;  Location: [facility] OR;  Service: Anesthesiology;  Laterality: Left;  l4-5, l5-s1 MBB    RADIOFREQUENCY ABLATION OF LUMBAR MEDIAL BRANCH NERVE AT SINGLE  LEVEL Bilateral 1/29/2021    Procedure: Radiofrequency Ablation, Nerve, Spinal, Lumbar, Medial Branch, 1 Level;  Surgeon: Adarsh Kraft MD;  Location: [facility] OR;  Service: Pain Management;  Laterality: Bilateral;  L3,4,5    TONSILLECTOMY, ADENOIDECTOMY         Family History   Problem Relation Age of Onset    Diabetes Mother     Hypertension Mother     Stroke Mother     Coronary artery disease Mother     Stroke Father     Heart disease Brother         CAD       Social History     Socioeconomic History    Marital status:    Tobacco Use    Smoking status: Never    Smokeless tobacco: Never   Substance and Sexual Activity    Alcohol use: Yes     Alcohol/week: 6.0 standard drinks of alcohol     Types: 6 Cans of beer per week     Comment: weekly or less    Drug use: No    Sexual activity: Yes     Partners: Female     Social Determinants of Health     Stress: No Stress Concern Present (5/14/2020)    [employer] - Occupational Stress Questionnaire     Feeling of Stress : Only a little       Current Facility-Administered Medications   Medication Dose Route Frequency Provider Last Rate Last Admin    lactated ringers infusion   Intravenous Continuous Adarsh Kraft MD        LIDOcaine (PF) 10 mg/ml (1%) injection 10 mg  1 mL Intradermal Once Adarsh Kraft MD         Facility-Administered Medications Ordered in Other Encounters   Medication Dose Route Frequency Provider Last Rate Last Admin    lactated ringers infusion   Intravenous Continuous Adarsh Kraft MD        LIDOcaine (PF) 10 mg/ml (1%) injection 10 mg  1 mL Intradermal Once Adarsh Kraft MD           Review of patient's allergies indicates:   Allergen Reactions    Pennsaid [diclofenac sodium] Rash and Blisters       There were no vitals filed for this visit.    REVIEW OF SYSTEMS:     GENERAL: No weight loss, malaise or fevers.  HEENT:  No recent changes in vision or hearing  NECK: Negative for lumps, no difficulty with  swallowing.  RESPIRATORY: Negative for cough, wheezing or shortness of breath, patient denies any recent URI.  CARDIOVASCULAR: Negative for chest pain, leg swelling or palpitations.  GI: Negative for abdominal discomfort, blood in stools or black stools or change in bowel habits.  MUSCULOSKELETAL: See HPI.  SKIN: Negative for lesions, rash, and itching.  PSYCH: No suicidal or homicidal ideations, no current mood disturbances.  HEMATOLOGY/LYMPHOLOGY: Negative for prolonged bleeding, bruising easily or swollen nodes. Patient is not currently taking any anti-coagulants  ENDO: No history of diabetes or thyroid dysfunction  NEURO: No history of syncope, paralysis, seizures or tremors.All other reviewed and negative other than HPI.    Physical exam:  Gen: A and O x3, pleasant, well-groomed  Skin: No rashes or obvious lesions  HEENT: PERRLA, no obvious deformities on ears or in canals. No thyroid masses, trachea midline, no palpable lymph nodes in neck, axilla.  CVS: Regular rate and rhythm, normal S1 and S2, no murmurs.  Resp: Clear to auscultation bilaterally.  Abdomen: Soft, NT/ND, normal bowel sounds present.  Musculoskeletal/Neuro: Moving all extremities    Assessment:  Other spondylosis, lumbar region  -     Case Request Operating Room: Block-nerve-medial branch-lumbar          PLAN: MBB      This patient has been cleared for surgery in an ambulatory surgical facility    ASA 3,  Mallampatti Score 3  No history of anesthetic complications  Plan for RN IV sedation

## 2023-11-28 NOTE — DISCHARGE SUMMARY
[facility] - Periop Services  Discharge Note  Short Stay    Procedure(s) (LRB):  Block-nerve-medial branch-lumbar (Bilateral)      OUTCOME: Patient tolerated treatment/procedure well without complication and is now ready for discharge.    DISPOSITION: Home or Self Care    FINAL DIAGNOSIS:  <principal problem not specified>    FOLLOWUP: In clinic    DISCHARGE INSTRUCTIONS:    Discharge Procedure Orders   Notify your health care provider if you experience any of the following:  temperature >100.4     Notify your health care provider if you experience any of the following:  severe uncontrolled pain     Notify your health care provider if you experience any of the following:  redness, tenderness, or signs of infection (pain, swelling, redness, odor or green/yellow discharge around incision site)     Activity as tolerated        TIME SPENT ON DISCHARGE:   30 minutes

## 2023-11-28 NOTE — OP NOTE
PROCEDURE DATE: 11/28/2023    PROCEDURE:  Diagnostic medial branch nerve blocks to the Left L4/5 and L5/S1 facets under fluoroscopy     DIAGNOSIS:  Other spondylosis, lumbar region    Post Op diagnosis: Same    PHYSICIAN: Adarsh Kraft MD    MEDICATIONS INJECTED: 0.5% bupivicaine, 0.5 ml at each level    SEDATION MEDICATIONS:none     LOCAL ANESTHETIC USED: None    ESTIMATED BLOOD LOSS:  None    COMPLICATIONS:  None    TECHNIQUE: A time out was taken to identify the patient, procedure and side of the procedure. The patient was placed in a prone position, then prepped and draped in the usual sterile fashion using ChloraPrep and sterile towels.  The levels were determined under fluoroscopic guidance and then marked.  A 25-gauge 3.5 inch needle was introduced to the anatomic location of the medial branch nerves that innervate the left L4/5 and L5/S1 facets. The above medication was then injected. The patient tolerated the procedure well.     The patient was monitored after the procedure. Patient was given pain diary to record pain levels at home.     If found to have greater than a 50% recovery and so will be scheduled for a radiofrequency ablation of the corresponding nerves.  Patient was given post procedure and discharge instructions to follow at home.  The patient was discharged in a stable condition.

## 2023-11-29 ENCOUNTER — TELEPHONE (OUTPATIENT)
Dept: PAIN MEDICINE | Facility: CLINIC | Age: 68
End: 2023-11-29
Payer: MEDICARE

## 2023-11-29 VITALS
RESPIRATION RATE: 17 BRPM | SYSTOLIC BLOOD PRESSURE: 131 MMHG | OXYGEN SATURATION: 97 % | HEART RATE: 51 BPM | DIASTOLIC BLOOD PRESSURE: 75 MMHG | TEMPERATURE: 97 F

## 2023-12-09 ENCOUNTER — HOSPITAL ENCOUNTER (EMERGENCY)
Facility: HOSPITAL | Age: 68
Discharge: HOME | End: 2023-12-09
Attending: EMERGENCY MEDICINE
Payer: MEDICARE

## 2023-12-09 ENCOUNTER — PATIENT MESSAGE (OUTPATIENT)
Dept: SPINE | Facility: CLINIC | Age: 68
End: 2023-12-09
Payer: MEDICARE

## 2023-12-09 VITALS
BODY MASS INDEX: 26.4 KG/M2 | SYSTOLIC BLOOD PRESSURE: 131 MMHG | OXYGEN SATURATION: 99 % | RESPIRATION RATE: 19 BRPM | DIASTOLIC BLOOD PRESSURE: 70 MMHG | HEART RATE: 62 BPM | WEIGHT: 184 LBS | TEMPERATURE: 99 F

## 2023-12-09 DIAGNOSIS — M54.9 CHRONIC BACK PAIN, UNSPECIFIED BACK LOCATION, UNSPECIFIED BACK PAIN LATERALITY: Primary | ICD-10-CM

## 2023-12-09 DIAGNOSIS — G89.29 CHRONIC BACK PAIN, UNSPECIFIED BACK LOCATION, UNSPECIFIED BACK PAIN LATERALITY: Primary | ICD-10-CM

## 2023-12-09 PROCEDURE — 63600175 PHARM REV CODE 636 W HCPCS: Performed by: NURSE PRACTITIONER

## 2023-12-09 PROCEDURE — 25000003 PHARM REV CODE 250: Performed by: NURSE PRACTITIONER

## 2023-12-09 PROCEDURE — 99284 EMERGENCY DEPT VISIT MOD MDM: CPT

## 2023-12-09 PROCEDURE — 96372 THER/PROPH/DIAG INJ SC/IM: CPT | Performed by: NURSE PRACTITIONER

## 2023-12-09 RX ORDER — METHYLPREDNISOLONE 4 MG/1
TABLET ORAL
Qty: 1 EACH | Refills: 0 | Status: SHIPPED | OUTPATIENT
Start: 2023-12-09

## 2023-12-09 RX ORDER — METHOCARBAMOL 500 MG/1
500 TABLET, FILM COATED ORAL 3 TIMES DAILY
Qty: 15 TABLET | Refills: 0 | Status: SHIPPED | OUTPATIENT
Start: 2023-12-09 | End: 2023-12-14

## 2023-12-09 RX ORDER — DEXAMETHASONE SODIUM PHOSPHATE 4 MG/ML
8 INJECTION, SOLUTION INTRA-ARTICULAR; INTRALESIONAL; INTRAMUSCULAR; INTRAVENOUS; SOFT TISSUE
Status: COMPLETED | OUTPATIENT
Start: 2023-12-09 | End: 2023-12-09

## 2023-12-09 RX ORDER — METHOCARBAMOL 500 MG/1
1000 TABLET, FILM COATED ORAL
Status: COMPLETED | OUTPATIENT
Start: 2023-12-09 | End: 2023-12-09

## 2023-12-09 RX ADMIN — METHOCARBAMOL TABLETS 1000 MG: 500 TABLET, COATED ORAL at 11:12

## 2023-12-09 RX ADMIN — DEXAMETHASONE SODIUM PHOSPHATE 8 MG: 4 INJECTION, SOLUTION INTRA-ARTICULAR; INTRALESIONAL; INTRAMUSCULAR; INTRAVENOUS; SOFT TISSUE at 11:12

## 2023-12-09 NOTE — DISCHARGE INSTRUCTIONS
Make a follow-up appoint with your primary care doctor.  Also call whoever is trying to get your MRI scheduled.  Taking medication as prescribed.  Return to the ED for any worsening of symptoms or any other concerns.

## 2023-12-09 NOTE — ED PROVIDER NOTES
Encounter Date: 12/9/2023       History     Chief Complaint   Patient presents with    Back Pain     Presents with complaint of low back pain going down his left leg.  Patient has chronic back pain.  Patient reports he does have an order for an MRI.  He wanted an MRI here.  They have not called him yet with a time or date for scheduling his MRI.  He reports that they have told him has sciatica in the past  he currently is taking hydrocodone and Robaxin.  He is not getting any relief.  He is currently out of the Robaxin.  He is requesting a refill on the Robaxin.  He denies bowel or bladder dysfunction.      Review of patient's allergies indicates:   Allergen Reactions    Pennsaid [diclofenac sodium] Rash and Blisters     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    Cervical disc displacement     Degeneration of lumbar intervertebral disc     GERD (gastroesophageal reflux disease)     gastric polyps    Hip pain 02/22/2016    Hyperlipidemia     Shingles 11/2013    Sleep apnea      Past Surgical History:   Procedure Laterality Date    ABLATION, SVT, ACCESSORY PATHWAY N/A 5/25/2023    Procedure: Ablation, SVT, Accessory Pathway;  Surgeon: Goyo Sky MD;  Location: [facility] EP LAB;  Service: Cardiology;  Laterality: N/A;  PAC, RFA, CARTO, MAC, GP, 3 PREP    CERVICAL FUSION      bone graft    COLONOSCOPY N/A 7/29/2021    Procedure: COLONOSCOPY;  Surgeon: Tyler Story MD;  Location: [facility] ENDO;  Service: Endoscopy;  Laterality: N/A;    EPIDURAL STEROID INJECTION INTO LUMBAR SPINE N/A 12/5/2019    Procedure: Injection-steroid-epidural-lumbar;  Surgeon: Adarsh Kraft MD;  Location: [facility] OR;  Service: Pain Management;  Laterality: N/A;  L5-S1    ESOPHAGOGASTRODUODENOSCOPY N/A 10/27/2020    Procedure: EGD (ESOPHAGOGASTRODUODENOSCOPY);  Surgeon: Tyler Story MD;  Location: [facility] ENDO;  Service: Endoscopy;  Laterality: N/A;    INJECTION OF ANESTHETIC AGENT AROUND MEDIAL BRANCH NERVES  INNERVATING LUMBAR FACET JOINT Bilateral 11/18/2020    Procedure: BLOCK, NERVE, LUMBAR, MEDIAL BRANCH Bilateral L3,4,5;  Surgeon: Adarsh Kraft MD;  Location: [facility] OR;  Service: Pain Management;  Laterality: Bilateral;    INJECTION OF ANESTHETIC AGENT AROUND MEDIAL BRANCH NERVES INNERVATING LUMBAR FACET JOINT Left 10/25/2023    Procedure: Block-nerve-medial branch-lumbar;  Surgeon: Adarsh Kraft MD;  Location: [facility] OR;  Service: Anesthesiology;  Laterality: Left;  l4-5, l5-s1 MBB    INJECTION OF ANESTHETIC AGENT AROUND MEDIAL BRANCH NERVES INNERVATING LUMBAR FACET JOINT Bilateral 11/28/2023    Procedure: Block-nerve-medial branch-lumbar;  Surgeon: Adarsh Kraft MD;  Location[facility] OR;  Service: Anesthesiology;  Laterality: Bilateral;  L4.5 and l5/s1 MBB #2    RADIOFREQUENCY ABLATION OF LUMBAR MEDIAL BRANCH NERVE AT SINGLE LEVEL Bilateral 1/29/2021    Procedure: Radiofrequency Ablation, Nerve, Spinal, Lumbar, Medial Branch, 1 Level;  Surgeon: Adarsh Kraft MD;  Location: [facility] OR;  Service: Pain Management;  Laterality: Bilateral;  L3,4,5    TONSILLECTOMY, ADENOIDECTOMY       Family History   Problem Relation Age of Onset    Diabetes Mother     Hypertension Mother     Stroke Mother     Coronary artery disease Mother     Stroke Father     Heart disease Brother         CAD     Social History     Tobacco Use    Smoking status: Never    Smokeless tobacco: Never   Substance Use Topics    Alcohol use: Yes     Alcohol/week: 6.0 standard drinks of alcohol     Types: 6 Cans of beer per week     Comment: weekly or less    Drug use: No     Review of Systems   Constitutional:  Negative for fever.   Respiratory:  Negative for cough, shortness of breath and wheezing.    Cardiovascular:  Negative for chest pain, palpitations and leg swelling.   Gastrointestinal:  Negative for abdominal pain, diarrhea, nausea and vomiting.   Genitourinary:  Negative for difficulty urinating, dysuria, enuresis, frequency and hematuria.    Musculoskeletal:  Positive for back pain. Negative for gait problem and neck pain.   Skin:  Negative for rash.   Neurological:  Negative for weakness.       Physical Exam     Initial Vitals [12/09/23 1100]   BP Pulse Resp Temp SpO2   134/74 (!) 59 19 98.5 °F (36.9 °C) 100 %      MAP       --         Physical Exam    Constitutional: He appears well-developed and well-nourished.   HENT:   Head: Normocephalic.   Mouth/Throat: Oropharynx is clear and moist.   Eyes: Conjunctivae are normal.   Neck: Neck supple.   Normal range of motion.  Cardiovascular:  Normal rate and regular rhythm.           Pulmonary/Chest: Breath sounds normal. No respiratory distress.   Abdominal: Abdomen is soft. Bowel sounds are normal. He exhibits no distension. There is no abdominal tenderness.   Musculoskeletal:         General: Normal range of motion.      Cervical back: Normal range of motion and neck supple.      Comments: Moves all extremities without difficulty.  He has full range of motion to extremities his gait is steady.  There is no bony tenderness.  There is tenderness to the lower lumbar spine near the coccyx.     Neurological: He is alert and oriented to person, place, and time. No sensory deficit. GCS score is 15. GCS eye subscore is 4. GCS verbal subscore is 5. GCS motor subscore is 6.   Skin: Skin is warm. Capillary refill takes less than 2 seconds.   Psychiatric: He has a normal mood and affect. Thought content normal.         ED Course   Procedures  Labs Reviewed - No data to display       Imaging Results    None          Medications   dexAMETHasone injection 8 mg (8 mg Intramuscular Given 12/9/23 1110)   methocarbamoL tablet 1,000 mg (1,000 mg Oral Given 12/9/23 1152)     Medical Decision Making  Presents with low back pain.  This is chronic.  He reports that it radiates down his left leg.  Denies injury.  Patient is currently waiting on an appointment for an MRI.    Amount and/or Complexity of Data Reviewed  Discussion  of management or test interpretation with external provider(s): Patient was given 8 mg of Decadron IM.  This has reduced his pain.  He was given Robaxin 1000 mg here in the ED. have written a prescription for Robaxin 500 mg p.o. b.i.d. p.r.n. spasms.  He is also been given a Medrol Dosepak.  He has been instructed on Monday to follow up with his doctor regarding the appointment for the MRI.  At discharge is patient appeared to be in no acute distress.  He is ambulatory per self.  He is given return precautions.    Risk  Prescription drug management.                                      Clinical Impression:  Final diagnoses:  [M54.9, G89.29] Chronic back pain, unspecified back location, unspecified back pain laterality (Primary)          ED Disposition Condition    Discharge Stable          ED Prescriptions       Medication Sig Dispense Start Date End Date Auth. Provider    methocarbamoL (ROBAXIN) 500 MG Tab Take 1 tablet (500 mg total) by mouth 3 (three) times daily. for 5 days 15 tablet 12/9/2023 12/14/2023 Jess Pillai NP    methylPREDNISolone (MEDROL DOSEPACK) 4 mg tablet Take as directed 1 each 12/9/2023 -- Jess Pillai NP          Follow-up Information       Follow up With Specialties Details Why Contact Info    Jeffery Mena MD Family Medicine In 3 days  [address] [facility] [address]  [phone]               Jess Pillai NP  12/09/23 7881

## 2023-12-11 ENCOUNTER — OFFICE VISIT (OUTPATIENT)
Dept: SPINE | Facility: CLINIC | Age: 68
End: 2023-12-11
Payer: MEDICARE

## 2023-12-11 ENCOUNTER — TELEPHONE (OUTPATIENT)
Dept: CARDIOLOGY | Facility: CLINIC | Age: 68
End: 2023-12-11
Payer: MEDICARE

## 2023-12-11 VITALS — BODY MASS INDEX: 26.35 KG/M2 | WEIGHT: 184.06 LBS | HEIGHT: 70 IN

## 2023-12-11 DIAGNOSIS — G89.29 CHRONIC BILATERAL LOW BACK PAIN WITH BILATERAL SCIATICA: Primary | ICD-10-CM

## 2023-12-11 DIAGNOSIS — M54.41 CHRONIC BILATERAL LOW BACK PAIN WITH BILATERAL SCIATICA: Primary | ICD-10-CM

## 2023-12-11 DIAGNOSIS — M54.42 CHRONIC BILATERAL LOW BACK PAIN WITH BILATERAL SCIATICA: Primary | ICD-10-CM

## 2023-12-11 PROCEDURE — 99213 OFFICE O/P EST LOW 20 MIN: CPT | Mod: S$GLB,,, | Performed by: PHYSICAL MEDICINE & REHABILITATION

## 2023-12-11 PROCEDURE — 99213 PR OFFICE/OUTPT VISIT, EST, LEVL III, 20-29 MIN: ICD-10-PCS | Mod: S$GLB,,, | Performed by: PHYSICAL MEDICINE & REHABILITATION

## 2023-12-11 RX ORDER — HYDROCODONE BITARTRATE AND ACETAMINOPHEN 5; 325 MG/1; MG/1
1 TABLET ORAL EVERY 6 HOURS PRN
Qty: 12 TABLET | Refills: 0 | Status: SHIPPED | OUTPATIENT
Start: 2023-12-11 | End: 2024-02-08

## 2023-12-11 NOTE — PROGRESS NOTES
SUBJECTIVE:    Patient ID: Colt Rose is a 68 y.o. male.    Chief Complaint: Follow-up    He presents today for follow-up with ongoing complaints of low back pain radiating into the legs he underwent medial branch blocks left L4-5 and L5-S1 on 1025 and 1128 of this year.  The 1st injection helped but the 2nd did not.  He is complaining of left greater than right-sided low back pain at the lumbosacral junction radiates down the left greater than right anterior thighs to the knees.  He has some numbness and tingling in the plantar portion of the left foot but not on the right.  He went to the emergency room on 12/09/2023.  Was given a shot of dexamethasone and Robaxin and sent home on a Medrol Dosepak and full Robaxin.  Feels a little bit better.  His pain level is 6/10 at this time but occasionally as high as 9 out 10 interferes with quality of life in terms of activities of daily living recreation and social activities.  He is scheduled himself an appointment with [facility] a local spine surgical practice.  They have him scheduled to repeat an MRI tomorrow and he will follow-up with them in a few days.  He is requesting something for pain until he can complete that process.          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    Cervical disc displacement     Degeneration of lumbar intervertebral disc     GERD (gastroesophageal reflux disease)     gastric polyps    Hip pain 02/22/2016    Hyperlipidemia     Shingles 11/2013    Sleep apnea      Social History     Socioeconomic History    Marital status:    Tobacco Use    Smoking status: Never    Smokeless tobacco: Never   Substance and Sexual Activity    Alcohol use: Yes     Alcohol/week: 6.0 standard drinks of alcohol     Types: 6 Cans of beer per week     Comment: weekly or less    Drug use: No    Sexual activity: Yes     Partners: Female     Social Determinants of Health     Stress: No Stress Concern Present (5/14/2020)     [facility] Occupational [facility] - Occupational Stress Questionnaire     Feeling of Stress : Only a little     Past Surgical History:   Procedure Laterality Date    ABLATION, SVT, ACCESSORY PATHWAY N/A 5/25/2023    Procedure: Ablation, SVT, Accessory Pathway;  Surgeon: Goyo Sky MD;  Location: [facility] EP LAB;  Service: Cardiology;  Laterality: N/A;  PAC, RFA, CARTO, MAC, GP, 3 PREP    CERVICAL FUSION      bone graft    COLONOSCOPY N/A 7/29/2021    Procedure: COLONOSCOPY;  Surgeon: Tyler Story MD;  Location: [facility] ENDO;  Service: Endoscopy;  Laterality: N/A;    EPIDURAL STEROID INJECTION INTO LUMBAR SPINE N/A 12/5/2019    Procedure: Injection-steroid-epidural-lumbar;  Surgeon: Adarsh Kraft MD;  Location: [facility] OR;  Service: Pain Management;  Laterality: N/A;  L5-S1    ESOPHAGOGASTRODUODENOSCOPY N/A 10/27/2020    Procedure: EGD (ESOPHAGOGASTRODUODENOSCOPY);  Surgeon: Tyler Story MD;  Location: [facility] ENDO;  Service: Endoscopy;  Laterality: N/A;    INJECTION OF ANESTHETIC AGENT AROUND MEDIAL BRANCH NERVES INNERVATING LUMBAR FACET JOINT Bilateral 11/18/2020    Procedure: BLOCK, NERVE, LUMBAR, MEDIAL BRANCH Bilateral L3,4,5;  Surgeon: Adarsh Kraft MD;  Location: [facility] OR;  Service: Pain Management;  Laterality: Bilateral;    INJECTION OF ANESTHETIC AGENT AROUND MEDIAL BRANCH NERVES INNERVATING LUMBAR FACET JOINT Left 10/25/2023    Procedure: Block-nerve-medial branch-lumbar;  Surgeon: Adarsh Kraft MD;  Location: [facility] OR;  Service: Anesthesiology;  Laterality: Left;  l4-5, l5-s1 MBB    INJECTION OF ANESTHETIC AGENT AROUND MEDIAL BRANCH NERVES INNERVATING LUMBAR FACET JOINT Bilateral 11/28/2023    Procedure: Block-nerve-medial branch-lumbar;  Surgeon: Adarsh Kraft MD;  Location: [facility] OR;  Service: Anesthesiology;  Laterality: Bilateral;  L4.5 and l5/s1 MBB #2    RADIOFREQUENCY ABLATION OF LUMBAR MEDIAL BRANCH NERVE AT SINGLE LEVEL Bilateral 1/29/2021    Procedure: Radiofrequency Ablation, Nerve,  "Spinal, Lumbar, Medial Branch, 1 Level;  Surgeon: Adarsh Kraft MD;  [facility];  Service: Pain Management;  Laterality: Bilateral;  L3,4,5    TONSILLECTOMY, ADENOIDECTOMY       Family History   Problem Relation Age of Onset    Diabetes Mother     Hypertension Mother     Stroke Mother     Coronary artery disease Mother     Stroke Father     Heart disease Brother         CAD     Vitals:    12/11/23 1452   Weight: 83.5 kg (184 lb 1.4 oz)   Height: 5' 10" (1.778 m)       Review of Systems   Constitutional:  Negative for chills, diaphoresis, fatigue, fever and unexpected weight change.   HENT:  Negative for trouble swallowing.    Eyes:  Negative for visual disturbance.   Respiratory:  Negative for shortness of breath.    Cardiovascular:  Negative for chest pain.   Gastrointestinal:  Negative for abdominal pain, constipation, diarrhea, nausea and vomiting.   Genitourinary:  Negative for difficulty urinating.   Musculoskeletal:  Negative for arthralgias, back pain, gait problem, joint swelling, myalgias, neck pain and neck stiffness.   Neurological:  Negative for dizziness, speech difficulty, weakness, light-headedness, numbness and headaches.          Objective:      Physical Exam  Neurological:      Mental Status: He is alert and oriented to person, place, and time.             Assessment:       1. Chronic bilateral low back pain with bilateral sciatica           Plan:     He should follow-up with [facility] as planned.  He also wishes to be seen by Dr. Colón which I will arrange.  I wrote him a prescription for few hydrocodone tablets.  He can follow up here as needed      Chronic bilateral low back pain with bilateral sciatica  -     Ambulatory referral/consult to Neurosurgery; Future; Expected date: 12/18/2023  -     HYDROcodone-acetaminophen (NORCO) 5-325 mg per tablet; Take 1 tablet by mouth every 6 (six) hours as needed for Pain.  Dispense: 12 tablet; Refill: 0          "

## 2023-12-13 DIAGNOSIS — I49.1 PREMATURE ATRIAL COMPLEXES: ICD-10-CM

## 2023-12-14 ENCOUNTER — PATIENT MESSAGE (OUTPATIENT)
Dept: CARDIOLOGY | Facility: CLINIC | Age: 68
End: 2023-12-14
Payer: MEDICARE

## 2023-12-14 RX ORDER — LANOLIN ALCOHOL/MO/W.PET/CERES
400 CREAM (GRAM) TOPICAL
Qty: 30 TABLET | Refills: 0 | Status: SHIPPED | OUTPATIENT
Start: 2023-12-14 | End: 2023-12-28 | Stop reason: SDUPTHER

## 2023-12-18 ENCOUNTER — TELEPHONE (OUTPATIENT)
Dept: FAMILY MEDICINE | Facility: CLINIC | Age: 68
End: 2023-12-18
Payer: MEDICARE

## 2023-12-18 NOTE — TELEPHONE ENCOUNTER
----- Message from Queta Carbajal sent at 12/18/2023 10:37 AM CST -----  Regarding: Sooner Appointment Reschedule Request  Contact: patient at [phone]  Type:  Sooner Appointment Reschedule Request    Caller is requesting a sooner appointment.  Caller declined first available appointment listed below.  Caller will not accept being placed on the waitlist and is requesting a message be sent to doctor.    Name of Caller:  patient at [phone]    When is the first available appointment?  6/2024    Additional Information:  patient needs to reschedule his appointment on 1/3/2024. Please call and advise. Thank you

## 2023-12-26 ENCOUNTER — TELEPHONE (OUTPATIENT)
Dept: CARDIOLOGY | Facility: CLINIC | Age: 68
End: 2023-12-26
Payer: MEDICARE

## 2023-12-26 NOTE — TELEPHONE ENCOUNTER
----- Message from Odalis Kim sent at 12/26/2023  4:01 PM CST -----  Regarding: appt for 12/28  Contact: Colt [phone]  Type: Needs Medical Advice    Who Called:  Colt Pollack Call Back Number: [phone]    Additional Information: Appt on 12/28 was cancelled by provider by mistake it appears, please call patient to reschedule, they were on the phone with Dr. Garcia office and were told to still come in on 12/28 to be seen. Please call to advise.

## 2023-12-26 NOTE — TELEPHONE ENCOUNTER
I sent message to Lydia about this she had him canceled and I don't know anything about it and there was no note in chart.

## 2023-12-26 NOTE — TELEPHONE ENCOUNTER
----- Message from Lulú Boyle, Patient Care Assistant sent at 12/26/2023  3:45 PM CST -----  Contact: self  Pt is ret a call back regarding a appt for a surgery  clearance [phone]  thanks

## 2023-12-27 ENCOUNTER — TELEPHONE (OUTPATIENT)
Dept: CARDIOLOGY | Facility: CLINIC | Age: 68
End: 2023-12-27

## 2023-12-27 NOTE — TELEPHONE ENCOUNTER
----- Message from Seth Spear sent at 12/27/2023  1:06 PM CST -----  Type: Needs Medical Advice  Who Called:  pt  Symptoms (please be specific):  pt said he need to speak to Janice--said she was supposed to get back with him about his appt tomorrow--please call woodrow sauer  Best Call Back Number: [phone] (home)     Additional Information: thank you

## 2023-12-27 NOTE — TELEPHONE ENCOUNTER
----- Message from Kodi Mitchell MA sent at 12/27/2023  1:55 PM CST -----    ----- Message -----  From: Seth Spear  Sent: 12/27/2023   1:07 PM CST  To: Pete Bocanegra Staff    Type: Needs Medical Advice  Who Called:  pt  Symptoms (please be specific):  pt said he need to speak to Janice--said she was supposed to get back with him about his appt tomorrow--please call woodrow sauer  Best Call Back Number: [phone] (home)     Additional Information: thank you

## 2023-12-27 NOTE — LETTER
2023    Colt Rose  [address] [place] [address]             [facility]  [address]  [address]  [facility] [address]  Phone: [phone]  Fax: [phone] Patient: Colt Rose  : 1955  Referring Doctor: Dr. Johnson   Procedure: herniated disk back surgery     Current Outpatient Medications   Medication Sig    aspirin (ECOTRIN) 81 MG EC tablet Take 1 tablet (81 mg total) by mouth once daily.    atorvastatin (LIPITOR) 10 MG tablet Take 1 tablet (10 mg total) by mouth once daily.    docosahexaenoic acid/epa (FISH OIL ORAL) Take 1 capsule by mouth once daily.    fluocinolone acetonide oil 0.01 % Drop Place 5 drops into both eyes 2 (two) times daily as needed.     HYDROcodone-acetaminophen (NORCO) 5-325 mg per tablet Take 1 tablet by mouth every 6 (six) hours as needed for Pain.    HYDROcodone-acetaminophen (NORCO) 5-325 mg per tablet Take 1 tablet by mouth every 6 (six) hours as needed for Pain.    magnesium oxide (MAG-OX) 400 mg (241.3 mg magnesium) tablet TAKE 1 TABLET(400 MG) BY MOUTH EVERY DAY    meloxicam (MOBIC) 15 MG tablet Take 1 tablet (500 mg total) by mouth nightly as needed. - Oral    methylPREDNISolone (MEDROL DOSEPACK) 4 mg tablet Take as directed    multivitamin (THERAGRAN) per tablet Take 1 tablet by mouth once daily.    neomycin-fluocinolone (MANUEL-SYNALAR) 0.5 % (0.35 % base)-0.025 % Crea Apply 1 application topically 2 (two) times daily.    omeprazole (PRILOSEC) 40 MG capsule Take 1 capsule (40 mg total) by mouth once daily.    prednisoLONE acetate (PRED FORTE) 1 % DrpS Place 1 drop into both eyes 4 (four) times daily.     No current facility-administered medications for this visit.     Facility-Administered Medications Ordered in Other Visits   Medication    lactated ringers infusion    lactated ringers infusion    LIDOcaine (PF) 10 mg/ml (1%) injection 10 mg    LIDOcaine (PF) 10  mg/ml (1%) injection 10 mg       This patient has been assessed for risk factors for clearance of surgery with the following stipulations:    __x_ No contraindications  _x__ Recommendations for hold ASPIRIN x __7__ days  ___ Cleared for surgery with moderate risks.  ___ Not cleared for surgery due to the following reasons:      If you have any questions regarding the above, please contact my office at [phone].    Sincerely,    Alyssia Marie NP

## 2023-12-28 ENCOUNTER — PATIENT MESSAGE (OUTPATIENT)
Dept: CARDIOLOGY | Facility: CLINIC | Age: 68
End: 2023-12-28
Payer: MEDICARE

## 2023-12-28 DIAGNOSIS — I49.1 PREMATURE ATRIAL COMPLEXES: ICD-10-CM

## 2023-12-28 RX ORDER — LANOLIN ALCOHOL/MO/W.PET/CERES
400 CREAM (GRAM) TOPICAL DAILY
Qty: 90 TABLET | Refills: 3 | Status: SHIPPED | OUTPATIENT
Start: 2023-12-28 | End: 2024-12-27

## 2023-12-29 ENCOUNTER — OFFICE VISIT (OUTPATIENT)
Dept: CARDIOLOGY | Facility: CLINIC | Age: 68
End: 2023-12-29
Payer: MEDICARE

## 2023-12-29 VITALS
DIASTOLIC BLOOD PRESSURE: 70 MMHG | HEIGHT: 70 IN | BODY MASS INDEX: 27.46 KG/M2 | WEIGHT: 191.81 LBS | SYSTOLIC BLOOD PRESSURE: 126 MMHG | HEART RATE: 84 BPM

## 2023-12-29 DIAGNOSIS — Z01.810 ENCOUNTER FOR PREOPERATIVE ASSESSMENT FOR NONCORONARY CARDIAC SURGERY: ICD-10-CM

## 2023-12-29 DIAGNOSIS — I49.1 PREMATURE ATRIAL CONTRACTIONS: ICD-10-CM

## 2023-12-29 DIAGNOSIS — Z98.890 HISTORY OF CARDIAC RADIOFREQUENCY ABLATION: ICD-10-CM

## 2023-12-29 DIAGNOSIS — E78.5 DYSLIPIDEMIA: Primary | ICD-10-CM

## 2023-12-29 PROCEDURE — 99214 PR OFFICE/OUTPT VISIT, EST, LEVL IV, 30-39 MIN: ICD-10-PCS | Mod: S$PBB,,, | Performed by: NURSE PRACTITIONER

## 2023-12-29 PROCEDURE — 99214 OFFICE O/P EST MOD 30 MIN: CPT | Mod: PBBFAC,PN | Performed by: NURSE PRACTITIONER

## 2023-12-29 PROCEDURE — 99214 OFFICE O/P EST MOD 30 MIN: CPT | Mod: S$PBB,,, | Performed by: NURSE PRACTITIONER

## 2023-12-29 PROCEDURE — 99999 PR PBB SHADOW E&M-EST. PATIENT-LVL IV: ICD-10-PCS | Mod: PBBFAC,,, | Performed by: NURSE PRACTITIONER

## 2023-12-29 PROCEDURE — 99999 PR PBB SHADOW E&M-EST. PATIENT-LVL IV: CPT | Mod: PBBFAC,,, | Performed by: NURSE PRACTITIONER

## 2023-12-29 NOTE — PROGRESS NOTES
Subjective:    Patient ID:  Colt Rose is a 68 y.o. male patient here for evaluation Pre-op Exam and Hyperlipidemia      History of Present Illness:         Colt Rose is here for follow-up visit. Denies chest pain or shortness of breath. Denies recent fever cough chills or congestion. Denies blood in the urine or blood in the stool.  Has back, hip and knee pain. Plan for surgery soon. Denies orthopnea or peripheral edema. Denies nausea vomiting or dyspepsia. No recent arm neck or jaw pain. No lightheadedness or dizziness.       Review of patient's allergies indicates:   Allergen Reactions    Pennsaid [diclofenac sodium] Rash and Blisters       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    Cervical disc displacement     Degeneration of lumbar intervertebral disc     GERD (gastroesophageal reflux disease)     gastric polyps    Hip pain 02/22/2016    Hyperlipidemia     Shingles 11/2013    Sleep apnea      Past Surgical History:   Procedure Laterality Date    ABLATION, SVT, ACCESSORY PATHWAY N/A 5/25/2023    Procedure: Ablation, SVT, Accessory Pathway;  Surgeon: Goyo Sky MD;  Location: [facility] EP LAB;  Service: Cardiology;  Laterality: N/A;  PAC, RFA, CARTO, MAC, GP, 3 PREP    CERVICAL FUSION      bone graft    COLONOSCOPY N/A 7/29/2021    Procedure: COLONOSCOPY;  Surgeon: Tyler Story MD;  Location: [facility] ENDO;  Service: Endoscopy;  Laterality: N/A;    EPIDURAL STEROID INJECTION INTO LUMBAR SPINE N/A 12/5/2019    Procedure: Injection-steroid-epidural-lumbar;  Surgeon: Adarsh Kraft MD;  Location: [facility] OR;  Service: Pain Management;  Laterality: N/A;  L5-S1    ESOPHAGOGASTRODUODENOSCOPY N/A 10/27/2020    Procedure: EGD (ESOPHAGOGASTRODUODENOSCOPY);  Surgeon: Tyler Story MD;  Location: [facility] ENDO;  Service: Endoscopy;  Laterality: N/A;    INJECTION OF ANESTHETIC AGENT AROUND MEDIAL BRANCH NERVES INNERVATING LUMBAR FACET JOINT Bilateral 11/18/2020    Procedure: BLOCK,  NERVE, LUMBAR, MEDIAL BRANCH Bilateral L3,4,5;  Surgeon: Adarsh Kraft MD;  Location: [facility] OR;  Service: Pain Management;  Laterality: Bilateral;    INJECTION OF ANESTHETIC AGENT AROUND MEDIAL BRANCH NERVES INNERVATING LUMBAR FACET JOINT Left 10/25/2023    Procedure: Block-nerve-medial branch-lumbar;  Surgeon: Adarsh Kraft MD;  Location: [facility] OR;  Service: Anesthesiology;  Laterality: Left;  l4-5, l5-s1 MBB    INJECTION OF ANESTHETIC AGENT AROUND MEDIAL BRANCH NERVES INNERVATING LUMBAR FACET JOINT Bilateral 11/28/2023    Procedure: Block-nerve-medial branch-lumbar;  Surgeon: Adarsh Kraft MD;  [facility];  Service: Anesthesiology;  Laterality: Bilateral;  L4.5 and l5/s1 MBB #2    RADIOFREQUENCY ABLATION OF LUMBAR MEDIAL BRANCH NERVE AT SINGLE LEVEL Bilateral 1/29/2021    Procedure: Radiofrequency Ablation, Nerve, Spinal, Lumbar, Medial Branch, 1 Level;  Surgeon: Adarsh Kraft MD;  Location: [facility] OR;  Service: Pain Management;  Laterality: Bilateral;  L3,4,5    TONSILLECTOMY, ADENOIDECTOMY       Social History     Tobacco Use    Smoking status: Never    Smokeless tobacco: Never   Substance Use Topics    Alcohol use: Yes     Alcohol/week: 6.0 standard drinks of alcohol     Types: 6 Cans of beer per week     Comment: weekly or less    Drug use: No        Review of Systems:    As noted in HPI                 Objective        Vitals:    12/29/23 1309   BP: 126/70   Pulse: 84       LIPIDS - LAST 2   Lab Results   Component Value Date    CHOL 169 06/08/2023    CHOL 180 06/14/2022    HDL 51 06/08/2023    HDL 51 06/14/2022    LDLCALC 108.6 06/08/2023    LDLCALC 107.6 06/14/2022    TRIG 47 06/08/2023    TRIG 107 06/14/2022    CHOLHDL 30.2 06/08/2023    CHOLHDL 28.3 06/14/2022       CBC - LAST 2  Lab Results   Component Value Date    WBC 4.29 06/08/2023    WBC 4.97 05/23/2023    RBC 4.40 (L) 05/23/2023    RBC 4.71 09/29/2020    HGB 14.0 06/08/2023    HGB 13.6 (L) 05/23/2023    HCT 41.6 05/23/2023    HCT 42.7  09/29/2020    MCV 95 05/23/2023    MCV 91 09/29/2020    MCH 30.9 05/23/2023    MCH 31.2 (H) 09/29/2020    MCHC 32.7 05/23/2023    MCHC 34.4 09/29/2020    RDW 13.0 05/23/2023    RDW 12.7 09/29/2020     06/08/2023     05/23/2023    MPV 11.0 06/08/2023    MPV 10.6 05/23/2023    GRAN 2.3 05/23/2023    GRAN 45.5 05/23/2023    LYMPH 2.0 05/23/2023    LYMPH 40.2 05/23/2023    MONO 0.5 05/23/2023    MONO 9.5 05/23/2023    BASO 0.04 05/23/2023    BASO 0.06 09/29/2020    NRBC 0 05/23/2023    NRBC 0 09/29/2020       CHEMISTRY & LIVER FUNCTION - LAST 2  Lab Results   Component Value Date     06/08/2023     05/23/2023    K 4.3 06/08/2023    K 4.3 05/23/2023     06/08/2023     05/23/2023    CO2 27 06/08/2023    CO2 24 05/23/2023    ANIONGAP 9 06/08/2023    ANIONGAP 11 05/23/2023    BUN 20 06/08/2023    BUN 15 05/23/2023    CREATININE 0.8 06/08/2023    CREATININE 0.9 05/23/2023    GLU 96 06/08/2023    GLU 89 05/23/2023    CALCIUM 9.6 06/08/2023    CALCIUM 9.4 05/23/2023    MG 1.9 12/01/2022    MG 1.9 01/14/2022    ALBUMIN 4.0 06/08/2023    ALBUMIN 4.0 06/14/2022    PROT 7.0 06/08/2023    PROT 6.9 06/14/2022    ALKPHOS 45 (L) 06/08/2023    ALKPHOS 47 (L) 06/14/2022    ALT 22 06/08/2023    ALT 25 06/14/2022    AST 22 06/08/2023    AST 21 06/14/2022    BILITOT 0.4 06/08/2023    BILITOT 0.8 06/14/2022        CARDIAC PROFILE - LAST 2  Lab Results   Component Value Date    BNP 23 09/28/2020     09/28/2020    CPKMB 8.1 (H) 09/28/2020    CPKMB 4.6 09/28/2020    TROPONINI <0.030 09/28/2020    TROPONINI <0.030 09/28/2020        COAGULATION - LAST 2  Lab Results   Component Value Date    LABPT 13.5 09/28/2020    INR 1.0 05/23/2023    INR 1.1 09/28/2020    APTT 26.5 05/23/2023    APTT 27.1 09/28/2020       ENDOCRINE & PSA - LAST 2  Lab Results   Component Value Date    HGBA1C 5.4 06/08/2023    HGBA1C 5.7 (H) 06/14/2022    TSH 3.094 06/08/2023    TSH 2.770 12/01/2022    PSA 0.50 06/14/2023    PSA  0.54 06/14/2022        ECHOCARDIOGRAM RESULTS  Results for orders placed in visit on 01/31/22    Stress Echo Which stress agent will be used? Treadmill Exercise; Color Flow Doppler? No    Interpretation Summary  · The patient reached the end of the protocol.  · There were no arrhythmias during stress.  · The left ventricle is normal in size with  · The estimated ejection fraction is 65%.  · Normal left ventricular diastolic function.  · Atrial fibrillation not observed.  · Normal right ventricular size with normal right ventricular systolic function.  · The stress echo portion of this study is negative for myocardial ischemia.  · The ECG portion of this study is negative for myocardial ischemia.  · Pre exercise tricuspid regurgitation was 2.5 m/sec postexercise was 3.4 which is slightly abnormal  · Nonspecific J-point depressions noted with no diagnostic ischemic ratio      CURRENT/PREVIOUS VISIT EKG  Results for orders placed or performed in visit on 08/30/23   IN OFFICE EKG 12-LEAD (to [employer])    Collection Time: 08/30/23 10:37 AM    Narrative    Test Reason : I48.3,E78.2,    Vent. Rate : 075 BPM     Atrial Rate : 075 BPM     P-R Int : 174 ms          QRS Dur : 092 ms      QT Int : 364 ms       P-R-T Axes : 056 029 015 degrees     QTc Int : 406 ms    Normal sinus rhythm  Anterior infarct ,age undetermined  Abnormal ECG  When compared with ECG of 25-MAY-2023 15:01,  No significant change was found  Confirmed by Daljit Freeman MD ([id number]) on 9/28/2023 10:31:40 AM    Referred By:  GP           Confirmed By:Daljit Freeman MD     No valid procedures specified.   Results for orders placed during the hospital encounter of 12/28/21    Nuclear Stress - Cardiology Interpreted    Interpretation Summary    Normal myocardial perfusion scan. There is no evidence of myocardial ischemia or infarction.    The gated perfusion images showed an ejection fraction of 57% post stress. Normal ejection fraction is greater than 53%.    LV  cavity size is normal at rest and normal at stress.There is abnormal wall motion at rest and post stress.    The EKG portion of this study is positive for ischemia.    The patient reported no chest pain during the stress test.    Patient exercised on a Shaan protocol for 8 minutes and 21 seconds and attained a maximum heart rate of 137 beats per minute which is 87% of the maximum predicted heart rate and attained 10 point 1 METs.  And had normal blood pressure response to exercise.  Stress echo    The patient reached the end of the protocol.  There were no arrhythmias during stress.  The left ventricle is normal in size with  The estimated ejection fraction is 65%.  Normal left ventricular diastolic function.  Atrial fibrillation not observed.  Normal right ventricular size with normal right ventricular systolic function.  The stress echo portion of this study is negative for myocardial ischemia.  The ECG portion of this study is negative for myocardial ischemia.  Pre exercise tricuspid regurgitation was 2.5 m/sec postexercise was 3.4 which is slightly abnormal  Nonspecific J-point depressions noted with no diagnostic ischemic ratio     PHYSICAL EXAM  CONSTITUTIONAL: Well built, well nourished in no apparent distress  NECK: no carotid bruit, no JVD  LUNGS: CTA  CHEST WALL: no tenderness  HEART: regular rate and rhythm, S1, S2 normal, no murmur, click, rub or gallop   ABDOMEN: soft, non-tender; bowel sounds normal; no masses,  no organomegaly  EXTREMITIES: Extremities normal, no edema, no calf tenderness noted  NEURO: AAO X 3    I HAVE REVIEWED :    The vital signs, nurses notes, and all the pertinent radiology and labs.        Current Outpatient Medications   Medication Instructions    aspirin (ECOTRIN) 81 mg, Oral, Daily    atorvastatin (LIPITOR) 10 mg, Oral, Daily    docosahexaenoic acid/epa (FISH OIL ORAL) 1 capsule, Oral, Daily    fluocinolone acetonide oil 0.01 % Drop 5 drops, Both Eyes, 2 times daily PRN     HYDROcodone-acetaminophen (NORCO) 5-325 mg per tablet 1 tablet, Oral, Every 6 hours PRN    HYDROcodone-acetaminophen (NORCO) 5-325 mg per tablet 1 tablet, Oral, Every 6 hours PRN    magnesium oxide (MAG-OX) 400 mg, Oral, Daily    meloxicam (MOBIC) 15 MG tablet Take 1 tablet (500 mg total) by mouth nightly as needed. - Oral    methylPREDNISolone (MEDROL DOSEPACK) 4 mg tablet Take as directed    multivitamin (THERAGRAN) per tablet 1 tablet, Oral, Daily    neomycin-fluocinolone (MANUEL-SYNALAR) 0.5 % (0.35 % base)-0.025 % Crea 1 application , Topical (Top), 2 times daily    omeprazole (PRILOSEC) 40 mg, Oral, Daily    prednisoLONE acetate (PRED FORTE) 1 % DrpS 1 drop, Both Eyes, 4 times daily          Assessment & Plan     1. Dyslipidemia        2. History of cardiac radiofrequency ablation        3. Premature atrial contractions        4. Encounter for preoperative assessment for noncoronary cardiac surgery           Followed by Asya and ROB Freeman  No issues No angina  Test reviewed   Cleared per ROB Freeman.   Clearance sent

## 2024-01-04 ENCOUNTER — TELEPHONE (OUTPATIENT)
Dept: CARDIOLOGY | Facility: CLINIC | Age: 69
End: 2024-01-04
Payer: MEDICARE

## 2024-01-04 NOTE — TELEPHONE ENCOUNTER
----- Message from Rohan Johnson sent at 1/4/2024  8:28 AM CST -----  Type: Needs Medical Advice  Who Called:  pt   Symptoms (please be specific):  Questions about appts   Best Call Back Number: [phone]    Additional Information: pt stated he would like to be advised on why pt's future appts for cardio were canceled please call back to advise and confirm asap thanks!

## 2024-01-10 ENCOUNTER — OFFICE VISIT (OUTPATIENT)
Dept: FAMILY MEDICINE | Facility: CLINIC | Age: 69
End: 2024-01-10
Payer: MEDICARE

## 2024-01-10 VITALS
HEART RATE: 85 BPM | BODY MASS INDEX: 27.27 KG/M2 | WEIGHT: 190.5 LBS | SYSTOLIC BLOOD PRESSURE: 122 MMHG | HEIGHT: 70 IN | DIASTOLIC BLOOD PRESSURE: 60 MMHG | TEMPERATURE: 98 F | OXYGEN SATURATION: 98 %

## 2024-01-10 DIAGNOSIS — E78.2 MIXED HYPERLIPIDEMIA: ICD-10-CM

## 2024-01-10 DIAGNOSIS — I48.3 TYPICAL ATRIAL FLUTTER: ICD-10-CM

## 2024-01-10 DIAGNOSIS — R73.03 PREDIABETES: Primary | ICD-10-CM

## 2024-01-10 DIAGNOSIS — R79.9 ABNORMAL FINDING OF BLOOD CHEMISTRY, UNSPECIFIED: ICD-10-CM

## 2024-01-10 PROCEDURE — 99214 OFFICE O/P EST MOD 30 MIN: CPT | Mod: S$PBB,,, | Performed by: FAMILY MEDICINE

## 2024-01-10 PROCEDURE — 99999 PR PBB SHADOW E&M-EST. PATIENT-LVL V: CPT | Mod: PBBFAC,,, | Performed by: FAMILY MEDICINE

## 2024-01-10 PROCEDURE — 99215 OFFICE O/P EST HI 40 MIN: CPT | Mod: PBBFAC,PO | Performed by: FAMILY MEDICINE

## 2024-01-10 NOTE — PROGRESS NOTES
Subjective:   Patient ID: Colt Rose is a 68 y.o. male     Chief Complaint:Follow-up      Here for checkup    Follow-up  Pertinent negatives include no abdominal pain or chest pain.     Review of Systems   Respiratory:  Negative for shortness of breath.    Cardiovascular:  Negative for chest pain.   Gastrointestinal:  Negative for abdominal pain.   Genitourinary:  Negative for dysuria.     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    Cervical disc displacement     Degeneration of lumbar intervertebral disc     GERD (gastroesophageal reflux disease)     gastric polyps    Hip pain 02/22/2016    Hyperlipidemia     Shingles 11/2013    Sleep apnea      Past Surgical History:   Procedure Laterality Date    ABLATION, SVT, ACCESSORY PATHWAY N/A 5/25/2023    Procedure: Ablation, SVT, Accessory Pathway;  Surgeon: Goyo Sky MD;  Location: [facility] EP LAB;  Service: Cardiology;  Laterality: N/A;  PAC, RFA, CARTO, MAC, GP, 3 PREP    CERVICAL FUSION      bone graft    COLONOSCOPY N/A 7/29/2021    Procedure: COLONOSCOPY;  Surgeon: Tyler Story MD;  Location: [facility] ENDO;  Service: Endoscopy;  Laterality: N/A;    EPIDURAL STEROID INJECTION INTO LUMBAR SPINE N/A 12/5/2019    Procedure: Injection-steroid-epidural-lumbar;  Surgeon: Adarsh Kraft MD;  Location: [facility] OR;  Service: Pain Management;  Laterality: N/A;  L5-S1    ESOPHAGOGASTRODUODENOSCOPY N/A 10/27/2020    Procedure: EGD (ESOPHAGOGASTRODUODENOSCOPY);  Surgeon: Tyler Story MD;  Location: [facility] ENDO;  Service: Endoscopy;  Laterality: N/A;    INJECTION OF ANESTHETIC AGENT AROUND MEDIAL BRANCH NERVES INNERVATING LUMBAR FACET JOINT Bilateral 11/18/2020    Procedure: BLOCK, NERVE, LUMBAR, MEDIAL BRANCH Bilateral L3,4,5;  Surgeon: Adarsh Kraft MD;  Location: [facility] OR;  Service: Pain Management;  Laterality: Bilateral;    INJECTION OF ANESTHETIC AGENT AROUND MEDIAL BRANCH NERVES INNERVATING LUMBAR FACET JOINT Left 10/25/2023     Procedure: Block-nerve-medial branch-lumbar;  Surgeon: Adarsh Kraft MD;  Location: [facility] OR;  Service: Anesthesiology;  Laterality: Left;  l4-5, l5-s1 MBB    INJECTION OF ANESTHETIC AGENT AROUND MEDIAL BRANCH NERVES INNERVATING LUMBAR FACET JOINT Bilateral 11/28/2023    Procedure: Block-nerve-medial branch-lumbar;  Surgeon: Adarsh Kraft MD;  Location: [facility] OR;  Service: Anesthesiology;  Laterality: Bilateral;  L4.5 and l5/s1 MBB #2    RADIOFREQUENCY ABLATION OF LUMBAR MEDIAL BRANCH NERVE AT SINGLE LEVEL Bilateral 1/29/2021    Procedure: Radiofrequency Ablation, Nerve, Spinal, Lumbar, Medial Branch, 1 Level;  Surgeon: Adarsh Kraft MD;  Location: [facility] OR;  Service: Pain Management;  Laterality: Bilateral;  L3,4,5    TONSILLECTOMY, ADENOIDECTOMY       Objective:     Vitals:    01/10/24 1412   BP: (!) 140/70   Pulse: 85   Temp: 98.2 °F (36.8 °C)     Body mass index is 27.33 kg/m².  Physical Exam  Vitals and nursing note reviewed.   Constitutional:       Appearance: He is well-developed.   HENT:      Head: Normocephalic and atraumatic.   Eyes:      General: No scleral icterus.     Conjunctiva/sclera: Conjunctivae normal.   Cardiovascular:      Heart sounds: No murmur heard.  Pulmonary:      Effort: Pulmonary effort is normal. No respiratory distress.   Musculoskeletal:         General: No deformity. Normal range of motion.      Cervical back: Normal range of motion and neck supple.   Skin:     Coloration: Skin is not pale.      Findings: No rash.   Neurological:      Mental Status: He is alert and oriented to person, place, and time.   Psychiatric:         Behavior: Behavior normal.         Thought Content: Thought content normal.         Judgment: Judgment normal.       Assessment:     1. Prediabetes    2. Abnormal finding of blood chemistry, unspecified    3. Typical atrial flutter    4. Mixed hyperlipidemia      Plan:   Prediabetes  -     Comprehensive Metabolic Panel; Future; Expected date: 01/10/2024  -      Hemoglobin; Future; Expected date: 01/10/2024  -     Hemoglobin A1C; Future; Expected date: 01/10/2024  -     WBC; Future; Expected date: 01/10/2024  -     Platelet Count, Blue Top; Future; Expected date: 01/10/2024  -     Lipid Panel; Future; Expected date: 01/10/2024    Abnormal finding of blood chemistry, unspecified  -     Lipid Panel; Future; Expected date: 01/10/2024    Typical atrial flutter  -     Comprehensive Metabolic Panel; Future; Expected date: 01/10/2024  -     Hemoglobin; Future; Expected date: 01/10/2024    Mixed hyperlipidemia  -     Lipid Panel; Future; Expected date: 01/10/2024            Total time spent of Greater than 30 minutes minutes on the day of the visit.This includes face to face time and preparing to see the patient, obtaining and reviewing separately obtained history, documenting clinical information in the electronic or other health record, independently interpreting results and communicating results to the patient/family/caregiver, or care coordinator.    Established patient with me has been instructed that must see me at least 1 time yearly (every 365 days) for refills of medications. Seeing other providers in this clinic is fine but expectation is to see me yearly.    Jeffery Mena MD  01/10/2024    Portions of this note have been dictated with CHRIS Orozco

## 2024-01-10 NOTE — PATIENT INSTRUCTIONS
"Homero Gregory,     If you are due for any health screening(s) below please notify me so we can arrange them to be ordered and scheduled to maintain your health. Most healthy patients complete it. Don't lose out on improving your health.     All of your core healthy metrics are met.                          Patient Education       Checking Your Blood Pressure at Home   The Basics   Written by the doctors and editors at [facility]   How is blood pressure measured? -- Blood pressure is usually measured with a device that goes around your upper arm. This is often done in a doctor's office. But some people also check their blood pressure themselves, at home or at work.  Blood pressure is explained with 2 numbers. For instance, your blood pressure might be "140 over 90." The first (top) number is the pressure inside your arteries when your heart is kylah. The second (bottom) number is the pressure inside your arteries when your heart is relaxed. The table shows how doctors and nurses define high and normal blood pressure (table 1).  If your blood pressure gets too high, it puts you at risk for heart attack, stroke, and kidney disease. High blood pressure does not usually cause symptoms. But it can be serious.  What is a home blood pressure meter? -- A home blood pressure meter (or "monitor") is a device you can use to check your blood pressure yourself. It has a cuff that goes around your upper arm (figure 1). Some devices have a cuff that goes around your wrist instead. But doctors aren't sure if these work as well. The meter also has a small screen, or dial, that shows your blood pressure numbers.  There are also special meters you can wear for a day or 2. These are different because they automatically check your blood pressure throughout the day and night, even while you are sleeping. If your doctor thinks you should use one of these devices, they will talk to you about how to wear it.  Why do I need to check my blood " pressure at home? -- If your doctor knows or suspects that you have high blood pressure, they might want you to check it at home. There are a few reasons for this. Your doctor might want to look at:  Whether your blood pressure measures the same at home as it did in the doctor's office  How well your blood pressure medicines are working  Changes in your blood pressure, for example, if it goes up and down  People who check their own blood pressure at home usually do better at keeping it low.  How do I choose a home blood pressure meter? -- When choosing a home blood pressure meter, you will probably want to think about:  Cost - Some devices cost more than others. You should also check to see if your insurance will help pay for your device.  Size - It's important to make sure the cuff fits your arm comfortably. Your doctor or nurse can help you with this.  How easy it is to use - You should make sure you understand how to use the device. You also need to be able to read the numbers on the screen.  You do not need a prescription to buy a home blood pressure meter. You can buy them at most pharmacies or over the internet. Your doctor or nurse can help you choose the right device for you.  How do I check my blood pressure at home? -- Once you have a home blood pressure meter, your doctor or nurse should check it to make sure it fits you and works correctly.  When it's time to check your blood pressure:  Go to the bathroom and empty your bladder first. Having a full bladder can temporarily increase your blood pressure, making the results inaccurate.  Sit in a chair with your feet flat on the ground.  Try to breathe normally and stay calm.  Attach the cuff to your arm. Place the cuff directly on your skin, not over your clothing. The cuff should be tight enough to not slip down, but not uncomfortably tight.  Sit and relax for about 3 to 5 minutes with the cuff on.  Follow the directions that came with your device to start  measuring your blood pressure. This might involve squeezing the bulb at the end of the tube to inflate the cuff (fill it with air). With some monitors, you just need to press a button to inflate the cuff. When the cuff fills with air, it feels like someone is squeezing your arm, but it should not hurt. Then you will slowly deflate the cuff (let the air out of it), or it will deflate by itself. The screen or dial will show your blood pressure numbers.  Stay seated and relax for 1 minute, then measure your blood pressure again.  How often should I check my blood pressure? -- It depends. Different people need to follow different schedules. Your doctor or nurse will tell you how often to check your blood pressure, and when. Some people need to check their blood pressure twice a day, in the morning and evening.  Your doctor or nurse will probably tell you to keep track of your blood pressure for at least a few days (table 2). Then they will look at the numbers. The reason for this is that it's normal for your blood pressure to change a bit from day to day. For example, the numbers might change depending on whether you recently had caffeine, just exercised, or feel stressed. Checking your blood pressure over several days - or longer - will give your doctor or nurse a better idea of what is average for you.  How should I keep track of my blood pressure? -- Some blood pressure meters will record your numbers for you, or send them to your computer or smartphone. If yours does not do this, you will need to write them down. Your doctor or nurse can help you figure out the best way to keep track of the numbers.  What if my blood pressure is high? -- Your doctor or nurse will tell you what to do if your blood pressure is high when you check it at home. If you get a number that is higher than normal, measure it again to see if it is still high. If it is very high (above a certain number, which your doctor or nurse will tell you  "to watch out for), you should call your doctor right away.  If your blood pressure is only a little high, your doctor or nurse might tell you to keep checking it for a few more days or weeks, and then call if it does not go back down. Then they can help you decide what to do next.  All topics are updated as new evidence becomes available and our peer review process is complete.  This topic retrieved from [employer] on: Sep 21, 2021.  Topic 282254 Version 4.0  Release: 29.4.2 - C29.263  © 2021 UpToDate, Inc. and/or its affiliates. All rights reserved.  table 1: Definition of normal and high blood pressure  Level  Top number  Bottom number    High 130 or above 80 or above   Elevated 120 to 129 79 or below   Normal 119 or below 79 or below   These definitions are from the American College of Cardiology/American Heart Association. Other expert groups might use slightly different definitions.  "Elevated blood pressure" is a term doctor or nurses use as a warning. It means you do not yet have high blood pressure, but your blood pressure is not as low as it should be for good health.  Graphic 42153 Version 6.0  figure 1: Using a home blood pressure meter     This is an example of a person using a home blood pressure meter.  Graphic 829952 Version 1.0    table 2: 7-day diary for checking blood pressure at home  Day 1  Day 2  Day 3  Day 4  Day 5  Day 6  Day 7    Morning  1st read Morning  1st read Morning  1st read Morning  1st read Morning  1st read Morning  1st read Morning  1st read   Systolic: __________ Systolic: __________ Systolic: __________ Systolic: __________ Systolic: __________ Systolic: __________ Systolic: __________   Diastolic: __________ Diastolic: __________ Diastolic: __________ Diastolic: __________ Diastolic: __________ Diastolic: __________ Diastolic: __________   Pulse: __________ Pulse: __________ Pulse: __________ Pulse: __________ Pulse: __________ Pulse: __________ Pulse: __________   Morning  2nd " read Morning  2nd read Morning  2nd read Morning  2nd read Morning  2nd read Morning  2nd read Morning  2nd read   Systolic: __________ Systolic: __________ Systolic: __________ Systolic: __________ Systolic: __________ Systolic: __________ Systolic: __________   Diastolic: __________ Diastolic: __________ Diastolic: __________ Diastolic: __________ Diastolic: __________ Diastolic: __________ Diastolic: __________   Pulse: __________ Pulse: __________ Pulse: __________ Pulse: __________ Pulse: __________ Pulse: __________ Pulse: __________   Evening  1st read Evening  1st read Evening  1st read Evening  1st read Evening  1st read Evening  1st read Evening  1st read   Systolic: __________ Systolic: __________ Systolic: __________ Systolic: __________ Systolic: __________ Systolic: __________ Systolic: __________   Diastolic: __________ Diastolic: __________ Diastolic: __________ Diastolic: __________ Diastolic: __________ Diastolic: __________ Diastolic: __________   Pulse: __________ Pulse: __________ Pulse: __________ Pulse: __________ Pulse: __________ Pulse: __________ Pulse: __________   Evening  2nd read Evening  2nd read Evening  2nd read Evening  2nd read Evening  2nd read Evening  2nd read Evening  2nd read   Systolic: __________ Systolic: __________ Systolic: __________ Systolic: __________ Systolic: __________ Systolic: __________ Systolic: __________   Diastolic: __________ Diastolic: __________ Diastolic: __________ Diastolic: __________ Diastolic: __________ Diastolic: __________ Diastolic: __________   Pulse: __________ Pulse: __________ Pulse: __________ Pulse: __________ Pulse: __________ Pulse: __________ Pulse: __________   Notes    Notes    Notes    Notes    Notes    Notes    Notes      ____________________ ____________________ ____________________ ____________________ ____________________ ____________________ ____________________   ____________________ ____________________ ____________________  ____________________ ____________________ ____________________ ____________________   ____________________ ____________________ ____________________ ____________________ ____________________ ____________________ ____________________   Patient name: ______________________________     Patient ID: ________________________________    Primary care provider: _______________________    Average BP: _______________________________    [facility] 841205 Version 1.0  Consumer Information Use and Disclaimer   This information is not specific medical advice and does not replace information you receive from your health care provider. This is only a brief summary of general information. It does NOT include all information about conditions, illnesses, injuries, tests, procedures, treatments, therapies, discharge instructions or life-style choices that may apply to you. You must talk with your health care provider for complete information about your health and treatment options. This information should not be used to decide whether or not to accept your health care provider's advice, instructions or recommendations. Only your health care provider has the knowledge and training to provide advice that is right for you. The use of this information is governed by the [employer] End User License Agreement, available at https://[email or web address].com/en/solutions/lexicomp/about/fawad.The use of [employer] content is governed by the [employer] Terms of Use. ©2021 [employer] All rights reserved.  Copyright   © 2021 UpToDate, Inc. and/or its affiliates. All rights reserved.

## 2024-01-11 ENCOUNTER — LAB VISIT (OUTPATIENT)
Dept: LAB | Facility: HOSPITAL | Age: 69
End: 2024-01-11
Attending: FAMILY MEDICINE
Payer: MEDICARE

## 2024-01-11 DIAGNOSIS — R79.9 ABNORMAL FINDING OF BLOOD CHEMISTRY, UNSPECIFIED: ICD-10-CM

## 2024-01-11 DIAGNOSIS — E78.2 MIXED HYPERLIPIDEMIA: ICD-10-CM

## 2024-01-11 DIAGNOSIS — R73.03 PREDIABETES: ICD-10-CM

## 2024-01-11 DIAGNOSIS — I48.3 TYPICAL ATRIAL FLUTTER: ICD-10-CM

## 2024-01-11 LAB
ALBUMIN SERPL BCP-MCNC: 3.8 G/DL (ref 3.5–5.2)
ALP SERPL-CCNC: 64 U/L (ref 55–135)
ALT SERPL W/O P-5'-P-CCNC: 32 U/L (ref 10–44)
ANION GAP SERPL CALC-SCNC: 9 MMOL/L (ref 8–16)
AST SERPL-CCNC: 24 U/L (ref 10–40)
BILIRUB SERPL-MCNC: 0.5 MG/DL (ref 0.1–1)
BUN SERPL-MCNC: 19 MG/DL (ref 8–23)
CALCIUM SERPL-MCNC: 10 MG/DL (ref 8.7–10.5)
CHLORIDE SERPL-SCNC: 105 MMOL/L (ref 95–110)
CHOLEST SERPL-MCNC: 213 MG/DL (ref 120–199)
CHOLEST/HDLC SERPL: 3.7 {RATIO} (ref 2–5)
CO2 SERPL-SCNC: 27 MMOL/L (ref 23–29)
CREAT SERPL-MCNC: 0.8 MG/DL (ref 0.5–1.4)
EST. GFR  (NO RACE VARIABLE): >60 ML/MIN/1.73 M^2
ESTIMATED AVG GLUCOSE: 111 MG/DL (ref 68–131)
GLUCOSE SERPL-MCNC: 100 MG/DL (ref 70–110)
HBA1C MFR BLD: 5.5 % (ref 4–5.6)
HDLC SERPL-MCNC: 58 MG/DL (ref 40–75)
HDLC SERPL: 27.2 % (ref 20–50)
HGB BLD-MCNC: 13.5 G/DL (ref 14–18)
LDLC SERPL CALC-MCNC: 140.8 MG/DL (ref 63–159)
MPV, BLUE TOP: 9.5 FL (ref 9.2–12.9)
NONHDLC SERPL-MCNC: 155 MG/DL
PLATELET, BLUE TOP: 235 K/UL (ref 150–450)
POTASSIUM SERPL-SCNC: 4.1 MMOL/L (ref 3.5–5.1)
PROT SERPL-MCNC: 7.6 G/DL (ref 6–8.4)
SODIUM SERPL-SCNC: 141 MMOL/L (ref 136–145)
TRIGL SERPL-MCNC: 71 MG/DL (ref 30–150)
WBC # BLD AUTO: 4.93 K/UL (ref 3.9–12.7)

## 2024-01-11 PROCEDURE — 85018 HEMOGLOBIN: CPT | Performed by: FAMILY MEDICINE

## 2024-01-11 PROCEDURE — 83036 HEMOGLOBIN GLYCOSYLATED A1C: CPT | Performed by: FAMILY MEDICINE

## 2024-01-11 PROCEDURE — 80053 COMPREHEN METABOLIC PANEL: CPT | Performed by: FAMILY MEDICINE

## 2024-01-11 PROCEDURE — 85048 AUTOMATED LEUKOCYTE COUNT: CPT | Performed by: FAMILY MEDICINE

## 2024-01-11 PROCEDURE — 80061 LIPID PANEL: CPT | Performed by: FAMILY MEDICINE

## 2024-01-11 PROCEDURE — 85049 AUTOMATED PLATELET COUNT: CPT | Performed by: FAMILY MEDICINE

## 2024-01-11 PROCEDURE — 36415 COLL VENOUS BLD VENIPUNCTURE: CPT | Mod: PO | Performed by: FAMILY MEDICINE

## 2024-01-21 DIAGNOSIS — M23.92 INTERNAL DERANGEMENT OF LEFT KNEE: ICD-10-CM

## 2024-01-22 RX ORDER — MELOXICAM 15 MG/1
15 TABLET ORAL NIGHTLY PRN
Qty: 90 TABLET | Refills: 3 | Status: SHIPPED | OUTPATIENT
Start: 2024-01-22 | End: 2024-04-04

## 2024-01-22 NOTE — TELEPHONE ENCOUNTER
Refill Routing Note   Medication(s) are not appropriate for processing by [facility] for the following reason(s):        Outside of protocol    [facility] action(s):  Route               Appointments  past 12m or future 3m with PCP    Date Provider   Last Visit   1/10/2024 Jeffery Mena MD   Next Visit   Visit date not found Jeffery Mena MD   ED visits in past 90 days: 1        Note composed:8:11 AM 01/22/2024

## 2024-01-22 NOTE — TELEPHONE ENCOUNTER
No care due was identified.  [facility] Embedded Care Due Messages. Reference number: [id number].   1/21/2024 9:01:36 PM CST

## 2024-01-29 ENCOUNTER — HOSPITAL ENCOUNTER (OUTPATIENT)
Dept: RADIOLOGY | Facility: HOSPITAL | Age: 69
Discharge: HOME | End: 2024-01-29
Attending: ORTHOPAEDIC SURGERY
Payer: MEDICARE

## 2024-01-29 ENCOUNTER — OFFICE VISIT (OUTPATIENT)
Dept: ORTHOPEDICS | Facility: CLINIC | Age: 69
End: 2024-01-29
Payer: MEDICARE

## 2024-01-29 VITALS — HEIGHT: 70 IN | WEIGHT: 190 LBS | RESPIRATION RATE: 18 BRPM | BODY MASS INDEX: 27.2 KG/M2

## 2024-01-29 DIAGNOSIS — M75.101 TEAR OF RIGHT ROTATOR CUFF, UNSPECIFIED TEAR EXTENT, UNSPECIFIED WHETHER TRAUMATIC: ICD-10-CM

## 2024-01-29 DIAGNOSIS — M25.519 SHOULDER PAIN, UNSPECIFIED CHRONICITY, UNSPECIFIED LATERALITY: Primary | ICD-10-CM

## 2024-01-29 DIAGNOSIS — M75.100 ROTATOR CUFF SYNDROME, UNSPECIFIED LATERALITY: Primary | ICD-10-CM

## 2024-01-29 DIAGNOSIS — M25.511 RIGHT SHOULDER PAIN, UNSPECIFIED CHRONICITY: ICD-10-CM

## 2024-01-29 DIAGNOSIS — M25.519 SHOULDER PAIN, UNSPECIFIED CHRONICITY, UNSPECIFIED LATERALITY: ICD-10-CM

## 2024-01-29 PROCEDURE — 73030 X-RAY EXAM OF SHOULDER: CPT | Mod: 26,50,, | Performed by: RADIOLOGY

## 2024-01-29 PROCEDURE — 99999 PR PBB SHADOW E&M-EST. PATIENT-LVL III: CPT | Mod: PBBFAC,,, | Performed by: ORTHOPAEDIC SURGERY

## 2024-01-29 PROCEDURE — 99204 OFFICE O/P NEW MOD 45 MIN: CPT | Mod: S$PBB,,, | Performed by: ORTHOPAEDIC SURGERY

## 2024-01-29 PROCEDURE — 99213 OFFICE O/P EST LOW 20 MIN: CPT | Mod: PBBFAC,PO | Performed by: ORTHOPAEDIC SURGERY

## 2024-01-29 PROCEDURE — 73030 X-RAY EXAM OF SHOULDER: CPT | Mod: TC,50,PO

## 2024-01-29 NOTE — PROGRESS NOTES
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    Cervical disc displacement     Degeneration of lumbar intervertebral disc     GERD (gastroesophageal reflux disease)     gastric polyps    Hip pain 02/22/2016    Hyperlipidemia     Shingles 11/2013    Sleep apnea        Past Surgical History:   Procedure Laterality Date    ABLATION, SVT, ACCESSORY PATHWAY N/A 5/25/2023    Procedure: Ablation, SVT, Accessory Pathway;  Surgeon: Goyo Sky MD;  Location: [facility] EP LAB;  Service: Cardiology;  Laterality: N/A;  PAC, RFA, CARTO, MAC, GP, 3 PREP    CERVICAL FUSION      bone graft    COLONOSCOPY N/A 7/29/2021    Procedure: COLONOSCOPY;  Surgeon: Tyler Story MD;  Location: [facility] ENDO;  Service: Endoscopy;  Laterality: N/A;    EPIDURAL STEROID INJECTION INTO LUMBAR SPINE N/A 12/5/2019    Procedure: Injection-steroid-epidural-lumbar;  Surgeon: Adarsh Kraft MD;  Location: [facility] OR;  Service: Pain Management;  Laterality: N/A;  L5-S1    ESOPHAGOGASTRODUODENOSCOPY N/A 10/27/2020    Procedure: EGD (ESOPHAGOGASTRODUODENOSCOPY);  Surgeon: Tyler Story MD;  Location: [facility] ENDO;  Service: Endoscopy;  Laterality: N/A;    INJECTION OF ANESTHETIC AGENT AROUND MEDIAL BRANCH NERVES INNERVATING LUMBAR FACET JOINT Bilateral 11/18/2020    Procedure: BLOCK, NERVE, LUMBAR, MEDIAL BRANCH Bilateral L3,4,5;  Surgeon: Adarsh Kraft MD;  Location: [facility] OR;  Service: Pain Management;  Laterality: Bilateral;    INJECTION OF ANESTHETIC AGENT AROUND MEDIAL BRANCH NERVES INNERVATING LUMBAR FACET JOINT Left 10/25/2023    Procedure: Block-nerve-medial branch-lumbar;  Surgeon: Adarsh Kraft MD;  Location: [facility] OR;  Service: Anesthesiology;  Laterality: Left;  l4-5, l5-s1 MBB    INJECTION OF ANESTHETIC AGENT AROUND MEDIAL BRANCH NERVES INNERVATING LUMBAR FACET JOINT Bilateral 11/28/2023    Procedure: Block-nerve-medial branch-lumbar;  Surgeon: Adarsh Kraft MD;  Location: [facility] OR;  Service: Anesthesiology;  Laterality:  Bilateral;  L4.5 and l5/s1 MBB #2    RADIOFREQUENCY ABLATION OF LUMBAR MEDIAL BRANCH NERVE AT SINGLE LEVEL Bilateral 1/29/2021    Procedure: Radiofrequency Ablation, Nerve, Spinal, Lumbar, Medial Branch, 1 Level;  Surgeon: Adarsh Kraft MD;  Location: [facility] OR;  Service: Pain Management;  Laterality: Bilateral;  L3,4,5    TONSILLECTOMY, ADENOIDECTOMY         Current Outpatient Medications   Medication Sig    aspirin (ECOTRIN) 81 MG EC tablet Take 1 tablet (81 mg total) by mouth once daily.    atorvastatin (LIPITOR) 10 MG tablet Take 1 tablet (10 mg total) by mouth once daily.    docosahexaenoic acid/epa (FISH OIL ORAL) Take 1 capsule by mouth once daily.    fluocinolone acetonide oil 0.01 % Drop Place 5 drops into both eyes 2 (two) times daily as needed.     HYDROcodone-acetaminophen (NORCO) 5-325 mg per tablet Take 1 tablet by mouth every 6 (six) hours as needed for Pain.    HYDROcodone-acetaminophen (NORCO) 5-325 mg per tablet Take 1 tablet by mouth every 6 (six) hours as needed for Pain.    magnesium oxide (MAG-OX) 400 mg (241.3 mg magnesium) tablet Take 1 tablet (400 mg total) by mouth once daily.    meloxicam (MOBIC) 15 MG tablet TAKE 1 TABLET BY MOUTH EVERY  NIGHT AS NEEDED    methylPREDNISolone (MEDROL DOSEPACK) 4 mg tablet Take as directed    multivitamin (THERAGRAN) per tablet Take 1 tablet by mouth once daily.    neomycin-fluocinolone (MANUEL-SYNALAR) 0.5 % (0.35 % base)-0.025 % Crea Apply 1 application topically 2 (two) times daily.    omeprazole (PRILOSEC) 40 MG capsule Take 1 capsule (40 mg total) by mouth once daily.    prednisoLONE acetate (PRED FORTE) 1 % DrpS Place 1 drop into both eyes 4 (four) times daily.     No current facility-administered medications for this visit.     Facility-Administered Medications Ordered in Other Visits   Medication    lactated ringers infusion    lactated ringers infusion    LIDOcaine (PF) 10 mg/ml (1%) injection 10 mg    LIDOcaine (PF) 10 mg/ml (1%) injection 10 mg        Review of patient's allergies indicates:   Allergen Reactions    Pennsaid [diclofenac sodium] Rash and Blisters       Family History   Problem Relation Age of Onset    Diabetes Mother     Hypertension Mother     Stroke Mother     Coronary artery disease Mother     Stroke Father     Heart disease Brother         CAD       Social History     Socioeconomic History    Marital status:    Tobacco Use    Smoking status: Never    Smokeless tobacco: Never   Substance and Sexual Activity    Alcohol use: Yes     Alcohol/week: 6.0 standard drinks of alcohol     Types: 6 Cans of beer per week     Comment: weekly or less    Drug use: No    Sexual activity: Yes     Partners: Female     Social Determinants of Health     Food Insecurity: No Food Insecurity (12/28/2023)    Hunger Vital Sign     Worried About Running Out of Food in the Last Year: Never true     Ran Out of Food in the Last Year: Never true   Transportation Needs: No Transportation Needs (12/28/2023)    PRAPARE - Transportation     Lack of Transportation (Medical): No     Lack of Transportation (Non-Medical): No   Physical Activity: Inactive (12/28/2023)    Exercise Vital Sign     Days of Exercise per Week: 0 days     Minutes of Exercise per Session: 20 min   Stress: No Stress Concern Present (12/28/2023)    [employer] - Occupational Stress Questionnaire     Feeling of Stress : Only a little   Social Connections: Unknown (12/28/2023)    Social Connection and Isolation Panel [NHANES]     Frequency of Communication with Friends and Family: More than three times a week     Frequency of Social Gatherings with Friends and Family: More than three times a week     Active Member of Clubs or Organizations: Yes     Attends Club or Organization Meetings: 1 to 4 times per year     Marital Status:    Housing Stability: Low Risk  (12/28/2023)    Housing Stability Vital Sign     Unable to Pay for Housing in the Last Year: No     Number  of Places Lived in the Last Year: 1     Unstable Housing in the Last Year: No       Chief Complaint: No chief complaint on file.      History of present illness:  68-year-old right-hand-dominant male seen for bilateral shoulder pain.  Right is greater than the left.  Patient has had shoulder pain for about a year now.  No injury or trauma.  Patient did some physical therapy back in August with mild success.  Patient recently had back surgery so was having to push up with his arms a little more and that has aggravated the shoulders again.  Patient has difficulty raising his right arm above his head.  Right side is a 5/10.  Left side is not significant.      Review of Systems:    Constitution: Negative for chills, fever, and sweats.  Negative for unexplained weight loss.    HENT:  Negative for headaches and blurry vision.    Cardiovascular:Negative for chest pain or irregular heart beat. Negative for hypertension.    Respiratory:  Negative for cough and shortness of breath.    Gastrointestinal: Negative for abdominal pain, heartburn, melena, nausea, and vomitting.    Genitourinary:  Negative bladder incontinence and dysuria.    Musculoskeletal:  See HPI    Neurological: Negative for numbness.    Psychiatric/Behavioral: Negative for depression.  The patient is not nervous/anxious.      Endocrine: Negative for polyuria    Hematologic/Lymphatic: Negative for bleeding problem.  Does not bruise/bleed easily.    Skin: Negative for poor would healing and rash      Physical Examination:    Vital Signs:  There were no vitals filed for this visit.    There is no height or weight on file to calculate BMI.    This a well-developed, well nourished patient in no acute distress.  They are alert and oriented and cooperative to examination.  Pt. walks without an antalgic gait.      Examination of the right shoulder shows no rashes or erythema. There are no masses, ecchymosis, or atrophy. The patient has full range of motion in forward  flexion, external rotation, and internal rotation to the mid T-spine. The patient has Positive Hernandez and Neer test. - Moab's test. - Speeds test. Nontender to palpation over a.c. joint. Normal stability anteriorly, posteriorly, and negative sulcus sign. Passive range of motion: Forward flexion of 180°, external rotation at 90° of 90°, internal rotation of 50°, and external rotation at 0° of 50°. 2+ radial pulse. Intact axillary, radial, median and ulnar sensation 4+ out of 5 resisted forward flexion, external rotation, and negative lift off test.      X-rays:  X-rays of both shoulders are ordered and reviewed which show some calcific tendinitis of the right shoulder.     Assessment::  Right shoulder rotator cuff dysfunction    Plan:  I reviewed the findings with him today.  I recommended an MRI of his right shoulder.  Patient is already tried formal physical therapy with any real improvement.  Follow-up after the MRI is completed.    All previous pertinent notes including ER visits, physical therapy visits, other orthopedic visits as well as other care for the same musculoskeletal problem were reviewed.  All pertinent lab values and previous imaging was reviewed pertinent to the current visit.    This note was created using [employer] voice recognition software that occasionally misinterpreted phrases or words.    Consult note is delivered via Epic messaging service.

## 2024-02-01 ENCOUNTER — HOSPITAL ENCOUNTER (OUTPATIENT)
Dept: RADIOLOGY | Facility: HOSPITAL | Age: 69
Discharge: HOME | End: 2024-02-01
Attending: ORTHOPAEDIC SURGERY
Payer: MEDICARE

## 2024-02-01 DIAGNOSIS — M75.101 TEAR OF RIGHT ROTATOR CUFF, UNSPECIFIED TEAR EXTENT, UNSPECIFIED WHETHER TRAUMATIC: ICD-10-CM

## 2024-02-01 DIAGNOSIS — M25.511 RIGHT SHOULDER PAIN, UNSPECIFIED CHRONICITY: ICD-10-CM

## 2024-02-01 PROCEDURE — 73221 MRI JOINT UPR EXTREM W/O DYE: CPT | Mod: 26,RT,, | Performed by: RADIOLOGY

## 2024-02-01 PROCEDURE — 73221 MRI JOINT UPR EXTREM W/O DYE: CPT | Mod: TC,RT

## 2024-02-08 ENCOUNTER — OFFICE VISIT (OUTPATIENT)
Dept: ORTHOPEDICS | Facility: CLINIC | Age: 69
End: 2024-02-08
Payer: MEDICARE

## 2024-02-08 VITALS — HEIGHT: 70 IN | WEIGHT: 190 LBS | BODY MASS INDEX: 27.2 KG/M2 | RESPIRATION RATE: 18 BRPM

## 2024-02-08 DIAGNOSIS — M75.100 ROTATOR CUFF SYNDROME, UNSPECIFIED LATERALITY: ICD-10-CM

## 2024-02-08 DIAGNOSIS — M25.511 RIGHT SHOULDER PAIN, UNSPECIFIED CHRONICITY: Primary | ICD-10-CM

## 2024-02-08 PROCEDURE — 99214 OFFICE O/P EST MOD 30 MIN: CPT | Mod: S$PBB,,, | Performed by: ORTHOPAEDIC SURGERY

## 2024-02-08 PROCEDURE — 99213 OFFICE O/P EST LOW 20 MIN: CPT | Mod: PBBFAC,PO | Performed by: ORTHOPAEDIC SURGERY

## 2024-02-08 PROCEDURE — 99999 PR PBB SHADOW E&M-EST. PATIENT-LVL III: CPT | Mod: PBBFAC,,, | Performed by: ORTHOPAEDIC SURGERY

## 2024-02-08 NOTE — PROGRESS NOTES
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    Cervical disc displacement     Degeneration of lumbar intervertebral disc     GERD (gastroesophageal reflux disease)     gastric polyps    Hip pain 02/22/2016    Hyperlipidemia     Shingles 11/2013    Sleep apnea        Past Surgical History:   Procedure Laterality Date    ABLATION, SVT, ACCESSORY PATHWAY N/A 5/25/2023    Procedure: Ablation, SVT, Accessory Pathway;  Surgeon: Goyo Sky MD;  Location: [facility] EP LAB;  Service: Cardiology;  Laterality: N/A;  PAC, RFA, CARTO, MAC, GP, 3 PREP    CERVICAL FUSION      bone graft    COLONOSCOPY N/A 7/29/2021    Procedure: COLONOSCOPY;  Surgeon: Tyler Story MD;  Location: [facility] ENDO;  Service: Endoscopy;  Laterality: N/A;    EPIDURAL STEROID INJECTION INTO LUMBAR SPINE N/A 12/5/2019    Procedure: Injection-steroid-epidural-lumbar;  Surgeon: Adarsh Kraft MD;  Location: [facility] OR;  Service: Pain Management;  Laterality: N/A;  L5-S1    ESOPHAGOGASTRODUODENOSCOPY N/A 10/27/2020    Procedure: EGD (ESOPHAGOGASTRODUODENOSCOPY);  Surgeon: Tyler Story MD;  Location: [facility] ENDO;  Service: Endoscopy;  Laterality: N/A;    INJECTION OF ANESTHETIC AGENT AROUND MEDIAL BRANCH NERVES INNERVATING LUMBAR FACET JOINT Bilateral 11/18/2020    Procedure: BLOCK, NERVE, LUMBAR, MEDIAL BRANCH Bilateral L3,4,5;  Surgeon: Adarsh Kraft MD;  Location: [facility] OR;  Service: Pain Management;  Laterality: Bilateral;    INJECTION OF ANESTHETIC AGENT AROUND MEDIAL BRANCH NERVES INNERVATING LUMBAR FACET JOINT Left 10/25/2023    Procedure: Block-nerve-medial branch-lumbar;  Surgeon: Adarsh Kraft MD;  Location: [facility] OR;  Service: Anesthesiology;  Laterality: Left;  l4-5, l5-s1 MBB    INJECTION OF ANESTHETIC AGENT AROUND MEDIAL BRANCH NERVES INNERVATING LUMBAR FACET JOINT Bilateral 11/28/2023    Procedure: Block-nerve-medial branch-lumbar;  Surgeon: Adarsh Kraft MD;  Location: [facility] OR;  Service: Anesthesiology;  Laterality:  Bilateral;  L4.5 and l5/s1 MBB #2    RADIOFREQUENCY ABLATION OF LUMBAR MEDIAL BRANCH NERVE AT SINGLE LEVEL Bilateral 1/29/2021    Procedure: Radiofrequency Ablation, Nerve, Spinal, Lumbar, Medial Branch, 1 Level;  Surgeon: Adarsh Kraft MD;  [facility];  Service: Pain Management;  Laterality: Bilateral;  L3,4,5    TONSILLECTOMY, ADENOIDECTOMY         Current Outpatient Medications   Medication Sig    atorvastatin (LIPITOR) 10 MG tablet Take 1 tablet (10 mg total) by mouth once daily.    docosahexaenoic acid/epa (FISH OIL ORAL) Take 1 capsule by mouth once daily.    fluocinolone acetonide oil 0.01 % Drop Place 5 drops into both eyes 2 (two) times daily as needed.     magnesium oxide (MAG-OX) 400 mg (241.3 mg magnesium) tablet Take 1 tablet (400 mg total) by mouth once daily.    meloxicam (MOBIC) 15 MG tablet TAKE 1 TABLET BY MOUTH EVERY  NIGHT AS NEEDED    methylPREDNISolone (MEDROL DOSEPACK) 4 mg tablet Take as directed    multivitamin (THERAGRAN) per tablet Take 1 tablet by mouth once daily.    neomycin-fluocinolone (MANUEL-SYNALAR) 0.5 % (0.35 % base)-0.025 % Crea Apply 1 application topically 2 (two) times daily.    omeprazole (PRILOSEC) 40 MG capsule Take 1 capsule (40 mg total) by mouth once daily.    prednisoLONE acetate (PRED FORTE) 1 % DrpS Place 1 drop into both eyes 4 (four) times daily.    aspirin (ECOTRIN) 81 MG EC tablet Take 1 tablet (81 mg total) by mouth once daily.     No current facility-administered medications for this visit.     Facility-Administered Medications Ordered in Other Visits   Medication    lactated ringers infusion    lactated ringers infusion    LIDOcaine (PF) 10 mg/ml (1%) injection 10 mg    LIDOcaine (PF) 10 mg/ml (1%) injection 10 mg       Review of patient's allergies indicates:   Allergen Reactions    Pennsaid [diclofenac sodium] Rash and Blisters       Family History   Problem Relation Age of Onset    Diabetes Mother     Hypertension Mother     Stroke Mother     Coronary  artery disease Mother     Stroke Father     Heart disease Brother         CAD       Social History     Socioeconomic History    Marital status:    Tobacco Use    Smoking status: Never    Smokeless tobacco: Never   Substance and Sexual Activity    Alcohol use: Yes     Alcohol/week: 6.0 standard drinks of alcohol     Types: 6 Cans of beer per week     Comment: weekly or less    Drug use: No    Sexual activity: Yes     Partners: Female     Social Determinants of Health     Food Insecurity: No Food Insecurity (12/28/2023)    Hunger Vital Sign     Worried About Running Out of Food in the Last Year: Never true     Ran Out of Food in the Last Year: Never true   Transportation Needs: No Transportation Needs (12/28/2023)    PRAPARE - Transportation     Lack of Transportation (Medical): No     Lack of Transportation (Non-Medical): No   Physical Activity: Inactive (12/28/2023)    Exercise Vital Sign     Days of Exercise per Week: 0 days     Minutes of Exercise per Session: 20 min   Stress: No Stress Concern Present (12/28/2023)    [employer] [place] [employer] - Occupational Stress Questionnaire     Feeling of Stress : Only a little   Social Connections: Unknown (12/28/2023)    Social Connection and Isolation Panel [NHANES]     Frequency of Communication with Friends and Family: More than three times a week     Frequency of Social Gatherings with Friends and Family: More than three times a week     Active Member of Clubs or Organizations: Yes     Attends Club or Organization Meetings: 1 to 4 times per year     Marital Status:    Housing Stability: Low Risk  (12/28/2023)    Housing Stability Vital Sign     Unable to Pay for Housing in the Last Year: No     Number of Places Lived in the Last Year: 1     Unstable Housing in the Last Year: No       Chief Complaint:   Chief Complaint   Patient presents with    Follow-up     Fu post mri right shoulder        History of present illness:  68-year-old  right-hand-dominant male seen for bilateral shoulder pain.  Right is greater than the left.  Patient has had shoulder pain for about a year now.  No injury or trauma.  Patient did some physical therapy back in August with mild success.  Patient recently had back surgery so was having to push up with his arms a little more and that has aggravated the shoulders again.  Patient has difficulty raising his right arm above his head.  Right side is a 5/10.  Left side is not significant.      Review of Systems:    Constitution: Negative for chills, fever, and sweats.  Negative for unexplained weight loss.    HENT:  Negative for headaches and blurry vision.    Cardiovascular:Negative for chest pain or irregular heart beat. Negative for hypertension.    Respiratory:  Negative for cough and shortness of breath.    Gastrointestinal: Negative for abdominal pain, heartburn, melena, nausea, and vomitting.    Genitourinary:  Negative bladder incontinence and dysuria.    Musculoskeletal:  See HPI    Neurological: Negative for numbness.    Psychiatric/Behavioral: Negative for depression.  The patient is not nervous/anxious.      Endocrine: Negative for polyuria    Hematologic/Lymphatic: Negative for bleeding problem.  Does not bruise/bleed easily.    Skin: Negative for poor would healing and rash      Physical Examination:    Vital Signs:    Vitals:    02/08/24 1333   Resp: 18       Body mass index is 27.26 kg/m².    This a well-developed, well nourished patient in no acute distress.  They are alert and oriented and cooperative to examination.  Pt. walks without an antalgic gait.      Examination of the right shoulder shows no rashes or erythema. There are no masses, ecchymosis, or atrophy. The patient has full range of motion in forward flexion, external rotation, and internal rotation to the mid T-spine. The patient has Positive Hernandez and Neer test. - [place]'s test. - Speeds test. Nontender to palpation over a.c. joint. Normal  stability anteriorly, posteriorly, and negative sulcus sign. Passive range of motion: Forward flexion of 180°, external rotation at 90° of 90°, internal rotation of 50°, and external rotation at 0° of 50°. 2+ radial pulse. Intact axillary, radial, median and ulnar sensation 4+ out of 5 resisted forward flexion, external rotation, and negative lift off test.      X-rays:  X-rays of both shoulders are  reviewed which show some calcific tendinitis of the right shoulder.    MRI of the right shoulder is reviewed and interpreted:Mild hypertrophic changes at the AC joint with small inferior spur.  Tendinitis/Tendinopathy of the distal supraspinatus tendon without tear or retraction      Assessment::  Right shoulder rotator cuff syndrome    Plan:  I reviewed the findings with him today.  I recommended his formal physical therapy to address his shoulder still.  He did it mostly on his back.  Follow up in 2 months.    All previous pertinent notes including ER visits, physical therapy visits, other orthopedic visits as well as other care for the same musculoskeletal problem were reviewed.  All pertinent lab values and previous imaging was reviewed pertinent to the current visit.    This note was created using StemSave voice recognition software that occasionally misinterpreted phrases or words.    Consult note is delivered via Epic messaging service.

## 2024-02-09 ENCOUNTER — PATIENT MESSAGE (OUTPATIENT)
Dept: ORTHOPEDICS | Facility: CLINIC | Age: 69
End: 2024-02-09
Payer: MEDICARE

## 2024-02-09 DIAGNOSIS — M75.101 TEAR OF RIGHT ROTATOR CUFF, UNSPECIFIED TEAR EXTENT, UNSPECIFIED WHETHER TRAUMATIC: ICD-10-CM

## 2024-02-09 DIAGNOSIS — M75.100 ROTATOR CUFF SYNDROME, UNSPECIFIED LATERALITY: Primary | ICD-10-CM

## 2024-02-09 NOTE — TELEPHONE ENCOUNTER
----- Message from Miladis Velazquez sent at 2/9/2024 12:12 PM CST -----  Regarding: PATIENT'S PT REFERRAL  Good morning,    Patient would like his referral sent over to [facility] in [place] please.    Thanks!

## 2024-02-16 ENCOUNTER — OFFICE VISIT (OUTPATIENT)
Dept: CARDIOLOGY | Facility: CLINIC | Age: 69
End: 2024-02-16
Payer: MEDICARE

## 2024-02-16 VITALS
SYSTOLIC BLOOD PRESSURE: 132 MMHG | HEIGHT: 70 IN | OXYGEN SATURATION: 96 % | WEIGHT: 188 LBS | BODY MASS INDEX: 26.92 KG/M2 | HEART RATE: 59 BPM | DIASTOLIC BLOOD PRESSURE: 72 MMHG

## 2024-02-16 DIAGNOSIS — Z98.890 HISTORY OF CARDIAC RADIOFREQUENCY ABLATION: ICD-10-CM

## 2024-02-16 DIAGNOSIS — E78.5 DYSLIPIDEMIA: Primary | ICD-10-CM

## 2024-02-16 DIAGNOSIS — E78.2 MIXED HYPERLIPIDEMIA: ICD-10-CM

## 2024-02-16 DIAGNOSIS — R07.9 CHEST PAIN, UNSPECIFIED TYPE: ICD-10-CM

## 2024-02-16 DIAGNOSIS — R00.2 PALPITATIONS: ICD-10-CM

## 2024-02-16 DIAGNOSIS — K21.9 GASTROESOPHAGEAL REFLUX DISEASE WITHOUT ESOPHAGITIS: ICD-10-CM

## 2024-02-16 PROCEDURE — 99214 OFFICE O/P EST MOD 30 MIN: CPT | Mod: PBBFAC,PN

## 2024-02-16 PROCEDURE — 99214 OFFICE O/P EST MOD 30 MIN: CPT | Mod: S$PBB,,,

## 2024-02-16 PROCEDURE — 99999 PR PBB SHADOW E&M-EST. PATIENT-LVL IV: CPT | Mod: PBBFAC,,,

## 2024-02-16 RX ORDER — ATORVASTATIN CALCIUM 20 MG/1
20 TABLET, FILM COATED ORAL DAILY
Qty: 90 TABLET | Refills: 3 | Status: SHIPPED | OUTPATIENT
Start: 2024-02-16

## 2024-02-16 RX ORDER — ATORVASTATIN CALCIUM 20 MG/1
20 TABLET, FILM COATED ORAL DAILY
Qty: 90 TABLET | Refills: 3 | Status: SHIPPED | OUTPATIENT
Start: 2024-02-16 | End: 2024-02-16 | Stop reason: ALTCHOICE

## 2024-02-16 NOTE — ASSESSMENT & PLAN NOTE
Denies anginal equivalent symptoms. Continue aspirin and statin therapy. Continue low sodium heart healthy diet.

## 2024-02-16 NOTE — ASSESSMENT & PLAN NOTE
Continue statin therapy.  Continue low sodium heart healthy diet. Last .  Increase atorvastatin to 20 mg daily. Goal LDL <100.

## 2024-02-16 NOTE — PROGRESS NOTES
Subjective:    Patient ID:  Colt Rose is a 68 y.o. male patient here for evaluation Follow-up      History of Present Illness:     Patient is here for a check up.  Denies chest pain, dyspnea, lightheadedness, dizziness, jaw/neck/arm pain, edema and bleeding. BP stable 132/72.          Review of patient's allergies indicates:   Allergen Reactions    Pennsaid [diclofenac sodium] Rash and Blisters       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    Cervical disc displacement     Degeneration of lumbar intervertebral disc     GERD (gastroesophageal reflux disease)     gastric polyps    Hip pain 02/22/2016    Hyperlipidemia     Shingles 11/2013    Sleep apnea      Past Surgical History:   Procedure Laterality Date    ABLATION, SVT, ACCESSORY PATHWAY N/A 5/25/2023    Procedure: Ablation, SVT, Accessory Pathway;  Surgeon: Goyo Sky MD;  Location: [facility] EP LAB;  Service: Cardiology;  Laterality: N/A;  PAC, RFA, CARTO, MAC, GP, 3 PREP    CERVICAL FUSION      bone graft    COLONOSCOPY N/A 7/29/2021    Procedure: COLONOSCOPY;  Surgeon: Tyler Story MD;  Location: [facility] ENDO;  Service: Endoscopy;  Laterality: N/A;    EPIDURAL STEROID INJECTION INTO LUMBAR SPINE N/A 12/5/2019    Procedure: Injection-steroid-epidural-lumbar;  Surgeon: Adarsh Kraft MD;  Location: [facility] OR;  Service: Pain Management;  Laterality: N/A;  L5-S1    ESOPHAGOGASTRODUODENOSCOPY N/A 10/27/2020    Procedure: EGD (ESOPHAGOGASTRODUODENOSCOPY);  Surgeon: Tyler Story MD;  Location: [facility] ENDO;  Service: Endoscopy;  Laterality: N/A;    INJECTION OF ANESTHETIC AGENT AROUND MEDIAL BRANCH NERVES INNERVATING LUMBAR FACET JOINT Bilateral 11/18/2020    Procedure: BLOCK, NERVE, LUMBAR, MEDIAL BRANCH Bilateral L3,4,5;  Surgeon: Adarsh Kraft MD;  Location: [facility] OR;  Service: Pain Management;  Laterality: Bilateral;    INJECTION OF ANESTHETIC AGENT AROUND MEDIAL BRANCH NERVES INNERVATING LUMBAR FACET JOINT Left 10/25/2023     Procedure: Block-nerve-medial branch-lumbar;  Surgeon: Adarsh Kraft MD;  Location: [facility] OR;  Service: Anesthesiology;  Laterality: Left;  l4-5, l5-s1 MBB    INJECTION OF ANESTHETIC AGENT AROUND MEDIAL BRANCH NERVES INNERVATING LUMBAR FACET JOINT Bilateral 11/28/2023    Procedure: Block-nerve-medial branch-lumbar;  Surgeon: Adarsh Kraft MD;  Location: [facility] OR;  Service: Anesthesiology;  Laterality: Bilateral;  L4.5 and l5/s1 MBB #2    RADIOFREQUENCY ABLATION OF LUMBAR MEDIAL BRANCH NERVE AT SINGLE LEVEL Bilateral 1/29/2021    Procedure: Radiofrequency Ablation, Nerve, Spinal, Lumbar, Medial Branch, 1 Level;  Surgeon: Adarsh Kraft MD;  Location: [facility] OR;  Service: Pain Management;  Laterality: Bilateral;  L3,4,5    TONSILLECTOMY, ADENOIDECTOMY       Social History     Tobacco Use    Smoking status: Never    Smokeless tobacco: Never   Substance Use Topics    Alcohol use: Yes     Alcohol/week: 6.0 standard drinks of alcohol     Types: 6 Cans of beer per week     Comment: weekly or less    Drug use: No        Review of Systems:    As noted in HPI in addition      REVIEW OF SYSTEMS  CARDIOVASCULAR: No recent chest pain, palpitations, arm, neck, or jaw pain  RESPIRATORY: No recent fever, cough chills, SOB or congestion  : No blood in the urine  GI: No Nausea, vomiting, constipation, diarrhea, blood, or reflux.  MUSCULOSKELETAL: No myalgias  NEURO: No lightheadedness or dizziness  EYES: No Double vision, blurry, vision or headache              Objective        Vitals:    02/16/24 1425   BP: 132/72   Pulse: (!) 59       LIPIDS - LAST 2   Lab Results   Component Value Date    CHOL 213 (H) 01/11/2024    CHOL 169 06/08/2023    HDL 58 01/11/2024    HDL 51 06/08/2023    LDLCALC 140.8 01/11/2024    LDLCALC 108.6 06/08/2023    TRIG 71 01/11/2024    TRIG 47 06/08/2023    CHOLHDL 27.2 01/11/2024    CHOLHDL 30.2 06/08/2023       CBC - LAST 2  Lab Results   Component Value Date    WBC 4.93 01/11/2024    WBC 4.29 06/08/2023     RBC 4.40 (L) 05/23/2023    RBC 4.71 09/29/2020    HGB 13.5 (L) 01/11/2024    HGB 14.0 06/08/2023    HCT 41.6 05/23/2023    HCT 42.7 09/29/2020    MCV 95 05/23/2023    MCV 91 09/29/2020    MCH 30.9 05/23/2023    MCH 31.2 (H) 09/29/2020    MCHC 32.7 05/23/2023    MCHC 34.4 09/29/2020    RDW 13.0 05/23/2023    RDW 12.7 09/29/2020     06/08/2023     05/23/2023    MPV 11.0 06/08/2023    MPV 10.6 05/23/2023    GRAN 2.3 05/23/2023    GRAN 45.5 05/23/2023    LYMPH 2.0 05/23/2023    LYMPH 40.2 05/23/2023    MONO 0.5 05/23/2023    MONO 9.5 05/23/2023    BASO 0.04 05/23/2023    BASO 0.06 09/29/2020    NRBC 0 05/23/2023    NRBC 0 09/29/2020       CHEMISTRY & LIVER FUNCTION - LAST 2  Lab Results   Component Value Date     01/11/2024     06/08/2023    K 4.1 01/11/2024    K 4.3 06/08/2023     01/11/2024     06/08/2023    CO2 27 01/11/2024    CO2 27 06/08/2023    ANIONGAP 9 01/11/2024    ANIONGAP 9 06/08/2023    BUN 19 01/11/2024    BUN 20 06/08/2023    CREATININE 0.8 01/11/2024    CREATININE 0.8 06/08/2023     01/11/2024    GLU 96 06/08/2023    CALCIUM 10.0 01/11/2024    CALCIUM 9.6 06/08/2023    MG 1.9 12/01/2022    MG 1.9 01/14/2022    ALBUMIN 3.8 01/11/2024    ALBUMIN 4.0 06/08/2023    PROT 7.6 01/11/2024    PROT 7.0 06/08/2023    ALKPHOS 64 01/11/2024    ALKPHOS 45 (L) 06/08/2023    ALT 32 01/11/2024    ALT 22 06/08/2023    AST 24 01/11/2024    AST 22 06/08/2023    BILITOT 0.5 01/11/2024    BILITOT 0.4 06/08/2023        CARDIAC PROFILE - LAST 2  Lab Results   Component Value Date    BNP 23 09/28/2020     09/28/2020    CPKMB 8.1 (H) 09/28/2020    CPKMB 4.6 09/28/2020    TROPONINI <0.030 09/28/2020    TROPONINI <0.030 09/28/2020        COAGULATION - LAST 2  Lab Results   Component Value Date    LABPT 13.5 09/28/2020    INR 1.0 05/23/2023    INR 1.1 09/28/2020    APTT 26.5 05/23/2023    APTT 27.1 09/28/2020       ENDOCRINE & PSA - LAST 2  Lab Results   Component Value Date     HGBA1C 5.5 01/11/2024    HGBA1C 5.4 06/08/2023    TSH 3.094 06/08/2023    TSH 2.770 12/01/2022    PSA 0.50 06/14/2023    PSA 0.54 06/14/2022        ECHOCARDIOGRAM RESULTS  Results for orders placed in visit on 01/31/22    Stress Echo Which stress agent will be used? Treadmill Exercise; Color Flow Doppler? No    Interpretation Summary  · The patient reached the end of the protocol.  · There were no arrhythmias during stress.  · The left ventricle is normal in size with  · The estimated ejection fraction is 65%.  · Normal left ventricular diastolic function.  · Atrial fibrillation not observed.  · Normal right ventricular size with normal right ventricular systolic function.  · The stress echo portion of this study is negative for myocardial ischemia.  · The ECG portion of this study is negative for myocardial ischemia.  · Pre exercise tricuspid regurgitation was 2.5 m/sec postexercise was 3.4 which is slightly abnormal  · Nonspecific J-point depressions noted with no diagnostic ischemic ratio      CURRENT/PREVIOUS VISIT EKG  Results for orders placed or performed in visit on 08/30/23   IN OFFICE EKG 12-LEAD (to dscovered)    Collection Time: 08/30/23 10:37 AM    Narrative    Test Reason : I48.3,E78.2,    Vent. Rate : 075 BPM     Atrial Rate : 075 BPM     P-R Int : 174 ms          QRS Dur : 092 ms      QT Int : 364 ms       P-R-T Axes : 056 029 015 degrees     QTc Int : 406 ms    Normal sinus rhythm  Anterior infarct ,age undetermined  Abnormal ECG  When compared with ECG of 25-MAY-2023 15:01,  No significant change was found  Confirmed by Daljit Freeman MD ([id number]) on 9/28/2023 10:31:40 AM    Referred By:  GP           Confirmed By:Daljit Freeman MD     No valid procedures specified.   Results for orders placed during the hospital encounter of 12/28/21    Nuclear Stress - Cardiology Interpreted    Interpretation Summary    Normal myocardial perfusion scan. There is no evidence of myocardial ischemia or infarction.    The  gated perfusion images showed an ejection fraction of 57% post stress. Normal ejection fraction is greater than 53%.    LV cavity size is normal at rest and normal at stress.There is abnormal wall motion at rest and post stress.    The EKG portion of this study is positive for ischemia.    The patient reported no chest pain during the stress test.    Patient exercised on a Shaan protocol for 8 minutes and 21 seconds and attained a maximum heart rate of 137 beats per minute which is 87% of the maximum predicted heart rate and attained 10 point 1 METs.  And had normal blood pressure response to exercise.    No valid procedures specified.    PHYSICAL EXAM  CONSTITUTIONAL: Well built, well nourished in no apparent distress  NECK: no carotid bruit, no JVD  LUNGS: CTA  CHEST WALL: no tenderness  HEART: regular rate and rhythm, S1, S2 normal, no murmur, click, rub or gallop   ABDOMEN: soft, non-tender; bowel sounds normal; no masses,  no organomegaly  EXTREMITIES: Extremities normal, no edema, no calf tenderness noted  NEURO: AAO X 3    I HAVE REVIEWED :    The vital signs, nurses notes, and all the pertinent radiology and labs.        Current Outpatient Medications   Medication Instructions    aspirin (ECOTRIN) 81 mg, Oral, Daily    atorvastatin (LIPITOR) 20 mg, Oral, Daily    docosahexaenoic acid/epa (FISH OIL ORAL) 1 capsule, Oral, Daily    fluocinolone acetonide oil 0.01 % Drop 5 drops, Both Eyes, 2 times daily PRN    magnesium oxide (MAG-OX) 400 mg, Oral, Daily    meloxicam (MOBIC) 15 mg, Oral, Nightly PRN    methylPREDNISolone (MEDROL DOSEPACK) 4 mg tablet Take as directed    multivitamin (THERAGRAN) per tablet 1 tablet, Oral, Daily    neomycin-fluocinolone (MANUEL-SYNALAR) 0.5 % (0.35 % base)-0.025 % Crea 1 application , Topical (Top), 2 times daily    omeprazole (PRILOSEC) 40 mg, Oral, Daily    prednisoLONE acetate (PRED FORTE) 1 % DrpS 1 drop, Both Eyes, 4 times daily          Assessment & Plan      Dyslipidemia  Continue statin therapy.  Continue low sodium heart healthy diet. Last .  Increase atorvastatin to 20 mg daily. Goal LDL <100.     Chest pain  Denies anginal equivalent symptoms. Continue aspirin and statin therapy. Continue low sodium heart healthy diet.     History of cardiac radiofrequency ablation  Patient sees Dr. Sky.  RRR today in office.     Palpitations  RRR.  Stable. Denies palpitations.     Gastroesophageal reflux disease  Stable continue to avoid dietary triggers.  Continue Prilosec.  Managed by PCP          Follow up in about 6 months (around 8/16/2024).

## 2024-02-27 DIAGNOSIS — Z00.00 ENCOUNTER FOR MEDICARE ANNUAL WELLNESS EXAM: ICD-10-CM

## 2024-04-03 ENCOUNTER — TELEPHONE (OUTPATIENT)
Dept: CARDIOLOGY | Facility: CLINIC | Age: 69
End: 2024-04-03
Payer: MEDICARE

## 2024-04-03 NOTE — TELEPHONE ENCOUNTER
----- Message from Soledad Coon sent at 4/3/2024 12:58 PM CDT -----  Regarding: Needs to schedule  Type: Needs Medical Advice  Who Called:  Pt    Best Call Back Number: [phone]'    Additional Information: Pt was told to call and schedule his annual with dr alex, schedule would not show for me please barbara

## 2024-04-04 ENCOUNTER — OFFICE VISIT (OUTPATIENT)
Dept: ORTHOPEDICS | Facility: CLINIC | Age: 69
End: 2024-04-04
Payer: MEDICARE

## 2024-04-04 ENCOUNTER — OFFICE VISIT (OUTPATIENT)
Dept: FAMILY MEDICINE | Facility: CLINIC | Age: 69
End: 2024-04-04
Payer: MEDICARE

## 2024-04-04 VITALS
OXYGEN SATURATION: 96 % | WEIGHT: 192.25 LBS | HEART RATE: 55 BPM | HEIGHT: 70 IN | BODY MASS INDEX: 27.52 KG/M2 | SYSTOLIC BLOOD PRESSURE: 132 MMHG | DIASTOLIC BLOOD PRESSURE: 70 MMHG

## 2024-04-04 VITALS — WEIGHT: 192.25 LBS | HEIGHT: 70 IN | BODY MASS INDEX: 27.52 KG/M2 | RESPIRATION RATE: 18 BRPM

## 2024-04-04 DIAGNOSIS — Z00.00 ENCOUNTER FOR MEDICARE ANNUAL WELLNESS EXAM: Primary | ICD-10-CM

## 2024-04-04 DIAGNOSIS — I48.3 TYPICAL ATRIAL FLUTTER: ICD-10-CM

## 2024-04-04 DIAGNOSIS — M75.100 ROTATOR CUFF SYNDROME, UNSPECIFIED LATERALITY: Primary | ICD-10-CM

## 2024-04-04 DIAGNOSIS — I49.1 PREMATURE ATRIAL CONTRACTIONS: ICD-10-CM

## 2024-04-04 DIAGNOSIS — E78.2 MIXED HYPERLIPIDEMIA: ICD-10-CM

## 2024-04-04 PROBLEM — R07.9 CHEST PAIN: Status: RESOLVED | Noted: 2020-09-28 | Resolved: 2024-04-04

## 2024-04-04 PROCEDURE — 99214 OFFICE O/P EST MOD 30 MIN: CPT | Mod: S$PBB,,, | Performed by: ORTHOPAEDIC SURGERY

## 2024-04-04 PROCEDURE — 99215 OFFICE O/P EST HI 40 MIN: CPT | Mod: PBBFAC,27,PO | Performed by: NURSE PRACTITIONER

## 2024-04-04 PROCEDURE — 99213 OFFICE O/P EST LOW 20 MIN: CPT | Mod: PBBFAC,PO | Performed by: ORTHOPAEDIC SURGERY

## 2024-04-04 PROCEDURE — 99999 PR PBB SHADOW E&M-EST. PATIENT-LVL III: CPT | Mod: PBBFAC,,, | Performed by: ORTHOPAEDIC SURGERY

## 2024-04-04 PROCEDURE — G0439 PPPS, SUBSEQ VISIT: HCPCS | Mod: ,,, | Performed by: NURSE PRACTITIONER

## 2024-04-04 PROCEDURE — 99999 PR PBB SHADOW E&M-EST. PATIENT-LVL V: CPT | Mod: PBBFAC,,, | Performed by: NURSE PRACTITIONER

## 2024-04-04 RX ORDER — METHOCARBAMOL 500 MG/1
500 TABLET, FILM COATED ORAL EVERY 8 HOURS PRN
COMMUNITY
Start: 2024-01-25

## 2024-04-04 RX ORDER — CICLOPIROX 80 MG/ML
SOLUTION TOPICAL
COMMUNITY
Start: 2024-03-30

## 2024-04-04 NOTE — PROGRESS NOTES
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    Cervical disc displacement     Degeneration of lumbar intervertebral disc     GERD (gastroesophageal reflux disease)     gastric polyps    Hip pain 02/22/2016    Hyperlipidemia     Shingles 11/2013    Sleep apnea        Past Surgical History:   Procedure Laterality Date    ABLATION, SVT, ACCESSORY PATHWAY N/A 5/25/2023    Procedure: Ablation, SVT, Accessory Pathway;  Surgeon: Goyo Sky MD;  Location: [facility] EP LAB;  Service: Cardiology;  Laterality: N/A;  PAC, RFA, CARTO, MAC, GP, 3 PREP    CERVICAL FUSION      bone graft    COLONOSCOPY N/A 7/29/2021    Procedure: COLONOSCOPY;  Surgeon: Tyler Story MD;  Location: [facility] ENDO;  Service: Endoscopy;  Laterality: N/A;    EPIDURAL STEROID INJECTION INTO LUMBAR SPINE N/A 12/5/2019    Procedure: Injection-steroid-epidural-lumbar;  Surgeon: Adarsh Kraft MD;  Location: [facility] OR;  Service: Pain Management;  Laterality: N/A;  L5-S1    ESOPHAGOGASTRODUODENOSCOPY N/A 10/27/2020    Procedure: EGD (ESOPHAGOGASTRODUODENOSCOPY);  Surgeon: Tyler Story MD;  Location: [facility] ENDO;  Service: Endoscopy;  Laterality: N/A;    INJECTION OF ANESTHETIC AGENT AROUND MEDIAL BRANCH NERVES INNERVATING LUMBAR FACET JOINT Bilateral 11/18/2020    Procedure: BLOCK, NERVE, LUMBAR, MEDIAL BRANCH Bilateral L3,4,5;  Surgeon: Adarsh Kraft MD;  Location: [facility] OR;  Service: Pain Management;  Laterality: Bilateral;    INJECTION OF ANESTHETIC AGENT AROUND MEDIAL BRANCH NERVES INNERVATING LUMBAR FACET JOINT Left 10/25/2023    Procedure: Block-nerve-medial branch-lumbar;  Surgeon: Adarsh Kraft MD;  Location: [facility] OR;  Service: Anesthesiology;  Laterality: Left;  l4-5, l5-s1 MBB    INJECTION OF ANESTHETIC AGENT AROUND MEDIAL BRANCH NERVES INNERVATING LUMBAR FACET JOINT Bilateral 11/28/2023    Procedure: Block-nerve-medial branch-lumbar;  Surgeon: Adarsh Kraft MD;  Location: [facility] OR;  Service: Anesthesiology;  Laterality:  Bilateral;  L4.5 and l5/s1 MBB #2    RADIOFREQUENCY ABLATION OF LUMBAR MEDIAL BRANCH NERVE AT SINGLE LEVEL Bilateral 1/29/2021    Procedure: Radiofrequency Ablation, Nerve, Spinal, Lumbar, Medial Branch, 1 Level;  Surgeon: Adarsh Kraft MD;  Location: [facility] OR;  Service: Pain Management;  Laterality: Bilateral;  L3,4,5    TONSILLECTOMY, ADENOIDECTOMY         Current Outpatient Medications   Medication Sig    atorvastatin (LIPITOR) 20 MG tablet Take 1 tablet (20 mg total) by mouth once daily.    ciclopirox (PENLAC) 8 % Soln Apply topically.    docosahexaenoic acid/epa (FISH OIL ORAL) Take 1 capsule by mouth once daily.    fluocinolone acetonide oil 0.01 % Drop Place 5 drops into both eyes 2 (two) times daily as needed.     magnesium oxide (MAG-OX) 400 mg (241.3 mg magnesium) tablet Take 1 tablet (400 mg total) by mouth once daily.    methocarbamoL (ROBAXIN) 500 MG Tab Take 500 mg by mouth every 8 (eight) hours as needed.    multivitamin (THERAGRAN) per tablet Take 1 tablet by mouth once daily.    neomycin-fluocinolone (MANUEL-SYNALAR) 0.5 % (0.35 % base)-0.025 % Crea Apply 1 application topically 2 (two) times daily.    omeprazole (PRILOSEC) 40 MG capsule Take 1 capsule (40 mg total) by mouth once daily.    prednisoLONE acetate (PRED FORTE) 1 % DrpS Place 1 drop into both eyes 4 (four) times daily.    aspirin (ECOTRIN) 81 MG EC tablet Take 1 tablet (81 mg total) by mouth once daily.     No current facility-administered medications for this visit.     Facility-Administered Medications Ordered in Other Visits   Medication    lactated ringers infusion    lactated ringers infusion    LIDOcaine (PF) 10 mg/ml (1%) injection 10 mg    LIDOcaine (PF) 10 mg/ml (1%) injection 10 mg       Review of patient's allergies indicates:   Allergen Reactions    Pennsaid [diclofenac sodium] Rash and Blisters       Family History   Problem Relation Age of Onset    Diabetes Mother     Hypertension Mother     Stroke Mother     Coronary artery  disease Mother     Stroke Father     Heart disease Brother         CAD       Social History     Socioeconomic History    Marital status:    Tobacco Use    Smoking status: Never    Smokeless tobacco: Never   Substance and Sexual Activity    Alcohol use: Yes     Alcohol/week: 6.0 standard drinks of alcohol     Types: 6 Cans of beer per week     Comment: weekly or less    Drug use: No    Sexual activity: Yes     Partners: Female     Social Determinants of Health     Financial Resource Strain: Low Risk  (4/4/2024)    Overall Financial Resource Strain (CARDIA)     Difficulty of Paying Living Expenses: Not hard at all   Food Insecurity: No Food Insecurity (4/4/2024)    Hunger Vital Sign     Worried About Running Out of Food in the Last Year: Never true     Ran Out of Food in the Last Year: Never true   Transportation Needs: No Transportation Needs (4/4/2024)    PRAPARE - Transportation     Lack of Transportation (Medical): No     Lack of Transportation (Non-Medical): No   Physical Activity: Insufficiently Active (4/4/2024)    Exercise Vital Sign     Days of Exercise per Week: 7 days     Minutes of Exercise per Session: 20 min   Stress: No Stress Concern Present (4/4/2024)    [place] citizen [place] of Occupational Health - Occupational Stress Questionnaire     Feeling of Stress : Only a little   Social Connections: Moderately Integrated (4/4/2024)    Social Connection and Isolation Panel [NHANES]     Frequency of Communication with Friends and Family: More than three times a week     Frequency of Social Gatherings with Friends and Family: More than three times a week     Attends Rastafarian Services: Never     Active Member of Clubs or Organizations: Yes     Attends Club or Organization Meetings: 1 to 4 times per year     Marital Status:    Housing Stability: Low Risk  (4/4/2024)    Housing Stability Vital Sign     Unable to Pay for Housing in the Last Year: No     Number of Places Lived in the Last Year: 1      Unstable Housing in the Last Year: No       Chief Complaint:   Chief Complaint   Patient presents with    Follow-up     Follow up right shoulder        History of present illness:  68-year-old right-hand-dominant male seen for bilateral shoulder pain.  Right is greater than the left.  Patient has had shoulder pain for about a year now.  No injury or trauma.    Patient has difficulty raising his right arm above his head.  Right side is a 4/10.  He has been doing more physical therapy and has made a little improvement.      Review of Systems:  Musculoskeletal:  See HPI    Physical Examination:    Vital Signs:    Vitals:    04/04/24 1438   Resp: 18       Body mass index is 27.58 kg/m².    This a well-developed, well nourished patient in no acute distress.  They are alert and oriented and cooperative to examination.  Pt. walks without an antalgic gait.      Examination of the right shoulder shows no rashes or erythema. There are no masses, ecchymosis, or atrophy. The patient has full range of motion in forward flexion, external rotation, and internal rotation to the mid T-spine. The patient has Positive Hernandez and Neer test. - Rico's test. - Speeds test. Nontender to palpation over a.c. joint. Passive range of motion: Forward flexion of 180°, external rotation at 90° of 90°, internal rotation of 50°, and external rotation at 0° of 50°. 2+ radial pulse. Intact axillary, radial, median and ulnar sensation 4+ out of 5 resisted forward flexion, external rotation, and negative lift off test.      X-rays:  X-rays of both shoulders are  reviewed which show some calcific tendinitis of the right shoulder.    MRI of the right shoulder is reviewed and interpreted:Mild hypertrophic changes at the AC joint with small inferior spur.  Tendinitis/Tendinopathy of the distal supraspinatus tendon without tear or retraction      Assessment::  Right shoulder rotator cuff syndrome    Plan:  I reviewed the findings with him today.  We  talked about continuing with the therapy exercises and observation versus surgical treatment for impingement and AC joint.  Follow up in 2 months.    All previous pertinent notes including ER visits, physical therapy visits, other orthopedic visits as well as other care for the same musculoskeletal problem were reviewed.  All pertinent lab values and previous imaging was reviewed pertinent to the current visit.    This note was created using M Modal voice recognition software that occasionally misinterpreted phrases or words.    Consult note is delivered via Epic messaging service.

## 2024-04-04 NOTE — PATIENT INSTRUCTIONS
Counseling and Referral of Other Preventative  (Italic type indicates deductible and co-insurance are waived)    Patient Name: Colt Rose  Today's Date: 4/4/2024    Health Maintenance       Date Due Completion Date    RSV Vaccine (Age 60+ and Pregnant patients) (1 - 1-dose 60+ series) Never done ---    Shingles Vaccine (1 of 2) 12/01/2017 10/6/2017    COVID-19 Vaccine (6 - 2023-24 season) 09/01/2023 12/28/2022    PROSTATE-SPECIFIC ANTIGEN 06/14/2024 6/14/2023    Colorectal Cancer Screening 07/29/2024 7/29/2021    Hemoglobin A1c (Prediabetes) 01/11/2025 1/11/2024    Lipid Panel 01/11/2025 1/11/2024    TETANUS VACCINE 10/20/2025 10/20/2015        No orders of the defined types were placed in this encounter.      The following information is provided to all patients.  This information is to help you find resources for any of the problems found today that may be affecting your health:                  Living healthy guide: www.[facility].[place].gov      Understanding Diabetes: www.diabetes.org      Eating healthy: www.cdc.gov/healthyweight      CDC home safety checklist: www.cdc.gov/steadi/patient.html      Agency on Aging: www.goea.[place].gov      Alcoholics anonymous (AA): www.aa.org      Physical Activity: www.emily.nih.gov/vw4hbsr      Tobacco use: www.quitwithusla.org

## 2024-04-14 NOTE — PROGRESS NOTES
"  Colt Rose presented for an initial Medicare AWV today. The following components were reviewed and updated:    Medical history  Family History  Social history  Allergies and Current Medications  Health Risk Assessment  Health Maintenance  Care Team    **See Completed Assessments for Annual Wellness visit with in the encounter summary    The following assessments were completed:  Depression Screening  Cognitive function Screening    Timed Get Up Test  Whisper Test      Opioid documentation:      Patient does not have a current opioid prescription.          Vitals:    04/04/24 0708   BP: 132/70   BP Location: Left arm   Patient Position: Sitting   BP Method: Medium (Manual)   Pulse: (!) 55   SpO2: 96%   Weight: 87.2 kg (192 lb 3.9 oz)   Height: 5' 10" (1.778 m)     Body mass index is 27.58 kg/m².       Physical Exam  Vitals reviewed.   Constitutional:       General: He is not in acute distress.  Cardiovascular:      Rate and Rhythm: Bradycardia present.   Pulmonary:      Effort: Pulmonary effort is normal. No respiratory distress.   Skin:     General: Skin is warm.   Neurological:      General: No focal deficit present.      Mental Status: He is alert.   Psychiatric:         Mood and Affect: Mood normal.           Diagnoses and health risks identified today and associated recommendations/orders:  1. Encounter for Medicare annual wellness exam  Reviewed health maintenance and provided recommendations   Recommend rsv and shingrix   - Ambulatory Referral/Consult to Enhanced Annual Wellness Visit (eAWV)    2. Premature atrial contractions  Continue to monitor  Followed by Dr Freeman.      3. Typical atrial flutter  Continue to monitor  Followed by Dr Freeman  postablation.      4. Mixed hyperlipidemia  Continue to monitor  Followed by Jeffery Mena MD   atorvastatin - 20 MG  .        Provided Colt with a 5-10 year written screening schedule and personal prevention plan. Recommendations were developed using " the USPSTF age appropriate recommendations. Education, counseling, and referrals were provided as needed.  After Visit Summary printed and given to patient which includes a list of additional screenings\tests needed.    No follow-ups on file.      Kaylyn Mckeon NP

## 2024-06-20 ENCOUNTER — OFFICE VISIT (OUTPATIENT)
Dept: CARDIOLOGY | Facility: CLINIC | Age: 69
End: 2024-06-20
Payer: MEDICARE

## 2024-06-20 VITALS
HEIGHT: 70 IN | OXYGEN SATURATION: 98 % | DIASTOLIC BLOOD PRESSURE: 76 MMHG | WEIGHT: 189 LBS | BODY MASS INDEX: 27.06 KG/M2 | SYSTOLIC BLOOD PRESSURE: 128 MMHG | HEART RATE: 49 BPM | RESPIRATION RATE: 16 BRPM

## 2024-06-20 DIAGNOSIS — R00.2 PALPITATIONS: Primary | ICD-10-CM

## 2024-06-20 DIAGNOSIS — I49.1 PREMATURE ATRIAL CONTRACTIONS: ICD-10-CM

## 2024-06-20 DIAGNOSIS — Z98.890 HISTORY OF CARDIAC RADIOFREQUENCY ABLATION: ICD-10-CM

## 2024-06-20 DIAGNOSIS — R00.1 BRADYCARDIA: ICD-10-CM

## 2024-06-20 DIAGNOSIS — E78.2 MIXED HYPERLIPIDEMIA: ICD-10-CM

## 2024-06-20 DIAGNOSIS — R94.39 ABNORMAL FINDING ON CARDIOVASCULAR STRESS TEST: ICD-10-CM

## 2024-06-20 PROCEDURE — 99213 OFFICE O/P EST LOW 20 MIN: CPT | Mod: PBBFAC,PN | Performed by: INTERNAL MEDICINE

## 2024-06-20 PROCEDURE — 99214 OFFICE O/P EST MOD 30 MIN: CPT | Mod: S$PBB,,, | Performed by: INTERNAL MEDICINE

## 2024-06-20 PROCEDURE — 99999 PR PBB SHADOW E&M-EST. PATIENT-LVL III: CPT | Mod: PBBFAC,,, | Performed by: INTERNAL MEDICINE

## 2024-06-20 NOTE — PROGRESS NOTES
Subjective:    Patient ID:  Colt Rose is a 68 y.o. male     Chief Complaint   Patient presents with    Hyperlipidemia    Hypertension    Atrial Fibrillation       HPI:  Ms Colt Rose is a 68 y.o. male   Is here for follow-up.    Patient has been doing well no specific complaints at the present time.  His breathing has been stable denies any shortness a breath or difficulty in breathing denies any chest pain or tightness or heaviness denies any dizziness or lightheadedness denies any loss of consciousness falls or head injury.    He is taking all his medications regularly.        Review of patient's allergies indicates:   Allergen Reactions    Pennsaid [diclofenac sodium] Rash and Blisters       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    Cervical disc displacement     Degeneration of lumbar intervertebral disc     GERD (gastroesophageal reflux disease)     gastric polyps    Hip pain 02/22/2016    Hyperlipidemia     Shingles 11/2013    Sleep apnea      Past Surgical History:   Procedure Laterality Date    ABLATION, SVT, ACCESSORY PATHWAY N/A 5/25/2023    Procedure: Ablation, SVT, Accessory Pathway;  Surgeon: Goyo Sky MD;  Location: [facility] EP LAB;  Service: Cardiology;  Laterality: N/A;  PAC, RFA, CARTO, MAC, GP, 3 PREP    CERVICAL FUSION      bone graft    COLONOSCOPY N/A 7/29/2021    Procedure: COLONOSCOPY;  Surgeon: Tyler Story MD;  Location: [facility] ENDO;  Service: Endoscopy;  Laterality: N/A;    EPIDURAL STEROID INJECTION INTO LUMBAR SPINE N/A 12/5/2019    Procedure: Injection-steroid-epidural-lumbar;  Surgeon: Adarsh Kraft MD;  Location: [facility] OR;  Service: Pain Management;  Laterality: N/A;  L5-S1    ESOPHAGOGASTRODUODENOSCOPY N/A 10/27/2020    Procedure: EGD (ESOPHAGOGASTRODUODENOSCOPY);  Surgeon: Tyler Story MD;  Location: [facility] ENDO;  Service: Endoscopy;  Laterality: N/A;    INJECTION OF ANESTHETIC AGENT AROUND MEDIAL BRANCH NERVES INNERVATING LUMBAR  FACET JOINT Bilateral 11/18/2020    Procedure: BLOCK, NERVE, LUMBAR, MEDIAL BRANCH Bilateral L3,4,5;  Surgeon: Adarsh Kraft MD;  Location: [facility] OR;  Service: Pain Management;  Laterality: Bilateral;    INJECTION OF ANESTHETIC AGENT AROUND MEDIAL BRANCH NERVES INNERVATING LUMBAR FACET JOINT Left 10/25/2023    Procedure: Block-nerve-medial branch-lumbar;  Surgeon: Adarsh Kraft MD;  [facility];  Service: Anesthesiology;  Laterality: Left;  l4-5, l5-s1 MBB    INJECTION OF ANESTHETIC AGENT AROUND MEDIAL BRANCH NERVES INNERVATING LUMBAR FACET JOINT Bilateral 11/28/2023    Procedure: Block-nerve-medial branch-lumbar;  Surgeon: Adarsh Kraft MD;  [facility];  Service: Anesthesiology;  Laterality: Bilateral;  L4.5 and l5/s1 MBB #2    RADIOFREQUENCY ABLATION OF LUMBAR MEDIAL BRANCH NERVE AT SINGLE LEVEL Bilateral 1/29/2021    Procedure: Radiofrequency Ablation, Nerve, Spinal, Lumbar, Medial Branch, 1 Level;  Surgeon: Adarsh Kraft MD;  Location: [facility] OR;  Service: Pain Management;  Laterality: Bilateral;  L3,4,5    TONSILLECTOMY, ADENOIDECTOMY       Social History     Tobacco Use    Smoking status: Never    Smokeless tobacco: Never   Substance Use Topics    Alcohol use: Yes     Alcohol/week: 6.0 standard drinks of alcohol     Types: 6 Cans of beer per week     Comment: weekly or less    Drug use: No     Family History   Problem Relation Name Age of Onset    Diabetes Mother      Hypertension Mother      Stroke Mother      Coronary artery disease Mother      Stroke Father      Heart disease Brother          CAD        Review of Systems:   Constitution: Negative for diaphoresis and fever.   HEENT: Negative for nosebleeds.    Cardiovascular: Negative for chest pain       No dyspnea on exertion       No leg swelling        No palpitations  Respiratory: Negative for shortness of breath and wheezing.    Hematologic/Lymphatic: Negative for bleeding problem. Does not bruise/bleed easily.   Skin: Negative for color  change and rash.   Musculoskeletal: Negative for falls and myalgias.   Gastrointestinal: Negative for hematemesis and hematochezia.   Genitourinary: Negative for hematuria.   Neurological: Negative for dizziness and light-headedness.   Psychiatric/Behavioral: Negative for altered mental status and memory loss.          Objective:        Vitals:    06/20/24 1541   BP: 128/76   Pulse: (!) 49   Resp: 16       Lab Results   Component Value Date    WBC 4.93 01/11/2024    HGB 13.5 (L) 01/11/2024    HCT 41.6 05/23/2023     06/08/2023    CHOL 213 (H) 01/11/2024    TRIG 71 01/11/2024    HDL 58 01/11/2024    ALT 32 01/11/2024    AST 24 01/11/2024     01/11/2024    K 4.1 01/11/2024     01/11/2024    CREATININE 0.8 01/11/2024    BUN 19 01/11/2024    CO2 27 01/11/2024    TSH 3.094 06/08/2023    PSA 0.50 06/14/2023    INR 1.0 05/23/2023    HGBA1C 5.5 01/11/2024        ECHOCARDIOGRAM RESULTS  Results for orders placed in visit on 01/31/22    Stress Echo Which stress agent will be used? Treadmill Exercise; Color Flow Doppler? No    Interpretation Summary  · The patient reached the end of the protocol.  · There were no arrhythmias during stress.  · The left ventricle is normal in size with  · The estimated ejection fraction is 65%.  · Normal left ventricular diastolic function.  · Atrial fibrillation not observed.  · Normal right ventricular size with normal right ventricular systolic function.  · The stress echo portion of this study is negative for myocardial ischemia.  · The ECG portion of this study is negative for myocardial ischemia.  · Pre exercise tricuspid regurgitation was 2.5 m/sec postexercise was 3.4 which is slightly abnormal  · Nonspecific J-point depressions noted with no diagnostic ischemic ratio        CURRENT/PREVIOUS VISIT EKG  Results for orders placed or performed in visit on 08/30/23   IN OFFICE EKG 12-LEAD (to [place])    Collection Time: 08/30/23 10:37 AM    Narrative    Test Reason :  I48.3,E78.2,    Vent. Rate : 075 BPM     Atrial Rate : 075 BPM     P-R Int : 174 ms          QRS Dur : 092 ms      QT Int : 364 ms       P-R-T Axes : 056 029 015 degrees     QTc Int : 406 ms    Normal sinus rhythm  Anterior infarct ,age undetermined  Abnormal ECG  When compared with ECG of 25-MAY-2023 15:01,  No significant change was found  Confirmed by Daljit Freeman MD ([id number]) on 9/28/2023 10:31:40 AM    Referred By:  GP           Confirmed By:Daljit Freeman MD     No valid procedures specified.   Results for orders placed during the hospital encounter of 12/28/21    Nuclear Stress - Cardiology Interpreted    Interpretation Summary    Normal myocardial perfusion scan. There is no evidence of myocardial ischemia or infarction.    The gated perfusion images showed an ejection fraction of 57% post stress. Normal ejection fraction is greater than 53%.    LV cavity size is normal at rest and normal at stress.There is abnormal wall motion at rest and post stress.    The EKG portion of this study is positive for ischemia.    The patient reported no chest pain during the stress test.    Patient exercised on a Shaan protocol for 8 minutes and 21 seconds and attained a maximum heart rate of 137 beats per minute which is 87% of the maximum predicted heart rate and attained 10 point 1 METs.  And had normal blood pressure response to exercise.      Physical Exam:  CONSTITUTIONAL: No fever, no chills  HEENT: Normocephalic, atraumatic,pupils reactive to light                 NECK:  No JVD no carotid bruit  CVS: S1S2+, RRR, systolic murmurs,   LUNGS: Clear  ABDOMEN: Soft, NT, BS+  EXTREMITIES: No cyanosis, edema  : No castro catheter  NEURO: AAO X 3  PSY: Normal affect      Medication List with Changes/Refills   Current Medications    ASPIRIN (ECOTRIN) 81 MG EC TABLET    Take 1 tablet (81 mg total) by mouth once daily.    ATORVASTATIN (LIPITOR) 20 MG TABLET    Take 1 tablet (20 mg total) by mouth once daily.    CICLOPIROX  (PENLAC) 8 % SOLN    Apply topically.    DOCOSAHEXAENOIC ACID/EPA (FISH OIL ORAL)    Take 1 capsule by mouth once daily.    FLUOCINOLONE ACETONIDE OIL 0.01 % DROP    Place 5 drops into both eyes 2 (two) times daily as needed.     MAGNESIUM OXIDE (MAG-OX) 400 MG (241.3 MG MAGNESIUM) TABLET    Take 1 tablet (400 mg total) by mouth once daily.    METHOCARBAMOL (ROBAXIN) 500 MG TAB    Take 500 mg by mouth every 8 (eight) hours as needed.    MULTIVITAMIN (THERAGRAN) PER TABLET    Take 1 tablet by mouth once daily.    NEOMYCIN-FLUOCINOLONE (MANUEL-SYNALAR) 0.5 % (0.35 % BASE)-0.025 % CREA    Apply 1 application topically 2 (two) times daily.    OMEPRAZOLE (PRILOSEC) 40 MG CAPSULE    Take 1 capsule (40 mg total) by mouth once daily.    PREDNISOLONE ACETATE (PRED FORTE) 1 % DRPS    Place 1 drop into both eyes 4 (four) times daily.             Assessment:       1. Palpitations    2. History of cardiac radiofrequency ablation    3. Mixed hyperlipidemia    4. Premature atrial contractions    5. Bradycardia    6. Abnormal finding on cardiovascular stress test         Plan:    Palpitations and history of radiofrequency ablation.    At the present time patient is stable he is on aspirin 81 mg p.o. daily continue the same.    2.  Mixed hyperlipidemia   He is currently on atorvastatin 20 mg p.o. daily and on his last blood work his total cholesterol 213 HDL is 58 LDL is 140 and triglycerides 71 need to increase his atorvastatin to 40 mg p.o. daily and repeat his labs in 3 months time.   He has normal liver function tests with  and ALT at 32.    3.  Premature atrial contractions   Patient is stable at the present time on his current EKG does not have any PACs.  4. Bradycardia   Patient's current heart rate is 49 beats per minute is totally asymptomatic and is also athletic.  Not on any AV kayode blocking agents.  5.  EKG   Reviewed his EKG independently patient is in sinus bradycardia with a heart rate of 49 beats per minute  normal intervals and no acute ST T-wave changes.  6. Patient to continue current management I will see him back in the office in 6 months time.      Problem List Items Addressed This Visit          Cardiac/Vascular    Palpitations - Primary    Relevant Orders    IN OFFICE EKG 12-LEAD (to [place])    Premature atrial contractions    Bradycardia    Mixed hyperlipidemia    Abnormal finding on cardiovascular stress test    History of cardiac radiofrequency ablation       No follow-ups on file.

## 2024-06-21 ENCOUNTER — TELEPHONE (OUTPATIENT)
Dept: CARDIOLOGY | Facility: CLINIC | Age: 69
End: 2024-06-21
Payer: MEDICARE

## 2024-06-22 LAB
OHS QRS DURATION: 96 MS
OHS QTC CALCULATION: 381 MS

## 2024-07-11 ENCOUNTER — OFFICE VISIT (OUTPATIENT)
Dept: FAMILY MEDICINE | Facility: CLINIC | Age: 69
End: 2024-07-11
Payer: MEDICARE

## 2024-07-11 ENCOUNTER — PATIENT MESSAGE (OUTPATIENT)
Dept: GASTROENTEROLOGY | Facility: CLINIC | Age: 69
End: 2024-07-11
Payer: MEDICARE

## 2024-07-11 VITALS
DIASTOLIC BLOOD PRESSURE: 60 MMHG | SYSTOLIC BLOOD PRESSURE: 124 MMHG | HEART RATE: 55 BPM | WEIGHT: 191.81 LBS | OXYGEN SATURATION: 100 % | TEMPERATURE: 98 F | BODY MASS INDEX: 27.46 KG/M2 | HEIGHT: 70 IN

## 2024-07-11 DIAGNOSIS — Z12.5 PROSTATE CANCER SCREENING: ICD-10-CM

## 2024-07-11 DIAGNOSIS — M47.26 OSTEOARTHRITIS OF SPINE WITH RADICULOPATHY, LUMBAR REGION: ICD-10-CM

## 2024-07-11 DIAGNOSIS — Z12.11 COLON CANCER SCREENING: ICD-10-CM

## 2024-07-11 DIAGNOSIS — K21.9 GASTROESOPHAGEAL REFLUX DISEASE WITHOUT ESOPHAGITIS: ICD-10-CM

## 2024-07-11 DIAGNOSIS — E78.5 DYSLIPIDEMIA: Primary | ICD-10-CM

## 2024-07-11 PROCEDURE — 99214 OFFICE O/P EST MOD 30 MIN: CPT | Mod: S$PBB,,,

## 2024-07-11 PROCEDURE — 99999 PR PBB SHADOW E&M-EST. PATIENT-LVL IV: CPT | Mod: PBBFAC,,,

## 2024-07-11 PROCEDURE — 99214 OFFICE O/P EST MOD 30 MIN: CPT | Mod: PBBFAC,PO

## 2024-07-11 RX ORDER — METHOCARBAMOL 500 MG/1
500 TABLET, FILM COATED ORAL EVERY 8 HOURS PRN
Qty: 90 TABLET | Refills: 3 | Status: SHIPPED | OUTPATIENT
Start: 2024-07-11

## 2024-07-11 NOTE — PROGRESS NOTES
Subjective:       Patient ID: Colt Rose is a 68 y.o. male.    Chief Complaint: 6 month follow up       Colt Rose is a 68 y.o. male with HLD, hx of prediabetes who presents to clinic for 6 month follow up. Doing well overall with no acute concerns. Colonoscopy due at end of the month.         Review of Systems   Constitutional:  Negative for appetite change and fatigue.   Respiratory:  Negative for shortness of breath.    Cardiovascular:  Negative for chest pain.   Gastrointestinal:  Negative for abdominal pain, constipation and diarrhea.   Skin:  Negative for rash.   Neurological:  Negative for dizziness and headaches.         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    Cervical disc displacement     Degeneration of lumbar intervertebral disc     GERD (gastroesophageal reflux disease)     gastric polyps    Hip pain 02/22/2016    Hyperlipidemia     Shingles 11/2013    Sleep apnea        Review of patient's allergies indicates:   Allergen Reactions    Pennsaid [diclofenac sodium] Rash and Blisters         Current Outpatient Medications:     atorvastatin (LIPITOR) 20 MG tablet, Take 1 tablet (20 mg total) by mouth once daily., Disp: 90 tablet, Rfl: 3    ciclopirox (PENLAC) 8 % Soln, Apply topically., Disp: , Rfl:     docosahexaenoic acid/epa (FISH OIL ORAL), Take 1 capsule by mouth once daily., Disp: , Rfl:     fluocinolone acetonide oil 0.01 % Drop, Place 5 drops into both eyes 2 (two) times daily as needed. , Disp: , Rfl: 5    magnesium oxide (MAG-OX) 400 mg (241.3 mg magnesium) tablet, Take 1 tablet (400 mg total) by mouth once daily., Disp: 90 tablet, Rfl: 3    multivitamin (THERAGRAN) per tablet, Take 1 tablet by mouth once daily., Disp: , Rfl:     neomycin-fluocinolone (MANUEL-SYNALAR) 0.5 % (0.35 % base)-0.025 % Crea, Apply 1 application topically 2 (two) times daily., Disp: , Rfl:     omeprazole (PRILOSEC) 40 MG capsule, TAKE 1 CAPSULE BY MOUTH ONCE  DAILY, Disp: 90  "capsule, Rfl: 1    prednisoLONE acetate (PRED FORTE) 1 % DrpS, Place 1 drop into both eyes 4 (four) times daily., Disp: , Rfl:     aspirin (ECOTRIN) 81 MG EC tablet, Take 1 tablet (81 mg total) by mouth once daily., Disp: , Rfl: 0    methocarbamoL (ROBAXIN) 500 MG Tab, Take 1 tablet (500 mg total) by mouth every 8 (eight) hours as needed (muscle spasm)., Disp: 90 tablet, Rfl: 3  No current facility-administered medications for this visit.    Facility-Administered Medications Ordered in Other Visits:     lactated ringers infusion, , Intravenous, Continuous, Adarsh Kraft MD    lactated ringers infusion, , Intravenous, Continuous, Adarsh Kraft MD    LIDOcaine (PF) 10 mg/ml (1%) injection 10 mg, 1 mL, Intradermal, Once, Adarsh Kraft MD    LIDOcaine (PF) 10 mg/ml (1%) injection 10 mg, 1 mL, Intradermal, Once, Adarsh Kraft MD    Objective:        Physical Exam  Vitals reviewed.   Constitutional:       General: He is not in acute distress.     Appearance: Normal appearance.   HENT:      Head: Normocephalic and atraumatic.   Eyes:      Conjunctiva/sclera: Conjunctivae normal.   Cardiovascular:      Rate and Rhythm: Regular rhythm. Bradycardia present.      Heart sounds: Normal heart sounds.   Pulmonary:      Effort: Pulmonary effort is normal.      Breath sounds: Normal breath sounds.   Musculoskeletal:         General: Normal range of motion.      Cervical back: Normal range of motion and neck supple.   Skin:     General: Skin is warm and dry.   Neurological:      Mental Status: He is alert and oriented to person, place, and time.   Psychiatric:         Behavior: Behavior normal.           Visit Vitals  /60 (BP Location: Right arm, Patient Position: Sitting, BP Method: Small (Manual))   Pulse (!) 55   Temp 98.2 °F (36.8 °C) (Oral)   Ht 5' 10" (1.778 m)   Wt 87 kg (191 lb 12.8 oz)   SpO2 100%   BMI 27.52 kg/m²      Assessment:         1. Dyslipidemia    2. Gastroesophageal reflux disease without esophagitis    3. " Osteoarthritis of spine with radiculopathy, lumbar region    4. Prostate cancer screening    5. Colon cancer screening        Plan:         Diagnoses and all orders for this visit:    Dyslipidemia  -     CBC Auto Differential; Future  -     Comprehensive Metabolic Panel; Future  -     Lipid Panel; Future  -     On Lipitor 20 mg    Gastroesophageal reflux disease without esophagitis        -  Stable. On Prilosec 40 mg     Osteoarthritis of spine with radiculopathy, lumbar region  -     methocarbamoL (ROBAXIN) 500 MG Tab; Take 1 tablet (500 mg total) by mouth every 8 (eight) hours as needed (muscle spasm).    Prostate cancer screening  -     PSA, SCREENING; Future    Colon cancer screening  -     Case Request Endoscopy: COLONOSCOPY       Follow up in 6 months.        Family Medicine Physician Assistant     Future Appointments       Date Provider Specialty Appt Notes    7/12/2024  Lab fasting    1/15/2025 Jeffery Mena MD Family Medicine annual             All of your core healthy metrics are met.       I spent a total of 20 minutes on the day of the visit.This includes face to face time and non-face to face time preparing to see the patient (eg, review of tests), obtaining and/or reviewing separately obtained history, documenting clinical information in the electronic or other health record, independently interpreting results and communicating results to the patient/family/caregiver, or care coordinator.

## 2024-07-12 ENCOUNTER — LAB VISIT (OUTPATIENT)
Dept: LAB | Facility: HOSPITAL | Age: 69
End: 2024-07-12
Payer: MEDICARE

## 2024-07-12 DIAGNOSIS — E78.5 DYSLIPIDEMIA: ICD-10-CM

## 2024-07-12 DIAGNOSIS — Z12.5 PROSTATE CANCER SCREENING: ICD-10-CM

## 2024-07-12 PROCEDURE — 80053 COMPREHEN METABOLIC PANEL: CPT

## 2024-07-12 PROCEDURE — 84153 ASSAY OF PSA TOTAL: CPT

## 2024-07-12 PROCEDURE — 36415 COLL VENOUS BLD VENIPUNCTURE: CPT | Mod: PO

## 2024-07-12 PROCEDURE — 85025 COMPLETE CBC W/AUTO DIFF WBC: CPT

## 2024-07-12 PROCEDURE — 80061 LIPID PANEL: CPT

## 2024-07-13 LAB
ALBUMIN SERPL BCP-MCNC: 3.9 G/DL (ref 3.5–5.2)
ALP SERPL-CCNC: 43 U/L (ref 55–135)
ALT SERPL W/O P-5'-P-CCNC: 23 U/L (ref 10–44)
ANION GAP SERPL CALC-SCNC: 10 MMOL/L (ref 8–16)
AST SERPL-CCNC: 22 U/L (ref 10–40)
BASOPHILS # BLD AUTO: 0.05 K/UL (ref 0–0.2)
BASOPHILS NFR BLD: 1.2 % (ref 0–1.9)
BILIRUB SERPL-MCNC: 0.8 MG/DL (ref 0.1–1)
BUN SERPL-MCNC: 18 MG/DL (ref 8–23)
CALCIUM SERPL-MCNC: 9.3 MG/DL (ref 8.7–10.5)
CHLORIDE SERPL-SCNC: 106 MMOL/L (ref 95–110)
CHOLEST SERPL-MCNC: 162 MG/DL (ref 120–199)
CHOLEST/HDLC SERPL: 3.1 {RATIO} (ref 2–5)
CO2 SERPL-SCNC: 25 MMOL/L (ref 23–29)
COMPLEXED PSA SERPL-MCNC: 0.39 NG/ML (ref 0–4)
CREAT SERPL-MCNC: 0.8 MG/DL (ref 0.5–1.4)
DIFFERENTIAL METHOD BLD: ABNORMAL
EOSINOPHIL # BLD AUTO: 0.1 K/UL (ref 0–0.5)
EOSINOPHIL NFR BLD: 2.5 % (ref 0–8)
ERYTHROCYTE [DISTWIDTH] IN BLOOD BY AUTOMATED COUNT: 13.7 % (ref 11.5–14.5)
EST. GFR  (NO RACE VARIABLE): >60 ML/MIN/1.73 M^2
GLUCOSE SERPL-MCNC: 104 MG/DL (ref 70–110)
HCT VFR BLD AUTO: 44 % (ref 40–54)
HDLC SERPL-MCNC: 52 MG/DL (ref 40–75)
HDLC SERPL: 32.1 % (ref 20–50)
HGB BLD-MCNC: 14.4 G/DL (ref 14–18)
IMM GRANULOCYTES # BLD AUTO: 0 K/UL (ref 0–0.04)
IMM GRANULOCYTES NFR BLD AUTO: 0 % (ref 0–0.5)
LDLC SERPL CALC-MCNC: 98.2 MG/DL (ref 63–159)
LYMPHOCYTES # BLD AUTO: 1.5 K/UL (ref 1–4.8)
LYMPHOCYTES NFR BLD: 37.1 % (ref 18–48)
MCH RBC QN AUTO: 31.7 PG (ref 27–31)
MCHC RBC AUTO-ENTMCNC: 32.7 G/DL (ref 32–36)
MCV RBC AUTO: 97 FL (ref 82–98)
MONOCYTES # BLD AUTO: 0.4 K/UL (ref 0.3–1)
MONOCYTES NFR BLD: 10.1 % (ref 4–15)
NEUTROPHILS # BLD AUTO: 2 K/UL (ref 1.8–7.7)
NEUTROPHILS NFR BLD: 49.1 % (ref 38–73)
NONHDLC SERPL-MCNC: 110 MG/DL
NRBC BLD-RTO: 0 /100 WBC
PLATELET # BLD AUTO: 204 K/UL (ref 150–450)
PMV BLD AUTO: 11.2 FL (ref 9.2–12.9)
POTASSIUM SERPL-SCNC: 4.3 MMOL/L (ref 3.5–5.1)
PROT SERPL-MCNC: 6.4 G/DL (ref 6–8.4)
RBC # BLD AUTO: 4.54 M/UL (ref 4.6–6.2)
SODIUM SERPL-SCNC: 141 MMOL/L (ref 136–145)
TRIGL SERPL-MCNC: 59 MG/DL (ref 30–150)
WBC # BLD AUTO: 4.07 K/UL (ref 3.9–12.7)

## 2024-08-08 ENCOUNTER — PATIENT MESSAGE (OUTPATIENT)
Dept: CARDIOLOGY | Facility: CLINIC | Age: 69
End: 2024-08-08
Payer: MEDICARE

## 2024-08-08 NOTE — LETTER
.  [facility]  [address]  [address]  [place] [address]  Phone: [phone]  Fax: [phone] Date: 2024    Patient: Colt Rose                   MRN#:[id number]  : 1955  Referring Physician: Dr. Mckeon           Procedure:     Current Outpatient Medications   Medication Sig Dispense Refill    aspirin (ECOTRIN) 81 MG EC tablet Take 1 tablet (81 mg total) by mouth once daily.  0    atorvastatin (LIPITOR) 20 MG tablet Take 1 tablet (20 mg total) by mouth once daily. 90 tablet 3    ciclopirox (PENLAC) 8 % Soln Apply topically.      docosahexaenoic acid/epa (FISH OIL ORAL) Take 1 capsule by mouth once daily.      fluocinolone acetonide oil 0.01 % Drop Place 5 drops into both eyes 2 (two) times daily as needed.   5    magnesium oxide (MAG-OX) 400 mg (241.3 mg magnesium) tablet Take 1 tablet (400 mg total) by mouth once daily. 90 tablet 3    methocarbamoL (ROBAXIN) 500 MG Tab Take 1 tablet (500 mg total) by mouth every 8 (eight) hours as needed (muscle spasm). 90 tablet 3    multivitamin (THERAGRAN) per tablet Take 1 tablet by mouth once daily.      neomycin-fluocinolone (MANUEL-SYNALAR) 0.5 % (0.35 % base)-0.025 % Crea Apply 1 application topically 2 (two) times daily.      omeprazole (PRILOSEC) 40 MG capsule TAKE 1 CAPSULE BY MOUTH ONCE  DAILY 90 capsule 1    prednisoLONE acetate (PRED FORTE) 1 % DrpS Place 1 drop into both eyes 4 (four) times daily.       No current facility-administered medications for this visit.     Facility-Administered Medications Ordered in Other Visits   Medication Dose Route Frequency Provider Last Rate Last Admin    lactated ringers infusion   Intravenous Continuous Adarsh Kraft MD        lactated ringers infusion   Intravenous Continuous Adarsh Kraft MD        LIDOcaine (PF) 10 mg/ml (1%) injection 10 mg  1 mL Intradermal Once Adarsh Kraft MD        LIDOcaine (PF) 10 mg/ml (1%) injection 10 mg  1 mL Intradermal  Once Vu, Adarsh ARRINGTON MD           This patient has been assessed for risk factors for clearance of surgery with the following stipulations:    [] No Contraindications.      [] Recommendations for {Medications to Hold:88373}: Hold X *** DAYS.    [] Patient is {Patient Risk:42000}    [] Cleared for surgery with the following restrictions:    [] Not Cleared for surgery due to the following reasons:    If you have any questions regarding the above, please contact my office at [phone]    Clearing Clinician:

## 2024-08-09 NOTE — TELEPHONE ENCOUNTER
----- Message from Odalis Abel sent at 8/9/2024 12:44 PM CDT -----  Contact: pt  Type:  Needs Medical Advice    Who Called: pt    Would the patient rather a call back or a response via [facility]?  Call back    Best Call Back Number: [phone]    Additional Information:  pt needs surgical clearance for procedure being done on 8/14    Please call to advise  Thanks

## 2024-08-12 ENCOUNTER — TELEPHONE (OUTPATIENT)
Dept: CARDIOLOGY | Facility: CLINIC | Age: 69
End: 2024-08-12
Payer: MEDICARE

## 2024-08-12 NOTE — TELEPHONE ENCOUNTER
----- Message from Fidelina Luther sent at 8/12/2024  8:59 AM CDT -----  Contact: COLTON HUGHES  Type:  Needs Medical Advice    Who Called: COLTON HUGHES  Symptoms (please be specific): PLEASE F/U ON  08/07/24 CLEARANCE  FORM   PLEASE FAX TO THE # BELOW AND NOT DIRECTLY TO THE OFFICE    How long has patient had these symptoms:  N/A  Pharmacy name and phone #:  N/A  Would the patient rather a call back or a response via [facility]? CALL   Best Call Back Number:  [phone] / FAX # [phone]  Additional Information: THANK YOU

## 2024-08-12 NOTE — LETTER
..  [facility]  [address]  [address]  [place] [address]  Phone: [phone]  Fax: [phone] Date: 2024    Patient: Colt Rose                      MRN#:[id number]  : 1955  Referring Physician: Dr Tee             Procedure: Interventional Pain Procedure     Current Outpatient Medications   Medication Sig Dispense Refill    aspirin (ECOTRIN) 81 MG EC tablet Take 1 tablet (81 mg total) by mouth once daily.  0    atorvastatin (LIPITOR) 20 MG tablet Take 1 tablet (20 mg total) by mouth once daily. 90 tablet 3    ciclopirox (PENLAC) 8 % Soln Apply topically.      docosahexaenoic acid/epa (FISH OIL ORAL) Take 1 capsule by mouth once daily.      fluocinolone acetonide oil 0.01 % Drop Place 5 drops into both eyes 2 (two) times daily as needed.   5    magnesium oxide (MAG-OX) 400 mg (241.3 mg magnesium) tablet Take 1 tablet (400 mg total) by mouth once daily. 90 tablet 3    methocarbamoL (ROBAXIN) 500 MG Tab Take 1 tablet (500 mg total) by mouth every 8 (eight) hours as needed (muscle spasm). 90 tablet 3    multivitamin (THERAGRAN) per tablet Take 1 tablet by mouth once daily.      neomycin-fluocinolone (MANUEL-SYNALAR) 0.5 % (0.35 % base)-0.025 % Crea Apply 1 application topically 2 (two) times daily.      omeprazole (PRILOSEC) 40 MG capsule TAKE 1 CAPSULE BY MOUTH ONCE  DAILY 90 capsule 1    prednisoLONE acetate (PRED FORTE) 1 % DrpS Place 1 drop into both eyes 4 (four) times daily.       No current facility-administered medications for this visit.     Facility-Administered Medications Ordered in Other Visits   Medication Dose Route Frequency Provider Last Rate Last Admin    lactated ringers infusion   Intravenous Continuous Adarsh Kraft MD        lactated ringers infusion   Intravenous Continuous Adarsh Kraft MD        LIDOcaine (PF) 10 mg/ml (1%) injection 10 mg  1 mL Intradermal Once Adarsh Kraft MD        LIDOcaine (PF) 10  mg/ml (1%) injection 10 mg  1 mL Intradermal Once Adarsh Kraft MD           This patient has been assessed for risk factors for clearance of surgery with the following stipulations:    [] No Contraindications.      [x] Recommendations for ASPIRIN: Hold X 7 DAYS.    [x] Patient is MODERATE RISK    [x] Cleared for surgery with the following restrictions: moderate risk     [] Not Cleared for surgery due to the following reasons:    If you have any questions regarding the above, please contact my office at [phone]    Clearing Clinician:         Christelle Higginbotham NP

## 2024-08-12 NOTE — TELEPHONE ENCOUNTER
8/12/24 patient is request surgery cl for Dr. Tee for a Interventional Pain  procedure  on 8/14/24 will send to provider

## 2024-08-13 ENCOUNTER — TELEPHONE (OUTPATIENT)
Dept: CARDIOLOGY | Facility: CLINIC | Age: 69
End: 2024-08-13
Payer: MEDICARE

## 2024-08-13 NOTE — TELEPHONE ENCOUNTER
----- Message from Shabana Del Castillo sent at 8/13/2024  9:10 AM CDT -----  Contact: Perla [phone]  Type: Needs Medical Advice  Who Called:  Perla aviles     Best Call Back Number: [phone] fax [phone]    Additional Information: Pls fax surgical clearance form. Surgery is tomorrow Thank you!

## 2024-08-13 NOTE — TELEPHONE ENCOUNTER
8/13/24 sw the patient about his concerns with getting his Sx CL nancy due to to the surgery being tomorrow 8/14/24 I informed the patient that we were waiting on a provider to sign  patient understood the stated

## 2024-08-13 NOTE — TELEPHONE ENCOUNTER
----- Message from Odalis Abel sent at 8/13/2024 12:48 PM CDT -----  Contact: [facility]  Type:  Patient Returning Call    Who Called:Jamila from [facility]    Who Left Message for Patient: Alicia    Does the patient know what this is regarding?: clearance    Would the patient rather a call back or a response via [facility]?  Call back     Best Call Back Number:     Additional Information:  gave them office fax.    They are needing the clearance request, procedure is cervical epidural steroid injection    They will refax again today    Please return to fax # [phone]    They would like a call back as well    Thanks

## 2024-08-22 ENCOUNTER — PATIENT MESSAGE (OUTPATIENT)
Dept: ADMINISTRATIVE | Facility: HOSPITAL | Age: 69
End: 2024-08-22
Payer: MEDICARE

## 2024-08-23 ENCOUNTER — PATIENT OUTREACH (OUTPATIENT)
Dept: ADMINISTRATIVE | Facility: HOSPITAL | Age: 69
End: 2024-08-23
Payer: MEDICARE

## 2024-08-23 NOTE — PROGRESS NOTES
Population Health Chart Review & Patient Outreach Details      Additional [facility] Health Notes:               Updates Requested / Reviewed:      Updated Care Coordination Note, Care Everywhere, and          Health Maintenance Topics Overdue:      [facility] Score: 1     Colon Cancer Screening    Shingles/Zoster Vaccine  RSV Vaccine                  Health Maintenance Topic(s) Outreach Outcomes & Actions Taken:    Colorectal Cancer Screening - Outreach Outcomes & Actions Taken  : Colonoscopy Case Request / Referral / Home Test Order Placed and informed patient to schedule 3 year follow up

## 2024-08-26 ENCOUNTER — TELEPHONE (OUTPATIENT)
Dept: GASTROENTEROLOGY | Facility: CLINIC | Age: 69
End: 2024-08-26
Payer: MEDICARE

## 2024-10-23 RX ORDER — PREGABALIN 100 MG/1
100 CAPSULE ORAL DAILY
COMMUNITY

## 2024-10-30 ENCOUNTER — HOSPITAL ENCOUNTER (OUTPATIENT)
Facility: HOSPITAL | Age: 69
Discharge: HOME | End: 2024-10-30
Attending: INTERNAL MEDICINE | Admitting: INTERNAL MEDICINE
Payer: MEDICARE

## 2024-10-30 ENCOUNTER — ANESTHESIA EVENT (OUTPATIENT)
Dept: ENDOSCOPY | Facility: HOSPITAL | Age: 69
End: 2024-10-30
Payer: MEDICARE

## 2024-10-30 ENCOUNTER — ANESTHESIA (OUTPATIENT)
Dept: ENDOSCOPY | Facility: HOSPITAL | Age: 69
End: 2024-10-30
Payer: MEDICARE

## 2024-10-30 DIAGNOSIS — Z86.0100 HISTORY OF COLON POLYPS: ICD-10-CM

## 2024-10-30 DIAGNOSIS — K57.90 DIVERTICULOSIS: ICD-10-CM

## 2024-10-30 DIAGNOSIS — K64.8 INTERNAL HEMORRHOIDS: Primary | ICD-10-CM

## 2024-10-30 PROCEDURE — D9220A PRA ANESTHESIA: Mod: ANES,,, | Performed by: ANESTHESIOLOGY

## 2024-10-30 PROCEDURE — 37000009 HC ANESTHESIA EA ADD 15 MINS: Performed by: INTERNAL MEDICINE

## 2024-10-30 PROCEDURE — 37000008 HC ANESTHESIA 1ST 15 MINUTES: Performed by: INTERNAL MEDICINE

## 2024-10-30 PROCEDURE — D9220A PRA ANESTHESIA: Mod: CRNA,,, | Performed by: NURSE ANESTHETIST, CERTIFIED REGISTERED

## 2024-10-30 PROCEDURE — G0105 COLORECTAL SCRN; HI RISK IND: HCPCS | Mod: ,,, | Performed by: INTERNAL MEDICINE

## 2024-10-30 PROCEDURE — 25000003 PHARM REV CODE 250: Performed by: INTERNAL MEDICINE

## 2024-10-30 PROCEDURE — 63600175 PHARM REV CODE 636 W HCPCS: Performed by: NURSE ANESTHETIST, CERTIFIED REGISTERED

## 2024-10-30 PROCEDURE — G0105 COLORECTAL SCRN; HI RISK IND: HCPCS | Performed by: INTERNAL MEDICINE

## 2024-10-30 RX ORDER — DEXTROMETHORPHAN/PSEUDOEPHED 2.5-7.5/.8
DROPS ORAL
Status: DISCONTINUED | OUTPATIENT
Start: 2024-10-30 | End: 2024-10-30 | Stop reason: HOSPADM

## 2024-10-30 RX ORDER — LIDOCAINE HYDROCHLORIDE 20 MG/ML
INJECTION, SOLUTION EPIDURAL; INFILTRATION; INTRACAUDAL; PERINEURAL
Status: DISCONTINUED | OUTPATIENT
Start: 2024-10-30 | End: 2024-10-30

## 2024-10-30 RX ORDER — PROPOFOL 10 MG/ML
VIAL (ML) INTRAVENOUS
Status: DISCONTINUED | OUTPATIENT
Start: 2024-10-30 | End: 2024-10-30

## 2024-10-30 RX ORDER — SODIUM CHLORIDE 9 MG/ML
INJECTION, SOLUTION INTRAVENOUS CONTINUOUS
Status: DISCONTINUED | OUTPATIENT
Start: 2024-10-30 | End: 2024-10-30 | Stop reason: HOSPADM

## 2024-10-30 RX ADMIN — PROPOFOL 30 MG: 10 INJECTION, EMULSION INTRAVENOUS at 08:10

## 2024-10-30 RX ADMIN — PROPOFOL 100 MG: 10 INJECTION, EMULSION INTRAVENOUS at 08:10

## 2024-10-30 RX ADMIN — LIDOCAINE HYDROCHLORIDE 100 MG: 20 INJECTION, SOLUTION EPIDURAL; INFILTRATION; INTRACAUDAL; PERINEURAL at 08:10

## 2024-10-30 RX ADMIN — GLYCOPYRROLATE 0.2 MG: 0.2 INJECTION, SOLUTION INTRAMUSCULAR; INTRAVITREAL at 08:10

## 2024-10-30 NOTE — PLAN OF CARE
Dr. Story to bedside to speak to patient regarding findings of procedure, discharge instructions given to patient and his wife, both verbalized understanding and voiced no questions or concerns at this time.  Patient drinking apple juice and tolerating it well.

## 2024-10-30 NOTE — PROVATION PATIENT INSTRUCTIONS
Discharge Summary/Instructions after an Endoscopic Procedure  Patient Name: Colt Rose  Patient MRN: [id number]  Patient YOB: 1955 Wednesday, October 30, 2024  Tyler Covarrubias MD  Dear patient,  As a result of recent federal legislation (The Federal Cures Act), you may   receive lab or pathology results from your procedure in your [facility]   account before your physician is able to contact you. Your physician or   their representative will relay the results to you with their   recommendations at their soonest availability.  Thank you,  RESTRICTIONS:  During your procedure today, you received medications for sedation.  These   medications may affect your judgment, balance and coordination.  Therefore,   for 24 hours, you have the following restrictions:   - DO NOT drive a car, operate machinery, make legal/financial decisions,   sign important papers or drink alcohol.    ACTIVITY:  Today: no heavy lifting, straining or running due to procedural   sedation/anesthesia.  The following day: return to full activity including work.  DIET:  Eat and drink normally unless instructed otherwise.     TREATMENT FOR COMMON SIDE EFFECTS:  - Mild abdominal pain, nausea, belching, bloating or excessive gas:  rest,   eat lightly and use a heating pad.  - Sore Throat: treat with throat lozenges and/or gargle with warm salt   water.  - Because air was used during the procedure, expelling large amounts of air   from your rectum or belching is normal.  - If a bowel prep was taken, you may not have a bowel movement for 1-3 days.    This is normal.  SYMPTOMS TO WATCH FOR AND REPORT TO YOUR PHYSICIAN:  1. Abdominal pain or bloating, other than gas cramps.  2. Chest pain.  3. Back pain.  4. Signs of infection such as: chills or fever occurring within 24 hours   after the procedure.  5. Rectal bleeding, which would show as bright red, maroon, or black stools.   (A tablespoon of blood from the rectum is not serious, especially  if   hemorrhoids are present.)  6. Vomiting.  7. Weakness or dizziness.  GO DIRECTLY TO THE NEAREST EMERGENCY ROOM IF YOU HAVE ANY OF THE FOLLOWING:      Difficulty breathing              Chills and/or fever over 101 F   Persistent vomiting and/or vomiting blood   Severe abdominal pain   Severe chest pain   Black, tarry stools   Bleeding- more than one tablespoon   Any other symptom or condition that you feel may need urgent attention  Your doctor recommends these additional instructions:  If any biopsies were taken, your doctors clinic will contact you in 1 to 2   weeks with any results.  - Patient has a contact number available for emergencies.  The signs and   symptoms of potential delayed complications were discussed with the   patient.  Return to normal activities tomorrow.  Written discharge   instructions were provided to the patient.   - High fiber diet.   - Continue present medications.   - Repeat colonoscopy in 5 years for surveillance.   - Discharge patient to home (ambulatory).   - Return to GI office after studies are complete.  For questions, problems or results please call your physician - Tyler Covarrubias MD at Work:  [phone].  [facility] EM[facility]ENC[facility] RO[facility] PHONE NUMBER: [phone]  IF A COMPLICATION OR EMERGENCY SITUATION ARISES AND YOU ARE UNABLE TO REACH   YOUR PHYSICIAN - GO DIRECTLY TO THE EMERGENCY ROOM.  Tyler Covarrubias MD  10/30/2024 8:20:06 AM  This report has been verified and signed electronically.  Dear patient,  As a result of recent federal legislation (The Federal Cures Act), you may   receive lab or pathology results from your procedure in your [facility]   account before your physician is able to contact you. Your physician or   their representative will relay the results to you with their   recommendations at their soonest availability.  Thank you,  PROVATION

## 2024-10-30 NOTE — PLAN OF CARE
0820 Received patient from procedure room.  Report received, placed patient on monitors, VSS.  Will continue to monitor patient per protocol until discharge.

## 2024-10-31 VITALS
RESPIRATION RATE: 10 BRPM | TEMPERATURE: 97 F | SYSTOLIC BLOOD PRESSURE: 126 MMHG | OXYGEN SATURATION: 98 % | DIASTOLIC BLOOD PRESSURE: 70 MMHG | HEART RATE: 54 BPM

## 2024-12-02 DIAGNOSIS — M23.92 INTERNAL DERANGEMENT OF LEFT KNEE: ICD-10-CM

## 2024-12-03 RX ORDER — MELOXICAM 15 MG/1
15 TABLET ORAL NIGHTLY PRN
Qty: 90 TABLET | Refills: 3 | Status: SHIPPED | OUTPATIENT
Start: 2024-12-03

## 2024-12-03 NOTE — TELEPHONE ENCOUNTER
No care due was identified.  [facility] Embedded Care Due Messages. Reference number: [id number].   12/02/2024 9:58:23 PM CST

## 2024-12-04 ENCOUNTER — TELEPHONE (OUTPATIENT)
Dept: UROLOGY | Facility: CLINIC | Age: 69
End: 2024-12-04
Payer: MEDICARE

## 2024-12-04 NOTE — TELEPHONE ENCOUNTER
Pre appt call   No referral or T labs on file   Informed pt need to defer to pcp for this concern and return with above   Pt vu   Appt canceled

## 2024-12-09 ENCOUNTER — OFFICE VISIT (OUTPATIENT)
Dept: FAMILY MEDICINE | Facility: CLINIC | Age: 69
End: 2024-12-09
Payer: MEDICARE

## 2024-12-09 VITALS
TEMPERATURE: 98 F | HEART RATE: 57 BPM | HEIGHT: 70 IN | DIASTOLIC BLOOD PRESSURE: 64 MMHG | RESPIRATION RATE: 17 BRPM | WEIGHT: 192.88 LBS | BODY MASS INDEX: 27.61 KG/M2 | OXYGEN SATURATION: 99 % | SYSTOLIC BLOOD PRESSURE: 120 MMHG

## 2024-12-09 DIAGNOSIS — G47.33 OSA (OBSTRUCTIVE SLEEP APNEA): ICD-10-CM

## 2024-12-09 DIAGNOSIS — R79.89 ABNORMAL COMPLETE BLOOD COUNT: ICD-10-CM

## 2024-12-09 DIAGNOSIS — R53.83 FATIGUE, UNSPECIFIED TYPE: Primary | ICD-10-CM

## 2024-12-09 DIAGNOSIS — E78.2 MIXED HYPERLIPIDEMIA: ICD-10-CM

## 2024-12-09 DIAGNOSIS — G47.9 SLEEP DISTURBANCE: ICD-10-CM

## 2024-12-09 PROCEDURE — 99215 OFFICE O/P EST HI 40 MIN: CPT | Mod: PBBFAC,PO | Performed by: PHYSICIAN ASSISTANT

## 2024-12-09 PROCEDURE — 99999 PR PBB SHADOW E&M-EST. PATIENT-LVL V: CPT | Mod: PBBFAC,,, | Performed by: PHYSICIAN ASSISTANT

## 2024-12-09 PROCEDURE — 99214 OFFICE O/P EST MOD 30 MIN: CPT | Mod: S$PBB,,, | Performed by: PHYSICIAN ASSISTANT

## 2024-12-09 NOTE — PROGRESS NOTES
Subjective:       Patient ID: Colt Rose is a 69 y.o. male.    Chief Complaint: Follow-up (Testosterone checked ) and Fatigue    Mr. Rose is a 69 year old male with mixed hyperlipidemia and ARIS. He complains of general fatigue over the last several months. He does have ARIS and uses CPAP. Will be seeing pulmonologist later this month. He reports poor sleep quality. Patient is specifically concerned about his testosterone level. The patient also admits to some associated sexual dysfunction. Denies change in medication or activity. Denies chest pain, CONTRERAS, shortness of breath. He is followed by cardiologist, Dr. Freeman.       Review of patient's allergies indicates:   Allergen Reactions    Pennsaid [diclofenac sodium] Rash and Blisters         Current Outpatient Medications:     atorvastatin (LIPITOR) 20 MG tablet, Take 1 tablet (20 mg total) by mouth once daily., Disp: 90 tablet, Rfl: 3    ciclopirox (PENLAC) 8 % Soln, Apply topically., Disp: , Rfl:     docosahexaenoic acid/epa (FISH OIL ORAL), Take 1 capsule by mouth once daily., Disp: , Rfl:     fluocinolone acetonide oil 0.01 % Drop, Place 5 drops into both eyes 2 (two) times daily as needed. , Disp: , Rfl: 5    magnesium oxide (MAG-OX) 400 mg (241.3 mg magnesium) tablet, Take 1 tablet (400 mg total) by mouth once daily., Disp: 90 tablet, Rfl: 3    meloxicam (MOBIC) 15 MG tablet, TAKE 1 TABLET BY MOUTH AT NIGHT  AS NEEDED, Disp: 90 tablet, Rfl: 3    methocarbamoL (ROBAXIN) 500 MG Tab, Take 1 tablet (500 mg total) by mouth every 8 (eight) hours as needed (muscle spasm)., Disp: 90 tablet, Rfl: 3    multivitamin (THERAGRAN) per tablet, Take 1 tablet by mouth once daily., Disp: , Rfl:     omeprazole (PRILOSEC) 40 MG capsule, TAKE 1 CAPSULE BY MOUTH ONCE  DAILY, Disp: 90 capsule, Rfl: 1    pregabalin (LYRICA) 100 MG capsule, Take 100 mg by mouth once daily., Disp: , Rfl:     aspirin (ECOTRIN) 81 MG EC tablet, Take 1 tablet (81 mg total) by mouth once daily.,  Disp: , Rfl: 0  No current facility-administered medications for this visit.    Facility-Administered Medications Ordered in Other Visits:     lactated ringers infusion, , Intravenous, Continuous, Adarsh Kraft MD    lactated ringers infusion, , Intravenous, Continuous, Adarsh Kraft MD    LIDOcaine (PF) 10 mg/ml (1%) injection 10 mg, 1 mL, Intradermal, Once, Adarsh Kraft MD    LIDOcaine (PF) 10 mg/ml (1%) injection 10 mg, 1 mL, Intradermal, Once, Adarsh Kraft MD    Lab Results   Component Value Date    WBC 4.07 07/12/2024    HGB 14.4 07/12/2024    HCT 44.0 07/12/2024     07/12/2024    CHOL 162 07/12/2024    TRIG 59 07/12/2024    HDL 52 07/12/2024    ALT 23 07/12/2024    AST 22 07/12/2024     07/12/2024    K 4.3 07/12/2024     07/12/2024    CREATININE 0.8 07/12/2024    BUN 18 07/12/2024    CO2 25 07/12/2024    TSH 3.094 06/08/2023    PSA 0.39 07/12/2024    INR 1.0 05/23/2023    HGBA1C 5.5 01/11/2024       Review of Systems   Constitutional:  Positive for fatigue. Negative for activity change, appetite change and fever.   HENT:  Negative for postnasal drip, rhinorrhea and sinus pressure.    Eyes:  Negative for visual disturbance.   Respiratory:  Negative for cough and shortness of breath.    Cardiovascular:  Negative for chest pain.   Gastrointestinal:  Negative for abdominal distention and abdominal pain.   Genitourinary:  Negative for difficulty urinating and dysuria.   Musculoskeletal:  Negative for arthralgias and myalgias.   Neurological:  Negative for headaches.   Hematological:  Negative for adenopathy.   Psychiatric/Behavioral:  The patient is not nervous/anxious.        Objective:      Physical Exam  Constitutional:       Appearance: Normal appearance.   HENT:      Head: Normocephalic and atraumatic.   Eyes:      Conjunctiva/sclera: Conjunctivae normal.   Cardiovascular:      Rate and Rhythm: Normal rate and regular rhythm.   Pulmonary:      Effort: Pulmonary effort is normal. No respiratory  distress.      Breath sounds: Normal breath sounds. No wheezing.   Abdominal:      General: There is no distension.      Palpations: There is no mass.      Tenderness: There is no abdominal tenderness.   Musculoskeletal:      Right lower leg: No edema.      Left lower leg: No edema.   Lymphadenopathy:      Cervical: No cervical adenopathy.   Skin:     Findings: No erythema.   Neurological:      Mental Status: He is alert and oriented to person, place, and time.   Psychiatric:         Behavior: Behavior normal.         Assessment:       1. Mixed hyperlipidemia    2. Fatigue, unspecified type    3. Sleep disturbance    4. ARIS (obstructive sleep apnea)    5. Abnormal complete blood count        Plan:       Colt was seen today for follow-up and fatigue.    Diagnoses and all orders for this visit:    Fatigue, unspecified type  -     Comprehensive Metabolic Panel; Future  -     CBC Auto Differential; Future  -     TSH; Future  -     TESTOSTERONE; Future  -     IRON AND TIBC; Future  -     FERRITIN; Future    Mixed hyperlipidemia  Stable   Continue current medication  Sleep disturbance  -     TSH; Future  -     TESTOSTERONE; Future  -     IRON AND TIBC; Future  -     FERRITIN; Future    ARIS (obstructive sleep apnea)  -     TSH; Future  -     TESTOSTERONE; Future  -     IRON AND TIBC; Future  -     FERRITIN; Future  Using CPAP  Follow up with pulmonology   Abnormal complete blood count  -     CBC Auto Differential; Future  -     IRON AND TIBC; Future  -     FERRITIN; Future    Patient will be notified when labs return and further recommendations will be given at that time.

## 2024-12-10 ENCOUNTER — LAB VISIT (OUTPATIENT)
Dept: LAB | Facility: HOSPITAL | Age: 69
End: 2024-12-10
Attending: PHYSICIAN ASSISTANT
Payer: MEDICARE

## 2024-12-10 DIAGNOSIS — G47.33 OSA (OBSTRUCTIVE SLEEP APNEA): ICD-10-CM

## 2024-12-10 DIAGNOSIS — R53.83 FATIGUE, UNSPECIFIED TYPE: ICD-10-CM

## 2024-12-10 DIAGNOSIS — R79.89 ABNORMAL COMPLETE BLOOD COUNT: ICD-10-CM

## 2024-12-10 DIAGNOSIS — G47.9 SLEEP DISTURBANCE: ICD-10-CM

## 2024-12-10 LAB
ALBUMIN SERPL BCP-MCNC: 3.6 G/DL (ref 3.5–5.2)
ALP SERPL-CCNC: 49 U/L (ref 40–150)
ALT SERPL W/O P-5'-P-CCNC: 27 U/L (ref 10–44)
ANION GAP SERPL CALC-SCNC: 7 MMOL/L (ref 8–16)
AST SERPL-CCNC: 23 U/L (ref 10–40)
BASOPHILS # BLD AUTO: 0.04 K/UL (ref 0–0.2)
BASOPHILS NFR BLD: 0.9 % (ref 0–1.9)
BILIRUB SERPL-MCNC: 0.5 MG/DL (ref 0.1–1)
BUN SERPL-MCNC: 17 MG/DL (ref 8–23)
CALCIUM SERPL-MCNC: 9.1 MG/DL (ref 8.7–10.5)
CHLORIDE SERPL-SCNC: 105 MMOL/L (ref 95–110)
CO2 SERPL-SCNC: 28 MMOL/L (ref 23–29)
CREAT SERPL-MCNC: 0.8 MG/DL (ref 0.5–1.4)
DIFFERENTIAL METHOD BLD: ABNORMAL
EOSINOPHIL # BLD AUTO: 0.2 K/UL (ref 0–0.5)
EOSINOPHIL NFR BLD: 3.5 % (ref 0–8)
ERYTHROCYTE [DISTWIDTH] IN BLOOD BY AUTOMATED COUNT: 13.1 % (ref 11.5–14.5)
EST. GFR  (NO RACE VARIABLE): >60 ML/MIN/1.73 M^2
FERRITIN SERPL-MCNC: 140 NG/ML (ref 20–300)
GLUCOSE SERPL-MCNC: 106 MG/DL (ref 70–110)
HCT VFR BLD AUTO: 41.9 % (ref 40–54)
HGB BLD-MCNC: 13.8 G/DL (ref 14–18)
IMM GRANULOCYTES # BLD AUTO: 0.01 K/UL (ref 0–0.04)
IMM GRANULOCYTES NFR BLD AUTO: 0.2 % (ref 0–0.5)
IRON SERPL-MCNC: 143 UG/DL (ref 45–160)
LYMPHOCYTES # BLD AUTO: 1.9 K/UL (ref 1–4.8)
LYMPHOCYTES NFR BLD: 42.8 % (ref 18–48)
MCH RBC QN AUTO: 31.4 PG (ref 27–31)
MCHC RBC AUTO-ENTMCNC: 32.9 G/DL (ref 32–36)
MCV RBC AUTO: 95 FL (ref 82–98)
MONOCYTES # BLD AUTO: 0.4 K/UL (ref 0.3–1)
MONOCYTES NFR BLD: 9.8 % (ref 4–15)
NEUTROPHILS # BLD AUTO: 1.9 K/UL (ref 1.8–7.7)
NEUTROPHILS NFR BLD: 42.8 % (ref 38–73)
NRBC BLD-RTO: 0 /100 WBC
PLATELET # BLD AUTO: 224 K/UL (ref 150–450)
PMV BLD AUTO: 10.9 FL (ref 9.2–12.9)
POTASSIUM SERPL-SCNC: 4.1 MMOL/L (ref 3.5–5.1)
PROT SERPL-MCNC: 6.6 G/DL (ref 6–8.4)
RBC # BLD AUTO: 4.4 M/UL (ref 4.6–6.2)
SATURATED IRON: 44 % (ref 20–50)
SODIUM SERPL-SCNC: 140 MMOL/L (ref 136–145)
TESTOST SERPL-MCNC: 632 NG/DL (ref 304–1227)
TOTAL IRON BINDING CAPACITY: 324 UG/DL (ref 250–450)
TRANSFERRIN SERPL-MCNC: 219 MG/DL (ref 200–375)
TSH SERPL DL<=0.005 MIU/L-ACNC: 2.79 UIU/ML (ref 0.4–4)
WBC # BLD AUTO: 4.51 K/UL (ref 3.9–12.7)

## 2024-12-10 PROCEDURE — 80053 COMPREHEN METABOLIC PANEL: CPT | Performed by: PHYSICIAN ASSISTANT

## 2024-12-10 PROCEDURE — 84443 ASSAY THYROID STIM HORMONE: CPT | Performed by: PHYSICIAN ASSISTANT

## 2024-12-10 PROCEDURE — 84403 ASSAY OF TOTAL TESTOSTERONE: CPT | Performed by: PHYSICIAN ASSISTANT

## 2024-12-10 PROCEDURE — 84466 ASSAY OF TRANSFERRIN: CPT | Performed by: PHYSICIAN ASSISTANT

## 2024-12-10 PROCEDURE — 82728 ASSAY OF FERRITIN: CPT | Performed by: PHYSICIAN ASSISTANT

## 2024-12-10 PROCEDURE — 85025 COMPLETE CBC W/AUTO DIFF WBC: CPT | Performed by: PHYSICIAN ASSISTANT

## 2024-12-10 PROCEDURE — 36415 COLL VENOUS BLD VENIPUNCTURE: CPT | Mod: PO | Performed by: PHYSICIAN ASSISTANT

## 2024-12-11 DIAGNOSIS — D64.9 ANEMIA, UNSPECIFIED TYPE: Primary | ICD-10-CM

## 2024-12-11 DIAGNOSIS — K21.9 GASTROESOPHAGEAL REFLUX DISEASE WITHOUT ESOPHAGITIS: ICD-10-CM

## 2024-12-11 RX ORDER — OMEPRAZOLE 40 MG/1
40 CAPSULE, DELAYED RELEASE ORAL
Qty: 90 CAPSULE | Refills: 0 | Status: SHIPPED | OUTPATIENT
Start: 2024-12-11

## 2024-12-11 NOTE — TELEPHONE ENCOUNTER
Colt Rose  is requesting a refill authorization.  Brief Assessment and Rationale for Refill:  Approve     Medication Therapy Plan:         Comments:     Note composed:5:03 AM 12/11/2024

## 2024-12-11 NOTE — TELEPHONE ENCOUNTER
No care due was identified.  [facility] Embedded Care Due Messages. Reference number: [id number].   12/11/2024 12:42:30 AM CST

## 2024-12-12 ENCOUNTER — LAB VISIT (OUTPATIENT)
Dept: LAB | Facility: HOSPITAL | Age: 69
End: 2024-12-12
Attending: PHYSICIAN ASSISTANT
Payer: MEDICARE

## 2024-12-12 DIAGNOSIS — D64.9 ANEMIA, UNSPECIFIED TYPE: ICD-10-CM

## 2024-12-12 LAB
IRON SERPL-MCNC: 127 UG/DL (ref 45–160)
SATURATED IRON: 39 % (ref 20–50)
TOTAL IRON BINDING CAPACITY: 327 UG/DL (ref 250–450)
TRANSFERRIN SERPL-MCNC: 221 MG/DL (ref 200–375)

## 2024-12-12 PROCEDURE — 36415 COLL VENOUS BLD VENIPUNCTURE: CPT | Mod: PO | Performed by: PHYSICIAN ASSISTANT

## 2024-12-12 PROCEDURE — 83540 ASSAY OF IRON: CPT | Performed by: PHYSICIAN ASSISTANT

## 2024-12-12 PROCEDURE — 82728 ASSAY OF FERRITIN: CPT | Performed by: PHYSICIAN ASSISTANT

## 2024-12-13 LAB — FERRITIN SERPL-MCNC: 148 NG/ML (ref 20–300)

## 2024-12-13 NOTE — PROGRESS NOTES
No critical results.  Reviewed and held for ordering providers return.    Mary Ann Davidson NP covering for DANIEL Oliveira

## 2024-12-20 ENCOUNTER — TELEPHONE (OUTPATIENT)
Dept: FAMILY MEDICINE | Facility: CLINIC | Age: 69
End: 2024-12-20
Payer: MEDICARE

## 2024-12-20 ENCOUNTER — OFFICE VISIT (OUTPATIENT)
Dept: PULMONOLOGY | Facility: CLINIC | Age: 69
End: 2024-12-20
Payer: MEDICARE

## 2024-12-20 VITALS
HEART RATE: 55 BPM | OXYGEN SATURATION: 97 % | DIASTOLIC BLOOD PRESSURE: 63 MMHG | WEIGHT: 191 LBS | SYSTOLIC BLOOD PRESSURE: 107 MMHG | HEIGHT: 70 IN | BODY MASS INDEX: 27.35 KG/M2

## 2024-12-20 DIAGNOSIS — R19.5 OCCULT BLOOD POSITIVE STOOL: Primary | ICD-10-CM

## 2024-12-20 DIAGNOSIS — G47.33 OSA (OBSTRUCTIVE SLEEP APNEA): Primary | ICD-10-CM

## 2024-12-20 PROCEDURE — 99213 OFFICE O/P EST LOW 20 MIN: CPT | Mod: PBBFAC,PO | Performed by: NURSE PRACTITIONER

## 2024-12-20 PROCEDURE — 99999 PR PBB SHADOW E&M-EST. PATIENT-LVL III: CPT | Mod: PBBFAC,,, | Performed by: NURSE PRACTITIONER

## 2024-12-20 NOTE — PATIENT INSTRUCTIONS
Continue CPAP nightly.    CPAP Supply order sent to [facility].    Can consider getting new CPAP machine in 2026.    Continue current prescription medication regiment. Keep follow up appointment as scheduled. Please call the office if you have any questions or concerns.

## 2024-12-20 NOTE — PROGRESS NOTES
12/20/2024    Colt Rose  New Patient Consult    Chief Complaint   Patient presents with    Apnea       HPI:  12/20/2024: Hx: ARIS on CPAP  In office today alone.  Denies history of lung disease. Denies current use of inhaler therapy, supplemental oxygen. Denies personal history of cancer, PE, current anticoagulation use.  Diagnosed with ARIS after doing a PSG on 8/10/2021 revealing AHI 19.4. Subsequently underwent Titration study on 11/16/2021 revealing adequate pressures with CPAP.  Has been using CPAP ever since 2021 - states uses most nights, except for when he goes camping in the winter time on the weekends. Using nasal piece with no issue reported.  Endorses minimal benefit with CPAP use.   Denies shortness of breath, wheezing, cough, chest tightness, mucous production.           Social Hx: Lives with wife - one dog in the home. 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 exposure, Smoking Hx: Never smoker  Family Hx: No Lung Cancer, COPD, Asthma  Medical Hx: No previous pneumonia ; No previous shoulder/chest surgery          The Chief Complaint is: New to me      PFSH:  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    Cervical disc displacement     Degeneration of lumbar intervertebral disc     GERD (gastroesophageal reflux disease)     gastric polyps    Hip pain 02/22/2016    Hyperlipidemia     Shingles 11/2013    Sleep apnea          Past Surgical History:   Procedure Laterality Date    ABLATION, SVT, ACCESSORY PATHWAY N/A 5/25/2023    Procedure: Ablation, SVT, Accessory Pathway;  Surgeon: Goyo Sky MD;  Location: [facility] EP LAB;  Service: Cardiology;  Laterality: N/A;  PAC, RFA, CARTO, MAC, GP, 3 PREP    CERVICAL FUSION      bone graft    COLONOSCOPY N/A 7/29/2021    Procedure: COLONOSCOPY;  Surgeon: Tyler Story MD;  Location: [facility] ENDO;  Service: Endoscopy;  Laterality: N/A;    COLONOSCOPY N/A 10/30/2024    Procedure: COLONOSCOPY;  Surgeon: Jez  Tyler MENJIVAR MD;  Location: [facility];  Service: Endoscopy;  Laterality: N/A;  m/ppm    EPIDURAL STEROID INJECTION INTO LUMBAR SPINE N/A 12/5/2019    Procedure: Injection-steroid-epidural-lumbar;  Surgeon: Adarsh Kraft MD;  Location: [facility];  Service: Pain Management;  Laterality: N/A;  L5-S1    ESOPHAGOGASTRODUODENOSCOPY N/A 10/27/2020    Procedure: EGD (ESOPHAGOGASTRODUODENOSCOPY);  Surgeon: Tyler Story MD;  Location: [facility];  Service: Endoscopy;  Laterality: N/A;    INJECTION OF ANESTHETIC AGENT AROUND MEDIAL BRANCH NERVES INNERVATING LUMBAR FACET JOINT Bilateral 11/18/2020    Procedure: BLOCK, NERVE, LUMBAR, MEDIAL BRANCH Bilateral L3,4,5;  Surgeon: Adarsh Kraft MD;  Location: [facility];  Service: Pain Management;  Laterality: Bilateral;    INJECTION OF ANESTHETIC AGENT AROUND MEDIAL BRANCH NERVES INNERVATING LUMBAR FACET JOINT Left 10/25/2023    Procedure: Block-nerve-medial branch-lumbar;  Surgeon: Adarsh Kraft MD;  Location: [facility];  Service: Anesthesiology;  Laterality: Left;  l4-5, l5-s1 MBB    INJECTION OF ANESTHETIC AGENT AROUND MEDIAL BRANCH NERVES INNERVATING LUMBAR FACET JOINT Bilateral 11/28/2023    Procedure: Block-nerve-medial branch-lumbar;  Surgeon: Adarsh Kraft MD;  Location: [facility] OR;  Service: Anesthesiology;  Laterality: Bilateral;  L4.5 and l5/s1 MBB #2    RADIOFREQUENCY ABLATION OF LUMBAR MEDIAL BRANCH NERVE AT SINGLE LEVEL Bilateral 1/29/2021    Procedure: Radiofrequency Ablation, Nerve, Spinal, Lumbar, Medial Branch, 1 Level;  Surgeon: Adarsh Kraft MD;  Location: [facility];  Service: Pain Management;  Laterality: Bilateral;  L3,4,5    TONSILLECTOMY, ADENOIDECTOMY       Social History     Tobacco Use    Smoking status: Never    Smokeless tobacco: Never   Substance Use Topics    Alcohol use: Yes     Alcohol/week: 6.0 standard drinks of alcohol     Types: 6 Cans of beer per week     Comment: weekly or less    Drug use: No     Family History   Problem Relation Name Age of Onset     "Diabetes Mother      Hypertension Mother      Stroke Mother      Coronary artery disease Mother      Stroke Father      Heart disease Brother          CAD     Review of patient's allergies indicates:   Allergen Reactions    Pennsaid [diclofenac sodium] Rash and Blisters         I have reviewed past medical, family, and social history.     Performance Status:The patient's activity level is no limits with regular activity.        Review of Systems   Constitutional:  Positive for fatigue. Negative for activity change, appetite change, chills, diaphoresis, fever and unexpected weight change.   HENT:  Negative for congestion, postnasal drip, rhinorrhea, sinus pressure, sinus pain, sore throat and trouble swallowing.    Eyes:  Negative for photophobia and visual disturbance.   Respiratory:  Negative for cough, choking, chest tightness, shortness of breath and wheezing.    Cardiovascular:  Negative for chest pain, palpitations and leg swelling.   Gastrointestinal:  Negative for abdominal distention, abdominal pain, blood in stool, constipation, diarrhea, nausea and vomiting.   Genitourinary:  Negative for difficulty urinating, dysuria, flank pain and hematuria.   Musculoskeletal:  Positive for neck pain. Negative for back pain, gait problem and joint swelling.   Skin:  Negative for rash and wound.   Allergic/Immunologic: Negative for environmental allergies and food allergies.   Neurological:  Negative for dizziness, seizures, syncope, weakness, light-headedness, numbness and headaches.   Hematological:  Does not bruise/bleed easily.   Psychiatric/Behavioral:  Positive for sleep disturbance. Negative for confusion. The patient is not nervous/anxious.          Exam:Comprehensive exam done. /63 (BP Location: Left arm, Patient Position: Sitting)   Pulse (!) 55   Ht 5' 10" (1.778 m)   Wt 86.7 kg (191 lb 0.5 oz)   SpO2 97%   BMI 27.41 kg/m²   Exam included Vitals as listed  Constitutional: He is oriented to person, " place, and time. He appears well-developed. No distress.   Nose: Nose normal.   Mouth/Throat: Uvula is midline, oropharynx is clear and moist and mucous membranes are normal. No dental caries. No oropharyngeal exudate, posterior oropharyngeal edema, posterior oropharyngeal erythema or tonsillar abscesses.    Eyes: Pupils are equal, round, and reactive to light.   Neck: No JVD present. No thyromegaly present.   Cardiovascular: Normal rate, regular rhythm and normal heart sounds. Exam reveals no gallop and no friction rub.   No murmur heard.  Pulmonary/Chest: Effort normal and breath sounds normal. No accessory muscle usage or stridor. No apnea and no tachypnea. No respiratory distress, decreased breath sounds, wheezes, rhonchi, rales, or tenderness. Clear breath sounds throughout, on room air, in no acute distress.   Abdominal: Soft. He exhibits no mass. There is no tenderness. No hepatosplenomegaly, hernias and normoactive bowel sounds  Musculoskeletal: Normal range of motion. exhibits no edema.   Neurological:  alert and oriented to person, place, and time. not disoriented.   Skin: Skin is warm and dry. Capillary refill takes less 2 sec. No cyanosis or erythema. No pallor. Nails show no clubbing.   Psychiatric: normal mood and affect. behavior is normal. Judgment and thought content normal.       Radiographs (ct chest and cxr) reviewed: was not done  Patient imaging studies were reviewed and interpreted independently. My personal interpretation of most recent images include:  CXR -   CT Chest -       Labs Patient's labs were reviewed including CBC and CMP    Lab Results   Component Value Date    WBC 4.51 12/10/2024    RBC 4.40 (L) 12/10/2024    HGB 13.8 (L) 12/10/2024    HCT 41.9 12/10/2024    MCV 95 12/10/2024    MCH 31.4 (H) 12/10/2024    MCHC 32.9 12/10/2024    RDW 13.1 12/10/2024     12/10/2024    MPV 10.9 12/10/2024    GRAN 1.9 12/10/2024    GRAN 42.8 12/10/2024    LYMPH 1.9 12/10/2024    LYMPH 42.8  12/10/2024    MONO 0.4 12/10/2024    MONO 9.8 12/10/2024    EOS 0.2 12/10/2024    BASO 0.04 12/10/2024    EOSINOPHIL 3.5 12/10/2024    BASOPHIL 0.9 12/10/2024   CMP  Sodium   Date Value Ref Range Status   12/10/2024 140 136 - 145 mmol/L Final     Potassium   Date Value Ref Range Status   12/10/2024 4.1 3.5 - 5.1 mmol/L Final     Chloride   Date Value Ref Range Status   12/10/2024 105 95 - 110 mmol/L Final     CO2   Date Value Ref Range Status   12/10/2024 28 23 - 29 mmol/L Final     Glucose   Date Value Ref Range Status   12/10/2024 106 70 - 110 mg/dL Final     BUN   Date Value Ref Range Status   12/10/2024 17 8 - 23 mg/dL Final     Creatinine   Date Value Ref Range Status   12/10/2024 0.8 0.5 - 1.4 mg/dL Final     Calcium   Date Value Ref Range Status   12/10/2024 9.1 8.7 - 10.5 mg/dL Final     Total Protein   Date Value Ref Range Status   12/10/2024 6.6 6.0 - 8.4 g/dL Final     Albumin   Date Value Ref Range Status   12/10/2024 3.6 3.5 - 5.2 g/dL Final     Total Bilirubin   Date Value Ref Range Status   12/10/2024 0.5 0.1 - 1.0 mg/dL Final     Comment:     For infants and newborns, interpretation of results should be based  on gestational age, weight and in agreement with clinical  observations.    Premature Infant recommended reference ranges:  Up to 24 hours.............<8.0 mg/dL  Up to 48 hours............<12.0 mg/dL  3-5 days..................<15.0 mg/dL  6-29 days.................<15.0 mg/dL       Alkaline Phosphatase   Date Value Ref Range Status   12/10/2024 49 40 - 150 U/L Final     AST   Date Value Ref Range Status   12/10/2024 23 10 - 40 U/L Final     ALT   Date Value Ref Range Status   12/10/2024 27 10 - 44 U/L Final     Anion Gap   Date Value Ref Range Status   12/10/2024 7 (L) 8 - 16 mmol/L Final     eGFR   Date Value Ref Range Status   12/10/2024 >60.0 >60 mL/min/1.73 m^2 Final         PFT was not done  Pulmonary Functions Testing Results:        Plan:  Clinical impression is apparently straight  forward and impression with management as below.    Colt was seen today for apnea.    Diagnoses and all orders for this visit:    ARIS (obstructive sleep apnea)  -     CPAP/BIPAP SUPPLIES        Follow up in about 1 year (around 12/20/2025), or if symptoms worsen or fail to improve.    Discussed with patient above for education the following:      Patient Instructions   Continue CPAP nightly.    CPAP Supply order sent to [facility].    Can consider getting new CPAP machine in 2026.    Continue current prescription medication regiment. Keep follow up appointment as scheduled. Please call the office if you have any questions or concerns.

## 2025-01-08 ENCOUNTER — OFFICE VISIT (OUTPATIENT)
Dept: SURGERY | Facility: CLINIC | Age: 70
End: 2025-01-08
Payer: MEDICARE

## 2025-01-08 VITALS — HEART RATE: 66 BPM | DIASTOLIC BLOOD PRESSURE: 79 MMHG | TEMPERATURE: 97 F | SYSTOLIC BLOOD PRESSURE: 121 MMHG

## 2025-01-08 DIAGNOSIS — R19.5 HEME POSITIVE STOOL: Primary | ICD-10-CM

## 2025-01-08 DIAGNOSIS — Z87.19 H/O ESOPHAGEAL ULCER: ICD-10-CM

## 2025-01-08 DIAGNOSIS — K64.9 HEMORRHOIDS, UNSPECIFIED HEMORRHOID TYPE: ICD-10-CM

## 2025-01-08 PROCEDURE — 99999 PR PBB SHADOW E&M-EST. PATIENT-LVL III: CPT | Mod: PBBFAC,,, | Performed by: SURGERY

## 2025-01-08 PROCEDURE — 99203 OFFICE O/P NEW LOW 30 MIN: CPT | Mod: S$PBB,AQ,, | Performed by: SURGERY

## 2025-01-08 PROCEDURE — 99213 OFFICE O/P EST LOW 20 MIN: CPT | Mod: PBBFAC,PN | Performed by: SURGERY

## 2025-01-08 RX ORDER — LANOLIN ALCOHOL/MO/W.PET/CERES
1 CREAM (GRAM) TOPICAL
COMMUNITY
Start: 2024-09-30

## 2025-01-08 NOTE — H&P
GENERAL SURGERY  OUTPATIENT H&P    REASON FOR VISIT/CC: Internal hemorrhoids    HPI: Colt Rose is a 69 y.o. male referred for evaluation of internal hemorrhoids.  Patient underwent colonoscopy in October which showed diverticulosis and small internal hemorrhoids. Has had very mild anemia and underwent hemoccult testing which returned as positive. Was referred to surgery for evaluation of hemorrhoids as possible etiology.  He is asymptomatic. He does not have any obvious blood in or around his stool or black tarry stools. Does not strain significantly. No family history of colorectal cancers. No bulge from the anus or itching/ irritation.    I have reviewed the patient's chart including prior progress notes, procedures and testing.     ROS:   Review of Systems    PROBLEM LIST:  Patient Active Problem List   Diagnosis    Gastroesophageal reflux disease    Dyslipidemia    Cervical disc displacement    Degeneration of lumbar intervertebral disc    Left-sided low back pain without sciatica    Skin cancer    Lateral epicondylitis of both elbows    Left elbow pain    Right elbow pain    Lumbar radiculitis    Palpitations    Premature atrial contractions    Bradycardia    Osteoarthritis of spine with radiculopathy, lumbar region    Epigastric pain    Other spondylosis, lumbar region    Encounter for preoperative assessment for noncoronary cardiac surgery    Sleep arousal disorder    Snoring    Nonspecific abnormal electrocardiogram (ECG) (EKG)    Mixed hyperlipidemia    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    Abnormal finding on cardiovascular stress test    Prediabetes    Typical atrial flutter    History of cardiac radiofrequency ablation         HISTORY  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    Cervical disc displacement     Degeneration of lumbar intervertebral disc     GERD (gastroesophageal reflux disease)     gastric polyps    Hip pain 02/22/2016    Hyperlipidemia      Shingles 11/2013    Sleep apnea        Past Surgical History:   Procedure Laterality Date    ABLATION, SVT, ACCESSORY PATHWAY N/A 5/25/2023    Procedure: Ablation, SVT, Accessory Pathway;  Surgeon: Goyo Sky MD;  Location: [facility] EP LAB;  Service: Cardiology;  Laterality: N/A;  PAC, RFA, CARTO, MAC, GP, 3 PREP    CERVICAL FUSION      bone graft    COLONOSCOPY N/A 7/29/2021    Procedure: COLONOSCOPY;  Surgeon: Tyler Story MD;  Location: [facility] ENDO;  Service: Endoscopy;  Laterality: N/A;    COLONOSCOPY N/A 10/30/2024    Procedure: COLONOSCOPY;  Surgeon: Tyler Story MD;  Location: [facility] ENDO;  Service: Endoscopy;  Laterality: N/A;  m/ppm    EPIDURAL STEROID INJECTION INTO LUMBAR SPINE N/A 12/5/2019    Procedure: Injection-steroid-epidural-lumbar;  Surgeon: Adarsh Kraft MD;  Location: [facility] OR;  Service: Pain Management;  Laterality: N/A;  L5-S1    ESOPHAGOGASTRODUODENOSCOPY N/A 10/27/2020    Procedure: EGD (ESOPHAGOGASTRODUODENOSCOPY);  Surgeon: Tyler Story MD;  Location: [facility];  Service: Endoscopy;  Laterality: N/A;    INJECTION OF ANESTHETIC AGENT AROUND MEDIAL BRANCH NERVES INNERVATING LUMBAR FACET JOINT Bilateral 11/18/2020    Procedure: BLOCK, NERVE, LUMBAR, MEDIAL BRANCH Bilateral L3,4,5;  Surgeon: Adarsh Kraft MD;  Location: [facility] OR;  Service: Pain Management;  Laterality: Bilateral;    INJECTION OF ANESTHETIC AGENT AROUND MEDIAL BRANCH NERVES INNERVATING LUMBAR FACET JOINT Left 10/25/2023    Procedure: Block-nerve-medial branch-lumbar;  Surgeon: Adarsh Kraft MD;  [facility];  Service: Anesthesiology;  Laterality: Left;  l4-5, l5-s1 MBB    INJECTION OF ANESTHETIC AGENT AROUND MEDIAL BRANCH NERVES INNERVATING LUMBAR FACET JOINT Bilateral 11/28/2023    Procedure: Block-nerve-medial branch-lumbar;  Surgeon: Adarsh Kraft MD;  Location: [facility] OR;  Service: Anesthesiology;  Laterality: Bilateral;  L4.5 and l5/s1 MBB #2    RADIOFREQUENCY ABLATION OF LUMBAR MEDIAL BRANCH NERVE AT  SINGLE LEVEL Bilateral 1/29/2021    Procedure: Radiofrequency Ablation, Nerve, Spinal, Lumbar, Medial Branch, 1 Level;  Surgeon: Adarsh Kraft MD;  Location: [facility] OR;  Service: Pain Management;  Laterality: Bilateral;  L3,4,5    TONSILLECTOMY, ADENOIDECTOMY         Social History     Tobacco Use    Smoking status: Never    Smokeless tobacco: Never   Substance Use Topics    Alcohol use: Yes     Alcohol/week: 6.0 standard drinks of alcohol     Types: 6 Cans of beer per week     Comment: weekly or less    Drug use: No       Family History   Problem Relation Name Age of Onset    Diabetes Mother      Hypertension Mother      Stroke Mother      Coronary artery disease Mother      Stroke Father      Heart disease Brother          CAD         MEDS:  Current Outpatient Medications on File Prior to Visit   Medication Sig Dispense Refill    aspirin (ECOTRIN) 81 MG EC tablet Take 1 tablet (81 mg total) by mouth once daily.  0    atorvastatin (LIPITOR) 20 MG tablet Take 1 tablet (20 mg total) by mouth once daily. 90 tablet 3    ciclopirox (PENLAC) 8 % Soln Apply topically.      docosahexaenoic acid/epa (FISH OIL ORAL) Take 1 capsule by mouth once daily.      fluocinolone acetonide oil 0.01 % Drop Place 5 drops into both eyes 2 (two) times daily as needed.   5    meloxicam (MOBIC) 15 MG tablet TAKE 1 TABLET BY MOUTH AT NIGHT  AS NEEDED 90 tablet 3    methocarbamoL (ROBAXIN) 500 MG Tab Take 1 tablet (500 mg total) by mouth every 8 (eight) hours as needed (muscle spasm). 90 tablet 3    multivitamin (THERAGRAN) per tablet Take 1 tablet by mouth once daily.      omeprazole (PRILOSEC) 40 MG capsule TAKE 1 CAPSULE BY MOUTH ONCE  DAILY 90 capsule 0    pregabalin (LYRICA) 100 MG capsule Take 100 mg by mouth once daily.       Current Facility-Administered Medications on File Prior to Visit   Medication Dose Route Frequency Provider Last Rate Last Admin    lactated ringers infusion   Intravenous Continuous Adarsh Kraft MD lactated  ringers infusion   Intravenous Continuous Adarsh Kraft MD        LIDOcaine (PF) 10 mg/ml (1%) injection 10 mg  1 mL Intradermal Once Adarsh Kraft MD        LIDOcaine (PF) 10 mg/ml (1%) injection 10 mg  1 mL Intradermal Once Adarsh Kraft MD           ALLERGIES:  Review of patient's allergies indicates:   Allergen Reactions    Pennsaid [diclofenac sodium] Rash and Blisters         VITALS:  There were no vitals filed for this visit.      PHYSICAL EXAM:  Physical Exam  Vitals reviewed.   Constitutional:       General: He is not in acute distress.     Appearance: Normal appearance. He is well-developed.   HENT:      Head: Normocephalic and atraumatic.   Eyes:      General: No scleral icterus.  Neck:      Trachea: No tracheal deviation.   Cardiovascular:      Rate and Rhythm: Normal rate and regular rhythm.      Pulses: Normal pulses.   Pulmonary:      Effort: Pulmonary effort is normal. No respiratory distress.      Breath sounds: Normal breath sounds.   Abdominal:      General: There is no distension.      Palpations: Abdomen is soft.      Tenderness: There is no abdominal tenderness.   Genitourinary:     Comments: External anal exam shows no lesions or masses, no protrusion of hemorrhoid tissue with Valsalva  Musculoskeletal:         General: No swelling or tenderness. Normal range of motion.      Cervical back: Normal range of motion and neck supple. No rigidity.   Skin:     General: Skin is warm and dry.      Coloration: Skin is not jaundiced.      Findings: No erythema.   Neurological:      General: No focal deficit present.      Mental Status: He is alert and oriented to person, place, and time. He is not disoriented.      Motor: No weakness or abnormal muscle tone.   Psychiatric:         Mood and Affect: Mood normal.         Behavior: Behavior normal.         Thought Content: Thought content normal.         Judgment: Judgment normal.           LABS:  Lab Results   Component Value Date    WBC 4.51 12/10/2024    RBC  4.40 (L) 12/10/2024    HGB 13.8 (L) 12/10/2024    HCT 41.9 12/10/2024     12/10/2024     Lab Results   Component Value Date     12/10/2024     12/10/2024    K 4.1 12/10/2024     12/10/2024    CO2 28 12/10/2024    BUN 17 12/10/2024    CREATININE 0.8 12/10/2024    CALCIUM 9.1 12/10/2024     Lab Results   Component Value Date    ALT 27 12/10/2024    AST 23 12/10/2024    ALKPHOS 49 12/10/2024    BILITOT 0.5 12/10/2024     Lab Results   Component Value Date    MG 1.9 12/01/2022       STUDIES:  Images and reports were personally reviewed.  C-scope 10-30-24  Impression:            - Non-bleeding internal hemorrhoids.                          - Diverticulosis in the sigmoid colon, in the                          descending colon, in the transverse colon and in                          the ascending colon.                          - The rectum, cecum, ileocecal valve and                          appendiceal orifice are normal.                          - The examined portion of the ileum was normal.                          - No specimens collected       ASSESSMENT & PLAN:  69 y.o. male with hemoccult positive stools, internal hemorrhoids  -based off of previous endoscopy patient does have internal hemorrhoids  -that has a possible etiology for his heme-positive stools the other etiologies still could give positive results  -he is concerned that has blood counts were slightly low we would like to have the hemorrhoids addressed   -I discussed options which included traditional hemorrhoidectomy versus banding, given the small size in asymptomatic nature patient would be an ideal candidate for banding however I do not perform this procedure   -will refer to Colorectal surgery for possible banding   -patient will still likely need EGD to further evaluate heme-positive stools, especially given previous history of esophagitis and gastric polyps

## 2025-01-10 ENCOUNTER — HOSPITAL ENCOUNTER (OUTPATIENT)
Dept: RADIOLOGY | Facility: HOSPITAL | Age: 70
Discharge: HOME | End: 2025-01-10
Attending: ORTHOPAEDIC SURGERY
Payer: MEDICARE

## 2025-01-10 ENCOUNTER — OFFICE VISIT (OUTPATIENT)
Dept: ORTHOPEDICS | Facility: CLINIC | Age: 70
End: 2025-01-10
Payer: MEDICARE

## 2025-01-10 ENCOUNTER — TELEPHONE (OUTPATIENT)
Dept: ORTHOPEDICS | Facility: CLINIC | Age: 70
End: 2025-01-10
Payer: MEDICARE

## 2025-01-10 DIAGNOSIS — M79.605 PAIN OF LEFT LOWER EXTREMITY: Primary | ICD-10-CM

## 2025-01-10 DIAGNOSIS — S86.112A GASTROCNEMIUS STRAIN, LEFT, INITIAL ENCOUNTER: Primary | ICD-10-CM

## 2025-01-10 DIAGNOSIS — M79.605 PAIN OF LEFT LOWER EXTREMITY: ICD-10-CM

## 2025-01-10 PROCEDURE — 99212 OFFICE O/P EST SF 10 MIN: CPT | Mod: PBBFAC,25,PO | Performed by: ORTHOPAEDIC SURGERY

## 2025-01-10 PROCEDURE — 73590 X-RAY EXAM OF LOWER LEG: CPT | Mod: TC,PO,LT

## 2025-01-10 PROCEDURE — 73590 X-RAY EXAM OF LOWER LEG: CPT | Mod: 26,LT,, | Performed by: RADIOLOGY

## 2025-01-10 PROCEDURE — 99999 PR PBB SHADOW E&M-EST. PATIENT-LVL II: CPT | Mod: PBBFAC,,, | Performed by: ORTHOPAEDIC SURGERY

## 2025-01-10 RX ORDER — DICLOFENAC SODIUM 75 MG/1
75 TABLET, DELAYED RELEASE ORAL 2 TIMES DAILY WITH MEALS
Qty: 60 TABLET | Refills: 2 | Status: SHIPPED | OUTPATIENT
Start: 2025-01-10 | End: 2025-04-10

## 2025-01-10 NOTE — PROGRESS NOTES
"Status/Diagnosis: Left medial gastroc strain.  Date of Surgery: none  Date of Injury: 01/10/2025  Return visit: 2 weeks  X-rays on Return: none    Chief Complaint:   Chief Complaint   Patient presents with    Left Lower Leg - Injury, Swelling     Present History:  Colt presents for evaluation of a calf injury sustained while playing pickleball earlier today. He reports feeling a "pop" in the back of his leg when reaching to stretch during the game. The pain is localized to the calf area, with no discomfort reported in other parts of the leg. He reports more severe pain at a specific point when the area is palpated. Colt denies any previous issues with the calf muscle prior to this acute injury, and denies any pain down the leg beyond the calf area or any prior pain in the calf or Achilles before this incident.     PMH significant for hyperlipidemia and ARIS.  Denies tobacco use.  Remains active.      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    Cervical disc displacement     Degeneration of lumbar intervertebral disc     GERD (gastroesophageal reflux disease)     gastric polyps    Hip pain 02/22/2016    Hyperlipidemia     Shingles 11/2013    Sleep apnea        Past Surgical History:   Procedure Laterality Date    ABLATION, SVT, ACCESSORY PATHWAY N/A 5/25/2023    Procedure: Ablation, SVT, Accessory Pathway;  Surgeon: Goyo Sky MD;  Location: [facility] EP LAB;  Service: Cardiology;  Laterality: N/A;  PAC, RFA, CARTO, MAC, GP, 3 PREP    CERVICAL FUSION      bone graft    COLONOSCOPY N/A 7/29/2021    Procedure: COLONOSCOPY;  Surgeon: Tyler Story MD;  Location: [facility] ENDO;  Service: Endoscopy;  Laterality: N/A;    COLONOSCOPY N/A 10/30/2024    Procedure: COLONOSCOPY;  Surgeon: Tyler Story MD;  Location: [facility] ENDO;  Service: Endoscopy;  Laterality: N/A;  m/ppm    EPIDURAL STEROID INJECTION INTO LUMBAR SPINE N/A 12/5/2019    Procedure: Injection-steroid-epidural-lumbar;  " Surgeon: Adarsh Kraft MD;  Location: [facility] OR;  Service: Pain Management;  Laterality: N/A;  L5-S1    ESOPHAGOGASTRODUODENOSCOPY N/A 10/27/2020    Procedure: EGD (ESOPHAGOGASTRODUODENOSCOPY);  Surgeon: Tyler Story MD;  Location: [facility];  Service: Endoscopy;  Laterality: N/A;    INJECTION OF ANESTHETIC AGENT AROUND MEDIAL BRANCH NERVES INNERVATING LUMBAR FACET JOINT Bilateral 11/18/2020    Procedure: BLOCK, NERVE, LUMBAR, MEDIAL BRANCH Bilateral L3,4,5;  Surgeon: Adarsh Kraft MD;  Location: [facility] OR;  Service: Pain Management;  Laterality: Bilateral;    INJECTION OF ANESTHETIC AGENT AROUND MEDIAL BRANCH NERVES INNERVATING LUMBAR FACET JOINT Left 10/25/2023    Procedure: Block-nerve-medial branch-lumbar;  Surgeon: Adarsh Kraft MD;  Location: [facility] OR;  Service: Anesthesiology;  Laterality: Left;  l4-5, l5-s1 MBB    INJECTION OF ANESTHETIC AGENT AROUND MEDIAL BRANCH NERVES INNERVATING LUMBAR FACET JOINT Bilateral 11/28/2023    Procedure: Block-nerve-medial branch-lumbar;  Surgeon: Adarsh Kraft MD;  Location: [facility] OR;  Service: Anesthesiology;  Laterality: Bilateral;  L4.5 and l5/s1 MBB #2    RADIOFREQUENCY ABLATION OF LUMBAR MEDIAL BRANCH NERVE AT SINGLE LEVEL Bilateral 1/29/2021    Procedure: Radiofrequency Ablation, Nerve, Spinal, Lumbar, Medial Branch, 1 Level;  Surgeon: Adarsh Kraft MD;  Location: [facility] OR;  Service: Pain Management;  Laterality: Bilateral;  L3,4,5    TONSILLECTOMY, ADENOIDECTOMY         Current Outpatient Medications   Medication Sig    atorvastatin (LIPITOR) 20 MG tablet Take 1 tablet (20 mg total) by mouth once daily.    ciclopirox (PENLAC) 8 % Soln Apply topically.    docosahexaenoic acid/epa (FISH OIL ORAL) Take 1 capsule by mouth once daily.    magnesium oxide (MAG-OX) 400 mg (241.3 mg magnesium) tablet Take 1 tablet by mouth.    meloxicam (MOBIC) 15 MG tablet TAKE 1 TABLET BY MOUTH AT NIGHT  AS NEEDED    methocarbamoL (ROBAXIN) 500 MG Tab Take 1 tablet (500 mg total) by mouth every  8 (eight) hours as needed (muscle spasm).    multivitamin (THERAGRAN) per tablet Take 1 tablet by mouth once daily.    omeprazole (PRILOSEC) 40 MG capsule TAKE 1 CAPSULE BY MOUTH ONCE  DAILY    pregabalin (LYRICA) 100 MG capsule Take 100 mg by mouth once daily.    aspirin (ECOTRIN) 81 MG EC tablet Take 1 tablet (81 mg total) by mouth once daily.     No current facility-administered medications for this visit.     Facility-Administered Medications Ordered in Other Visits   Medication    lactated ringers infusion    lactated ringers infusion    LIDOcaine (PF) 10 mg/ml (1%) injection 10 mg    LIDOcaine (PF) 10 mg/ml (1%) injection 10 mg       Review of patient's allergies indicates:   Allergen Reactions    Pennsaid [diclofenac sodium] Rash and Blisters       Family History   Problem Relation Name Age of Onset    Diabetes Mother      Hypertension Mother      Stroke Mother      Coronary artery disease Mother      Stroke Father      Heart disease Brother          CAD       Social History     Socioeconomic History    Marital status:    Tobacco Use    Smoking status: Never    Smokeless tobacco: Never   Substance and Sexual Activity    Alcohol use: Yes     Alcohol/week: 6.0 standard drinks of alcohol     Types: 6 Cans of beer per week     Comment: weekly or less    Drug use: No    Sexual activity: Yes     Partners: Female     Social Drivers of Health     Financial Resource Strain: Low Risk  (4/4/2024)    Overall Financial Resource Strain (CARDIA)     Difficulty of Paying Living Expenses: Not hard at all   Food Insecurity: No Food Insecurity (4/4/2024)    Hunger Vital Sign     Worried About Running Out of Food in the Last Year: Never true     Ran Out of Food in the Last Year: Never true   Transportation Needs: No Transportation Needs (4/4/2024)    PRAPARE - Transportation     Lack of Transportation (Medical): No     Lack of Transportation (Non-Medical): No   Physical Activity: Insufficiently Active (4/4/2024)     Exercise Vital Sign     Days of Exercise per Week: 7 days     Minutes of Exercise per Session: 20 min   Stress: No Stress Concern Present (4/4/2024)    [employer] [place] [employer] - Occupational Stress Questionnaire     Feeling of Stress : Only a little   Housing Stability: Low Risk  (4/4/2024)    Housing Stability Vital Sign     Unable to Pay for Housing in the Last Year: No     Number of Places Lived in the Last Year: 1     Unstable Housing in the Last Year: No       Physical exam:  There were no vitals filed for this visit.  There is no height or weight on file to calculate BMI.  General: In no apparent distress; well developed and well nourished.  HEENT: normocephalic; atraumatic.  Cardiovascular: regular rate.  Respiratory: no increased work of breathing.  Musculoskeletal:   Gait: mild antalgic  Inspection:   Mild-to-moderate swelling about the left calf musculature.  Moderate tenderness over the medial gastroc muscle belly.  Negative Homans sign.  No palpable defect over the Achilles.  Symmetric resting ankle plantar flexion with the patient prone and knees flexed 90°.  Good tension with Sanderson testing.  5/5 ankle plantar flexion strength.  Silfverskiold: Negative  Alignment:  Knee: neutral               Ankle: neutral              Hindfoot: neutral              Forefoot: neutral   Sensation:              SILT distally  ROM:              Ankle: near full with pain at extremes              Subtalar: near full with pain at extremes  Pulses: Palpable pedal pulse                   Imaging Studies/Outside documentation:  I have ordered/reviewed/interpreted the following images/outside documentation:  1. NON-weight bearing 3-views of Left tibia and fibula:   On my independent review, no acute bony abnormality noted.  Ankle and knee joint space relatively well-maintained.        Assessment:  Colt Rose is a 69 y.o. male with Left medial gastroc strain.     Plan:   Clinical and radiographic  findings were discussed at length.  No evidence of Achilles tendon rupture.    Recommend a tall cam boot for short period of immobilization.  Patient may weightbear as tolerated in the boot.    Also discussed a short course of oral diclofenac to be taken with food.  No other over-the-counter oral NSAIDs.    Patient voiced understanding all questions were answered.  Return to clinic in 2 weeks for repeat evaluation.  Pending patient progress, patient may or may not require physical therapy for rehabilitation at that time.    We performed a custom orthotic/brace fitting, adjusting and training with the patient. The patient demonstrated understanding and proper care. This was performed for 15 minutes.    This note was created using voice recognition software and may contain grammatical errors.

## 2025-01-10 NOTE — TELEPHONE ENCOUNTER
----- Message from Med Assistant Shore sent at 1/10/2025 11:41 AM CST -----  Contact: wife,  patito  Wants to be seen today, achilles problem,    Call back

## 2025-01-15 ENCOUNTER — OFFICE VISIT (OUTPATIENT)
Dept: FAMILY MEDICINE | Facility: CLINIC | Age: 70
End: 2025-01-15
Payer: MEDICARE

## 2025-01-15 VITALS
BODY MASS INDEX: 28.06 KG/M2 | WEIGHT: 196 LBS | HEIGHT: 70 IN | HEART RATE: 59 BPM | OXYGEN SATURATION: 98 % | TEMPERATURE: 98 F | SYSTOLIC BLOOD PRESSURE: 102 MMHG | DIASTOLIC BLOOD PRESSURE: 70 MMHG | RESPIRATION RATE: 16 BRPM

## 2025-01-15 DIAGNOSIS — M54.12 CERVICAL RADICULOPATHY: ICD-10-CM

## 2025-01-15 DIAGNOSIS — I48.3 TYPICAL ATRIAL FLUTTER: ICD-10-CM

## 2025-01-15 DIAGNOSIS — E78.2 MIXED HYPERLIPIDEMIA: Primary | ICD-10-CM

## 2025-01-15 DIAGNOSIS — R79.9 ABNORMAL BLOOD FINDING: ICD-10-CM

## 2025-01-15 PROCEDURE — 99214 OFFICE O/P EST MOD 30 MIN: CPT | Mod: PBBFAC,PO | Performed by: FAMILY MEDICINE

## 2025-01-15 PROCEDURE — 99999 PR PBB SHADOW E&M-EST. PATIENT-LVL IV: CPT | Mod: PBBFAC,,, | Performed by: FAMILY MEDICINE

## 2025-01-15 PROCEDURE — 99214 OFFICE O/P EST MOD 30 MIN: CPT | Mod: S$PBB,,, | Performed by: FAMILY MEDICINE

## 2025-01-15 PROCEDURE — G2211 COMPLEX E/M VISIT ADD ON: HCPCS | Mod: S$PBB,,, | Performed by: FAMILY MEDICINE

## 2025-01-15 NOTE — PROGRESS NOTES
Subjective:   Patient ID: Colt Rose is a 69 y.o. male     Chief Complaint:Annual Exam      Here for checkup      Review of Systems   Respiratory:  Negative for shortness of breath.    Cardiovascular:  Negative for chest pain.   Gastrointestinal:  Negative for abdominal pain.   Genitourinary:  Negative for dysuria.     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    Cervical disc displacement     Degeneration of lumbar intervertebral disc     GERD (gastroesophageal reflux disease)     gastric polyps    Hip pain 02/22/2016    Hyperlipidemia     Shingles 11/2013    Sleep apnea      Past Surgical History:   Procedure Laterality Date    ABLATION, SVT, ACCESSORY PATHWAY N/A 5/25/2023    Procedure: Ablation, SVT, Accessory Pathway;  Surgeon: Goyo Sky MD;  Location: [facility] EP LAB;  Service: Cardiology;  Laterality: N/A;  PAC, RFA, CARTO, MAC, GP, 3 PREP    CERVICAL FUSION      bone graft    COLONOSCOPY N/A 7/29/2021    Procedure: COLONOSCOPY;  Surgeon: Tyler Story MD;  Location: [facility];  Service: Endoscopy;  Laterality: N/A;    COLONOSCOPY N/A 10/30/2024    Procedure: COLONOSCOPY;  Surgeon: Tyler Story MD;  Location: [facility];  Service: Endoscopy;  Laterality: N/A;  m/ppm    EPIDURAL STEROID INJECTION INTO LUMBAR SPINE N/A 12/5/2019    Procedure: Injection-steroid-epidural-lumbar;  Surgeon: Adarsh Kraft MD;  Location: [facility];  Service: Pain Management;  Laterality: N/A;  L5-S1    ESOPHAGOGASTRODUODENOSCOPY N/A 10/27/2020    Procedure: EGD (ESOPHAGOGASTRODUODENOSCOPY);  Surgeon: Tyler Story MD;  Location: [facility];  Service: Endoscopy;  Laterality: N/A;    INJECTION OF ANESTHETIC AGENT AROUND MEDIAL BRANCH NERVES INNERVATING LUMBAR FACET JOINT Bilateral 11/18/2020    Procedure: BLOCK, NERVE, LUMBAR, MEDIAL BRANCH Bilateral L3,4,5;  Surgeon: Adarsh Kraft MD;  Location: [facility] OR;  Service: Pain Management;  Laterality: Bilateral;    INJECTION OF ANESTHETIC AGENT  AROUND MEDIAL BRANCH NERVES INNERVATING LUMBAR FACET JOINT Left 10/25/2023    Procedure: Block-nerve-medial branch-lumbar;  Surgeon: Adarsh Kraft MD;  Location: [facility] OR;  Service: Anesthesiology;  Laterality: Left;  l4-5, l5-s1 MBB    INJECTION OF ANESTHETIC AGENT AROUND MEDIAL BRANCH NERVES INNERVATING LUMBAR FACET JOINT Bilateral 11/28/2023    Procedure: Block-nerve-medial branch-lumbar;  Surgeon: Adarsh Kraft MD;  Location: [facility] OR;  Service: Anesthesiology;  Laterality: Bilateral;  L4.5 and l5/s1 MBB #2    RADIOFREQUENCY ABLATION OF LUMBAR MEDIAL BRANCH NERVE AT SINGLE LEVEL Bilateral 1/29/2021    Procedure: Radiofrequency Ablation, Nerve, Spinal, Lumbar, Medial Branch, 1 Level;  Surgeon: Adarsh Kraft MD;  Location: [facility] OR;  Service: Pain Management;  Laterality: Bilateral;  L3,4,5    TONSILLECTOMY, ADENOIDECTOMY       Objective:     Vitals:    01/15/25 1325   BP: 102/70   Pulse: (!) 59   Resp: 16   Temp: 97.9 °F (36.6 °C)     Body mass index is 28.12 kg/m².  Physical Exam  Vitals and nursing note reviewed.   Constitutional:       Appearance: He is well-developed.   HENT:      Head: Normocephalic and atraumatic.   Eyes:      General: No scleral icterus.     Conjunctiva/sclera: Conjunctivae normal.   Cardiovascular:      Heart sounds: No murmur heard.  Pulmonary:      Effort: Pulmonary effort is normal. No respiratory distress.   Musculoskeletal:         General: No deformity. Normal range of motion.      Cervical back: Normal range of motion and neck supple.   Skin:     Coloration: Skin is not pale.      Findings: No rash.   Neurological:      Mental Status: He is alert and oriented to person, place, and time.   Psychiatric:         Behavior: Behavior normal.         Thought Content: Thought content normal.         Judgment: Judgment normal.       Assessment:     1. Mixed hyperlipidemia    2. Abnormal blood finding    3. Typical atrial flutter    4. Cervical radiculopathy      Plan:   Mixed  hyperlipidemia  -     Comprehensive Metabolic Panel; Future; Expected date: 01/15/2025  -     Hemoglobin A1C; Future; Expected date: 01/15/2025  -     Lipid Panel; Future; Expected date: 01/15/2025    Abnormal blood finding  -     Hemoglobin A1C; Future; Expected date: 01/15/2025    Typical atrial flutter  Rate controlled. Follows with cardiology  Cervical radiculopathy  Undergoing PT, follows with back specialist.          Total time spent of Greater than 30 minutes minutes on the day of the visit.This includes face to face time and preparing to see the patient, obtaining and reviewing separately obtained history, documenting clinical information in the electronic or other health record, independently interpreting results and communicating results to the patient/family/caregiver, or care coordinator.    Established patient with me has been instructed that must see me at least 1 time yearly (every 365 days) for refills of medications. Seeing other providers in this clinic is fine but expectation is to see me yearly.    Jeffery Mena MD  01/15/2025    Portions of this note have been dictated with CHRIS Orozco

## 2025-01-16 ENCOUNTER — OFFICE VISIT (OUTPATIENT)
Dept: SURGERY | Facility: CLINIC | Age: 70
End: 2025-01-16
Payer: MEDICARE

## 2025-01-16 VITALS
HEART RATE: 46 BPM | HEIGHT: 70 IN | BODY MASS INDEX: 27.9 KG/M2 | SYSTOLIC BLOOD PRESSURE: 135 MMHG | WEIGHT: 194.88 LBS | DIASTOLIC BLOOD PRESSURE: 71 MMHG | TEMPERATURE: 97 F

## 2025-01-16 DIAGNOSIS — R19.5 HEME POSITIVE STOOL: Primary | ICD-10-CM

## 2025-01-16 PROCEDURE — 99999 PR PBB SHADOW E&M-EST. PATIENT-LVL III: CPT | Mod: PBBFAC,,, | Performed by: SURGERY

## 2025-01-16 PROCEDURE — 99213 OFFICE O/P EST LOW 20 MIN: CPT | Mod: PBBFAC,PO | Performed by: SURGERY

## 2025-01-16 PROCEDURE — 99213 OFFICE O/P EST LOW 20 MIN: CPT | Mod: S$PBB,,, | Performed by: SURGERY

## 2025-01-16 NOTE — PROGRESS NOTES
This is a pleasant 69-year-old gentleman who was referred to me for evaluation of his hemorrhoids.  Patient notes that he was found to be anemic and also noted to have heme-positive stools.  Patient self denies ever noticing any blood in his stool.  He denies ever having blood when he wipes on the tissue paper he has not seen blood whatsoever.  He did have a colonoscopy performed in October of 2024.  No significant abnormalities were noted.  He did have moderate-sized hemorrhoids which were reviewed.  He has not EGD or further workup for the heme-positive stools and low-grade anemia.  He has benign abdominal exam.  No significant medical or surgical history.  I did have a lengthy discussion with the patient.  I have explained to him that he does have moderate-sized hemorrhoids and I can certainly see how these could periodically bleed.  These would certainly be amenable to banding.  I did discuss with the patient that most typically when people have hemorrhoid bleeding that has something that has visible to the I.  Typically they would see blood mixed in with the stool or would identify a bleeding with wiping.  I do think it would be reasonable to proceed with further GI workup prior to proceeding with banding.  Patient notes he would like to ensure no other source of bleeding is present prior to proceeding with banding.  He is scheduled to follow up with GI later this month.  Would keep GI follow-up and workup.  He can follow up with me after all other sources of bleeding has been ruled out

## 2025-01-17 ENCOUNTER — TELEPHONE (OUTPATIENT)
Dept: CARDIOLOGY | Facility: CLINIC | Age: 70
End: 2025-01-17
Payer: MEDICARE

## 2025-01-17 NOTE — TELEPHONE ENCOUNTER
----- Message from Jay sent at 1/17/2025  3:05 PM CST -----  Regarding: advice  Contact: COLT VALENCIA [[id number]]  Type: Needs Medical Advice  Who Called:  Colt    Symptoms (please be specific):  na    How long has patient had these symptoms:  na    Pharmacy name and phone #:  na    Best Call Back Number: [phone] (home)     Additional Information: Needs clearance for cervical epidural at [facility]. Please call to advise.

## 2025-01-18 ENCOUNTER — PATIENT MESSAGE (OUTPATIENT)
Dept: GASTROENTEROLOGY | Facility: CLINIC | Age: 70
End: 2025-01-18
Payer: MEDICARE

## 2025-01-20 NOTE — H&P (VIEW-ONLY)
Subjective:       Patient ID: Colt Rose is a 69 y.o. male There is no height or weight on file to calculate BMI.    Chief Complaint: No chief complaint on file.    This patient is new to me.  Referring Provider: Dr. Dickson Jaramillo* for anemia.  Established patient of Dr. Story.     The patient location is: Home  The chief complaint leading to consultation is: anemia  Visit type: audiovisual  Face to Face time with patient: 10  25 minutes of total time spent on the encounter, which includes face to face time and non-face to face time preparing to see the patient (eg, review of tests), Obtaining and/or reviewing separately obtained history, Documenting clinical information in the electronic or other health record, Independently interpreting results (not separately reported) and communicating results to the patient/family/caregiver, or Care coordination (not separately reported).     Each patient to whom he or she provides medical services by telemedicine is:  (1) informed of the relationship between the physician and patient and the respective role of any other health care provider with respect to management of the patient; and (2) notified that he or she may decline to receive medical services by telemedicine and may withdraw from such care at any time.      No dark stools. Saw Dr Dean recently and he said he did not think the heme pos stool was due to hemorrhoids.     Reflux that is controlled well.       10/2024 Colonoscopy with Dr Story  Findings:       The perianal and digital rectal examinations were normal.        Non-bleeding internal hemorrhoids were found during retroflexion.        The hemorrhoids were medium-sized.        Multiple small and large-mouthed diverticula were found in the        sigmoid colon, descending colon, transverse colon and ascending        colon.        The rectum, cecum, appendiceal orifice and ileocecal valve appeared        normal.        The terminal ileum appeared  normal.   Impression:            - Non-bleeding internal hemorrhoids.                          - Diverticulosis in the sigmoid colon, in the                          descending colon, in the transverse colon and in                          the ascending colon.                          - The rectum, cecum, ileocecal valve and                          appendiceal orifice are normal.                          - The examined portion of the ileum was normal.         Review of Systems   Constitutional:  Negative for activity change, appetite change, fatigue, fever and unexpected weight change.   HENT:  Negative for sore throat and trouble swallowing.    Respiratory:  Negative for cough and shortness of breath.    Cardiovascular:  Negative for chest pain.   Gastrointestinal:  Positive for blood in stool. Negative for abdominal distention, abdominal pain, anal bleeding, constipation, diarrhea, nausea, rectal pain and vomiting.       No LMP for male patient.  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    Cervical disc displacement     Degeneration of lumbar intervertebral disc     GERD (gastroesophageal reflux disease)     gastric polyps    Hip pain 02/22/2016    Hyperlipidemia     Shingles 11/2013    Sleep apnea      Past Surgical History:   Procedure Laterality Date    ABLATION, SVT, ACCESSORY PATHWAY N/A 5/25/2023    Procedure: Ablation, SVT, Accessory Pathway;  Surgeon: Goyo Sky MD;  Location: [facility];  Service: Cardiology;  Laterality: N/A;  PAC, RFA, CARTO, MAC, GP, 3 PREP    CERVICAL FUSION      bone graft    COLONOSCOPY N/A 7/29/2021    Procedure: COLONOSCOPY;  Surgeon: Tyler Story MD;  Location: [facility];  Service: Endoscopy;  Laterality: N/A;    COLONOSCOPY N/A 10/30/2024    Procedure: COLONOSCOPY;  Surgeon: Tyler Story MD;  Location: [facility];  Service: Endoscopy;  Laterality: N/A;  m/ppm    EPIDURAL STEROID INJECTION INTO LUMBAR SPINE N/A 12/5/2019     Procedure: Injection-steroid-epidural-lumbar;  Surgeon: Adarsh Kraft MD;  Location: [facility];  Service: Pain Management;  Laterality: N/A;  L5-S1    ESOPHAGOGASTRODUODENOSCOPY N/A 10/27/2020    Procedure: EGD (ESOPHAGOGASTRODUODENOSCOPY);  Surgeon: Tyler Story MD;  Location: [facility];  Service: Endoscopy;  Laterality: N/A;    INJECTION OF ANESTHETIC AGENT AROUND MEDIAL BRANCH NERVES INNERVATING LUMBAR FACET JOINT Bilateral 11/18/2020    Procedure: BLOCK, NERVE, LUMBAR, MEDIAL BRANCH Bilateral L3,4,5;  Surgeon: Adarsh Kraft MD;  Location: [facility] OR;  Service: Pain Management;  Laterality: Bilateral;    INJECTION OF ANESTHETIC AGENT AROUND MEDIAL BRANCH NERVES INNERVATING LUMBAR FACET JOINT Left 10/25/2023    Procedure: Block-nerve-medial branch-lumbar;  Surgeon: Adarsh Kraft MD;  Location: [facility] OR;  Service: Anesthesiology;  Laterality: Left;  l4-5, l5-s1 MBB    INJECTION OF ANESTHETIC AGENT AROUND MEDIAL BRANCH NERVES INNERVATING LUMBAR FACET JOINT Bilateral 11/28/2023    Procedure: Block-nerve-medial branch-lumbar;  Surgeon: Adarsh Kraft MD;  [facility];  Service: Anesthesiology;  Laterality: Bilateral;  L4.5 and l5/s1 MBB #2    RADIOFREQUENCY ABLATION OF LUMBAR MEDIAL BRANCH NERVE AT SINGLE LEVEL Bilateral 1/29/2021    Procedure: Radiofrequency Ablation, Nerve, Spinal, Lumbar, Medial Branch, 1 Level;  Surgeon: Adarsh Kraft MD;  Location: [facility] OR;  Service: Pain Management;  Laterality: Bilateral;  L3,4,5    TONSILLECTOMY, ADENOIDECTOMY       Family History   Problem Relation Name Age of Onset    Diabetes Mother      Hypertension Mother      Stroke Mother      Coronary artery disease Mother      Stroke Father      Heart disease Brother          CAD     Social History     Tobacco Use    Smoking status: Never    Smokeless tobacco: Never   Substance Use Topics    Alcohol use: Yes     Alcohol/week: 6.0 standard drinks of alcohol     Types: 6 Cans of beer per week     Comment: weekly or less    Drug use:  No     Wt Readings from Last 10 Encounters:   01/16/25 88.4 kg (194 lb 14.2 oz)   01/15/25 88.9 kg (195 lb 15.8 oz)   12/20/24 86.7 kg (191 lb 0.5 oz)   12/09/24 87.5 kg (192 lb 14.4 oz)   07/11/24 87 kg (191 lb 12.8 oz)   06/20/24 85.7 kg (189 lb)   04/04/24 87.2 kg (192 lb 3.9 oz)   04/04/24 87.2 kg (192 lb 3.9 oz)   02/16/24 85.3 kg (188 lb)   02/08/24 86.2 kg (190 lb)     Lab Results   Component Value Date    WBC 4.51 12/10/2024    HGB 13.8 (L) 12/10/2024    HCT 41.9 12/10/2024    MCV 95 12/10/2024     12/10/2024     CMP  Sodium   Date Value Ref Range Status   12/10/2024 140 136 - 145 mmol/L Final     Potassium   Date Value Ref Range Status   12/10/2024 4.1 3.5 - 5.1 mmol/L Final     Chloride   Date Value Ref Range Status   12/10/2024 105 95 - 110 mmol/L Final     CO2   Date Value Ref Range Status   12/10/2024 28 23 - 29 mmol/L Final     Glucose   Date Value Ref Range Status   12/10/2024 106 70 - 110 mg/dL Final     BUN   Date Value Ref Range Status   12/10/2024 17 8 - 23 mg/dL Final     Creatinine   Date Value Ref Range Status   12/10/2024 0.8 0.5 - 1.4 mg/dL Final     Calcium   Date Value Ref Range Status   12/10/2024 9.1 8.7 - 10.5 mg/dL Final     Total Protein   Date Value Ref Range Status   12/10/2024 6.6 6.0 - 8.4 g/dL Final     Albumin   Date Value Ref Range Status   12/10/2024 3.6 3.5 - 5.2 g/dL Final     Total Bilirubin   Date Value Ref Range Status   12/10/2024 0.5 0.1 - 1.0 mg/dL Final     Comment:     For infants and newborns, interpretation of results should be based  on gestational age, weight and in agreement with clinical  observations.    Premature Infant recommended reference ranges:  Up to 24 hours.............<8.0 mg/dL  Up to 48 hours............<12.0 mg/dL  3-5 days..................<15.0 mg/dL  6-29 days.................<15.0 mg/dL       Alkaline Phosphatase   Date Value Ref Range Status   12/10/2024 49 40 - 150 U/L Final     AST   Date Value Ref Range Status   12/10/2024 23 10 - 40  "U/L Final     ALT   Date Value Ref Range Status   12/10/2024 27 10 - 44 U/L Final     Anion Gap   Date Value Ref Range Status   12/10/2024 7 (L) 8 - 16 mmol/L Final     eGFR if    Date Value Ref Range Status   06/14/2022 >60.0 >60 mL/min/1.73 m^2 Final     eGFR if non    Date Value Ref Range Status   06/14/2022 >60.0 >60 mL/min/1.73 m^2 Final     Comment:     Calculation used to obtain the estimated glomerular filtration  rate (eGFR) is the CKD-EPI equation.        No results found for: "AMYLASE"  No results found for: "LIPASE"  No results found for: "LIPASERES"  Lab Results   Component Value Date    TSH 2.789 12/10/2024       Reviewed prior medical records including radiology report of labs and OVs 11/2024-present & endoscopy history (see surgical history).    Objective:      Physical Exam  Vitals reviewed: No physical exam due to VV (weather).   Neurological:      Mental Status: He is alert and oriented to person, place, and time.         Assessment:       1. Heme positive stool    2. H/O esophageal ulcer    3. Gastroesophageal reflux disease without esophagitis    4. Hemorrhoids, unspecified hemorrhoid type        Plan:       Heme positive stool & Hemorrhoids, unspecified hemorrhoid type  Suspect intermittent bleeding or blood loss in stool due to hemorrhoid but will further investigate due patient history of gastric ulcer.  - avoid constipation and straining with bowel movements; try using an OTC stool softener as directed and increase fiber in diet (20-30 grams daily)/OTC fiber supplement such as metamucil (take as directed)  - recommend SITZ baths  - possible referral to colorectal surgery if symptoms persist    Sit in a tub with about an inch of warm water and about a 1/4 cup of epsom salts (available over-the-counter). Do this for 15-20 minutes up to 3 times per day.  - schedule EGD, discussed procedure with patient, including risks and benefits, patient verbalized " understanding    If negative will do VCE to further investigate heme positive stool result  -     Ambulatory referral/consult to Gastroenterology    H/O esophageal ulcer  - Avoid foods and drinks that cause discomfort for you. For many people these include alcohol, coffee, caffeinated soda, fatty foods, chocolate, and spicy foods.  - Avoid eating late night snacks.  - If you smoke or chew tobacco, try to quit. Tobacco will slow the healing of your ulcer and increase the chance that the ulcer will come back. Talk to your doctor about getting help for quitting tobacco use.  - Try to reduce your stress level and learn ways to better manage stress.  Avoid drugs such as aspirin, ibuprofen (Advil, Motrin), or naproxen (Aleve, Naprosyn). Take acetaminophen (Tylenol) to relieve pain. Take all medicines with plenty of water.    -     Ambulatory referral/consult to Gastroenterology    Gastroesophageal reflux disease without esophagitis  -discussed about the different types of medications used to treat reflux and how to use them, antacids can be used PRN for breakthrough heartburn symptoms by reducing stomach acid that is already produced, H2 blockers work by limiting the amount acid production, & PPI's work to block acid production and are taken daily, patient verbalized understanding.  -Educated patient on lifestyle modifications to help control/reduce reflux/abdominal pain including: avoid large meals, avoid eating within 2-3 hours of bedtime (avoid late night eating & lying down soon after eating), elevate head of bed if nocturnal symptoms are present, smoking cessation (if current smoker), & weight loss (if overweight).   -Educated to avoid known foods which trigger reflux symptoms & to minimize/avoid high-fat foods, chocolate, caffeine, citrus, alcohol, & tomato products.  -Advised to avoid/limit use of NSAID's, since they can cause GI upset, bleeding, and/or ulcers. If needed, take with food.     - schedule EGD,  discussed procedure with patient, including risks and benefits, patient verbalized understanding    -     omeprazole (PRILOSEC) 40 MG capsule; Take 1 capsule (40 mg total) by mouth every morning.  Dispense: 90 capsule; Refill: 3    No follow-ups on file.      If no improvement in symptoms or symptoms worsen, call/follow-up at clinic or go to ER.       IGOR Maher, PORFIRIOP-C    Encounter includes face to face time and non-face to face time preparing to see the patient (eg, review of tests), obtaining and/or reviewing separately obtained history, documenting clinical information in the electronic or other health record, independently interpreting results (not separately reported) and communicating results to the patient/family/caregiver, or care coordination (not separately reported).     A dictation software program was used for this note. Please expect some simple typographical errors in this note.

## 2025-01-20 NOTE — PROGRESS NOTES
Subjective:       Patient ID: Colt Rose is a 69 y.o. male There is no height or weight on file to calculate BMI.    Chief Complaint: No chief complaint on file.    This patient is new to me.  Referring Provider: Dr. Dickson Jaramillo* for anemia.  Established patient of Dr. Story.     The patient location is: Home  The chief complaint leading to consultation is: anemia  Visit type: audiovisual  Face to Face time with patient: 10  25 minutes of total time spent on the encounter, which includes face to face time and non-face to face time preparing to see the patient (eg, review of tests), Obtaining and/or reviewing separately obtained history, Documenting clinical information in the electronic or other health record, Independently interpreting results (not separately reported) and communicating results to the patient/family/caregiver, or Care coordination (not separately reported).     Each patient to whom he or she provides medical services by telemedicine is:  (1) informed of the relationship between the physician and patient and the respective role of any other health care provider with respect to management of the patient; and (2) notified that he or she may decline to receive medical services by telemedicine and may withdraw from such care at any time.      No dark stools. Saw Dr Dean recently and he said he did not think the heme pos stool was due to hemorrhoids.     Reflux that is controlled well.       10/2024 Colonoscopy with Dr Story  Findings:       The perianal and digital rectal examinations were normal.        Non-bleeding internal hemorrhoids were found during retroflexion.        The hemorrhoids were medium-sized.        Multiple small and large-mouthed diverticula were found in the        sigmoid colon, descending colon, transverse colon and ascending        colon.        The rectum, cecum, appendiceal orifice and ileocecal valve appeared        normal.        The terminal ileum appeared  normal.   Impression:            - Non-bleeding internal hemorrhoids.                          - Diverticulosis in the sigmoid colon, in the                          descending colon, in the transverse colon and in                          the ascending colon.                          - The rectum, cecum, ileocecal valve and                          appendiceal orifice are normal.                          - The examined portion of the ileum was normal.         Review of Systems   Constitutional:  Negative for activity change, appetite change, fatigue, fever and unexpected weight change.   HENT:  Negative for sore throat and trouble swallowing.    Respiratory:  Negative for cough and shortness of breath.    Cardiovascular:  Negative for chest pain.   Gastrointestinal:  Positive for blood in stool. Negative for abdominal distention, abdominal pain, anal bleeding, constipation, diarrhea, nausea, rectal pain and vomiting.       No LMP for male patient.  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    Cervical disc displacement     Degeneration of lumbar intervertebral disc     GERD (gastroesophageal reflux disease)     gastric polyps    Hip pain 02/22/2016    Hyperlipidemia     Shingles 11/2013    Sleep apnea      Past Surgical History:   Procedure Laterality Date    ABLATION, SVT, ACCESSORY PATHWAY N/A 5/25/2023    Procedure: Ablation, SVT, Accessory Pathway;  Surgeon: Goyo Sky MD;  Location: [facility] EP LAB;  Service: Cardiology;  Laterality: N/A;  PAC, RFA, CARTO, MAC, GP, 3 PREP    CERVICAL FUSION      bone graft    COLONOSCOPY N/A 7/29/2021    Procedure: COLONOSCOPY;  Surgeon: Tyler Story MD;  Location: [facility];  Service: Endoscopy;  Laterality: N/A;    COLONOSCOPY N/A 10/30/2024    Procedure: COLONOSCOPY;  Surgeon: Tyler Story MD;  Location: [facility];  Service: Endoscopy;  Laterality: N/A;  m/ppm    EPIDURAL STEROID INJECTION INTO LUMBAR SPINE N/A 12/5/2019     Procedure: Injection-steroid-epidural-lumbar;  Surgeon: Adarsh Kraft MD;  Location: [facility] OR;  Service: Pain Management;  Laterality: N/A;  L5-S1    ESOPHAGOGASTRODUODENOSCOPY N/A 10/27/2020    Procedure: EGD (ESOPHAGOGASTRODUODENOSCOPY);  Surgeon: Tyler Story MD;  Location: [facility];  Service: Endoscopy;  Laterality: N/A;    INJECTION OF ANESTHETIC AGENT AROUND MEDIAL BRANCH NERVES INNERVATING LUMBAR FACET JOINT Bilateral 11/18/2020    Procedure: BLOCK, NERVE, LUMBAR, MEDIAL BRANCH Bilateral L3,4,5;  Surgeon: Adarsh Kraft MD;  Location: [facility] OR;  Service: Pain Management;  Laterality: Bilateral;    INJECTION OF ANESTHETIC AGENT AROUND MEDIAL BRANCH NERVES INNERVATING LUMBAR FACET JOINT Left 10/25/2023    Procedure: Block-nerve-medial branch-lumbar;  Surgeon: Adarsh Kraft MD;  Location: [facility] OR;  Service: Anesthesiology;  Laterality: Left;  l4-5, l5-s1 MBB    INJECTION OF ANESTHETIC AGENT AROUND MEDIAL BRANCH NERVES INNERVATING LUMBAR FACET JOINT Bilateral 11/28/2023    Procedure: Block-nerve-medial branch-lumbar;  Surgeon: Adarsh Kraft MD;  Location[facility] OR;  Service: Anesthesiology;  Laterality: Bilateral;  L4.5 and l5/s1 MBB #2    RADIOFREQUENCY ABLATION OF LUMBAR MEDIAL BRANCH NERVE AT SINGLE LEVEL Bilateral 1/29/2021    Procedure: Radiofrequency Ablation, Nerve, Spinal, Lumbar, Medial Branch, 1 Level;  Surgeon: Adarsh Kraft MD;  Location: [facility] OR;  Service: Pain Management;  Laterality: Bilateral;  L3,4,5    TONSILLECTOMY, ADENOIDECTOMY       Family History   Problem Relation Name Age of Onset    Diabetes Mother      Hypertension Mother      Stroke Mother      Coronary artery disease Mother      Stroke Father      Heart disease Brother          CAD     Social History     Tobacco Use    Smoking status: Never    Smokeless tobacco: Never   Substance Use Topics    Alcohol use: Yes     Alcohol/week: 6.0 standard drinks of alcohol     Types: 6 Cans of beer per week     Comment: weekly or less    Drug use:  No     Wt Readings from Last 10 Encounters:   01/16/25 88.4 kg (194 lb 14.2 oz)   01/15/25 88.9 kg (195 lb 15.8 oz)   12/20/24 86.7 kg (191 lb 0.5 oz)   12/09/24 87.5 kg (192 lb 14.4 oz)   07/11/24 87 kg (191 lb 12.8 oz)   06/20/24 85.7 kg (189 lb)   04/04/24 87.2 kg (192 lb 3.9 oz)   04/04/24 87.2 kg (192 lb 3.9 oz)   02/16/24 85.3 kg (188 lb)   02/08/24 86.2 kg (190 lb)     Lab Results   Component Value Date    WBC 4.51 12/10/2024    HGB 13.8 (L) 12/10/2024    HCT 41.9 12/10/2024    MCV 95 12/10/2024     12/10/2024     CMP  Sodium   Date Value Ref Range Status   12/10/2024 140 136 - 145 mmol/L Final     Potassium   Date Value Ref Range Status   12/10/2024 4.1 3.5 - 5.1 mmol/L Final     Chloride   Date Value Ref Range Status   12/10/2024 105 95 - 110 mmol/L Final     CO2   Date Value Ref Range Status   12/10/2024 28 23 - 29 mmol/L Final     Glucose   Date Value Ref Range Status   12/10/2024 106 70 - 110 mg/dL Final     BUN   Date Value Ref Range Status   12/10/2024 17 8 - 23 mg/dL Final     Creatinine   Date Value Ref Range Status   12/10/2024 0.8 0.5 - 1.4 mg/dL Final     Calcium   Date Value Ref Range Status   12/10/2024 9.1 8.7 - 10.5 mg/dL Final     Total Protein   Date Value Ref Range Status   12/10/2024 6.6 6.0 - 8.4 g/dL Final     Albumin   Date Value Ref Range Status   12/10/2024 3.6 3.5 - 5.2 g/dL Final     Total Bilirubin   Date Value Ref Range Status   12/10/2024 0.5 0.1 - 1.0 mg/dL Final     Comment:     For infants and newborns, interpretation of results should be based  on gestational age, weight and in agreement with clinical  observations.    Premature Infant recommended reference ranges:  Up to 24 hours.............<8.0 mg/dL  Up to 48 hours............<12.0 mg/dL  3-5 days..................<15.0 mg/dL  6-29 days.................<15.0 mg/dL       Alkaline Phosphatase   Date Value Ref Range Status   12/10/2024 49 40 - 150 U/L Final     AST   Date Value Ref Range Status   12/10/2024 23 10 - 40  "U/L Final     ALT   Date Value Ref Range Status   12/10/2024 27 10 - 44 U/L Final     Anion Gap   Date Value Ref Range Status   12/10/2024 7 (L) 8 - 16 mmol/L Final     eGFR if    Date Value Ref Range Status   06/14/2022 >60.0 >60 mL/min/1.73 m^2 Final     eGFR if non    Date Value Ref Range Status   06/14/2022 >60.0 >60 mL/min/1.73 m^2 Final     Comment:     Calculation used to obtain the estimated glomerular filtration  rate (eGFR) is the CKD-EPI equation.        No results found for: "AMYLASE"  No results found for: "LIPASE"  No results found for: "LIPASERES"  Lab Results   Component Value Date    TSH 2.789 12/10/2024       Reviewed prior medical records including radiology report of labs and OVs 11/2024-present & endoscopy history (see surgical history).    Objective:      Physical Exam  Vitals reviewed: No physical exam due to VV (weather).   Neurological:      Mental Status: He is alert and oriented to person, place, and time.         Assessment:       1. Heme positive stool    2. H/O esophageal ulcer    3. Gastroesophageal reflux disease without esophagitis    4. Hemorrhoids, unspecified hemorrhoid type        Plan:       Heme positive stool & Hemorrhoids, unspecified hemorrhoid type  Suspect intermittent bleeding or blood loss in stool due to hemorrhoid but will further investigate due patient history of gastric ulcer.  - avoid constipation and straining with bowel movements; try using an OTC stool softener as directed and increase fiber in diet (20-30 grams daily)/OTC fiber supplement such as metamucil (take as directed)  - recommend SITZ baths  - possible referral to colorectal surgery if symptoms persist    Sit in a tub with about an inch of warm water and about a 1/4 cup of epsom salts (available over-the-counter). Do this for 15-20 minutes up to 3 times per day.  - schedule EGD, discussed procedure with patient, including risks and benefits, patient verbalized " understanding    If negative will do VCE to further investigate heme positive stool result  -     Ambulatory referral/consult to Gastroenterology    H/O esophageal ulcer  - Avoid foods and drinks that cause discomfort for you. For many people these include alcohol, coffee, caffeinated soda, fatty foods, chocolate, and spicy foods.  - Avoid eating late night snacks.  - If you smoke or chew tobacco, try to quit. Tobacco will slow the healing of your ulcer and increase the chance that the ulcer will come back. Talk to your doctor about getting help for quitting tobacco use.  - Try to reduce your stress level and learn ways to better manage stress.  Avoid drugs such as aspirin, ibuprofen (Advil, Motrin), or naproxen (Aleve, Naprosyn). Take acetaminophen (Tylenol) to relieve pain. Take all medicines with plenty of water.    -     Ambulatory referral/consult to Gastroenterology    Gastroesophageal reflux disease without esophagitis  -discussed about the different types of medications used to treat reflux and how to use them, antacids can be used PRN for breakthrough heartburn symptoms by reducing stomach acid that is already produced, H2 blockers work by limiting the amount acid production, & PPI's work to block acid production and are taken daily, patient verbalized understanding.  -Educated patient on lifestyle modifications to help control/reduce reflux/abdominal pain including: avoid large meals, avoid eating within 2-3 hours of bedtime (avoid late night eating & lying down soon after eating), elevate head of bed if nocturnal symptoms are present, smoking cessation (if current smoker), & weight loss (if overweight).   -Educated to avoid known foods which trigger reflux symptoms & to minimize/avoid high-fat foods, chocolate, caffeine, citrus, alcohol, & tomato products.  -Advised to avoid/limit use of NSAID's, since they can cause GI upset, bleeding, and/or ulcers. If needed, take with food.     - schedule EGD,  discussed procedure with patient, including risks and benefits, patient verbalized understanding    -     omeprazole (PRILOSEC) 40 MG capsule; Take 1 capsule (40 mg total) by mouth every morning.  Dispense: 90 capsule; Refill: 3    No follow-ups on file.      If no improvement in symptoms or symptoms worsen, call/follow-up at clinic or go to ER.       IGOR Maher, PORFIRIOP-C    Encounter includes face to face time and non-face to face time preparing to see the patient (eg, review of tests), obtaining and/or reviewing separately obtained history, documenting clinical information in the electronic or other health record, independently interpreting results (not separately reported) and communicating results to the patient/family/caregiver, or care coordination (not separately reported).     A dictation software program was used for this note. Please expect some simple typographical errors in this note.

## 2025-01-21 ENCOUNTER — OFFICE VISIT (OUTPATIENT)
Dept: GASTROENTEROLOGY | Facility: CLINIC | Age: 70
End: 2025-01-21
Payer: MEDICARE

## 2025-01-21 DIAGNOSIS — Z87.19 H/O ESOPHAGEAL ULCER: ICD-10-CM

## 2025-01-21 DIAGNOSIS — R19.5 HEME POSITIVE STOOL: Primary | ICD-10-CM

## 2025-01-21 DIAGNOSIS — K21.9 GASTROESOPHAGEAL REFLUX DISEASE WITHOUT ESOPHAGITIS: ICD-10-CM

## 2025-01-21 DIAGNOSIS — K64.9 HEMORRHOIDS, UNSPECIFIED HEMORRHOID TYPE: ICD-10-CM

## 2025-01-21 PROCEDURE — 98005 SYNCH AUDIO-VIDEO EST LOW 20: CPT | Mod: 95,,,

## 2025-01-21 RX ORDER — OMEPRAZOLE 40 MG/1
40 CAPSULE, DELAYED RELEASE ORAL EVERY MORNING
Qty: 90 CAPSULE | Refills: 3 | Status: SHIPPED | OUTPATIENT
Start: 2025-01-21

## 2025-01-23 ENCOUNTER — PATIENT MESSAGE (OUTPATIENT)
Dept: GASTROENTEROLOGY | Facility: CLINIC | Age: 70
End: 2025-01-23
Payer: MEDICARE

## 2025-01-23 ENCOUNTER — TELEPHONE (OUTPATIENT)
Dept: GASTROENTEROLOGY | Facility: CLINIC | Age: 70
End: 2025-01-23
Payer: MEDICARE

## 2025-01-23 DIAGNOSIS — I49.1 ATRIAL PREMATURE DEPOLARIZATION: ICD-10-CM

## 2025-01-23 NOTE — TELEPHONE ENCOUNTER
----- Message from Ayaka Hodges NP sent at 1/21/2025  1:04 PM CST -----  Please schedule EGD with Dr Story for positive occult stool

## 2025-01-24 ENCOUNTER — OFFICE VISIT (OUTPATIENT)
Dept: ORTHOPEDICS | Facility: CLINIC | Age: 70
End: 2025-01-24
Payer: MEDICARE

## 2025-01-24 ENCOUNTER — TELEPHONE (OUTPATIENT)
Dept: GASTROENTEROLOGY | Facility: CLINIC | Age: 70
End: 2025-01-24
Payer: MEDICARE

## 2025-01-24 VITALS — HEIGHT: 70 IN | BODY MASS INDEX: 27.9 KG/M2 | WEIGHT: 194.88 LBS

## 2025-01-24 DIAGNOSIS — S86.112A GASTROCNEMIUS STRAIN, LEFT, INITIAL ENCOUNTER: Primary | ICD-10-CM

## 2025-01-24 PROCEDURE — 99213 OFFICE O/P EST LOW 20 MIN: CPT | Mod: PBBFAC,PO | Performed by: ORTHOPAEDIC SURGERY

## 2025-01-24 PROCEDURE — 99999 PR PBB SHADOW E&M-EST. PATIENT-LVL III: CPT | Mod: PBBFAC,,, | Performed by: ORTHOPAEDIC SURGERY

## 2025-01-24 PROCEDURE — 99213 OFFICE O/P EST LOW 20 MIN: CPT | Mod: S$PBB,,, | Performed by: ORTHOPAEDIC SURGERY

## 2025-01-24 NOTE — TELEPHONE ENCOUNTER
----- Message from Candy sent at 1/24/2025  9:24 AM CST -----  Type:  Needs Medical Advice    Who Called: pt    Would the patient rather a call back or a response via [facility]? Call back     Best Call Back Number: [phone]      Additional Information: pt was wanting to schedule a egd stated he was called yesterday was returning call    Please call back to advise. Thanks!

## 2025-01-24 NOTE — PROGRESS NOTES
"Status/Diagnosis: Left medial gastroc strain.  Date of Surgery: none  Date of Injury: 01/10/2025  Return visit: PRN  X-rays on Return: pending patient complaint    Chief Complaint:   Left calf pain    Present History:  Colt presents for evaluation of a calf injury sustained while playing pickleball earlier today. He reports feeling a "pop" in the back of his leg when reaching to stretch during the game. The pain is localized to the calf area, with no discomfort reported in other parts of the leg. He reports more severe pain at a specific point when the area is palpated. Colt denies any previous issues with the calf muscle prior to this acute injury, and denies any pain down the leg beyond the calf area or any prior pain in the calf or Achilles before this incident.     PMH significant for hyperlipidemia and ARIS.  Denies tobacco use.  Remains active.    01/24/2025:  Moderate improvement versus last clinic visit.  Currently full weight-bearing in his tall cam boot.  0/10 pain.      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    Cervical disc displacement     Degeneration of lumbar intervertebral disc     GERD (gastroesophageal reflux disease)     gastric polyps    Hip pain 02/22/2016    Hyperlipidemia     Shingles 11/2013    Sleep apnea        Past Surgical History:   Procedure Laterality Date    ABLATION, SVT, ACCESSORY PATHWAY N/A 5/25/2023    Procedure: Ablation, SVT, Accessory Pathway;  Surgeon: Goyo Sky MD;  Location: [facility] EP LAB;  Service: Cardiology;  Laterality: N/A;  PAC, RFA, CARTO, MAC, GP, 3 PREP    CERVICAL FUSION      bone graft    COLONOSCOPY N/A 7/29/2021    Procedure: COLONOSCOPY;  Surgeon: Tyler Story MD;  Location: [facility] ENDO;  Service: Endoscopy;  Laterality: N/A;    COLONOSCOPY N/A 10/30/2024    Procedure: COLONOSCOPY;  Surgeon: Tyler Story MD;  Location: [facility] ENDO;  Service: Endoscopy;  Laterality: N/A;  m/ppm    EPIDURAL STEROID INJECTION INTO " LUMBAR SPINE N/A 12/5/2019    Procedure: Injection-steroid-epidural-lumbar;  Surgeon: Adarsh Kraft MD;  Location: [facility] OR;  Service: Pain Management;  Laterality: N/A;  L5-S1    ESOPHAGOGASTRODUODENOSCOPY N/A 10/27/2020    Procedure: EGD (ESOPHAGOGASTRODUODENOSCOPY);  Surgeon: Tyler Story MD;  Location: [facility];  Service: Endoscopy;  Laterality: N/A;    INJECTION OF ANESTHETIC AGENT AROUND MEDIAL BRANCH NERVES INNERVATING LUMBAR FACET JOINT Bilateral 11/18/2020    Procedure: BLOCK, NERVE, LUMBAR, MEDIAL BRANCH Bilateral L3,4,5;  Surgeon: Adarsh Kraft MD;  Location: [facility] OR;  Service: Pain Management;  Laterality: Bilateral;    INJECTION OF ANESTHETIC AGENT AROUND MEDIAL BRANCH NERVES INNERVATING LUMBAR FACET JOINT Left 10/25/2023    Procedure: Block-nerve-medial branch-lumbar;  Surgeon: Adarsh Kraft MD;  Location: [facility] OR;  Service: Anesthesiology;  Laterality: Left;  l4-5, l5-s1 MBB    INJECTION OF ANESTHETIC AGENT AROUND MEDIAL BRANCH NERVES INNERVATING LUMBAR FACET JOINT Bilateral 11/28/2023    Procedure: Block-nerve-medial branch-lumbar;  Surgeon: Adarsh Kraft MD;  [facility];  Service: Anesthesiology;  Laterality: Bilateral;  L4.5 and l5/s1 MBB #2    RADIOFREQUENCY ABLATION OF LUMBAR MEDIAL BRANCH NERVE AT SINGLE LEVEL Bilateral 1/29/2021    Procedure: Radiofrequency Ablation, Nerve, Spinal, Lumbar, Medial Branch, 1 Level;  Surgeon: Adarsh Kraft MD;  Location: [facility] OR;  Service: Pain Management;  Laterality: Bilateral;  L3,4,5    TONSILLECTOMY, ADENOIDECTOMY         Current Outpatient Medications   Medication Sig    aspirin (ECOTRIN) 81 MG EC tablet Take 1 tablet (81 mg total) by mouth once daily.    atorvastatin (LIPITOR) 20 MG tablet Take 1 tablet (20 mg total) by mouth once daily.    ciclopirox (PENLAC) 8 % Soln Apply topically. (Patient not taking: Reported on 1/16/2025)    diclofenac (VOLTAREN) 75 MG EC tablet Take 1 tablet (75 mg total) by mouth 2 (two) times daily with meals.     docosahexaenoic acid/epa (FISH OIL ORAL) Take 1 capsule by mouth once daily.    magnesium oxide (MAG-OX) 400 mg (241.3 mg magnesium) tablet Take 1 tablet by mouth.    meloxicam (MOBIC) 15 MG tablet TAKE 1 TABLET BY MOUTH AT NIGHT  AS NEEDED    methocarbamoL (ROBAXIN) 500 MG Tab Take 1 tablet (500 mg total) by mouth every 8 (eight) hours as needed (muscle spasm).    multivitamin (THERAGRAN) per tablet Take 1 tablet by mouth once daily.    omeprazole (PRILOSEC) 40 MG capsule Take 1 capsule (40 mg total) by mouth every morning.    pregabalin (LYRICA) 100 MG capsule Take 100 mg by mouth once daily.     No current facility-administered medications for this visit.     Facility-Administered Medications Ordered in Other Visits   Medication    lactated ringers infusion    lactated ringers infusion    LIDOcaine (PF) 10 mg/ml (1%) injection 10 mg    LIDOcaine (PF) 10 mg/ml (1%) injection 10 mg       Review of patient's allergies indicates:   Allergen Reactions    Pennsaid [diclofenac sodium] Rash and Blisters       Family History   Problem Relation Name Age of Onset    Diabetes Mother      Hypertension Mother      Stroke Mother      Coronary artery disease Mother      Stroke Father      Heart disease Brother          CAD       Social History     Socioeconomic History    Marital status:    Tobacco Use    Smoking status: Never    Smokeless tobacco: Never   Substance and Sexual Activity    Alcohol use: Yes     Alcohol/week: 6.0 standard drinks of alcohol     Types: 6 Cans of beer per week     Comment: weekly or less    Drug use: No    Sexual activity: Yes     Partners: Female     Social Drivers of Health     Financial Resource Strain: Low Risk  (1/15/2025)    Overall Financial Resource Strain (CARDIA)     Difficulty of Paying Living Expenses: Not hard at all   Food Insecurity: No Food Insecurity (1/15/2025)    Hunger Vital Sign     Worried About Running Out of Food in the Last Year: Never true     Ran Out of Food in the  Last Year: Never true   Transportation Needs: No Transportation Needs (4/4/2024)    PRAPARE - Transportation     Lack of Transportation (Medical): No     Lack of Transportation (Non-Medical): No   Physical Activity: Sufficiently Active (1/15/2025)    Exercise Vital Sign     Days of Exercise per Week: 7 days     Minutes of Exercise per Session: 80 min   Stress: Stress Concern Present (1/15/2025)    [employer] [place] [employer] - Occupational Stress Questionnaire     Feeling of Stress : To some extent   Housing Stability: Low Risk  (4/4/2024)    Housing Stability Vital Sign     Unable to Pay for Housing in the Last Year: No     Number of Places Lived in the Last Year: 1     Unstable Housing in the Last Year: No       Physical exam:  There were no vitals filed for this visit.  There is no height or weight on file to calculate BMI.  General: In no apparent distress; well developed and well nourished.  HEENT: normocephalic; atraumatic.  Cardiovascular: regular rate.  Respiratory: no increased work of breathing.  Musculoskeletal:   Gait:  Minimally antalgic  Inspection:   Mild residual swelling about the left calf musculature.    Some residual tenderness over the medial gastroc muscle belly but with significant improvement versus new patient evaluation. Negative Homans sign.  No palpable defect over the Achilles.  Symmetric resting ankle plantar flexion with the patient prone and knees flexed 90°.  Good tension with Sanderson testing.  5/5 ankle plantar flexion strength.  Silfverskiold: Negative  Alignment:  Knee: neutral               Ankle: neutral              Hindfoot: neutral              Forefoot: neutral   Sensation:              SILT distally  ROM:              Ankle: near full with pain at extremes              Subtalar: near full with pain at extremes  Pulses: Palpable pedal pulse                   Imaging Studies/Outside documentation:  I have ordered/reviewed/interpreted the following images/outside  documentation:  No new imaging today.        Assessment:  Colt Rose is a 69 y.o. male with Left medial gastroc strain.     Plan:   Okay to transition out of the boot and back into normal shoe wear as pain allows.    Initiate physical therapy per protocol.  Patient already attending physical therapy for his shoulder.  Updated external PT referral placed today.    He will follow up on an as-needed basis.  Patient voiced understanding all questions were answered.    This note was created using voice recognition software and may contain grammatical errors.

## 2025-01-25 DIAGNOSIS — E78.2 MIXED HYPERLIPIDEMIA: ICD-10-CM

## 2025-01-27 DIAGNOSIS — R19.5 POSITIVE OCCULT STOOL BLOOD TEST: Primary | ICD-10-CM

## 2025-01-27 DIAGNOSIS — K92.1 BLOOD IN THE STOOL: ICD-10-CM

## 2025-01-27 RX ORDER — ATORVASTATIN CALCIUM 20 MG/1
20 TABLET, FILM COATED ORAL
Qty: 90 TABLET | Refills: 3 | Status: SHIPPED | OUTPATIENT
Start: 2025-01-27

## 2025-01-28 RX ORDER — LANOLIN ALCOHOL/MO/W.PET/CERES
400 CREAM (GRAM) TOPICAL
Qty: 90 TABLET | Refills: 3 | Status: SHIPPED | OUTPATIENT
Start: 2025-01-28

## 2025-01-29 ENCOUNTER — TELEPHONE (OUTPATIENT)
Dept: GASTROENTEROLOGY | Facility: CLINIC | Age: 70
End: 2025-01-29
Payer: MEDICARE

## 2025-01-29 ENCOUNTER — OFFICE VISIT (OUTPATIENT)
Dept: CARDIOLOGY | Facility: CLINIC | Age: 70
End: 2025-01-29
Payer: MEDICARE

## 2025-01-29 VITALS
HEART RATE: 56 BPM | BODY MASS INDEX: 27.77 KG/M2 | WEIGHT: 194 LBS | SYSTOLIC BLOOD PRESSURE: 118 MMHG | OXYGEN SATURATION: 99 % | RESPIRATION RATE: 17 BRPM | DIASTOLIC BLOOD PRESSURE: 68 MMHG | HEIGHT: 70 IN

## 2025-01-29 DIAGNOSIS — E78.5 DYSLIPIDEMIA: ICD-10-CM

## 2025-01-29 DIAGNOSIS — I48.3 TYPICAL ATRIAL FLUTTER: ICD-10-CM

## 2025-01-29 DIAGNOSIS — Z01.818 PREOPERATIVE EVALUATION OF A MEDICAL CONDITION TO RULE OUT SURGICAL CONTRAINDICATIONS (TAR REQUIRED): Primary | ICD-10-CM

## 2025-01-29 DIAGNOSIS — K21.9 GASTROESOPHAGEAL REFLUX DISEASE WITHOUT ESOPHAGITIS: ICD-10-CM

## 2025-01-29 DIAGNOSIS — R00.1 BRADYCARDIA: ICD-10-CM

## 2025-01-29 PROCEDURE — 99214 OFFICE O/P EST MOD 30 MIN: CPT | Mod: PBBFAC,PN | Performed by: INTERNAL MEDICINE

## 2025-01-29 PROCEDURE — 99214 OFFICE O/P EST MOD 30 MIN: CPT | Mod: S$PBB,,, | Performed by: INTERNAL MEDICINE

## 2025-01-29 PROCEDURE — 99999 PR PBB SHADOW E&M-EST. PATIENT-LVL IV: CPT | Mod: PBBFAC,,, | Performed by: INTERNAL MEDICINE

## 2025-01-29 NOTE — ASSESSMENT & PLAN NOTE
Patient appears to be an acceptable sided cardiovascular risk for planned surgical intervention.  Recommend to hold aspirin for 7 days duration and maintain on same regimen.  Resume aspirin as soon as cleared by surgery

## 2025-01-29 NOTE — LETTER
.  [facility]  [address]  [address]  [place] [address]  Phone: [phone]  Fax: [phone] Date: 2025    Patient: Colt Rose                    MRN#:[id number]  : 1955  Referring Physician: dr alaniz        Procedure: EGD    Current Outpatient Medications   Medication Sig Dispense Refill    aspirin (ECOTRIN) 81 MG EC tablet Take 1 tablet (81 mg total) by mouth once daily.  0    atorvastatin (LIPITOR) 20 MG tablet TAKE 1 TABLET BY MOUTH ONCE  DAILY 90 tablet 3    diclofenac (VOLTAREN) 75 MG EC tablet Take 1 tablet (75 mg total) by mouth 2 (two) times daily with meals. 60 tablet 2    docosahexaenoic acid/epa (FISH OIL ORAL) Take 1 capsule by mouth once daily.      magnesium oxide (MAG-OX) 400 mg (241.3 mg magnesium) tablet Take 1 tablet (400 mg total) by mouth once daily. 90 tablet 3    meloxicam (MOBIC) 15 MG tablet TAKE 1 TABLET BY MOUTH AT NIGHT  AS NEEDED 90 tablet 3    methocarbamoL (ROBAXIN) 500 MG Tab Take 1 tablet (500 mg total) by mouth every 8 (eight) hours as needed (muscle spasm). 90 tablet 3    multivitamin (THERAGRAN) per tablet Take 1 tablet by mouth once daily.      omeprazole (PRILOSEC) 40 MG capsule Take 1 capsule (40 mg total) by mouth every morning. 90 capsule 3    pregabalin (LYRICA) 100 MG capsule Take 100 mg by mouth once daily.      ciclopirox (PENLAC) 8 % Soln Apply topically. (Patient not taking: Reported on 2025)       No current facility-administered medications for this visit.     Facility-Administered Medications Ordered in Other Visits   Medication Dose Route Frequency Provider Last Rate Last Admin    lactated ringers infusion   Intravenous Continuous Adarsh Kraft MD        lactated ringers infusion   Intravenous Continuous Adarsh Kraft MD        LIDOcaine (PF) 10 mg/ml (1%) injection 10 mg  1 mL Intradermal Once Adarsh Kraft MD        LIDOcaine (PF) 10 mg/ml (1%) injection 10 mg  1 mL  Intradermal Once Adarsh Kraft MD           This patient has been assessed for risk factors for clearance of surgery with the following stipulations:    [] No Contraindications.      [x] Recommendations for ASPIRIN: Hold X 5 DAYS.    [x] Patient is MODERATE RISK    [x] Cleared for surgery with the following restrictions:    [] Not Cleared for surgery due to the following reasons:    If you have any questions regarding the above, please contact my office at [phone]    Clearing Clinician:       .

## 2025-01-29 NOTE — ASSESSMENT & PLAN NOTE
Patient with a history of dyslipidemia currently on Lipitor 20 mg daily encouraged him to maintain the same regimen he appears reasonably stable his LDL cholesterol still is elevated at 98.  May consider increasing the Lipitor to 40 mg a day and an effort to get the LDL cholesterol below 70.  Maintain on low-fat low-cholesterol diet and daily physical activity as tolerated

## 2025-01-29 NOTE — PROGRESS NOTES
Subjective:    Patient ID:  Colt Rose is a 69 y.o. male patient here for evaluation Follow-up (Doing well/Pt states that he is having Cervical RENETTA with Dr. Franklin Tee needs clearance.)      History of Present Illness:     Patient is a 69-year-old gentleman with history of dyslipidemia GERD had cervical disc displacement and cervical neuropathy radiculopathy is seeking preop evaluation for cervical RENETTA.  He has no occurrence of any angina shortness of breath no PND orthopnea noted.  He remains reasonably active no cough or congestion noted no fevers chills rigors and no swelling in the lower extremities denies having any paresthesias        Review of patient's allergies indicates:   Allergen Reactions    Pennsaid [diclofenac sodium] Rash and Blisters       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    Cervical disc displacement     Degeneration of lumbar intervertebral disc     GERD (gastroesophageal reflux disease)     gastric polyps    Hip pain 02/22/2016    Hyperlipidemia     Shingles 11/2013    Sleep apnea      Past Surgical History:   Procedure Laterality Date    ABLATION, SVT, ACCESSORY PATHWAY N/A 5/25/2023    Procedure: Ablation, SVT, Accessory Pathway;  Surgeon: Goyo Sky MD;  Location: [facility] EP LAB;  Service: Cardiology;  Laterality: N/A;  PAC, RFA, CARTO, MAC, GP, 3 PREP    CERVICAL FUSION      bone graft    COLONOSCOPY N/A 7/29/2021    Procedure: COLONOSCOPY;  Surgeon: Tyler Story MD;  Location: [facility];  Service: Endoscopy;  Laterality: N/A;    COLONOSCOPY N/A 10/30/2024    Procedure: COLONOSCOPY;  Surgeon: Tyler Story MD;  Location: [facility] ENDO;  Service: Endoscopy;  Laterality: N/A;  m/ppm    EPIDURAL STEROID INJECTION INTO LUMBAR SPINE N/A 12/5/2019    Procedure: Injection-steroid-epidural-lumbar;  Surgeon: Adarsh Kraft MD;  Location: [facility] OR;  Service: Pain Management;  Laterality: N/A;  L5-S1    ESOPHAGOGASTRODUODENOSCOPY N/A 10/27/2020     Procedure: EGD (ESOPHAGOGASTRODUODENOSCOPY);  Surgeon: Tyler Story MD;  Location: [facility];  Service: Endoscopy;  Laterality: N/A;    INJECTION OF ANESTHETIC AGENT AROUND MEDIAL BRANCH NERVES INNERVATING LUMBAR FACET JOINT Bilateral 11/18/2020    Procedure: BLOCK, NERVE, LUMBAR, MEDIAL BRANCH Bilateral L3,4,5;  Surgeon: Aadrsh Kraft MD;  Location: [facility] OR;  Service: Pain Management;  Laterality: Bilateral;    INJECTION OF ANESTHETIC AGENT AROUND MEDIAL BRANCH NERVES INNERVATING LUMBAR FACET JOINT Left 10/25/2023    Procedure: Block-nerve-medial branch-lumbar;  Surgeon: Adarsh Kraft MD;  Location: [facility] OR;  Service: Anesthesiology;  Laterality: Left;  l4-5, l5-s1 MBB    INJECTION OF ANESTHETIC AGENT AROUND MEDIAL BRANCH NERVES INNERVATING LUMBAR FACET JOINT Bilateral 11/28/2023    Procedure: Block-nerve-medial branch-lumbar;  Surgeon: Adarsh Kraft MD;  [facility];  Service: Anesthesiology;  Laterality: Bilateral;  L4.5 and l5/s1 MBB #2    RADIOFREQUENCY ABLATION OF LUMBAR MEDIAL BRANCH NERVE AT SINGLE LEVEL Bilateral 1/29/2021    Procedure: Radiofrequency Ablation, Nerve, Spinal, Lumbar, Medial Branch, 1 Level;  Surgeon: Adarsh Kraft MD;  Location: [facility] OR;  Service: Pain Management;  Laterality: Bilateral;  L3,4,5    TONSILLECTOMY, ADENOIDECTOMY       Social History     Tobacco Use    Smoking status: Never    Smokeless tobacco: Never   Substance Use Topics    Alcohol use: Yes     Alcohol/week: 6.0 standard drinks of alcohol     Types: 6 Cans of beer per week     Comment: weekly or less    Drug use: No        Review of Systems:    As noted in HPI in addition      REVIEW OF SYSTEMS  CARDIOVASCULAR: No recent chest pain, palpitations, arm, neck, or jaw pain  RESPIRATORY: No recent fever, cough chills, SOB or congestion  : No blood in the urine  GI: No Nausea, vomiting, constipation, diarrhea, blood, or reflux.  MUSCULOSKELETAL: No myalgias  NEURO: No lightheadedness or dizziness  EYES: No Double  vision, blurry, vision or headache              Objective        Vitals:    01/29/25 1113   BP: 118/68   Pulse: (!) 56   Resp: 17       LIPIDS - LAST 2   Lab Results   Component Value Date    CHOL 162 07/12/2024    CHOL 213 (H) 01/11/2024    HDL 52 07/12/2024    HDL 58 01/11/2024    LDLCALC 98.2 07/12/2024    LDLCALC 140.8 01/11/2024    TRIG 59 07/12/2024    TRIG 71 01/11/2024    CHOLHDL 32.1 07/12/2024    CHOLHDL 27.2 01/11/2024       CBC - LAST 2  Lab Results   Component Value Date    WBC 4.51 12/10/2024    WBC 4.07 07/12/2024    RBC 4.40 (L) 12/10/2024    RBC 4.54 (L) 07/12/2024    HGB 13.8 (L) 12/10/2024    HGB 14.4 07/12/2024    HCT 41.9 12/10/2024    HCT 44.0 07/12/2024    MCV 95 12/10/2024    MCV 97 07/12/2024    MCH 31.4 (H) 12/10/2024    MCH 31.7 (H) 07/12/2024    MCHC 32.9 12/10/2024    MCHC 32.7 07/12/2024    RDW 13.1 12/10/2024    RDW 13.7 07/12/2024     12/10/2024     07/12/2024    MPV 10.9 12/10/2024    MPV 11.2 07/12/2024    GRAN 1.9 12/10/2024    GRAN 42.8 12/10/2024    LYMPH 1.9 12/10/2024    LYMPH 42.8 12/10/2024    MONO 0.4 12/10/2024    MONO 9.8 12/10/2024    BASO 0.04 12/10/2024    BASO 0.05 07/12/2024    NRBC 0 12/10/2024    NRBC 0 07/12/2024       CHEMISTRY & LIVER FUNCTION - LAST 2  Lab Results   Component Value Date     12/10/2024     07/12/2024    K 4.1 12/10/2024    K 4.3 07/12/2024     12/10/2024     07/12/2024    CO2 28 12/10/2024    CO2 25 07/12/2024    ANIONGAP 7 (L) 12/10/2024    ANIONGAP 10 07/12/2024    BUN 17 12/10/2024    BUN 18 07/12/2024    CREATININE 0.8 12/10/2024    CREATININE 0.8 07/12/2024     12/10/2024     07/12/2024    CALCIUM 9.1 12/10/2024    CALCIUM 9.3 07/12/2024    MG 1.9 12/01/2022    MG 1.9 01/14/2022    ALBUMIN 3.6 12/10/2024    ALBUMIN 3.9 07/12/2024    PROT 6.6 12/10/2024    PROT 6.4 07/12/2024    ALKPHOS 49 12/10/2024    ALKPHOS 43 (L) 07/12/2024    ALT 27 12/10/2024    ALT 23 07/12/2024    AST 23 12/10/2024     AST 22 07/12/2024    BILITOT 0.5 12/10/2024    BILITOT 0.8 07/12/2024        CARDIAC PROFILE - LAST 2  Lab Results   Component Value Date    BNP 23 09/28/2020     09/28/2020    CPKMB 8.1 (H) 09/28/2020    CPKMB 4.6 09/28/2020    TROPONINI <0.030 09/28/2020    TROPONINI <0.030 09/28/2020        COAGULATION - LAST 2  Lab Results   Component Value Date    LABPT 13.5 09/28/2020    INR 1.0 05/23/2023    INR 1.1 09/28/2020    APTT 26.5 05/23/2023    APTT 27.1 09/28/2020       ENDOCRINE & PSA - LAST 2  Lab Results   Component Value Date    HGBA1C 5.5 01/11/2024    HGBA1C 5.4 06/08/2023    TSH 2.789 12/10/2024    TSH 3.094 06/08/2023    PSA 0.39 07/12/2024    PSA 0.50 06/14/2023        ECHOCARDIOGRAM RESULTS  Results for orders placed in visit on 01/31/22    Stress Echo Which stress agent will be used? Treadmill Exercise; Color Flow Doppler? No    Interpretation Summary  · The patient reached the end of the protocol.  · There were no arrhythmias during stress.  · The left ventricle is normal in size with  · The estimated ejection fraction is 65%.  · Normal left ventricular diastolic function.  · Atrial fibrillation not observed.  · Normal right ventricular size with normal right ventricular systolic function.  · The stress echo portion of this study is negative for myocardial ischemia.  · The ECG portion of this study is negative for myocardial ischemia.  · Pre exercise tricuspid regurgitation was 2.5 m/sec postexercise was 3.4 which is slightly abnormal  · Nonspecific J-point depressions noted with no diagnostic ischemic ratio      CURRENT/PREVIOUS VISIT EKG  Results for orders placed or performed in visit on 06/20/24   IN OFFICE EKG 12-LEAD (to [place])    Collection Time: 06/20/24  3:37 PM   Result Value Ref Range    QRS Duration 96 ms    OHS QTC Calculation 381 ms    Narrative    Test Reason : R00.2,    Vent. Rate : 049 BPM     Atrial Rate : 049 BPM     P-R Int : 164 ms          QRS Dur : 096 ms      QT Int : 422  ms       P-R-T Axes : 064 -03 016 degrees     QTc Int : 381 ms    Sinus bradycardia  Otherwise normal ECG  When compared with ECG of 30-AUG-2023 10:37,  Vent. rate has decreased BY  26 BPM  Confirmed by Daljit Freeman MD ([id number]) on 7/25/2024 5:18:02 PM    Referred By:             Confirmed By:Daljit Freeman MD     No valid procedures specified.   Results for orders placed during the hospital encounter of 12/28/21    Nuclear Stress - Cardiology Interpreted    Interpretation Summary    Normal myocardial perfusion scan. There is no evidence of myocardial ischemia or infarction.    The gated perfusion images showed an ejection fraction of 57% post stress. Normal ejection fraction is greater than 53%.    LV cavity size is normal at rest and normal at stress.There is abnormal wall motion at rest and post stress.    The EKG portion of this study is positive for ischemia.    The patient reported no chest pain during the stress test.    Patient exercised on a Shaan protocol for 8 minutes and 21 seconds and attained a maximum heart rate of 137 beats per minute which is 87% of the maximum predicted heart rate and attained 10 point 1 METs.  And had normal blood pressure response to exercise.    No valid procedures specified.    PHYSICAL EXAM  CONSTITUTIONAL: Well built, well nourished in no apparent distress  NECK: no carotid bruit, no JVD  LUNGS: CTA  CHEST WALL: no tenderness  HEART: regular rate and rhythm, S1, S2 normal, no murmur, click, rub or gallop   ABDOMEN: soft, non-tender; bowel sounds normal; no masses,  no organomegaly  EXTREMITIES: Extremities normal, no edema, no calf tenderness noted  NEURO: AAO X 3    I HAVE REVIEWED :    The vital signs, nurses notes, and all the pertinent radiology and labs.    EKG 01/29/2025 shows sinus bradycardia rate of 51 beats per minute otherwise within normal limits    Current Outpatient Medications   Medication Instructions    aspirin (ECOTRIN) 81 mg, Oral, Daily    atorvastatin  (LIPITOR) 20 mg, Oral    ciclopirox (PENLAC) 8 % Soln Apply topically.    diclofenac (VOLTAREN) 75 mg, Oral, 2 times daily with meals    docosahexaenoic acid/epa (FISH OIL ORAL) 1 capsule, Daily    magnesium oxide (MAG-OX) 400 mg, Oral    meloxicam (MOBIC) 15 mg, Oral, Nightly PRN    methocarbamoL (ROBAXIN) 500 mg, Oral, Every 8 hours PRN    multivitamin (THERAGRAN) per tablet 1 tablet, Daily    omeprazole (PRILOSEC) 40 mg, Oral, Every morning    pregabalin (LYRICA) 100 mg, Daily          Assessment & Plan     1. Preoperative evaluation of a medical condition to rule out surgical contraindications (TAR required)  Assessment & Plan:  Patient appears to be an acceptable sided cardiovascular risk for planned surgical intervention.  Recommend to hold aspirin for 7 days duration and maintain on same regimen.  Resume aspirin as soon as cleared by surgery      2. Dyslipidemia  Assessment & Plan:  Patient with a history of dyslipidemia currently on Lipitor 20 mg daily encouraged him to maintain the same regimen he appears reasonably stable his LDL cholesterol still is elevated at 98.  May consider increasing the Lipitor to 40 mg a day and an effort to get the LDL cholesterol below 70.  Maintain on low-fat low-cholesterol diet and daily physical activity as tolerated      3. Bradycardia  Assessment & Plan:  Asymptomatic bradycardia relatively stable.  Maintain the same regimen      4. Typical atrial flutter  Assessment & Plan:  Clinically stable no recent flutter activity noted clinically.  Continue on magnesium oxide 400 mg daily and aspirin when tolerated.      5. Gastroesophageal reflux disease without esophagitis  Assessment & Plan:  Reflux symptoms are reasonably controlled with Prilosec 40 mg daily maintain the same            No follow-ups on file.

## 2025-01-29 NOTE — ASSESSMENT & PLAN NOTE
Clinically stable no recent flutter activity noted clinically.  Continue on magnesium oxide 400 mg daily and aspirin when tolerated.

## 2025-01-29 NOTE — TELEPHONE ENCOUNTER
----- Message from Tyler Story MD sent at 1/29/2025  1:19 PM CST -----  Regarding: FW: Other    ----- Message -----  From: Rylan Freeman MD  Sent: 1/29/2025  12:27 PM CST  To: Tyler Story MD  Subject: Other

## 2025-01-29 NOTE — LETTER
...  [facility]  [address]  [address]  [place] [address]  Phone: [phone]  Fax: [phone] Date: 2025    Patient: Colt Rose                    MRN#:[id number]  : 1955  Referring Physician: Dr. Franklin Tee            Procedure: Cervical RENETTA    Current Outpatient Medications   Medication Sig Dispense Refill    aspirin (ECOTRIN) 81 MG EC tablet Take 1 tablet (81 mg total) by mouth once daily.  0    atorvastatin (LIPITOR) 20 MG tablet TAKE 1 TABLET BY MOUTH ONCE  DAILY 90 tablet 3    diclofenac (VOLTAREN) 75 MG EC tablet Take 1 tablet (75 mg total) by mouth 2 (two) times daily with meals. 60 tablet 2    docosahexaenoic acid/epa (FISH OIL ORAL) Take 1 capsule by mouth once daily.      magnesium oxide (MAG-OX) 400 mg (241.3 mg magnesium) tablet Take 1 tablet (400 mg total) by mouth once daily. 90 tablet 3    meloxicam (MOBIC) 15 MG tablet TAKE 1 TABLET BY MOUTH AT NIGHT  AS NEEDED 90 tablet 3    methocarbamoL (ROBAXIN) 500 MG Tab Take 1 tablet (500 mg total) by mouth every 8 (eight) hours as needed (muscle spasm). 90 tablet 3    multivitamin (THERAGRAN) per tablet Take 1 tablet by mouth once daily.      omeprazole (PRILOSEC) 40 MG capsule Take 1 capsule (40 mg total) by mouth every morning. 90 capsule 3    pregabalin (LYRICA) 100 MG capsule Take 100 mg by mouth once daily.      ciclopirox (PENLAC) 8 % Soln Apply topically. (Patient not taking: Reported on 2025)       No current facility-administered medications for this visit.     Facility-Administered Medications Ordered in Other Visits   Medication Dose Route Frequency Provider Last Rate Last Admin    lactated ringers infusion   Intravenous Continuous Adarsh Kraft MD        lactated ringers infusion   Intravenous Continuous Adarsh Kraft MD        LIDOcaine (PF) 10 mg/ml (1%) injection 10 mg  1 mL Intradermal Once Adarsh Kraft MD        LIDOcaine (PF) 10 mg/ml (1%)  injection 10 mg  1 mL Intradermal Once Adarsh Kraft MD           This patient has been assessed for risk factors for clearance of surgery with the following stipulations:    [x] No Contraindications.      [x] Recommendations for ASPIRIN: Hold X 5 DAYS.    [x] Patient is MODERATE RISK    [x] Cleared for surgery.    If you have any questions regarding the above, please contact my office at [phone]    Clearing Clinician:       '

## 2025-02-11 ENCOUNTER — TELEPHONE (OUTPATIENT)
Dept: CARDIOLOGY | Facility: CLINIC | Age: 70
End: 2025-02-11
Payer: MEDICARE

## 2025-02-11 DIAGNOSIS — Z01.818 PREOPERATIVE EVALUATION OF A MEDICAL CONDITION TO RULE OUT SURGICAL CONTRAINDICATIONS (TAR REQUIRED): Primary | ICD-10-CM

## 2025-02-11 NOTE — TELEPHONE ENCOUNTER
----- Message from LUL Pink sent at 2025  4:06 PM CST -----  Regardin/29 EKG Order  The EKG performed on 2025 is missing an order.  Please place an order so that the EKG can be read in [place].    Thank you,  Apryl Root RN  [facility]   [facility]  3rd Floor Cardiology Clinic  [phone]/ [id number]

## 2025-02-14 ENCOUNTER — HOSPITAL ENCOUNTER (OUTPATIENT)
Facility: HOSPITAL | Age: 70
Discharge: HOME | End: 2025-02-14
Attending: INTERNAL MEDICINE | Admitting: INTERNAL MEDICINE
Payer: MEDICARE

## 2025-02-14 ENCOUNTER — ANESTHESIA (OUTPATIENT)
Dept: ENDOSCOPY | Facility: HOSPITAL | Age: 70
End: 2025-02-14
Payer: MEDICARE

## 2025-02-14 ENCOUNTER — ANESTHESIA EVENT (OUTPATIENT)
Dept: ENDOSCOPY | Facility: HOSPITAL | Age: 70
End: 2025-02-14
Payer: MEDICARE

## 2025-02-14 DIAGNOSIS — K29.70 GASTRITIS, PRESENCE OF BLEEDING UNSPECIFIED, UNSPECIFIED CHRONICITY, UNSPECIFIED GASTRITIS TYPE: ICD-10-CM

## 2025-02-14 DIAGNOSIS — K44.9 HIATAL HERNIA: ICD-10-CM

## 2025-02-14 DIAGNOSIS — K92.1 BLOOD IN STOOL: ICD-10-CM

## 2025-02-14 DIAGNOSIS — K31.7 GASTRIC POLYPS: Primary | ICD-10-CM

## 2025-02-14 PROCEDURE — 43239 EGD BIOPSY SINGLE/MULTIPLE: CPT | Mod: 59 | Performed by: INTERNAL MEDICINE

## 2025-02-14 PROCEDURE — 27201012 HC FORCEPS, HOT/COLD, DISP: Performed by: INTERNAL MEDICINE

## 2025-02-14 PROCEDURE — 37000008 HC ANESTHESIA 1ST 15 MINUTES: Performed by: INTERNAL MEDICINE

## 2025-02-14 PROCEDURE — 63600175 PHARM REV CODE 636 W HCPCS: Performed by: NURSE ANESTHETIST, CERTIFIED REGISTERED

## 2025-02-14 PROCEDURE — 43251 EGD REMOVE LESION SNARE: CPT | Performed by: INTERNAL MEDICINE

## 2025-02-14 PROCEDURE — 25000003 PHARM REV CODE 250: Performed by: INTERNAL MEDICINE

## 2025-02-14 PROCEDURE — 88312 SPECIAL STAINS GROUP 1: CPT | Mod: TC | Performed by: PATHOLOGY

## 2025-02-14 PROCEDURE — 27201089 HC SNARE, DISP (ANY): Performed by: INTERNAL MEDICINE

## 2025-02-14 RX ORDER — LIDOCAINE HYDROCHLORIDE 20 MG/ML
INJECTION INTRAVENOUS
Status: DISCONTINUED | OUTPATIENT
Start: 2025-02-14 | End: 2025-02-14

## 2025-02-14 RX ORDER — SODIUM CHLORIDE 9 MG/ML
INJECTION, SOLUTION INTRAVENOUS CONTINUOUS
Status: DISCONTINUED | OUTPATIENT
Start: 2025-02-14 | End: 2025-02-14 | Stop reason: HOSPADM

## 2025-02-14 RX ORDER — PROPOFOL 10 MG/ML
VIAL (ML) INTRAVENOUS
Status: DISCONTINUED | OUTPATIENT
Start: 2025-02-14 | End: 2025-02-14

## 2025-02-14 RX ADMIN — PROPOFOL 50 MG: 10 INJECTION, EMULSION INTRAVENOUS at 01:02

## 2025-02-14 RX ADMIN — PROPOFOL 100 MG: 10 INJECTION, EMULSION INTRAVENOUS at 12:02

## 2025-02-14 RX ADMIN — SODIUM CHLORIDE: 9 INJECTION, SOLUTION INTRAVENOUS at 12:02

## 2025-02-14 RX ADMIN — LIDOCAINE HYDROCHLORIDE 100 MG: 20 INJECTION, SOLUTION INTRAVENOUS at 12:02

## 2025-02-14 NOTE — ANESTHESIA PREPROCEDURE EVALUATION
02/14/2025  Colt Rose is a 69 y.o., male.      Pre-op Assessment    I have reviewed the Patient Summary Reports.     I have reviewed the Nursing Notes. I have reviewed the NPO Status.   I have reviewed the Medications.     Review of Systems  Anesthesia Hx:  No problems with previous Anesthesia                Cardiovascular:         Dysrhythmias       hyperlipidemia    Hx A-flutter  S/P ablation                           Pulmonary:        Sleep Apnea     Obstructive Sleep Apnea (ARIS).           Renal/:  Renal/ Normal                 Hepatic/GI:     GERD                Musculoskeletal:  Arthritis          Spine Disorders: cervical and lumbar            Neurological:    Neuromuscular Disease,                                 Neuromuscular Disease   Endocrine:  Endocrine Normal                Physical Exam  General: Well nourished    Airway:  Mallampati: II   Mouth Opening: Normal  TM Distance: Normal  Neck ROM: Normal ROM    Dental:  Intact        Anesthesia Plan  Type of Anesthesia, risks & benefits discussed:    Anesthesia Type: Gen Natural Airway  Intra-op Monitoring Plan: Standard ASA Monitors  Induction:  IV  Informed Consent: Informed consent signed with the Patient and all parties understand the risks and agree with anesthesia plan.  All questions answered.   ASA Score: 3    Ready For Surgery From Anesthesia Perspective.     .

## 2025-02-14 NOTE — TRANSFER OF CARE
"Anesthesia Transfer of Care Note    Patient: Colt Rose    Procedure(s) Performed: Procedure(s) (LRB):  EGD (ESOPHAGOGASTRODUODENOSCOPY) (N/A)    Patient location: GI    Anesthesia Type: general    Transport from OR: Transported from OR on room air with adequate spontaneous ventilation    Post pain: adequate analgesia    Post assessment: no apparent anesthetic complications and tolerated procedure well    Post vital signs: stable    Level of consciousness: awake, alert and oriented    Nausea/Vomiting: no nausea/vomiting    Complications: none    Transfer of care protocol was followed    Last vitals: Visit Vitals  /69 (BP Location: Left arm, Patient Position: Lying)   Pulse (!) 55   Temp 36.3 °C (97.3 °F) (Skin)   Resp 14   Ht 5' 10" (1.778 m)   Wt 83.9 kg (185 lb)   SpO2 98%   BMI 26.54 kg/m²     "

## 2025-02-14 NOTE — PROVATION PATIENT INSTRUCTIONS
Discharge Summary/Instructions after an Endoscopic Procedure  Patient Name: Colt Rose  Patient MRN: [id number]  Patient YOB: 1955 Friday, February 14, 2025  Tyler Covarrubias MD  Dear patient,  As a result of recent federal legislation (The Federal Cures Act), you may   receive lab or pathology results from your procedure in your [facility]   account before your physician is able to contact you. Your physician or   their representative will relay the results to you with their   recommendations at their soonest availability.  Thank you,  RESTRICTIONS:  During your procedure today, you received medications for sedation.  These   medications may affect your judgment, balance and coordination.  Therefore,   for 24 hours, you have the following restrictions:   - DO NOT drive a car, operate machinery, make legal/financial decisions,   sign important papers or drink alcohol.    ACTIVITY:  Today: no heavy lifting, straining or running due to procedural   sedation/anesthesia.  The following day: return to full activity including work.  DIET:  Eat and drink normally unless instructed otherwise.     TREATMENT FOR COMMON SIDE EFFECTS:  - Mild abdominal pain, nausea, belching, bloating or excessive gas:  rest,   eat lightly and use a heating pad.  - Sore Throat: treat with throat lozenges and/or gargle with warm salt   water.  - Because air was used during the procedure, expelling large amounts of air   from your rectum or belching is normal.  - If a bowel prep was taken, you may not have a bowel movement for 1-3 days.    This is normal.  SYMPTOMS TO WATCH FOR AND REPORT TO YOUR PHYSICIAN:  1. Abdominal pain or bloating, other than gas cramps.  2. Chest pain.  3. Back pain.  4. Signs of infection such as: chills or fever occurring within 24 hours   after the procedure.  5. Rectal bleeding, which would show as bright red, maroon, or black stools.   (A tablespoon of blood from the rectum is not serious, especially  if   hemorrhoids are present.)  6. Vomiting.  7. Weakness or dizziness.  GO DIRECTLY TO THE NEAREST EMERGENCY ROOM IF YOU HAVE ANY OF THE FOLLOWING:      Difficulty breathing              Chills and/or fever over 101 F   Persistent vomiting and/or vomiting blood   Severe abdominal pain   Severe chest pain   Black, tarry stools   Bleeding- more than one tablespoon   Any other symptom or condition that you feel may need urgent attention  Your doctor recommends these additional instructions:  If any biopsies were taken, your doctors clinic will contact you in 1 to 2   weeks with any results.  - Patient has a contact number available for emergencies.  The signs and   symptoms of potential delayed complications were discussed with the   patient.  Return to normal activities tomorrow.  Written discharge   instructions were provided to the patient.   - Resume previous diet.   - Continue present medications.   - No aspirin, ibuprofen, naproxen, or other non-steroidal anti-inflammatory   drugs.   - Await pathology results.   - Discharge patient to home (ambulatory).   - Follow an antireflux regimen.   - Return to GI office after studies are complete.  For questions, problems or results please call your physician - Tyler Covarrubias MD at Work:  [phone].  [facility] [place], EMERGENCY ROOM PHONE NUMBER: [phone]  IF A COMPLICATION OR EMERGENCY SITUATION ARISES AND YOU ARE UNABLE TO REACH   YOUR PHYSICIAN - GO DIRECTLY TO THE EMERGENCY ROOM.  Tyler Covarrubias MD  2/14/2025 1:11:37 PM  This report has been verified and signed electronically.  Dear patient,  As a result of recent federal legislation (The Federal Cures Act), you may   receive lab or pathology results from your procedure in your [facility]   account before your physician is able to contact you. Your physician or   their representative will relay the results to you with their   recommendations at their soonest availability.  Thank you,  PROVATION

## 2025-02-14 NOTE — ANESTHESIA POSTPROCEDURE EVALUATION
Anesthesia Post Evaluation    Patient: Colt Rose    Procedure(s) Performed: Procedure(s) (LRB):  EGD (ESOPHAGOGASTRODUODENOSCOPY) (N/A)    Final Anesthesia Type: general      Patient location during evaluation: PACU  Patient participation: Yes- Able to Participate  Level of consciousness: sedated and awake  Post-procedure vital signs: reviewed and stable  Pain management: adequate  Airway patency: patent    PONV status at discharge: No PONV  Anesthetic complications: no      Cardiovascular status: blood pressure returned to baseline and hemodynamically stable  Respiratory status: spontaneous ventilation  Hydration status: euvolemic  Follow-up not needed.              Vitals Value Taken Time   /65 02/14/25 1330   Temp 36.2 °C (97.2 °F) 02/14/25 1325   Pulse 65 02/14/25 1330   Resp 14 02/14/25 1330   SpO2 96 % 02/14/25 1330         Event Time   Out of Recovery 13:45:12         Pain/Leyda Score: Leyda Score: 10 (2/14/2025  1:32 PM)

## 2025-02-14 NOTE — PLAN OF CARE
Vss, karan po fluids, denies pain, ambulates easily. IV removed, catheter intact. Discharge instructions provided and states understanding. States ready to go home.  Discharged from facility with family per wheelchair.

## 2025-02-17 VITALS
BODY MASS INDEX: 26.48 KG/M2 | HEIGHT: 70 IN | WEIGHT: 185 LBS | DIASTOLIC BLOOD PRESSURE: 65 MMHG | TEMPERATURE: 97 F | OXYGEN SATURATION: 66 % | RESPIRATION RATE: 14 BRPM | SYSTOLIC BLOOD PRESSURE: 139 MMHG | HEART RATE: 65 BPM

## 2025-02-18 ENCOUNTER — RESULTS FOLLOW-UP (OUTPATIENT)
Dept: GASTROENTEROLOGY | Facility: CLINIC | Age: 70
End: 2025-02-18

## 2025-02-18 NOTE — PROGRESS NOTES
Please notify patient that biopsies reviewed and showed no bacteria.  Continue current meds and follow up as previously planned.

## 2025-03-07 DIAGNOSIS — E78.2 MIXED HYPERLIPIDEMIA: ICD-10-CM

## 2025-03-07 RX ORDER — ATORVASTATIN CALCIUM 20 MG/1
20 TABLET, FILM COATED ORAL
Qty: 90 TABLET | Refills: 3 | Status: SHIPPED | OUTPATIENT
Start: 2025-03-07

## 2025-03-24 DIAGNOSIS — Z00.00 ENCOUNTER FOR MEDICARE ANNUAL WELLNESS EXAM: ICD-10-CM

## 2025-04-16 ENCOUNTER — OFFICE VISIT (OUTPATIENT)
Dept: ORTHOPEDICS | Facility: CLINIC | Age: 70
End: 2025-04-16
Payer: MEDICARE

## 2025-04-16 ENCOUNTER — HOSPITAL ENCOUNTER (OUTPATIENT)
Dept: RADIOLOGY | Facility: HOSPITAL | Age: 70
Discharge: HOME | End: 2025-04-16
Attending: ORTHOPAEDIC SURGERY
Payer: MEDICARE

## 2025-04-16 VITALS — BODY MASS INDEX: 26.48 KG/M2 | WEIGHT: 185 LBS | HEIGHT: 70 IN

## 2025-04-16 DIAGNOSIS — M25.562 LEFT KNEE PAIN, UNSPECIFIED CHRONICITY: ICD-10-CM

## 2025-04-16 DIAGNOSIS — M17.12 PRIMARY OSTEOARTHRITIS OF LEFT KNEE: Primary | ICD-10-CM

## 2025-04-16 DIAGNOSIS — M25.562 LEFT KNEE PAIN, UNSPECIFIED CHRONICITY: Primary | ICD-10-CM

## 2025-04-16 PROCEDURE — 73564 X-RAY EXAM KNEE 4 OR MORE: CPT | Mod: TC,PO,LT

## 2025-04-16 PROCEDURE — 99999 PR PBB SHADOW E&M-EST. PATIENT-LVL III: CPT | Mod: PBBFAC,,, | Performed by: ORTHOPAEDIC SURGERY

## 2025-04-16 PROCEDURE — 99213 OFFICE O/P EST LOW 20 MIN: CPT | Mod: PBBFAC,25,PO | Performed by: ORTHOPAEDIC SURGERY

## 2025-04-16 NOTE — PROGRESS NOTES
Subjective:      Patient ID: Colt Rose is a 69 y.o. male.    Chief Complaint: Pain of the Left Knee (Increase in pain and popping sensation)    HPI  69-year-old male long history of intermittent problems with the left knee.  He has been diagnosed with a osteoarthritis in the past.  Recently while playing pickleball on a very regular basis had some increased pain and popping in his knee.  He comes in his a precaution.  He would like to continue to be active  ROS      Objective:    Ortho Exam     Constitutional:   Patient is alert  and oriented in no acute distress  HEENT:  normocephalic atraumatic; PERRL EOMI  Neck:  Supple without adenopathy  Cardiovascular:  Normal rate and rhythm  Pulmonary:  Normal respiratory effort normal chest wall expansion  Abdominal:  Nonprotuberant nondistended  Musculoskeletal:  Patient has a steady minimally antalgic gait  Good range of motion does have some difficulty with full extension  He has a patellofemoral crepitus no gross instability of the knee although he is a bit guarded.  Neurological:  No focal defect; cranial nerves 2-12 grossly intact  Psychiatric/behavioral:  Mood and behavior normal      X-ray Knee Ortho Left with Flexion (XPD)  Narrative: EXAMINATION:  XR KNEE ORTHO LEFT WITH FLEXION (XPD)    CLINICAL HISTORY:  . Pain in left knee    TECHNIQUE:  AP standing view of both knees, PA flexion standing views of both knees, and Merchant views of both knees were performed. A lateral view of the left knee was also performed.    COMPARISON:  Knee x-rays of November 12, 2019    FINDINGS:  There is mild narrowing of the medial intercondylar joint space best seen on the PA flexion views.  There is pointing of the intercondylar tibial eminences.  Spur formation of the patella is noted.  No joint effusion or fracture is seen.  Similar findings are seen on the right.  Impression: Osteoarthritic changes left knee.    Electronically signed by: Sebastian Kraft  MD  Date:    04/16/2025  Time:    11:15       My Radiographs Findings:    Radiographs show some degenerative changes no acute osseous abnormalities  Assessment:       Encounter Diagnosis   Name Primary?    Primary osteoarthritis of left knee Yes         Plan:       I have discussed medical condition treatment options with him at length.  We have provided some rehab exercises.  I think he can continue to be active as tolerated.  I have suggested adequate pre activity stretches icing afterwards and maybe use of compression sleeve during his pickleball.  We have discussed NSAIDs if approved by his PCP P.  He declines an injection into his knee today.  Follow up can be as needed.        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    Cervical disc displacement     Degeneration of lumbar intervertebral disc     GERD (gastroesophageal reflux disease)     gastric polyps    Hip pain 02/22/2016    Hyperlipidemia     Shingles 11/2013    Sleep apnea      Past Surgical History:   Procedure Laterality Date    ABLATION, SVT, ACCESSORY PATHWAY N/A 5/25/2023    Procedure: Ablation, SVT, Accessory Pathway;  Surgeon: Goyo Sky MD;  Location: [facility] EP LAB;  Service: Cardiology;  Laterality: N/A;  PAC, RFA, CARTO, MAC, GP, 3 PREP    CERVICAL FUSION      bone graft    COLONOSCOPY N/A 7/29/2021    Procedure: COLONOSCOPY;  Surgeon: Tyler Story MD;  Location: [facility] ENDO;  Service: Endoscopy;  Laterality: N/A;    COLONOSCOPY N/A 10/30/2024    Procedure: COLONOSCOPY;  Surgeon: Tyler Story MD;  Location: [facility] ENDO;  Service: Endoscopy;  Laterality: N/A;  m/ppm    EPIDURAL STEROID INJECTION INTO LUMBAR SPINE N/A 12/5/2019    Procedure: Injection-steroid-epidural-lumbar;  Surgeon: Adarsh Kraft MD;  Location: [facility] OR;  Service: Pain Management;  Laterality: N/A;  L5-S1    ESOPHAGOGASTRODUODENOSCOPY N/A 10/27/2020    Procedure: EGD (ESOPHAGOGASTRODUODENOSCOPY);  Surgeon: Tyler Story MD;  Location:  [facility] ENDO;  Service: Endoscopy;  Laterality: N/A;    ESOPHAGOGASTRODUODENOSCOPY N/A 2/14/2025    Procedure: EGD (ESOPHAGOGASTRODUODENOSCOPY);  Surgeon: Tyler Story MD;  Location: [facility];  Service: Endoscopy;  Laterality: N/A;    INJECTION OF ANESTHETIC AGENT AROUND MEDIAL BRANCH NERVES INNERVATING LUMBAR FACET JOINT Bilateral 11/18/2020    Procedure: BLOCK, NERVE, LUMBAR, MEDIAL BRANCH Bilateral L3,4,5;  Surgeon: Adarsh Kraft MD;  Location: [facility];  Service: Pain Management;  Laterality: Bilateral;    INJECTION OF ANESTHETIC AGENT AROUND MEDIAL BRANCH NERVES INNERVATING LUMBAR FACET JOINT Left 10/25/2023    Procedure: Block-nerve-medial branch-lumbar;  Surgeon: Adarsh Kraft MD;  Location: [facility] OR;  Service: Anesthesiology;  Laterality: Left;  l4-5, l5-s1 MBB    INJECTION OF ANESTHETIC AGENT AROUND MEDIAL BRANCH NERVES INNERVATING LUMBAR FACET JOINT Bilateral 11/28/2023    Procedure: Block-nerve-medial branch-lumbar;  Surgeon: Adarsh Kraft MD;  Location: [facility] OR;  Service: Anesthesiology;  Laterality: Bilateral;  L4.5 and l5/s1 MBB #2    RADIOFREQUENCY ABLATION OF LUMBAR MEDIAL BRANCH NERVE AT SINGLE LEVEL Bilateral 1/29/2021    Procedure: Radiofrequency Ablation, Nerve, Spinal, Lumbar, Medial Branch, 1 Level;  Surgeon: Adarsh Kraft MD;  Location: [facility] OR;  Service: Pain Management;  Laterality: Bilateral;  L3,4,5    TONSILLECTOMY, ADENOIDECTOMY         Current Medications[1]    Review of patient's allergies indicates:   Allergen Reactions    Pennsaid [diclofenac sodium] Rash and Blisters       Family History   Problem Relation Name Age of Onset    Diabetes Mother      Hypertension Mother      Stroke Mother      Coronary artery disease Mother      Stroke Father      Heart disease Brother          CAD     Social History     Occupational History    Not on file   Tobacco Use    Smoking status: Never    Smokeless tobacco: Never   Substance and Sexual Activity    Alcohol use: Yes     Alcohol/week: 6.0  standard drinks of alcohol     Types: 6 Cans of beer per week     Comment: weekly or less    Drug use: No    Sexual activity: Yes     Partners: Female            [1]   Current Outpatient Medications:     aspirin (ECOTRIN) 81 MG EC tablet, Take 1 tablet (81 mg total) by mouth once daily., Disp: , Rfl: 0    atorvastatin (LIPITOR) 20 MG tablet, TAKE ONE TABLET BY MOUTH ONCE DAILY, Disp: 90 tablet, Rfl: 3    ciclopirox (PENLAC) 8 % Soln, Apply topically. (Patient not taking: Reported on 1/16/2025), Disp: , Rfl:     docosahexaenoic acid/epa (FISH OIL ORAL), Take 1 capsule by mouth once daily., Disp: , Rfl:     magnesium oxide (MAG-OX) 400 mg (241.3 mg magnesium) tablet, Take 1 tablet (400 mg total) by mouth once daily., Disp: 90 tablet, Rfl: 3    meloxicam (MOBIC) 15 MG tablet, TAKE 1 TABLET BY MOUTH AT NIGHT  AS NEEDED, Disp: 90 tablet, Rfl: 3    methocarbamoL (ROBAXIN) 500 MG Tab, Take 1 tablet (500 mg total) by mouth every 8 (eight) hours as needed (muscle spasm)., Disp: 90 tablet, Rfl: 3    multivitamin (THERAGRAN) per tablet, Take 1 tablet by mouth once daily., Disp: , Rfl:     omeprazole (PRILOSEC) 40 MG capsule, Take 1 capsule (40 mg total) by mouth every morning., Disp: 90 capsule, Rfl: 3    pregabalin (LYRICA) 100 MG capsule, Take 100 mg by mouth once daily., Disp: , Rfl:   No current facility-administered medications for this visit.    Facility-Administered Medications Ordered in Other Visits:     lactated ringers infusion, , Intravenous, Continuous, Adarsh Kraft MD    lactated ringers infusion, , Intravenous, Continuous, Adarsh Kraft MD    LIDOcaine (PF) 10 mg/ml (1%) injection 10 mg, 1 mL, Intradermal, Once, Adarsh Kraft MD    LIDOcaine (PF) 10 mg/ml (1%) injection 10 mg, 1 mL, Intradermal, Once, Adarsh Kraft MD

## 2025-04-21 ENCOUNTER — OFFICE VISIT (OUTPATIENT)
Dept: FAMILY MEDICINE | Facility: CLINIC | Age: 70
End: 2025-04-21
Payer: MEDICARE

## 2025-04-21 DIAGNOSIS — L25.9 CONTACT DERMATITIS, UNSPECIFIED CONTACT DERMATITIS TYPE, UNSPECIFIED TRIGGER: Primary | ICD-10-CM

## 2025-04-21 DIAGNOSIS — E78.2 MIXED HYPERLIPIDEMIA: ICD-10-CM

## 2025-04-21 PROCEDURE — 98005 SYNCH AUDIO-VIDEO EST LOW 20: CPT | Mod: 95,,, | Performed by: PHYSICIAN ASSISTANT

## 2025-04-21 RX ORDER — TRIAMCINOLONE ACETONIDE 1 MG/G
OINTMENT TOPICAL 2 TIMES DAILY
Qty: 30 G | Refills: 0 | Status: SHIPPED | OUTPATIENT
Start: 2025-04-21

## 2025-04-21 NOTE — PROGRESS NOTES
Subjective:       Patient ID: Colt Rose is a 69 y.o. male.    Chief Complaint: No chief complaint on file.    The patient location is: [place]  The chief complaint leading to consultation is: rash    Visit type: audiovisual    Face to Face time with patient: 9 min  15 minutes of total time spent on the encounter, which includes face to face time and non-face to face time preparing to see the patient (eg, review of tests), Obtaining and/or reviewing separately obtained history, Documenting clinical information in the electronic or other health record, Independently interpreting results (not separately reported) and communicating results to the patient/family/caregiver, or Care coordination (not separately reported).         Each patient to whom he or she provides medical services by telemedicine is:  (1) informed of the relationship between the physician and patient and the respective role of any other health care provider with respect to management of the patient; and (2) notified that he or she may decline to receive medical services by telemedicine and may withdraw from such care at any time.    Notes:         Rash  This is a new problem. The current episode started yesterday. The problem is unchanged. The affected locations include the left upper leg. The rash is characterized by blistering and redness. Associated with: brace being worn on left knee. Pertinent negatives include no anorexia, congestion, cough, eye pain, facial edema, fatigue, fever, joint pain, nail changes, shortness of breath or vomiting. Treatments tried: alcohol. The treatment provided mild relief.     Review of patient's allergies indicates:   Allergen Reactions    Pennsaid [diclofenac sodium] Rash and Blisters       Current Medications[1]    Lab Results   Component Value Date    WBC 4.51 12/10/2024    HGB 13.8 (L) 12/10/2024    HCT 41.9 12/10/2024     12/10/2024    CHOL 162 07/12/2024    TRIG 59 07/12/2024    HDL 52  07/12/2024    ALT 27 12/10/2024    AST 23 12/10/2024     12/10/2024    K 4.1 12/10/2024     12/10/2024    CREATININE 0.8 12/10/2024    BUN 17 12/10/2024    CO2 28 12/10/2024    TSH 2.789 12/10/2024    PSA 0.39 07/12/2024    INR 1.0 05/23/2023    HGBA1C 5.5 01/11/2024       Review of Systems   Constitutional:  Negative for activity change, appetite change, fatigue and fever.   HENT:  Negative for congestion.    Eyes:  Negative for pain and visual disturbance.   Respiratory:  Negative for cough and shortness of breath.    Gastrointestinal:  Negative for anorexia and vomiting.   Genitourinary:  Negative for difficulty urinating and dysuria.   Musculoskeletal:  Negative for arthralgias, joint pain and myalgias.   Skin:  Positive for color change and rash. Negative for nail changes.   Neurological:  Negative for headaches.   Hematological:  Negative for adenopathy.   Psychiatric/Behavioral:  The patient is not nervous/anxious.        Objective:      Physical Exam  Constitutional:       General: He is not in acute distress.     Appearance: Normal appearance.   HENT:      Head: Normocephalic and atraumatic.   Pulmonary:      Effort: Pulmonary effort is normal. No respiratory distress.   Skin:     Findings: Erythema present.      Comments: Photos of rash reviewed- clear fluid filled blisters with surrounding erythema    Neurological:      Mental Status: He is alert and oriented to person, place, and time.      Cranial Nerves: No cranial nerve deficit.   Psychiatric:         Behavior: Behavior normal.         Assessment:       1. Contact dermatitis, unspecified contact dermatitis type, unspecified trigger        Plan:       Diagnoses and all orders for this visit:    Contact dermatitis, unspecified contact dermatitis type, unspecified trigger  -     triamcinolone acetonide 0.1% (KENALOG) 0.1 % ointment; Apply topically 2 (two) times daily.  Stop using knee brace    Patient advised to contact clinic should rash  spread once brace removed or if pain , fever, worsening redness develop. Patient verbalized understanding.     Mixed hyperlipidemia  Stable  High fiber diet             [1]   Current Outpatient Medications:     aspirin (ECOTRIN) 81 MG EC tablet, Take 1 tablet (81 mg total) by mouth once daily., Disp: , Rfl: 0    atorvastatin (LIPITOR) 20 MG tablet, TAKE ONE TABLET BY MOUTH ONCE DAILY, Disp: 90 tablet, Rfl: 3    ciclopirox (PENLAC) 8 % Soln, Apply topically. (Patient not taking: Reported on 1/16/2025), Disp: , Rfl:     docosahexaenoic acid/epa (FISH OIL ORAL), Take 1 capsule by mouth once daily., Disp: , Rfl:     magnesium oxide (MAG-OX) 400 mg (241.3 mg magnesium) tablet, Take 1 tablet (400 mg total) by mouth once daily., Disp: 90 tablet, Rfl: 3    meloxicam (MOBIC) 15 MG tablet, TAKE 1 TABLET BY MOUTH AT NIGHT  AS NEEDED, Disp: 90 tablet, Rfl: 3    methocarbamoL (ROBAXIN) 500 MG Tab, Take 1 tablet (500 mg total) by mouth every 8 (eight) hours as needed (muscle spasm)., Disp: 90 tablet, Rfl: 3    multivitamin (THERAGRAN) per tablet, Take 1 tablet by mouth once daily., Disp: , Rfl:     omeprazole (PRILOSEC) 40 MG capsule, Take 1 capsule (40 mg total) by mouth every morning., Disp: 90 capsule, Rfl: 3    pregabalin (LYRICA) 100 MG capsule, Take 100 mg by mouth once daily., Disp: , Rfl:     triamcinolone acetonide 0.1% (KENALOG) 0.1 % ointment, Apply topically 2 (two) times daily., Disp: 30 g, Rfl: 0  No current facility-administered medications for this visit.    Facility-Administered Medications Ordered in Other Visits:     lactated ringers infusion, , Intravenous, Continuous, Adarsh Kraft MD    lactated ringers infusion, , Intravenous, Continuous, Adarsh Kraft MD    LIDOcaine (PF) 10 mg/ml (1%) injection 10 mg, 1 mL, Intradermal, Once, Adarsh Kraft MD    LIDOcaine (PF) 10 mg/ml (1%) injection 10 mg, 1 mL, Intradermal, Once, Adarsh Kraft MD

## 2025-05-04 ENCOUNTER — HOSPITAL ENCOUNTER (EMERGENCY)
Facility: HOSPITAL | Age: 70
Discharge: ANOTHER HOSPITAL | End: 2025-05-04
Attending: EMERGENCY MEDICINE
Payer: MEDICARE

## 2025-05-04 ENCOUNTER — HOSPITAL ENCOUNTER (INPATIENT)
Facility: HOSPITAL | Age: 70
LOS: 9 days | Discharge: NURSING, REHAB OR LONG-TERM CARE | DRG: 064 | End: 2025-05-13
Attending: STUDENT IN AN ORGANIZED HEALTH CARE EDUCATION/TRAINING PROGRAM | Admitting: STUDENT IN AN ORGANIZED HEALTH CARE EDUCATION/TRAINING PROGRAM
Payer: MEDICARE

## 2025-05-04 VITALS
DIASTOLIC BLOOD PRESSURE: 55 MMHG | HEART RATE: 89 BPM | OXYGEN SATURATION: 98 % | SYSTOLIC BLOOD PRESSURE: 118 MMHG | BODY MASS INDEX: 28.08 KG/M2 | WEIGHT: 195.69 LBS | RESPIRATION RATE: 30 BRPM

## 2025-05-04 DIAGNOSIS — I61.1 NONTRAUMATIC CORTICAL HEMORRHAGE OF RIGHT CEREBRAL HEMISPHERE: ICD-10-CM

## 2025-05-04 DIAGNOSIS — I61.9 ICH (INTRACEREBRAL HEMORRHAGE): ICD-10-CM

## 2025-05-04 DIAGNOSIS — R29.818 ACUTE FOCAL NEUROLOGICAL DEFICIT: ICD-10-CM

## 2025-05-04 DIAGNOSIS — I61.0 NONTRAUMATIC SUBCORTICAL HEMORRHAGE OF RIGHT CEREBRAL HEMISPHERE: Primary | ICD-10-CM

## 2025-05-04 PROBLEM — R53.1 ACUTE LEFT-SIDED WEAKNESS: Status: ACTIVE | Noted: 2025-05-04

## 2025-05-04 LAB
ABSOLUTE EOSINOPHIL (SMH): 0.11 K/UL
ABSOLUTE MONOCYTE (SMH): 0.63 K/UL (ref 0.3–1)
ABSOLUTE NEUTROPHIL COUNT (SMH): 2.1 K/UL (ref 1.8–7.7)
ALBUMIN SERPL-MCNC: 4.2 G/DL (ref 3.5–5.2)
ALP SERPL-CCNC: 46 UNIT/L (ref 55–135)
ALT SERPL-CCNC: 24 UNIT/L (ref 10–44)
ANION GAP (SMH): 6 MMOL/L (ref 8–16)
AST SERPL-CCNC: 22 UNIT/L (ref 10–40)
BASOPHILS # BLD AUTO: 0.05 K/UL
BASOPHILS NFR BLD AUTO: 1 %
BILIRUB SERPL-MCNC: 0.5 MG/DL (ref 0.1–1)
BUN SERPL-MCNC: 25 MG/DL (ref 8–23)
CALCIUM SERPL-MCNC: 9.2 MG/DL (ref 8.7–10.5)
CHLORIDE SERPL-SCNC: 107 MMOL/L (ref 95–110)
CHOLEST SERPL-MCNC: 168 MG/DL (ref 120–199)
CHOLEST SERPL-MCNC: 173 MG/DL (ref 120–199)
CHOLEST/HDLC SERPL: 2.4 {RATIO} (ref 2–5)
CHOLEST/HDLC SERPL: 2.7 {RATIO} (ref 2–5)
CO2 SERPL-SCNC: 27 MMOL/L (ref 23–29)
CREAT SERPL-MCNC: 0.8 MG/DL (ref 0.5–1.4)
EAG (OHS): 111 MG/DL (ref 68–131)
ERYTHROCYTE [DISTWIDTH] IN BLOOD BY AUTOMATED COUNT: 13.3 % (ref 11.5–14.5)
GFR SERPLBLD CREATININE-BSD FMLA CKD-EPI: >60 ML/MIN/1.73/M2
GLUCOSE SERPL-MCNC: 96 MG/DL (ref 70–110)
HBA1C MFR BLD: 5.5 % (ref 4–5.6)
HCT VFR BLD AUTO: 39.8 % (ref 40–54)
HDLC SERPL-MCNC: 63 MG/DL (ref 40–75)
HDLC SERPL-MCNC: 69 MG/DL (ref 40–75)
HDLC SERPL: 36.4 % (ref 20–50)
HDLC SERPL: 41.1 % (ref 20–50)
HGB BLD-MCNC: 13.5 GM/DL (ref 14–18)
IMM GRANULOCYTES # BLD AUTO: 0.01 K/UL (ref 0–0.04)
IMM GRANULOCYTES NFR BLD AUTO: 0.2 % (ref 0–0.5)
INDIRECT COOMBS: NORMAL
INDIRECT COOMBS: NORMAL
INR PPP: 1 (ref 0.8–1.2)
LDLC SERPL CALC-MCNC: 102.2 MG/DL (ref 63–159)
LDLC SERPL CALC-MCNC: 86.6 MG/DL (ref 63–159)
LYMPHOCYTES # BLD AUTO: 2.1 K/UL (ref 1–4.8)
MCH RBC QN AUTO: 31.5 PG (ref 27–31)
MCHC RBC AUTO-ENTMCNC: 33.9 G/DL (ref 32–36)
MCV RBC AUTO: 93 FL (ref 82–98)
NONHDLC SERPL-MCNC: 110 MG/DL
NONHDLC SERPL-MCNC: 99 MG/DL
NUCLEATED RBC (/100WBC) (SMH): 0 /100 WBC
PLATELET # BLD AUTO: 206 K/UL (ref 150–450)
PMV BLD AUTO: 10.2 FL (ref 9.2–12.9)
POTASSIUM SERPL-SCNC: 4 MMOL/L (ref 3.5–5.1)
PROT SERPL-MCNC: 6.7 GM/DL (ref 6–8.4)
PROTHROMBIN TIME: 11.3 SECONDS (ref 9–12.5)
RBC # BLD AUTO: 4.28 M/UL (ref 4.6–6.2)
RELATIVE EOSINOPHIL (SMH): 2.2 % (ref 0–8)
RELATIVE LYMPHOCYTE (SMH): 41.7 % (ref 18–48)
RELATIVE MONOCYTE (SMH): 12.5 % (ref 4–15)
RELATIVE NEUTROPHIL (SMH): 42.4 % (ref 38–73)
RH BLD: NORMAL
RH BLD: NORMAL
SODIUM SERPL-SCNC: 140 MMOL/L (ref 136–145)
SPECIMEN OUTDATE: NORMAL
SPECIMEN OUTDATE: NORMAL
TRIGL SERPL-MCNC: 39 MG/DL (ref 30–150)
TRIGL SERPL-MCNC: 62 MG/DL (ref 30–150)
TSH SERPL-ACNC: 1.72 UIU/ML (ref 0.4–4)
TSH SERPL-ACNC: 2.49 UIU/ML (ref 0.34–5.6)
WBC # BLD AUTO: 5.04 K/UL (ref 3.9–12.7)

## 2025-05-04 PROCEDURE — 84443 ASSAY THYROID STIM HORMONE: CPT | Performed by: NURSE PRACTITIONER

## 2025-05-04 PROCEDURE — 99291 CRITICAL CARE FIRST HOUR: CPT | Mod: ,,, | Performed by: NURSE PRACTITIONER

## 2025-05-04 PROCEDURE — 82040 ASSAY OF SERUM ALBUMIN: CPT | Performed by: EMERGENCY MEDICINE

## 2025-05-04 PROCEDURE — 36415 COLL VENOUS BLD VENIPUNCTURE: CPT | Performed by: EMERGENCY MEDICINE

## 2025-05-04 PROCEDURE — 85610 PROTHROMBIN TIME: CPT | Performed by: EMERGENCY MEDICINE

## 2025-05-04 PROCEDURE — 82962 GLUCOSE BLOOD TEST: CPT

## 2025-05-04 PROCEDURE — 80061 LIPID PANEL: CPT | Performed by: NURSE PRACTITIONER

## 2025-05-04 PROCEDURE — 20000000 HC ICU ROOM

## 2025-05-04 PROCEDURE — 25500020 PHARM REV CODE 255: Performed by: EMERGENCY MEDICINE

## 2025-05-04 PROCEDURE — 94761 N-INVAS EAR/PLS OXIMETRY MLT: CPT

## 2025-05-04 PROCEDURE — 83036 HEMOGLOBIN GLYCOSYLATED A1C: CPT | Performed by: NURSE PRACTITIONER

## 2025-05-04 PROCEDURE — 85025 COMPLETE CBC W/AUTO DIFF WBC: CPT | Performed by: EMERGENCY MEDICINE

## 2025-05-04 PROCEDURE — 99291 CRITICAL CARE FIRST HOUR: CPT

## 2025-05-04 PROCEDURE — G0427 INPT/ED TELECONSULT70: HCPCS | Mod: 95,,, | Performed by: STUDENT IN AN ORGANIZED HEALTH CARE EDUCATION/TRAINING PROGRAM

## 2025-05-04 PROCEDURE — 25000003 PHARM REV CODE 250: Performed by: EMERGENCY MEDICINE

## 2025-05-04 PROCEDURE — 86850 RBC ANTIBODY SCREEN: CPT | Mod: 91 | Performed by: NURSE PRACTITIONER

## 2025-05-04 PROCEDURE — 63600175 PHARM REV CODE 636 W HCPCS: Performed by: EMERGENCY MEDICINE

## 2025-05-04 PROCEDURE — 95813 EEG EXTND MNTR 61-119 MIN: CPT | Mod: 26,,, | Performed by: PSYCHIATRY & NEUROLOGY

## 2025-05-04 PROCEDURE — 80061 LIPID PANEL: CPT | Performed by: EMERGENCY MEDICINE

## 2025-05-04 PROCEDURE — 93005 ELECTROCARDIOGRAM TRACING: CPT | Performed by: INTERNAL MEDICINE

## 2025-05-04 PROCEDURE — 96374 THER/PROPH/DIAG INJ IV PUSH: CPT

## 2025-05-04 PROCEDURE — 84443 ASSAY THYROID STIM HORMONE: CPT | Performed by: EMERGENCY MEDICINE

## 2025-05-04 PROCEDURE — 99499 UNLISTED E&M SERVICE: CPT | Mod: ICN,,, | Performed by: NURSE PRACTITIONER

## 2025-05-04 PROCEDURE — 82565 ASSAY OF CREATININE: CPT

## 2025-05-04 PROCEDURE — 99291 CRITICAL CARE FIRST HOUR: CPT | Mod: FS,,, | Performed by: PSYCHIATRY & NEUROLOGY

## 2025-05-04 PROCEDURE — 86901 BLOOD TYPING SEROLOGIC RH(D): CPT | Performed by: EMERGENCY MEDICINE

## 2025-05-04 PROCEDURE — 96375 TX/PRO/DX INJ NEW DRUG ADDON: CPT | Mod: 59

## 2025-05-04 PROCEDURE — 93010 ELECTROCARDIOGRAM REPORT: CPT | Mod: ,,, | Performed by: INTERNAL MEDICINE

## 2025-05-04 RX ORDER — NICARDIPINE HYDROCHLORIDE 0.2 MG/ML
0-15 INJECTION INTRAVENOUS CONTINUOUS
Status: DISCONTINUED | OUTPATIENT
Start: 2025-05-04 | End: 2025-05-04 | Stop reason: HOSPADM

## 2025-05-04 RX ORDER — LANOLIN ALCOHOL/MO/W.PET/CERES
800 CREAM (GRAM) TOPICAL
Status: DISCONTINUED | OUTPATIENT
Start: 2025-05-04 | End: 2025-05-06

## 2025-05-04 RX ORDER — SODIUM,POTASSIUM PHOSPHATES 280-250MG
2 POWDER IN PACKET (EA) ORAL
Status: DISCONTINUED | OUTPATIENT
Start: 2025-05-04 | End: 2025-05-06

## 2025-05-04 RX ORDER — NICARDIPINE HYDROCHLORIDE 0.2 MG/ML
0-15 INJECTION INTRAVENOUS CONTINUOUS
Status: DISCONTINUED | OUTPATIENT
Start: 2025-05-05 | End: 2025-05-04 | Stop reason: HOSPADM

## 2025-05-04 RX ORDER — ONDANSETRON HYDROCHLORIDE 2 MG/ML
4 INJECTION, SOLUTION INTRAVENOUS EVERY 8 HOURS PRN
Status: DISCONTINUED | OUTPATIENT
Start: 2025-05-04 | End: 2025-05-13 | Stop reason: HOSPADM

## 2025-05-04 RX ORDER — SODIUM CHLORIDE 0.9 % (FLUSH) 0.9 %
10 SYRINGE (ML) INJECTION
Status: DISCONTINUED | OUTPATIENT
Start: 2025-05-04 | End: 2025-05-13 | Stop reason: HOSPADM

## 2025-05-04 RX ORDER — LEVETIRACETAM 500 MG/5ML
1000 INJECTION, SOLUTION, CONCENTRATE INTRAVENOUS
Status: COMPLETED | OUTPATIENT
Start: 2025-05-04 | End: 2025-05-04

## 2025-05-04 RX ORDER — BISACODYL 10 MG/1
10 SUPPOSITORY RECTAL DAILY PRN
Status: DISCONTINUED | OUTPATIENT
Start: 2025-05-04 | End: 2025-05-13 | Stop reason: HOSPADM

## 2025-05-04 RX ORDER — LABETALOL HYDROCHLORIDE 5 MG/ML
INJECTION, SOLUTION INTRAVENOUS
Status: COMPLETED
Start: 2025-05-04 | End: 2025-05-04

## 2025-05-04 RX ORDER — DESMOPRESSIN ACETATE 4 UG/ML
40 INJECTION, SOLUTION INTRAVENOUS; SUBCUTANEOUS
Status: DISCONTINUED | OUTPATIENT
Start: 2025-05-04 | End: 2025-05-04

## 2025-05-04 RX ORDER — LABETALOL HCL 20 MG/4 ML
10 SYRINGE (ML) INTRAVENOUS EVERY 4 HOURS PRN
Status: DISCONTINUED | OUTPATIENT
Start: 2025-05-04 | End: 2025-05-07

## 2025-05-04 RX ORDER — AMOXICILLIN 250 MG
1 CAPSULE ORAL 2 TIMES DAILY
Status: DISCONTINUED | OUTPATIENT
Start: 2025-05-04 | End: 2025-05-06

## 2025-05-04 RX ORDER — ATORVASTATIN CALCIUM 20 MG/1
20 TABLET, FILM COATED ORAL NIGHTLY
Status: DISCONTINUED | OUTPATIENT
Start: 2025-05-04 | End: 2025-05-06

## 2025-05-04 RX ADMIN — NICARDIPINE HYDROCHLORIDE 5 MG/HR: 0.2 INJECTION, SOLUTION INTRAVENOUS at 04:05

## 2025-05-04 RX ADMIN — IOHEXOL 100 ML: 350 INJECTION, SOLUTION INTRAVENOUS at 04:05

## 2025-05-04 RX ADMIN — LEVETIRACETAM 1000 MG: 100 INJECTION, SOLUTION INTRAVENOUS at 05:05

## 2025-05-04 RX ADMIN — DESMOPRESSIN ACETATE 40 MCG: 4 SOLUTION INTRAVENOUS at 05:05

## 2025-05-04 NOTE — SUBJECTIVE & OBJECTIVE
HPI:  69 y.o. male with medical history of pre-DM, HLD, cervical and LDDD - with admission concerns of focal neurological deficits. And Stroke code / neurology called in for the same.      History from family / primary team and medical records review. Symptom spectrum of left sided weakness; facial droop right gaze, with LKN <4.5 hrs  prior to ED arrival. No compressive neuropathy pattern of presentation. No associated complex headaches or clinical seizures. No similar events in the past. No history of strokes, epilepsy or complex migraines.     Exam: awake, alert, following commands, right gaze; left hemiplegia; droop and neglect.      Images personally reviewed and interpreted:  Study: Head CT  Study Interpretation: 3.3 x 2.4 cm intracranial hemorrhage within the right frontoparietal region with mild vasogenic edema. There is no midline shift      Assessment and plan:    Spontaneous lobar right frontoparietal region with mild vasogenic edema  - approx 3.3 x 2.4 cm     Transfer to the nearest [facility] / NSGY available facility    - Close neuro monitoring   - Hold off any antithrombotics or DVT ppx   --- patient on ASA / consider reversal DDAVP .4mcg/kg  - SBP target < 140   - eunatremia / euglycemia / euthermia goals / control infection if any   - MRI brain w wo contrast / CTA head and neck for further evaluation      Lytics recommendation: Thrombolytic therapy not recommended due to Hemorrhage on CT     Thrombectomy recommendation: No; No large vessel occlusion identified on imaging   Placement recommendation: transfer to nearest appropriate facility

## 2025-05-04 NOTE — ED PROVIDER NOTES
Encounter Date: 5/4/2025       History     Chief Complaint   Patient presents with    Aphasia     69-year-old male presents to the ER with left-sided weakness and right-sided gaze preference that began abruptly this afternoon around 4:00 p.m..  All history from EMS.    The history is provided by the EMS personnel.     Review of patient's allergies indicates:   Allergen Reactions    Pennsaid [diclofenac sodium] Rash and Blisters     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    Cervical disc displacement     Degeneration of lumbar intervertebral disc     GERD (gastroesophageal reflux disease)     gastric polyps    Hip pain 02/22/2016    Hyperlipidemia     Shingles 11/2013    Sleep apnea      Past Surgical History:   Procedure Laterality Date    ABLATION, SVT, ACCESSORY PATHWAY N/A 5/25/2023    Procedure: Ablation, SVT, Accessory Pathway;  Surgeon: Goyo Sky MD;  Location: [facility] EP LAB;  Service: Cardiology;  Laterality: N/A;  PAC, RFA, CARTO, MAC, GP, 3 PREP    CERVICAL FUSION      bone graft    COLONOSCOPY N/A 7/29/2021    Procedure: COLONOSCOPY;  Surgeon: Tyler Story MD;  Location: [facility];  Service: Endoscopy;  Laterality: N/A;    COLONOSCOPY N/A 10/30/2024    Procedure: COLONOSCOPY;  Surgeon: Tyler Story MD;  Location: [facility];  Service: Endoscopy;  Laterality: N/A;  m/ppm    EPIDURAL STEROID INJECTION INTO LUMBAR SPINE N/A 12/5/2019    Procedure: Injection-steroid-epidural-lumbar;  Surgeon: Adarsh Kraft MD;  Location: [facility] OR;  Service: Pain Management;  Laterality: N/A;  L5-S1    ESOPHAGOGASTRODUODENOSCOPY N/A 10/27/2020    Procedure: EGD (ESOPHAGOGASTRODUODENOSCOPY);  Surgeon: Tyler Story MD;  Location: [facility];  Service: Endoscopy;  Laterality: N/A;    ESOPHAGOGASTRODUODENOSCOPY N/A 2/14/2025    Procedure: EGD (ESOPHAGOGASTRODUODENOSCOPY);  Surgeon: Tyler Story MD;  Location: [facility];  Service: Endoscopy;  Laterality: N/A;    INJECTION OF  ANESTHETIC AGENT AROUND MEDIAL BRANCH NERVES INNERVATING LUMBAR FACET JOINT Bilateral 11/18/2020    Procedure: BLOCK, NERVE, LUMBAR, MEDIAL BRANCH Bilateral L3,4,5;  Surgeon: Adarsh Kraft MD;  Location: [facility] OR;  Service: Pain Management;  Laterality: Bilateral;    INJECTION OF ANESTHETIC AGENT AROUND MEDIAL BRANCH NERVES INNERVATING LUMBAR FACET JOINT Left 10/25/2023    Procedure: Block-nerve-medial branch-lumbar;  Surgeon: Adarsh Kraft MD;  [facility];  Service: Anesthesiology;  Laterality: Left;  l4-5, l5-s1 MBB    INJECTION OF ANESTHETIC AGENT AROUND MEDIAL BRANCH NERVES INNERVATING LUMBAR FACET JOINT Bilateral 11/28/2023    Procedure: Block-nerve-medial branch-lumbar;  Surgeon: Adarsh Kraft MD;  Location: [facility] OR;  Service: Anesthesiology;  Laterality: Bilateral;  L4.5 and l5/s1 MBB #2    RADIOFREQUENCY ABLATION OF LUMBAR MEDIAL BRANCH NERVE AT SINGLE LEVEL Bilateral 1/29/2021    Procedure: Radiofrequency Ablation, Nerve, Spinal, Lumbar, Medial Branch, 1 Level;  Surgeon: Adarsh Kraft MD;  Location: [facility] OR;  Service: Pain Management;  Laterality: Bilateral;  L3,4,5    TONSILLECTOMY, ADENOIDECTOMY       Family History   Problem Relation Name Age of Onset    Diabetes Mother      Hypertension Mother      Stroke Mother      Coronary artery disease Mother      Stroke Father      Heart disease Brother          CAD     Social History[1]  Review of Systems   Unable to perform ROS: Acuity of condition       Physical Exam     Initial Vitals [05/04/25 1608]   BP Pulse Resp Temp SpO2   (!) 144/83 62 16 -- 98 %      MAP       --         Physical Exam    Nursing note and vitals reviewed.  Constitutional: He appears well-developed and well-nourished. He is not diaphoretic.  Non-toxic appearance. He does not have a sickly appearance. He does not appear ill. No distress.   HENT:   Head: Normocephalic and atraumatic.   Forced gaze to the right   Eyes: EOM are normal.   Neck: Neck supple.   Normal range of  motion.  Cardiovascular:  Normal rate, regular rhythm and normal heart sounds.     Exam reveals no gallop and no friction rub.       No murmur heard.  Pulmonary/Chest: Breath sounds normal. No respiratory distress. He has no wheezes. He has no rhonchi. He has no rales.   Musculoskeletal:         General: Normal range of motion.      Cervical back: Normal range of motion and neck supple. No rigidity. Normal range of motion.     Neurological: He is alert.   Left-sided hemiplegia   Skin: Skin is warm and dry. No rash noted.   Psychiatric: He has a normal mood and affect.         ED Course   Critical Care    Date/Time: 5/4/2025 5:41 PM    Performed by: Edy Babb MD  Authorized by: Edy Babb MD  Direct patient critical care time: 40 minutes  Additional history critical care time: 5 minutes  Ordering / reviewing critical care time: 10 minutes  Documentation critical care time: 10 minutes  Consulting other physicians critical care time: 6 minutes  Consult with family critical care time: 6 minutes  Total critical care time (exclusive of procedural time) : 77 minutes  Critical care was necessary to treat or prevent imminent or life-threatening deterioration of the following conditions: ICH.  Critical care was time spent personally by me on the following activities: development of treatment plan with patient or surrogate, discussions with consultants, evaluation of patient's response to treatment, examination of patient, obtaining history from patient or surrogate, ordering and performing treatments and interventions, ordering and review of laboratory studies, ordering and review of radiographic studies, pulse oximetry, re-evaluation of patient's condition and review of old charts.        Labs Reviewed   COMPREHENSIVE METABOLIC PANEL - Abnormal       Result Value    Sodium 140      Potassium 4.0      Chloride 107      CO2 27      Glucose 96      BUN 25 (*)     Creatinine 0.8      Calcium 9.2      Protein  Total 6.7      Albumin 4.2      Bilirubin Total 0.5      ALP 46 (*)     AST 22      ALT 24      Anion Gap 6 (*)     eGFR >60     CBC WITH DIFFERENTIAL - Abnormal    WBC 5.04      RBC 4.28 (*)     Hgb 13.5 (*)     Hct 39.8 (*)     MCV 93      MCH 31.5 (*)     MCHC 33.9      RDW 13.3      Platelet Count 206      MPV 10.2      Nucleated RBC 0      Neut % 42.4      Lymph % 41.7      Mono % 12.5      Eos % 2.2      Basophil % 1.0      Imm Grans % 0.2      Neut # 2.1      Lymph # 2.10      Mono # 0.63      Eos # 0.11      Baso # 0.05      Imm Grans # 0.01     PROTIME-INR - Normal    PT 11.3      INR 1.0     TSH - Normal    TSH 2.486     CBC W/ AUTO DIFFERENTIAL    Narrative:     The following orders were created for panel order CBC W/ AUTO DIFFERENTIAL.  Procedure                               Abnormality         Status                     ---------                               -----------         ------                     CBC with Differential[[id number]]       Abnormal            Final result                 Please view results for these tests on the individual orders.   LIPID PANEL    Cholesterol Total 168      Triglyceride 62      HDL Cholesterol 69      LDL Cholesterol 86.6      HDL/Cholesterol Ratio 41.1      Cholesterol/HDL Ratio 2.4      Non HDL Cholesterol 99     EXTRA TUBES    Narrative:     The following orders were created for panel order EXTRA TUBES.  Procedure                               Abnormality         Status                     ---------                               -----------         ------                     Lavender Top Hold[[id number]]                               In process                 Gold Top Hold[[id number]]                                   In process                 Blood Bank Hold[[id number]]                                 In process                   Please view results for these tests on the individual orders.   LAVENDER TOP HOLD   GOLD TOP HOLD   TYPE & SCREEN   BLOOD BANK  HOLD   POCT GLUCOSE MONITORING CONTINUOUS     EKG Readings: (Independently Interpreted)   Sinus rhythm 64 beats per minute no ST elevation or depression no T-wave inversion independently interpreted     ECG Results              ECG 12 lead (In process)        Collection Time Result Time QRS Duration OHS QTC Calculation    05/04/25 16:16:42 05/04/25 16:29:45 94 427                     In process by Interface, Lab In [facility] (05/04/25 16:29:53)                   Narrative:    Test Reason : R29.818,    Vent. Rate :  64 BPM     Atrial Rate :  64 BPM     P-R Int : 180 ms          QRS Dur :  94 ms      QT Int : 414 ms       P-R-T Axes :  63   4  26 degrees    QTcB Int : 427 ms    Normal sinus rhythm  Normal ECG  When compared with ECG of 29-Jan-2025 12:28,  No significant change was found    Referred By:            Confirmed By:                                   Imaging Results              CTA Head and Neck (xpd) (Final result)  Result time 05/04/25 16:51:38      Final result by Frandy Felix MD (05/04/25 16:51:38)                   Impression:      1. Acute right frontal intraparenchymal hematoma with mild adjacent edema is similar to the recent prior study.  Follow-up recommended.  2. No high-grade stenosis or major vessel occlusion.      Electronically signed by: Frandy Felix  Date:    05/04/2025  Time:    16:51               Narrative:    EXAMINATION:  CTA HEAD AND NECK (XPD)    CLINICAL HISTORY:  Neuro deficit, acute, stroke suspected;    TECHNIQUE:  CT angiogram was performed from the level of the jairon to the top of the head following the IV administration of 100mL of Omnipaque 350 with 1 mm slice thickness.  Sagittal and coronal thick section 5 mm reconstructions are submitted.    COMPARISON:  CT 05/04/2025    FINDINGS:  Intracranial Compartment:    No midline shift or acute hydrocephalus.    Acute right frontal intraparenchymal hematoma with mild adjacent edema is similar to the study 6 minutes  prior.    No mass is detected.  No evidence of acute major vascular infarction.    Skull/Extracranial Contents (limited evaluation): No fracture. Mastoid air cells and paranasal sinuses are essentially clear.    Non-Vascular Structures of the Neck/Thoracic Inlet (limited evaluation): Normal.    Aorta: Normal 3 vessel arch.    Extracranial carotid circulation: No hemodynamically significant stenosis, aneurysmal dilatation, or dissection.    Extracranial vertebral circulation: No hemodynamically significant stenosis, aneurysmal dilatation, or dissection.    Intracranial Arteries: No focal high-grade stenosis, occlusion, or aneurysm.    No evidence of any large vessel occlusion of the anterior, middle and posterior cerebral arteries.    Right P1 segment of the right posterior cerebral artery is hypoplastic.  There is a patent posterior communicating artery on the right.  P2 segment appears adequately maintained on the right.  No significant abnormality of the left posterior cerebral artery also.    Venous structures (limited evaluation): Normal.                                       CT HEAD FOR CODE STROKE (Final result)  Result time 05/04/25 16:24:12      Final result by Enriqueta De La Vega MD (05/04/25 16:24:12)                   Impression:      3.3 x 2.4 cm intracranial hemorrhage within the right frontoparietal region with mild vasogenic edema.  There is no midline shift    These findings were called to the ordering physician at 16:22      Electronically signed by: Enriqueta De La Vega  Date:    05/04/2025  Time:    16:24               Narrative:    CLINICAL HISTORY:  ([id number])70 y/o  (1955) M    Neuro deficit, acute, stroke suspected;    TECHNIQUE:  (A#[id number], exam time 5/4/2025 16:12)    CT HEAD FOR CODE STROKE [id number]    Axial CT of the brain without contrast using soft tissue and bone algorithm. None.    CMS MANDATED QUALITY DATA - CT RADIATION - 436    All CT scans at this facility utilize dose  modulation, iterative reconstruction, and/or weight based dosing when appropriate to reduce radiation dose to as low as reasonably achievable.    COMPARISON:  None available.<Comparisons>    FINDINGS:  There is a 3.3 x 2.4 cm intracranial hemorrhage within the right frontal parietal region with mild surrounding vasogenic edema.  There is no midline shift.    The ventricles and sulci are normal in size and configuration for age.  There are no extra-axial fluid collections.  The cerebellum and brainstem are normal.  The orbits are unremarkable.  The paranasal sinuses and mastoid air cells are clear.                                       Medications   niCARdipine 40 mg/200 mL (0.2 mg/mL) infusion (5 mg/hr Intravenous New Bag 5/4/25 1639)     Followed by   niCARdipine 40 mg/200 mL (0.2 mg/mL) infusion (has no administration in time range)   desmopressin (DDAVP) 40 mcg in 0.9% NaCl 50 mL IVPB (40 mcg Intravenous New Bag 5/4/25 1737)   labetaloL (NORMODYNE,TRANDATE) 5 mg/mL injection (  Return to Cabinet 5/4/25 1615)   tenecteplase (TNKase) 50 mg IV KIT (  Return to Cabinet 5/4/25 1615)   iohexoL (OMNIPAQUE 350) injection 100 mL (100 mLs Intravenous Given 5/4/25 1614)   levETIRAcetam injection 1,000 mg (1,000 mg Intravenous Given 5/4/25 1726)     Medical Decision Making  69-year-old relatively healthy male presents with severe left-sided deficits, aphasia, forced gaze to the right.  This happened about 20 minutes prior to arrival.  Stroke alert called but head CT demonstrates intraparenchymal bleed.  Obviously not a candidate for lytics.  Case discussed with Neurosurgery here who recommends transfer to [place] IV Keppra and DDAVP all of this was ordered.  Accepted at [facility] neuro critical care by Dr. Connors.  Does not need intubation at this time.  Patient will go by helicopter EMS.    Amount and/or Complexity of Data Reviewed  Independent Historian: spouse  Labs: ordered.  Radiology: ordered. Decision-making  details documented in ED Course.    Risk  Prescription drug management.  Decision regarding hospitalization.      Additional MDM:     NIH Stroke Scale:   Interval = baseline (upon arrival/admit)  Level of consciousness = 1 - drowsy  LOC questions = 2 - answers none correctly  LOC commands = 2 - performs neither correctly  Best gaze = 2 - forced deviation  Visual = 2 - complete hemianopia  Facial palsy = 2 - partial  Motor left arm =  4 - no movement  Motor right arm =  0 - no drift  Motor left leg = 4 - no movement  Motor right leg =  0 - no drift  Limb ataxia = 2 - present in two limbs  Sensory = 2 - severe to total loss  Best language = 2 - severe aphasia  Dysarthria = 2 - near to unintelligible  Extinction and inattention = 2 - complete neglect  NIH Stroke Scale Total = 29              ED Course as of 05/04/25 1741   Sun May 04, 2025   1606 Last known normal:  3:40 p.m.  Provider contact time:  Arrival  Head CT Read by MD: 1609 ICH  Neurology consult ordered by ED MD: arrival    VAN positive? Y (weakness plus any other cortical finding. If so, call stroke alert out to 24 hours):    Weakness: Yes    Visual changes: Yes  Aphasia: Yes  Neglect: Yes    NIH score: 29  TPa administered: N, ICH (if not, why not)     [EF]   1609 Head bleed according to CT [EF]   1628 CT HEAD FOR CODE STROKE [EF]   1645 Spoke with dr trevino, he will look at images and call me back [EF]   1655 Family updated [EF]   1657 Dr trevino recommends ddavp, xfer to [facility] [EF]   1717 Accepted at [facility] [EF]      ED Course User Index  [EF] Edy Babb MD                           Clinical Impression:  Final diagnoses:  [R29.818] Acute focal neurological deficit          ED Disposition Condition    Transfer to Another Facility Stable                  [1]   Social History  Tobacco Use    Smoking status: Never    Smokeless tobacco: Never   Substance Use Topics    Alcohol use: Yes     Alcohol/week: 6.0 standard drinks of alcohol     Types: 6 Cans of  beer per week     Comment: weekly or less    Drug use: No        Edy Babb MD  05/04/25 [id number]

## 2025-05-04 NOTE — TELEMEDICINE CONSULT
[facility]  Vascular Neurology  Comprehensive Stroke Center  TeleVascular Neurology Acute Consultation Note        Consult Information  Consult to Telemedicine - Acute Stroke  Consult performed by: Stacy Mcmahan MD  Consult ordered by: Edy Levine MD          Consulting Provider: EDY LEVINE   Current Providers  No providers found    Patient Location:  [facility] EMERGENCY DEPARTMENT Emergency Department    Spoke hospital nurse at bedside with patient assisting consultant.  Patient information was obtained from spouse/SO and primary team.       Stroke Documentation  Acute Stroke Times   Last Known Normal Date: 05/04/25  Last Known Normal Time: 1430  Symptom Onset Date: 05/04/25  Symptom Onset Time: 1430  Stroke Team Called Date: 05/04/25  Stroke Team Called Time: 1610  Stroke Team Arrival Date: 05/04/25  Stroke Team Arrival Time: 1615  CT Interpretation Time: 1620  Thrombolytic Therapy Recommended: No  Thrombectomy Recommended: No    NIH Scale:  1a. Level of Consciousness: 0-->Alert, keenly responsive  1b. LOC Questions: 0-->Answers both questions correctly  1c. LOC Commands: 0-->Performs both tasks correctly  2. Best Gaze: 1-->Partial gaze palsy, gaze is abnormal in one or both eyes, but forced deviation or total gaze paresis is not present  3. Visual: 1-->Partial hemianopia  4. Facial Palsy: 2-->Partial paralysis (total or near-total paralysis of lower face)  5a. Motor Arm, Left: 4-->No movement  5b. Motor Arm, Right: 0-->No drift, limb holds 90 (or 45) degrees for full 10 secs  6a. Motor Leg, Left: 2-->Some effort against gravity, leg falls to bed by 5 secs, but has some effort against gravity  6b. Motor Leg, Right: 0-->No drift, leg holds 30 degree position for full 5 secs  7. Limb Ataxia: 0-->Absent  8. Sensory: 1-->Mild-to-moderate sensory loss, patient feels pinprick is less sharp or is dull on the affected side, or there is a loss of superficial pain with pinprick, but  patient is aware of being touched  9. Best Language: 0-->No aphasia, normal  10. Dysarthria: 1-->Mild-to-moderate dysarthria, patient slurs at least some words and, at worst, can be understood with some difficulty  11. Extinction and Inattention (formerly Neglect): 1-->Visual, tactile, auditory, spatial, or personal inattention or extinction to bilateral simultaneous stimulation in one of the sensory modalities  Total (NIH Stroke Scale): 13      Modified Edwin:    Nader Coma Scale: 15   ABCD2 Score:    [id number] Score:    HAS -BLED Score:    ICH Score:    Hunt & Hull Classification:      Blood pressure (!) 144/83, pulse 62, resp. rate 16, SpO2 98%.      In my opinion, this was a: Tier 1; VAN Stroke Assessment: Not Applicable     Medical Decision Making  HPI:  69 y.o. male with medical history of pre-DM, HLD, cervical and LDDD - with admission concerns of focal neurological deficits. And Stroke code / neurology called in for the same.      History from family / primary team and medical records review. Symptom spectrum of left sided weakness; facial droop right gaze, with LKN <4.5 hrs  prior to ED arrival. No compressive neuropathy pattern of presentation. No associated complex headaches or clinical seizures. No similar events in the past. No history of strokes, epilepsy or complex migraines.     Exam: awake, alert, following commands, right gaze; left hemiplegia; droop and neglect.      Images personally reviewed and interpreted:  Study: Head CT  Study Interpretation: 3.3 x 2.4 cm intracranial hemorrhage within the right frontoparietal region with mild vasogenic edema. There is no midline shift      Assessment and plan:    Spontaneous lobar right frontoparietal region with mild vasogenic edema  - approx 3.3 x 2.4 cm     Transfer to the nearest NCC / NSGY available facility    - Close neuro monitoring   - Hold off any antithrombotics or DVT ppx   --- patient on ASA / consider reversal DDAVP .4mcg/kg  - SBP  target < 140   - eunatremia / euglycemia / euthermia goals / control infection if any   - MRI brain w wo contrast / CTA head and neck for further evaluation      Lytics recommendation: Thrombolytic therapy not recommended due to Hemorrhage on CT     Thrombectomy recommendation: No; No large vessel occlusion identified on imaging   Placement recommendation: transfer to nearest appropriate facility          ROS  Physical Exam  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    Cervical disc displacement     Degeneration of lumbar intervertebral disc     GERD (gastroesophageal reflux disease)     gastric polyps    Hip pain 02/22/2016    Hyperlipidemia     Shingles 11/2013    Sleep apnea      Past Surgical History:   Procedure Laterality Date    ABLATION, SVT, ACCESSORY PATHWAY N/A 5/25/2023    Procedure: Ablation, SVT, Accessory Pathway;  Surgeon: Goyo Sky MD;  Location: [facility] EP LAB;  Service: Cardiology;  Laterality: N/A;  PAC, RFA, CARTO, MAC, GP, 3 PREP    CERVICAL FUSION      bone graft    COLONOSCOPY N/A 7/29/2021    Procedure: COLONOSCOPY;  Surgeon: Tyler Story MD;  Location: [facility];  Service: Endoscopy;  Laterality: N/A;    COLONOSCOPY N/A 10/30/2024    Procedure: COLONOSCOPY;  Surgeon: Tyler Story MD;  Location: [facility];  Service: Endoscopy;  Laterality: N/A;  m/ppm    EPIDURAL STEROID INJECTION INTO LUMBAR SPINE N/A 12/5/2019    Procedure: Injection-steroid-epidural-lumbar;  Surgeon: Adarsh Kraft MD;  Location: [facility] OR;  Service: Pain Management;  Laterality: N/A;  L5-S1    ESOPHAGOGASTRODUODENOSCOPY N/A 10/27/2020    Procedure: EGD (ESOPHAGOGASTRODUODENOSCOPY);  Surgeon: Tyler Story MD;  Location: [facility];  Service: Endoscopy;  Laterality: N/A;    ESOPHAGOGASTRODUODENOSCOPY N/A 2/14/2025    Procedure: EGD (ESOPHAGOGASTRODUODENOSCOPY);  Surgeon: Tyler Story MD;  Location: [facility];  Service: Endoscopy;  Laterality: N/A;    INJECTION OF ANESTHETIC  AGENT AROUND MEDIAL BRANCH NERVES INNERVATING LUMBAR FACET JOINT Bilateral 11/18/2020    Procedure: BLOCK, NERVE, LUMBAR, MEDIAL BRANCH Bilateral L3,4,5;  Surgeon: Adarsh Kraft MD;  Location: [facility] OR;  Service: Pain Management;  Laterality: Bilateral;    INJECTION OF ANESTHETIC AGENT AROUND MEDIAL BRANCH NERVES INNERVATING LUMBAR FACET JOINT Left 10/25/2023    Procedure: Block-nerve-medial branch-lumbar;  Surgeon: Adarsh Kraft MD;  Location[facility] OR;  Service: Anesthesiology;  Laterality: Left;  l4-5, l5-s1 MBB    INJECTION OF ANESTHETIC AGENT AROUND MEDIAL BRANCH NERVES INNERVATING LUMBAR FACET JOINT Bilateral 11/28/2023    Procedure: Block-nerve-medial branch-lumbar;  Surgeon: Adarsh Kraft MD;  Location: [facility] OR;  Service: Anesthesiology;  Laterality: Bilateral;  L4.5 and l5/s1 MBB #2    RADIOFREQUENCY ABLATION OF LUMBAR MEDIAL BRANCH NERVE AT SINGLE LEVEL Bilateral 1/29/2021    Procedure: Radiofrequency Ablation, Nerve, Spinal, Lumbar, Medial Branch, 1 Level;  Surgeon: Adarsh Kraft MD;  Location: [facility] OR;  Service: Pain Management;  Laterality: Bilateral;  L3,4,5    TONSILLECTOMY, ADENOIDECTOMY       Family History   Problem Relation Name Age of Onset    Diabetes Mother      Hypertension Mother      Stroke Mother      Coronary artery disease Mother      Stroke Father      Heart disease Brother          CAD       Diagnoses  Problem Noted   Nontraumatic Cortical Hemorrhage of Right Cerebral Hemisphere 5/4/2025       Stacy Mcmahan MD      Emergent/Acute neurological consultation requested by spoke provider due to critical concerns for possible cerebrovascular event that could result in permanent loss of neurologic/bodily function, severe disability or death of this patient.  Immediate/timely evaluation by a highly prepared expert is paramount for optimal outcomes  High risk for neurological deterioration if not properly diagnosed  High risk for neurological deterioration if not treated promplty/as soon  as possible  Complex diagnostic evaluation may be required (advanced imaging)  High risk treatment options (thrombolytics and/or thrombectomy)    Patient care was coordinated with spoke provider, including but not limted to    Discussing likely diagnosis/etiology of symptoms  Making recommendations for further diagnostic studies  Making recommendations for intravenous thrombolytics or other advanced therapies  Making recommendations for disposition (admission/transfer for higher level of care)      Neurology consultation requested by spoke provider. Audiovisual encounter with the patient performed using a secure connection.  Results and impressions from the visit are documented on this note and were communicated to the consulting provider/team via direct communication. The note has been shared for addition to the patients electronic medical record.

## 2025-05-05 PROBLEM — R47.1 DYSARTHRIA: Status: ACTIVE | Noted: 2025-05-05

## 2025-05-05 PROBLEM — R56.9 SEIZURE: Status: ACTIVE | Noted: 2025-05-05

## 2025-05-05 PROBLEM — G93.6 VASOGENIC BRAIN EDEMA: Status: ACTIVE | Noted: 2025-05-05

## 2025-05-05 LAB
ABSOLUTE EOSINOPHIL (OHS): 0 K/UL
ABSOLUTE MONOCYTE (OHS): 0.46 K/UL (ref 0.3–1)
ABSOLUTE NEUTROPHIL COUNT (OHS): 5.92 K/UL (ref 1.8–7.7)
ALBUMIN SERPL BCP-MCNC: 3.7 G/DL (ref 3.5–5.2)
ALP SERPL-CCNC: 49 UNIT/L (ref 40–150)
ALT SERPL W/O P-5'-P-CCNC: 23 UNIT/L (ref 10–44)
ANION GAP (OHS): 11 MMOL/L (ref 8–16)
APTT PPP: 22.6 SECONDS (ref 21–32)
ASCENDING AORTA: 3 CM
AST SERPL-CCNC: 25 UNIT/L (ref 11–45)
AV AREA BY CONTINUOUS VTI: 2.7 CM2
AV INDEX (PROSTH): 0.69
AV LVOT MEAN GRADIENT: 3 MMHG
AV LVOT PEAK GRADIENT: 5 MMHG
AV MEAN GRADIENT: 5 MMHG
AV PEAK GRADIENT: 12 MMHG
AV VALVE AREA BY VELOCITY RATIO: 2.5 CM²
AV VALVE AREA: 2.6 CM2
AV VELOCITY RATIO: 0.65
BASOPHILS # BLD AUTO: 0.03 K/UL
BASOPHILS NFR BLD AUTO: 0.4 %
BILIRUB SERPL-MCNC: 0.6 MG/DL (ref 0.1–1)
BILIRUB UR QL STRIP.AUTO: NEGATIVE
BSA FOR ECHO PROCEDURE: 2.09 M2
BUN SERPL-MCNC: 22 MG/DL (ref 8–23)
CALCIUM SERPL-MCNC: 8.7 MG/DL (ref 8.7–10.5)
CHLORIDE SERPL-SCNC: 110 MMOL/L (ref 95–110)
CLARITY UR: CLEAR
CO2 SERPL-SCNC: 21 MMOL/L (ref 23–29)
COLOR UR AUTO: YELLOW
CREAT SERPL-MCNC: 0.7 MG/DL (ref 0.5–1.4)
CV ECHO LV RWT: 0.28 CM
DOP CALC AO PEAK VEL: 1.7 M/S
DOP CALC AO VTI: 35.2 CM
DOP CALC LVOT AREA: 3.8 CM2
DOP CALC LVOT DIAMETER: 2.2 CM
DOP CALC LVOT PEAK VEL: 1.1 M/S
DOP CALC LVOT STROKE VOLUME: 92.3 CM3
DOP CALCLVOT PEAK VEL VTI: 24.3 CM
E WAVE DECELERATION TIME: 142 MS
E/A RATIO: 1.41
E/E' RATIO: 5 M/S
ECHO EF ESTIMATED: 75 %
ECHO LV POSTERIOR WALL: 0.8 CM (ref 0.6–1.1)
ERYTHROCYTE [DISTWIDTH] IN BLOOD BY AUTOMATED COUNT: 13.3 % (ref 11.5–14.5)
FRACTIONAL SHORTENING: 43.9 % (ref 28–44)
GFR SERPLBLD CREATININE-BSD FMLA CKD-EPI: >60 ML/MIN/1.73/M2
GLUCOSE SERPL-MCNC: 121 MG/DL (ref 70–110)
GLUCOSE UR QL STRIP: NEGATIVE
HCT VFR BLD AUTO: 37.2 % (ref 40–54)
HGB BLD-MCNC: 12.4 GM/DL (ref 14–18)
HGB UR QL STRIP: NEGATIVE
HOLD SPECIMEN: NORMAL
IMM GRANULOCYTES # BLD AUTO: 0.02 K/UL (ref 0–0.04)
IMM GRANULOCYTES NFR BLD AUTO: 0.3 % (ref 0–0.5)
INR PPP: 1.1 (ref 0.8–1.2)
INTERVENTRICULAR SEPTUM: 0.8 CM (ref 0.6–1.1)
IVRT: 100 MS
KETONES UR QL STRIP: ABNORMAL
LA MAJOR: 6.3 CM
LA MINOR: 6.3 CM
LA WIDTH: 4.1 CM
LEFT ATRIUM SIZE: 3.7 CM
LEFT ATRIUM VOLUME INDEX MOD: 46 ML/M2
LEFT ATRIUM VOLUME INDEX: 39 ML/M2
LEFT ATRIUM VOLUME MOD: 95 ML
LEFT ATRIUM VOLUME: 81 CM3
LEFT INTERNAL DIMENSION IN SYSTOLE: 3.2 CM (ref 2.1–4)
LEFT VENTRICLE DIASTOLIC VOLUME INDEX: 77.67 ML/M2
LEFT VENTRICLE DIASTOLIC VOLUME: 160 ML
LEFT VENTRICLE MASS INDEX: 82.6 G/M2
LEFT VENTRICLE SYSTOLIC VOLUME INDEX: 18.9 ML/M2
LEFT VENTRICLE SYSTOLIC VOLUME: 39 ML
LEFT VENTRICULAR INTERNAL DIMENSION IN DIASTOLE: 5.7 CM (ref 3.5–6)
LEFT VENTRICULAR MASS: 170.2 G
LEUKOCYTE ESTERASE UR QL STRIP: NEGATIVE
LV LATERAL E/E' RATIO: 4
LV SEPTAL E/E' RATIO: 5.8
LYMPHOCYTES # BLD AUTO: 0.86 K/UL (ref 1–4.8)
MAGNESIUM SERPL-MCNC: 1.9 MG/DL (ref 1.6–2.6)
MCH RBC QN AUTO: 31.5 PG (ref 27–31)
MCHC RBC AUTO-ENTMCNC: 33.3 G/DL (ref 32–36)
MCV RBC AUTO: 94 FL (ref 82–98)
MV PEAK A VEL: 0.54 M/S
MV PEAK E VEL: 0.76 M/S
NITRITE UR QL STRIP: NEGATIVE
NUCLEATED RBC (/100WBC) (OHS): 0 /100 WBC
OHS CV RV/LV RATIO: 0.68 CM
PH UR STRIP: 6 [PH]
PHOSPHATE SERPL-MCNC: 4.2 MG/DL (ref 2.7–4.5)
PISA TR MAX VEL: 2.9 M/S
PLATELET # BLD AUTO: 187 K/UL (ref 150–450)
PMV BLD AUTO: 10.3 FL (ref 9.2–12.9)
POCT GLUCOSE: 126 MG/DL (ref 70–110)
POCT GLUCOSE: 97 MG/DL (ref 70–110)
POTASSIUM SERPL-SCNC: 3.9 MMOL/L (ref 3.5–5.1)
PROT SERPL-MCNC: 6.7 GM/DL (ref 6–8.4)
PROT UR QL STRIP: NEGATIVE
PROTHROMBIN TIME: 11.8 SECONDS (ref 9–12.5)
RA MAJOR: 5.65 CM
RA PRESSURE ESTIMATED: 8 MMHG
RA WIDTH: 3.77 CM
RBC # BLD AUTO: 3.94 M/UL (ref 4.6–6.2)
RELATIVE EOSINOPHIL (OHS): 0 %
RELATIVE LYMPHOCYTE (OHS): 11.8 % (ref 18–48)
RELATIVE MONOCYTE (OHS): 6.3 % (ref 4–15)
RELATIVE NEUTROPHIL (OHS): 81.2 % (ref 38–73)
RIGHT VENTRICLE DIASTOLIC BASEL DIMENSION: 3.9 CM
RV TB RVSP: 11 MMHG
RV TISSUE DOPPLER FREE WALL SYSTOLIC VELOCITY 1 (APICAL 4 CHAMBER VIEW): 20.92 CM/S
SINUS: 3.74 CM
SODIUM SERPL-SCNC: 140 MMOL/L (ref 136–145)
SODIUM SERPL-SCNC: 142 MMOL/L (ref 136–145)
SODIUM SERPL-SCNC: 143 MMOL/L (ref 136–145)
SP GR UR STRIP: >=1.03
STJ: 3.1 CM
TDI LATERAL: 0.19 M/S
TDI SEPTAL: 0.13 M/S
TDI: 0.16 M/S
TRICUSPID ANNULAR PLANE SYSTOLIC EXCURSION: 3.2 CM
TV PEAK GRADIENT: 34 MMHG
TV REST PULMONARY ARTERY PRESSURE: 42 MMHG
UROBILINOGEN UR STRIP-ACNC: NEGATIVE EU/DL
WBC # BLD AUTO: 7.29 K/UL (ref 3.9–12.7)
Z-SCORE OF LEFT VENTRICULAR DIMENSION IN END DIASTOLE: -0.87
Z-SCORE OF LEFT VENTRICULAR DIMENSION IN END SYSTOLE: -1.38

## 2025-05-05 PROCEDURE — 81003 URINALYSIS AUTO W/O SCOPE: CPT | Performed by: NURSE PRACTITIONER

## 2025-05-05 PROCEDURE — 25000003 PHARM REV CODE 250

## 2025-05-05 PROCEDURE — 94761 N-INVAS EAR/PLS OXIMETRY MLT: CPT

## 2025-05-05 PROCEDURE — 92610 EVALUATE SWALLOWING FUNCTION: CPT

## 2025-05-05 PROCEDURE — 82040 ASSAY OF SERUM ALBUMIN: CPT | Performed by: NURSE PRACTITIONER

## 2025-05-05 PROCEDURE — 94660 CPAP INITIATION&MGMT: CPT

## 2025-05-05 PROCEDURE — 97535 SELF CARE MNGMENT TRAINING: CPT

## 2025-05-05 PROCEDURE — 99223 1ST HOSP IP/OBS HIGH 75: CPT | Mod: GC,,, | Performed by: NEUROLOGICAL SURGERY

## 2025-05-05 PROCEDURE — 99291 CRITICAL CARE FIRST HOUR: CPT | Mod: GC,,, | Performed by: STUDENT IN AN ORGANIZED HEALTH CARE EDUCATION/TRAINING PROGRAM

## 2025-05-05 PROCEDURE — 97162 PT EVAL MOD COMPLEX 30 MIN: CPT

## 2025-05-05 PROCEDURE — 99291 CRITICAL CARE FIRST HOUR: CPT | Mod: GC,,, | Performed by: PSYCHIATRY & NEUROLOGY

## 2025-05-05 PROCEDURE — 84100 ASSAY OF PHOSPHORUS: CPT | Performed by: NURSE PRACTITIONER

## 2025-05-05 PROCEDURE — 84295 ASSAY OF SERUM SODIUM: CPT | Performed by: PSYCHIATRY & NEUROLOGY

## 2025-05-05 PROCEDURE — 63600175 PHARM REV CODE 636 W HCPCS: Performed by: NURSE PRACTITIONER

## 2025-05-05 PROCEDURE — 27100245 HC EEG CAPS, DISPOSABLE

## 2025-05-05 PROCEDURE — 97530 THERAPEUTIC ACTIVITIES: CPT

## 2025-05-05 PROCEDURE — 27100171 HC OXYGEN HIGH FLOW UP TO 24 HOURS

## 2025-05-05 PROCEDURE — 85730 THROMBOPLASTIN TIME PARTIAL: CPT | Performed by: NURSE PRACTITIONER

## 2025-05-05 PROCEDURE — 25000003 PHARM REV CODE 250: Performed by: NURSE PRACTITIONER

## 2025-05-05 PROCEDURE — 27000190 HC CPAP FULL FACE MASK W/VALVE

## 2025-05-05 PROCEDURE — 83735 ASSAY OF MAGNESIUM: CPT | Performed by: NURSE PRACTITIONER

## 2025-05-05 PROCEDURE — 63600175 PHARM REV CODE 636 W HCPCS: Performed by: PSYCHIATRY & NEUROLOGY

## 2025-05-05 PROCEDURE — 85610 PROTHROMBIN TIME: CPT | Performed by: NURSE PRACTITIONER

## 2025-05-05 PROCEDURE — 97165 OT EVAL LOW COMPLEX 30 MIN: CPT

## 2025-05-05 PROCEDURE — 85025 COMPLETE CBC W/AUTO DIFF WBC: CPT | Performed by: NURSE PRACTITIONER

## 2025-05-05 PROCEDURE — 99900035 HC TECH TIME PER 15 MIN (STAT)

## 2025-05-05 PROCEDURE — 20000000 HC ICU ROOM

## 2025-05-05 RX ORDER — SODIUM CHLORIDE 9 MG/ML
INJECTION, SOLUTION INTRAVENOUS CONTINUOUS
Status: DISCONTINUED | OUTPATIENT
Start: 2025-05-05 | End: 2025-05-06

## 2025-05-05 RX ORDER — SODIUM CHLORIDE 9 MG/ML
INJECTION, SOLUTION INTRAVENOUS CONTINUOUS
Status: DISCONTINUED | OUTPATIENT
Start: 2025-05-05 | End: 2025-05-05

## 2025-05-05 RX ADMIN — SODIUM CHLORIDE: 9 INJECTION, SOLUTION INTRAVENOUS at 11:05

## 2025-05-05 RX ADMIN — SODIUM CHLORIDE: 9 INJECTION, SOLUTION INTRAVENOUS at 05:05

## 2025-05-05 RX ADMIN — SODIUM CHLORIDE 250 ML: 3 INJECTION, SOLUTION INTRAVENOUS at 02:05

## 2025-05-05 RX ADMIN — LABETALOL HYDROCHLORIDE 10 MG: 5 INJECTION, SOLUTION INTRAVENOUS at 01:05

## 2025-05-05 RX ADMIN — ONDANSETRON 4 MG: 2 INJECTION INTRAMUSCULAR; INTRAVENOUS at 01:05

## 2025-05-05 NOTE — PROGRESS NOTES
[facility] - Neuro Critical Care  Neurocritical Care  Progress Note    Admit Date: 5/4/2025  Service Date: 05/05/2025  Length of Stay: 1    Subjective:     Chief Complaint: Nontraumatic subcortical hemorrhage of right cerebral hemisphere    History of Present Illness: 70 yo M, PMH SVT/ablation, HLD, presents as transfer for Right lobar hemorhage, He originaly presented to OSH ER with left-sided weakness and right-sided gaze preference that began abruptly this afternoon around 4:00 p.m.. CTH with right parietal IPH, CTA without AVM/abnormalities. ASA hx, Given ddAVP for reversal. Transferred to [facility] for management and eval.     Hospital Course: 05/05/2025 CTH this AM stable when compared to MRI. EEG without evidence of seizures. Will hold off AEDs for now. Goal of SBP <150 and Na >140. No NSGY interventions. Will repeat CTH tomorrow AM.       Interval History/Significant Events: CTH this AM stable when compared to MRI. EEG without evidence of seizures. Will hold off AEDs for now. Goal of SBP <150 and Na >140. No NSGY interventions. Will repeat CTH tomorrow AM.     Holding NG tube; will reconsider tomorrow.     Review of Systems   Constitutional:  Negative for fever.   Eyes:  Negative for photophobia.   Respiratory:  Negative for shortness of breath.    Cardiovascular:  Negative for chest pain.   Gastrointestinal:  Negative for nausea.   Genitourinary:  Negative for dysuria.   Neurological:  Positive for tremors, facial asymmetry and weakness.     Objective:     Vitals:    Temp: 98.6 °F (37 °C)  Pulse: 70  Rhythm: normal sinus rhythm  BP: 134/66  MAP (mmHg): 94  Resp: 18  SpO2: 100 %    Temp  Min: 97.2 °F (36.2 °C)  Max: 98.8 °F (37.1 °C)  Pulse  Min: 62  Max: 89  BP  Min: 114/57  Max: 155/73  MAP (mmHg)  Min: 79  Max: 107  Resp  Min: 14  Max: 36  SpO2  Min: 90 %  Max: 100 %    No intake/output data recorded.   Unmeasured Output  Urine Occurrence: 1  Pad Count: 2        Physical Exam  Vitals and nursing note reviewed.    Cardiovascular:      Rate and Rhythm: Normal rate and regular rhythm.      Pulses: Normal pulses.      Heart sounds: Normal heart sounds.   Pulmonary:      Effort: Pulmonary effort is normal.      Breath sounds: Normal breath sounds.   Abdominal:      General: Bowel sounds are normal.      Palpations: Abdomen is soft.   Skin:     General: Skin is warm and dry.      Capillary Refill: Capillary refill takes 2 to 3 seconds.   Neurological:      Comments: E3 V5 M6  Mild left facial weakness  Perrla, Open eyes to voice with some lid apraxia, right gaze preference  Left sided weakness/plegia, slightly increased tone  Left side tito-neglect  RUE/RLE 5/5 , follows commands             Today I personally reviewed pertinent medications, lines/drains/airways, imaging, cardiology results, laboratory results, microbiology results,     Assessment/Plan:     Neuro  * Nontraumatic subcortical hemorrhage of right cerebral hemisphere  - CTH/CTA with right parietal IPH, no AVM/malformation   - [facility] Neuro Following  - NSGY following   - No interventions as CTH this AM stable in comparison to MRI.   - Will repeat CTH tomorrow AM  - MRI brain without evidence of amyloid.   - Transferred NCC, hourly Neuro checks  - SBP < 150  - Na > 140 to decrease risk of edema.   - Sodium q6h   - Hypertonic saline q6h prn  - Coags WNL  - DDAVP given prior to transfer for ASA hx     PT/OT, SLP    Family updated bedside     Vasogenic brain edema  See ICH    Seizure  - Left hand twitching on arrival, did appear rhymic. Resolved at this time.  - EEG without evidence of seizure.  - Holding AEDs at this time.    Dysarthria  SLP consulted   - Keep NPO   - Evaluate the need for NG tube tomorrow   - Continue with NS 50cc/hr    Acute left-sided weakness  PT/OT    Cardiac/Vascular  Dyslipidemia  Resume statin           The patient is being Prophylaxed for:  Venous Thromboembolism with: Mechanical  Stress Ulcer with: None  Ventilator Pneumonia with: not  applicable    Activity Orders            Turn patient starting at 05/04 2000    Elevate HOB starting at 05/04 1920    Diet NPO: NPO starting at 05/04 1920          Full Code    William Appiah MD  Neurocritical Care  [facility] - Neuro Critical Care

## 2025-05-05 NOTE — PROGRESS NOTES
[facility] - Neuro Critical Care  Neurosurgery  Progress Note    Subjective:     History of Present Illness: 69-year-old male presents to the ER with left-sided weakness and right-sided gaze preference that began abruptly this afternoon around 4:00 p.m.. At OSH found to have R frontal lobar ICH. Given ddAVP for reversal.    Post-Op Info:  * No surgery found *       5/5: MRI with enlarged ICH w IVH , CTH corroborates , stable. GCS 14, no evac at this time    Prescriptions Prior to Admission[1]    Review of patient's allergies indicates:   Allergen Reactions    Pennsaid [diclofenac sodium] Rash and Blisters       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    Cervical disc displacement     Degeneration of lumbar intervertebral disc     GERD (gastroesophageal reflux disease)     gastric polyps    Hip pain 02/22/2016    Hyperlipidemia     Shingles 11/2013    Sleep apnea      Past Surgical History:   Procedure Laterality Date    ABLATION, SVT, ACCESSORY PATHWAY N/A 5/25/2023    Procedure: Ablation, SVT, Accessory Pathway;  Surgeon: Goyo Sky MD;  Location: [facility] EP LAB;  Service: Cardiology;  Laterality: N/A;  PAC, RFA, CARTO, MAC, GP, 3 PREP    CERVICAL FUSION      bone graft    COLONOSCOPY N/A 7/29/2021    Procedure: COLONOSCOPY;  Surgeon: Tyler Story MD;  Location: [facility];  Service: Endoscopy;  Laterality: N/A;    COLONOSCOPY N/A 10/30/2024    Procedure: COLONOSCOPY;  Surgeon: Tyler Story MD;  Location: [facility];  Service: Endoscopy;  Laterality: N/A;  m/ppm    EPIDURAL STEROID INJECTION INTO LUMBAR SPINE N/A 12/5/2019    Procedure: Injection-steroid-epidural-lumbar;  Surgeon: Adarsh Kraft MD;  Location: [facility] OR;  Service: Pain Management;  Laterality: N/A;  L5-S1    ESOPHAGOGASTRODUODENOSCOPY N/A 10/27/2020    Procedure: EGD (ESOPHAGOGASTRODUODENOSCOPY);  Surgeon: Tyler Story MD;  Location: [facility];  Service: Endoscopy;  Laterality: N/A;     ESOPHAGOGASTRODUODENOSCOPY N/A 2/14/2025    Procedure: EGD (ESOPHAGOGASTRODUODENOSCOPY);  Surgeon: Tyler Story MD;  Location: [facility];  Service: Endoscopy;  Laterality: N/A;    INJECTION OF ANESTHETIC AGENT AROUND MEDIAL BRANCH NERVES INNERVATING LUMBAR FACET JOINT Bilateral 11/18/2020    Procedure: BLOCK, NERVE, LUMBAR, MEDIAL BRANCH Bilateral L3,4,5;  Surgeon: Adarsh Kraft MD;  [facility];  Service: Pain Management;  Laterality: Bilateral;    INJECTION OF ANESTHETIC AGENT AROUND MEDIAL BRANCH NERVES INNERVATING LUMBAR FACET JOINT Left 10/25/2023    Procedure: Block-nerve-medial branch-lumbar;  Surgeon: Adarsh Kraft MD;  Location: [facility] OR;  Service: Anesthesiology;  Laterality: Left;  l4-5, l5-s1 MBB    INJECTION OF ANESTHETIC AGENT AROUND MEDIAL BRANCH NERVES INNERVATING LUMBAR FACET JOINT Bilateral 11/28/2023    Procedure: Block-nerve-medial branch-lumbar;  Surgeon: Adarsh Kraft MD;  Location: [facility] OR;  Service: Anesthesiology;  Laterality: Bilateral;  L4.5 and l5/s1 MBB #2    RADIOFREQUENCY ABLATION OF LUMBAR MEDIAL BRANCH NERVE AT SINGLE LEVEL Bilateral 1/29/2021    Procedure: Radiofrequency Ablation, Nerve, Spinal, Lumbar, Medial Branch, 1 Level;  Surgeon: Adarsh Kraft MD;  [facility];  Service: Pain Management;  Laterality: Bilateral;  L3,4,5    TONSILLECTOMY, ADENOIDECTOMY       Family History       Problem Relation (Age of Onset)    Coronary artery disease Mother    Diabetes Mother    Heart disease Brother    Hypertension Mother    Stroke Mother, Father          Tobacco Use    Smoking status: Never    Smokeless tobacco: Never   Substance and Sexual Activity    Alcohol use: Yes     Alcohol/week: 6.0 standard drinks of alcohol     Types: 6 Cans of beer per week     Comment: weekly or less    Drug use: No    Sexual activity: Yes     Partners: Female       Objective:     Weight: 88.8 kg (195 lb 12.3 oz)  Body mass index is 28.09 kg/m².  Vital Signs (Most Recent):  Temp: 98.5 °F  (36.9 °C) (05/05/25 0301)  Pulse: 77 (05/05/25 0701)  Resp: 18 (05/05/25 0701)  BP: (!) 142/70 (05/05/25 0701)  SpO2: 98 % (05/05/25 0701) Vital Signs (24h Range):  Temp:  [97.2 °F (36.2 °C)-98.5 °F (36.9 °C)] 98.5 °F (36.9 °C)  Pulse:  [62-89] 77  Resp:  [14-36] 18  SpO2:  [90 %-98 %] 98 %  BP: (114-155)/(55-92) 142/70     Date 05/05/25 0700 - 05/06/25 0659   Shift 5172-9656 7476-2005 7275-1011 24 Hour Total   INTAKE   I.V.(mL/kg) 85.4(1)   85.4(1)   Shift Total(mL/kg) 85.4(1)   85.4(1)   OUTPUT   Shift Total(mL/kg)       Weight (kg) 88.8 88.8 88.8 88.8                               Physical Exam         Neurosurgery Physical Exam    GENERAL: resting comfortably  HEENT: NCAT, PERRL, mucous membranes moist  NECK: supple, trachea midline  CV: normal capillary refill  PULM: aerating well, symmetric expansion, no distress  ABD: soft, NT, ND  EXT: no c/c/e    NEURO:    AAO x 3  CN II-XII grossly intact  RUE/RLE: fc  LUE/LLE: plegic  Decreased sensation L hemibody      Significant Labs:  Recent Labs   Lab 05/04/25 1608 05/05/25  0145   GLU 96 121*    142   K 4.0 3.9    110   CO2 27 21*   BUN 25* 22   CREATININE 0.8 0.7   CALCIUM 9.2 8.7   MG  --  1.9     Recent Labs   Lab 05/04/25 1608 05/05/25  0145   WBC 5.04 7.29   HGB 13.5* 12.4*   HCT 39.8* 37.2*    187     Recent Labs   Lab 05/04/25  1608 05/05/25  0145   INR 1.0 1.1   APTT  --  22.6     Microbiology Results (last 7 days)       ** No results found for the last 168 hours. **          All pertinent labs from the last 24 hours have been reviewed.    Significant Diagnostics:  I have reviewed and interpreted all pertinent imaging results/findings within the past 24 hours.  CTA Head and Neck (xpd)  Result Date: 5/4/2025  1. Acute right frontal intraparenchymal hematoma with mild adjacent edema is similar to the recent prior study.  Follow-up recommended. 2. No high-grade stenosis or major vessel occlusion. Electronically signed by: Frandy Landis  Date:    05/04/2025 Time:    16:51    CT HEAD FOR CODE STROKE  Result Date: 5/4/2025  3.3 x 2.4 cm intracranial hemorrhage within the right frontoparietal region with mild vasogenic edema.  There is no midline shift These findings were called to the ordering physician at 16:22 Electronically signed by: Enriqueta De La Vega Date:    05/04/2025 Time:    16:24    CT Head Without Contrast  Result Date: 5/5/2025  1. Right frontal parenchymal hemorrhage with subarachnoid and intraventricular extension appears relatively similar when compared to MRI performed 05/04/2025, but notably increased since initial head CT of 05/04/2025. 2. Relatively similar associated mass effect compared to most recent MRI. Electronically signed by: Colt Ang Date:    05/05/2025 Time:    06:25    CTA Head and Neck (xpd)  Result Date: 5/4/2025  1. Acute right frontal intraparenchymal hematoma with mild adjacent edema is similar to the recent prior study.  Follow-up recommended. 2. No high-grade stenosis or major vessel occlusion. Electronically signed by: Frandy Landis Date:    05/04/2025 Time:    16:51    CT HEAD FOR CODE STROKE  Result Date: 5/4/2025  3.3 x 2.4 cm intracranial hemorrhage within the right frontoparietal region with mild vasogenic edema.  There is no midline shift These findings were called to the ordering physician at 16:22 Electronically signed by: Enriqueta De La Vega Date:    05/04/2025 Time:    16:24      Assessment/Plan:     * Nontraumatic subcortical hemorrhage of right cerebral hemisphere  69-year-old male presents to the ER with left-sided weakness and right-sided gaze preference that began abruptly this afternoon around 4:00 p.m.. At OSH found to have R frontal lobar ICH. Given ddAVP for reversal.    ICHS 0    MRI wo 5/4: swi looks larger , there is IVH now too  CTH 5/5: looks same as MRI , larger and IVH    --Patient admitted to [facility] on telemetry      -q1h neurochecks in ICU, q2h neurochecks in stepdown, q4h neurochecks on  floor  --All labs and diagnostics reviewed  ---150  --Na >135  --Keppra 500 BID   --HOB >30  --No acute neurosurgical intervention at this time  --Continue to monitor clinically, notify NSGY immediately with any changes in neuro status    Dispo: ICU    Plan d/w Dr. Gross.         Oskar Simms MD  Neurosurgery  [facility] - Neuro Critical Care       [1]   Medications Prior to Admission   Medication Sig Dispense Refill Last Dose/Taking    aspirin (ECOTRIN) 81 MG EC tablet Take 1 tablet (81 mg total) by mouth once daily.  0     atorvastatin (LIPITOR) 20 MG tablet TAKE ONE TABLET BY MOUTH ONCE DAILY 90 tablet 3     ciclopirox (PENLAC) 8 % Soln Apply topically. (Patient not taking: Reported on 1/16/2025)       docosahexaenoic acid/epa (FISH OIL ORAL) Take 1 capsule by mouth once daily.       magnesium oxide (MAG-OX) 400 mg (241.3 mg magnesium) tablet Take 1 tablet (400 mg total) by mouth once daily. 90 tablet 3     meloxicam (MOBIC) 15 MG tablet TAKE 1 TABLET BY MOUTH AT NIGHT  AS NEEDED 90 tablet 3     methocarbamoL (ROBAXIN) 500 MG Tab TAKE ONE TABLET BY MOUTH EVERY 8 HOURS AS NEEDED FOR MUSCLE SPASMS 90 tablet 2     multivitamin (THERAGRAN) per tablet Take 1 tablet by mouth once daily.       omeprazole (PRILOSEC) 40 MG capsule Take 1 capsule (40 mg total) by mouth every morning. 90 capsule 3     pregabalin (LYRICA) 100 MG capsule Take 100 mg by mouth once daily.       triamcinolone acetonide 0.1% (KENALOG) 0.1 % ointment Apply topically 2 (two) times daily. 30 g 0

## 2025-05-05 NOTE — H&P
[facility] - Neuro Critical Care  Neurocritical Care  History & Physical    Admit Date: 5/4/2025  Service Date: 05/04/2025  Length of Stay: 1    Subjective:     Chief Complaint: Nontraumatic subcortical hemorrhage of right cerebral hemisphere    History of Present Illness: 70 yo M, PMH SVT/ablation, HLD, presents as transfer for Right lobar hemorhage, He originaly presented to OSH ER with left-sided weakness and right-sided gaze preference that began abruptly this afternoon around 4:00 p.m.. CTH with right parietal IPH, CTA without AVM/abnormalities. ASA hx, Given ddAVP for reversal. Transferred to [facility] for management and eval.       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    Cervical disc displacement     Degeneration of lumbar intervertebral disc     GERD (gastroesophageal reflux disease)     gastric polyps    Hip pain 02/22/2016    Hyperlipidemia     Shingles 11/2013    Sleep apnea      Past Surgical History:   Procedure Laterality Date    ABLATION, SVT, ACCESSORY PATHWAY N/A 5/25/2023    Procedure: Ablation, SVT, Accessory Pathway;  Surgeon: Goyo Sky MD;  Location: [facility] EP LAB;  Service: Cardiology;  Laterality: N/A;  PAC, RFA, CARTO, MAC, GP, 3 PREP    CERVICAL FUSION      bone graft    COLONOSCOPY N/A 7/29/2021    Procedure: COLONOSCOPY;  Surgeon: Tyler Story MD;  Location: [facility];  Service: Endoscopy;  Laterality: N/A;    COLONOSCOPY N/A 10/30/2024    Procedure: COLONOSCOPY;  Surgeon: Tyler Story MD;  Location: [facility];  Service: Endoscopy;  Laterality: N/A;  m/ppm    EPIDURAL STEROID INJECTION INTO LUMBAR SPINE N/A 12/5/2019    Procedure: Injection-steroid-epidural-lumbar;  Surgeon: Adarsh Kraft MD;  Location: [facility] OR;  Service: Pain Management;  Laterality: N/A;  L5-S1    ESOPHAGOGASTRODUODENOSCOPY N/A 10/27/2020    Procedure: EGD (ESOPHAGOGASTRODUODENOSCOPY);  Surgeon: Tyler Story MD;  Location: [facility];  Service: Endoscopy;  Laterality: N/A;     ESOPHAGOGASTRODUODENOSCOPY N/A 2/14/2025    Procedure: EGD (ESOPHAGOGASTRODUODENOSCOPY);  Surgeon: Tyler Story MD;  Location: [facility];  Service: Endoscopy;  Laterality: N/A;    INJECTION OF ANESTHETIC AGENT AROUND MEDIAL BRANCH NERVES INNERVATING LUMBAR FACET JOINT Bilateral 11/18/2020    Procedure: BLOCK, NERVE, LUMBAR, MEDIAL BRANCH Bilateral L3,4,5;  Surgeon: Adarsh Kraft MD;  Location: [facility];  Service: Pain Management;  Laterality: Bilateral;    INJECTION OF ANESTHETIC AGENT AROUND MEDIAL BRANCH NERVES INNERVATING LUMBAR FACET JOINT Left 10/25/2023    Procedure: Block-nerve-medial branch-lumbar;  Surgeon: Adarsh Kraft MD;  Location: [facility] OR;  Service: Anesthesiology;  Laterality: Left;  l4-5, l5-s1 MBB    INJECTION OF ANESTHETIC AGENT AROUND MEDIAL BRANCH NERVES INNERVATING LUMBAR FACET JOINT Bilateral 11/28/2023    Procedure: Block-nerve-medial branch-lumbar;  Surgeon: Adarsh Kraft MD;  Location: [facility] OR;  Service: Anesthesiology;  Laterality: Bilateral;  L4.5 and l5/s1 MBB #2    RADIOFREQUENCY ABLATION OF LUMBAR MEDIAL BRANCH NERVE AT SINGLE LEVEL Bilateral 1/29/2021    Procedure: Radiofrequency Ablation, Nerve, Spinal, Lumbar, Medial Branch, 1 Level;  Surgeon: Adarsh Kraft MD;  Location: [facility] OR;  Service: Pain Management;  Laterality: Bilateral;  L3,4,5    TONSILLECTOMY, ADENOIDECTOMY        Medications Ordered Prior to Encounter[1]   Allergies: Pennsaid [diclofenac sodium]  Family History   Problem Relation Name Age of Onset    Diabetes Mother      Hypertension Mother      Stroke Mother      Coronary artery disease Mother      Stroke Father      Heart disease Brother          CAD     Social History[2]  Review of Systems   Constitutional:  Negative for fever.   Eyes:  Negative for photophobia.   Respiratory:  Negative for shortness of breath.    Cardiovascular:  Negative for chest pain.   Gastrointestinal:  Negative for nausea.   Genitourinary:  Negative for dysuria.   Neurological:   Positive for tremors, facial asymmetry and weakness.     Objective:     Vitals:    Temp: 97.2 °F (36.2 °C)  Pulse: 79  Rhythm: normal sinus rhythm  BP: 134/66  MAP (mmHg): 94  Resp: 18  SpO2: 97 %    Temp  Min: 97.2 °F (36.2 °C)  Max: 97.2 °F (36.2 °C)  Pulse  Min: 62  Max: 89  BP  Min: 114/57  Max: 155/73  MAP (mmHg)  Min: 79  Max: 105  Resp  Min: 14  Max: 36  SpO2  Min: 90 %  Max: 98 %    No intake/output data recorded.            Physical Exam  Vitals and nursing note reviewed.   Cardiovascular:      Rate and Rhythm: Normal rate and regular rhythm.      Pulses: Normal pulses.      Heart sounds: Normal heart sounds.   Pulmonary:      Effort: Pulmonary effort is normal.      Breath sounds: Normal breath sounds.   Abdominal:      General: Bowel sounds are normal.      Palpations: Abdomen is soft.   Skin:     General: Skin is warm and dry.      Capillary Refill: Capillary refill takes 2 to 3 seconds.   Neurological:      Comments: E3 V5 M6  Mild left facial weakness  Perrla, Open eyes to voice, right gaze preference  Left sided weakness/plegia, slightly increased tone  RUE/RLE 5/5 , follows commands           Today I personally reviewed pertinent medications, lines/drains/airways, imaging, cardiology results, laboratory results, microbiology results,       Assessment/Plan:     Neuro  * Nontraumatic subcortical hemorrhage of right cerebral hemisphere  CTH/CTA with right parietal IPH, no AVM/malformation   Vasc Neuro Following  NSGY following  Transferred NCC, hourly Neuro checks  SBP < 150  MRI with SWI pending, eval amyloid?   Coags WNL  DDAVP given prior to transfer for ASA hx     PT/OT, SLP    Family updated bedside     Vasogenic brain edema  See ICH    Seizure  Left hand twitching on arrival, did appear rhymic.   EEG cap placed, status not noted.   Discussed AED ppx/Tx with NCC attending, deferred given EEG read.     Dysarthria  SLP    Acute left-sided weakness  PT/OT    Cardiac/Vascular  Dyslipidemia  Resume  statin           The patient is being Prophylaxed for:  Venous Thromboembolism with: Mechanical  Stress Ulcer with: None  Ventilator Pneumonia with: not applicable    Activity Orders            Turn patient starting at 05/04 2000    Elevate HOB starting at 05/04 1920    Diet NPO: NPO starting at 05/04 1920          Full Code  Critical condition in that Patient has a condition that poses threat to life and bodily function: ICH, Vasogenic edema, mass effect      50 minutes of Critical care time was spent personally by me on the following activities: development of treatment plan with patient or surrogate and bedside caregivers, discussions with consultants, evaluation of patient's response to treatment, examination of patient, ordering and performing treatments and interventions, ordering and review of laboratory studies, ordering and review of radiographic studies, pulse oximetry, antibiotic titration if applicable, vasopressor titration if applicable, re-evaluation of patient's condition. This critical care time did not overlap with that of any other provider or involve time for any procedures. There is high probability for acute neurological change leading to clinical and possibly life-threatening deterioration requiring highest level of physician preparedness for urgent intervention.       Dimas Siegel, SHAR  Neurocritical Care  [facility] - Neuro Critical Care       [1]   Current Facility-Administered Medications on File Prior to Encounter   Medication Dose Route Frequency Provider Last Rate Last Admin    [COMPLETED] desmopressin (DDAVP) 40 mcg in 0.9% NaCl 50 mL IVPB  40 mcg Intravenous ED 1 Time Edy Babb MD   Stopped at 05/04/25 8692    [COMPLETED] iohexoL (OMNIPAQUE 350) injection 100 mL  100 mL Intravenous ONCE PRN Edy Babb MD   100 mL at 05/04/25 1614    [COMPLETED] labetaloL (NORMODYNE,TRANDATE) 5 mg/mL injection             lactated ringers infusion   Intravenous Continuous Adarsh Kraft  MD        lactated ringers infusion   Intravenous Continuous Adarsh Kraft MD        [COMPLETED] levETIRAcetam injection 1,000 mg  1,000 mg Intravenous ED 1 Time Edy Babb MD   1,000 mg at 05/04/25 1726    LIDOcaine (PF) 10 mg/ml (1%) injection 10 mg  1 mL Intradermal Once Adarsh Kraft MD        LIDOcaine (PF) 10 mg/ml (1%) injection 10 mg  1 mL Intradermal Once Adarsh Kraft MD        [COMPLETED] tenecteplase (TNKase) 50 mg IV KIT             [DISCONTINUED] desmopressin injection 40 mcg  40 mcg Intravenous ED 1 Time Edy Babb MD        [DISCONTINUED] niCARdipine 40 mg/200 mL (0.2 mg/mL) infusion  0-15 mg/hr Intravenous Continuous Edy Babb MD 25 mL/hr at 05/04/25 1639 5 mg/hr at 05/04/25 1639    [DISCONTINUED] niCARdipine 40 mg/200 mL (0.2 mg/mL) infusion  0-15 mg/hr Intravenous Continuous Edy Babb MD         Current Outpatient Medications on File Prior to Encounter   Medication Sig Dispense Refill    aspirin (ECOTRIN) 81 MG EC tablet Take 1 tablet (81 mg total) by mouth once daily.  0    atorvastatin (LIPITOR) 20 MG tablet TAKE ONE TABLET BY MOUTH ONCE DAILY 90 tablet 3    ciclopirox (PENLAC) 8 % Soln Apply topically. (Patient not taking: Reported on 1/16/2025)      docosahexaenoic acid/epa (FISH OIL ORAL) Take 1 capsule by mouth once daily.      magnesium oxide (MAG-OX) 400 mg (241.3 mg magnesium) tablet Take 1 tablet (400 mg total) by mouth once daily. 90 tablet 3    meloxicam (MOBIC) 15 MG tablet TAKE 1 TABLET BY MOUTH AT NIGHT  AS NEEDED 90 tablet 3    methocarbamoL (ROBAXIN) 500 MG Tab TAKE ONE TABLET BY MOUTH EVERY 8 HOURS AS NEEDED FOR MUSCLE SPASMS 90 tablet 2    multivitamin (THERAGRAN) per tablet Take 1 tablet by mouth once daily.      omeprazole (PRILOSEC) 40 MG capsule Take 1 capsule (40 mg total) by mouth every morning. 90 capsule 3    pregabalin (LYRICA) 100 MG capsule Take 100 mg by mouth once daily.      triamcinolone acetonide 0.1% (KENALOG) 0.1 % ointment Apply  topically 2 (two) times daily. 30 g 0   [2]   Social History  Tobacco Use    Smoking status: Never    Smokeless tobacco: Never   Substance Use Topics    Alcohol use: Yes     Alcohol/week: 6.0 standard drinks of alcohol     Types: 6 Cans of beer per week     Comment: weekly or less    Drug use: No

## 2025-05-05 NOTE — ASSESSMENT & PLAN NOTE
Left hand twitching on arrival, did appear rhymic.   EEG cap placed, status not noted.   Discussed AED ppx/Tx with NCC attending, deferred given EEG read.

## 2025-05-05 NOTE — HOSPITAL COURSE
05/05/2025 CTH this AM stable when compared to MRI. EEG without evidence of seizures. Will hold off AEDs for now. Goal of SBP <150 and Na >140. No NSGY interventions. Will repeat CTH tomorrow AM.   05/06/2025 Repeat CTH shows increased edema with increased midline shift. Increase sodium goal to >145. Amlodipine added for BP support. Patient with flushing after amlodipine given. Hydroxyzine and IV pepcid given as patient reports possible allergy to bendaryl in the past. Losartan added instead. NG tube placed and started tube feeds.  05/07/2025 Repeat CTH head was stable. Blood pressure medication changes for optimization.   05/08/2025 Patient removed CPAP overnight and as a result, the NG tube was also removed. SLP okay with crushed meds on evaluation. Will hold off on replacing NG tube.   05/09/2025 Fever of 101.2 overnight, resolved with tylenol. Adjusting BP meds for better control. Labs and imaging to rule out carcinoid tumor and pheochromocytoma given recurring flushing and difficult to control BP. Speech cleared for pureed diet. Repeat CTH 5/9, improved midline shift.  05/10/2025 No events overnight.   05/11/2025 No events overnight, has discomfort with neck. Admits anxiety and sadness about his current illness  05/12/2025 Late in the afternoon yesterday, patient with a hypotensive episode while working with PT. Likely vagal. Responded to fluids. Repeat CTH was grossly stable. No exam changes this AM. Otherwise, no significant events from the night team. Stepping down to Vascular Neuro today.

## 2025-05-05 NOTE — ASSESSMENT & PLAN NOTE
-Mild vasogenic edema is noted on imaging in the territory of the R middle cerebral artery with associated mass effect. Initial CTH with no MLS.   -The patient is admitted to [facility] for close monitoring, hourly neuro checks. Low threshold to repeat imaging if exam changes.  -Neurosurgery following  -At this time the etiology of the ICH is unclear. He was not hypertensive on presentation and remains without elevated BP. No aneurysm or AVM noted on CTA head and neck. MRI Brain pending.

## 2025-05-05 NOTE — PLAN OF CARE
"[facility] Care Plan  POC reviewed with Colt Rose and family at 1400. Family verbalized understanding. Questions and concerns addressed. No acute events today. Pt progressing toward goals. Will continue to monitor. See below and flowsheets for full assessment and VS info.     -PRN labetalol given x1  -PRN 3% 250cc bolus administered for Na of 140  -SLP to re-evaluate 5/6 AM  -CT scheduled for overnight    Is this a stroke patient? yes    Neuro:  [place] Coma Scale  Best Eye Response: 2-->(E2) to pain  Best Motor Response: 6-->(M6) obeys commands  Best Verbal Response: 5-->(V5) oriented  [place] Coma Scale Score: 13  Assessment Qualifiers: patient not sedated/intubated, no eye obstruction present  Pupil PERRLA: yes     24 hr Temp:  [97.2 °F (36.2 °C)-98.8 °F (37.1 °C)]     CV:   Rhythm: normal sinus rhythm  BP goals:   SBP < 130-150  MAP > 65    Resp:           Plan: N/A    GI/:     Diet/Nutrition Received: NPO  Last Bowel Movement: 05/04/25  Voiding Characteristics: external catheter    Intake/Output Summary (Last 24 hours) at 5/5/2025 1831  Last data filed at 5/5/2025 1801  Gross per 24 hour   Intake 928.06 ml   Output 575 ml   Net 353.06 ml     Unmeasured Output  Urine Occurrence: 1  Pad Count: 2    Labs/Accuchecks:  Recent Labs   Lab 05/05/25 0145   WBC 7.29   RBC 3.94*   HGB 12.4*   HCT 37.2*         Recent Labs   Lab 05/05/25 0145 05/05/25  1336    140   K 3.9  --    CO2 21*  --      --    BUN 22  --    CREATININE 0.7  --    ALKPHOS 49  --    ALT 23  --    AST 25  --    BILITOT 0.6  --       Recent Labs   Lab 05/05/25 0145   PROTIME 11.8   INR 1.1   APTT 22.6    No results for input(s): "CPK", "CPKMB", "TROPONINI", "MB" in the last 168 hours.    Electrolytes: N/A - electrolytes WDL  Accuchecks: none    Gtts:   0.9% NaCl   Intravenous Continuous 50 mL/hr at 05/05/25 1501 Rate Verify at 05/05/25 1501       LDA/Wounds:    Mir Risk Assessment  Sensory Perception: 2-->very " limited  Moisture: 3-->occasionally moist  Activity: 1-->bedfast  Mobility: 2-->very limited  Nutrition: 2-->probably inadequate  Friction and Shear: 1-->problem  Mir Score: 11    Is your mir score 12 or less? yes heel boots on            Restraints:        [facility]

## 2025-05-05 NOTE — HOSPITAL COURSE
5/5: MRI with enlarged ICH w IVH , CTH corroborates , stable. GCS 14, no evac at this time  5/6: CTH this AM stable. Slightly harder to arouse this AM but Aox3 still and FC on R  5/7: NAEON. Na 142. Aox3 with choices, FC. CTM closely  5/8: NAEON. Neuro stable. Na 143  5/9: Na 146. Talking well this AM. CTH today surveillance  5/10: exam stable, nsgy will sign off

## 2025-05-05 NOTE — SUBJECTIVE & OBJECTIVE
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    Cervical disc displacement     Degeneration of lumbar intervertebral disc     GERD (gastroesophageal reflux disease)     gastric polyps    Hip pain 02/22/2016    Hyperlipidemia     Shingles 11/2013    Sleep apnea      Past Surgical History:   Procedure Laterality Date    ABLATION, SVT, ACCESSORY PATHWAY N/A 5/25/2023    Procedure: Ablation, SVT, Accessory Pathway;  Surgeon: Goyo Sky MD;  Location: [facility] EP LAB;  Service: Cardiology;  Laterality: N/A;  PAC, RFA, CARTO, MAC, GP, 3 PREP    CERVICAL FUSION      bone graft    COLONOSCOPY N/A 7/29/2021    Procedure: COLONOSCOPY;  Surgeon: Tyler Story MD;  Location: [facility];  Service: Endoscopy;  Laterality: N/A;    COLONOSCOPY N/A 10/30/2024    Procedure: COLONOSCOPY;  Surgeon: Tyler Story MD;  Location: [facility];  Service: Endoscopy;  Laterality: N/A;  m/ppm    EPIDURAL STEROID INJECTION INTO LUMBAR SPINE N/A 12/5/2019    Procedure: Injection-steroid-epidural-lumbar;  Surgeon: Adarsh Kraft MD;  Location: [facility];  Service: Pain Management;  Laterality: N/A;  L5-S1    ESOPHAGOGASTRODUODENOSCOPY N/A 10/27/2020    Procedure: EGD (ESOPHAGOGASTRODUODENOSCOPY);  Surgeon: Tyler Story MD;  Location: [facility];  Service: Endoscopy;  Laterality: N/A;    ESOPHAGOGASTRODUODENOSCOPY N/A 2/14/2025    Procedure: EGD (ESOPHAGOGASTRODUODENOSCOPY);  Surgeon: Tyler Story MD;  Location: [facility];  Service: Endoscopy;  Laterality: N/A;    INJECTION OF ANESTHETIC AGENT AROUND MEDIAL BRANCH NERVES INNERVATING LUMBAR FACET JOINT Bilateral 11/18/2020    Procedure: BLOCK, NERVE, LUMBAR, MEDIAL BRANCH Bilateral L3,4,5;  Surgeon: Adarsh Kraft MD;  Location: [facility] OR;  Service: Pain Management;  Laterality: Bilateral;    INJECTION OF ANESTHETIC AGENT AROUND MEDIAL BRANCH NERVES INNERVATING LUMBAR FACET JOINT Left 10/25/2023    Procedure: Block-nerve-medial branch-lumbar;  Surgeon: Swapnil  Adarsh ARRINGTON MD;  Location: [facility];  Service: Anesthesiology;  Laterality: Left;  l4-5, l5-s1 MBB    INJECTION OF ANESTHETIC AGENT AROUND MEDIAL BRANCH NERVES INNERVATING LUMBAR FACET JOINT Bilateral 11/28/2023    Procedure: Block-nerve-medial branch-lumbar;  Surgeon: Adarsh Kraft MD;  Location: [facility];  Service: Anesthesiology;  Laterality: Bilateral;  L4.5 and l5/s1 MBB #2    RADIOFREQUENCY ABLATION OF LUMBAR MEDIAL BRANCH NERVE AT SINGLE LEVEL Bilateral 1/29/2021    Procedure: Radiofrequency Ablation, Nerve, Spinal, Lumbar, Medial Branch, 1 Level;  Surgeon: Adarsh Kraft MD;  Location: [facility] OR;  Service: Pain Management;  Laterality: Bilateral;  L3,4,5    TONSILLECTOMY, ADENOIDECTOMY       Social History[1]  Review of patient's allergies indicates:   Allergen Reactions    Pennsaid [diclofenac sodium] Rash and Blisters       Medications: I have reviewed the current medication administration record.    Medications Prior to Admission   Medication Sig Dispense Refill Last Dose/Taking    aspirin (ECOTRIN) 81 MG EC tablet Take 1 tablet (81 mg total) by mouth once daily.  0     atorvastatin (LIPITOR) 20 MG tablet TAKE ONE TABLET BY MOUTH ONCE DAILY 90 tablet 3     ciclopirox (PENLAC) 8 % Soln Apply topically. (Patient not taking: Reported on 1/16/2025)       docosahexaenoic acid/epa (FISH OIL ORAL) Take 1 capsule by mouth once daily.       magnesium oxide (MAG-OX) 400 mg (241.3 mg magnesium) tablet Take 1 tablet (400 mg total) by mouth once daily. 90 tablet 3     meloxicam (MOBIC) 15 MG tablet TAKE 1 TABLET BY MOUTH AT NIGHT  AS NEEDED 90 tablet 3     methocarbamoL (ROBAXIN) 500 MG Tab TAKE ONE TABLET BY MOUTH EVERY 8 HOURS AS NEEDED FOR MUSCLE SPASMS 90 tablet 2     multivitamin (THERAGRAN) per tablet Take 1 tablet by mouth once daily.       omeprazole (PRILOSEC) 40 MG capsule Take 1 capsule (40 mg total) by mouth every morning. 90 capsule 3     pregabalin (LYRICA) 100 MG capsule Take 100 mg by mouth once daily.        triamcinolone acetonide 0.1% (KENALOG) 0.1 % ointment Apply topically 2 (two) times daily. 30 g 0        Review of Systems   Eyes:  Positive for visual disturbance.   Musculoskeletal:  Positive for gait problem.   Neurological:  Positive for speech difficulty and weakness.   All other systems reviewed and are negative.    Objective:     Vital Signs (Most Recent):  Temp: 97.2 °F (36.2 °C) (05/04/25 1956)  Pulse: 81 (05/04/25 2201)  Resp: 14 (05/04/25 2201)  BP: 133/68 (05/04/25 2201)  SpO2: 96 % (05/04/25 2201)    Vital Signs Range (Last 24H):  Temp:  [97.2 °F (36.2 °C)]   Pulse:  [62-89]   Resp:  [14-36]   BP: (114-155)/(55-85)   SpO2:  [90 %-98 %]        Physical Exam  Vitals and nursing note reviewed.   Constitutional:       Comments: EEG cap in place   HENT:      Nose: Nose normal.      Mouth/Throat:      Mouth: Mucous membranes are moist.   Eyes:      General:         Right eye: No discharge.         Left eye: No discharge.      Conjunctiva/sclera: Conjunctivae normal.   Cardiovascular:      Rate and Rhythm: Normal rate.   Pulmonary:      Effort: Pulmonary effort is normal.   Skin:     General: Skin is warm and dry.   Neurological:      Mental Status: He is oriented to person, place, and time. He is lethargic.      Motor: Weakness present.          Neurological Exam:   LOC: drowsy  Attention Span: poor  Language: No aphasia  Articulation: Dysarthria  Orientation: Person, Place, Time       Laboratory:  CMP:   Recent Labs   Lab 05/04/25  1608   CALCIUM 9.2   ALBUMIN 4.2   PROT 6.7      K 4.0   CO2 27      BUN 25*   CREATININE 0.8   ALKPHOS 46*   ALT 24   AST 22   BILITOT 0.5     CBC:   Recent Labs   Lab 05/04/25  1608   WBC 5.04   RBC 4.28*   HGB 13.5*   HCT 39.8*      MCV 93   MCH 31.5*   MCHC 33.9     Lipid Panel:   Recent Labs   Lab 05/04/25 2024   CHOL 173   LDLCALC 102.2   HDL 63   TRIG 39     Coagulation:   Recent Labs   Lab 05/04/25  1608   INR 1.0     Hgb A1C:   Recent Labs   Lab  05/04/25 2024   HGBA1C 5.5     TSH:   Recent Labs   Lab 05/04/25 2024   TSH 1.716       Diagnostic Results:  All images personally reviewed    Brain imaging:  MRI Brain pending    CTH 5/4/2025 Impression: 3.3 x 2.4 cm intracranial hemorrhage within the right frontoparietal region with mild vasogenic edema.  There is no midline shift      Vessel Imaging:  CTA Head and Neck 5/4/2025 Impression: 1. Acute right frontal intraparenchymal hematoma with mild adjacent edema is similar to the recent prior study.  Follow-up recommended.  2. No high-grade stenosis or major vessel occlusion.    Cardiac Evaluation:   TTE pending         [1]   Social History  Tobacco Use    Smoking status: Never    Smokeless tobacco: Never   Substance Use Topics    Alcohol use: Yes     Alcohol/week: 6.0 standard drinks of alcohol     Types: 6 Cans of beer per week     Comment: weekly or less    Drug use: No

## 2025-05-05 NOTE — PT/OT/SLP EVAL
Physical Therapy Co-Evaluation and Treatment    Patient Name: Colt Rose   MRN: [id number]  Recent Surgery: * No surgery found *      Recommendations:     Discharge Recommendations: High Intensity Therapy    Discharge Equipment Recommendations: hospital bed, wheelchair, lift device   Barriers to discharge: Increased level of assist  Nursing Mobilization: Patient not clear to transfer with RN/PCT, medi-chair transfer via drawsheet only.    Assessment:     Colt Rose is a 69 y.o. male admitted with a medical diagnosis of Nontraumatic subcortical hemorrhage of right cerebral hemisphere. He presents with the following impairments/functional limitations: impaired endurance, weakness, impaired sensation, impaired self care skills, impaired functional mobility, gait instability, impaired balance, visual deficits, impaired cognition, decreased coordination, decreased upper extremity function, decreased lower extremity function, decreased safety awareness, impaired coordination, impaired fine motor. Pt alert and oriented, able to follow simple commands on R side and hook RLE behind LLE to self-assist w/ leg management. Patient presents with good participation and motivation to return to prior level of function with high intensity therapy.  The patient demonstrates appropriate endurance and strength to participate in up to 3 hours or 15hrs of combined therapy post acute.     Rehab Prognosis: Good; patient would benefit from acute skilled PT services to address these deficits and reach maximum level of function.  Recent Surgery: * No surgery found *      Plan:     During this hospitalization, patient to be seen 4 x/week to address the identified rehab impairments via gait training, therapeutic activities, therapeutic exercises, neuromuscular re-education and progress toward the following goals:    Plan of Care Expires: 05/16/25    Subjective     Chief Complaint: inability to open eyes/keep eyes open  Patient/Family  Comments/Goals: pt's wife reports pt usually very active, was scheduled for shoulder surgery tomorrow   Pain/Comfort:  Pain Rating 1: 0/10  Pain Rating Post-Intervention 1: 0/10    Patients cultural, spiritual, Sikh conflicts given the current situation: no    Social History:  Living Environment: Patient lives with their spouse in a single story home, number of outside stairs: 1, walk-in shower.  Prior Level of Function: Prior to admission, patient was independent with ADLs, driving and retired. Plays pickleball daily.  Equipment Used at Home: grab bar, shower chair    DME owned (not currently used): none  Assistance Upon Discharge: family; wife is retired and can assist 24/7    Objective:     Communicated with RN prior to session. Patient found HOB elevated with telemetry, pulse ox (continuous), blood pressure cuff, peripheral IV, Condom Catheter upon PT entry to room. Co-evaluation w/ OT 2/2 suspected pt complexity and requirement of assistance from 2 skilled therapists to maximize treatment potential and maintain pt safety     General Precautions: Standard, aspiration, fall, aphasia, NPO   Orthopedic Precautions:N/A   Braces: N/A  Respiratory Status: Room air    Exams:  Cognitive Exam: Patient is oriented to Person, Place, Time, Situation, follows commands 75% of the time  Vision: unable to open eyes or keep eyes open requiring therapist to manually hold eyelids open but then can track visually and diminished visual acuity in L periphery, see OT nilo for further details  RLE ROM: WFL  RLE Strength: WFL  LLE ROM: WFL PROM  LLE Strength: 0/5 grossly  Sensation: -       Impaired  light/touch no light touch sensation in LLE, intact in RLE, able to feel pain in BLE w/ WD on L    Functional Mobility:  Bed Mobility:  Rolling Left:  maximal assistance  Scooting: maximal assistance of 2 persons  Supine to Sit: maximal assistance of 2 persons for LE management and trunk management towards L side of bed, uncertain if  inc assistance required due to L inattention  Sit to Supine: total assistance of 2 persons for LE management and trunk management  Transfers:  Sit to Stand: maximal assistance of 2 persons with hand-held assist with cues for hand placement and foot placement  Balance:   Static Sitting: maximal assistance at EOB w/ L lean and intermittently pushing w/ RUE towards L side  Dynamic Sitting: total assistance at EOB  Static Standing: maximal assistance of 2 persons with hand-held assist    Therapeutic Activities and Exercises:  Patient educated on role of acute care PT and PT POC, safety while in hospital including calling nurse for mobility, and call light usage  Patient performed 2 set(s) of 5 repetitions of  the following seated exercises: ankle pumps, long arc quads, and knee flexion for right LE. Patient required skilled PT for instruction of exercises and appropriate cues to perform exercises safely and appropriately. PROM LLE  Patient educated about importance of OOB mobility  Sitting balance w/ verbal and tactile cueing for midline orientation, RUE placement to assist w/ balance, attention to L side, safety awareness, and core activation  Rolling L/R in bed to reposition pt w/ wedges and pillows to offload pressure, avoid muscle contractures, and prevent skin breakdown. Pt and family educated on importance of pt being rotated every 2hrs for these reasons.     AM-PAC 6 CLICK MOBILITY  Turning over in bed (including adjusting bedclothes, sheets and blankets)?: 2  Sitting down on and standing up from a chair with arms (e.g., wheelchair, bedside commode, etc.): 2  Moving from lying on back to sitting on the side of the bed?: 2  Moving to and from a bed to a chair (including a wheelchair)?: 1  Need to walk in hospital room?: 1  Climbing 3-5 steps with a railing?: 1  Basic Mobility Total Score: 9     Patient left HOB elevated with all lines intact, call button in reach, RN notified, bed alarm on, and spouse  present.    GOALS:   Multidisciplinary Problems       Physical Therapy Goals          Problem: Physical Therapy    Goal Priority Disciplines Outcome Interventions   Physical Therapy Goal     PT, PT/OT Progressing    Description: Goals to be completed by: 5/16/25    Pt will perform sup<>sit transfers w/ moderate assistance  Pt will have sufficient dynamic balance to sit EOB while performing ADLs/therex w/ minimum assistance  Pt will be able to stand up from EOB w/ moderate assistance using LRAD  Pt will transfer from bed to chair w/ moderate assistance using LRAD  Pt will be independent w/ HEP therex on BLE w/ good form and ROM                        History:     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    Cervical disc displacement     Degeneration of lumbar intervertebral disc     GERD (gastroesophageal reflux disease)     gastric polyps    Hip pain 02/22/2016    Hyperlipidemia     Shingles 11/2013    Sleep apnea        Past Surgical History:   Procedure Laterality Date    ABLATION, SVT, ACCESSORY PATHWAY N/A 5/25/2023    Procedure: Ablation, SVT, Accessory Pathway;  Surgeon: Goyo Sky MD;  Location: [facility] EP LAB;  Service: Cardiology;  Laterality: N/A;  PAC, RFA, CARTO, MAC, GP, 3 PREP    CERVICAL FUSION      bone graft    COLONOSCOPY N/A 7/29/2021    Procedure: COLONOSCOPY;  Surgeon: Tyler Story MD;  Location: [facility];  Service: Endoscopy;  Laterality: N/A;    COLONOSCOPY N/A 10/30/2024    Procedure: COLONOSCOPY;  Surgeon: Tyler Story MD;  Location: [facility];  Service: Endoscopy;  Laterality: N/A;  m/ppm    EPIDURAL STEROID INJECTION INTO LUMBAR SPINE N/A 12/5/2019    Procedure: Injection-steroid-epidural-lumbar;  Surgeon: Adarsh Kraft MD;  Location: [facility] OR;  Service: Pain Management;  Laterality: N/A;  L5-S1    ESOPHAGOGASTRODUODENOSCOPY N/A 10/27/2020    Procedure: EGD (ESOPHAGOGASTRODUODENOSCOPY);  Surgeon: Tyler Story MD;  Location: [facility];  Service:  Endoscopy;  Laterality: N/A;    ESOPHAGOGASTRODUODENOSCOPY N/A 2/14/2025    Procedure: EGD (ESOPHAGOGASTRODUODENOSCOPY);  Surgeon: Tyler Story MD;  Location: [facility];  Service: Endoscopy;  Laterality: N/A;    INJECTION OF ANESTHETIC AGENT AROUND MEDIAL BRANCH NERVES INNERVATING LUMBAR FACET JOINT Bilateral 11/18/2020    Procedure: BLOCK, NERVE, LUMBAR, MEDIAL BRANCH Bilateral L3,4,5;  Surgeon: Adarsh Kraft MD;  Location: [facility];  Service: Pain Management;  Laterality: Bilateral;    INJECTION OF ANESTHETIC AGENT AROUND MEDIAL BRANCH NERVES INNERVATING LUMBAR FACET JOINT Left 10/25/2023    Procedure: Block-nerve-medial branch-lumbar;  Surgeon: Adarsh Kraft MD;  Location: [facility];  Service: Anesthesiology;  Laterality: Left;  l4-5, l5-s1 MBB    INJECTION OF ANESTHETIC AGENT AROUND MEDIAL BRANCH NERVES INNERVATING LUMBAR FACET JOINT Bilateral 11/28/2023    Procedure: Block-nerve-medial branch-lumbar;  Surgeon: Adarsh Kraft MD;  Location: [facility];  Service: Anesthesiology;  Laterality: Bilateral;  L4.5 and l5/s1 MBB #2    RADIOFREQUENCY ABLATION OF LUMBAR MEDIAL BRANCH NERVE AT SINGLE LEVEL Bilateral 1/29/2021    Procedure: Radiofrequency Ablation, Nerve, Spinal, Lumbar, Medial Branch, 1 Level;  Surgeon: Adarsh Kraft MD;  Location: [facility];  Service: Pain Management;  Laterality: Bilateral;  L3,4,5    TONSILLECTOMY, ADENOIDECTOMY         Time Tracking:     PT Received On: 05/05/25  PT Start Time: 0949  PT Stop Time: 1019  PT Total Time (min): 30 min     Billable Minutes: Evaluation 7 and Therapeutic Activity 23 05/05/2025

## 2025-05-05 NOTE — PT/OT/SLP EVAL
Speech Language Pathology Evaluation  Bedside Swallow    Patient Name:  Colt Rose   MRN:  [id number]  Admitting Diagnosis: Nontraumatic subcortical hemorrhage of right cerebral hemisphere    Recommendations:                 General Recommendations:  Dysphagia therapy and Speech language evaluation  Diet recommendations:  NPO, NPO   Aspiration Precautions: Frequent oral care, Monitor for s/s of aspiration, and Strict aspiration precautions   General Precautions: Standard, aspiration, fall  Communication strategies:  none    Assessment:     Colt Rose is a 69 y.o. male with an SLP diagnosis of Dysphagia.  He presents with lethargy.    History:     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    Cervical disc displacement     Degeneration of lumbar intervertebral disc     GERD (gastroesophageal reflux disease)     gastric polyps    Hip pain 02/22/2016    Hyperlipidemia     Shingles 11/2013    Sleep apnea        Past Surgical History:   Procedure Laterality Date    ABLATION, SVT, ACCESSORY PATHWAY N/A 5/25/2023    Procedure: Ablation, SVT, Accessory Pathway;  Surgeon: Goyo Sky MD;  Location: [facility] EP LAB;  Service: Cardiology;  Laterality: N/A;  PAC, RFA, CARTO, MAC, GP, 3 PREP    CERVICAL FUSION      bone graft    COLONOSCOPY N/A 7/29/2021    Procedure: COLONOSCOPY;  Surgeon: Tyler Story MD;  Location: [facility];  Service: Endoscopy;  Laterality: N/A;    COLONOSCOPY N/A 10/30/2024    Procedure: COLONOSCOPY;  Surgeon: Tyler Story MD;  Location: [facility];  Service: Endoscopy;  Laterality: N/A;  m/ppm    EPIDURAL STEROID INJECTION INTO LUMBAR SPINE N/A 12/5/2019    Procedure: Injection-steroid-epidural-lumbar;  Surgeon: Adarsh Kraft MD;  Location: [facility] OR;  Service: Pain Management;  Laterality: N/A;  L5-S1    ESOPHAGOGASTRODUODENOSCOPY N/A 10/27/2020    Procedure: EGD (ESOPHAGOGASTRODUODENOSCOPY);  Surgeon: Tyler Story MD;  Location: [facility];  Service:  "Endoscopy;  Laterality: N/A;    ESOPHAGOGASTRODUODENOSCOPY N/A 2/14/2025    Procedure: EGD (ESOPHAGOGASTRODUODENOSCOPY);  Surgeon: Tyler Story MD;  Location: [facility];  Service: Endoscopy;  Laterality: N/A;    INJECTION OF ANESTHETIC AGENT AROUND MEDIAL BRANCH NERVES INNERVATING LUMBAR FACET JOINT Bilateral 11/18/2020    Procedure: BLOCK, NERVE, LUMBAR, MEDIAL BRANCH Bilateral L3,4,5;  Surgeon: Adarsh Kraft MD;  Location: [facility];  Service: Pain Management;  Laterality: Bilateral;    INJECTION OF ANESTHETIC AGENT AROUND MEDIAL BRANCH NERVES INNERVATING LUMBAR FACET JOINT Left 10/25/2023    Procedure: Block-nerve-medial branch-lumbar;  Surgeon: Adarsh Kraft MD;  Location: [facility];  Service: Anesthesiology;  Laterality: Left;  l4-5, l5-s1 MBB    INJECTION OF ANESTHETIC AGENT AROUND MEDIAL BRANCH NERVES INNERVATING LUMBAR FACET JOINT Bilateral 11/28/2023    Procedure: Block-nerve-medial branch-lumbar;  Surgeon: Adarsh Kraft MD;  Location: [facility];  Service: Anesthesiology;  Laterality: Bilateral;  L4.5 and l5/s1 MBB #2    RADIOFREQUENCY ABLATION OF LUMBAR MEDIAL BRANCH NERVE AT SINGLE LEVEL Bilateral 1/29/2021    Procedure: Radiofrequency Ablation, Nerve, Spinal, Lumbar, Medial Branch, 1 Level;  Surgeon: Adarsh Kraft MD;  Location: [facility];  Service: Pain Management;  Laterality: Bilateral;  L3,4,5    TONSILLECTOMY, ADENOIDECTOMY         Social History: Patient lives with spouse.      Prior diet: regular with thin.    Occupation/hobbies/homemaking: tba.    Subjective     "He would open his eyes yesterday' per wife  Patient goals: dany     Pain/Comfort:  Pain Rating 1: 0/10  Pain Rating Post-Intervention 1: 0/10    Respiratory Status: Room air    Objective:     Oral Musculature Evaluation  Oral Musculature: unable to assess due to poor participation/comprehension  Dentition: present and adequate  Secretion Management: adequate  Mucosal Quality: good  Volitional Cough: not elicited  Volitional Swallow: not " elicited  Voice Prior to PO Intake: wfl based on very limited sample    Bedside Swallow Eval:   Consistencies Assessed:  Thin liquids 1/4 teaspoon of water x3  Puree 1/4 teaspoon of applesauce     Oral Phase:   Anterior loss  Decreased closure around utensil  Prolonged mastication  Oral residue  Poor oral acceptance  Slow oral transit time    Pharyngeal Phase:   delayed swallow initation  multiple spontaneous swallows    Compensatory Strategies  None    Treatment: HOb was raised to 90 degree angle.  Eyes closed for entire session despite wet rag to face, ice to lips and sternal rub.  Limited verbalizations elicited that were dysarthric at single word level.  He did not cough, throat clear on command.  Pt. Did not readily remove bolus' from the spoon with small bolus' placed in oral cavity.  Anterior loss of bolus noted with verbal cues needed to initiate swallow.  Pt. Very high risk of aspiration at this time due to lethargy.  Education provided to pt.'s spouse re need for npo , aspiration precautions and slp plan of care.     Goals:   Multidisciplinary Problems       SLP Goals          Problem: SLP    Goal Priority Disciplines Outcome   SLP Goal     SLP Progressing   Description: Goals due 5/12  1.  Pt. Will participate in ongoing assessment of swallow at bedside  2.  Pt. Will participate in speech, language and cognitive assessment                        Plan:     Patient to be seen:  4 x/week   Plan of Care expires:  06/02/25  Plan of Care reviewed with:  patient, spouse   SLP Follow-Up:  Yes       Discharge recommendations:  High Intensity Therapy   Barriers to Discharge:  Level of Skilled Assistance Needed 24 hour    Time Tracking:     SLP Treatment Date:   05/05/25  Speech Start Time:  0800  Speech Stop Time:  0814     Speech Total Time (min):  14 min    Billable Minutes: Eval 6  and Self Care/Home Management Training 8    05/05/2025

## 2025-05-05 NOTE — SUBJECTIVE & OBJECTIVE
Prescriptions Prior to Admission[1]    Review of patient's allergies indicates:   Allergen Reactions    Pennsaid [diclofenac sodium] Rash and Blisters       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    Cervical disc displacement     Degeneration of lumbar intervertebral disc     GERD (gastroesophageal reflux disease)     gastric polyps    Hip pain 02/22/2016    Hyperlipidemia     Shingles 11/2013    Sleep apnea      Past Surgical History:   Procedure Laterality Date    ABLATION, SVT, ACCESSORY PATHWAY N/A 5/25/2023    Procedure: Ablation, SVT, Accessory Pathway;  Surgeon: Goyo Sky MD;  Location: [facility] EP LAB;  Service: Cardiology;  Laterality: N/A;  PAC, RFA, CARTO, MAC, GP, 3 PREP    CERVICAL FUSION      bone graft    COLONOSCOPY N/A 7/29/2021    Procedure: COLONOSCOPY;  Surgeon: Tyler Story MD;  Location: [facility];  Service: Endoscopy;  Laterality: N/A;    COLONOSCOPY N/A 10/30/2024    Procedure: COLONOSCOPY;  Surgeon: Tyler Story MD;  Location: [facility];  Service: Endoscopy;  Laterality: N/A;  m/ppm    EPIDURAL STEROID INJECTION INTO LUMBAR SPINE N/A 12/5/2019    Procedure: Injection-steroid-epidural-lumbar;  Surgeon: Adarsh Kraft MD;  Location: [facility] OR;  Service: Pain Management;  Laterality: N/A;  L5-S1    ESOPHAGOGASTRODUODENOSCOPY N/A 10/27/2020    Procedure: EGD (ESOPHAGOGASTRODUODENOSCOPY);  Surgeon: Tyler Story MD;  Location: [facility];  Service: Endoscopy;  Laterality: N/A;    ESOPHAGOGASTRODUODENOSCOPY N/A 2/14/2025    Procedure: EGD (ESOPHAGOGASTRODUODENOSCOPY);  Surgeon: Tyler Story MD;  Location: [facility];  Service: Endoscopy;  Laterality: N/A;    INJECTION OF ANESTHETIC AGENT AROUND MEDIAL BRANCH NERVES INNERVATING LUMBAR FACET JOINT Bilateral 11/18/2020    Procedure: BLOCK, NERVE, LUMBAR, MEDIAL BRANCH Bilateral L3,4,5;  Surgeon: Adarsh Kraft MD;  Location: [facility] OR;  Service: Pain Management;  Laterality: Bilateral;    INJECTION OF  ANESTHETIC AGENT AROUND MEDIAL BRANCH NERVES INNERVATING LUMBAR FACET JOINT Left 10/25/2023    Procedure: Block-nerve-medial branch-lumbar;  Surgeon: Adarsh Kraft MD;  Location: [facility] OR;  Service: Anesthesiology;  Laterality: Left;  l4-5, l5-s1 MBB    INJECTION OF ANESTHETIC AGENT AROUND MEDIAL BRANCH NERVES INNERVATING LUMBAR FACET JOINT Bilateral 11/28/2023    Procedure: Block-nerve-medial branch-lumbar;  Surgeon: Adarsh Kraft MD;  Location: [facility] OR;  Service: Anesthesiology;  Laterality: Bilateral;  L4.5 and l5/s1 MBB #2    RADIOFREQUENCY ABLATION OF LUMBAR MEDIAL BRANCH NERVE AT SINGLE LEVEL Bilateral 1/29/2021    Procedure: Radiofrequency Ablation, Nerve, Spinal, Lumbar, Medial Branch, 1 Level;  Surgeon: Adarsh Kraft MD;  Location: [facility] OR;  Service: Pain Management;  Laterality: Bilateral;  L3,4,5    TONSILLECTOMY, ADENOIDECTOMY       Family History       Problem Relation (Age of Onset)    Coronary artery disease Mother    Diabetes Mother    Heart disease Brother    Hypertension Mother    Stroke Mother, Father          Tobacco Use    Smoking status: Never    Smokeless tobacco: Never   Substance and Sexual Activity    Alcohol use: Yes     Alcohol/week: 6.0 standard drinks of alcohol     Types: 6 Cans of beer per week     Comment: weekly or less    Drug use: No    Sexual activity: Yes     Partners: Female     Review of Systems   Unable to perform ROS: Acuity of condition     Objective:        There is no height or weight on file to calculate BMI.  Vital Signs (Most Recent):  Temp: (P) 97.2 °F (36.2 °C) (05/04/25 1956)  Pulse: 76 (05/04/25 2031)  Resp: 15 (05/04/25 2031)  BP: 123/68 (05/04/25 2031)  SpO2: 98 % (05/04/25 2031) Vital Signs (24h Range):  Temp:  [97.2 °F (36.2 °C)] (P) 97.2 °F (36.2 °C)  Pulse:  [62-89] 76  Resp:  [15-36] 15  SpO2:  [90 %-98 %] 98 %  BP: (114-155)/(55-85) 123/68                                 Physical Exam         Neurosurgery Physical Exam    GENERAL: resting  comfortably  HEENT: NCAT, PERRL, mucous membranes moist  NECK: supple, trachea midline  CV: normal capillary refill  PULM: aerating well, symmetric expansion, no distress  ABD: soft, NT, ND  EXT: no c/c/e    NEURO:    AAO x 3  CN II-XII grossly intact  RUE/RLE: fc  LUE/LLE: plegic  Decreased sensation L hemibody      Significant Labs:  Recent Labs   Lab 05/04/25  1608   GLU 96      K 4.0      CO2 27   BUN 25*   CREATININE 0.8   CALCIUM 9.2     Recent Labs   Lab 05/04/25  1608   WBC 5.04   HGB 13.5*   HCT 39.8*        Recent Labs   Lab 05/04/25  1608   INR 1.0     Microbiology Results (last 7 days)       ** No results found for the last 168 hours. **          All pertinent labs from the last 24 hours have been reviewed.    Significant Diagnostics:  I have reviewed and interpreted all pertinent imaging results/findings within the past 24 hours.  CTA Head and Neck (xpd)  Result Date: 5/4/2025  1. Acute right frontal intraparenchymal hematoma with mild adjacent edema is similar to the recent prior study.  Follow-up recommended. 2. No high-grade stenosis or major vessel occlusion. Electronically signed by: Frandy Landis Date:    05/04/2025 Time:    16:51    CT HEAD FOR CODE STROKE  Result Date: 5/4/2025  3.3 x 2.4 cm intracranial hemorrhage within the right frontoparietal region with mild vasogenic edema.  There is no midline shift These findings were called to the ordering physician at 16:22 Electronically signed by: Enriqueta De La Vega Date:    05/04/2025 Time:    16:24           [1]   Medications Prior to Admission   Medication Sig Dispense Refill Last Dose/Taking    aspirin (ECOTRIN) 81 MG EC tablet Take 1 tablet (81 mg total) by mouth once daily.  0     atorvastatin (LIPITOR) 20 MG tablet TAKE ONE TABLET BY MOUTH ONCE DAILY 90 tablet 3     ciclopirox (PENLAC) 8 % Soln Apply topically. (Patient not taking: Reported on 1/16/2025)       docosahexaenoic acid/epa (FISH OIL ORAL) Take 1 capsule by mouth  once daily.       magnesium oxide (MAG-OX) 400 mg (241.3 mg magnesium) tablet Take 1 tablet (400 mg total) by mouth once daily. 90 tablet 3     meloxicam (MOBIC) 15 MG tablet TAKE 1 TABLET BY MOUTH AT NIGHT  AS NEEDED 90 tablet 3     methocarbamoL (ROBAXIN) 500 MG Tab TAKE ONE TABLET BY MOUTH EVERY 8 HOURS AS NEEDED FOR MUSCLE SPASMS 90 tablet 2     multivitamin (THERAGRAN) per tablet Take 1 tablet by mouth once daily.       omeprazole (PRILOSEC) 40 MG capsule Take 1 capsule (40 mg total) by mouth every morning. 90 capsule 3     pregabalin (LYRICA) 100 MG capsule Take 100 mg by mouth once daily.       triamcinolone acetonide 0.1% (KENALOG) 0.1 % ointment Apply topically 2 (two) times daily. 30 g 0

## 2025-05-05 NOTE — SUBJECTIVE & OBJECTIVE
5/5: MRI with enlarged ICH w IVH , CTH corroborates , stable. GCS 14, no evac at this time    Prescriptions Prior to Admission[1]    Review of patient's allergies indicates:   Allergen Reactions    Pennsaid [diclofenac sodium] Rash and Blisters       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    Cervical disc displacement     Degeneration of lumbar intervertebral disc     GERD (gastroesophageal reflux disease)     gastric polyps    Hip pain 02/22/2016    Hyperlipidemia     Shingles 11/2013    Sleep apnea      Past Surgical History:   Procedure Laterality Date    ABLATION, SVT, ACCESSORY PATHWAY N/A 5/25/2023    Procedure: Ablation, SVT, Accessory Pathway;  Surgeon: Goyo Sky MD;  Location: [facility] EP LAB;  Service: Cardiology;  Laterality: N/A;  PAC, RFA, CARTO, MAC, GP, 3 PREP    CERVICAL FUSION      bone graft    COLONOSCOPY N/A 7/29/2021    Procedure: COLONOSCOPY;  Surgeon: Tyler Story MD;  Location: [facility];  Service: Endoscopy;  Laterality: N/A;    COLONOSCOPY N/A 10/30/2024    Procedure: COLONOSCOPY;  Surgeon: Tyler Story MD;  Location: [facility];  Service: Endoscopy;  Laterality: N/A;  m/ppm    EPIDURAL STEROID INJECTION INTO LUMBAR SPINE N/A 12/5/2019    Procedure: Injection-steroid-epidural-lumbar;  Surgeon: Adarsh Kraft MD;  Location: [facility] OR;  Service: Pain Management;  Laterality: N/A;  L5-S1    ESOPHAGOGASTRODUODENOSCOPY N/A 10/27/2020    Procedure: EGD (ESOPHAGOGASTRODUODENOSCOPY);  Surgeon: Tyler Story MD;  Location: [facility];  Service: Endoscopy;  Laterality: N/A;    ESOPHAGOGASTRODUODENOSCOPY N/A 2/14/2025    Procedure: EGD (ESOPHAGOGASTRODUODENOSCOPY);  Surgeon: Tyler Story MD;  Location: [facility];  Service: Endoscopy;  Laterality: N/A;    INJECTION OF ANESTHETIC AGENT AROUND MEDIAL BRANCH NERVES INNERVATING LUMBAR FACET JOINT Bilateral 11/18/2020    Procedure: BLOCK, NERVE, LUMBAR, MEDIAL BRANCH Bilateral L3,4,5;  Surgeon: Adarsh Kraft MD;   Location: [facility] OR;  Service: Pain Management;  Laterality: Bilateral;    INJECTION OF ANESTHETIC AGENT AROUND MEDIAL BRANCH NERVES INNERVATING LUMBAR FACET JOINT Left 10/25/2023    Procedure: Block-nerve-medial branch-lumbar;  Surgeon: Adarsh Kraft MD;  Location: [facility] OR;  Service: Anesthesiology;  Laterality: Left;  l4-5, l5-s1 MBB    INJECTION OF ANESTHETIC AGENT AROUND MEDIAL BRANCH NERVES INNERVATING LUMBAR FACET JOINT Bilateral 11/28/2023    Procedure: Block-nerve-medial branch-lumbar;  Surgeon: Adarsh Kraft MD;  Location: [facility] OR;  Service: Anesthesiology;  Laterality: Bilateral;  L4.5 and l5/s1 MBB #2    RADIOFREQUENCY ABLATION OF LUMBAR MEDIAL BRANCH NERVE AT SINGLE LEVEL Bilateral 1/29/2021    Procedure: Radiofrequency Ablation, Nerve, Spinal, Lumbar, Medial Branch, 1 Level;  Surgeon: Adarsh Kraft MD;  Location: [facility] OR;  Service: Pain Management;  Laterality: Bilateral;  L3,4,5    TONSILLECTOMY, ADENOIDECTOMY       Family History       Problem Relation (Age of Onset)    Coronary artery disease Mother    Diabetes Mother    Heart disease Brother    Hypertension Mother    Stroke Mother, Father          Tobacco Use    Smoking status: Never    Smokeless tobacco: Never   Substance and Sexual Activity    Alcohol use: Yes     Alcohol/week: 6.0 standard drinks of alcohol     Types: 6 Cans of beer per week     Comment: weekly or less    Drug use: No    Sexual activity: Yes     Partners: Female       Objective:     Weight: 88.8 kg (195 lb 12.3 oz)  Body mass index is 28.09 kg/m².  Vital Signs (Most Recent):  Temp: 98.5 °F (36.9 °C) (05/05/25 0301)  Pulse: 77 (05/05/25 0701)  Resp: 18 (05/05/25 0701)  BP: (!) 142/70 (05/05/25 0701)  SpO2: 98 % (05/05/25 0701) Vital Signs (24h Range):  Temp:  [97.2 °F (36.2 °C)-98.5 °F (36.9 °C)] 98.5 °F (36.9 °C)  Pulse:  [62-89] 77  Resp:  [14-36] 18  SpO2:  [90 %-98 %] 98 %  BP: (114-155)/(55-92) 142/70     Date 05/05/25 0700 - 05/06/25 0659   Shift 9963-2327 8475-2697  5977-1064 24 Hour Total   INTAKE   I.V.(mL/kg) 85.4(1)   85.4(1)   Shift Total(mL/kg) 85.4(1)   85.4(1)   OUTPUT   Shift Total(mL/kg)       Weight (kg) 88.8 88.8 88.8 88.8                               Physical Exam         Neurosurgery Physical Exam    GENERAL: resting comfortably  HEENT: NCAT, PERRL, mucous membranes moist  NECK: supple, trachea midline  CV: normal capillary refill  PULM: aerating well, symmetric expansion, no distress  ABD: soft, NT, ND  EXT: no c/c/e    NEURO:    AAO x 3  CN II-XII grossly intact  RUE/RLE: fc  LUE/LLE: plegic  Decreased sensation L hemibody      Significant Labs:  Recent Labs   Lab 05/04/25 1608 05/05/25  0145   GLU 96 121*    142   K 4.0 3.9    110   CO2 27 21*   BUN 25* 22   CREATININE 0.8 0.7   CALCIUM 9.2 8.7   MG  --  1.9     Recent Labs   Lab 05/04/25  1608 05/05/25  0145   WBC 5.04 7.29   HGB 13.5* 12.4*   HCT 39.8* 37.2*    187     Recent Labs   Lab 05/04/25  1608 05/05/25  0145   INR 1.0 1.1   APTT  --  22.6     Microbiology Results (last 7 days)       ** No results found for the last 168 hours. **          All pertinent labs from the last 24 hours have been reviewed.    Significant Diagnostics:  I have reviewed and interpreted all pertinent imaging results/findings within the past 24 hours.  CTA Head and Neck (xpd)  Result Date: 5/4/2025  1. Acute right frontal intraparenchymal hematoma with mild adjacent edema is similar to the recent prior study.  Follow-up recommended. 2. No high-grade stenosis or major vessel occlusion. Electronically signed by: Frandy Landis Date:    05/04/2025 Time:    16:51    CT HEAD FOR CODE STROKE  Result Date: 5/4/2025  3.3 x 2.4 cm intracranial hemorrhage within the right frontoparietal region with mild vasogenic edema.  There is no midline shift These findings were called to the ordering physician at 16:22 Electronically signed by: Enriqueta De La Vega Date:    05/04/2025 Time:    16:24    CT Head Without Contrast  Result  Date: 5/5/2025  1. Right frontal parenchymal hemorrhage with subarachnoid and intraventricular extension appears relatively similar when compared to MRI performed 05/04/2025, but notably increased since initial head CT of 05/04/2025. 2. Relatively similar associated mass effect compared to most recent MRI. Electronically signed by: Colt Ang Date:    05/05/2025 Time:    06:25    CTA Head and Neck (xpd)  Result Date: 5/4/2025  1. Acute right frontal intraparenchymal hematoma with mild adjacent edema is similar to the recent prior study.  Follow-up recommended. 2. No high-grade stenosis or major vessel occlusion. Electronically signed by: Frandy Landis Date:    05/04/2025 Time:    16:51    CT HEAD FOR CODE STROKE  Result Date: 5/4/2025  3.3 x 2.4 cm intracranial hemorrhage within the right frontoparietal region with mild vasogenic edema.  There is no midline shift These findings were called to the ordering physician at 16:22 Electronically signed by: Enriqueta De La Vega Date:    05/04/2025 Time:    16:24           [1]   Medications Prior to Admission   Medication Sig Dispense Refill Last Dose/Taking    aspirin (ECOTRIN) 81 MG EC tablet Take 1 tablet (81 mg total) by mouth once daily.  0     atorvastatin (LIPITOR) 20 MG tablet TAKE ONE TABLET BY MOUTH ONCE DAILY 90 tablet 3     ciclopirox (PENLAC) 8 % Soln Apply topically. (Patient not taking: Reported on 1/16/2025)       docosahexaenoic acid/epa (FISH OIL ORAL) Take 1 capsule by mouth once daily.       magnesium oxide (MAG-OX) 400 mg (241.3 mg magnesium) tablet Take 1 tablet (400 mg total) by mouth once daily. 90 tablet 3     meloxicam (MOBIC) 15 MG tablet TAKE 1 TABLET BY MOUTH AT NIGHT  AS NEEDED 90 tablet 3     methocarbamoL (ROBAXIN) 500 MG Tab TAKE ONE TABLET BY MOUTH EVERY 8 HOURS AS NEEDED FOR MUSCLE SPASMS 90 tablet 2     multivitamin (THERAGRAN) per tablet Take 1 tablet by mouth once daily.       omeprazole (PRILOSEC) 40 MG capsule Take 1 capsule (40 mg  total) by mouth every morning. 90 capsule 3     pregabalin (LYRICA) 100 MG capsule Take 100 mg by mouth once daily.       triamcinolone acetonide 0.1% (KENALOG) 0.1 % ointment Apply topically 2 (two) times daily. 30 g 0

## 2025-05-05 NOTE — ASSESSMENT & PLAN NOTE
69-year-old male presents to the ER with left-sided weakness and right-sided gaze preference that began abruptly this afternoon around 4:00 p.m.. At OSH found to have R frontal lobar ICH. Given ddAVP for reversal.    ICHS 0    --Patient admitted to [facility] on telemetry      -q1h neurochecks in ICU, q2h neurochecks in stepdown, q4h neurochecks on floor  --All labs and diagnostics reviewed  --Follow-up CTH and 6h scan for stability; MRI to assess for amyloid   --SBP <140  --Na >135  --Keppra 500 BID   --HOB >30  --Follow-up full pre-op labs (CBC/CMP/PT-INR/PTT/T&S)  --NPO at this time for possible operative intervention  --Continue to monitor clinically, notify NSGY immediately with any changes in neuro status    Dispo: ICU    Plan d/w Dr. Gross.

## 2025-05-05 NOTE — SUBJECTIVE & OBJECTIVE
Past Medical History:   Diagnosis Date    Arthritis of hand 08/06/2015    Cervical disc displacement     Degeneration of lumbar intervertebral disc     GERD (gastroesophageal reflux disease)     gastric polyps    Hip pain 02/22/2016    Hyperlipidemia     Shingles 11/2013    Sleep apnea      Past Surgical History:   Procedure Laterality Date    ABLATION, SVT, ACCESSORY PATHWAY N/A 5/25/2023    Procedure: Ablation, SVT, Accessory Pathway;  Surgeon: Goyo Sky MD;  Location: [facility] EP LAB;  Service: Cardiology;  Laterality: N/A;  PAC, RFA, CARTO, MAC, GP, 3 PREP    CERVICAL FUSION      bone graft    COLONOSCOPY N/A 7/29/2021    Procedure: COLONOSCOPY;  Surgeon: Tyler Story MD;  Location: [facility];  Service: Endoscopy;  Laterality: N/A;    COLONOSCOPY N/A 10/30/2024    Procedure: COLONOSCOPY;  Surgeon: Tyler Story MD;  Location: [facility];  Service: Endoscopy;  Laterality: N/A;  m/ppm    EPIDURAL STEROID INJECTION INTO LUMBAR SPINE N/A 12/5/2019    Procedure: Injection-steroid-epidural-lumbar;  Surgeon: Adarsh Kraft MD;  Location: [facility];  Service: Pain Management;  Laterality: N/A;  L5-S1    ESOPHAGOGASTRODUODENOSCOPY N/A 10/27/2020    Procedure: EGD (ESOPHAGOGASTRODUODENOSCOPY);  Surgeon: Tyler Story MD;  Location: [facility];  Service: Endoscopy;  Laterality: N/A;    ESOPHAGOGASTRODUODENOSCOPY N/A 2/14/2025    Procedure: EGD (ESOPHAGOGASTRODUODENOSCOPY);  Surgeon: Tyler Story MD;  Location: [facility];  Service: Endoscopy;  Laterality: N/A;    INJECTION OF ANESTHETIC AGENT AROUND MEDIAL BRANCH NERVES INNERVATING LUMBAR FACET JOINT Bilateral 11/18/2020    Procedure: BLOCK, NERVE, LUMBAR, MEDIAL BRANCH Bilateral L3,4,5;  Surgeon: Adarsh Kraft MD;  Location: [facility] OR;  Service: Pain Management;  Laterality: Bilateral;    INJECTION OF ANESTHETIC AGENT AROUND MEDIAL BRANCH NERVES INNERVATING LUMBAR FACET JOINT Left 10/25/2023    Procedure: Block-nerve-medial branch-lumbar;   Surgeon: Adarsh Kraft MD;  Location: [facility];  Service: Anesthesiology;  Laterality: Left;  l4-5, l5-s1 MBB    INJECTION OF ANESTHETIC AGENT AROUND MEDIAL BRANCH NERVES INNERVATING LUMBAR FACET JOINT Bilateral 11/28/2023    Procedure: Block-nerve-medial branch-lumbar;  Surgeon: Adarsh Kraft MD;  Location: [facility];  Service: Anesthesiology;  Laterality: Bilateral;  L4.5 and l5/s1 MBB #2    RADIOFREQUENCY ABLATION OF LUMBAR MEDIAL BRANCH NERVE AT SINGLE LEVEL Bilateral 1/29/2021    Procedure: Radiofrequency Ablation, Nerve, Spinal, Lumbar, Medial Branch, 1 Level;  Surgeon: Adarsh Kraft MD;  Location: [facility] OR;  Service: Pain Management;  Laterality: Bilateral;  L3,4,5    TONSILLECTOMY, ADENOIDECTOMY        Medications Ordered Prior to Encounter[1]   Allergies: Pennsaid [diclofenac sodium]  Family History   Problem Relation Name Age of Onset    Diabetes Mother      Hypertension Mother      Stroke Mother      Coronary artery disease Mother      Stroke Father      Heart disease Brother          CAD     Social History[2]  Review of Systems   Constitutional:  Negative for fever.   Eyes:  Negative for photophobia.   Respiratory:  Negative for shortness of breath.    Cardiovascular:  Negative for chest pain.   Gastrointestinal:  Negative for nausea.   Genitourinary:  Negative for dysuria.   Neurological:  Positive for tremors, facial asymmetry and weakness.     Objective:     Vitals:    Temp: 97.2 °F (36.2 °C)  Pulse: 79  Rhythm: normal sinus rhythm  BP: 134/66  MAP (mmHg): 94  Resp: 18  SpO2: 97 %    Temp  Min: 97.2 °F (36.2 °C)  Max: 97.2 °F (36.2 °C)  Pulse  Min: 62  Max: 89  BP  Min: 114/57  Max: 155/73  MAP (mmHg)  Min: 79  Max: 105  Resp  Min: 14  Max: 36  SpO2  Min: 90 %  Max: 98 %    No intake/output data recorded.            Physical Exam  Vitals and nursing note reviewed.   Cardiovascular:      Rate and Rhythm: Normal rate and regular rhythm.      Pulses: Normal pulses.      Heart sounds: Normal  heart sounds.   Pulmonary:      Effort: Pulmonary effort is normal.      Breath sounds: Normal breath sounds.   Abdominal:      General: Bowel sounds are normal.      Palpations: Abdomen is soft.   Skin:     General: Skin is warm and dry.      Capillary Refill: Capillary refill takes 2 to 3 seconds.   Neurological:      Comments: E3 V5 M6  Mild left facial weakness  Perrla, Open eyes to voice, right gaze preference  Left sided weakness/plegia, slightly increased tone  RUE/RLE 5/5 , follows commands           Today I personally reviewed pertinent medications, lines/drains/airways, imaging, cardiology results, laboratory results, microbiology results,            [1]  Current Facility-Administered Medications on File Prior to Encounter   Medication Dose Route Frequency Provider Last Rate Last Admin    [COMPLETED] desmopressin (DDAVP) 40 mcg in 0.9% NaCl 50 mL IVPB  40 mcg Intravenous ED 1 Time Edy Babb MD   Stopped at 05/04/25 1752    [COMPLETED] iohexoL (OMNIPAQUE 350) injection 100 mL  100 mL Intravenous ONCE PRN Edy Babb MD   100 mL at 05/04/25 1614    [COMPLETED] labetaloL (NORMODYNE,TRANDATE) 5 mg/mL injection             lactated ringers infusion   Intravenous Continuous Adarsh Kraft MD        lactated ringers infusion   Intravenous Continuous Adarsh Kraft MD        [COMPLETED] levETIRAcetam injection 1,000 mg  1,000 mg Intravenous ED 1 Time Edy Babb MD   1,000 mg at 05/04/25 1726    LIDOcaine (PF) 10 mg/ml (1%) injection 10 mg  1 mL Intradermal Once Adarsh Kraft MD        LIDOcaine (PF) 10 mg/ml (1%) injection 10 mg  1 mL Intradermal Once Adarsh Kraft MD        [COMPLETED] tenecteplase (TNKase) 50 mg IV KIT             [DISCONTINUED] desmopressin injection 40 mcg  40 mcg Intravenous ED 1 Time Edy Babb MD        [DISCONTINUED] niCARdipine 40 mg/200 mL (0.2 mg/mL) infusion  0-15 mg/hr Intravenous Continuous Edy Babb MD 25 mL/hr at 05/04/25 1639 5 mg/hr  at 05/04/25 1639    [DISCONTINUED] niCARdipine 40 mg/200 mL (0.2 mg/mL) infusion  0-15 mg/hr Intravenous Continuous Edy Babb MD         Current Outpatient Medications on File Prior to Encounter   Medication Sig Dispense Refill    aspirin (ECOTRIN) 81 MG EC tablet Take 1 tablet (81 mg total) by mouth once daily.  0    atorvastatin (LIPITOR) 20 MG tablet TAKE ONE TABLET BY MOUTH ONCE DAILY 90 tablet 3    ciclopirox (PENLAC) 8 % Soln Apply topically. (Patient not taking: Reported on 1/16/2025)      docosahexaenoic acid/epa (FISH OIL ORAL) Take 1 capsule by mouth once daily.      magnesium oxide (MAG-OX) 400 mg (241.3 mg magnesium) tablet Take 1 tablet (400 mg total) by mouth once daily. 90 tablet 3    meloxicam (MOBIC) 15 MG tablet TAKE 1 TABLET BY MOUTH AT NIGHT  AS NEEDED 90 tablet 3    methocarbamoL (ROBAXIN) 500 MG Tab TAKE ONE TABLET BY MOUTH EVERY 8 HOURS AS NEEDED FOR MUSCLE SPASMS 90 tablet 2    multivitamin (THERAGRAN) per tablet Take 1 tablet by mouth once daily.      omeprazole (PRILOSEC) 40 MG capsule Take 1 capsule (40 mg total) by mouth every morning. 90 capsule 3    pregabalin (LYRICA) 100 MG capsule Take 100 mg by mouth once daily.      triamcinolone acetonide 0.1% (KENALOG) 0.1 % ointment Apply topically 2 (two) times daily. 30 g 0   [2]  Social History  Tobacco Use    Smoking status: Never    Smokeless tobacco: Never   Substance Use Topics    Alcohol use: Yes     Alcohol/week: 6.0 standard drinks of alcohol     Types: 6 Cans of beer per week     Comment: weekly or less    Drug use: No

## 2025-05-05 NOTE — ASSESSMENT & PLAN NOTE
-Stroke risk factor.  Sinus rhythm currently on tele and EKG.  -Not on AC  -TTE pending  -Continue tele

## 2025-05-05 NOTE — PT/OT/SLP EVAL
Occupational Therapy  Co Evaluation/treatment    Name: Colt Rose  MRN: [id number]  Admitting Diagnosis: Nontraumatic subcortical hemorrhage of right cerebral hemisphere  Recent Surgery: * No surgery found *      Recommendations:     Discharge Recommendations: High Intensity Therapy  Discharge Equipment Recommendations:  hospital bed, wheelchair  Barriers to discharge:  Other (Comment) (increased skilled A needed)    Assessment:     Colt Rose is a 69 y.o. male with a medical diagnosis of Nontraumatic subcortical hemorrhage of right cerebral hemisphere.  He presents with decreased self-care and functional mobility independence 2/2 AMS. Performance deficits affecting function: weakness, gait instability, decreased upper extremity function, decreased ROM, impaired endurance, impaired balance, decreased lower extremity function, impaired coordination, impaired sensation, visual deficits, decreased safety awareness, impaired fine motor, impaired self care skills, impaired functional mobility, decreased coordination, abnormal tone.  This date pt A&O x4 utilizing head nods to multi-option choices with few verbalization attempts and pt following 75% of verbal commands. Pt with eyes closed t/o session, requiring total assistance to open eyes. Pt with able to track with L eye to R though with difficulty tracking to L. Pt able to identify number of fingers held by therapist in all planes except L periphery with L eye. Pt with no LUE AROM observed this date with pt unable to identify light touch present and minimal pain withdrawal. Prior to admission, pt was independent with all self-care and mobility, with an active lifestyle. Due to increased assistance needed, pt is functioning below his PLOF and therefore would continue to benefit from skilled OT services to increase independence.     Co-evaluation with PT performed for patient safety, education, and facilitation of treatment to maximize activity tolerance and  progression towards goals from two skilled therapy disciplines.     Rehab Prognosis: Good; patient would benefit from acute skilled OT services to address these deficits and reach maximum level of function.       Plan:     Patient to be seen 4 x/week to address the above listed problems via self-care/home management, therapeutic activities, therapeutic exercises, neuromuscular re-education  Plan of Care Expires: 06/05/25  Plan of Care Reviewed with: patient, spouse    Subjective     Chief Complaint: n/a  Patient/Family Comments/goals: increase independence    Occupational Profile:  Living Environment: Pt lives with wife in [facility], [address], [place] with built in seate and grab bars  Previous level of function: independent  Roles and Routines: Drives, retired,enjoys playing pickle ball  Equipment Used at Home: none  Assistance upon Discharge: Spouse    Pain/Comfort:  Pain Rating 1: 0/10  Pain Rating Post-Intervention 1: 0/10    Patients cultural, spiritual, Lutheran conflicts given the current situation: no    Objective:     Communicated with: nursing prior to session.  Patient found HOB elevated with telemetry, pulse ox (continuous), bed alarm, peripheral IV upon OT entry to room.    General Precautions: Standard, fall, aspiration  Orthopedic Precautions: N/A  Braces: N/A  Respiratory Status: Room air    Occupational Performance:    Bed Mobility:    Patient completed Rolling/Turning to Left with  maximal assistance  Patient completed Scooting/Bridging with maximal assistance and 2 persons  Patient completed Supine to Sit with maximal assistance, 2 persons, and assistance at trunk and BLEs   Patient completed Sit to Supine with total assistance and 2 persons  Pt seated EOB wit maximal assistance 2/2 posterior lean and RUE pushing to L     Functional Mobility/Transfers:  Patient completed Sit <> Stand Transfer with maximal assistance and of 2 persons  with  hand-held assist and LUE supported with 75% hip clearance  with LLE  "knee blocking     Activities of Daily Living:  Grooming: maximal assistance to maintain static sitting balance EOB while pt completing  face washing using RUE - pt with appropriate GMC and thoroughness; maximal assistance for oral suctioning 2/2 pt with decreased control of oral secretions    Cognitive/Visual Perceptual:  Cognitive/Psychosocial Skills:     -       Oriented to: Person, Place, Time, Situation, and pt utilizing head nods to indicate responses to multi-choice options   -       Follows Commands/attention:follows 75% of one step commands  -       Communication: pt with few verbalization attempts though with a few clear one-word responses "good" "hospital" and pt utilizing head nods predominately   -       Memory: No Deficits noted  -       Safety awareness/insight to disability: mildly impaired   -       Mood/Affect/Coping skills/emotional control: Pleasant  Visual/Perceptual:      -Impaired  - pt with eyes closed t/o session, requiring total assistance for eye opening; R eye: pt with mild difficulty with vertical tracking, able to identify correct number of fingers in various planes; L eye - difficulty with tracking to L and identifying number of fingers in L periphery      Physical Exam:  Balance:    -       poor postural control  Sensation:    -       Impaired  light/touch LUE and sharp/dull with minimal withdrawal of LUE; intact RUE  Dominant hand:    -       R  Upper Extremity Range of Motion:     -       Right Upper Extremity: WNL  -       Left Upper Extremity: no AROM, PROM WFL; pt with rotator cuff injury pending surgical procedure  Upper Extremity Strength:    -       Right Upper Extremity: WNL  -       Left Upper Extremity: Deficits: 0/5   Strength:    -       Right Upper Extremity: WNL  -       Left Upper Extremity: Deficits: 0/5  Fine Motor Coordination:    -       Impaired  Left hand, finger to nose  , Left hand thumb/finger opposition skills  , and Left hand, manipulation of objects ; " RUE finger to nose intact  Gross motor coordination:   impaired LUE, RUE moderately impaired while eyes remaining closed    AMPAC 6 Click ADL:  AMPAC Total Score: 11    Treatment & Education:  Session this date targeted initial OT evaluation and self-care activities to increase pt's independence  Pt and spouse educated on OT roles, POC, call button for assistance  Pt and spouse educated on importance of LUE positioning to promote joint integrity as well as PROM with joint protection strategies    Patient left HOB elevated with all lines intact, call button in reach, nursing notified, and spouse present    GOALS:   Multidisciplinary Problems       Occupational Therapy Goals          Problem: Occupational Therapy    Goal Priority Disciplines Outcome Interventions   Occupational Therapy Goal     OT, PT/OT Progressing    Description: Goals to be met by: 6/5/25     Patient will increase functional independence with ADLs by performing:    UE Dressing with Moderate Assistance.  LE Dressing with Moderate Assistance and Assistive Devices as needed.  Grooming while seated with Stand-by Assistance.  Toileting from bedside commode with Moderate Assistance for hygiene and clothing management.   Sitting at edge of bed x10 minutes with Moderate Assistance.  Supine to sit with Moderate Assistance.  Stand pivot transfers with Moderate Assistance.  Toilet transfer to bedside commode with Moderate Assistance.  Increased functional strength to 1/5 for LUE.  Upper extremity exercise program x10 reps per handout, with assistance as needed.                         DME Justifications:  Colt requires a hospital bed due to him requiring positioning of the body in ways not feasible with an ordinary bed due to limited ability and cannot independently make changes in body position without the use of the bed.The positioning of the body cannot be sufficiently resolved by the use of pillows and wedges.  Colt Rose has a mobility limitation  that significantly impairs his ability to participate in one or more mobility related activities of daily living (MRADLs) such as toileting, feeding, dressing, grooming, and bathing in customary locations in the home.  The mobility limitation cannot be sufficiently resolved by the use of a cane or walker.   The use of a manual wheelchair will significantly improve the patients ability to participate in MRADLS and the patient will use it on regular basis in the home.  Colt Rose has expressed his willingness to use a manual wheelchair in the home. Patients upper body strength is sufficient for propulsion.  He also has a caregiver who is available, willing, and able to provide assistance with the wheelchair when needed.      History:     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    Cervical disc displacement     Degeneration of lumbar intervertebral disc     GERD (gastroesophageal reflux disease)     gastric polyps    Hip pain 02/22/2016    Hyperlipidemia     Shingles 11/2013    Sleep apnea          Past Surgical History:   Procedure Laterality Date    ABLATION, SVT, ACCESSORY PATHWAY N/A 5/25/2023    Procedure: Ablation, SVT, Accessory Pathway;  Surgeon: Goyo Sky MD;  Location: [facility] EP LAB;  Service: Cardiology;  Laterality: N/A;  PAC, RFA, CARTO, MAC, GP, 3 PREP    CERVICAL FUSION      bone graft    COLONOSCOPY N/A 7/29/2021    Procedure: COLONOSCOPY;  Surgeon: Tyler Story MD;  Location: [facility] ENDO;  Service: Endoscopy;  Laterality: N/A;    COLONOSCOPY N/A 10/30/2024    Procedure: COLONOSCOPY;  Surgeon: Tyler Story MD;  Location: [facility] ENDO;  Service: Endoscopy;  Laterality: N/A;  m/ppm    EPIDURAL STEROID INJECTION INTO LUMBAR SPINE N/A 12/5/2019    Procedure: Injection-steroid-epidural-lumbar;  Surgeon: Adarsh Kraft MD;  Location: [facility] OR;  Service: Pain Management;  Laterality: N/A;  L5-S1    ESOPHAGOGASTRODUODENOSCOPY N/A 10/27/2020    Procedure: EGD  (ESOPHAGOGASTRODUODENOSCOPY);  Surgeon: Tyler Story MD;  Location: [facility];  Service: Endoscopy;  Laterality: N/A;    ESOPHAGOGASTRODUODENOSCOPY N/A 2/14/2025    Procedure: EGD (ESOPHAGOGASTRODUODENOSCOPY);  Surgeon: Tyler Story MD;  Location: [facility];  Service: Endoscopy;  Laterality: N/A;    INJECTION OF ANESTHETIC AGENT AROUND MEDIAL BRANCH NERVES INNERVATING LUMBAR FACET JOINT Bilateral 11/18/2020    Procedure: BLOCK, NERVE, LUMBAR, MEDIAL BRANCH Bilateral L3,4,5;  Surgeon: Adarsh Kraft MD;  Location: [facility];  Service: Pain Management;  Laterality: Bilateral;    INJECTION OF ANESTHETIC AGENT AROUND MEDIAL BRANCH NERVES INNERVATING LUMBAR FACET JOINT Left 10/25/2023    Procedure: Block-nerve-medial branch-lumbar;  Surgeon: Adarsh Kraft MD;  Location: [facility] OR;  Service: Anesthesiology;  Laterality: Left;  l4-5, l5-s1 MBB    INJECTION OF ANESTHETIC AGENT AROUND MEDIAL BRANCH NERVES INNERVATING LUMBAR FACET JOINT Bilateral 11/28/2023    Procedure: Block-nerve-medial branch-lumbar;  Surgeon: Adarsh Kraft MD;  [facility];  Service: Anesthesiology;  Laterality: Bilateral;  L4.5 and l5/s1 MBB #2    RADIOFREQUENCY ABLATION OF LUMBAR MEDIAL BRANCH NERVE AT SINGLE LEVEL Bilateral 1/29/2021    Procedure: Radiofrequency Ablation, Nerve, Spinal, Lumbar, Medial Branch, 1 Level;  Surgeon: Adarsh Kraft MD;  Location: [facility] OR;  Service: Pain Management;  Laterality: Bilateral;  L3,4,5    TONSILLECTOMY, ADENOIDECTOMY         Time Tracking:     OT Date of Treatment: 05/05/25  OT Start Time: 0949  OT Stop Time: 1019  OT Total Time (min): 30 min    Billable Minutes:Evaluation 22  Self Care/Home Management 8    5/5/2025

## 2025-05-05 NOTE — SUBJECTIVE & OBJECTIVE
Neurologic Chief Complaint: ICH    Subjective:     Interval History: Patient is seen for follow-up neurological assessment and treatment recommendations: CTH and MRI w/o contrast this morning w/ increasing hemorrhagic size with increased midline shift. Eyelid apraxia evident on exam. Pt alert though non-verbal, responding to commands. Total LSW, w/d to pain.     HPI, Past Medical, Family, and Social History remains the same as documented in the initial encounter.     Review of Systems   Constitutional:  Negative for fever.   Eyes:  Negative for discharge.   Respiratory:  Negative for apnea, cough and wheezing.    Cardiovascular:  Negative for leg swelling.   Gastrointestinal:  Negative for abdominal distention.   Neurological:  Positive for facial asymmetry, speech difficulty, weakness and numbness.   Psychiatric/Behavioral:  Negative for agitation, behavioral problems and decreased concentration.      Scheduled Meds:   atorvastatin  20 mg Oral QHS    senna-docusate  1 tablet Oral BID     Continuous Infusions:   0.9% NaCl   Intravenous Continuous 50 mL/hr at 05/05/25 1401 Rate Verify at 05/05/25 1401     PRN Meds:  Current Facility-Administered Medications:     bisacodyL, 10 mg, Rectal, Daily PRN    labetalol, 10 mg, Intravenous, Q4H PRN    magnesium oxide, 800 mg, Oral, PRN    magnesium oxide, 800 mg, Oral, PRN    ondansetron, 4 mg, Intravenous, Q8H PRN    potassium bicarbonate, 35 mEq, Oral, PRN    potassium bicarbonate, 50 mEq, Oral, PRN    potassium bicarbonate, 60 mEq, Oral, PRN    potassium, sodium phosphates, 2 packet, Oral, PRN    potassium, sodium phosphates, 2 packet, Oral, PRN    potassium, sodium phosphates, 2 packet, Oral, PRN    sodium chloride 0.9%, 10 mL, Intravenous, PRN    sodium chloride 3% HYPERTONIC, 250 mL, Intravenous, Q6H PRN    Objective:     Vital Signs (Most Recent):  Temp: 98.6 °F (37 °C) (05/05/25 1101)  Pulse: 70 (05/05/25 1401)  Resp: 18 (05/05/25 1401)  BP: (!) 144/71 (05/05/25  1401)  SpO2: 97 % (05/05/25 1401)  BP Location: Right arm    Vital Signs Range (Last 24H):  Temp:  [97.2 °F (36.2 °C)-98.8 °F (37.1 °C)]   Pulse:  [62-89]   Resp:  [14-36]   BP: (114-155)/(55-92)   SpO2:  [90 %-100 %]   BP Location: Right arm       Physical Exam  Vitals and nursing note reviewed.   HENT:      Head: Normocephalic and atraumatic.   Cardiovascular:      Rate and Rhythm: Normal rate.      Pulses: Normal pulses.   Pulmonary:      Effort: Pulmonary effort is normal. No respiratory distress.      Breath sounds: No wheezing.   Abdominal:      General: There is no distension.      Palpations: Abdomen is soft.   Musculoskeletal:         General: No swelling.      Right lower leg: No edema.      Left lower leg: No edema.   Skin:     General: Skin is warm and dry.      Capillary Refill: Capillary refill takes 2 to 3 seconds.   Neurological:      Mental Status: He is alert.      Comments: E3 V5 M6  Mild left facial weakness  Eyelid apraxia, right gaze preference  Left sided weakness/plegia, slightly increased tone  Left side tito-neglect  RUE/RLE 3/5 , follows commands                  Neurological Exam:   LOC: alert  Attention Span: Good   Language: non-verbal at time of exam  Orientation: Person, Place, Time   Visual Fields: Full and Visual neglect  EOM (CN III, IV, VI): Gaze preference  right  Facial Movement (CN VII): Lower facial weakness on the Left  Motor: Arm left  Plegia 0/5  Leg left  Plegia 0/5  Arm right  Normal 5/5  Leg right Paresis: 3/5  Sensation: decreased on L    Laboratory:  All labs reviewd    Diagnostic Results   All imaging reviewed

## 2025-05-05 NOTE — PLAN OF CARE
[facility] Neuro Critical Care  Initial Discharge Assessment       Primary Care Provider: Jeffery Mena MD    Admission Diagnosis: ICH (intracerebral hemorrhage) [I61.9]    Admission Date: 5/4/2025  Expected Discharge Date:     Transition of Care Barriers: None    Payor: MEDICARE / Plan: MEDICARE PART A & B / Product Type: Government /     Extended Emergency Contact Information  Primary Emergency Contact: Rajan Rose  Mobile Phone: [phone]  Relation: Son  Preferred language: English   needed? No  Secondary Emergency Contact: Yenny Rose  Address: [id number] [place], [place] [id number] [place] [address]  Mobile Phone: [phone]  Relation: Spouse  Preferred language: English   needed? No    Discharge Plan A: Home with family  Discharge Plan B: Home      [facility] - [place], [place] - [address] [facility]  [address] [address]  Phone: [phone] Fax: [phone]    [facility] - [facility] [address]  [address]  [facility] [address]  Phone: [phone] Fax: [phone]    [facility] - [facility] [address] [facility] [address]  [address] [facility] [address]  [place] [address]  Phone: [phone] Fax: [phone]    [id number] [address] AT [facility] & [address] [address]  Phone: [phone] Fax: [phone]      Initial Assessment (most recent)       Adult Discharge Assessment - 05/05/25 2129          Discharge Assessment    Assessment Type Discharge Planning Assessment     Confirmed/corrected address, phone number and insurance Yes     Confirmed Demographics Correct on Facesheet     Source of Information family     Communicated COLBY with patient/caregiver Yes     Reason For Admission Nontraumatic subcortical hemorrhage of right cerebral hemisphere     People in Home spouse      Facility Arrived From: [facility] ED     Do you expect to return to your current living situation? Yes     Do you have help at home or someone to help you manage your care at home? No     Prior to hospitilization cognitive status: Unable to Assess     Current cognitive status: Unable to Assess     Walking or Climbing Stairs Difficulty no     Dressing/Bathing Difficulty no     Home Layout Able to live on 1st floor     Equipment Currently Used at Home none     Readmission within 30 days? No     Patient currently being followed by outpatient case management? No     Do you currently have service(s) that help you manage your care at home? No     Do you take prescription medications? Yes     Do you have prescription coverage? Yes     Coverage Payor: MEDICARE - MEDICARE PART A & B -     Do you have any problems affording any of your prescribed medications? No     Is the patient taking medications as prescribed? yes     Who is going to help you get home at discharge? Yenny Rose     How do you get to doctors appointments? car, drives self     Are you on dialysis? No     Do you take coumadin? No     Discharge Plan A Home with family     Discharge Plan B Home     DME Needed Upon Discharge  none     Discharge Plan discussed with: Spouse/sig other     Name(s) and Number(s) Yenny Rose  [phone]     Transition of Care Barriers None        Physical Activity    On average, how many days per week do you engage in moderate to strenuous exercise (like a brisk walk)? 7 days     On average, how many minutes do you engage in exercise at this level? 60 min        Financial Resource Strain    How hard is it for you to pay for the very basics like food, housing, medical care, and heating? Not hard at all        Housing Stability    In the last 12 months, was there a time when you were not able to pay the mortgage or rent on time? No     At any time in the past 12 months, were you homeless or living in a shelter (including now)? No         Transportation Needs    In the past 12 months, has lack of transportation kept you from medical appointments or from getting medications? No     In the past 12 months, has lack of transportation kept you from meetings, work, or from getting things needed for daily living? No        Food Insecurity    Within the past 12 months, you worried that your food would run out before you got the money to buy more. Never true     Within the past 12 months, the food you bought just didn't last and you didn't have money to get more. Never true        Stress    Do you feel stress - tense, restless, nervous, or anxious, or unable to sleep at night because your mind is troubled all the time - these days? Patient unable to answer        Social Isolation    How often do you feel lonely or isolated from those around you?  Patient unable to answer        Alcohol Use    Q1: How often do you have a drink containing alcohol? Monthly or less     Q2: How many drinks containing alcohol do you have on a typical day when you are drinking? 1 or 2     Q3: How often do you have six or more drinks on one occasion? Never        Utilities    In the past 12 months has the electric, gas, oil, or water company threatened to shut off services in your home? No        Health Literacy    How often do you need to have someone help you when you read instructions, pamphlets, or other written material from your doctor or pharmacy? Patient unable to respond        OTHER    Name(s) of People in Home Yenny Castano                   The  met with the patient spouse Yenny Castano  [phone] at bedside. The SW placed name and contact information on the blackboard in the patient's room. Use preferred pharmacy / bedside delivery for any necessary medications at the time of discharge. The patient is independent with all ADLs. The patient is not on Dialysis or Coumadin but takes a baby aspirin. The Patient does not use DME or home oxygen, The patient's  spouse  will provide assistance to the patient upon discharge. The patient's spouse will provide transportation upon discharge. SW will continue to follow for course of hospitalization.  A discharge planning booklet was left at bedside.        Discharge Plan A and Plan B have been determined by review of patient's clinical status, future medical and therapeutic needs, and coverage/benefits for post-acute care in coordination with multidisciplinary team members.    Zamzam Tellez MSW, EVELYNW  [facility]  Case Management Dept.

## 2025-05-05 NOTE — PLAN OF CARE
"[facility] Care Plan    POC reviewed with Colt Rose and family at 0300. Patient and Family verbalized understanding. Questions and concerns addressed. No acute events today. Pt progressing toward goals. Will continue to monitor. See below and flowsheets for full assessment and VS info.     MRI complete   CT complete   NS @ 75         Is this a stroke patient? Yes. Stroke booklet given to patient and family at bedside.    Neuro:  Nader Coma Scale  Best Eye Response: 3-->(E3) to speech  Best Motor Response: 6-->(M6) obeys commands  Best Verbal Response: 5-->(V5) oriented  Nader Coma Scale Score: 14  Assessment Qualifiers: patient not sedated/intubated  Pupil PERRLA: yes     24 hr Temp:  [97.2 °F (36.2 °C)-98.5 °F (36.9 °C)]     CV:   Rhythm: normal sinus rhythm  BP goals:   SBP < 150  MAP > 65    Resp:           Plan: N/A    GI/:     Diet/Nutrition Received: NPO  Last Bowel Movement: 05/04/25  Voiding Characteristics: external catheter  No intake or output data in the 24 hours ending 05/05/25 0707  Unmeasured Output  Urine Occurrence: 1  Pad Count: 2    Labs/Accuchecks:  Recent Labs   Lab 05/05/25  0145   WBC 7.29   RBC 3.94*   HGB 12.4*   HCT 37.2*         Recent Labs   Lab 05/05/25  0145      K 3.9   CO2 21*      BUN 22   CREATININE 0.7   ALKPHOS 49   ALT 23   AST 25   BILITOT 0.6      Recent Labs   Lab 05/05/25  0145   PROTIME 11.8   INR 1.1   APTT 22.6    No results for input(s): "CPK", "CPKMB", "TROPONINI", "MB" in the last 168 hours.    Electrolytes: N/A - electrolytes WDL  Accuchecks: none    Gtts:   0.9% NaCl   Intravenous Continuous 75 mL/hr at 05/05/25 0515 New Bag at 05/05/25 0515       LDA/Wounds:    Mir Risk Assessment  Sensory Perception: 3-->slightly limited  Moisture: 4-->rarely moist  Activity: 1-->bedfast  Mobility: 2-->very limited  Nutrition: 2-->probably inadequate  Friction and Shear: 2-->potential problem  Mir Score: 14  Is your mir score 12 or less? " no          Restraints:        PERRY

## 2025-05-05 NOTE — CONSULTS
[facility]  Neurosurgery  Consult Note    Consults  Subjective:     Chief Complaint/Reason for Admission: ICH    History of Present Illness: 69-year-old male presents to the ER with left-sided weakness and right-sided gaze preference that began abruptly this afternoon around 4:00 p.m.. At OSH found to have R frontal lobar ICH. Given ddAVP for reversal.    [Prescriptions Prior to Admission]    [Prescriptions Prior to Admission]  Medications Prior to Admission   Medication Sig Dispense Refill Last Dose/Taking    aspirin (ECOTRIN) 81 MG EC tablet Take 1 tablet (81 mg total) by mouth once daily.  0     atorvastatin (LIPITOR) 20 MG tablet TAKE ONE TABLET BY MOUTH ONCE DAILY 90 tablet 3     ciclopirox (PENLAC) 8 % Soln Apply topically. (Patient not taking: Reported on 1/16/2025)       docosahexaenoic acid/epa (FISH OIL ORAL) Take 1 capsule by mouth once daily.       magnesium oxide (MAG-OX) 400 mg (241.3 mg magnesium) tablet Take 1 tablet (400 mg total) by mouth once daily. 90 tablet 3     meloxicam (MOBIC) 15 MG tablet TAKE 1 TABLET BY MOUTH AT NIGHT  AS NEEDED 90 tablet 3     methocarbamoL (ROBAXIN) 500 MG Tab TAKE ONE TABLET BY MOUTH EVERY 8 HOURS AS NEEDED FOR MUSCLE SPASMS 90 tablet 2     multivitamin (THERAGRAN) per tablet Take 1 tablet by mouth once daily.       omeprazole (PRILOSEC) 40 MG capsule Take 1 capsule (40 mg total) by mouth every morning. 90 capsule 3     pregabalin (LYRICA) 100 MG capsule Take 100 mg by mouth once daily.       triamcinolone acetonide 0.1% (KENALOG) 0.1 % ointment Apply topically 2 (two) times daily. 30 g 0        Review of patient's allergies indicates:   Allergen Reactions    Pennsaid [diclofenac sodium] Rash and Blisters       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    Cervical disc displacement     Degeneration of lumbar intervertebral disc     GERD (gastroesophageal reflux disease)     gastric polyps    Hip pain 02/22/2016     Hyperlipidemia     Shingles 11/2013    Sleep apnea      Past Surgical History:   Procedure Laterality Date    ABLATION, SVT, ACCESSORY PATHWAY N/A 5/25/2023    Procedure: Ablation, SVT, Accessory Pathway;  Surgeon: Goyo Sky MD;  Location: [facility] EP LAB;  Service: Cardiology;  Laterality: N/A;  PAC, RFA, CARTO, MAC, GP, 3 PREP    CERVICAL FUSION      bone graft    COLONOSCOPY N/A 7/29/2021    Procedure: COLONOSCOPY;  Surgeon: Tyler Story MD;  Location: [facility] ENDO;  Service: Endoscopy;  Laterality: N/A;    COLONOSCOPY N/A 10/30/2024    Procedure: COLONOSCOPY;  Surgeon: Tyler Story MD;  Location: [facility] ENDO;  Service: Endoscopy;  Laterality: N/A;  m/ppm    EPIDURAL STEROID INJECTION INTO LUMBAR SPINE N/A 12/5/2019    Procedure: Injection-steroid-epidural-lumbar;  Surgeon: Adarsh Kraft MD;  Location: [facility] OR;  Service: Pain Management;  Laterality: N/A;  L5-S1    ESOPHAGOGASTRODUODENOSCOPY N/A 10/27/2020    Procedure: EGD (ESOPHAGOGASTRODUODENOSCOPY);  Surgeon: Tyler Story MD;  Location: [facility];  Service: Endoscopy;  Laterality: N/A;    ESOPHAGOGASTRODUODENOSCOPY N/A 2/14/2025    Procedure: EGD (ESOPHAGOGASTRODUODENOSCOPY);  Surgeon: Tyler Story MD;  Location: [facility];  Service: Endoscopy;  Laterality: N/A;    INJECTION OF ANESTHETIC AGENT AROUND MEDIAL BRANCH NERVES INNERVATING LUMBAR FACET JOINT Bilateral 11/18/2020    Procedure: BLOCK, NERVE, LUMBAR, MEDIAL BRANCH Bilateral L3,4,5;  Surgeon: Adarsh Kraft MD;  Location: [facility] OR;  Service: Pain Management;  Laterality: Bilateral;    INJECTION OF ANESTHETIC AGENT AROUND MEDIAL BRANCH NERVES INNERVATING LUMBAR FACET JOINT Left 10/25/2023    Procedure: Block-nerve-medial branch-lumbar;  Surgeon: Adarsh Kraft MD;  Location: [facility] OR;  Service: Anesthesiology;  Laterality: Left;  l4-5, l5-s1 MBB    INJECTION OF ANESTHETIC AGENT AROUND MEDIAL BRANCH NERVES INNERVATING LUMBAR FACET JOINT Bilateral 11/28/2023    Procedure: Block-nerve-medial  branch-lumbar;  Surgeon: Adarsh Kraft MD;  Location: [facility] AS OR;  Service: Anesthesiology;  Laterality: Bilateral;  L4.5 and l5/s1 MBB #2    RADIOFREQUENCY ABLATION OF LUMBAR MEDIAL BRANCH NERVE AT SINGLE LEVEL Bilateral 1/29/2021    Procedure: Radiofrequency Ablation, Nerve, Spinal, Lumbar, Medial Branch, 1 Level;  Surgeon: Adarsh Kraft MD;  Location: [facility] OR;  Service: Pain Management;  Laterality: Bilateral;  L3,4,5    TONSILLECTOMY, ADENOIDECTOMY       Family History       Problem Relation (Age of Onset)    Coronary artery disease Mother    Diabetes Mother    Heart disease Brother    Hypertension Mother    Stroke Mother, Father          Tobacco Use    Smoking status: Never    Smokeless tobacco: Never   Substance and Sexual Activity    Alcohol use: Yes     Alcohol/week: 6.0 standard drinks of alcohol     Types: 6 Cans of beer per week     Comment: weekly or less    Drug use: No    Sexual activity: Yes     Partners: Female     Review of Systems   Unable to perform ROS: Acuity of condition     Objective:        There is no height or weight on file to calculate BMI.  Vital Signs (Most Recent):  Temp: (P) 97.2 °F (36.2 °C) (05/04/25 1956)  Pulse: 76 (05/04/25 2031)  Resp: 15 (05/04/25 2031)  BP: 123/68 (05/04/25 2031)  SpO2: 98 % (05/04/25 2031) Vital Signs (24h Range):  Temp:  [97.2 °F (36.2 °C)] (P) 97.2 °F (36.2 °C)  Pulse:  [62-89] 76  Resp:  [15-36] 15  SpO2:  [90 %-98 %] 98 %  BP: (114-155)/(55-85) 123/68                                 Physical Exam         Neurosurgery Physical Exam    GENERAL: resting comfortably  HEENT: NCAT, PERRL, mucous membranes moist  NECK: supple, trachea midline  CV: normal capillary refill  PULM: aerating well, symmetric expansion, no distress  ABD: soft, NT, ND  EXT: no c/c/e    NEURO:    AAO x 3  CN II-XII grossly intact except L FD and R gaze preference  RUE/RLE: fc  LUE/LLE: plegic  Decreased sensation L hemibody      Significant Labs:  Recent Labs   Lab 05/04/25  1608   GLU 96       K 4.0      CO2 27   BUN 25*   CREATININE 0.8   CALCIUM 9.2     Recent Labs   Lab 05/04/25  1608   WBC 5.04   HGB 13.5*   HCT 39.8*        Recent Labs   Lab 05/04/25  1608   INR 1.0     Microbiology Results (last 7 days)       ** No results found for the last 168 hours. **          All pertinent labs from the last 24 hours have been reviewed.    Significant Diagnostics:  I have reviewed and interpreted all pertinent imaging results/findings within the past 24 hours.  CTA Head and Neck (xpd)  Result Date: 5/4/2025  1. Acute right frontal intraparenchymal hematoma with mild adjacent edema is similar to the recent prior study.  Follow-up recommended. 2. No high-grade stenosis or major vessel occlusion. Electronically signed by: Frandy Landis Date:    05/04/2025 Time:    16:51    CT HEAD FOR CODE STROKE  Result Date: 5/4/2025  3.3 x 2.4 cm intracranial hemorrhage within the right frontoparietal region with mild vasogenic edema.  There is no midline shift These findings were called to the ordering physician at 16:22 Electronically signed by: Enriqueta De La Vega Date:    05/04/2025 Time:    16:24      Assessment/Plan:     Nontraumatic subcortical hemorrhage of right cerebral hemisphere  69-year-old male presents to the ER with left-sided weakness and right-sided gaze preference that began abruptly this afternoon around 4:00 p.m.. At OSH found to have R frontal lobar ICH. Given ddAVP for reversal.    ICHS 0    --Patient admitted to [facility] on telemetry      -q1h neurochecks in ICU, q2h neurochecks in stepdown, q4h neurochecks on floor  --All labs and diagnostics reviewed  --Follow-up CTH and 6h scan for stability; MRI to assess for amyloid   --SBP <140  --Na >135  --Keppra 500 BID   --HOB >30  --Follow-up full pre-op labs (CBC/CMP/PT-INR/PTT/T&S)  --NPO at this time for possible operative intervention  --Continue to monitor clinically, notify NSGY immediately with any changes in neuro status    Dispo:  ICU    Plan d/w Dr. Gross.         Thank you for your consult. I will follow-up with patient. Please contact us if you have any additional questions.    Colt Solis MD  Neurosurgery  [facility]

## 2025-05-05 NOTE — ASSESSMENT & PLAN NOTE
- CTH/CTA with right parietal IPH, no AVM/malformation   - Vasc Neuro Following  - NSGY following   - No interventions as CTH this AM stable in comparison to MRI.   - Will repeat CTH tomorrow AM  - MRI brain without evidence of amyloid.   - Transferred NCC, hourly Neuro checks  - SBP < 150  - Na > 140 to decrease risk of edema.   - Sodium q6h   - Hypertonic saline q6h prn  - Coags WNL  - DDAVP given prior to transfer for ASA hx     PT/OT, SLP    Family updated bedside

## 2025-05-05 NOTE — ASSESSMENT & PLAN NOTE
-Vascular disease risk factor.  -Patient is prescribed Atorvastatin 20 mg daily. LDL is 102.2. Consider increasing atorvastatin to 40 mg daily.    Recent Labs     05/04/25 2024   CHOL 173   HDL 63   CHOLHDL 36.4   NONHDLCHOL 110   TOTALCHOLEST 2.7   TRIG 39   LDLCALC 102.2

## 2025-05-05 NOTE — ASSESSMENT & PLAN NOTE
69-year-old male presents to the ER with left-sided weakness and right-sided gaze preference that began abruptly this afternoon around 4:00 p.m.. At OSH found to have R frontal lobar ICH. Given ddAVP for reversal.    ICHS 0    MRI wo 5/4: swi looks larger , there is IVH now too  CTH 5/5: looks same as MRI , larger and IVH    --Patient admitted to [facility] on telemetry      -q1h neurochecks in ICU, q2h neurochecks in stepdown, q4h neurochecks on floor  --All labs and diagnostics reviewed  ---150  --Na >135  --Keppra 500 BID   --HOB >30  --No acute neurosurgical intervention at this time  --Continue to monitor clinically, notify NSGY immediately with any changes in neuro status    Dispo: ICU    Plan d/w Dr. Gross.

## 2025-05-05 NOTE — SUBJECTIVE & OBJECTIVE
Interval History/Significant Events: CTH this AM stable when compared to MRI. EEG without evidence of seizures. Will hold off AEDs for now. Goal of SBP <150 and Na >140. No NSGY interventions. Will repeat CTH tomorrow AM.     Holding NG tube; will reconsider tomorrow.     Review of Systems   Constitutional:  Negative for fever.   Eyes:  Negative for photophobia.   Respiratory:  Negative for shortness of breath.    Cardiovascular:  Negative for chest pain.   Gastrointestinal:  Negative for nausea.   Genitourinary:  Negative for dysuria.   Neurological:  Positive for tremors, facial asymmetry and weakness.     Objective:     Vitals:    Temp: 98.6 °F (37 °C)  Pulse: 70  Rhythm: normal sinus rhythm  BP: 134/66  MAP (mmHg): 94  Resp: 18  SpO2: 100 %    Temp  Min: 97.2 °F (36.2 °C)  Max: 98.8 °F (37.1 °C)  Pulse  Min: 62  Max: 89  BP  Min: 114/57  Max: 155/73  MAP (mmHg)  Min: 79  Max: 107  Resp  Min: 14  Max: 36  SpO2  Min: 90 %  Max: 100 %    No intake/output data recorded.   Unmeasured Output  Urine Occurrence: 1  Pad Count: 2        Physical Exam  Vitals and nursing note reviewed.   Cardiovascular:      Rate and Rhythm: Normal rate and regular rhythm.      Pulses: Normal pulses.      Heart sounds: Normal heart sounds.   Pulmonary:      Effort: Pulmonary effort is normal.      Breath sounds: Normal breath sounds.   Abdominal:      General: Bowel sounds are normal.      Palpations: Abdomen is soft.   Skin:     General: Skin is warm and dry.      Capillary Refill: Capillary refill takes 2 to 3 seconds.   Neurological:      Comments: E3 V5 M6  Mild left facial weakness  Perrla, Open eyes to voice with some lid apraxia, right gaze preference  Left sided weakness/plegia, slightly increased tone  Left side tito-neglect  RUE/RLE 5/5 , follows commands             Today I personally reviewed pertinent medications, lines/drains/airways, imaging, cardiology results, laboratory results, microbiology results,

## 2025-05-05 NOTE — EICU
Virtual ICU Admission    Admit Date: 2025  LOS: 0  Code Status: Full Code   : 1955  Bed: 9083/9083 A:     Diagnosis: <principal problem not specified>    Patient  has a past medical history of Arthritis of hand, Cervical disc displacement, Degeneration of lumbar intervertebral disc, GERD (gastroesophageal reflux disease), Hip pain, Hyperlipidemia, Shingles, and Sleep apnea.    Last VS: There were no vitals taken for this visit.      VICU Review    VICU nurse assessment :  [place] completed

## 2025-05-05 NOTE — ASSESSMENT & PLAN NOTE
CTH/CTA with right parietal IPH, no AVM/malformation   Vasc Neuro Following  NSGY following  Transferred NCC, hourly Neuro checks  SBP < 150  MRI with SWI pending, eval amyloid?   Coags WNL  DDAVP given prior to transfer for ASA hx     PT/OT, SLP    Family updated bedside

## 2025-05-05 NOTE — PLAN OF CARE
Problem: Occupational Therapy  Goal: Occupational Therapy Goal  Description: Goals to be met by: 6/5/25     Patient will increase functional independence with ADLs by performing:    UE Dressing with Moderate Assistance.  LE Dressing with Moderate Assistance and Assistive Devices as needed.  Grooming while seated with Stand-by Assistance.  Toileting from bedside commode with Moderate Assistance for hygiene and clothing management.   Sitting at edge of bed x10 minutes with Moderate Assistance.  Supine to sit with Moderate Assistance.  Stand pivot transfers with Moderate Assistance.  Toilet transfer to bedside commode with Moderate Assistance.  Increased functional strength to 1/5 for LUE.  Upper extremity exercise program x10 reps per handout, with assistance as needed.    Outcome: Progressing

## 2025-05-05 NOTE — ASSESSMENT & PLAN NOTE
Colt Rose is a 69 y.o. male with a significant medical history of HLD, cervical and lumbar DDD, pre-diabetes who presents to the hospital as a transfer from [facility] for evaluation of R ICH. He acutely developed LSW and R gaze around 4 PM today (5/5/2025). CTH was obtained and showed a 3.3 x 2.4 cm R frontoparietal ICH w/mild vasogenic edema. The patient was given DDVAP as he takes ASA daily and transferred to [facility] for higher level of care, further evaluation.    On this exam the patient is lethargic and in need of intermittent stimulation to arouse to participate with exam. He opens his eyes to touch and voice and is noted to be doing a repetitive rubbing/picking motion to his chest with his RUE. Dysconjugate gaze. Withdraws from pain in the left sided extremities. NIHSS 14.   -He is currently admitted to [facility].   -EEG cap is in place.    Antithrombotics for secondary stroke prevention: Antiplatelets: None: Intracerebral Hemorrhage    Statins for secondary stroke prevention and hyperlipidemia, if present:   Statins: Not indicated in acute ICH however, LDL is 102.2. The patient is currently prescribed atorvastatin 20 mg daily, may benefit from increasing to 40 mg daily.     Aggressive risk factor modification: HLD, sleep apnea     Rehab efforts: The patient has been evaluated by a stroke team provider and the therapy needs have been fully considered based off the presenting complaints and exam findings. The following therapy evaluations are needed: PT evaluate and treat, OT evaluate and treat, SLP evaluate and treat, PM&R evaluate for appropriate placement    Diagnostics ordered/pending: MRI head without contrast to assess brain parenchyma, TTE to assess cardiac function/status , Other: CTH prn    VTE prophylaxis: Mechanical prophylaxis: Place SCDs  None: Reason for No Pharmacological VTE Prophylaxis: History of systemic or intracranial bleeding    BP parameters: ICH: SBP Goal Range  520-199

## 2025-05-05 NOTE — HPI
69-year-old male presents to the ER with left-sided weakness and right-sided gaze preference that began abruptly this afternoon around 4:00 p.m.. At OSH found to have R frontal lobar ICH. Given ddAVP for reversal.

## 2025-05-05 NOTE — CONSULTS
[facility] - Neuro Critical Care  Vascular Neurology  Comprehensive Stroke Center  Consult Note    Inpatient consult to Neurology Services (Vascular Neurology)  Consult performed by: Gwen Guerra DNP  Consult ordered by: Dimas Siegel NP  Reason for consult: transfer, ICH        Assessment/Plan:         Vasogenic brain edema  -Mild vasogenic edema is noted on imaging in the territory of the R middle cerebral artery with associated mass effect. Initial CTH with no MLS.   -The patient is admitted to [facility] for close monitoring, hourly neuro checks. Low threshold to repeat imaging if exam changes.  -Neurosurgery following  -At this time the etiology of the ICH is unclear. He was not hypertensive on presentation and remains without elevated BP. No aneurysm or AVM noted on CTA head and neck. MRI Brain pending.     Nontraumatic subcortical hemorrhage of right cerebral hemisphere  Colt Rose is a 69 y.o. male with a significant medical history of HLD, cervical and lumbar DDD, pre-diabetes who presents to the hospital as a transfer from [facility] for evaluation of R ICH. He acutely developed LSW and R gaze around 4 PM today (5/5/2025). CTH was obtained and showed a 3.3 x 2.4 cm R frontoparietal ICH w/mild vasogenic edema. The patient was given DDVAP as he takes ASA daily and transferred to [facility] for higher level of care, further evaluation.    On this exam the patient is lethargic and in need of intermittent stimulation to arouse to participate with exam. He opens his eyes to touch and voice and is noted to be doing a repetitive rubbing/picking motion to his chest with his RUE. Dysconjugate gaze. Withdraws from pain in the left sided extremities. NIHSS 14.   -He is currently admitted to [facility].   -EEG cap is in place.    Antithrombotics for secondary stroke prevention: Antiplatelets: None: Intracerebral Hemorrhage    Statins for secondary stroke prevention and hyperlipidemia, if present:    Statins: Not indicated in acute ICH however, LDL is 102.2. The patient is currently prescribed atorvastatin 20 mg daily, may benefit from increasing to 40 mg daily.     Aggressive risk factor modification: HLD, sleep apnea     Rehab efforts: The patient has been evaluated by a stroke team provider and the therapy needs have been fully considered based off the presenting complaints and exam findings. The following therapy evaluations are needed: PT evaluate and treat, OT evaluate and treat, SLP evaluate and treat, PM&R evaluate for appropriate placement    Diagnostics ordered/pending: MRI head without contrast to assess brain parenchyma, TTE to assess cardiac function/status , Other: CTH prn    VTE prophylaxis: Mechanical prophylaxis: Place SCDs  None: Reason for No Pharmacological VTE Prophylaxis: History of systemic or intracranial bleeding    BP parameters: ICH: SBP Goal Range 130-150        Dysarthria  Acute left-sided weakness   -Due to ICH. Therapy evals pending.    Dyslipidemia  -Vascular disease risk factor.  -Patient is prescribed Atorvastatin 20 mg daily. LDL is 102.2. Consider increasing atorvastatin to 40 mg daily.    Recent Labs     05/04/25 2024   CHOL 173   HDL 63   CHOLHDL 36.4   NONHDLCHOL 110   TOTALCHOLEST 2.7   TRIG 39   LDLCALC 102.2         Typical atrial flutter  -Stroke risk factor.  Sinus rhythm currently on tele and EKG.  -Not on AC  -TTE pending  -Continue tele        STROKE DOCUMENTATION          NIH Scale:  Interval: baseline  1a. Level of Consciousness: 2-->Not alert, requires repeated stimulation to attend, or is obtunded and requires strong or painful stimulation to make movements (not stereotyped)  1b. LOC Questions: 0-->Answers both questions correctly  1c. LOC Commands: 0-->Performs both tasks correctly  2. Best Gaze: 1-->Partial gaze palsy, gaze is abnormal in one or both eyes, but forced deviation or total gaze paresis is not present  3. Visual: 1-->Partial hemianopia  4.  Facial Palsy: 2-->Partial paralysis (total or near-total paralysis of lower face)  5a. Motor Arm, Left: 4-->No movement  5b. Motor Arm, Right: 0-->No drift, limb holds 90 (or 45) degrees for full 10 secs  6a. Motor Leg, Left: 2-->Some effort against gravity, leg falls to bed by 5 secs, but has some effort against gravity  6b. Motor Leg, Right: 0-->No drift, leg holds 30 degree position for full 5 secs  7. Limb Ataxia: 0-->Absent  8. Sensory: 1-->Mild-to-moderate sensory loss, patient feels pinprick is less sharp or is dull on the affected side, or there is a loss of superficial pain with pinprick, but patient is aware of being touched  9. Best Language: 0-->No aphasia, normal  10. Dysarthria: 1-->Mild-to-moderate dysarthria, patient slurs at least some words and, at worst, can be understood with some difficulty  11. Extinction and Inattention (formerly Neglect): 1-->Visual, tactile, auditory, spatial, or personal inattention or extinction to bilateral simultaneous stimulation in one of the sensory modalities  Total (NIH Stroke Scale): 15    Modified [place] Score: 1  [place] Coma Scale:14   ABCD2 Score:    [id number] Score:   HAS -BLED Score:   ICH Score:1  Hunt & Hull Classification:       Thrombolysis Candidate? No, CT findings (ICH, SAH, Large core infarct)     Delays to Thrombolysis?  Not Applicable    Interventional Revascularization Candidate?   Is the patient eligible for mechanical endovascular reperfusion (DANIELA)?  No, ICH    Delays to Thrombectomy? Not Applicable    Hemorrhagic change of an Ischemic Stroke: Does this patient have an ischemic stroke with hemorrhagic changes? No     Subjective:     History of Present Illness:  Colt Rose is a 69 y.o. male with a significant medical history of HLD, cervical and lumbar DDD, pre-diabetes who presents to the hospital as a transfer from [facility] for evaluation of R ICH. HPI gathered from review of the patient's medical record, family at the bedside.  Due to the patient being very drowsy, not fully aware of preceding events. The patient was in his usual state of health and with family when he acutely developed LSW and R gaze preference around 4 pm today (5/4/2025). He was brought to the [facility] ED where a CTH was obtained and showed a 3.3 x 2.4 cm R frontoparietal ICH w/mild vasogenic edema. The patient was transferred to [facility] for higher level of care, further evaluation.                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    Cervical disc displacement     Degeneration of lumbar intervertebral disc     GERD (gastroesophageal reflux disease)     gastric polyps    Hip pain 02/22/2016    Hyperlipidemia     Shingles 11/2013    Sleep apnea      Past Surgical History:   Procedure Laterality Date    ABLATION, SVT, ACCESSORY PATHWAY N/A 5/25/2023    Procedure: Ablation, SVT, Accessory Pathway;  Surgeon: Goyo Sky MD;  Location: [facility] LAB;  Service: Cardiology;  Laterality: N/A;  PAC, RFA, CARTO, MAC, GP, 3 PREP    CERVICAL FUSION      bone graft    COLONOSCOPY N/A 7/29/2021    Procedure: COLONOSCOPY;  Surgeon: Tyler Story MD;  Location: [facility];  Service: Endoscopy;  Laterality: N/A;    COLONOSCOPY N/A 10/30/2024    Procedure: COLONOSCOPY;  Surgeon: Tyler Story MD;  Location: [facility];  Service: Endoscopy;  Laterality: N/A;  m/ppm    EPIDURAL STEROID INJECTION INTO LUMBAR SPINE N/A 12/5/2019    Procedure: Injection-steroid-epidural-lumbar;  Surgeon: Adarsh Kraft MD;  Location: [facility] OR;  Service: Pain Management;  Laterality: N/A;  L5-S1    ESOPHAGOGASTRODUODENOSCOPY N/A 10/27/2020    Procedure: EGD (ESOPHAGOGASTRODUODENOSCOPY);  Surgeon: Tyler Story MD;  Location: [facility];  Service: Endoscopy;  Laterality: N/A;    ESOPHAGOGASTRODUODENOSCOPY N/A 2/14/2025    Procedure: EGD (ESOPHAGOGASTRODUODENOSCOPY);  Surgeon: Tyler Story MD;  Location: [facility];  Service: Endoscopy;  Laterality: N/A;     INJECTION OF ANESTHETIC AGENT AROUND MEDIAL BRANCH NERVES INNERVATING LUMBAR FACET JOINT Bilateral 11/18/2020    Procedure: BLOCK, NERVE, LUMBAR, MEDIAL BRANCH Bilateral L3,4,5;  Surgeon: Adarsh Kraft MD;  Location: [facility] OR;  Service: Pain Management;  Laterality: Bilateral;    INJECTION OF ANESTHETIC AGENT AROUND MEDIAL BRANCH NERVES INNERVATING LUMBAR FACET JOINT Left 10/25/2023    Procedure: Block-nerve-medial branch-lumbar;  Surgeon: Adarsh Kraft MD;  Location: [facility] OR;  Service: Anesthesiology;  Laterality: Left;  l4-5, l5-s1 MBB    INJECTION OF ANESTHETIC AGENT AROUND MEDIAL BRANCH NERVES INNERVATING LUMBAR FACET JOINT Bilateral 11/28/2023    Procedure: Block-nerve-medial branch-lumbar;  Surgeon: Adarsh Kraft MD;  Location: [facility] OR;  Service: Anesthesiology;  Laterality: Bilateral;  L4.5 and l5/s1 MBB #2    RADIOFREQUENCY ABLATION OF LUMBAR MEDIAL BRANCH NERVE AT SINGLE LEVEL Bilateral 1/29/2021    Procedure: Radiofrequency Ablation, Nerve, Spinal, Lumbar, Medial Branch, 1 Level;  Surgeon: Adarsh Kraft MD;  Location: [facility] OR;  Service: Pain Management;  Laterality: Bilateral;  L3,4,5    TONSILLECTOMY, ADENOIDECTOMY       Social History[1]  Review of patient's allergies indicates:   Allergen Reactions    Pennsaid [diclofenac sodium] Rash and Blisters       Medications: I have reviewed the current medication administration record.    Medications Prior to Admission   Medication Sig Dispense Refill Last Dose/Taking    aspirin (ECOTRIN) 81 MG EC tablet Take 1 tablet (81 mg total) by mouth once daily.  0     atorvastatin (LIPITOR) 20 MG tablet TAKE ONE TABLET BY MOUTH ONCE DAILY 90 tablet 3     ciclopirox (PENLAC) 8 % Soln Apply topically. (Patient not taking: Reported on 1/16/2025)       docosahexaenoic acid/epa (FISH OIL ORAL) Take 1 capsule by mouth once daily.       magnesium oxide (MAG-OX) 400 mg (241.3 mg magnesium) tablet Take 1 tablet (400 mg total) by mouth once daily. 90 tablet 3     meloxicam  (MOBIC) 15 MG tablet TAKE 1 TABLET BY MOUTH AT NIGHT  AS NEEDED 90 tablet 3     methocarbamoL (ROBAXIN) 500 MG Tab TAKE ONE TABLET BY MOUTH EVERY 8 HOURS AS NEEDED FOR MUSCLE SPASMS 90 tablet 2     multivitamin (THERAGRAN) per tablet Take 1 tablet by mouth once daily.       omeprazole (PRILOSEC) 40 MG capsule Take 1 capsule (40 mg total) by mouth every morning. 90 capsule 3     pregabalin (LYRICA) 100 MG capsule Take 100 mg by mouth once daily.       triamcinolone acetonide 0.1% (KENALOG) 0.1 % ointment Apply topically 2 (two) times daily. 30 g 0        Review of Systems   Eyes:  Positive for visual disturbance.   Musculoskeletal:  Positive for gait problem.   Neurological:  Positive for speech difficulty and weakness.   All other systems reviewed and are negative.    Objective:     Vital Signs (Most Recent):  Temp: 97.2 °F (36.2 °C) (05/04/25 1956)  Pulse: 81 (05/04/25 2201)  Resp: 14 (05/04/25 2201)  BP: 133/68 (05/04/25 2201)  SpO2: 96 % (05/04/25 2201)    Vital Signs Range (Last 24H):  Temp:  [97.2 °F (36.2 °C)]   Pulse:  [62-89]   Resp:  [14-36]   BP: (114-155)/(55-85)   SpO2:  [90 %-98 %]        Physical Exam  Vitals and nursing note reviewed.   Constitutional:       Comments: EEG cap in place   HENT:      Nose: Nose normal.      Mouth/Throat:      Mouth: Mucous membranes are moist.   Eyes:      General:         Right eye: No discharge.         Left eye: No discharge.      Conjunctiva/sclera: Conjunctivae normal.   Cardiovascular:      Rate and Rhythm: Normal rate.   Pulmonary:      Effort: Pulmonary effort is normal.   Skin:     General: Skin is warm and dry.   Neurological:      Mental Status: He is oriented to person, place, and time. He is lethargic.      Motor: Weakness present.          Neurological Exam:   LOC: drowsy  Attention Span: poor  Language: No aphasia  Articulation: Dysarthria  Orientation: Person, Place, Time       Laboratory:  CMP:   Recent Labs   Lab 05/04/25  1608   CALCIUM 9.2   ALBUMIN  4.2   PROT 6.7      K 4.0   CO2 27      BUN 25*   CREATININE 0.8   ALKPHOS 46*   ALT 24   AST 22   BILITOT 0.5     CBC:   Recent Labs   Lab 05/04/25  1608   WBC 5.04   RBC 4.28*   HGB 13.5*   HCT 39.8*      MCV 93   MCH 31.5*   MCHC 33.9     Lipid Panel:   Recent Labs   Lab 05/04/25 2024   CHOL 173   LDLCALC 102.2   HDL 63   TRIG 39     Coagulation:   Recent Labs   Lab 05/04/25  1608   INR 1.0     Hgb A1C:   Recent Labs   Lab 05/04/25 2024   HGBA1C 5.5     TSH:   Recent Labs   Lab 05/04/25 2024   TSH 1.716       Diagnostic Results:  All images personally reviewed    Brain imaging:  MRI Brain pending    [facility] 5/4/2025 Impression: 3.3 x 2.4 cm intracranial hemorrhage within the right frontoparietal region with mild vasogenic edema.  There is no midline shift      Vessel Imaging:  CTA Head and Neck 5/4/2025 Impression: 1. Acute right frontal intraparenchymal hematoma with mild adjacent edema is similar to the recent prior study.  Follow-up recommended.  2. No high-grade stenosis or major vessel occlusion.    Cardiac Evaluation:   TTE pending      Gwen Guerra [facility] Stroke [place]  Department of Vascular Neurology   [facility] - Neuro Critical Care     This medical record was prepared using voice recognition software and may contain phonetic and/or or grammatical errors.  Garbled syntax, mangled pronounciations, and other bizarre constructions may be attributed to that system.         [1]   Social History  Tobacco Use    Smoking status: Never    Smokeless tobacco: Never   Substance Use Topics    Alcohol use: Yes     Alcohol/week: 6.0 standard drinks of alcohol     Types: 6 Cans of beer per week     Comment: weekly or less    Drug use: No

## 2025-05-05 NOTE — PLAN OF CARE
PT Eval complete, appropriate goals created    Problem: Physical Therapy  Goal: Physical Therapy Goal  Description: Goals to be completed by: 5/16/25    Pt will perform sup<>sit transfers w/ moderate assistance  Pt will have sufficient dynamic balance to sit EOB while performing ADLs/therex w/ minimum assistance  Pt will be able to stand up from EOB w/ moderate assistance using LRAD  Pt will transfer from bed to chair w/ moderate assistance using LRAD  Pt will be independent w/ HEP therex on BLE w/ good form and ROM   Outcome: Progressing

## 2025-05-05 NOTE — PROGRESS NOTES
[facility] - Neuro Critical Care  Vascular Neurology  Comprehensive Stroke Center  Progress Note    Assessment/Plan:     * Nontraumatic subcortical hemorrhage of right cerebral hemisphere  Colt Rose is a 69 y.o. male with a significant medical history of HLD, cervical and lumbar DDD, pre-diabetes who presents to the hospital as a transfer from [facility] for evaluation of R ICH. He acutely developed LSW and R gaze around 4 PM today (5/5/2025). CTH was obtained and showed a 3.3 x 2.4 cm R frontoparietal ICH w/mild vasogenic edema. The patient was given DDVAP as he takes ASA daily and transferred to [facility] for higher level of care, further evaluation.    On this exam the patient is lethargic and in need of intermittent stimulation to arouse to participate with exam. He opens his eyes to touch and voice and is noted to be doing a repetitive rubbing/picking motion to his chest with his RUE. Dysconjugate gaze. Withdraws from pain in the left sided extremities. NIHSS 14. Admitted to [facility].     - EEG with abnormal brain activity --> focal slowing over the right anterior quadrant consistent with underlying subcortical dysfunction   - CTH and MRI w/o contrast this am with increased size of hemorrhage and midline shift  - Recommend Repeat CTH in afternoon to assess for increasing hemorrhagic size  - Recommend MRI Brain w/ Contrast to further assess for underlying etiology of ICH  - Keep -150      Antithrombotics for secondary stroke prevention: Antiplatelets: None: Intracerebral Hemorrhage    Statins for secondary stroke prevention and hyperlipidemia, if present:   Statins: Not indicated in acute ICH however, LDL is 102.2. The patient is currently prescribed atorvastatin 20 mg daily, may benefit from increasing to 40 mg daily.     Aggressive risk factor modification: HLD, sleep apnea     Rehab efforts: The patient has been evaluated by a stroke team provider and the therapy needs have been fully  considered based off the presenting complaints and exam findings. The following therapy evaluations are needed: PT evaluate and treat, OT evaluate and treat, SLP evaluate and treat, PM&R evaluate for appropriate placement    Diagnostics ordered/pending: MRI head without contrast to assess brain parenchyma, TTE to assess cardiac function/status , Other: CTH prn    VTE prophylaxis: Mechanical prophylaxis: Place SCDs  None: Reason for No Pharmacological VTE Prophylaxis: History of systemic or intracranial bleeding    BP parameters: ICH: SBP Goal Range 130-150        Vasogenic brain edema  Mild vasogenic edema is noted on imaging in the territory of the R middle cerebral artery with associated mass effect. Initial CTH with no MLS. The patient is admitted to [facility] for close monitoring, hourly neuro checks.     -Neurosurgery recommending against intervention at this time, will contact if clinical change  - Obtain MRI Brain with Contrast -- etiology of ICH unclear. He was not HTN on presentation nor is area of bleed c/w HTN stroke; No aneurysm or AVM noted on CTA head and neck.     Dysarthria  Acute left-sided weakness   -Due to ICH. Therapy evals pending.    Typical atrial flutter  -Stroke risk factor.  Sinus rhythm currently on tele and EKG.  -Not on AC  -TTE pending  -Continue tele    Dyslipidemia  -Vascular disease risk factor.  -Patient is prescribed Atorvastatin 20 mg daily. LDL is 102.2. Consider increasing atorvastatin to 40 mg daily.    Recent Labs     05/04/25 2024   CHOL 173   HDL 63   CHOLHDL 36.4   NONHDLCHOL 110   TOTALCHOLEST 2.7   TRIG 39   LDLCALC 102.2              No notes on file    STROKE DOCUMENTATION        NIH Scale:   1a. Level of Consciousness: 1-->Not alert, but arousable by minor stimulation to obey, answer, or respond  1b. LOC Questions: 0-->Answers both questions correctly  1c. LOC Commands: 0-->Performs both tasks correctly  2. Best Gaze: 1-->Partial gaze palsy, gaze is abnormal in one or both  eyes, but forced deviation or total gaze paresis is not present  3. Visual: 1-->Partial hemianopia  4. Facial Palsy: 2-->Partial paralysis (total or near-total paralysis of lower face)  5a. Motor Arm, Left: 4-->No movement  5b. Motor Arm, Right: 0-->No drift, limb holds 90 (or 45) degrees for full 10 secs  6a. Motor Leg, Left: 4-->No movement  6b. Motor Leg, Right: 3-->No effort against gravity, leg falls to bed immediately  7. Limb Ataxia: 0-->Absent  8. Sensory: 1-->Mild-to-moderate sensory loss, patient feels pinprick is less sharp or is dull on the affected side, or there is a loss of superficial pain with pinprick, but patient is aware of being touched  9. Best Language: 2-->Severe aphasia, all communication is through fragmentary expression, great need for inference, questioning, and guessing by the listener. Range of information that can be exchanged is limited, listener carries burden of. . . (see row details)  10. Dysarthria: 1-->Mild-to-moderate dysarthria, patient slurs at least some words and, at worst, can be understood with some difficulty  11. Extinction and Inattention (formerly Neglect): 0-->No abnormality  Total (NIH Stroke Scale): 20       Modified [place] Score: 1  [place] Coma Scale:    ABCD2 Score:    [id number] Score:   HAS -BLED Score:   ICH Score:1  Hunt & Hull Classification:        Neurologic Chief Complaint: ICH    Subjective:     Interval History: Patient is seen for follow-up neurological assessment and treatment recommendations: CTH and MRI w/o contrast this morning w/ increasing hemorrhagic size with increased midline shift. Eyelid apraxia evident on exam. Pt alert though non-verbal, responding to commands. Total LSW, w/d to pain.     HPI, Past Medical, Family, and Social History remains the same as documented in the initial encounter.     Review of Systems   Constitutional:  Negative for fever.   Eyes:  Negative for discharge.   Respiratory:  Negative for apnea, cough and wheezing.     Cardiovascular:  Negative for leg swelling.   Gastrointestinal:  Negative for abdominal distention.   Neurological:  Positive for facial asymmetry, speech difficulty, weakness and numbness.   Psychiatric/Behavioral:  Negative for agitation, behavioral problems and decreased concentration.      Scheduled Meds:   atorvastatin  20 mg Oral QHS    senna-docusate  1 tablet Oral BID     Continuous Infusions:   0.9% NaCl   Intravenous Continuous 50 mL/hr at 05/05/25 1401 Rate Verify at 05/05/25 1401     PRN Meds:  Current Facility-Administered Medications:     bisacodyL, 10 mg, Rectal, Daily PRN    labetalol, 10 mg, Intravenous, Q4H PRN    magnesium oxide, 800 mg, Oral, PRN    magnesium oxide, 800 mg, Oral, PRN    ondansetron, 4 mg, Intravenous, Q8H PRN    potassium bicarbonate, 35 mEq, Oral, PRN    potassium bicarbonate, 50 mEq, Oral, PRN    potassium bicarbonate, 60 mEq, Oral, PRN    potassium, sodium phosphates, 2 packet, Oral, PRN    potassium, sodium phosphates, 2 packet, Oral, PRN    potassium, sodium phosphates, 2 packet, Oral, PRN    sodium chloride 0.9%, 10 mL, Intravenous, PRN    sodium chloride 3% HYPERTONIC, 250 mL, Intravenous, Q6H PRN    Objective:     Vital Signs (Most Recent):  Temp: 98.6 °F (37 °C) (05/05/25 1101)  Pulse: 70 (05/05/25 1401)  Resp: 18 (05/05/25 1401)  BP: (!) 144/71 (05/05/25 1401)  SpO2: 97 % (05/05/25 1401)  BP Location: Right arm    Vital Signs Range (Last 24H):  Temp:  [97.2 °F (36.2 °C)-98.8 °F (37.1 °C)]   Pulse:  [62-89]   Resp:  [14-36]   BP: (114-155)/(55-92)   SpO2:  [90 %-100 %]   BP Location: Right arm       Physical Exam  Vitals and nursing note reviewed.   HENT:      Head: Normocephalic and atraumatic.   Cardiovascular:      Rate and Rhythm: Normal rate.      Pulses: Normal pulses.   Pulmonary:      Effort: Pulmonary effort is normal. No respiratory distress.      Breath sounds: No wheezing.   Abdominal:      General: There is no distension.      Palpations: Abdomen is soft.    Musculoskeletal:         General: No swelling.      Right lower leg: No edema.      Left lower leg: No edema.   Skin:     General: Skin is warm and dry.      Capillary Refill: Capillary refill takes 2 to 3 seconds.   Neurological:      Mental Status: He is alert.      Comments: E3 V5 M6  Mild left facial weakness  Eyelid apraxia, right gaze preference  Left sided weakness/plegia, slightly increased tone  Left side tito-neglect  RUE/RLE 3/5 , follows commands                  Neurological Exam:   LOC: alert  Attention Span: Good   Language: non-verbal at time of exam  Orientation: Person, Place, Time   Visual Fields: Full and Visual neglect  EOM (CN III, IV, VI): Gaze preference  right  Facial Movement (CN VII): Lower facial weakness on the Left  Motor: Arm left  Plegia 0/5  Leg left  Plegia 0/5  Arm right  Normal 5/5  Leg right Paresis: 3/5  Sensation: decreased on L    Laboratory:  All labs reviewd    Diagnostic Results   All imaging reviewed    Ruddy Medina MD  Comprehensive Stroke Center  Department of Vascular Neurology   [facility] - Neuro Critical Care

## 2025-05-05 NOTE — HPI
70 yo M, PMH SVT/ablation, HLD, presents as transfer for Right lobar hemorhage, He originaly presented to [facility] ER with left-sided weakness and right-sided gaze preference that began abruptly this afternoon around 4:00 p.m.. CTH with right parietal IPH, CTA without AVM/abnormalities. ASA hx, Given ddAVP for reversal. Transferred to [facility] for management and eval.

## 2025-05-05 NOTE — ASSESSMENT & PLAN NOTE
SLP consulted   - Keep NPO   - Evaluate the need for NG tube tomorrow   - Continue with NS 50cc/hr

## 2025-05-05 NOTE — PROCEDURES
Procedures    ICU EEG/VIDEO MONITORING REPORT    DATE OF SERVICE: 5/4/25  EEG NUMBER:   REQUESTED BY: Jayro  LOCATION OF SERVICE: [facility] [place]     METHODOLOGY   Electroencephalographic (EEG) recording is with electrodes placed according to the International 10-20 placement system.  Thirty two (32) channels of digital signal are simultaneously recorded from the scalp and may include EKG, EMG, and/or eye monitors.   Recording band pass was 0.1 to 512 hz.  Digital video recording of the patient is simultaneously recorded with the EEG.  The nursing staff report clinical symptoms and may press an event button when the patient has symptoms of clinical interest to the treating physicians.  EEG and video recording is stored and archived in digital format.  The entire recording is visually reviewed and the times identified by computer analysis as being spikes or seizures are reviewed again.  Activation procedures which include photic stimulation, hyperventilation and instructing patients to perform simple task are done in selected patients.   Compresses spectral analysis (CSA) is also performed on the activity recorded from each individual channel.  This is displayed as a power display of frequencies from 0 to 30 Hz over time.   The CSA analysis is done and displayed continuously.  This is reviewed for asymmetries in power between homologous areas of the scalp and for presence of changes in power which canbe seen when seizures occur.  Sections of suspected abnormalities on the CSA is then compared with the original EEG recording.     [employer] software was also utilized in the review of this study.  This software suite analyzes the EEG recording in multiple domains.  Coherence and rhythmicity is computed to identify EEG sections which may contain organized seizures.  Each channel undergoes analysis to detect presence of spike and sharp waves which have special and morphological characteristic of epileptic activity.  The routine EEG  recording is converted from spacial into frequency domain.  This is then displayed comparing homologous areas to identify areas of significant asymmetry.  Algorithm to identify non-cortically generated artifact is used to separate eye movement, EMG and other artifact from the EEG.      Recording Times  Start on 5/4/25 at 21:43  Stop on 5/4/25 at 21:48  Start on 5/4/25 at 20:20  Stop on 5/4/25 at 21:41  A total of 1 hours and 25 minutes of EEG was recorded.    EEG FINDINGS  The record is limited by significant electrode cap artifact. Study shows a poor organization at rest with no discernible posterior dominant rhythm with poor reactivity. There is mild bilateral beta activity. There is continuous diffuse delta and theta range background slowing with further delta range slowing over the right anterior quadrant.    Drowsiness is characterized by attenuation of the background, vertex waves, and bilateral theta slowing. Stage II sleep is characterized by slowing, vertex waves, and symmetric sleep spindles.     Provocative maneuvers including hyperventilation and photic stimulation were not performed.     EKG recording shows a sinus rhythm.    There is no push button or clinical event.    IMPRESSION:  Abnormal study due to moderate to severe  diffuse background slowing consistent with diffuse cerebral dysfunction and encephalopathy which may be on the basis of toxic, metabolic, or primary neuronal disorder. There is further focal slowing over the right anterior quadrant consistent with underlying subcortical dysfunction such as may be seen in the setting of structural defects such as mass or infarct.

## 2025-05-05 NOTE — CONSULTS
Inpatient consult to Physical Medicine Rehab  Consult performed by: Patricia Nicholson NP  Consult ordered by: Dimas Siegel NP  Reason for consult: Rehab      Consult received.     IGOR Roberto, FNP-C  Physical Medicine & Rehabilitation   05/05/2025

## 2025-05-05 NOTE — ASSESSMENT & PLAN NOTE
- Left hand twitching on arrival, did appear rhymic. Resolved at this time.  - EEG without evidence of seizure.  - Holding AEDs at this time.

## 2025-05-05 NOTE — PLAN OF CARE
Recommend that pt be npo with alternate means of nutrition  Problem: SLP  Goal: SLP Goal  Description: Goals due 5/12  1.  Pt. Will participate in ongoing assessment of swallow at bedside  2.  Pt. Will participate in speech, language and cognitive assessment   Outcome: Progressing

## 2025-05-05 NOTE — EICU
Virtual ICU Quality Rounds    Admit Date: 5/4/2025  Hospital Day: 1    ICU Day: 18h    24H Vital Sign Range:  Temp:  [97.2 °F (36.2 °C)-98.8 °F (37.1 °C)]   Pulse:  [62-89]   Resp:  [14-36]   BP: (114-155)/(55-92)   SpO2:  [90 %-100 %]     VICU Surveillance Screening                    LDA reconciliation : Yes

## 2025-05-05 NOTE — HPI
Colt Rose is a 69 y.o. male with a significant medical history of HLD, cervical and lumbar DDD, pre-diabetes who presents to the hospital as a transfer from [facility] for evaluation of R ICH. HPI gathered from review of the patient's medical record, family at the bedside. Due to the patient being very drowsy, not fully aware of preceding events. The patient was in his usual state of health and with family when he acutely developed LSW and R gaze preference around 4 pm today (5/4/2025). He was brought to the [facility] ED where a CTH was obtained and showed a 3.3 x 2.4 cm R frontoparietal ICH w/mild vasogenic edema. The patient was transferred to [facility] for higher level of care, further evaluation.

## 2025-05-06 LAB
ABSOLUTE EOSINOPHIL (OHS): 0 K/UL
ABSOLUTE MONOCYTE (OHS): 0.78 K/UL (ref 0.3–1)
ABSOLUTE NEUTROPHIL COUNT (OHS): 7.98 K/UL (ref 1.8–7.7)
ALBUMIN SERPL BCP-MCNC: 3.5 G/DL (ref 3.5–5.2)
ALP SERPL-CCNC: 55 UNIT/L (ref 40–150)
ALT SERPL W/O P-5'-P-CCNC: 20 UNIT/L (ref 10–44)
ANION GAP (OHS): 10 MMOL/L (ref 8–16)
AST SERPL-CCNC: 20 UNIT/L (ref 11–45)
BASOPHILS # BLD AUTO: 0.02 K/UL
BASOPHILS NFR BLD AUTO: 0.2 %
BILIRUB SERPL-MCNC: 0.5 MG/DL (ref 0.1–1)
BUN SERPL-MCNC: 20 MG/DL (ref 8–23)
CALCIUM SERPL-MCNC: 8.5 MG/DL (ref 8.7–10.5)
CHLORIDE SERPL-SCNC: 111 MMOL/L (ref 95–110)
CO2 SERPL-SCNC: 21 MMOL/L (ref 23–29)
CREAT SERPL-MCNC: 0.7 MG/DL (ref 0.5–1.4)
CREAT SERPL-MCNC: 1 MG/DL (ref 0.5–1.4)
ERYTHROCYTE [DISTWIDTH] IN BLOOD BY AUTOMATED COUNT: 13.6 % (ref 11.5–14.5)
GFR SERPLBLD CREATININE-BSD FMLA CKD-EPI: >60 ML/MIN/1.73/M2
GLUCOSE SERPL-MCNC: 119 MG/DL (ref 70–110)
HCT VFR BLD AUTO: 40.3 % (ref 40–54)
HGB BLD-MCNC: 13 GM/DL (ref 14–18)
IMM GRANULOCYTES # BLD AUTO: 0.03 K/UL (ref 0–0.04)
IMM GRANULOCYTES NFR BLD AUTO: 0.3 % (ref 0–0.5)
LYMPHOCYTES # BLD AUTO: 1.21 K/UL (ref 1–4.8)
MAGNESIUM SERPL-MCNC: 2 MG/DL (ref 1.6–2.6)
MCH RBC QN AUTO: 30.6 PG (ref 27–31)
MCHC RBC AUTO-ENTMCNC: 32.3 G/DL (ref 32–36)
MCV RBC AUTO: 95 FL (ref 82–98)
NUCLEATED RBC (/100WBC) (OHS): 0 /100 WBC
OHS QRS DURATION: 94 MS
OHS QTC CALCULATION: 427 MS
PHOSPHATE SERPL-MCNC: 3 MG/DL (ref 2.7–4.5)
PLATELET # BLD AUTO: 208 K/UL (ref 150–450)
PMV BLD AUTO: 10.8 FL (ref 9.2–12.9)
POTASSIUM SERPL-SCNC: 4.1 MMOL/L (ref 3.5–5.1)
PROT SERPL-MCNC: 6.6 GM/DL (ref 6–8.4)
RBC # BLD AUTO: 4.25 M/UL (ref 4.6–6.2)
RELATIVE EOSINOPHIL (OHS): 0 %
RELATIVE LYMPHOCYTE (OHS): 12.1 % (ref 18–48)
RELATIVE MONOCYTE (OHS): 7.8 % (ref 4–15)
RELATIVE NEUTROPHIL (OHS): 79.6 % (ref 38–73)
SAMPLE: NORMAL
SODIUM SERPL-SCNC: 139 MMOL/L (ref 136–145)
SODIUM SERPL-SCNC: 139 MMOL/L (ref 136–145)
SODIUM SERPL-SCNC: 141 MMOL/L (ref 136–145)
SODIUM SERPL-SCNC: 142 MMOL/L (ref 136–145)
WBC # BLD AUTO: 10.02 K/UL (ref 3.9–12.7)

## 2025-05-06 PROCEDURE — 99900035 HC TECH TIME PER 15 MIN (STAT)

## 2025-05-06 PROCEDURE — 84100 ASSAY OF PHOSPHORUS: CPT | Performed by: NURSE PRACTITIONER

## 2025-05-06 PROCEDURE — 25000003 PHARM REV CODE 250: Performed by: NURSE PRACTITIONER

## 2025-05-06 PROCEDURE — 63600175 PHARM REV CODE 636 W HCPCS: Performed by: NURSE PRACTITIONER

## 2025-05-06 PROCEDURE — 63600175 PHARM REV CODE 636 W HCPCS: Performed by: PSYCHIATRY & NEUROLOGY

## 2025-05-06 PROCEDURE — 20000000 HC ICU ROOM

## 2025-05-06 PROCEDURE — 25000003 PHARM REV CODE 250

## 2025-05-06 PROCEDURE — 83735 ASSAY OF MAGNESIUM: CPT | Performed by: NURSE PRACTITIONER

## 2025-05-06 PROCEDURE — 82040 ASSAY OF SERUM ALBUMIN: CPT | Performed by: NURSE PRACTITIONER

## 2025-05-06 PROCEDURE — 92523 SPEECH SOUND LANG COMPREHEN: CPT

## 2025-05-06 PROCEDURE — 99232 SBSQ HOSP IP/OBS MODERATE 35: CPT | Mod: GC,,, | Performed by: NEUROLOGICAL SURGERY

## 2025-05-06 PROCEDURE — 94660 CPAP INITIATION&MGMT: CPT

## 2025-05-06 PROCEDURE — 85025 COMPLETE CBC W/AUTO DIFF WBC: CPT | Performed by: NURSE PRACTITIONER

## 2025-05-06 PROCEDURE — 99291 CRITICAL CARE FIRST HOUR: CPT | Mod: GC,,, | Performed by: STUDENT IN AN ORGANIZED HEALTH CARE EDUCATION/TRAINING PROGRAM

## 2025-05-06 PROCEDURE — 25000003 PHARM REV CODE 250: Performed by: PSYCHIATRY & NEUROLOGY

## 2025-05-06 PROCEDURE — 97110 THERAPEUTIC EXERCISES: CPT

## 2025-05-06 PROCEDURE — 92526 ORAL FUNCTION THERAPY: CPT

## 2025-05-06 PROCEDURE — 94799 UNLISTED PULMONARY SVC/PX: CPT

## 2025-05-06 PROCEDURE — 27100171 HC OXYGEN HIGH FLOW UP TO 24 HOURS

## 2025-05-06 PROCEDURE — 63600175 PHARM REV CODE 636 W HCPCS

## 2025-05-06 PROCEDURE — 84295 ASSAY OF SERUM SODIUM: CPT | Performed by: PSYCHIATRY & NEUROLOGY

## 2025-05-06 PROCEDURE — 51701 INSERT BLADDER CATHETER: CPT

## 2025-05-06 PROCEDURE — 99291 CRITICAL CARE FIRST HOUR: CPT | Mod: GC,,, | Performed by: PSYCHIATRY & NEUROLOGY

## 2025-05-06 PROCEDURE — 27000190 HC CPAP FULL FACE MASK W/VALVE

## 2025-05-06 PROCEDURE — 97530 THERAPEUTIC ACTIVITIES: CPT | Mod: CQ

## 2025-05-06 PROCEDURE — 97112 NEUROMUSCULAR REEDUCATION: CPT | Mod: CQ

## 2025-05-06 PROCEDURE — 94761 N-INVAS EAR/PLS OXIMETRY MLT: CPT

## 2025-05-06 RX ORDER — AMLODIPINE BESYLATE 5 MG/1
5 TABLET ORAL DAILY
Status: DISCONTINUED | OUTPATIENT
Start: 2025-05-06 | End: 2025-05-06

## 2025-05-06 RX ORDER — HYDRALAZINE HYDROCHLORIDE 20 MG/ML
10 INJECTION INTRAMUSCULAR; INTRAVENOUS EVERY 6 HOURS PRN
Status: DISCONTINUED | OUTPATIENT
Start: 2025-05-06 | End: 2025-05-07

## 2025-05-06 RX ORDER — LANOLIN ALCOHOL/MO/W.PET/CERES
800 CREAM (GRAM) TOPICAL
Status: DISCONTINUED | OUTPATIENT
Start: 2025-05-06 | End: 2025-05-08

## 2025-05-06 RX ORDER — HYDRALAZINE HYDROCHLORIDE 25 MG/1
25 TABLET, FILM COATED ORAL ONCE
Status: COMPLETED | OUTPATIENT
Start: 2025-05-06 | End: 2025-05-06

## 2025-05-06 RX ORDER — SODIUM,POTASSIUM PHOSPHATES 280-250MG
2 POWDER IN PACKET (EA) ORAL
Status: DISCONTINUED | OUTPATIENT
Start: 2025-05-06 | End: 2025-05-08

## 2025-05-06 RX ORDER — ACETAMINOPHEN 650 MG/20.3ML
650 LIQUID ORAL EVERY 6 HOURS PRN
Status: DISCONTINUED | OUTPATIENT
Start: 2025-05-06 | End: 2025-05-08

## 2025-05-06 RX ORDER — ATORVASTATIN CALCIUM 20 MG/1
20 TABLET, FILM COATED ORAL NIGHTLY
Status: DISCONTINUED | OUTPATIENT
Start: 2025-05-06 | End: 2025-05-08

## 2025-05-06 RX ORDER — FAMOTIDINE 10 MG/ML
20 INJECTION, SOLUTION INTRAVENOUS ONCE
Status: COMPLETED | OUTPATIENT
Start: 2025-05-06 | End: 2025-05-06

## 2025-05-06 RX ORDER — HYDROXYZINE HYDROCHLORIDE 25 MG/1
25 TABLET, FILM COATED ORAL ONCE
Status: COMPLETED | OUTPATIENT
Start: 2025-05-06 | End: 2025-05-06

## 2025-05-06 RX ORDER — LOSARTAN POTASSIUM 25 MG/1
25 TABLET ORAL DAILY
Status: DISCONTINUED | OUTPATIENT
Start: 2025-05-06 | End: 2025-05-07

## 2025-05-06 RX ORDER — HYDRALAZINE HYDROCHLORIDE 20 MG/ML
10 INJECTION INTRAMUSCULAR; INTRAVENOUS EVERY 6 HOURS PRN
Status: DISCONTINUED | OUTPATIENT
Start: 2025-05-06 | End: 2025-05-06

## 2025-05-06 RX ORDER — AMOXICILLIN 250 MG
1 CAPSULE ORAL 2 TIMES DAILY
Status: DISCONTINUED | OUTPATIENT
Start: 2025-05-06 | End: 2025-05-08

## 2025-05-06 RX ORDER — HYDRALAZINE HYDROCHLORIDE 20 MG/ML
10 INJECTION INTRAMUSCULAR; INTRAVENOUS EVERY 8 HOURS PRN
Status: DISCONTINUED | OUTPATIENT
Start: 2025-05-06 | End: 2025-05-06

## 2025-05-06 RX ADMIN — LABETALOL HYDROCHLORIDE 10 MG: 5 INJECTION, SOLUTION INTRAVENOUS at 06:05

## 2025-05-06 RX ADMIN — SODIUM CHLORIDE 250 ML: 3 INJECTION, SOLUTION INTRAVENOUS at 02:05

## 2025-05-06 RX ADMIN — LABETALOL HYDROCHLORIDE 10 MG: 5 INJECTION, SOLUTION INTRAVENOUS at 01:05

## 2025-05-06 RX ADMIN — AMLODIPINE BESYLATE 5 MG: 5 TABLET ORAL at 11:05

## 2025-05-06 RX ADMIN — SODIUM CHLORIDE 250 ML: 3 INJECTION, SOLUTION INTRAVENOUS at 09:05

## 2025-05-06 RX ADMIN — HYDRALAZINE HYDROCHLORIDE 25 MG: 25 TABLET ORAL at 02:05

## 2025-05-06 RX ADMIN — HYDRALAZINE HYDROCHLORIDE 10 MG: 20 INJECTION, SOLUTION INTRAMUSCULAR; INTRAVENOUS at 11:05

## 2025-05-06 RX ADMIN — HYDRALAZINE HYDROCHLORIDE 10 MG: 20 INJECTION, SOLUTION INTRAMUSCULAR; INTRAVENOUS at 06:05

## 2025-05-06 RX ADMIN — HYDROXYZINE HYDROCHLORIDE 25 MG: 25 TABLET, FILM COATED ORAL at 12:05

## 2025-05-06 RX ADMIN — FAMOTIDINE 20 MG: 10 INJECTION, SOLUTION INTRAVENOUS at 12:05

## 2025-05-06 RX ADMIN — SENNOSIDES AND DOCUSATE SODIUM 1 TABLET: 50; 8.6 TABLET ORAL at 09:05

## 2025-05-06 RX ADMIN — ACETAMINOPHEN 650 MG: 650 SOLUTION ORAL at 07:05

## 2025-05-06 RX ADMIN — HYPROMELLOSE 2910 1 DROP: 5 SOLUTION OPHTHALMIC at 09:05

## 2025-05-06 RX ADMIN — LOSARTAN POTASSIUM 25 MG: 25 TABLET, FILM COATED ORAL at 05:05

## 2025-05-06 RX ADMIN — SODIUM CHLORIDE 250 ML: 3 INJECTION, SOLUTION INTRAVENOUS at 10:05

## 2025-05-06 RX ADMIN — ATORVASTATIN CALCIUM 20 MG: 20 TABLET, FILM COATED ORAL at 09:05

## 2025-05-06 RX ADMIN — HYPROMELLOSE 2910 1 DROP: 5 SOLUTION OPHTHALMIC at 01:05

## 2025-05-06 NOTE — PLAN OF CARE
Recommendations  --When medically able, advance to Regular diet as tolerated and clinically indicated    If tube feed recommendations desired,   --Continuous TF recommendation: Isosource 1.5 @ 55 mL/hr to provide 1320 mL total volume, 1980 kcal, 232 g CHO, 90 g protein, 20 g fiber, 1008 mL free water, 132% DRI         --160 mL flush q4 hrs to provide additional 960 mL free water (Total 1968 mL)    --Nursing: please continue to document rate and formula on flowsheet    --RD to monitor weight, rate, tolerance    Goals: Provide nutrition within 48 hours  Nutrition Goal Status: new  Communication of RD Recs:  (POC)

## 2025-05-06 NOTE — PT/OT/SLP EVAL
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 male with an SLP diagnosis of Dysphagia, Dysarthria, Cognitive-Linguistic Impairment, and Visio-Spatial Impairment.      History:     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    Cervical disc displacement     Degeneration of lumbar intervertebral disc     GERD (gastroesophageal reflux disease)     gastric polyps    Hip pain 02/22/2016    Hyperlipidemia     Shingles 11/2013    Sleep apnea        Past Surgical History:   Procedure Laterality Date    ABLATION, SVT, ACCESSORY PATHWAY N/A 5/25/2023    Procedure: Ablation, SVT, Accessory Pathway;  Surgeon: Goyo Sky MD;  Location: [facility];  Service: Cardiology;  Laterality: N/A;  PAC, RFA, CARTO, MAC, GP, 3 PREP    CERVICAL FUSION      bone graft    COLONOSCOPY N/A 7/29/2021    Procedure: COLONOSCOPY;  Surgeon: Tyler Story MD;  Location: [facility] ENDO;  Service: Endoscopy;  Laterality: N/A;    COLONOSCOPY N/A 10/30/2024    Procedure: COLONOSCOPY;  Surgeon: Tyler Story MD;  Location: [facility] ENDO;  Service: Endoscopy;  Laterality: N/A;  m/ppm    EPIDURAL STEROID INJECTION INTO LUMBAR SPINE N/A 12/5/2019    Procedure: Injection-steroid-epidural-lumbar;  Surgeon: Adarsh Kraft MD;  Location: [facility] OR;  Service: Pain Management;  Laterality: N/A;  L5-S1     "ESOPHAGOGASTRODUODENOSCOPY N/A 10/27/2020    Procedure: EGD (ESOPHAGOGASTRODUODENOSCOPY);  Surgeon: Tyler Story MD;  Location: [facility];  Service: Endoscopy;  Laterality: N/A;    ESOPHAGOGASTRODUODENOSCOPY N/A 2/14/2025    Procedure: EGD (ESOPHAGOGASTRODUODENOSCOPY);  Surgeon: Tyler Story MD;  Location: [facility];  Service: Endoscopy;  Laterality: N/A;    INJECTION OF ANESTHETIC AGENT AROUND MEDIAL BRANCH NERVES INNERVATING LUMBAR FACET JOINT Bilateral 11/18/2020    Procedure: BLOCK, NERVE, LUMBAR, MEDIAL BRANCH Bilateral L3,4,5;  Surgeon: Adarsh Kraft MD;  Location: [facility];  Service: Pain Management;  Laterality: Bilateral;    INJECTION OF ANESTHETIC AGENT AROUND MEDIAL BRANCH NERVES INNERVATING LUMBAR FACET JOINT Left 10/25/2023    Procedure: Block-nerve-medial branch-lumbar;  Surgeon: Adarsh Kraft MD;  Location: [facility] OR;  Service: Anesthesiology;  Laterality: Left;  l4-5, l5-s1 MBB    INJECTION OF ANESTHETIC AGENT AROUND MEDIAL BRANCH NERVES INNERVATING LUMBAR FACET JOINT Bilateral 11/28/2023    Procedure: Block-nerve-medial branch-lumbar;  Surgeon: Adarsh Kraft MD;  Location: [facility] OR;  Service: Anesthesiology;  Laterality: Bilateral;  L4.5 and l5/s1 MBB #2    RADIOFREQUENCY ABLATION OF LUMBAR MEDIAL BRANCH NERVE AT SINGLE LEVEL Bilateral 1/29/2021    Procedure: Radiofrequency Ablation, Nerve, Spinal, Lumbar, Medial Branch, 1 Level;  Surgeon: Adarsh Kraft MD;  Location: [facility] OR;  Service: Pain Management;  Laterality: Bilateral;  L3,4,5    TONSILLECTOMY, ADENOIDECTOMY         Social History: Patient lives with his spouse, IND pta, retired, very active per wife.    Prior Intubation HX:  none this admission     Modified Barium Swallow: none reported    Chest X-Rays: 5/4: "No acute radiographic findings in the chest. "    Prior diet: regular/thin.      Subjective     "I'd walk to the bathroom"     Nurse cleared for evaluation.  Wife reports quiet night, persistent lethargy, slightly worse than " yesterday.      Pain/Comfort:  Pain Rating 1: 0/10  Pain Rating Post-Intervention 1: 0/10    Respiratory Status: Room air    Objective:     Cognitive Status:    Orientation Oriented x4  Memory Immediate Recall 100% and Delayed2/3 unassociated words recalled after delay   Problem Solving x2/2, Categories x2/2, and Solutions x2/3  Safety awareness decreased   Impaired sustained attention prolonged response time with multiple repetitions with all tasks      Receptive Language:   Comprehension:   WFL    Pragmatics:    Eyes closed t/o, minimal interaction in simple conversation     Expressive Language:  Verbal:    Pt with minimal spontaneous language though verbal responses to therapy prompts were appropriate at sentence level       Motor Speech:  Dysarthria      Voice:   Quality Rough  Intensity low     Visual-Spatial:  Cont to assess, eyes close t/o session, decreased L attn    Reading:   tba     Written Expression:   tba    Treatment: Pt seen bedside with wife present.  He roused with min cues though eyes remained closed t/o session.  Spontaneous throat clear was weak. Tongue pumping observed following prompts for volitional swallow though swallow not elicited.  Pt presented with ice chip x2 and 1/2 tsps of puree x2.  Decreased rotational chew with open mouth, weak mastication observed.  Intermittent oral hold noted with verbal cues required to attend to swallowing task.  Swallow response was delayed with overt coughing elicited following 2nd puree trial.  Several minutes provided for pt to recover.  Education provided re: cont npo, aspiration risk, s/s aspiration, and POC.  Spouse verbalized understanding.       Goals:   Multidisciplinary Problems       SLP Goals          Problem: SLP    Goal Priority Disciplines Outcome   SLP Goal     SLP Progressing   Description: Goals due 5/12  1.  Pt. Will participate in ongoing assessment of swallow at bedside  2.  Pt. Will participate in speech, language and cognitive  assessment                        Plan:   Patient to be seen:  4 x/week   Plan of Care expires:  06/02/25  Plan of Care reviewed with:  patient, spouse   SLP Follow-Up:  Yes       Discharge recommendations:  Therapy Intensity Recommendations at Discharge: High Intensity Therapy   Barriers to Discharge:  Level of Skilled Assistance Needed      Time Tracking:     SLP Treatment Date:   05/06/25  Speech Start Time:  0633  Speech Stop Time:  0653     Speech Total Time (min):  20 min    Billable Minutes: Eval 8  and Treatment Swallowing Dysfunction 12    05/06/2025

## 2025-05-06 NOTE — CONSULTS
"[facility]  Adult Nutrition  Consult Note    SUMMARY     Recommendations  --When medically able, advance to Regular diet as tolerated and clinically indicated    If tube feed recommendations desired,   --Continuous TF recommendation: Isosource 1.5 @ 55 mL/hr to provide 1320 mL total volume, 1980 kcal, 232 g CHO, 90 g protein, 20 g fiber, 1008 mL free water, 132% DRI         --160 mL flush q4 hrs to provide additional 960 mL free water (Total 1968 mL)    --Nursing: please continue to document rate and formula on flowsheet    --RD to monitor weight, rate, tolerance    Goals: Provide nutrition within 48 hours  Nutrition Goal Status: new  Communication of RD Recs:  (POC)    Nutrition Discharge Planning     Nutrition Discharge Planning: Too early to determine, pending clinical course    Reason for Assessment    Reason For Assessment: consult (Assess dietary needs)  Diagnosis: hemorrhage (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 Pt currently NPO. Pt to be evaluated for NG tube tomorrow. Weight stable - no concerns for malnutrition at this time.     Nutrition Related Social Determinants of Health: SDOH: Adequate food in home environment     Food Insecurity: No Food Insecurity (5/5/2025)    Hunger Vital Sign     Worried About Running Out of Food in the Last Year: Never true     Ran Out of Food in the Last Year: Never true         Nutrition/Diet History    Spiritual, Cultural Beliefs, Buddhist Practices, Values that Affect Care: no  Food Allergies: NKFA  Factors Affecting Nutritional Intake: NPO    Anthropometrics    Height: 5' 10" (177.8 cm)  Height (inches): 70 in  Weight: 88.5 kg (195 lb 1.7 oz)  Weight (lb): 195.11 lb  Weight Method: Bed Scale  Ideal Body Weight (IBW), Male: 166 lb  % Ideal Body Weight, Male (lb): 117.54 %  BMI " (Calculated): 28  BMI Grade: 18.5-24.9 - normal       Lab/Procedures/Meds    Pertinent Labs Reviewed: reviewed - glucose 121    Pertinent Medications Reviewed: reviewed - atorvastatin, senna-docusate    Estimated/Assessed Needs    Weight Used For Calorie Calculations: 88.5 kg (195 lb 1.7 oz)  Energy Calorie Requirements (kcal): 2082 kcal/day (x 1.25 AF)  Energy Need Method: Williams-St Jeor  Protein Requirements: 106-177 g/day (1.2-2.0 g/kg)  Weight Used For Protein Calculations: 88.5 kg (195 lb 1.7 oz)     Estimated Fluid Requirement Method: RDA Method  RDA Method (mL): 2082         Nutrition Prescription Ordered    Current Diet Order: NPO    Evaluation of Received Nutrient/Fluid Intake    Energy Calories Required: not meeting needs  Protein Required: not meeting needs  Fluid Required:  (Per MD)  Comments: LBM 5/4  % Intake of Estimated Energy Needs: NPO   % Meal Intake: NPO    PES Statement  Nutrition PES Status: New  Nutrition PES Problem: Inadequate enteral nutrition infusion  Nutrition PES Etiology:  (Infusion volume not reached)  Nutrition PES Signs and Symptoms:  (Inadequate EN volume compared to estimated requirements)    Nutrition Risk    Level of Risk/Frequency of Follow-up: high       Monitor and Evaluation    Monitor and Evaluation: Energy intake, Food and beverage intake, Weight, Electrolyte and renal panel, Gastrointestinal profile, Glucose/endocrine profile, Inflammatory profile, Lipid profile, Nutrition focused physical findings, Skin, Food and nutrition knowledge, Diet order       Nutrition Follow-Up    RD Follow-up?: Yes

## 2025-05-06 NOTE — PROGRESS NOTES
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 male with a significant medical history of HLD, cervical and lumbar DDD, pre-diabetes who presents to the hospital as a transfer from [facility] for evaluation of R ICH. He acutely developed LSW and R gaze around 4 PM today (5/5/2025). CTH was obtained and showed a 3.3 x 2.4 cm R frontoparietal ICH w/mild vasogenic edema. The patient was given DDVAP as he takes ASA daily and transferred to [facility] for higher level of care, further evaluation.    On this exam the patient is lethargic and in need of intermittent stimulation to arouse to participate with exam. He opens his eyes to touch and voice and is noted to be doing a repetitive rubbing/picking motion to his chest with his RUE. Dysconjugate gaze. Withdraws from pain in the left sided extremities. NIHSS 14. Admitted to [facility].     EEG with abnormal brain activity --> focal slowing over the right anterior quadrant consistent with underlying subcortical dysfunction. CTH and MRI w/o contrast on 5/5 with increased size of hemorrhage and midline shift. NGT placed on 5/6 s/p SLP assessment failure.     - CTH this am w/ stable hemorrhagic size  - Continue Intermittent 3% bolus to maintain Na >140  - Plan to repeat CTH on Wednesday to assess size of bleed, NSGY following  - Recommend MRI Brain w/ Contrast to further assess for underlying etiology of ICH  - Keep -150      Antithrombotics for secondary stroke prevention: Antiplatelets: None: Intracerebral Hemorrhage    Statins for secondary stroke prevention and hyperlipidemia, if present:   Statins: Not indicated in acute ICH however, LDL is 102.2. The patient is currently prescribed atorvastatin 20 mg daily, may benefit from increasing to 40 mg daily.     Aggressive risk factor modification:  HLD, sleep apnea     Rehab efforts: The patient has been evaluated by a stroke team provider and the therapy needs have been fully considered based off the presenting complaints and exam findings. The following therapy evaluations are needed: PT evaluate and treat, OT evaluate and treat, SLP evaluate and treat, PM&R evaluate for appropriate placement    Diagnostics ordered/pending: MRI head without contrast to assess brain parenchyma, TTE to assess cardiac function/status , Other: CTH prn    VTE prophylaxis: Mechanical prophylaxis: Place SCDs  None: Reason for No Pharmacological VTE Prophylaxis: History of systemic or intracranial bleeding    BP parameters: ICH: SBP Goal Range 130-150        Vasogenic brain edema  Mild vasogenic edema is noted on imaging in the territory of the R middle cerebral artery with associated mass effect. Initial CTH with no MLS. At this time the etiology of the ICH is unclear. He was not hypertensive on presentation and remains without elevated BP. No aneurysm or AVM noted on CTA head and neck. MRI Brain pending. Neurosurgery following  - See plan for Nontraumatic subcortical hemorrhage of right cerebral hemisphere        Dysarthria  Acute left-sided weakness   -Due to ICH. Therapy evals pending.    Acute left-sided weakness  Therapy following    Typical atrial flutter  -Stroke risk factor.  Sinus rhythm currently on tele and EKG.  -Not on AC  -TTE pending  -Continue tele    Dyslipidemia  -Vascular disease risk factor.  -Patient is prescribed Atorvastatin 20 mg daily. LDL is 102.2. Consider increasing atorvastatin to 40 mg daily.    Recent Labs     05/04/25 2024   CHOL 173   HDL 63   CHOLHDL 36.4   NONHDLCHOL 110   TOTALCHOLEST 2.7   TRIG 39   LDLCALC 102.2              No notes on file    STROKE DOCUMENTATION        NIH Scale:  1a. Level of Consciousness: 1-->Not alert, but arousable by minor stimulation to obey, answer, or respond  1b. LOC Questions: 0-->Answers both questions  correctly  1c. LOC Commands: 0-->Performs both tasks correctly  2. Best Gaze: 1-->Partial gaze palsy, gaze is abnormal in one or both eyes, but forced deviation or total gaze paresis is not present  3. Visual: 1-->Partial hemianopia  4. Facial Palsy: 2-->Partial paralysis (total or near-total paralysis of lower face)  5a. Motor Arm, Left: 4-->No movement  5b. Motor Arm, Right: 0-->No drift, limb holds 90 (or 45) degrees for full 10 secs  6a. Motor Leg, Left: 4-->No movement  6b. Motor Leg, Right: 3-->No effort against gravity, leg falls to bed immediately  7. Limb Ataxia: 0-->Absent  8. Sensory: 2-->Severe to total sensory loss, patient is not aware of being touched in the face, arm, and leg  9. Best Language: 2-->Severe aphasia, all communication is through fragmentary expression, great need for inference, questioning, and guessing by the listener. Range of information that can be exchanged is limited, listener carries burden of. . . (see row details)  10. Dysarthria: 2-->Severe dysarthria, patients speech is so slurred as to be unintelligible in the absence of or out of proportion to any dysphasia, or is mute/anarthric  11. Extinction and Inattention (formerly Neglect): 1-->Visual, tactile, auditory, spatial, or personal inattention or extinction to bilateral simultaneous stimulation in one of the sensory modalities  Total (NIH Stroke Scale): 23       Modified Edwin Score: 1  Nader Coma Scale:    ABCD2 Score:    [id number] Score:   HAS -BLED Score:   ICH Score:1  Hunt & Hull Classification:        Neurologic Chief Complaint: ICH    Subjective:     Interval History: Patient is seen for follow-up neurological assessment and treatment recommendations: NGT placed this am s/p SLP assessment failure. CTH this am w/ stable hemorrhagic size. Intermittent 3% bolus to maintain Na >140. Alert and responsive to commands, though RLE more pronouncedly weak this and Eyelid apraxia still evident on exam this morning. Full  LSW, w/d to pain. Plan to repeat CTH on Wednesday, NSGY following.     HPI, Past Medical, Family, and Social History remains the same as documented in the initial encounter.     Review of Systems   Constitutional:  Negative for fever.   Eyes:  Negative for discharge.   Respiratory:  Negative for apnea, cough and wheezing.    Cardiovascular:  Negative for leg swelling.   Gastrointestinal:  Negative for abdominal distention.   Neurological:  Positive for facial asymmetry, speech difficulty, weakness and numbness.   Psychiatric/Behavioral:  Negative for agitation, behavioral problems and decreased concentration.      Scheduled Meds:   atorvastatin  20 mg Per NG tube QHS    senna-docusate  1 tablet Per NG tube BID     Continuous Infusions:   0.9% NaCl   Intravenous Continuous 50 mL/hr at 05/06/25 1101 Rate Verify at 05/06/25 1101     PRN Meds:  Current Facility-Administered Medications:     bisacodyL, 10 mg, Rectal, Daily PRN    hydrALAZINE, 10 mg, Intravenous, Q8H PRN    labetalol, 10 mg, Intravenous, Q4H PRN    magnesium oxide, 800 mg, Per NG tube, PRN    magnesium oxide, 800 mg, Per NG tube, PRN    ondansetron, 4 mg, Intravenous, Q8H PRN    potassium bicarbonate, 35 mEq, Per NG tube, PRN    potassium bicarbonate, 50 mEq, Per NG tube, PRN    potassium bicarbonate, 60 mEq, Per NG tube, PRN    potassium, sodium phosphates, 2 packet, Per NG tube, PRN    potassium, sodium phosphates, 2 packet, Per NG tube, PRN    potassium, sodium phosphates, 2 packet, Per NG tube, PRN    sodium chloride 0.9%, 10 mL, Intravenous, PRN    sodium chloride 3% HYPERTONIC, 250 mL, Intravenous, Q6H PRN    Objective:     Vital Signs (Most Recent):  Temp: 98.3 °F (36.8 °C) (05/06/25 1101)  Pulse: 65 (05/06/25 1101)  Resp: 17 (05/06/25 1101)  BP: (!) 163/77 (05/06/25 1114)  SpO2: 97 % (05/06/25 1101)  BP Location: Left arm    Vital Signs Range (Last 24H):  Temp:  [98.2 °F (36.8 °C)-99.1 °F (37.3 °C)]   Pulse:  [60-76]   Resp:  [15-33]   BP:  (122-163)/(62-77)   SpO2:  [95 %-100 %]   BP Location: Left arm       Physical Exam  Vitals and nursing note reviewed.   HENT:      Head: Normocephalic and atraumatic.   Cardiovascular:      Rate and Rhythm: Normal rate.      Pulses: Normal pulses.   Pulmonary:      Effort: Pulmonary effort is normal. No respiratory distress.      Breath sounds: No wheezing.   Abdominal:      General: There is no distension.      Palpations: Abdomen is soft.   Musculoskeletal:         General: No swelling.      Right lower leg: No edema.      Left lower leg: No edema.   Skin:     General: Skin is warm and dry.      Capillary Refill: Capillary refill takes 2 to 3 seconds.   Neurological:      Mental Status: He is alert.      Comments: E3 V5 M6  Mild left facial weakness  Eyelid apraxia, right gaze preference  Left sided weakness/plegia, slightly increased tone  Left side tito-neglect  RUE/RLE 3/5 , follows commands                  Neurological Exam:   LOC: alert  Attention Span: Good   Language: non-verbal at time of exam  Orientation: Person, Place, Time   Visual Fields: Full and Visual neglect  EOM (CN III, IV, VI): Gaze preference  right  Facial Movement (CN VII): Lower facial weakness on the Left  Motor: Arm left  Plegia 0/5  Leg left  Plegia 0/5  Arm right  Normal 5/5  Leg right Paresis: 3/5  Sensation: decreased on L    Laboratory:  All labs reviewd    Diagnostic Results   All imaging reviewed    Ruddy Medina MD  [facility]  Department of Vascular Neurology   [facility] - Neuro Critical Care

## 2025-05-06 NOTE — SUBJECTIVE & OBJECTIVE
Neurologic Chief Complaint: ICH    Subjective:     Interval History: Patient is seen for follow-up neurological assessment and treatment recommendations: NGT placed this am s/p SLP assessment failure. CTH this am w/ stable hemorrhagic size. Intermittent 3% bolus to maintain Na >140. Alert and responsive to commands, though RLE more pronouncedly weak this and Eyelid apraxia still evident on exam this morning. Full LSW, w/d to pain. Plan to repeat CTH on Wednesday, NSGY following.     HPI, Past Medical, Family, and Social History remains the same as documented in the initial encounter.     Review of Systems   Constitutional:  Negative for fever.   Eyes:  Negative for discharge.   Respiratory:  Negative for apnea, cough and wheezing.    Cardiovascular:  Negative for leg swelling.   Gastrointestinal:  Negative for abdominal distention.   Neurological:  Positive for facial asymmetry, speech difficulty, weakness and numbness.   Psychiatric/Behavioral:  Negative for agitation, behavioral problems and decreased concentration.      Scheduled Meds:   atorvastatin  20 mg Per NG tube QHS    senna-docusate  1 tablet Per NG tube BID     Continuous Infusions:   0.9% NaCl   Intravenous Continuous 50 mL/hr at 05/06/25 1101 Rate Verify at 05/06/25 1101     PRN Meds:  Current Facility-Administered Medications:     bisacodyL, 10 mg, Rectal, Daily PRN    hydrALAZINE, 10 mg, Intravenous, Q8H PRN    labetalol, 10 mg, Intravenous, Q4H PRN    magnesium oxide, 800 mg, Per NG tube, PRN    magnesium oxide, 800 mg, Per NG tube, PRN    ondansetron, 4 mg, Intravenous, Q8H PRN    potassium bicarbonate, 35 mEq, Per NG tube, PRN    potassium bicarbonate, 50 mEq, Per NG tube, PRN    potassium bicarbonate, 60 mEq, Per NG tube, PRN    potassium, sodium phosphates, 2 packet, Per NG tube, PRN    potassium, sodium phosphates, 2 packet, Per NG tube, PRN    potassium, sodium phosphates, 2 packet, Per NG tube, PRN    sodium chloride 0.9%, 10 mL, Intravenous,  PRN    sodium chloride 3% HYPERTONIC, 250 mL, Intravenous, Q6H PRN    Objective:     Vital Signs (Most Recent):  Temp: 98.3 °F (36.8 °C) (05/06/25 1101)  Pulse: 65 (05/06/25 1101)  Resp: 17 (05/06/25 1101)  BP: (!) 163/77 (05/06/25 1114)  SpO2: 97 % (05/06/25 1101)  BP Location: Left arm    Vital Signs Range (Last 24H):  Temp:  [98.2 °F (36.8 °C)-99.1 °F (37.3 °C)]   Pulse:  [60-76]   Resp:  [15-33]   BP: (122-163)/(62-77)   SpO2:  [95 %-100 %]   BP Location: Left arm       Physical Exam  Vitals and nursing note reviewed.   HENT:      Head: Normocephalic and atraumatic.   Cardiovascular:      Rate and Rhythm: Normal rate.      Pulses: Normal pulses.   Pulmonary:      Effort: Pulmonary effort is normal. No respiratory distress.      Breath sounds: No wheezing.   Abdominal:      General: There is no distension.      Palpations: Abdomen is soft.   Musculoskeletal:         General: No swelling.      Right lower leg: No edema.      Left lower leg: No edema.   Skin:     General: Skin is warm and dry.      Capillary Refill: Capillary refill takes 2 to 3 seconds.   Neurological:      Mental Status: He is alert.      Comments: E3 V5 M6  Mild left facial weakness  Eyelid apraxia, right gaze preference  Left sided weakness/plegia, slightly increased tone  Left side tito-neglect  RUE/RLE 3/5 , follows commands                  Neurological Exam:   LOC: alert  Attention Span: Good   Language: non-verbal at time of exam  Orientation: Person, Place, Time   Visual Fields: Full and Visual neglect  EOM (CN III, IV, VI): Gaze preference  right  Facial Movement (CN VII): Lower facial weakness on the Left  Motor: Arm left  Plegia 0/5  Leg left  Plegia 0/5  Arm right  Normal 5/5  Leg right Paresis: 3/5  Sensation: decreased on L    Laboratory:  All labs reviewd    Diagnostic Results   All imaging reviewed

## 2025-05-06 NOTE — ASSESSMENT & PLAN NOTE
69-year-old male presents to the ER with left-sided weakness and right-sided gaze preference that began abruptly this afternoon around 4:00 p.m.. At OSH found to have R frontal lobar ICH. Given ddAVP for reversal.    ICHS 0    MRI wo 5/4: swi looks larger , there is IVH now too  CTH 5/5: looks same as MRI , larger and IVH  CTH 5/6: stable    --Patient admitted to [facility] on telemetry      -q1h neurochecks in ICU, q2h neurochecks in stepdown, q4h neurochecks on floor  --All labs and diagnostics reviewed  ---150  --Na >145  --Keppra 500 BID   --HOB >30  --No acute neurosurgical intervention at this time  --Continue to monitor clinically, notify NSGY immediately with any changes in neuro status    Dispo: ICU    Plan d/w Dr. Gross.

## 2025-05-06 NOTE — PLAN OF CARE
"[facility] Care Plan    POC reviewed with Colt Rose and family at 0300. Patient and Family verbalized understanding. Questions and concerns addressed. No acute events today. Pt progressing toward goals. Will continue to monitor. See below and flowsheets for full assessment and VS info.     CT complete   Na goal maintained   Speech eval this AM          Is this a stroke patient? Yes    Neuro:  Cobb Coma Scale  Best Eye Response: 3-->(E3) to speech  Best Motor Response: 6-->(M6) obeys commands  Best Verbal Response: 5-->(V5) oriented  Cobb Coma Scale Score: 14  Assessment Qualifiers: patient not sedated/intubated  Pupil PERRLA: yes     24 hr Temp:  [98.2 °F (36.8 °C)-99.1 °F (37.3 °C)]     CV:   Rhythm: normal sinus rhythm  BP goals:   SBP < 150  MAP > 65    Resp:      Oxygen Concentration (%): 24    Plan: N/A    GI/:     Diet/Nutrition Received: NPO  Last Bowel Movement: 05/04/25  Voiding Characteristics: external catheter    Intake/Output Summary (Last 24 hours) at 5/6/2025 0628  Last data filed at 5/5/2025 1801  Gross per 24 hour   Intake 928.06 ml   Output 575 ml   Net 353.06 ml     Unmeasured Output  Urine Occurrence: 1  Pad Count: 2    Labs/Accuchecks:  Recent Labs   Lab 05/06/25  0209   WBC 10.02   RBC 4.25*   HGB 13.0*   HCT 40.3         Recent Labs   Lab 05/06/25  0209      K 4.1   CO2 21*   *   BUN 20   CREATININE 0.7   ALKPHOS 55   ALT 20   AST 20   BILITOT 0.5      Recent Labs   Lab 05/05/25  0145   PROTIME 11.8   INR 1.1   APTT 22.6    No results for input(s): "CPK", "CPKMB", "TROPONINI", "MB" in the last 168 hours.    Electrolytes: N/A - electrolytes WDL  Accuchecks: none    Gtts:   0.9% NaCl   Intravenous Continuous 50 mL/hr at 05/05/25 1801 Rate Verify at 05/05/25 1801       LDA/Wounds:    Mir Risk Assessment  Sensory Perception: 2-->very limited  Moisture: 3-->occasionally moist  Activity: 1-->bedfast  Mobility: 2-->very limited  Nutrition: 2-->probably " inadequate  Friction and Shear: 2-->potential problem  Mir Score: 12  Is your mir score 12 or less? yes enrolled in the peach program and heel boots on          Restraints:        [facility]

## 2025-05-06 NOTE — SUBJECTIVE & OBJECTIVE
Interval History/Significant Events: NAEO. Repeat CTH shows increased edema with increased midline shift. Increase sodium goal to >145. Amlodipine added for BP support. Patient with flushing after amlodipine given. Hydroxyzine and IV pepcid given as patient reports possible allergy to bendaryl in the past. Losartan added instead. NG tube placed and started tube feeds.    Review of Systems   Constitutional:  Negative for fever.   Eyes:  Negative for photophobia.   Respiratory:  Negative for shortness of breath.    Cardiovascular:  Negative for chest pain.   Gastrointestinal:  Negative for nausea.   Genitourinary:  Negative for dysuria.   Neurological:  Positive for tremors, facial asymmetry and weakness.     Objective:     Vitals:    Temp: 97.9 °F (36.6 °C)  Pulse: 76  Rhythm: normal sinus rhythm  BP: (!) 150/70  MAP (mmHg): 100  Resp: (!) 23  SpO2: 98 %    Temp  Min: 97.9 °F (36.6 °C)  Max: 99.1 °F (37.3 °C)  Pulse  Min: 60  Max: 81  BP  Min: 122/70  Max: 170/75  MAP (mmHg)  Min: 92  Max: 108  Resp  Min: 15  Max: 33  SpO2  Min: 95 %  Max: 100 %  Oxygen Concentration (%)  Min: 24  Max: 24    05/05 0701 - 05/06 0700  In: 1495.3 [I.V.:1495.3]  Out: 575 [Urine:575]   Unmeasured Output  Urine Occurrence: 1  Pad Count: 2        Physical Exam  Vitals and nursing note reviewed.   Cardiovascular:      Rate and Rhythm: Normal rate and regular rhythm.      Pulses: Normal pulses.      Heart sounds: Normal heart sounds.   Pulmonary:      Effort: Pulmonary effort is normal.      Breath sounds: Normal breath sounds.   Abdominal:      General: Bowel sounds are normal.      Palpations: Abdomen is soft.   Skin:     General: Skin is warm and dry.      Capillary Refill: Capillary refill takes 2 to 3 seconds.   Neurological:      Comments: E3 V5 M6  Mild left facial weakness  Perrla, Open eyes to voice with some lid apraxia, right gaze preference though does cross midline with direction.  Delayed verbal responses to questions  Left  sided weakness/plegia, slightly increased tone  Left side tito-neglect  RUE/RLE 5/5 , follows commands             Today I personally reviewed pertinent medications, lines/drains/airways, imaging, cardiology results, laboratory results, microbiology results,

## 2025-05-06 NOTE — PROGRESS NOTES
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 CTH with right parietal IPH, CTA without AVM/abnormalities. ASA hx, Given ddAVP for reversal. Transferred to [facility] for management and eval.     Hospital Course: 05/05/2025 CTH this AM stable when compared to MRI. EEG without evidence of seizures. Will hold off AEDs for now. Goal of SBP <150 and Na >140. No NSGY interventions. Will repeat CTH tomorrow AM.   05/06/2025 Repeat CTH shows increased edema with increased midline shift. Increase sodium goal to >145. Amlodipine added for BP support. Patient with flushing after amlodipine given. Hydroxyzine and IV pepcid given as patient reports possible allergy to bendaryl in the past. Losartan added instead. NG tube placed and started tube feeds.      Interval History/Significant Events: NAEO. Repeat CTH shows increased edema with increased midline shift. Increase sodium goal to >145. Amlodipine added for BP support. Patient with flushing after amlodipine given. Hydroxyzine and IV pepcid given as patient reports possible allergy to bendaryl in the past. Losartan added instead. NG tube placed and started tube feeds.    Review of Systems   Constitutional:  Negative for fever.   Eyes:  Negative for photophobia.   Respiratory:  Negative for shortness of breath.    Cardiovascular:  Negative for chest pain.   Gastrointestinal:  Negative for nausea.   Genitourinary:  Negative for dysuria.   Neurological:  Positive for tremors, facial asymmetry and weakness.     Objective:     Vitals:    Temp: 97.9 °F  (36.6 °C)  Pulse: 76  Rhythm: normal sinus rhythm  BP: (!) 150/70  MAP (mmHg): 100  Resp: (!) 23  SpO2: 98 %    Temp  Min: 97.9 °F (36.6 °C)  Max: 99.1 °F (37.3 °C)  Pulse  Min: 60  Max: 81  BP  Min: 122/70  Max: 170/75  MAP (mmHg)  Min: 92  Max: 108  Resp  Min: 15  Max: 33  SpO2  Min: 95 %  Max: 100 %  Oxygen Concentration (%)  Min: 24  Max: 24    05/05 0701 - 05/06 0700  In: 1495.3 [I.V.:1495.3]  Out: 575 [Urine:575]   Unmeasured Output  Urine Occurrence: 1  Pad Count: 2        Physical Exam  Vitals and nursing note reviewed.   Cardiovascular:      Rate and Rhythm: Normal rate and regular rhythm.      Pulses: Normal pulses.      Heart sounds: Normal heart sounds.   Pulmonary:      Effort: Pulmonary effort is normal.      Breath sounds: Normal breath sounds.   Abdominal:      General: Bowel sounds are normal.      Palpations: Abdomen is soft.   Skin:     General: Skin is warm and dry.      Capillary Refill: Capillary refill takes 2 to 3 seconds.   Neurological:      Comments: E3 V5 M6  Mild left facial weakness  Perrla, Open eyes to voice with some lid apraxia, right gaze preference though does cross midline with direction.  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    - Repeat CTH with edema expansion and worsening midline shift. Will increase sodium goals.  - Na > 145 to decrease risk of edema.   - Sodium q6h   - Hypertonic saline q6h prn  - Will repeat CTH overnight  - MRI brain without evidence of  amyloid.   - neuro checks q1h  - SBP < 150   - losartan 25mg qd   - consider labetalol gtt vs hydralazine PO if BP not controlled   - hydralazine and labetalol prn  - Coags WNL  - DDAVP given prior to transfer for ASA hx     PT/OT, SLP    Family updated bedside     Vasogenic brain edema  See ICH    Seizure  - Left hand twitching on arrival, did appear rhymic. Resolved at this time.  - EEG without evidence of seizure.  - Holding AEDs at this time.    Dysarthria  SLP consulted   - Keep NPO   - NG tube placed, started tube feeds   - FWF with normal saline 30cc q6h    Acute left-sided weakness  PT/OT    Cardiac/Vascular  Dyslipidemia  Resume statin           The patient is being Prophylaxed for:  Venous Thromboembolism with: Mechanical  Stress Ulcer with: None  Ventilator Pneumonia with: not applicable    Activity Orders            Elevate HOB Elevate HOB 30-45 degrees during feeding unless contraindicated starting at 05/06 0903    Turn patient starting at 05/04 2000    Elevate HOB starting at 05/04 1920    Diet NPO: NPO starting at 05/04 1920          Full Code    William Appiah MD  Neurocritical Care  [facility] - Neuro Critical Care

## 2025-05-06 NOTE — ASSESSMENT & PLAN NOTE
Mild vasogenic edema is noted on imaging in the territory of the R middle cerebral artery with associated mass effect. Initial CTH with no MLS. At this time the etiology of the ICH is unclear. He was not hypertensive on presentation and remains without elevated BP. No aneurysm or AVM noted on CTA head and neck. MRI Brain pending. Neurosurgery following  - See plan for Nontraumatic subcortical hemorrhage of right cerebral hemisphere

## 2025-05-06 NOTE — NURSING
1100: /76  1114: Hydralazine 10mg IV administered  1146: /72, NCC aware, Amlodipine 5mg oral ordered and administered  1230: Face flushed and warm, no fever noted, VSS - team notified, Hydroxyzine administered at 1244 and Famotidine at 1252 (pt with Benadryl allergy)  1300: /75  1311: Labetalol 10mg IV administered  1440: /74, Hydralazine 25mg oral administered    *Pt unable to have cardene/cleviprex d/t being in the same drug class as Amlodipine*

## 2025-05-06 NOTE — EICU
Intervention Initiated From:  [facility] / EICU    Praveena intervened regarding:  Rounding (Video assessment)    Comments: Virtual ICU Quality Rounds    Admit Date: 5/4/2025  Hospital Day: 2    ICU Day: 1d 17h    24H Vital Sign Range:  Temp:  [98.2 °F (36.8 °C)-99.1 °F (37.3 °C)]   Pulse:  [60-81]   Resp:  [15-33]   BP: (122-163)/(62-77)   SpO2:  [95 %-100 %]     VICU Surveillance Screening    LDA reconciliation : Yes

## 2025-05-06 NOTE — ASSESSMENT & PLAN NOTE
- CTH/CTA with right parietal IPH, no AVM/malformation   - Vasc Neuro Following  - NSGY following   - No interventions as CTH this AM stable in comparison to MRI.    - Repeat CTH with edema expansion and worsening midline shift. Will increase sodium goals.  - Na > 145 to decrease risk of edema.   - Sodium q6h   - Hypertonic saline q6h prn  - Will repeat CTH overnight  - MRI brain without evidence of amyloid.   - neuro checks q1h  - SBP < 150   - losartan 25mg qd   - consider labetalol gtt vs hydralazine PO if BP not controlled   - hydralazine and labetalol prn  - Coags WNL  - DDAVP given prior to transfer for ASA hx     PT/OT, SLP    Family updated bedside

## 2025-05-06 NOTE — ASSESSMENT & PLAN NOTE
Colt Rose is a 69 y.o. male with a significant medical history of HLD, cervical and lumbar DDD, pre-diabetes who presents to the hospital as a transfer from [facility] for evaluation of R ICH. He acutely developed LSW and R gaze around 4 PM today (5/5/2025). CTH was obtained and showed a 3.3 x 2.4 cm R frontoparietal ICH w/mild vasogenic edema. The patient was given DDVAP as he takes ASA daily and transferred to [facility] for higher level of care, further evaluation.    On this exam the patient is lethargic and in need of intermittent stimulation to arouse to participate with exam. He opens his eyes to touch and voice and is noted to be doing a repetitive rubbing/picking motion to his chest with his RUE. Dysconjugate gaze. Withdraws from pain in the left sided extremities. NIHSS 14. Admitted to [facility].     EEG with abnormal brain activity --> focal slowing over the right anterior quadrant consistent with underlying subcortical dysfunction. CTH and MRI w/o contrast on 5/5 with increased size of hemorrhage and midline shift. NGT placed on 5/6 s/p SLP assessment failure.     - CTH this am w/ stable hemorrhagic size  - Continue Intermittent 3% bolus to maintain Na >140  - Plan to repeat CTH on Wednesday to assess size of bleed, NSGY following  - Recommend MRI Brain w/ Contrast to further assess for underlying etiology of ICH  - Keep -150      Antithrombotics for secondary stroke prevention: Antiplatelets: None: Intracerebral Hemorrhage    Statins for secondary stroke prevention and hyperlipidemia, if present:   Statins: Not indicated in acute ICH however, LDL is 102.2. The patient is currently prescribed atorvastatin 20 mg daily, may benefit from increasing to 40 mg daily.     Aggressive risk factor modification: HLD, sleep apnea     Rehab efforts: The patient has been evaluated by a stroke team provider and the therapy needs have been fully considered based off the presenting complaints and  exam findings. The following therapy evaluations are needed: PT evaluate and treat, OT evaluate and treat, SLP evaluate and treat, PM&R evaluate for appropriate placement    Diagnostics ordered/pending: MRI head without contrast to assess brain parenchyma, TTE to assess cardiac function/status , Other: CTH prn    VTE prophylaxis: Mechanical prophylaxis: Place SCDs  None: Reason for No Pharmacological VTE Prophylaxis: History of systemic or intracranial bleeding    BP parameters: ICH: SBP Goal Range 130-150

## 2025-05-06 NOTE — PROGRESS NOTES
[facility] - Neuro Critical Care  Neurosurgery  Progress Note    Subjective:     History of Present Illness: 69-year-old male presents to the ER with left-sided weakness and right-sided gaze preference that began abruptly this afternoon around 4:00 p.m.. At OSH found to have R frontal lobar ICH. Given ddAVP for reversal.    Post-Op Info:  * No surgery found *       5/6: CTH this AM stable. Slightly harder to arouse this AM but Aox3 still and FC on R    Prescriptions Prior to Admission[1]    Review of patient's allergies indicates:   Allergen Reactions    Pennsaid [diclofenac sodium] Rash and Blisters       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    Cervical disc displacement     Degeneration of lumbar intervertebral disc     GERD (gastroesophageal reflux disease)     gastric polyps    Hip pain 02/22/2016    Hyperlipidemia     Shingles 11/2013    Sleep apnea      Past Surgical History:   Procedure Laterality Date    ABLATION, SVT, ACCESSORY PATHWAY N/A 5/25/2023    Procedure: Ablation, SVT, Accessory Pathway;  Surgeon: Goyo Sky MD;  Location: [facility] LAB;  Service: Cardiology;  Laterality: N/A;  PAC, RFA, CARTO, MAC, GP, 3 PREP    CERVICAL FUSION      bone graft    COLONOSCOPY N/A 7/29/2021    Procedure: COLONOSCOPY;  Surgeon: Tyler Story MD;  Location: [facility];  Service: Endoscopy;  Laterality: N/A;    COLONOSCOPY N/A 10/30/2024    Procedure: COLONOSCOPY;  Surgeon: Tyler Story MD;  Location: [facility];  Service: Endoscopy;  Laterality: N/A;  m/ppm    EPIDURAL STEROID INJECTION INTO LUMBAR SPINE N/A 12/5/2019    Procedure: Injection-steroid-epidural-lumbar;  Surgeon: Adarsh Kraft MD;  Location: [facility] OR;  Service: Pain Management;  Laterality: N/A;  L5-S1    ESOPHAGOGASTRODUODENOSCOPY N/A 10/27/2020    Procedure: EGD (ESOPHAGOGASTRODUODENOSCOPY);  Surgeon: Tyler Story MD;  Location: [facility];  Service: Endoscopy;  Laterality: N/A;    ESOPHAGOGASTRODUODENOSCOPY  N/A 2/14/2025    Procedure: EGD (ESOPHAGOGASTRODUODENOSCOPY);  Surgeon: Tyler Story MD;  Location: [facility];  Service: Endoscopy;  Laterality: N/A;    INJECTION OF ANESTHETIC AGENT AROUND MEDIAL BRANCH NERVES INNERVATING LUMBAR FACET JOINT Bilateral 11/18/2020    Procedure: BLOCK, NERVE, LUMBAR, MEDIAL BRANCH Bilateral L3,4,5;  Surgeon: Adarsh Kraft MD;  Location: [facility] OR;  Service: Pain Management;  Laterality: Bilateral;    INJECTION OF ANESTHETIC AGENT AROUND MEDIAL BRANCH NERVES INNERVATING LUMBAR FACET JOINT Left 10/25/2023    Procedure: Block-nerve-medial branch-lumbar;  Surgeon: Adarsh Kraft MD;  Location: [facility] OR;  Service: Anesthesiology;  Laterality: Left;  l4-5, l5-s1 MBB    INJECTION OF ANESTHETIC AGENT AROUND MEDIAL BRANCH NERVES INNERVATING LUMBAR FACET JOINT Bilateral 11/28/2023    Procedure: Block-nerve-medial branch-lumbar;  Surgeon: Adarsh Kraft MD;  Location: [facility] OR;  Service: Anesthesiology;  Laterality: Bilateral;  L4.5 and l5/s1 MBB #2    RADIOFREQUENCY ABLATION OF LUMBAR MEDIAL BRANCH NERVE AT SINGLE LEVEL Bilateral 1/29/2021    Procedure: Radiofrequency Ablation, Nerve, Spinal, Lumbar, Medial Branch, 1 Level;  Surgeon: Adarsh Kraft MD;  Location: [facility] OR;  Service: Pain Management;  Laterality: Bilateral;  L3,4,5    TONSILLECTOMY, ADENOIDECTOMY       Family History       Problem Relation (Age of Onset)    Coronary artery disease Mother    Diabetes Mother    Heart disease Brother    Hypertension Mother    Stroke Mother, Father          Tobacco Use    Smoking status: Never    Smokeless tobacco: Never   Substance and Sexual Activity    Alcohol use: Yes     Alcohol/week: 6.0 standard drinks of alcohol     Types: 6 Cans of beer per week     Comment: weekly or less    Drug use: No    Sexual activity: Yes     Partners: Female       Objective:     Weight: 88.5 kg (195 lb 1.7 oz)  Body mass index is 27.99 kg/m².  Vital Signs (Most Recent):  Temp: 99.1 °F (37.3 °C) (05/06/25  0301)  Pulse: 66 (05/06/25 0731)  Resp: 19 (05/06/25 0731)  BP: 122/70 (05/06/25 0731)  SpO2: 96 % (05/06/25 0731) Vital Signs (24h Range):  Temp:  [98.2 °F (36.8 °C)-99.1 °F (37.3 °C)] 99.1 °F (37.3 °C)  Pulse:  [60-76] 66  Resp:  [15-33] 19  SpO2:  [95 %-100 %] 96 %  BP: (122-156)/(60-74) 122/70     Date 05/06/25 0700 - 05/07/25 0659   Shift 8303-0880 8182-8504 2361-9813 24 Hour Total   INTAKE   I.V.(mL/kg) 50(0.6)   50(0.6)   Shift Total(mL/kg) 50(0.6)   50(0.6)   OUTPUT   Shift Total(mL/kg)       Weight (kg) 88.5 88.5 88.5 88.5              Oxygen Concentration (%):  [24] 24                Physical Exam         Neurosurgery Physical Exam    GENERAL: resting comfortably  HEENT: NCAT, PERRL, mucous membranes moist  NECK: supple, trachea midline  CV: normal capillary refill  PULM: aerating well, symmetric expansion, no distress  ABD: soft, NT, ND  EXT: no c/c/e    NEURO:    AAO x 3  CN II-XII grossly intact  RUE/RLE: fc  LUE/LLE: plegic  Decreased sensation L hemibody      Significant Labs:  Recent Labs   Lab 05/04/25  1608 05/05/25  0145 05/05/25  0145 05/05/25  1336 05/05/25  2031 05/06/25  0209   GLU 96 121*  --   --   --  119*    142   < > 140 143 142   K 4.0 3.9  --   --   --  4.1    110  --   --   --  111*   CO2 27 21*  --   --   --  21*   BUN 25* 22  --   --   --  20   CREATININE 0.8 0.7  --   --   --  0.7   CALCIUM 9.2 8.7  --   --   --  8.5*   MG  --  1.9  --   --   --  2.0    < > = values in this interval not displayed.     Recent Labs   Lab 05/04/25  1608 05/05/25  0145 05/06/25  0209   WBC 5.04 7.29 10.02   HGB 13.5* 12.4* 13.0*   HCT 39.8* 37.2* 40.3    187 208     Recent Labs   Lab 05/04/25  1608 05/05/25  0145   INR 1.0 1.1   APTT  --  22.6     Microbiology Results (last 7 days)       ** No results found for the last 168 hours. **          All pertinent labs from the last 24 hours have been reviewed.    Significant Diagnostics:  I have reviewed and interpreted all pertinent imaging  results/findings within the past 24 hours.  CTA Head and Neck (xpd)  Result Date: 5/4/2025  1. Acute right frontal intraparenchymal hematoma with mild adjacent edema is similar to the recent prior study.  Follow-up recommended. 2. No high-grade stenosis or major vessel occlusion. Electronically signed by: Frandy Landis Date:    05/04/2025 Time:    16:51    CT HEAD FOR CODE STROKE  Result Date: 5/4/2025  3.3 x 2.4 cm intracranial hemorrhage within the right frontoparietal region with mild vasogenic edema.  There is no midline shift These findings were called to the ordering physician at 16:22 Electronically signed by: Enriqueta De La Vega Date:    05/04/2025 Time:    16:24    CT Head Without Contrast  Result Date: 5/5/2025  1. Right frontal parenchymal hemorrhage with subarachnoid and intraventricular extension appears relatively similar when compared to MRI performed 05/04/2025, but notably increased since initial head CT of 05/04/2025. 2. Relatively similar associated mass effect compared to most recent MRI. Electronically signed by: Colt Ang Date:    05/05/2025 Time:    06:25    CTA Head and Neck (xpd)  Result Date: 5/4/2025  1. Acute right frontal intraparenchymal hematoma with mild adjacent edema is similar to the recent prior study.  Follow-up recommended. 2. No high-grade stenosis or major vessel occlusion. Electronically signed by: Frandy Landis Date:    05/04/2025 Time:    16:51    CT HEAD FOR CODE STROKE  Result Date: 5/4/2025  3.3 x 2.4 cm intracranial hemorrhage within the right frontoparietal region with mild vasogenic edema.  There is no midline shift These findings were called to the ordering physician at 16:22 Electronically signed by: Enriqueta De La Vega Date:    05/04/2025 Time:    16:24    No results found in the last 24 hours.    Assessment/Plan:     * Nontraumatic cortical hemorrhage of right cerebral hemisphere  69-year-old male presents to the ER with left-sided weakness and right-sided gaze  preference that began abruptly this afternoon around 4:00 p.m.. At OSH found to have R frontal lobar ICH. Given ddAVP for reversal.    ICHS 0    MRI wo 5/4: swi looks larger , there is IVH now too  CTH 5/5: looks same as MRI , larger and IVH  CTH 5/6: stable    --Patient admitted to [facility] on telemetry      -q1h neurochecks in ICU, q2h neurochecks in stepdown, q4h neurochecks on floor  --All labs and diagnostics reviewed  ---150  --Na >145  --Keppra 500 BID   --CTH surveillance Wednesday ordered  --HOB >30  --No acute neurosurgical intervention at this time  --Continue to monitor clinically, notify NSGY immediately with any changes in neuro status    Dispo: ICU    Plan d/w Dr. Gross.         Oskar Simms MD  Neurosurgery  [facility] - Neuro Critical Care       [1]   Medications Prior to Admission   Medication Sig Dispense Refill Last Dose/Taking    aspirin (ECOTRIN) 81 MG EC tablet Take 1 tablet (81 mg total) by mouth once daily.  0     atorvastatin (LIPITOR) 20 MG tablet TAKE ONE TABLET BY MOUTH ONCE DAILY 90 tablet 3     ciclopirox (PENLAC) 8 % Soln Apply topically. (Patient not taking: Reported on 1/16/2025)       docosahexaenoic acid/epa (FISH OIL ORAL) Take 1 capsule by mouth once daily.       magnesium oxide (MAG-OX) 400 mg (241.3 mg magnesium) tablet Take 1 tablet (400 mg total) by mouth once daily. 90 tablet 3     meloxicam (MOBIC) 15 MG tablet TAKE 1 TABLET BY MOUTH AT NIGHT  AS NEEDED 90 tablet 3     methocarbamoL (ROBAXIN) 500 MG Tab TAKE ONE TABLET BY MOUTH EVERY 8 HOURS AS NEEDED FOR MUSCLE SPASMS 90 tablet 2     multivitamin (THERAGRAN) per tablet Take 1 tablet by mouth once daily.       omeprazole (PRILOSEC) 40 MG capsule Take 1 capsule (40 mg total) by mouth every morning. 90 capsule 3     pregabalin (LYRICA) 100 MG capsule Take 100 mg by mouth once daily.       triamcinolone acetonide 0.1% (KENALOG) 0.1 % ointment Apply topically 2 (two) times daily. 30 g 0

## 2025-05-06 NOTE — PLAN OF CARE
"[facility] Care Plan  POC reviewed with Colt Rose and family at 1400. Family verbalized understanding. Questions and concerns addressed. No acute events today. Pt progressing toward goals. Will continue to monitor. See below and flowsheets for full assessment and VS info.     -PRN 3% 250cc bolus given x2 for Na <145  -See note regarding blood pressure medications  -Losartan scheduled  -straight cath x1  -TF at 55cc/hr (goal)    -Always follows commands briskly, requires extreme encouragement to answer orientation questions, does nod appropriately    Is this a stroke patient? yes    Neuro:  Nader Coma Scale  Best Eye Response: 3-->(E3) to speech  Best Motor Response: 6-->(M6) obeys commands  Best Verbal Response: 5-->(V5) oriented  Nader Coma Scale Score: 14  Assessment Qualifiers: patient not sedated/intubated, no eye obstruction present  Pupil PERRLA: yes     24 hr Temp:  [98.2 °F (36.8 °C)-99.1 °F (37.3 °C)]     CV:   Rhythm: normal sinus rhythm  BP goals:   SBP < 130-150  MAP > 65    Resp:      Oxygen Concentration (%): 24    Plan: N/A    GI/:     Diet/Nutrition Received: NPO  Last Bowel Movement: 05/04/25  Voiding Characteristics: external catheter    Intake/Output Summary (Last 24 hours) at 5/6/2025 1757  Last data filed at 5/6/2025 1752  Gross per 24 hour   Intake 1619.98 ml   Output 800 ml   Net 819.98 ml     Unmeasured Output  Urine Occurrence: 1  Pad Count: 2    Labs/Accuchecks:  Recent Labs   Lab 05/06/25  0209   WBC 10.02   RBC 4.25*   HGB 13.0*   HCT 40.3         Recent Labs   Lab 05/06/25  0209 05/06/25  0819 05/06/25  1415      < > 139   K 4.1  --   --    CO2 21*  --   --    *  --   --    BUN 20  --   --    CREATININE 0.7  --   --    ALKPHOS 55  --   --    ALT 20  --   --    AST 20  --   --    BILITOT 0.5  --   --     < > = values in this interval not displayed.      Recent Labs   Lab 05/05/25  0145   PROTIME 11.8   INR 1.1   APTT 22.6    No results for input(s): "CPK", " ""CPKMB", "TROPONINI", "MB" in the last 168 hours.    Electrolytes: N/A - electrolytes WDL  Accuchecks: none    Gtts:      LDA/Wounds:    Mir Risk Assessment  Sensory Perception: 2-->very limited  Moisture: 3-->occasionally moist  Activity: 1-->bedfast  Mobility: 2-->very limited  Nutrition: 2-->probably inadequate  Friction and Shear: 2-->potential problem  Mir Score: 12    Is your mir score 12 or less? yes heel boots on        " [Clean] : clean [Dry] : dry [Intact] : intact [Granulation tissue] : no granulation tissue [Drainage] : no drainage [Soft] : soft [Tender] : not tender [Distended] : not distended [Testicle descended on left] : testicle descended on left [Testicle descended on right] : testicle descended on right [FreeTextEntry1] : normal post op healing

## 2025-05-06 NOTE — SUBJECTIVE & OBJECTIVE
5/6: [facility] this AM stable. Slightly harder to arouse this AM but Aox3 still and FC on R    Prescriptions Prior to Admission[1]    Review of patient's allergies indicates:   Allergen Reactions    Pennsaid [diclofenac sodium] Rash and Blisters       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    Cervical disc displacement     Degeneration of lumbar intervertebral disc     GERD (gastroesophageal reflux disease)     gastric polyps    Hip pain 02/22/2016    Hyperlipidemia     Shingles 11/2013    Sleep apnea      Past Surgical History:   Procedure Laterality Date    ABLATION, SVT, ACCESSORY PATHWAY N/A 5/25/2023    Procedure: Ablation, SVT, Accessory Pathway;  Surgeon: Goyo Sky MD;  Location: [facility] EP LAB;  Service: Cardiology;  Laterality: N/A;  PAC, RFA, CARTO, MAC, GP, 3 PREP    CERVICAL FUSION      bone graft    COLONOSCOPY N/A 7/29/2021    Procedure: COLONOSCOPY;  Surgeon: Tyler Story MD;  Location: [facility];  Service: Endoscopy;  Laterality: N/A;    COLONOSCOPY N/A 10/30/2024    Procedure: COLONOSCOPY;  Surgeon: Tyler Story MD;  Location: [facility];  Service: Endoscopy;  Laterality: N/A;  m/ppm    EPIDURAL STEROID INJECTION INTO LUMBAR SPINE N/A 12/5/2019    Procedure: Injection-steroid-epidural-lumbar;  Surgeon: Adarsh Kraft MD;  Location: [facility] OR;  Service: Pain Management;  Laterality: N/A;  L5-S1    ESOPHAGOGASTRODUODENOSCOPY N/A 10/27/2020    Procedure: EGD (ESOPHAGOGASTRODUODENOSCOPY);  Surgeon: Tyelr Story MD;  Location: [facility];  Service: Endoscopy;  Laterality: N/A;    ESOPHAGOGASTRODUODENOSCOPY N/A 2/14/2025    Procedure: EGD (ESOPHAGOGASTRODUODENOSCOPY);  Surgeon: Tyler Story MD;  Location: [facility];  Service: Endoscopy;  Laterality: N/A;    INJECTION OF ANESTHETIC AGENT AROUND MEDIAL BRANCH NERVES INNERVATING LUMBAR FACET JOINT Bilateral 11/18/2020    Procedure: BLOCK, NERVE, LUMBAR, MEDIAL BRANCH Bilateral L3,4,5;  Surgeon: Adarsh Kraft MD;   Location: [facility] OR;  Service: Pain Management;  Laterality: Bilateral;    INJECTION OF ANESTHETIC AGENT AROUND MEDIAL BRANCH NERVES INNERVATING LUMBAR FACET JOINT Left 10/25/2023    Procedure: Block-nerve-medial branch-lumbar;  Surgeon: Adarsh Kraft MD;  [facility];  Service: Anesthesiology;  Laterality: Left;  l4-5, l5-s1 MBB    INJECTION OF ANESTHETIC AGENT AROUND MEDIAL BRANCH NERVES INNERVATING LUMBAR FACET JOINT Bilateral 11/28/2023    Procedure: Block-nerve-medial branch-lumbar;  Surgeon: Adarsh Kraft MD;  Location: [facility] OR;  Service: Anesthesiology;  Laterality: Bilateral;  L4.5 and l5/s1 MBB #2    RADIOFREQUENCY ABLATION OF LUMBAR MEDIAL BRANCH NERVE AT SINGLE LEVEL Bilateral 1/29/2021    Procedure: Radiofrequency Ablation, Nerve, Spinal, Lumbar, Medial Branch, 1 Level;  Surgeon: Adarsh Kraft MD;  Location: [facility] OR;  Service: Pain Management;  Laterality: Bilateral;  L3,4,5    TONSILLECTOMY, ADENOIDECTOMY       Family History       Problem Relation (Age of Onset)    Coronary artery disease Mother    Diabetes Mother    Heart disease Brother    Hypertension Mother    Stroke Mother, Father          Tobacco Use    Smoking status: Never    Smokeless tobacco: Never   Substance and Sexual Activity    Alcohol use: Yes     Alcohol/week: 6.0 standard drinks of alcohol     Types: 6 Cans of beer per week     Comment: weekly or less    Drug use: No    Sexual activity: Yes     Partners: Female       Objective:     Weight: 88.5 kg (195 lb 1.7 oz)  Body mass index is 27.99 kg/m².  Vital Signs (Most Recent):  Temp: 99.1 °F (37.3 °C) (05/06/25 0301)  Pulse: 66 (05/06/25 0731)  Resp: 19 (05/06/25 0731)  BP: 122/70 (05/06/25 0731)  SpO2: 96 % (05/06/25 0731) Vital Signs (24h Range):  Temp:  [98.2 °F (36.8 °C)-99.1 °F (37.3 °C)] 99.1 °F (37.3 °C)  Pulse:  [60-76] 66  Resp:  [15-33] 19  SpO2:  [95 %-100 %] 96 %  BP: (122-156)/(60-74) 122/70     Date 05/06/25 0700 - 05/07/25 0659   Shift 0737-9254 3438-6479 3831-7954  24 Hour Total   INTAKE   I.V.(mL/kg) 50(0.6)   50(0.6)   Shift Total(mL/kg) 50(0.6)   50(0.6)   OUTPUT   Shift Total(mL/kg)       Weight (kg) 88.5 88.5 88.5 88.5              Oxygen Concentration (%):  [24] 24                Physical Exam         Neurosurgery Physical Exam    GENERAL: resting comfortably  HEENT: NCAT, PERRL, mucous membranes moist  NECK: supple, trachea midline  CV: normal capillary refill  PULM: aerating well, symmetric expansion, no distress  ABD: soft, NT, ND  EXT: no c/c/e    NEURO:    AAO x 3  CN II-XII grossly intact  RUE/RLE: fc  LUE/LLE: plegic  Decreased sensation L hemibody      Significant Labs:  Recent Labs   Lab 05/04/25 1608 05/05/25 0145 05/05/25 0145 05/05/25  1336 05/05/25  2031 05/06/25  0209   GLU 96 121*  --   --   --  119*    142   < > 140 143 142   K 4.0 3.9  --   --   --  4.1    110  --   --   --  111*   CO2 27 21*  --   --   --  21*   BUN 25* 22  --   --   --  20   CREATININE 0.8 0.7  --   --   --  0.7   CALCIUM 9.2 8.7  --   --   --  8.5*   MG  --  1.9  --   --   --  2.0    < > = values in this interval not displayed.     Recent Labs   Lab 05/04/25 1608 05/05/25 0145 05/06/25  0209   WBC 5.04 7.29 10.02   HGB 13.5* 12.4* 13.0*   HCT 39.8* 37.2* 40.3    187 208     Recent Labs   Lab 05/04/25 1608 05/05/25 0145   INR 1.0 1.1   APTT  --  22.6     Microbiology Results (last 7 days)       ** No results found for the last 168 hours. **          All pertinent labs from the last 24 hours have been reviewed.    Significant Diagnostics:  I have reviewed and interpreted all pertinent imaging results/findings within the past 24 hours.  CTA Head and Neck (xpd)  Result Date: 5/4/2025  1. Acute right frontal intraparenchymal hematoma with mild adjacent edema is similar to the recent prior study.  Follow-up recommended. 2. No high-grade stenosis or major vessel occlusion. Electronically signed by: Frandy Landis Date:    05/04/2025 Time:    16:51    CT HEAD FOR  CODE STROKE  Result Date: 5/4/2025  3.3 x 2.4 cm intracranial hemorrhage within the right frontoparietal region with mild vasogenic edema.  There is no midline shift These findings were called to the ordering physician at 16:22 Electronically signed by: Enriqueta De La Vega Date:    05/04/2025 Time:    16:24    CT Head Without Contrast  Result Date: 5/5/2025  1. Right frontal parenchymal hemorrhage with subarachnoid and intraventricular extension appears relatively similar when compared to MRI performed 05/04/2025, but notably increased since initial head CT of 05/04/2025. 2. Relatively similar associated mass effect compared to most recent MRI. Electronically signed by: Colt Ang Date:    05/05/2025 Time:    06:25    CTA Head and Neck (xpd)  Result Date: 5/4/2025  1. Acute right frontal intraparenchymal hematoma with mild adjacent edema is similar to the recent prior study.  Follow-up recommended. 2. No high-grade stenosis or major vessel occlusion. Electronically signed by: Frandy Landis Date:    05/04/2025 Time:    16:51    CT HEAD FOR CODE STROKE  Result Date: 5/4/2025  3.3 x 2.4 cm intracranial hemorrhage within the right frontoparietal region with mild vasogenic edema.  There is no midline shift These findings were called to the ordering physician at 16:22 Electronically signed by: Enriqueta De La Vega Date:    05/04/2025 Time:    16:24    No results found in the last 24 hours.         [1]   Medications Prior to Admission   Medication Sig Dispense Refill Last Dose/Taking    aspirin (ECOTRIN) 81 MG EC tablet Take 1 tablet (81 mg total) by mouth once daily.  0     atorvastatin (LIPITOR) 20 MG tablet TAKE ONE TABLET BY MOUTH ONCE DAILY 90 tablet 3     ciclopirox (PENLAC) 8 % Soln Apply topically. (Patient not taking: Reported on 1/16/2025)       docosahexaenoic acid/epa (FISH OIL ORAL) Take 1 capsule by mouth once daily.       magnesium oxide (MAG-OX) 400 mg (241.3 mg magnesium) tablet Take 1 tablet (400 mg total) by  mouth once daily. 90 tablet 3     meloxicam (MOBIC) 15 MG tablet TAKE 1 TABLET BY MOUTH AT NIGHT  AS NEEDED 90 tablet 3     methocarbamoL (ROBAXIN) 500 MG Tab TAKE ONE TABLET BY MOUTH EVERY 8 HOURS AS NEEDED FOR MUSCLE SPASMS 90 tablet 2     multivitamin (THERAGRAN) per tablet Take 1 tablet by mouth once daily.       omeprazole (PRILOSEC) 40 MG capsule Take 1 capsule (40 mg total) by mouth every morning. 90 capsule 3     pregabalin (LYRICA) 100 MG capsule Take 100 mg by mouth once daily.       triamcinolone acetonide 0.1% (KENALOG) 0.1 % ointment Apply topically 2 (two) times daily. 30 g 0

## 2025-05-06 NOTE — PT/OT/SLP PROGRESS
Physical Therapy Treatment  Co Treatment with Occupational Therapy    Patient Name:  Colt Rose   MRN:  [id number]    Recommendations:     Discharge Recommendations: High Intensity Therapy  Discharge Equipment Recommendations: hospital bed, wheelchair, lift device  Barriers to discharge: Increased level of assistance    Assessment:     Colt Rose is a 69 y.o. 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  He presents with the following impairments/functional limitations: impaired endurance, weakness, impaired self care skills, impaired functional mobility, gait instability, impaired balance, visual deficits, impaired cognition, decreased coordination, decreased upper extremity function, decreased lower extremity function, decreased safety awareness, impaired coordination, impaired fine motor.    Pt met with HOB elevated, oriented x4, and agreeable to session. Pt tolerates session well with emphasis on bed mobility and sitting balance. Pt continues to require Maximal assistance x2 persons for Supine to Sit, total A x2 persons for Sit to supine, and Total Assist for sitting balance. Unable to trial standing this session d/t pt /80 mmHg and over systolic parameter of 130-150 mmHg. Nursing notified and pt safely returned to supine. Pt will continue to benefit from skilled therapy services.    Rehab Prognosis: Good; patient would benefit from acute skilled PT services to address these deficits and reach maximum level of function.    Recent Surgery: * No surgery found *      Plan:     During this hospitalization, patient to be seen 4 x/week to address the identified rehab impairments via gait training, therapeutic activities, therapeutic exercises, neuromuscular re-education and progress toward the following goals:    Plan of Care Expires:  05/16/25    Subjective     Chief Complaint: none stated  Patient/Family Comments/goals: none  stated  Pain/Comfort:  Pain Rating 1: other (see comments) (no indications of pain)  Pain Rating Post-Intervention 1: other (see comments) (no indications of pain)      Objective:     Communicated with Nursing prior to session.  Patient found HOB elevated with telemetry, pulse ox (continuous), blood pressure cuff, NG tube, peripheral IV, Condom Catheter, SCD upon PTA entry to room.     General Precautions: Standard, fall, aspiration, aphasia, NPO  Orthopedic Precautions: N/A  Braces: N/A  Respiratory Status: Room air     Functional Mobility:  Bed Mobility:     Rolling Left:  maximal assistance  Rolling Right: total assistance  Scooting: anteriorly to EOB maximal assistance x2 persons   Supine to Sit: maximal assistance x2 persons for Trunk/BLEs  Sit to Supine: total assistance x2 persons for Trunk/BLEs  Transfers:     Sit to Stand:  deferred d/t pt systolic BP over parameter  Balance:  Sitting Balance at EOB:  Level of Assist Required: Total Assistance  Deviations noted: poor trunk control, absent protective response, L posterolateral lean   Verbal and tactile cues required for upright posture, scapular retraction, midline sitting, elicitation of protective response  Total Time Sitting EOB: 15 minutes     AM-PAC 6 CLICK MOBILITY  Turning over in bed (including adjusting bedclothes, sheets and blankets)?: 2  Sitting down on and standing up from a chair with arms (e.g., wheelchair, bedside commode, etc.): 2  Moving from lying on back to sitting on the side of the bed?: 2  Moving to and from a bed to a chair (including a wheelchair)?: 1  Need to walk in hospital room?: 1  Climbing 3-5 steps with a railing?: 1  Basic Mobility Total Score: 9       Treatment & Education:  Patient provided with daily orientation and goals of this PT session.     Pt educated to call for assistance and to transfer with hospital staff only.  Also, pt was educated on the effects of prolonged immobility and the importance of performing OOB  activity and exercises to promote healing and reduce recovery time.    Co tx performed with OT due to patient need for 2 skilled therapist to ensure patient and staff safety and to accommondate for activity tolerance.    Patient left HOB elevated with all lines intact, call button in reach, restraints reapplied at end of session, RN notified, and Spouse present.    GOALS:   Multidisciplinary Problems       Physical Therapy Goals          Problem: Physical Therapy    Goal Priority Disciplines Outcome Interventions   Physical Therapy Goal     PT, PT/OT Progressing    Description: Goals to be completed by: 5/16/25    Pt will perform sup<>sit transfers w/ moderate assistance  Pt will have sufficient dynamic balance to sit EOB while performing ADLs/therex w/ minimum assistance  Pt will be able to stand up from EOB w/ moderate assistance using LRAD  Pt will transfer from bed to chair w/ moderate assistance using LRAD  Pt will be independent w/ HEP therex on BLE w/ good form and ROM                        DME Justifications:  Colt requires a hospital bed due to him requiring positioning of the body in ways not feasible with an ordinary bed to alleviate pain and due to limited ability and cannot independently make changes in body position without the use of the bed.The positioning of the body cannot be sufficiently resolved by the use of pillows and wedges.  Colt Rose has a mobility limitation that significantly impairs his ability to participate in one or more mobility related activities of daily living (MRADLs) such as toileting, feeding, dressing, grooming, and bathing in customary locations in the home.  The mobility limitation cannot be sufficiently resolved by the use of a cane or walker.   Patients upper body strength is not sufficient to propel a standard WC. The use of a lightweight wheelchair will significantly improve the patients ability to participate in MRADLS and the patient will use it on regular  basis in the home.   He has a caregiver who is available, willing, and able to provide assistance with the wheelchair when needed.     Time Tracking:     PT Received On: 05/06/25  PT Start Time: 1036     PT Stop Time: 1100  PT Total Time (min): 24 min     Billable Minutes: Therapeutic Activity 15 and Neuromuscular Re-education 9    Treatment Type: Treatment  PT/PTA: PTA     Number of PTA visits since last PT visit: 1 05/06/2025

## 2025-05-06 NOTE — ASSESSMENT & PLAN NOTE
SLP consulted   - Keep NPO   - NG tube placed, started tube feeds   - FWF with normal saline 30cc q6h

## 2025-05-06 NOTE — PT/OT/SLP PROGRESS
Occupational Therapy   Co Treatment    Name: Colt Rose  MRN: [id number]  Admitting Diagnosis:  Nontraumatic cortical hemorrhage of right cerebral hemisphere       Recommendations:     Discharge Recommendations: High Intensity Therapy  Discharge Equipment Recommendations:  hospital bed, lift device, wheelchair  Barriers to discharge:  Other (Comment) (increased skilled A needed)    Assessment:     Colt Rose is a 69 y.o. 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  He presents with consistent assistance needed for self-care and mobility activities 2/2 AMS. Pt A&O x4 following 80% of simple commands appropriately. Pt with slight intermittent L eye opening though limited. Performance deficits affecting function are weakness, gait instability, decreased upper extremity function, decreased ROM, impaired cardiopulmonary response to activity, impaired endurance, impaired balance, decreased lower extremity function, impaired coordination, decreased safety awareness, visual deficits, impaired sensation, impaired fine motor, impaired self care skills, impaired functional mobility, decreased coordination, abnormal tone. Pt continues to require significant assistance for all mobility activities as well as to maintain sitting EOB balance 2/2 LSW. Pt with no AROM of LUE observed this date. Session limited this date with stand not attempted 2/2 /80 while seated EOB with SBP goal 130-150 with pt returned to supine and nursing present. Patient has demonstrated sufficient progression to warrant high intensity therapy evidenced by objectives noted below.     Co-treatment with PT performed for patient safety, education, and facilitation of treatment to maximize activity tolerance and progression towards goals from two skilled therapy disciplines.     Rehab Prognosis:  Good; patient would benefit from acute skilled OT services to address these deficits and reach  maximum level of function.       Plan:     Patient to be seen 4 x/week to address the above listed problems via self-care/home management, therapeutic activities, therapeutic exercises, neuromuscular re-education  Plan of Care Expires: 25  Plan of Care Reviewed with: patient, spouse    Subjective     Chief Complaint: n/a - pt with few verbalization attempts, using head nods for responses  Patient/Family Comments/goals: increase independence  Pain/Comfort:  Pain Rating 1: 0/10  Pain Rating Post-Intervention 1: 0/10    Objective:     Communicated with: nursing prior to session.  Patient found HOB elevated with telemetry, pulse ox (continuous), blood pressure cuff, NG tube, peripheral IV, Condom Catheter, SCD upon OT entry to room.    General Precautions: Standard, fall, NPO, aspiration    Orthopedic Precautions:N/A  Braces: N/A  Respiratory Status: Room air     Occupational Performance:     Bed Mobility:    Patient completed Rolling/Turning to Left with  maximal assistance, with side rail, and pt assisting with RUE   Patient completed Rolling/Turning to Right with total assistance  Patient completed Scooting/Bridging with maximal assistance and 2 persons  Patient completed Supine to Sit with maximal assistance and 2 persons  Patient completed Sit to Supine with total assistance and 2 persons   Pt seated EOB with maximal assistance 2/2 RUE pushing to L with posterior lean  While seated EOB pt completin L lateral leans to forearm with total assistance and therapist providing tactile input at L elbow to increase L GHJ proprioceptive feedback and rest breaks between repetitions  Therapist assisted LUE PROM shoulder flexion, elbow flexion/extension, wrist flexion/extension x5 reps ea    Functional Mobility/Transfers:  Not attempted this date 2/2 SBP above SBP goal with pt returned to supine    Activities of Daily Living:  Pt with no ADL needs this date      [facility] 6 Click ADL: 11    Treatment &  Education:  Session this date targeted therapeutic exercises to increase pt's independence and LUE feedback  Pt and spouse educated on OT roles, POC, call button for assistance    Patient left HOB elevated with all lines intact, call button in reach, and nursing and spouse present    GOALS:   Multidisciplinary Problems       Occupational Therapy Goals          Problem: Occupational Therapy    Goal Priority Disciplines Outcome Interventions   Occupational Therapy Goal     OT, PT/OT Progressing    Description: Goals to be met by: 6/5/25     Patient will increase functional independence with ADLs by performing:    UE Dressing with Moderate Assistance.  LE Dressing with Moderate Assistance and Assistive Devices as needed.  Grooming while seated with Stand-by Assistance.  Toileting from bedside commode with Moderate Assistance for hygiene and clothing management.   Sitting at edge of bed x10 minutes with Moderate Assistance.  Supine to sit with Moderate Assistance.  Stand pivot transfers with Moderate Assistance.  Toilet transfer to bedside commode with Moderate Assistance.  Increased functional strength to 1/5 for LUE.  Upper extremity exercise program x10 reps per handout, with assistance as needed.                         DME Justifications:  Colt requires a hospital bed due to him requiring positioning of the body in ways not feasible with an ordinary bed due to limited ability and cannot independently make changes in body position without the use of the bed.The positioning of the body cannot be sufficiently resolved by the use of pillows and wedges.  Colt Rose has a mobility limitation that significantly impairs his ability to participate in one or more mobility related activities of daily living (MRADLs) such as toileting, feeding, dressing, grooming, and bathing in customary locations in the home.  The mobility limitation cannot be sufficiently resolved by the use of a cane or walker.   The use of a  manual wheelchair will significantly improve the patients ability to participate in MRADLS and the patient will use it on regular basis in the home.  Colt Rose has expressed his willingness to use a manual wheelchair in the home. Patients upper body strength is sufficient for propulsion.  He also has a caregiver who is available, willing, and able to provide assistance with the wheelchair when needed.      Time Tracking:     OT Date of Treatment: 05/06/25  OT Start Time: 1036  OT Stop Time: 1059  OT Total Time (min): 23 min    Billable Minutes:Therapeutic Exercise 23 5/6/2025

## 2025-05-06 NOTE — EICU
[facility] Night Rounds Checklist  24H Vital Sign Range:  Temp:  [97.9 °F (36.6 °C)-98.8 °F (37.1 °C)]   Pulse:  [68-79]   Resp:  [14-33]   BP: (115-156)/(58-92)   SpO2:  [95 %-100 %]     Video rounds

## 2025-05-06 NOTE — ASSESSMENT & PLAN NOTE
[address]  Discharge- Critical access hospital [place] 1965, 56 y o  male MRN: [id number]  Unit/Bed#: -01 Encounter: [id number]  Primary Care Provider: Verito Avalos DO   Date and time admitted to hospital: 8/6/2022 12:31 PM    * Acute on chronic systolic CHF (congestive heart failure) ([address] )  Assessment & Plan  Wt Readings from Last 3 Encounters:   08/11/22 65 6 kg (144 lb 9 6 oz)   05/31/22 69 2 kg (152 lb 8 9 oz)   04/19/22 65 6 kg (144 lb 10 oz)     · Patient presented to the ED with complaints of dyspnea on exertion, claudication  · Cardiology consult, appreciate input  · Continue Torsemide twice daily  · Follow up outpatient  · Continue to monitor daily weights at home  · 8/10:Patient complaining of worsening shortness of breath anxiety and hemoptysis which is ongoing for the last few weeks  Having anxiety attack and back on oxygen   seen by Critical Care for evaluation and underwent right-sided thoracentesis 1100 cc removed of straw-colored fluid  Fluid sent for analysis appears to be transudative  · 8/11:  Discharge home today with torsemide regimen with outpatient follow-up with Cardiology and pulmonology    Pleural effusion, right  Assessment & Plan  · Moderate to large right pleural effusion now status post right-sided thoracentesis about 1100 cc removed  · Most likely secondary to CHF, secondary to ischemic cardiomyopathy  · Follow cardiology recommendation  · Repeat chest x-ray post thoracentesis shows improvement of right-sided effusion  · Consult placed to pulmonology for hemoptysis  Patient noted to have some phlegm with small amounts of blood periodically throughout the day  Patient states that is been going on for the last few months  Discussed with pulmonology and recommend outpatient follow-up with possible bronch in the future    Tobacco abuse  Assessment & Plan  · Nicoderm patch    Counseled on smoking cessation    Chronic anticoagulation  Assessment & Resume statin    Plan  · Patient was started on Coumadin to prevent thrombus formation given apical akinesis-after recent cardiac catheterization at [facility]  · INR is 1 8  · Continue coumadin at home dosing; follow up outpatient  History of COVID-19  Assessment & Plan  · Recent history of COVID on 05/31/2022    PAD (peripheral artery disease) ([facility])  Assessment & Plan  · Symptomatic with claudication-has mottling around the knee joint area on clinical exam  · Pedal pulse is faintly palpable with Doppler   · Status post aortobifemoral bypass surgery  · Patient is still active smoker  · Patient follows up with vascular surgery at [facility]  · Status post arterial duplex on 08/05/2022:  Patent right limp with abnormal flow demonstrated  50-69% stenosis of the right common femoral artery, occlusion of the right superficial femur with distal recanalization  Occlusion of the left limb of aortic bifurcated bypass graft  · Case discuss by ER provider with on-call vascular surgeon had [facility], Dr Pennie Mayer-continue conservative management pain control, no urgent inpatient procedure required      Discharging Physician / Practitioner: Davion Murry MD  PCP: Aracelis Holly DO  Admission Date:   Admission Orders (From admission, onward)     Ordered        08/06/22 1537  INPATIENT ADMISSION  Once                      Discharge Date: 08/11/22    Medical Problems             Resolved Problems  Date Reviewed: 8/11/2022   None                 Consultations During Hospital Stay:  · Pulm,crit care,cardiology    Procedures Performed:   Right-sided thoracentesis 1100 cc transudative fluid removed on 08/10    Significant Findings / Test Results:   XR chest portable    Result Date: 8/10/2022  Impression: Diminished small right pleural effusion status post thoracentesis   No evidence of pneumothorax Possible infiltrate vs  atelectasis right lung base Workstation performed: [id number]     XR chest portable    Result Date: 8/9/2022  Impression: Cardiomegaly  Stable right pleural effusion and basilar atelectasis  Mild vascular congestion  Workstation performed: [id number]     XR chest portable    Result Date: 8/8/2022  Impression: Pleural-parenchymal density at the right lung base is similar to the study of August 6  Workstation performed: [id number]     XR chest 1 view portable    Result Date: 8/6/2022  Impression: Right base infiltrate  Right pleural effusion The study was marked in EPIC for immediate notification  Workstation performed: [id number]     CTA ED chest PE Study    Result Date: 8/6/2022  Impression: No pulmonary embolism seen in the left lung, right upper lobe, main pulmonary arteries  Limited and nondiagnostic evaluation of the right lower lobe pulmonary arteries (right interlobar, segmental and subsegmental vessels )due to poor and suboptimal enhancement  related to increased pulmonary artery pressure from overlying moderate to large  right effusion  Moderate to large right effusion Minimal groundglass haze in the lungs with mild interlobular septal thickening, suggest congestion There are mixed attenuation focal opacities in the left midlung with evolving nodular consolidation/density, (image 150 series 2 and image 117 series 2) likely due to evolution of the Covid related infiltrate seen on the previous study of May 31, 2022  However short interval follow-up at 2 months suggested for stability The study was marked in [facility] for immediate notification  Workstation performed: [id number]     Incidental Findings:   · None     Test Results Pending at Discharge (will require follow up):    · None     Outpatient Tests Requested:  · Bmp in 1 week    Complications:  none    Reason for Admission:  Shortness of breath and leg pain    Hospital Course:     Gloria Samuels is a 56 y o  male patient who originally presented to the hospital on 8/6/2022 due to acute on chronic systolic CHF exacerbation right-sided pleural effusion along with chronic claudication due to PAD  PATIENT RECEIVED IV DIURESIS AND LOST AROUND 22 LB  Still complaining of shortness of breath and then underwent right-sided thoracentesis and 1100 cc removed  Also seen by Cardiology and pulmonology  Patient has chronic hemoptysis and he is INR closely regulated and outpatient follow-up with pulmonology for possible bronchoscopy  Also has claudication status post aortobifemoral bypass surgery as he still actively smoking and his cardial Doppler done on 08/05/2022 shows right limb of the bypass has abnormal flow with 50-69% stenosis of the right common femoral artery occlusion of the right  Facial femoral artery and occlusion of the left limb of the aortic bifurcated bypass graft  Needs outpatient follow-up with vascular surgery        Please see above list of diagnoses and related plan for additional information  Condition at Discharge: fair     Discharge Day Visit / Exam:     Subjective:  Patient denies any chest pain or shortness of breath or abdominal pain today however he states he feels anxious and has difficulty sleeping at night  Vitals: Blood Pressure: 111/82 (08/11/22 0720)  Pulse: (!) 107 (08/10/22 2351)  Temperature: 98 1 °F (36 7 °C) (08/11/22 0720)  Temp Source: Oral (08/06/22 1900)  Respirations: 14 (08/11/22 0720)  Height: 5' 6" (167 6 cm) (08/09/22 0538)  Weight - Scale: 65 6 kg (144 lb 9 6 oz) (08/11/22 0500)  SpO2: 98 % (08/11/22 0900)  Exam:   Physical Exam  Vitals and nursing note reviewed  Constitutional:       Appearance: Normal appearance  HENT:      Head: Normocephalic and atraumatic  Right Ear: External ear normal       Left Ear: External ear normal       Nose: Nose normal       Mouth/Throat:      Pharynx: Oropharynx is clear  Eyes:      Pupils: Pupils are equal, round, and reactive to light  Cardiovascular:      Rate and Rhythm: Normal rate and regular rhythm  Heart sounds: Normal heart sounds     Pulmonary:      Effort: Pulmonary effort is normal       Comments: Moderate air entry bilaterally with mild decreased breath sounds on the right base  Abdominal:      General: Bowel sounds are normal       Palpations: Abdomen is soft  Tenderness: There is no abdominal tenderness  Musculoskeletal:         General: Normal range of motion  Cervical back: Normal range of motion and neck supple  Skin:     General: Skin is warm and dry  Capillary Refill: Capillary refill takes less than 2 seconds  Neurological:      General: No focal deficit present  Mental Status: He is alert and oriented to person, place, and time  Psychiatric:         Mood and Affect: Mood normal          Discussion with Family:  Discussed with sister    Discharge instructions/Information to patient and family:   See after visit summary for information provided to patient and family  Provisions for Follow-Up Care:  See after visit summary for information related to follow-up care and any pertinent home health orders  Disposition:     Home    For Discharges to [facility] SNF:   · Not Applicable to this Patient - Not Applicable to this Patient    Planned Readmission:  None     Discharge Statement:  I spent 35 minutes discharging the patient  This time was spent on the day of discharge  I had direct contact with the patient on the day of discharge  Greater than 50% of the total time was spent examining patient, answering all patient questions, arranging and discussing plan of care with patient as well as directly providing post-discharge instructions  Additional time then spent on discharge activities  Discharge Medications:  See after visit summary for reconciled discharge medications provided to patient and family        ** Please Note: This note has been constructed using a voice recognition system **

## 2025-05-07 PROBLEM — R33.9 URINARY RETENTION: Status: ACTIVE | Noted: 2025-05-07

## 2025-05-07 LAB
ABSOLUTE EOSINOPHIL (OHS): 0.01 K/UL
ABSOLUTE MONOCYTE (OHS): 1.01 K/UL (ref 0.3–1)
ABSOLUTE NEUTROPHIL COUNT (OHS): 8.48 K/UL (ref 1.8–7.7)
ALBUMIN SERPL BCP-MCNC: 3.4 G/DL (ref 3.5–5.2)
ALP SERPL-CCNC: 61 UNIT/L (ref 40–150)
ALT SERPL W/O P-5'-P-CCNC: 15 UNIT/L (ref 10–44)
ANION GAP (OHS): 9 MMOL/L (ref 8–16)
AST SERPL-CCNC: 15 UNIT/L (ref 11–45)
BASOPHILS # BLD AUTO: 0.04 K/UL
BASOPHILS NFR BLD AUTO: 0.4 %
BILIRUB SERPL-MCNC: 0.5 MG/DL (ref 0.1–1)
BUN SERPL-MCNC: 19 MG/DL (ref 8–23)
CALCIUM SERPL-MCNC: 8.8 MG/DL (ref 8.7–10.5)
CHLORIDE SERPL-SCNC: 112 MMOL/L (ref 95–110)
CO2 SERPL-SCNC: 21 MMOL/L (ref 23–29)
CREAT SERPL-MCNC: 0.6 MG/DL (ref 0.5–1.4)
ERYTHROCYTE [DISTWIDTH] IN BLOOD BY AUTOMATED COUNT: 14.1 % (ref 11.5–14.5)
GFR SERPLBLD CREATININE-BSD FMLA CKD-EPI: >60 ML/MIN/1.73/M2
GLUCOSE SERPL-MCNC: 125 MG/DL (ref 70–110)
HCT VFR BLD AUTO: 40.5 % (ref 40–54)
HGB BLD-MCNC: 13.3 GM/DL (ref 14–18)
IMM GRANULOCYTES # BLD AUTO: 0.05 K/UL (ref 0–0.04)
IMM GRANULOCYTES NFR BLD AUTO: 0.5 % (ref 0–0.5)
LYMPHOCYTES # BLD AUTO: 1.49 K/UL (ref 1–4.8)
MAGNESIUM SERPL-MCNC: 2 MG/DL (ref 1.6–2.6)
MCH RBC QN AUTO: 30.9 PG (ref 27–31)
MCHC RBC AUTO-ENTMCNC: 32.8 G/DL (ref 32–36)
MCV RBC AUTO: 94 FL (ref 82–98)
NUCLEATED RBC (/100WBC) (OHS): 0 /100 WBC
PHOSPHATE SERPL-MCNC: 2.7 MG/DL (ref 2.7–4.5)
PHOSPHATE SERPL-MCNC: 3 MG/DL (ref 2.7–4.5)
PLATELET # BLD AUTO: 205 K/UL (ref 150–450)
PMV BLD AUTO: 10.6 FL (ref 9.2–12.9)
POTASSIUM SERPL-SCNC: 3.7 MMOL/L (ref 3.5–5.1)
PROT SERPL-MCNC: 6.5 GM/DL (ref 6–8.4)
RBC # BLD AUTO: 4.3 M/UL (ref 4.6–6.2)
RELATIVE EOSINOPHIL (OHS): 0.1 %
RELATIVE LYMPHOCYTE (OHS): 13.4 % (ref 18–48)
RELATIVE MONOCYTE (OHS): 9.1 % (ref 4–15)
RELATIVE NEUTROPHIL (OHS): 76.5 % (ref 38–73)
SODIUM SERPL-SCNC: 141 MMOL/L (ref 136–145)
SODIUM SERPL-SCNC: 142 MMOL/L (ref 136–145)
SODIUM SERPL-SCNC: 144 MMOL/L (ref 136–145)
SODIUM SERPL-SCNC: 147 MMOL/L (ref 136–145)
WBC # BLD AUTO: 11.08 K/UL (ref 3.9–12.7)

## 2025-05-07 PROCEDURE — 84100 ASSAY OF PHOSPHORUS: CPT | Performed by: NURSE PRACTITIONER

## 2025-05-07 PROCEDURE — 63600175 PHARM REV CODE 636 W HCPCS: Performed by: PSYCHIATRY & NEUROLOGY

## 2025-05-07 PROCEDURE — 25000003 PHARM REV CODE 250: Performed by: INTERNAL MEDICINE

## 2025-05-07 PROCEDURE — 25000003 PHARM REV CODE 250: Performed by: NURSE PRACTITIONER

## 2025-05-07 PROCEDURE — 84100 ASSAY OF PHOSPHORUS: CPT | Performed by: PSYCHIATRY & NEUROLOGY

## 2025-05-07 PROCEDURE — 97112 NEUROMUSCULAR REEDUCATION: CPT

## 2025-05-07 PROCEDURE — 94660 CPAP INITIATION&MGMT: CPT

## 2025-05-07 PROCEDURE — 85025 COMPLETE CBC W/AUTO DIFF WBC: CPT | Performed by: NURSE PRACTITIONER

## 2025-05-07 PROCEDURE — 99233 SBSQ HOSP IP/OBS HIGH 50: CPT | Mod: GC,,, | Performed by: STUDENT IN AN ORGANIZED HEALTH CARE EDUCATION/TRAINING PROGRAM

## 2025-05-07 PROCEDURE — 27000190 HC CPAP FULL FACE MASK W/VALVE

## 2025-05-07 PROCEDURE — 80053 COMPREHEN METABOLIC PANEL: CPT | Performed by: NURSE PRACTITIONER

## 2025-05-07 PROCEDURE — 25000003 PHARM REV CODE 250

## 2025-05-07 PROCEDURE — 94761 N-INVAS EAR/PLS OXIMETRY MLT: CPT

## 2025-05-07 PROCEDURE — 83735 ASSAY OF MAGNESIUM: CPT | Performed by: NURSE PRACTITIONER

## 2025-05-07 PROCEDURE — 97535 SELF CARE MNGMENT TRAINING: CPT

## 2025-05-07 PROCEDURE — 97530 THERAPEUTIC ACTIVITIES: CPT | Mod: CQ

## 2025-05-07 PROCEDURE — 63600175 PHARM REV CODE 636 W HCPCS

## 2025-05-07 PROCEDURE — 20000000 HC ICU ROOM

## 2025-05-07 PROCEDURE — 99291 CRITICAL CARE FIRST HOUR: CPT | Mod: GC,,, | Performed by: PSYCHIATRY & NEUROLOGY

## 2025-05-07 PROCEDURE — 99900035 HC TECH TIME PER 15 MIN (STAT)

## 2025-05-07 PROCEDURE — 63600175 PHARM REV CODE 636 W HCPCS: Performed by: NURSE PRACTITIONER

## 2025-05-07 PROCEDURE — 92526 ORAL FUNCTION THERAPY: CPT

## 2025-05-07 PROCEDURE — 27100171 HC OXYGEN HIGH FLOW UP TO 24 HOURS

## 2025-05-07 PROCEDURE — 84295 ASSAY OF SERUM SODIUM: CPT | Performed by: PSYCHIATRY & NEUROLOGY

## 2025-05-07 PROCEDURE — 25000003 PHARM REV CODE 250: Performed by: PSYCHIATRY & NEUROLOGY

## 2025-05-07 RX ORDER — ACETAMINOPHEN 325 MG/1
325 TABLET ORAL ONCE
Status: COMPLETED | OUTPATIENT
Start: 2025-05-07 | End: 2025-05-07

## 2025-05-07 RX ORDER — SILODOSIN 4 MG/1
4 CAPSULE ORAL DAILY
Status: DISCONTINUED | OUTPATIENT
Start: 2025-05-07 | End: 2025-05-08

## 2025-05-07 RX ORDER — HYDRALAZINE HYDROCHLORIDE 25 MG/1
25 TABLET, FILM COATED ORAL ONCE
Status: COMPLETED | OUTPATIENT
Start: 2025-05-07 | End: 2025-05-07

## 2025-05-07 RX ORDER — ELECTROLYTES/DEXTROSE
200 SOLUTION, ORAL ORAL
Status: DISCONTINUED | OUTPATIENT
Start: 2025-05-07 | End: 2025-05-08

## 2025-05-07 RX ORDER — MAGNESIUM SULFATE HEPTAHYDRATE 40 MG/ML
2 INJECTION, SOLUTION INTRAVENOUS ONCE
Status: COMPLETED | OUTPATIENT
Start: 2025-05-07 | End: 2025-05-07

## 2025-05-07 RX ORDER — HYDRALAZINE HYDROCHLORIDE 50 MG/1
50 TABLET, FILM COATED ORAL EVERY 8 HOURS
Status: DISCONTINUED | OUTPATIENT
Start: 2025-05-07 | End: 2025-05-08

## 2025-05-07 RX ORDER — ENOXAPARIN SODIUM 100 MG/ML
40 INJECTION SUBCUTANEOUS EVERY 24 HOURS
Status: DISCONTINUED | OUTPATIENT
Start: 2025-05-07 | End: 2025-05-13 | Stop reason: HOSPADM

## 2025-05-07 RX ORDER — LOSARTAN POTASSIUM 50 MG/1
50 TABLET ORAL DAILY
Status: DISCONTINUED | OUTPATIENT
Start: 2025-05-08 | End: 2025-05-08

## 2025-05-07 RX ORDER — MUPIROCIN 20 MG/G
OINTMENT TOPICAL 2 TIMES DAILY
Status: COMPLETED | OUTPATIENT
Start: 2025-05-07 | End: 2025-05-09

## 2025-05-07 RX ORDER — HYDRALAZINE HYDROCHLORIDE 20 MG/ML
10 INJECTION INTRAMUSCULAR; INTRAVENOUS EVERY 6 HOURS PRN
Status: DISCONTINUED | OUTPATIENT
Start: 2025-05-07 | End: 2025-05-13

## 2025-05-07 RX ORDER — LABETALOL HCL 20 MG/4 ML
10 SYRINGE (ML) INTRAVENOUS EVERY 4 HOURS PRN
Status: DISCONTINUED | OUTPATIENT
Start: 2025-05-07 | End: 2025-05-13

## 2025-05-07 RX ORDER — HYDRALAZINE HYDROCHLORIDE 25 MG/1
25 TABLET, FILM COATED ORAL EVERY 8 HOURS
Status: DISCONTINUED | OUTPATIENT
Start: 2025-05-07 | End: 2025-05-07

## 2025-05-07 RX ORDER — LOSARTAN POTASSIUM 25 MG/1
25 TABLET ORAL ONCE
Status: COMPLETED | OUTPATIENT
Start: 2025-05-07 | End: 2025-05-07

## 2025-05-07 RX ADMIN — SILODOSIN 4 MG: 4 CAPSULE ORAL at 11:05

## 2025-05-07 RX ADMIN — HYDRALAZINE HYDROCHLORIDE 10 MG: 20 INJECTION, SOLUTION INTRAMUSCULAR; INTRAVENOUS at 05:05

## 2025-05-07 RX ADMIN — HYPROMELLOSE 2910 1 DROP: 5 SOLUTION OPHTHALMIC at 08:05

## 2025-05-07 RX ADMIN — MUPIROCIN: 20 OINTMENT TOPICAL at 08:05

## 2025-05-07 RX ADMIN — Medication 200 ML: at 08:05

## 2025-05-07 RX ADMIN — SODIUM CHLORIDE 250 ML: 3 INJECTION, SOLUTION INTRAVENOUS at 03:05

## 2025-05-07 RX ADMIN — SODIUM CHLORIDE 250 ML: 3 INJECTION, SOLUTION INTRAVENOUS at 10:05

## 2025-05-07 RX ADMIN — LOSARTAN POTASSIUM 25 MG: 25 TABLET, FILM COATED ORAL at 05:05

## 2025-05-07 RX ADMIN — ACETAMINOPHEN 650 MG: 650 SOLUTION ORAL at 08:05

## 2025-05-07 RX ADMIN — POTASSIUM & SODIUM PHOSPHATES POWDER PACK 280-160-250 MG 2 PACKET: 280-160-250 PACK at 08:05

## 2025-05-07 RX ADMIN — LOSARTAN POTASSIUM 25 MG: 25 TABLET, FILM COATED ORAL at 08:05

## 2025-05-07 RX ADMIN — LABETALOL HYDROCHLORIDE 10 MG: 5 INJECTION, SOLUTION INTRAVENOUS at 03:05

## 2025-05-07 RX ADMIN — SENNOSIDES AND DOCUSATE SODIUM 1 TABLET: 50; 8.6 TABLET ORAL at 08:05

## 2025-05-07 RX ADMIN — ACETAMINOPHEN 325 MG: 325 TABLET ORAL at 11:05

## 2025-05-07 RX ADMIN — MUPIROCIN: 20 OINTMENT TOPICAL at 11:05

## 2025-05-07 RX ADMIN — HYDRALAZINE HYDROCHLORIDE 25 MG: 25 TABLET ORAL at 02:05

## 2025-05-07 RX ADMIN — LABETALOL HYDROCHLORIDE 10 MG: 5 INJECTION, SOLUTION INTRAVENOUS at 12:05

## 2025-05-07 RX ADMIN — Medication 200 ML: at 04:05

## 2025-05-07 RX ADMIN — POTASSIUM BICARBONATE 50 MEQ: 978 TABLET, EFFERVESCENT ORAL at 03:05

## 2025-05-07 RX ADMIN — LABETALOL HYDROCHLORIDE 0.2 MG/MIN: 5 INJECTION INTRAVENOUS at 07:05

## 2025-05-07 RX ADMIN — Medication 200 ML: at 11:05

## 2025-05-07 RX ADMIN — MAGNESIUM SULFATE 2 G: 2 INJECTION INTRAVENOUS at 11:05

## 2025-05-07 RX ADMIN — ENOXAPARIN SODIUM 40 MG: 40 INJECTION SUBCUTANEOUS at 05:05

## 2025-05-07 RX ADMIN — HYDRALAZINE HYDROCHLORIDE 25 MG: 25 TABLET ORAL at 05:05

## 2025-05-07 RX ADMIN — POTASSIUM & SODIUM PHOSPHATES POWDER PACK 280-160-250 MG 2 PACKET: 280-160-250 PACK at 03:05

## 2025-05-07 RX ADMIN — ATORVASTATIN CALCIUM 20 MG: 20 TABLET, FILM COATED ORAL at 08:05

## 2025-05-07 RX ADMIN — SODIUM CHLORIDE 250 ML: 3 INJECTION, SOLUTION INTRAVENOUS at 04:05

## 2025-05-07 RX ADMIN — HYDRALAZINE HYDROCHLORIDE 50 MG: 50 TABLET ORAL at 10:05

## 2025-05-07 NOTE — ASSESSMENT & PLAN NOTE
69-year-old male presents to the ER with left-sided weakness and right-sided gaze preference that began abruptly this afternoon around 4:00 p.m.. At OSH found to have R frontal lobar ICH. Given ddAVP for reversal.    ICHS 0    MRI wo 5/4: swi looks larger , there is IVH now too  CTH 5/5: looks same as MRI , larger and IVH  CTH 5/6: stable    --Patient admitted to [facility] on telemetry      -q1h neurochecks in ICU, q2h neurochecks in stepdown, q4h neurochecks on floor  --All labs and diagnostics reviewed  ---150  --Na >145  --Keppra 500 BID   --HOB >30  --No acute neurosurgical intervention at this time  --Continue to monitor clinically, notify NSGY immediately with any changes in neuro status    Dispo: ICU    Plan d/w Dr. Gross.

## 2025-05-07 NOTE — PT/OT/SLP PROGRESS
Physical Therapy Co-Treatment with OT Christina)    Patient Name:  Colt Rose   MRN:  [id number]  Co-treatment performed for this visit due to need for two skilled therapists to ensure patient safety and to accommodate for patient tolerance/pain management.   Recommendations:     Discharge Recommendations: High Intensity Therapy  Discharge Equipment Recommendations: hospital bed, wheelchair, lift device  Barriers to discharge: increased level of assist    Assessment:     Colt Rose is a 69 y.o. 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  He presents with the following impairments/functional limitations: weakness, impaired endurance, impaired self care skills, impaired functional mobility, gait instability, impaired balance, visual deficits, decreased upper extremity function, decreased lower extremity function, impaired coordination, decreased safety awareness, impaired fine motor, decreased coordination, abnormal tone. Pt's session limited to bed mobility due to ongoing deficits. Most influenced by L side weakness. Blood pressure readings displayed improved tolerance to upright activity compared to yesterday's reading. BP remained within range throughout session. Able to attempt 2 standing trials today with Total assist of 2 persons. Unable to fully stand both attempts. Pt would benefit from continued PT to address these deficits and improve overall function abilities.    Rehab Prognosis: Good; patient would benefit from acute skilled PT services to address these deficits and reach maximum level of function.    Recent Surgery: * No surgery found *      Plan:     During this hospitalization, patient to be seen 4 x/week to address the identified rehab impairments via gait training, therapeutic activities, therapeutic exercises, neuromuscular re-education and progress toward the following goals:    Plan of Care Expires:  05/16/25    Subjective      Chief Complaint: non stated  Patient/Family Comments/goals: wife wanted to see him stand  Pain/Comfort:  Pain Rating 1: 0/10      Objective:     Communicated with RN prior to session.  Patient found HOB elevated with wife, RN adorning condom catheter, telemetry, NG tube, pulse ox (continuous), blood pressure cuff, peripheral IV, Other (comments) (Pressure relief boot and SCD on LLE) upon PT entry to room.     General Precautions: Standard, fall, aspiration  Orthopedic Precautions: N/A  Braces: N/A  Respiratory Status: Room air     Functional Mobility:  Bed Mobility:     Rolling Left: max A x 1. Pt able to reach with RUE and slightly pull himself into roll  Rolling Right: total A x 2.   Supine <> Sit: max x 2 to the L side of bed. Assist to legs and trunk to sit with assist to hips.  Pt used RLE to push LLE over EOB. Kept bringing his RLE back onto the bed  Hip shift total A, R>L, at seated EOB to improve symmetry   Transfers:    Sit to stand: 2 trials total x 2  1st trial: 25% lift. Unable to clear bottom of bed completely  2nd trial: improved lift able to clear bottom. Full stand unachieved  Balance: seated balance: max x 2.   Heavy L anterolateral lean, head down and rounded shoulers  VCs met with poor return, able to adjust short-term      AM-PAC 6 CLICK MOBILITY  Turning over in bed (including adjusting bedclothes, sheets and blankets)?: 2  Sitting down on and standing up from a chair with arms (e.g., wheelchair, bedside commode, etc.): 2  Moving from lying on back to sitting on the side of the bed?: 2  Moving to and from a bed to a chair (including a wheelchair)?: 1  Need to walk in hospital room?: 1  Climbing 3-5 steps with a railing?: 1  Basic Mobility Total Score: 9       Treatment & Education:  Treatment     Patient left HOB elevated with all lines intact, call button in reach, and wife present..    GOALS:   Multidisciplinary Problems       Physical Therapy Goals          Problem: Physical Therapy     Goal Priority Disciplines Outcome Interventions   Physical Therapy Goal     PT, PT/OT Progressing    Description: Goals to be completed by: 5/16/25    Pt will perform sup<>sit transfers w/ moderate assistance  Pt will have sufficient dynamic balance to sit EOB while performing ADLs/therex w/ minimum assistance  Pt will be able to stand up from EOB w/ moderate assistance using LRAD  Pt will transfer from bed to chair w/ moderate assistance using LRAD  Pt will be independent w/ HEP therex on BLE w/ good form and ROM                        DME Justifications:  Colt requires a hospital bed due to him requiring positioning of the body in ways not feasible with an ordinary bed to alleviate pain and due to limited ability and cannot independently make changes in body position without the use of the bed.The positioning of the body cannot be sufficiently resolved by the use of pillows and wedges.  Colt Rsoe has a mobility limitation that significantly impairs his ability to participate in one or more mobility related activities of daily living (MRADLs) such as toileting, feeding, dressing, grooming, and bathing in customary locations in the home.  The mobility limitation cannot be sufficiently resolved by the use of a cane or walker.   Patients upper body strength is not sufficient to propel a standard WC. The use of a lightweight wheelchair will significantly improve the patients ability to participate in MRADLS and the patient will use it on regular basis in the home.   He has a caregiver who is available, willing, and able to provide assistance with the wheelchair when needed.     Time Tracking:     PT Received On: 05/07/25  PT Start Time: 1151     PT Stop Time: 1226  PT Total Time (min): 35 min     Billable Minutes: Therapeutic Activity 35    Treatment Type: Treatment  PT/PTA: PTA     Number of PTA visits since last PT visit: 2     05/07/2025

## 2025-05-07 NOTE — PROGRESS NOTES
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 CTH with right parietal IPH, CTA without AVM/abnormalities. ASA hx, Given ddAVP for reversal. Transferred to [facility] for management and eval.     Hospital Course: 05/05/2025 CTH this AM stable when compared to MRI. EEG without evidence of seizures. Will hold off AEDs for now. Goal of SBP <150 and Na >140. No NSGY interventions. Will repeat CTH tomorrow AM.   05/06/2025 Repeat CTH shows increased edema with increased midline shift. Increase sodium goal to >145. Amlodipine added for BP support. Patient with flushing after amlodipine given. Hydroxyzine and IV pepcid given as patient reports possible allergy to bendaryl in the past. Losartan added instead. NG tube placed and started tube feeds.  05/07/2025 Repeat CTH head was stable. Blood pressure medication changes for optimization.     Interval History/Significant Events: NAEO. Repeat CTH head was stable. Blood pressure medication changes for optimization. Increased losartan to 50mg and added hydralazine 25mg. Also added silodosin for urinary retention.     Review of Systems   Constitutional:  Negative for fever.   Eyes:  Negative for photophobia.   Respiratory:  Negative for shortness of breath.    Cardiovascular:  Negative for chest pain.   Gastrointestinal:  Negative for nausea.   Genitourinary:  Negative for dysuria.   Neurological:  Positive for tremors, facial asymmetry and weakness.     Objective:     Vitals:    Temp: 98.6 °F (37 °C)  Pulse: 73  Rhythm: normal sinus rhythm  BP: (!)  152/70  MAP (mmHg): 94  Resp: 20  SpO2: (!) 94 %  Oxygen Concentration (%): 24    Temp  Min: 97.9 °F (36.6 °C)  Max: 99.9 °F (37.7 °C)  Pulse  Min: 66  Max: 81  BP  Min: 128/61  Max: 155/74  MAP (mmHg)  Min: 88  Max: 107  Resp  Min: 16  Max: 26  SpO2  Min: 94 %  Max: 99 %  Oxygen Concentration (%)  Min: 24  Max: 24    05/06 0701 - 05/07 0700  In: 1916 [I.V.:1256]  Out: 1150 [Urine:1150]   Unmeasured Output  Urine Occurrence: 1  Stool Occurrence: 1  Pad Count: 2        Physical Exam  Vitals and nursing note reviewed.   Cardiovascular:      Rate and Rhythm: Normal rate and regular rhythm.      Pulses: Normal pulses.      Heart sounds: Normal heart sounds.   Pulmonary:      Effort: Pulmonary effort is normal.      Breath sounds: Normal breath sounds.   Abdominal:      General: Bowel sounds are normal.      Palpations: Abdomen is soft.   Skin:     General: Skin is warm and dry.      Capillary Refill: Capillary refill takes 2 to 3 seconds.   Neurological:      Comments: E3 V5 M6  Mild left facial weakness  Perrla, Open eyes to voice with some lid apraxia, right gaze preference though does cross midline with direction.  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 Neuro Following  - NSGY following   - Repeat CTH with edema expansion and worsening midline shift. Increased sodium goals.   - Repeat CTH on 5/7 stable.   - Na > 145 to decrease risk of edema.   - Sodium q6h   - Hypertonic saline q6h prn  - MRI brain without evidence of amyloid.   - neuro checks q2h  - SBP < 150   - losartan 50mg qd   - hydralazine  25mg TID   - hydralazine and labetalol prn  - Coags WNL  - DDAVP given prior to transfer for ASA hx     PT/OT, SLP   - high intensity therapy recommended    Vasogenic brain edema  See ICH    Seizure  - Left hand twitching on arrival, did appear rhymic. Resolved at this time.  - EEG without evidence of seizure.  - Holding AEDs at this time.    Dysarthria  SLP consulted   - Keep NPO   - NG tube placed, started tube feeds   - FWF with normal saline 30cc q6h    Acute left-sided weakness  PT/OT    Cardiac/Vascular  Dyslipidemia  Resume statin     Renal/  Urinary retention  Added silodosin to assist with symptoms.           The patient is being Prophylaxed for:  Venous Thromboembolism with: Chemical  Stress Ulcer with: None  Ventilator Pneumonia with: not applicable    Activity Orders            Elevate HOB Elevate HOB 30-45 degrees during feeding unless contraindicated starting at 05/06 0903    Turn patient starting at 05/04 2000    Elevate HOB starting at 05/04 1920    Diet NPO: NPO starting at 05/04 1920          Full Code    William Appiah MD  Neurocritical Care  [facility] - Neuro Critical [facility]

## 2025-05-07 NOTE — SUBJECTIVE & OBJECTIVE
Neurologic Chief Complaint: ICH    Subjective:     Interval History: Patient is seen for follow-up neurological assessment and treatment recommendations: CTH this am w/ stable hemorrhagic size. NSGY following, will be on stanby in case of need for evacuation. Intermittent 3% bolus to maintain Na >145. Drowsy this am but responsive to commands. Will obtain MRI brain when stable    HPI, Past Medical, Family, and Social History remains the same as documented in the initial encounter.     Review of Systems   Constitutional:  Negative for fever.   Eyes:  Negative for discharge.   Respiratory:  Negative for apnea, cough and wheezing.    Cardiovascular:  Negative for leg swelling.   Gastrointestinal:  Negative for abdominal distention.   Neurological:  Positive for facial asymmetry, speech difficulty, weakness and numbness.   Psychiatric/Behavioral:  Negative for agitation, behavioral problems and decreased concentration.      Scheduled Meds:   artificial tears  1 drop Both Eyes BID    atorvastatin  20 mg Per NG tube QHS    electrolytes-dextrose  200 mL Per NG tube Q4H    enoxparin  40 mg Subcutaneous Q24H (prophylaxis, 1700)    hydrALAZINE  25 mg Per NG tube Q8H    [START ON 5/8/2025] losartan  50 mg Per NG tube Daily    magnesium sulfate 2 g IVPB  2 g Intravenous Once    mupirocin   Nasal BID    senna-docusate  1 tablet Per NG tube BID    silodosin  4 mg Per NG tube Daily     Continuous Infusions:      PRN Meds:  Current Facility-Administered Medications:     acetaminophen, 650 mg, Per NG tube, Q6H PRN    bisacodyL, 10 mg, Rectal, Daily PRN    hydrALAZINE, 10 mg, Intravenous, Q6H PRN    labetalol, 10 mg, Intravenous, Q4H PRN    magnesium oxide, 800 mg, Per NG tube, PRN    magnesium oxide, 800 mg, Per NG tube, PRN    ondansetron, 4 mg, Intravenous, Q8H PRN    potassium bicarbonate, 35 mEq, Per NG tube, PRN    potassium bicarbonate, 50 mEq, Per NG tube, PRN    potassium bicarbonate, 60 mEq, Per NG tube, PRN    potassium,  sodium phosphates, 2 packet, Per NG tube, PRN    potassium, sodium phosphates, 2 packet, Per NG tube, PRN    potassium, sodium phosphates, 2 packet, Per NG tube, PRN    sodium chloride 0.9%, 10 mL, Intravenous, PRN    sodium chloride 3% HYPERTONIC, 250 mL, Intravenous, Q6H PRN    Objective:     Vital Signs (Most Recent):  Temp: 98.6 °F (37 °C) (05/07/25 1102)  Pulse: 73 (05/07/25 1202)  Resp: 20 (05/07/25 1202)  BP: 133/68 (05/07/25 1202)  SpO2: (!) 94 % (05/07/25 1202)  BP Location: Left arm    Vital Signs Range (Last 24H):  Temp:  [97.9 °F (36.6 °C)-99.9 °F (37.7 °C)]   Pulse:  [66-81]   Resp:  [16-29]   BP: (128-156)/(61-80)   SpO2:  [94 %-99 %]   BP Location: Left arm       Physical Exam  Vitals and nursing note reviewed.   HENT:      Head: Normocephalic and atraumatic.   Cardiovascular:      Rate and Rhythm: Normal rate.      Pulses: Normal pulses.   Pulmonary:      Effort: Pulmonary effort is normal. No respiratory distress.      Breath sounds: No wheezing.   Abdominal:      General: There is no distension.      Palpations: Abdomen is soft.   Musculoskeletal:         General: No swelling.      Right lower leg: No edema.      Left lower leg: No edema.   Skin:     General: Skin is warm and dry.      Capillary Refill: Capillary refill takes 2 to 3 seconds.   Neurological:      Mental Status: He is alert.      Comments: E3 V5 M6  Mild left facial weakness  Eyelid apraxia, right gaze preference  Left sided weakness/plegia, slightly increased tone  Left side tito-neglect  RUE/RLE 3/5 , follows commands                  Neurological Exam:   LOC: alert  Attention Span: Good   Language: non-verbal at time of exam  Orientation: Person, Place, Time   Visual Fields: Full and Visual neglect  EOM (CN III, IV, VI): Gaze preference  right  Facial Movement (CN VII): Lower facial weakness on the Left  Motor: Arm left  Plegia 0/5  Leg left  Plegia 0/5  Arm right  Normal 5/5  Leg right Paresis: 3/5  Sensation: decreased on  L    Laboratory:  All labs reviewd    Diagnostic Results   All imaging reviewed

## 2025-05-07 NOTE — PHYSICIAN QUERY
Question: Please provide a diagnosis for the midline shift.    Provider Query Response:  Non-Traumatic Brain Compression

## 2025-05-07 NOTE — PLAN OF CARE
[facility] Care Plan    POC reviewed with Colt Rose and family at 0300. Patient did not verbalize understanding. Questions and concerns addressed. No acute events today. Pt progressing toward goals. Will continue to monitor. See below and flowsheets for full assessment and VS info.   - Pt. Not verbalizing or opening eyes on arrival, but was briskly following commands and nodding appropriately to questions.  NCC NP notified, pending no other neuro changes will do CT as scheduled, Pt. With not other changes during shift, CT done after midnight.  Pt. With slight improvement in neuro exam this am, some verbalization attempts intermittently  - x2 250cc 3% bolus's overnight for Na > 145, trending towards goal  - TF at goal, tolerating well  - SBP < 150, PRNs given as charted for goal         Is this a stroke patient? yes    Neuro:  [place] Coma Scale  Best Eye Response: 2-->(E2) to pain  Best Motor Response: 6-->(M6) obeys commands  Best Verbal Response: 1-->(V1) none  [place] Coma Scale Score: 9  Assessment Qualifiers: patient not sedated/intubated, other (see comments) (NP Burt notified, following commands briskly and nods appropriately despite not verbally answering)  Pupil PERRLA: yes     24 hr Temp:  [97.9 °F (36.6 °C)-99.9 °F (37.7 °C)]     CV:   Rhythm: normal sinus rhythm  BP goals:   SBP < 150  MAP > 65    Resp:      Oxygen Concentration (%): 24    Plan: N/A    GI/:     Diet/Nutrition Received: tube feeding  Last Bowel Movement: 05/04/25  Voiding Characteristics: external catheter    Intake/Output Summary (Last 24 hours) at 5/7/2025 0624  Last data filed at 5/7/2025 0605  Gross per 24 hour   Intake 1915.95 ml   Output 1150 ml   Net 765.95 ml     Unmeasured Output  Urine Occurrence: 1  Stool Occurrence: 1  Pad Count: 2    Labs/Accuchecks:  Recent Labs   Lab 05/07/25  0204   WBC 11.08   RBC 4.30*   HGB 13.3*   HCT 40.5         Recent Labs   Lab 05/07/25  0204      K 3.7   CO2 21*   *   BUN 19  "  CREATININE 0.6   ALKPHOS 61   ALT 15   AST 15   BILITOT 0.5      Recent Labs   Lab 05/05/25  0145   PROTIME 11.8   INR 1.1   APTT 22.6    No results for input(s): "CPK", "CPKMB", "TROPONINI", "MB" in the last 168 hours.    Electrolytes: Electrolytes replaced  Accuchecks: none    Gtts:      LDA/Wounds:    Mir Risk Assessment  Sensory Perception: 3-->slightly limited  Moisture: 4-->rarely moist  Activity: 1-->bedfast  Mobility: 2-->very limited  Nutrition: 3-->adequate  Friction and Shear: 3-->no apparent problem  Mir Score: 16  Is your mir score 12 or less? no          Restraints:        WCTM   "

## 2025-05-07 NOTE — ASSESSMENT & PLAN NOTE
- CTH/CTA with right parietal IPH, no AVM/malformation   - Vasc Neuro Following  - NSGY following   - Repeat CTH with edema expansion and worsening midline shift. Increased sodium goals.   - Repeat CTH on 5/7 stable.   - Na > 145 to decrease risk of edema.   - Sodium q6h   - Hypertonic saline q6h prn  - MRI brain without evidence of amyloid.   - neuro checks q2h  - SBP < 150   - losartan 50mg qd   - hydralazine 25mg TID   - hydralazine and labetalol prn  - Coags WNL  - DDAVP given prior to transfer for ASA hx     PT/OT, SLP   - high intensity therapy recommended

## 2025-05-07 NOTE — ASSESSMENT & PLAN NOTE
Colt Rose is a 69 y.o. male with a significant medical history of HLD, cervical and lumbar DDD, pre-diabetes who presents to the hospital as a transfer from [facility] for evaluation of R ICH. He acutely developed LSW and R gaze around 4 PM today (5/5/2025). CTH was obtained and showed a 3.3 x 2.4 cm R frontoparietal ICH w/mild vasogenic edema. The patient was given DDVAP as he takes ASA daily and transferred to [facility] for higher level of care, further evaluation.    On this exam the patient is lethargic and in need of intermittent stimulation to arouse to participate with exam. He opens his eyes to touch and voice and is noted to be doing a repetitive rubbing/picking motion to his chest with his RUE. Dysconjugate gaze. Withdraws from pain in the left sided extremities. NIHSS 14. Admitted to [facility].     EEG with abnormal brain activity --> focal slowing over the right anterior quadrant consistent with underlying subcortical dysfunction. CTH and MRI w/o contrast on 5/5 with increased size of hemorrhage and midline shift. NGT placed on 5/6 s/p SLP assessment failure.     - Repeat CTH this am w/ stable hemorrhagic size, not worsened though swelling has not reduced  - NSGY following in case of need for evacuation   - Requiring Intermittent 3% bolus to maintain Na >145  - Recommend MRI Brain w/ Contrast to further assess for underlying etiology of ICH when clinically appropriate  - Keep -150      Antithrombotics for secondary stroke prevention: Antiplatelets: None: Intracerebral Hemorrhage    Statins for secondary stroke prevention and hyperlipidemia, if present:   Statins: Not indicated in acute ICH however, LDL is 102.2. The patient is currently prescribed atorvastatin 20 mg daily, may benefit from increasing to 40 mg daily.     Aggressive risk factor modification: HLD, sleep apnea     Rehab efforts: The patient has been evaluated by a stroke team provider and the therapy needs have been  fully considered based off the presenting complaints and exam findings. The following therapy evaluations are needed: PT evaluate and treat, OT evaluate and treat, SLP evaluate and treat, PM&R evaluate for appropriate placement    Diagnostics ordered/pending: MRI head without contrast to assess brain parenchyma, TTE to assess cardiac function/status , Other: CTH prn    VTE prophylaxis: Mechanical prophylaxis: Place SCDs  None: Reason for No Pharmacological VTE Prophylaxis: History of systemic or intracranial bleeding    BP parameters: ICH: SBP Goal Range 130-150

## 2025-05-07 NOTE — PLAN OF CARE
"[facility] Care Plan  POC reviewed with Colt Rose and family at 1400. Patient and Family verbalized understanding. Questions and concerns addressed. No acute events today. Pt progressing toward goals. Will continue to monitor. See below and flowsheets for full assessment and VS info.     - 1x 3% Bolus for NA < 145  - PRN Tylenol x1 for 99.6 temp  - 200cc Pedialyte Q4  - BM x3  - Phos Replaced  - TF continued at goal  - PRN Labetalol & Hydralazine used for SBP > 150  - SBP goal now < 160      Neuro:  [place] Coma Scale  Best Eye Response: 4-->(E4) spontaneous  Best Motor Response: 6-->(M6) obeys commands  Best Verbal Response: 4-->(V4) confused  [place] Coma Scale Score: 14  Assessment Qualifiers: patient not sedated/intubated  Pupil PERRLA: yes     24 hr Temp:  [98.2 °F (36.8 °C)-99.9 °F (37.7 °C)]     CV:   Rhythm: normal sinus rhythm  BP goals:   SBP < 130-150  MAP > 65    Resp:      Oxygen Concentration (%): 24    Plan: N/A    GI/:     Diet/Nutrition Received: tube feeding  Last Bowel Movement: 05/07/25  Voiding Characteristics: external catheter    Intake/Output Summary (Last 24 hours) at 5/7/2025 1630  Last data filed at 5/7/2025 1502  Gross per 24 hour   Intake 1885.32 ml   Output 1195 ml   Net 690.32 ml     Unmeasured Output  Urine Occurrence: 1  Stool Occurrence: 1  Pad Count: 2    Labs/Accuchecks:  Recent Labs   Lab 05/07/25  0204   WBC 11.08   RBC 4.30*   HGB 13.3*   HCT 40.5         Recent Labs   Lab 05/07/25  0204 05/07/25  0829 05/07/25  1510      < > 144   K 3.7  --   --    CO2 21*  --   --    *  --   --    BUN 19  --   --    CREATININE 0.6  --   --    ALKPHOS 61  --   --    ALT 15  --   --    AST 15  --   --    BILITOT 0.5  --   --     < > = values in this interval not displayed.      Recent Labs   Lab 05/05/25  0145   PROTIME 11.8   INR 1.1   APTT 22.6    No results for input(s): "CPK", "CPKMB", "TROPONINI", "MB" in the last 168 hours.    Electrolytes: Electrolytes " replaced  Accuchecks: none    Gtts:      LDA/Wounds:    Mir Risk Assessment  Sensory Perception: 3-->slightly limited  Moisture: 4-->rarely moist  Activity: 1-->bedfast  Mobility: 2-->very limited  Nutrition: 3-->adequate  Friction and Shear: 3-->no apparent problem  Mir Score: 16    Is your mir score 12 or less? no            Restraints:        WCTM

## 2025-05-07 NOTE — PT/OT/SLP PROGRESS
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 male with an SLP diagnosis of Dysphagia, Dysarthria, and Cognitive-Linguistic Impairment.      Subjective     Nursing cleared pt. To be seen    Patient goals: did not state     Pain/Comfort:  Pain Rating 1: 0/10  Pain Rating Post-Intervention 1: 0/10    Respiratory Status: nasal cannula    Objective:     Has the patient been evaluated by SLP for swallowing?   Yes  Keep patient NPO? Yes   Current Respiratory Status:        Pt. Seen at bedside with wife present.  Hob was raised to 90 degree angle and ng tube remained in place.  Pt. Did rouse with stimulation however he required frequent cues to open eyes.  Verbalizations elicited were intelligible in known context with dysarthria persisting.  Vocal quality was dysphonic with reduced intensity. He produced weak cough and throat clear on command. Pt. Was given 1/2 - 1 teaspoon sized bolus' via spoon x6 trials.  When presented with teaspoons of water, pt. Readily removed bolus from spoon and initiated oral transit.  Initiation of pharyngeal swallow was delayed with occasional double swallows produced per bolus.  No coughing, throat clearing noted however silent aspiration cannot be ruled out.  No further po trials attempted due to risk of aspiration.  Ongoing education provided to wife re slp plan of care and likely need for mod barium swallow study as pt. Progresses.         Goals:   Multidisciplinary  Problems       SLP Goals          Problem: SLP    Goal Priority Disciplines Outcome   SLP Goal     SLP Progressing   Description: Goals due 5/12  1.  Pt. Will participate in ongoing assessment of swallow at bedside  2.  Pt. Will participate in speech, language and cognitive assessment                        Plan:     Patient to be seen:  4 x/week   Plan of Care expires:  06/02/25  Plan of Care reviewed with:  patient, spouse   SLP Follow-Up:  Yes       Discharge recommendations:  High Intensity Therapy   Barriers to Discharge:  means of nutrition    Time Tracking:     SLP Treatment Date:   05/07/25  Speech Start Time:  0800  Speech Stop Time:  0819     Speech Total Time (min):  19 min    Billable Minutes: Treatment Swallowing Dysfunction 10 and Self Care/Home Management Training 9    05/07/2025

## 2025-05-07 NOTE — SUBJECTIVE & OBJECTIVE
"5/7: DALLIN. Na 142. Aox3 with choices, FC. CTM closely    Prescriptions Prior to Admission[1]    Review of patient's allergies indicates:   Allergen Reactions    Amlodipine Other (See Comments)     Flushed face, resolved with Hydroxyzine and Pepcid    Benadryl [diphenhydramine hcl] Other (See Comments)     Per wife "pt collapsed and ended up in hospital" ~1995    Pennsaid [diclofenac sodium] Rash and Blisters       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    Cervical disc displacement     Degeneration of lumbar intervertebral disc     GERD (gastroesophageal reflux disease)     gastric polyps    Hip pain 02/22/2016    Hyperlipidemia     Shingles 11/2013    Sleep apnea      Past Surgical History:   Procedure Laterality Date    ABLATION, SVT, ACCESSORY PATHWAY N/A 5/25/2023    Procedure: Ablation, SVT, Accessory Pathway;  Surgeon: Goyo Sky MD;  Location: [facility];  Service: Cardiology;  Laterality: N/A;  PAC, RFA, CARTO, MAC, GP, 3 PREP    CERVICAL FUSION      bone graft    COLONOSCOPY N/A 7/29/2021    Procedure: COLONOSCOPY;  Surgeon: Tyler Story MD;  Location: [facility];  Service: Endoscopy;  Laterality: N/A;    COLONOSCOPY N/A 10/30/2024    Procedure: COLONOSCOPY;  Surgeon: Tyler Story MD;  Location: [facility];  Service: Endoscopy;  Laterality: N/A;  m/ppm    EPIDURAL STEROID INJECTION INTO LUMBAR SPINE N/A 12/5/2019    Procedure: Injection-steroid-epidural-lumbar;  Surgeon: Adarsh Kraft MD;  Location: [facility] OR;  Service: Pain Management;  Laterality: N/A;  L5-S1    ESOPHAGOGASTRODUODENOSCOPY N/A 10/27/2020    Procedure: EGD (ESOPHAGOGASTRODUODENOSCOPY);  Surgeon: Tyler Story MD;  Location: [facility];  Service: Endoscopy;  Laterality: N/A;    ESOPHAGOGASTRODUODENOSCOPY N/A 2/14/2025    Procedure: EGD (ESOPHAGOGASTRODUODENOSCOPY);  Surgeon: Tyler Story MD;  Location: [facility];  Service: Endoscopy;  Laterality: N/A;    INJECTION OF ANESTHETIC AGENT " AROUND MEDIAL BRANCH NERVES INNERVATING LUMBAR FACET JOINT Bilateral 11/18/2020    Procedure: BLOCK, NERVE, LUMBAR, MEDIAL BRANCH Bilateral L3,4,5;  Surgeon: Adarsh Kraft MD;  Location: [facility] OR;  Service: Pain Management;  Laterality: Bilateral;    INJECTION OF ANESTHETIC AGENT AROUND MEDIAL BRANCH NERVES INNERVATING LUMBAR FACET JOINT Left 10/25/2023    Procedure: Block-nerve-medial branch-lumbar;  Surgeon: Adarsh Kraft MD;  Location: [facility] OR;  Service: Anesthesiology;  Laterality: Left;  l4-5, l5-s1 MBB    INJECTION OF ANESTHETIC AGENT AROUND MEDIAL BRANCH NERVES INNERVATING LUMBAR FACET JOINT Bilateral 11/28/2023    Procedure: Block-nerve-medial branch-lumbar;  Surgeon: Adarsh Kraft MD;  Location: [facility] OR;  Service: Anesthesiology;  Laterality: Bilateral;  L4.5 and l5/s1 MBB #2    RADIOFREQUENCY ABLATION OF LUMBAR MEDIAL BRANCH NERVE AT SINGLE LEVEL Bilateral 1/29/2021    Procedure: Radiofrequency Ablation, Nerve, Spinal, Lumbar, Medial Branch, 1 Level;  Surgeon: Adarsh Kraft MD;  Location: [facility] OR;  Service: Pain Management;  Laterality: Bilateral;  L3,4,5    TONSILLECTOMY, ADENOIDECTOMY       Family History       Problem Relation (Age of Onset)    Coronary artery disease Mother    Diabetes Mother    Heart disease Brother    Hypertension Mother    Stroke Mother, Father          Tobacco Use    Smoking status: Never    Smokeless tobacco: Never   Substance and Sexual Activity    Alcohol use: Yes     Alcohol/week: 6.0 standard drinks of alcohol     Types: 6 Cans of beer per week     Comment: weekly or less    Drug use: No    Sexual activity: Yes     Partners: Female       Objective:     Weight: 88.5 kg (195 lb 1.7 oz)  Body mass index is 27.99 kg/m².  Vital Signs (Most Recent):  Temp: 99.3 °F (37.4 °C) (05/07/25 0305)  Pulse: 76 (05/07/25 0605)  Resp: (!) 23 (05/07/25 0605)  BP: (!) 141/65 (05/07/25 0605)  SpO2: 97 % (05/07/25 0605) Vital Signs (24h Range):  Temp:  [97.9 °F (36.6 °C)-99.9 °F (37.7 °C)] 99.3 °F  "(37.4 °C)  Pulse:  [64-81] 76  Resp:  [16-31] 23  SpO2:  [95 %-100 %] 97 %  BP: (128-170)/(61-80) 141/65                  Oxygen Concentration (%):  [24] 24                Physical Exam         Neurosurgery Physical Exam    GENERAL: resting comfortably  HEENT: NCAT, PERRL, mucous membranes moist  NECK: supple, trachea midline  CV: normal capillary refill  PULM: aerating well, symmetric expansion, no distress  ABD: soft, NT, ND  EXT: no c/c/e    NEURO:    AAO x 3 w choices  CN II-XII grossly intact  RUE/RLE: fc  LUE/LLE: plegic  Decreased sensation L hemibody      Significant Labs:  Recent Labs   Lab 05/06/25  0209 05/06/25  0819 05/06/25  1415 05/06/25 1952 05/07/25  0204   *  --   --   --  125*      < > 139 141 142   K 4.1  --   --   --  3.7   *  --   --   --  112*   CO2 21*  --   --   --  21*   BUN 20  --   --   --  19   CREATININE 0.7  --   --   --  0.6   CALCIUM 8.5*  --   --   --  8.8   MG 2.0  --   --   --  2.0    < > = values in this interval not displayed.     Recent Labs   Lab 05/06/25 0209 05/07/25  0204   WBC 10.02 11.08   HGB 13.0* 13.3*   HCT 40.3 40.5    205     No results for input(s): "LABPT", "INR", "APTT" in the last 48 hours.    Microbiology Results (last 7 days)       ** No results found for the last 168 hours. **          All pertinent labs from the last 24 hours have been reviewed.    Significant Diagnostics:  I have reviewed and interpreted all pertinent imaging results/findings within the past 24 hours.  CTA Head and Neck (xpd)  Result Date: 5/4/2025  1. Acute right frontal intraparenchymal hematoma with mild adjacent edema is similar to the recent prior study.  Follow-up recommended. 2. No high-grade stenosis or major vessel occlusion. Electronically signed by: Frandy Landis Date:    05/04/2025 Time:    16:51    CT HEAD FOR CODE STROKE  Result Date: 5/4/2025  3.3 x 2.4 cm intracranial hemorrhage within the right frontoparietal region with mild vasogenic edema.  " There is no midline shift These findings were called to the ordering physician at 16:22 Electronically signed by: Enriqueta De La Vega Date:    05/04/2025 Time:    16:24    CT Head Without Contrast  Result Date: 5/5/2025  1. Right frontal parenchymal hemorrhage with subarachnoid and intraventricular extension appears relatively similar when compared to MRI performed 05/04/2025, but notably increased since initial head CT of 05/04/2025. 2. Relatively similar associated mass effect compared to most recent MRI. Electronically signed by: Colt Ang Date:    05/05/2025 Time:    06:25    CTA Head and Neck (xpd)  Result Date: 5/4/2025  1. Acute right frontal intraparenchymal hematoma with mild adjacent edema is similar to the recent prior study.  Follow-up recommended. 2. No high-grade stenosis or major vessel occlusion. Electronically signed by: Frandy Landis Date:    05/04/2025 Time:    16:51    CT HEAD FOR CODE STROKE  Result Date: 5/4/2025  3.3 x 2.4 cm intracranial hemorrhage within the right frontoparietal region with mild vasogenic edema.  There is no midline shift These findings were called to the ordering physician at 16:22 Electronically signed by: Enriqueta De La Vega Date:    05/04/2025 Time:    16:24    No results found in the last 24 hours.  XR NG/OG tube placement check, non-radiologist performed  Result Date: 5/6/2025  No acute process seen. Electronically signed by: Otto García MD Date:    05/06/2025 Time:    09:43           [1]   Medications Prior to Admission   Medication Sig Dispense Refill Last Dose/Taking    aspirin (ECOTRIN) 81 MG EC tablet Take 1 tablet (81 mg total) by mouth once daily.  0     atorvastatin (LIPITOR) 20 MG tablet TAKE ONE TABLET BY MOUTH ONCE DAILY 90 tablet 3     ciclopirox (PENLAC) 8 % Soln Apply topically. (Patient not taking: Reported on 1/16/2025)       docosahexaenoic acid/epa (FISH OIL ORAL) Take 1 capsule by mouth once daily.       magnesium oxide (MAG-OX) 400 mg (241.3 mg  magnesium) tablet Take 1 tablet (400 mg total) by mouth once daily. 90 tablet 3     meloxicam (MOBIC) 15 MG tablet TAKE 1 TABLET BY MOUTH AT NIGHT  AS NEEDED 90 tablet 3     methocarbamoL (ROBAXIN) 500 MG Tab TAKE ONE TABLET BY MOUTH EVERY 8 HOURS AS NEEDED FOR MUSCLE SPASMS 90 tablet 2     multivitamin (THERAGRAN) per tablet Take 1 tablet by mouth once daily.       omeprazole (PRILOSEC) 40 MG capsule Take 1 capsule (40 mg total) by mouth every morning. 90 capsule 3     pregabalin (LYRICA) 100 MG capsule Take 100 mg by mouth once daily.       triamcinolone acetonide 0.1% (KENALOG) 0.1 % ointment Apply topically 2 (two) times daily. 30 g 0

## 2025-05-07 NOTE — EICU
Virtual ICU Quality Rounds    Admit Date: 5/4/2025  Hospital Day: 3    ICU Day: 2d 18h    24H Vital Sign Range:  Temp:  [97.9 °F (36.6 °C)-99.9 °F (37.7 °C)]   Pulse:  [66-81]   Resp:  [16-29]   BP: (128-156)/(61-80)   SpO2:  [94 %-99 %]     VICU Surveillance Screening    LDA reconciliation : Yes

## 2025-05-07 NOTE — PROGRESS NOTES
"[facility] - Neuro Critical Care  Neurosurgery  Progress Note    Subjective:     History of Present Illness: 69-year-old male presents to the ER with left-sided weakness and right-sided gaze preference that began abruptly this afternoon around 4:00 p.m.. At OSH found to have R frontal lobar ICH. Given ddAVP for reversal.    Post-Op Info:  * No surgery found *       5/7: NAEON. Na 142. Aox3 with choices, FC. CTM closely    Prescriptions Prior to Admission[1]    Review of patient's allergies indicates:   Allergen Reactions    Amlodipine Other (See Comments)     Flushed face, resolved with Hydroxyzine and Pepcid    Benadryl [diphenhydramine hcl] Other (See Comments)     Per wife "pt collapsed and ended up in hospital" ~1995    Pennsaid [diclofenac sodium] Rash and Blisters       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    Cervical disc displacement     Degeneration of lumbar intervertebral disc     GERD (gastroesophageal reflux disease)     gastric polyps    Hip pain 02/22/2016    Hyperlipidemia     Shingles 11/2013    Sleep apnea      Past Surgical History:   Procedure Laterality Date    ABLATION, SVT, ACCESSORY PATHWAY N/A 5/25/2023    Procedure: Ablation, SVT, Accessory Pathway;  Surgeon: Goyo Sky MD;  Location: [facility] EP LAB;  Service: Cardiology;  Laterality: N/A;  PAC, RFA, CARTO, MAC, GP, 3 PREP    CERVICAL FUSION      bone graft    COLONOSCOPY N/A 7/29/2021    Procedure: COLONOSCOPY;  Surgeon: Tyler Story MD;  Location: [facility] ENDO;  Service: Endoscopy;  Laterality: N/A;    COLONOSCOPY N/A 10/30/2024    Procedure: COLONOSCOPY;  Surgeon: Tyler Story MD;  Location: [facility] ENDO;  Service: Endoscopy;  Laterality: N/A;  m/ppm    EPIDURAL STEROID INJECTION INTO LUMBAR SPINE N/A 12/5/2019    Procedure: Injection-steroid-epidural-lumbar;  Surgeon: Adarsh Kraft MD;  Location: [facility] OR;  Service: Pain Management;  Laterality: N/A;  L5-S1    ESOPHAGOGASTRODUODENOSCOPY N/A 10/27/2020 "    Procedure: EGD (ESOPHAGOGASTRODUODENOSCOPY);  Surgeon: Tyler Story MD;  Location: [facility];  Service: Endoscopy;  Laterality: N/A;    ESOPHAGOGASTRODUODENOSCOPY N/A 2/14/2025    Procedure: EGD (ESOPHAGOGASTRODUODENOSCOPY);  Surgeon: Tyler Story MD;  Location: [facility];  Service: Endoscopy;  Laterality: N/A;    INJECTION OF ANESTHETIC AGENT AROUND MEDIAL BRANCH NERVES INNERVATING LUMBAR FACET JOINT Bilateral 11/18/2020    Procedure: BLOCK, NERVE, LUMBAR, MEDIAL BRANCH Bilateral L3,4,5;  Surgeon: Adarsh Kraft MD;  Location: [facility] OR;  Service: Pain Management;  Laterality: Bilateral;    INJECTION OF ANESTHETIC AGENT AROUND MEDIAL BRANCH NERVES INNERVATING LUMBAR FACET JOINT Left 10/25/2023    Procedure: Block-nerve-medial branch-lumbar;  Surgeon: Adarsh Kraft MD;  Location: [facility] OR;  Service: Anesthesiology;  Laterality: Left;  l4-5, l5-s1 MBB    INJECTION OF ANESTHETIC AGENT AROUND MEDIAL BRANCH NERVES INNERVATING LUMBAR FACET JOINT Bilateral 11/28/2023    Procedure: Block-nerve-medial branch-lumbar;  Surgeon: Adarsh Kraft MD;  [facility] OR;  Service: Anesthesiology;  Laterality: Bilateral;  L4.5 and l5/s1 MBB #2    RADIOFREQUENCY ABLATION OF LUMBAR MEDIAL BRANCH NERVE AT SINGLE LEVEL Bilateral 1/29/2021    Procedure: Radiofrequency Ablation, Nerve, Spinal, Lumbar, Medial Branch, 1 Level;  Surgeon: Adarsh Kraft MD;  Location: [facility];  Service: Pain Management;  Laterality: Bilateral;  L3,4,5    TONSILLECTOMY, ADENOIDECTOMY       Family History       Problem Relation (Age of Onset)    Coronary artery disease Mother    Diabetes Mother    Heart disease Brother    Hypertension Mother    Stroke Mother, Father          Tobacco Use    Smoking status: Never    Smokeless tobacco: Never   Substance and Sexual Activity    Alcohol use: Yes     Alcohol/week: 6.0 standard drinks of alcohol     Types: 6 Cans of beer per week     Comment: weekly or less    Drug use: No    Sexual activity: Yes      "Partners: Female       Objective:     Weight: 88.5 kg (195 lb 1.7 oz)  Body mass index is 27.99 kg/m².  Vital Signs (Most Recent):  Temp: 99.3 °F (37.4 °C) (05/07/25 0305)  Pulse: 76 (05/07/25 0605)  Resp: (!) 23 (05/07/25 0605)  BP: (!) 141/65 (05/07/25 0605)  SpO2: 97 % (05/07/25 0605) Vital Signs (24h Range):  Temp:  [97.9 °F (36.6 °C)-99.9 °F (37.7 °C)] 99.3 °F (37.4 °C)  Pulse:  [64-81] 76  Resp:  [16-31] 23  SpO2:  [95 %-100 %] 97 %  BP: (128-170)/(61-80) 141/65                  Oxygen Concentration (%):  [24] 24                Physical Exam         Neurosurgery Physical Exam    GENERAL: resting comfortably  HEENT: NCAT, PERRL, mucous membranes moist  NECK: supple, trachea midline  CV: normal capillary refill  PULM: aerating well, symmetric expansion, no distress  ABD: soft, NT, ND  EXT: no c/c/e    NEURO:    AAO x 3 w choices  CN II-XII grossly intact  RUE/RLE: fc  LUE/LLE: plegic  Decreased sensation L hemibody      Significant Labs:  Recent Labs   Lab 05/06/25 0209 05/06/25  0819 05/06/25  1415 05/06/25 1952 05/07/25 0204   *  --   --   --  125*      < > 139 141 142   K 4.1  --   --   --  3.7   *  --   --   --  112*   CO2 21*  --   --   --  21*   BUN 20  --   --   --  19   CREATININE 0.7  --   --   --  0.6   CALCIUM 8.5*  --   --   --  8.8   MG 2.0  --   --   --  2.0    < > = values in this interval not displayed.     Recent Labs   Lab 05/06/25 0209 05/07/25 0204   WBC 10.02 11.08   HGB 13.0* 13.3*   HCT 40.3 40.5    205     No results for input(s): "LABPT", "INR", "APTT" in the last 48 hours.    Microbiology Results (last 7 days)       ** No results found for the last 168 hours. **          All pertinent labs from the last 24 hours have been reviewed.    Significant Diagnostics:  I have reviewed and interpreted all pertinent imaging results/findings within the past 24 hours.  CTA Head and Neck (xpd)  Result Date: 5/4/2025  1. Acute right frontal intraparenchymal hematoma with " mild adjacent edema is similar to the recent prior study.  Follow-up recommended. 2. No high-grade stenosis or major vessel occlusion. Electronically signed by: Frandy Landis Date:    05/04/2025 Time:    16:51    CT HEAD FOR CODE STROKE  Result Date: 5/4/2025  3.3 x 2.4 cm intracranial hemorrhage within the right frontoparietal region with mild vasogenic edema.  There is no midline shift These findings were called to the ordering physician at 16:22 Electronically signed by: Enriqueta De La Vega Date:    05/04/2025 Time:    16:24    CT Head Without Contrast  Result Date: 5/5/2025  1. Right frontal parenchymal hemorrhage with subarachnoid and intraventricular extension appears relatively similar when compared to MRI performed 05/04/2025, but notably increased since initial head CT of 05/04/2025. 2. Relatively similar associated mass effect compared to most recent MRI. Electronically signed by: Colt Ang Date:    05/05/2025 Time:    06:25    CTA Head and Neck (xpd)  Result Date: 5/4/2025  1. Acute right frontal intraparenchymal hematoma with mild adjacent edema is similar to the recent prior study.  Follow-up recommended. 2. No high-grade stenosis or major vessel occlusion. Electronically signed by: Frandy Landis Date:    05/04/2025 Time:    16:51    CT HEAD FOR CODE STROKE  Result Date: 5/4/2025  3.3 x 2.4 cm intracranial hemorrhage within the right frontoparietal region with mild vasogenic edema.  There is no midline shift These findings were called to the ordering physician at 16:22 Electronically signed by: Enriqueta De La Vega Date:    05/04/2025 Time:    16:24    No results found in the last 24 hours.  XR NG/OG tube placement check, non-radiologist performed  Result Date: 5/6/2025  No acute process seen. Electronically signed by: Otto García MD Date:    05/06/2025 Time:    09:43      Assessment/Plan:     * Nontraumatic cortical hemorrhage of right cerebral hemisphere  69-year-old male presents to the ER with  left-sided weakness and right-sided gaze preference that began abruptly this afternoon around 4:00 p.m.. At OSH found to have R frontal lobar ICH. Given ddAVP for reversal.    ICHS 0    MRI wo 5/4: swi looks larger , there is IVH now too  CTH 5/5: looks same as MRI , larger and IVH  CTH 5/6: stable    --Patient admitted to [facility] on telemetry      -q1h neurochecks in ICU, q2h neurochecks in stepdown, q4h neurochecks on floor  --All labs and diagnostics reviewed  ---150  --Na >145  --Keppra 500 BID   --HOB >30  --No acute neurosurgical intervention at this time  --Continue to monitor clinically, notify NSGY immediately with any changes in neuro status    Dispo: ICU    Plan d/w Dr. Gross.         Oskar Simms MD  Neurosurgery  [facility] - Neuro Critical Care       [1]   Medications Prior to Admission   Medication Sig Dispense Refill Last Dose/Taking    aspirin (ECOTRIN) 81 MG EC tablet Take 1 tablet (81 mg total) by mouth once daily.  0     atorvastatin (LIPITOR) 20 MG tablet TAKE ONE TABLET BY MOUTH ONCE DAILY 90 tablet 3     ciclopirox (PENLAC) 8 % Soln Apply topically. (Patient not taking: Reported on 1/16/2025)       docosahexaenoic acid/epa (FISH OIL ORAL) Take 1 capsule by mouth once daily.       magnesium oxide (MAG-OX) 400 mg (241.3 mg magnesium) tablet Take 1 tablet (400 mg total) by mouth once daily. 90 tablet 3     meloxicam (MOBIC) 15 MG tablet TAKE 1 TABLET BY MOUTH AT NIGHT  AS NEEDED 90 tablet 3     methocarbamoL (ROBAXIN) 500 MG Tab TAKE ONE TABLET BY MOUTH EVERY 8 HOURS AS NEEDED FOR MUSCLE SPASMS 90 tablet 2     multivitamin (THERAGRAN) per tablet Take 1 tablet by mouth once daily.       omeprazole (PRILOSEC) 40 MG capsule Take 1 capsule (40 mg total) by mouth every morning. 90 capsule 3     pregabalin (LYRICA) 100 MG capsule Take 100 mg by mouth once daily.       triamcinolone acetonide 0.1% (KENALOG) 0.1 % ointment Apply topically 2 (two) times daily. 30 g 0

## 2025-05-07 NOTE — EICU
Intervention Initiated From:  COR / ABHILASHU    Praveena intervened regarding:  Rounding (Video assessment)  VICU Night Rounds Checklist  24H Vital Sign Range:  Temp:  [97.9 °F (36.6 °C)-99.9 °F (37.7 °C)]   Pulse:  [60-81]   Resp:  [15-31]   BP: (122-170)/(61-77)   SpO2:  [95 %-100 %]     Video rounds

## 2025-05-07 NOTE — PROGRESS NOTES
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 male with a significant medical history of HLD, cervical and lumbar DDD, pre-diabetes who presents to the hospital as a transfer from [facility] for evaluation of R ICH. He acutely developed LSW and R gaze around 4 PM today (5/5/2025). CTH was obtained and showed a 3.3 x 2.4 cm R frontoparietal ICH w/mild vasogenic edema. The patient was given DDVAP as he takes ASA daily and transferred to [facility] for higher level of care, further evaluation.    On this exam the patient is lethargic and in need of intermittent stimulation to arouse to participate with exam. He opens his eyes to touch and voice and is noted to be doing a repetitive rubbing/picking motion to his chest with his RUE. Dysconjugate gaze. Withdraws from pain in the left sided extremities. NIHSS 14. Admitted to [facility].     EEG with abnormal brain activity --> focal slowing over the right anterior quadrant consistent with underlying subcortical dysfunction. CTH and MRI w/o contrast on 5/5 with increased size of hemorrhage and midline shift. NGT placed on 5/6 s/p SLP assessment failure.     - Repeat CTH this am w/ stable hemorrhagic size, not worsened though swelling has not reduced  - NSGY following in case of need for evacuation   - Requiring Intermittent 3% bolus to maintain Na >145  - Recommend MRI Brain w/ Contrast to further assess for underlying etiology of ICH when clinically appropriate  - Keep -150      Antithrombotics for secondary stroke prevention: Antiplatelets: None: Intracerebral Hemorrhage    Statins for secondary stroke prevention and hyperlipidemia, if present:   Statins: Not indicated in acute ICH however, LDL is 102.2. The patient is currently prescribed atorvastatin 20 mg daily, may benefit from increasing  to 40 mg daily.     Aggressive risk factor modification: HLD, sleep apnea     Rehab efforts: The patient has been evaluated by a stroke team provider and the therapy needs have been fully considered based off the presenting complaints and exam findings. The following therapy evaluations are needed: PT evaluate and treat, OT evaluate and treat, SLP evaluate and treat, PM&R evaluate for appropriate placement    Diagnostics ordered/pending: MRI head without contrast to assess brain parenchyma, TTE to assess cardiac function/status , Other: CTH prn    VTE prophylaxis: Mechanical prophylaxis: Place SCDs  None: Reason for No Pharmacological VTE Prophylaxis: History of systemic or intracranial bleeding    BP parameters: ICH: SBP Goal Range 130-150        Vasogenic brain edema  Mild vasogenic edema is noted on imaging in the territory of the R middle cerebral artery with associated mass effect. Initial CTH with no MLS. At this time the etiology of the ICH is unclear. He was not hypertensive on presentation and remains without elevated BP. No aneurysm or AVM noted on CTA head and neck. MRI Brain pending. Neurosurgery following  - See plan for Nontraumatic subcortical hemorrhage of right cerebral hemisphere        Dysarthria  Acute left-sided weakness   -Due to ICH. Therapy evals pending.    Acute left-sided weakness  Therapy following    Typical atrial flutter  -Stroke risk factor.  Sinus rhythm currently on tele and EKG.  -Not on AC  -TTE pending  -Continue tele    Dyslipidemia  -Vascular disease risk factor.  -Patient is prescribed Atorvastatin 20 mg daily. LDL is 102.2. Consider increasing atorvastatin to 40 mg daily.    Recent Labs     05/04/25 2024   CHOL 173   HDL 63   CHOLHDL 36.4   NONHDLCHOL 110   TOTALCHOLEST 2.7   TRIG 39   LDLCALC 102.2              No notes on file    STROKE DOCUMENTATION        NIH Scale:  1a. Level of Consciousness: 2-->Not alert, requires repeated stimulation to attend, or is obtunded and  requires strong or painful stimulation to make movements (not stereotyped)  1b. LOC Questions: 0-->Answers both questions correctly  1c. LOC Commands: 0-->Performs both tasks correctly  2. Best Gaze: 1-->Partial gaze palsy, gaze is abnormal in one or both eyes, but forced deviation or total gaze paresis is not present  3. Visual: 1-->Partial hemianopia  4. Facial Palsy: 1-->Minor paralysis (flattened nasolabial fold, asymmetry on smiling)  5a. Motor Arm, Left: 4-->No movement  5b. Motor Arm, Right: 0-->No drift, limb holds 90 (or 45) degrees for full 10 secs  6a. Motor Leg, Left: 4-->No movement  6b. Motor Leg, Right: 2-->Some effort against gravity, leg falls to bed by 5 secs, but has some effort against gravity  7. Limb Ataxia: 0-->Absent  8. Sensory: 2-->Severe to total sensory loss, patient is not aware of being touched in the face, arm, and leg  9. Best Language: 2-->Severe aphasia, all communication is through fragmentary expression, great need for inference, questioning, and guessing by the listener. Range of information that can be exchanged is limited, listener carries burden of. . . (see row details)  10. Dysarthria: 2-->Severe dysarthria, patients speech is so slurred as to be unintelligible in the absence of or out of proportion to any dysphasia, or is mute/anarthric  11. Extinction and Inattention (formerly Neglect): 1-->Visual, tactile, auditory, spatial, or personal inattention or extinction to bilateral simultaneous stimulation in one of the sensory modalities  Total (NIH Stroke Scale): 22       Modified Edwin Score: 1  [place] Coma Scale:    ABCD2 Score:    [id number] Score:   HAS -BLED Score:   ICH Score:1  Hunt & Hull Classification:        Neurologic Chief Complaint: ICH    Subjective:     Interval History: Patient is seen for follow-up neurological assessment and treatment recommendations: [facility] this am w/ stable hemorrhagic size. NSGY following, will be on stanby in case of need for  evacuation. Intermittent 3% bolus to maintain Na >145. Drowsy this am but responsive to commands. Will obtain MRI brain when stable    HPI, Past Medical, Family, and Social History remains the same as documented in the initial encounter.     Review of Systems   Constitutional:  Negative for fever.   Eyes:  Negative for discharge.   Respiratory:  Negative for apnea, cough and wheezing.    Cardiovascular:  Negative for leg swelling.   Gastrointestinal:  Negative for abdominal distention.   Neurological:  Positive for facial asymmetry, speech difficulty, weakness and numbness.   Psychiatric/Behavioral:  Negative for agitation, behavioral problems and decreased concentration.      Scheduled Meds:   artificial tears  1 drop Both Eyes BID    atorvastatin  20 mg Per NG tube QHS    electrolytes-dextrose  200 mL Per NG tube Q4H    enoxparin  40 mg Subcutaneous Q24H (prophylaxis, 1700)    hydrALAZINE  25 mg Per NG tube Q8H    [START ON 5/8/2025] losartan  50 mg Per NG tube Daily    magnesium sulfate 2 g IVPB  2 g Intravenous Once    mupirocin   Nasal BID    senna-docusate  1 tablet Per NG tube BID    silodosin  4 mg Per NG tube Daily     Continuous Infusions:      PRN Meds:  Current Facility-Administered Medications:     acetaminophen, 650 mg, Per NG tube, Q6H PRN    bisacodyL, 10 mg, Rectal, Daily PRN    hydrALAZINE, 10 mg, Intravenous, Q6H PRN    labetalol, 10 mg, Intravenous, Q4H PRN    magnesium oxide, 800 mg, Per NG tube, PRN    magnesium oxide, 800 mg, Per NG tube, PRN    ondansetron, 4 mg, Intravenous, Q8H PRN    potassium bicarbonate, 35 mEq, Per NG tube, PRN    potassium bicarbonate, 50 mEq, Per NG tube, PRN    potassium bicarbonate, 60 mEq, Per NG tube, PRN    potassium, sodium phosphates, 2 packet, Per NG tube, PRN    potassium, sodium phosphates, 2 packet, Per NG tube, PRN    potassium, sodium phosphates, 2 packet, Per NG tube, PRN    sodium chloride 0.9%, 10 mL, Intravenous, PRN    sodium chloride 3% HYPERTONIC,  250 mL, Intravenous, Q6H PRN    Objective:     Vital Signs (Most Recent):  Temp: 98.6 °F (37 °C) (05/07/25 1102)  Pulse: 73 (05/07/25 1202)  Resp: 20 (05/07/25 1202)  BP: 133/68 (05/07/25 1202)  SpO2: (!) 94 % (05/07/25 1202)  BP Location: Left arm    Vital Signs Range (Last 24H):  Temp:  [97.9 °F (36.6 °C)-99.9 °F (37.7 °C)]   Pulse:  [66-81]   Resp:  [16-29]   BP: (128-156)/(61-80)   SpO2:  [94 %-99 %]   BP Location: Left arm       Physical Exam  Vitals and nursing note reviewed.   HENT:      Head: Normocephalic and atraumatic.   Cardiovascular:      Rate and Rhythm: Normal rate.      Pulses: Normal pulses.   Pulmonary:      Effort: Pulmonary effort is normal. No respiratory distress.      Breath sounds: No wheezing.   Abdominal:      General: There is no distension.      Palpations: Abdomen is soft.   Musculoskeletal:         General: No swelling.      Right lower leg: No edema.      Left lower leg: No edema.   Skin:     General: Skin is warm and dry.      Capillary Refill: Capillary refill takes 2 to 3 seconds.   Neurological:      Mental Status: He is alert.      Comments: E3 V5 M6  Mild left facial weakness  Eyelid apraxia, right gaze preference  Left sided weakness/plegia, slightly increased tone  Left side tito-neglect  RUE/RLE 3/5 , follows commands                  Neurological Exam:   LOC: alert  Attention Span: Good   Language: non-verbal at time of exam  Orientation: Person, Place, Time   Visual Fields: Full and Visual neglect  EOM (CN III, IV, VI): Gaze preference  right  Facial Movement (CN VII): Lower facial weakness on the Left  Motor: Arm left  Plegia 0/5  Leg left  Plegia 0/5  Arm right  Normal 5/5  Leg right Paresis: 3/5  Sensation: decreased on L    Laboratory:  All labs reviewd    Diagnostic Results   All imaging reviewed    Ruddy Medina MD  Comprehensive Stroke Center  Department of Vascular Neurology   [facility] - Neuro Critical Care

## 2025-05-07 NOTE — PT/OT/SLP PROGRESS
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  He presents with consistent assistance needed for self-care and mobility activities 2/2 AMS. Performance deficits affecting function are weakness, gait instability, decreased upper extremity function, decreased ROM, impaired endurance, impaired balance, decreased lower extremity function, impaired coordination, visual deficits, decreased safety awareness, impaired fine motor, impaired self care skills, impaired functional mobility, decreased coordination, abnormal tone. Pt A&O x4 with head nods utilized to multiple choice options. Pt with eyes open slightly intermittently though with continued decreased L attention and R cervical turn preference. Pt continues to display no AROM of LUE leading to increased assistance needed for self care and mobility activities. Pt with decreased postural control with RUE pushing to L with intermittent cues to place hands in lap to decrease pushing. Pt continues to follow commands appropriately with good participation in OT sessions. Pt with mild fatigue following sit to stand attempt though with VSS. Patient has demonstrated sufficient progression to warrant high intensity therapy evidenced by objectives noted below.     Co-treatment with PTA performed for patient safety, education, and facilitation of treatment to maximize activity tolerance and progression towards goals from two skilled therapy disciplines.     Rehab Prognosis:  Good; patient would benefit from  acute skilled OT services to address these deficits and reach maximum level of function.       Plan:     Patient to be seen 4 x/week to address the above listed problems via self-care/home management, therapeutic activities, therapeutic exercises, neuromuscular re-education  Plan of Care Expires: 06/05/25  Plan of Care Reviewed with: patient, spouse    Subjective     Chief Complaint: n/a  Patient/Family Comments/goals: increase independence  Pain/Comfort:  Pain Rating 1: 0/10    Objective:     Communicated with: nursing prior to session.  Patient found HOB elevated with telemetry, NG tube, pulse ox (continuous), blood pressure cuff, peripheral IV, PureWick, SCD, pressure relief boots upon OT entry to room.    General Precautions: Standard, fall, aspiration    Orthopedic Precautions:N/A  Braces: N/A  Respiratory Status: Room air     Occupational Performance:     Bed Mobility:    Patient completed Rolling/Turning to Left with  maximal assistance, with side rail, and moderate tactile/v/cs for roll technique x2 attempts completed  Patient completed Rolling/Turning to Right with total assistance  Patient completed Scooting/Bridging with total assistance  Patient completed Supine to Sit with maximal assistance and 2 persons and pt assisting at trunk/RLE with maximal tactile and v/cs  Patient completed Sit to Supine with total assistance and 2 persons   Pt seated EOB with maximal to total assistance to RUE pushing to L with maximal tactile and v/cs to decrease pushing and increase midline orientation with maximal tactile and v/cs to increase cervical extension and neutral rotation    Functional Mobility/Transfers:  Patient completed Sit <> Stand Transfer with total assistance and of 2 persons  with  hand-held assist and 2 attempts completed and L knee blocking 2/2 difficulty with L knee extension and partial upright stand obtained despite maximal tactile and v/cs     Activities of Daily Living:  Upper Body Dressing:  maximal assistance to alexi/doff gown and pt assisting to alexi RUE through sleeve  Toileting: total assistance for rear pericare 2/2 BM - completed bed level       [facility] 6 Click ADL: 10    Treatment & Education:  Session this date targeted self-care and neuromuscular re-education of postural control to increase pt's independence  Pt educated on OT roles, POC, call button for assistance, importance of EOB activities to increase independence  Pt and spouse educated on LUE PROM and joint protection to prevent adverse events    Patient left HOB elevated with all lines intact, call button in reach, nursing notified, and spouse present    GOALS:   Multidisciplinary Problems       Occupational Therapy Goals          Problem: Occupational Therapy    Goal Priority Disciplines Outcome Interventions   Occupational Therapy Goal     OT, PT/OT Progressing    Description: Goals to be met by: 6/5/25     Patient will increase functional independence with ADLs by performing:    UE Dressing with Moderate Assistance.  LE Dressing with Moderate Assistance and Assistive Devices as needed.  Grooming while seated with Stand-by Assistance.  Toileting from bedside commode with Moderate Assistance for hygiene and clothing management.   Sitting at edge of bed x10 minutes with Moderate Assistance.  Supine to sit with Moderate Assistance.  Stand pivot transfers with Moderate Assistance.  Toilet transfer to bedside commode with Moderate Assistance.  Increased functional strength to 1/5 for LUE.  Upper extremity exercise program x10 reps per handout, with assistance as needed.                         DME Justifications:  Colt requires a hospital bed due to him requiring positioning of the body in ways not feasible with an ordinary bed due to limited ability and cannot independently make changes in body position without the use of the bed.The positioning of the body cannot be sufficiently resolved by the use of pillows and wedges.  Colt Rose  has a mobility limitation that significantly impairs his ability to participate in one or more mobility related activities of daily living (MRADLs) such as toileting, feeding, dressing, grooming, and bathing in customary locations in the home.  The mobility limitation cannot be sufficiently resolved by the use of a cane or walker.   The use of a manual wheelchair will significantly improve the patients ability to participate in MRADLS and the patient will use it on regular basis in the home.  Colt Milton has expressed his willingness to use a manual wheelchair in the home. Patients upper body strength is sufficient for propulsion.  He also has a caregiver who is available, willing, and able to provide assistance with the wheelchair when needed.      Time Tracking:     OT Date of Treatment: 05/07/25  OT Start Time: 1151  OT Stop Time: 1226  OT Total Time (min): 35 min    Billable Minutes:Self Care/Home Management 20  Neuromuscular Re-education 15               5/7/2025

## 2025-05-07 NOTE — SUBJECTIVE & OBJECTIVE
Interval History/Significant Events: NAEO. Repeat CTH head was stable. Blood pressure medication changes for optimization. Increased losartan to 50mg and added hydralazine 25mg. Also added silodosin for urinary retention.     Review of Systems   Constitutional:  Negative for fever.   Eyes:  Negative for photophobia.   Respiratory:  Negative for shortness of breath.    Cardiovascular:  Negative for chest pain.   Gastrointestinal:  Negative for nausea.   Genitourinary:  Negative for dysuria.   Neurological:  Positive for tremors, facial asymmetry and weakness.     Objective:     Vitals:    Temp: 98.6 °F (37 °C)  Pulse: 73  Rhythm: normal sinus rhythm  BP: (!) 152/70  MAP (mmHg): 94  Resp: 20  SpO2: (!) 94 %  Oxygen Concentration (%): 24    Temp  Min: 97.9 °F (36.6 °C)  Max: 99.9 °F (37.7 °C)  Pulse  Min: 66  Max: 81  BP  Min: 128/61  Max: 155/74  MAP (mmHg)  Min: 88  Max: 107  Resp  Min: 16  Max: 26  SpO2  Min: 94 %  Max: 99 %  Oxygen Concentration (%)  Min: 24  Max: 24    05/06 0701 - 05/07 0700  In: 1916 [I.V.:1256]  Out: 1150 [Urine:1150]   Unmeasured Output  Urine Occurrence: 1  Stool Occurrence: 1  Pad Count: 2        Physical Exam  Vitals and nursing note reviewed.   Cardiovascular:      Rate and Rhythm: Normal rate and regular rhythm.      Pulses: Normal pulses.      Heart sounds: Normal heart sounds.   Pulmonary:      Effort: Pulmonary effort is normal.      Breath sounds: Normal breath sounds.   Abdominal:      General: Bowel sounds are normal.      Palpations: Abdomen is soft.   Skin:     General: Skin is warm and dry.      Capillary Refill: Capillary refill takes 2 to 3 seconds.   Neurological:      Comments: E3 V5 M6  Mild left facial weakness  Perrla, Open eyes to voice with some lid apraxia, right gaze preference though does cross midline with direction.  Delayed verbal responses to questions  Left sided weakness/plegia, slightly increased tone  Left side tito-neglect  RUE/RLE 5/5 , follows commands              Today I personally reviewed pertinent medications, lines/drains/airways, imaging, cardiology results, laboratory results, microbiology results,

## 2025-05-08 LAB
ABSOLUTE EOSINOPHIL (OHS): 0.02 K/UL
ABSOLUTE MONOCYTE (OHS): 1.05 K/UL (ref 0.3–1)
ABSOLUTE NEUTROPHIL COUNT (OHS): 7.88 K/UL (ref 1.8–7.7)
ALBUMIN SERPL BCP-MCNC: 3.1 G/DL (ref 3.5–5.2)
ALP SERPL-CCNC: 57 UNIT/L (ref 40–150)
ALT SERPL W/O P-5'-P-CCNC: 26 UNIT/L (ref 10–44)
ANION GAP (OHS): 9 MMOL/L (ref 8–16)
AST SERPL-CCNC: 27 UNIT/L (ref 11–45)
BASOPHILS # BLD AUTO: 0.05 K/UL
BASOPHILS NFR BLD AUTO: 0.5 %
BILIRUB SERPL-MCNC: 0.5 MG/DL (ref 0.1–1)
BUN SERPL-MCNC: 21 MG/DL (ref 8–23)
CALCIUM SERPL-MCNC: 8.8 MG/DL (ref 8.7–10.5)
CHLORIDE SERPL-SCNC: 113 MMOL/L (ref 95–110)
CO2 SERPL-SCNC: 21 MMOL/L (ref 23–29)
CREAT SERPL-MCNC: 0.7 MG/DL (ref 0.5–1.4)
ERYTHROCYTE [DISTWIDTH] IN BLOOD BY AUTOMATED COUNT: 14.6 % (ref 11.5–14.5)
GFR SERPLBLD CREATININE-BSD FMLA CKD-EPI: >60 ML/MIN/1.73/M2
GLUCOSE SERPL-MCNC: 125 MG/DL (ref 70–110)
HCT VFR BLD AUTO: 41 % (ref 40–54)
HGB BLD-MCNC: 13.2 GM/DL (ref 14–18)
IMM GRANULOCYTES # BLD AUTO: 0.04 K/UL (ref 0–0.04)
IMM GRANULOCYTES NFR BLD AUTO: 0.4 % (ref 0–0.5)
LYMPHOCYTES # BLD AUTO: 1.31 K/UL (ref 1–4.8)
MAGNESIUM SERPL-MCNC: 2.1 MG/DL (ref 1.6–2.6)
MCH RBC QN AUTO: 31.5 PG (ref 27–31)
MCHC RBC AUTO-ENTMCNC: 32.2 G/DL (ref 32–36)
MCV RBC AUTO: 98 FL (ref 82–98)
NUCLEATED RBC (/100WBC) (OHS): 0 /100 WBC
PHOSPHATE SERPL-MCNC: 3.6 MG/DL (ref 2.7–4.5)
PLATELET # BLD AUTO: 198 K/UL (ref 150–450)
PMV BLD AUTO: 10.2 FL (ref 9.2–12.9)
POTASSIUM SERPL-SCNC: 4 MMOL/L (ref 3.5–5.1)
PROT SERPL-MCNC: 6.3 GM/DL (ref 6–8.4)
RBC # BLD AUTO: 4.19 M/UL (ref 4.6–6.2)
RELATIVE EOSINOPHIL (OHS): 0.2 %
RELATIVE LYMPHOCYTE (OHS): 12.7 % (ref 18–48)
RELATIVE MONOCYTE (OHS): 10.1 % (ref 4–15)
RELATIVE NEUTROPHIL (OHS): 76.1 % (ref 38–73)
SODIUM SERPL-SCNC: 143 MMOL/L (ref 136–145)
SODIUM SERPL-SCNC: 143 MMOL/L (ref 136–145)
SODIUM SERPL-SCNC: 144 MMOL/L (ref 136–145)
SODIUM SERPL-SCNC: 145 MMOL/L (ref 136–145)
WBC # BLD AUTO: 10.35 K/UL (ref 3.9–12.7)

## 2025-05-08 PROCEDURE — 84295 ASSAY OF SERUM SODIUM: CPT | Performed by: PSYCHIATRY & NEUROLOGY

## 2025-05-08 PROCEDURE — 20000000 HC ICU ROOM

## 2025-05-08 PROCEDURE — 94761 N-INVAS EAR/PLS OXIMETRY MLT: CPT

## 2025-05-08 PROCEDURE — 27000190 HC CPAP FULL FACE MASK W/VALVE

## 2025-05-08 PROCEDURE — 83735 ASSAY OF MAGNESIUM: CPT | Performed by: NURSE PRACTITIONER

## 2025-05-08 PROCEDURE — 99291 CRITICAL CARE FIRST HOUR: CPT | Mod: GC,,, | Performed by: PSYCHIATRY & NEUROLOGY

## 2025-05-08 PROCEDURE — 94660 CPAP INITIATION&MGMT: CPT

## 2025-05-08 PROCEDURE — 92507 TX SP LANG VOICE COMM INDIV: CPT

## 2025-05-08 PROCEDURE — 99900035 HC TECH TIME PER 15 MIN (STAT)

## 2025-05-08 PROCEDURE — 63600175 PHARM REV CODE 636 W HCPCS

## 2025-05-08 PROCEDURE — 92526 ORAL FUNCTION THERAPY: CPT

## 2025-05-08 PROCEDURE — 25000003 PHARM REV CODE 250

## 2025-05-08 PROCEDURE — 84100 ASSAY OF PHOSPHORUS: CPT | Performed by: NURSE PRACTITIONER

## 2025-05-08 PROCEDURE — 82040 ASSAY OF SERUM ALBUMIN: CPT | Performed by: NURSE PRACTITIONER

## 2025-05-08 PROCEDURE — 97530 THERAPEUTIC ACTIVITIES: CPT

## 2025-05-08 PROCEDURE — 63600175 PHARM REV CODE 636 W HCPCS: Performed by: PSYCHIATRY & NEUROLOGY

## 2025-05-08 PROCEDURE — 25000003 PHARM REV CODE 250: Performed by: PSYCHIATRY & NEUROLOGY

## 2025-05-08 PROCEDURE — 97535 SELF CARE MNGMENT TRAINING: CPT

## 2025-05-08 PROCEDURE — 85025 COMPLETE CBC W/AUTO DIFF WBC: CPT | Performed by: NURSE PRACTITIONER

## 2025-05-08 PROCEDURE — 27100171 HC OXYGEN HIGH FLOW UP TO 24 HOURS

## 2025-05-08 RX ORDER — ATORVASTATIN CALCIUM 20 MG/1
20 TABLET, FILM COATED ORAL NIGHTLY
Status: DISCONTINUED | OUTPATIENT
Start: 2025-05-08 | End: 2025-05-13 | Stop reason: HOSPADM

## 2025-05-08 RX ORDER — LOSARTAN POTASSIUM 50 MG/1
50 TABLET ORAL DAILY
Status: DISCONTINUED | OUTPATIENT
Start: 2025-05-08 | End: 2025-05-09

## 2025-05-08 RX ORDER — PHENYLEPHRINE HCL IN 0.9% NACL 1 MG/10 ML
SYRINGE (ML) INTRAVENOUS
Status: COMPLETED
Start: 2025-05-08 | End: 2025-05-08

## 2025-05-08 RX ORDER — AMOXICILLIN 250 MG
1 CAPSULE ORAL 2 TIMES DAILY
Status: DISCONTINUED | OUTPATIENT
Start: 2025-05-08 | End: 2025-05-13 | Stop reason: HOSPADM

## 2025-05-08 RX ORDER — SODIUM,POTASSIUM PHOSPHATES 280-250MG
2 POWDER IN PACKET (EA) ORAL
Status: DISCONTINUED | OUTPATIENT
Start: 2025-05-08 | End: 2025-05-12

## 2025-05-08 RX ORDER — LANOLIN ALCOHOL/MO/W.PET/CERES
800 CREAM (GRAM) TOPICAL
Status: DISCONTINUED | OUTPATIENT
Start: 2025-05-08 | End: 2025-05-12

## 2025-05-08 RX ORDER — SILODOSIN 4 MG/1
4 CAPSULE ORAL DAILY
Status: DISCONTINUED | OUTPATIENT
Start: 2025-05-09 | End: 2025-05-08

## 2025-05-08 RX ORDER — LOSARTAN POTASSIUM 50 MG/1
50 TABLET ORAL DAILY
Status: DISCONTINUED | OUTPATIENT
Start: 2025-05-09 | End: 2025-05-08

## 2025-05-08 RX ORDER — FAMOTIDINE 10 MG/ML
20 INJECTION, SOLUTION INTRAVENOUS ONCE
Status: COMPLETED | OUTPATIENT
Start: 2025-05-08 | End: 2025-05-08

## 2025-05-08 RX ORDER — HYDRALAZINE HYDROCHLORIDE 50 MG/1
100 TABLET, FILM COATED ORAL EVERY 8 HOURS
Status: DISCONTINUED | OUTPATIENT
Start: 2025-05-08 | End: 2025-05-13 | Stop reason: HOSPADM

## 2025-05-08 RX ORDER — HYDRALAZINE HYDROCHLORIDE 50 MG/1
100 TABLET, FILM COATED ORAL EVERY 8 HOURS
Status: DISCONTINUED | OUTPATIENT
Start: 2025-05-08 | End: 2025-05-08

## 2025-05-08 RX ORDER — ACETAMINOPHEN 325 MG/1
650 TABLET ORAL EVERY 6 HOURS PRN
Status: DISCONTINUED | OUTPATIENT
Start: 2025-05-08 | End: 2025-05-13 | Stop reason: HOSPADM

## 2025-05-08 RX ORDER — ACETAMINOPHEN 10 MG/ML
1000 INJECTION, SOLUTION INTRAVENOUS ONCE
Status: COMPLETED | OUTPATIENT
Start: 2025-05-08 | End: 2025-05-08

## 2025-05-08 RX ORDER — SILODOSIN 4 MG/1
4 CAPSULE ORAL DAILY
Status: DISCONTINUED | OUTPATIENT
Start: 2025-05-08 | End: 2025-05-13 | Stop reason: HOSPADM

## 2025-05-08 RX ADMIN — SODIUM CHLORIDE 250 ML: 3 INJECTION, SOLUTION INTRAVENOUS at 04:05

## 2025-05-08 RX ADMIN — MUPIROCIN: 20 OINTMENT TOPICAL at 08:05

## 2025-05-08 RX ADMIN — HYDRALAZINE HYDROCHLORIDE 100 MG: 50 TABLET ORAL at 10:05

## 2025-05-08 RX ADMIN — ACETAMINOPHEN 1000 MG: 10 INJECTION, SOLUTION INTRAVENOUS at 04:05

## 2025-05-08 RX ADMIN — HYDRALAZINE HYDROCHLORIDE 100 MG: 50 TABLET ORAL at 03:05

## 2025-05-08 RX ADMIN — SENNOSIDES AND DOCUSATE SODIUM 1 TABLET: 50; 8.6 TABLET ORAL at 08:05

## 2025-05-08 RX ADMIN — ACETAMINOPHEN 650 MG: 325 TABLET ORAL at 08:05

## 2025-05-08 RX ADMIN — Medication 200 ML: at 01:05

## 2025-05-08 RX ADMIN — SODIUM CHLORIDE 250 ML: 3 INJECTION, SOLUTION INTRAVENOUS at 09:05

## 2025-05-08 RX ADMIN — ATORVASTATIN CALCIUM 20 MG: 20 TABLET, FILM COATED ORAL at 08:05

## 2025-05-08 RX ADMIN — HYPROMELLOSE 2910 1 DROP: 5 SOLUTION OPHTHALMIC at 09:05

## 2025-05-08 RX ADMIN — SILODOSIN 4 MG: 4 CAPSULE ORAL at 11:05

## 2025-05-08 RX ADMIN — SODIUM CHLORIDE 250 ML: 3 INJECTION, SOLUTION INTRAVENOUS at 12:05

## 2025-05-08 RX ADMIN — HYPROMELLOSE 2910 1 DROP: 5 SOLUTION OPHTHALMIC at 08:05

## 2025-05-08 RX ADMIN — ENOXAPARIN SODIUM 40 MG: 40 INJECTION SUBCUTANEOUS at 04:05

## 2025-05-08 RX ADMIN — FAMOTIDINE 20 MG: 10 INJECTION, SOLUTION INTRAVENOUS at 06:05

## 2025-05-08 RX ADMIN — MUPIROCIN: 20 OINTMENT TOPICAL at 09:05

## 2025-05-08 RX ADMIN — LOSARTAN POTASSIUM 50 MG: 50 TABLET, FILM COATED ORAL at 11:05

## 2025-05-08 NOTE — SUBJECTIVE & OBJECTIVE
Interval History/Significant Events: Patient removed CPAP overnight and as a result, the NG tube was also removed. SLP okay with crushed meds on evaluation. Will hold off on replacing NG tube.     Neuro checks q2h in daytime and q4h at night. Labetalol gtt discontinued. BP better controlled.     Will go for barium swallow tomorrow.     Review of Systems   Constitutional:  Negative for fever.   Eyes:  Negative for photophobia.   Respiratory:  Negative for shortness of breath.    Cardiovascular:  Negative for chest pain.   Gastrointestinal:  Negative for nausea.   Genitourinary:  Negative for dysuria.   Neurological:  Positive for tremors, facial asymmetry and weakness.     Objective:     Vitals:    Temp: 97.5 °F (36.4 °C)  Pulse: 60  Rhythm: sinus bradycardia, normal sinus rhythm  BP: (!) 141/74  MAP (mmHg): 101  Resp: 17  SpO2: 97 %    Temp  Min: 97.5 °F (36.4 °C)  Max: 100.2 °F (37.9 °C)  Pulse  Min: 58  Max: 77  BP  Min: 110/61  Max: 166/80  MAP (mmHg)  Min: 79  Max: 114  Resp  Min: 14  Max: 24  SpO2  Min: 95 %  Max: 98 %    05/07 0701 - 05/08 0700  In: 2868.3 [P.O.:600; I.V.:708.3]  Out: 1370 [Urine:1370]   Unmeasured Output  Unmeasured Urine Occurrence: 1  Unmeasured Stool Occurrence: 1  Pad Count: 1        Physical Exam  Vitals and nursing note reviewed.   Cardiovascular:      Rate and Rhythm: Normal rate and regular rhythm.      Pulses: Normal pulses.      Heart sounds: Normal heart sounds.   Pulmonary:      Effort: Pulmonary effort is normal.      Breath sounds: Normal breath sounds.   Abdominal:      General: Bowel sounds are normal.      Palpations: Abdomen is soft.   Skin:     General: Skin is warm and dry.      Capillary Refill: Capillary refill takes 2 to 3 seconds.   Neurological:      Comments: E3 V5 M6  Perrla, Open eyes to voice with some lid apraxia, right gaze preference though does cross midline with direction.   Delayed verbal responses to questions, though more responsive today.   Left sided  weakness/plegia, slightly increased tone  Left side tito-neglect, improving. Able to point to and reach for his left arm correctly.   RUE/RLE 5/5 , follows commands             Today I personally reviewed pertinent medications, lines/drains/airways, imaging, cardiology results, laboratory results, microbiology results,

## 2025-05-08 NOTE — PLAN OF CARE
"[facility] Care Plan    POC reviewed with Colt Rose and family at 0300. Family verbalized understanding. Questions and concerns addressed. No acute events today. Pt progressing toward goals. Will continue to monitor. See below and flowsheets for full assessment and VS info.     -labetalol gtt @0.2  -PRN 3% bolus x2  -NGT removed by patient-provider aware  -CPAP refused  -IV tylenol for fever        Is this a stroke patient? Yes    Neuro:  Nader Coma Scale  Best Eye Response: 3-->(E3) to speech  Best Motor Response: 6-->(M6) obeys commands  Best Verbal Response: 3-->(V3) inappropriate words  [place] Coma Scale Score: 12  Assessment Qualifiers: patient not sedated/intubated  Pupil PERRLA: yes     24 hr Temp:  [98.2 °F (36.8 °C)-100.2 °F (37.9 °C)]     CV:   Rhythm: normal sinus rhythm  BP goals:   SBP < 130-150  MAP > 65    Resp:      Oxygen Concentration (%): 24    Plan: N/A    GI/:     Diet/Nutrition Received: tube feeding  Last Bowel Movement: 05/07/25  Voiding Characteristics: external catheter    Intake/Output Summary (Last 24 hours) at 5/8/2025 0524  Last data filed at 5/8/2025 0501  Gross per 24 hour   Intake 2649 ml   Output 1320 ml   Net 1329 ml     Unmeasured Output  Urine Occurrence: 1  Stool Occurrence: 1  Pad Count: 1    Labs/Accuchecks:  Recent Labs   Lab 05/08/25  0255   WBC 10.35   RBC 4.19*   HGB 13.2*   HCT 41.0         Recent Labs   Lab 05/08/25  0255      K 4.0   CO2 21*   *   BUN 21   CREATININE 0.7   ALKPHOS 57   ALT 26   AST 27   BILITOT 0.5      Recent Labs   Lab 05/05/25  0145   PROTIME 11.8   INR 1.1   APTT 22.6    No results for input(s): "CPK", "CPKMB", "TROPONINI", "MB" in the last 168 hours.    Electrolytes: N/A - electrolytes WDL  Accuchecks: none    Gtts:   labetaloL (NORMODYNE,TRANDATE) 500 mg in 100 mL infusion (conc: 5 mg/mL)  0-3 mg/min Intravenous Continuous 2.4 mL/hr at 05/07/25 1958 0.2 mg/min at 05/07/25 1958       LDA/Wounds:    Mir Risk " Assessment  Sensory Perception: 3-->slightly limited  Moisture: 3-->occasionally moist  Activity: 1-->bedfast  Mobility: 2-->very limited  Nutrition: 3-->adequate  Friction and Shear: 2-->potential problem  Mir Score: 14  Is your mir score 12 or less? no

## 2025-05-08 NOTE — NURSING
NGT removed by patient at 0400. Provider aware. Wife at bedside. Patient nodding and gesturing throat discomfort. NGT placement on hold at this time. 0600 meds held. Provider aware.

## 2025-05-08 NOTE — PROGRESS NOTES
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 CTH with right parietal IPH, CTA without AVM/abnormalities. ASA hx, Given ddAVP for reversal. Transferred to [facility] for management and eval.     Hospital Course: 05/05/2025 CTH this AM stable when compared to MRI. EEG without evidence of seizures. Will hold off AEDs for now. Goal of SBP <150 and Na >140. No NSGY interventions. Will repeat CTH tomorrow AM.   05/06/2025 Repeat CTH shows increased edema with increased midline shift. Increase sodium goal to >145. Amlodipine added for BP support. Patient with flushing after amlodipine given. Hydroxyzine and IV pepcid given as patient reports possible allergy to bendaryl in the past. Losartan added instead. NG tube placed and started tube feeds.  05/07/2025 Repeat CTH head was stable. Blood pressure medication changes for optimization.   05/08/2025 Patient removed CPAP overnight and as a result, the NG tube was also removed. SLP okay with crushed meds on evaluation. Will hold off on replacing NG tube.     Interval History/Significant Events: Patient removed CPAP overnight and as a result, the NG tube was also removed. SLP okay with crushed meds on evaluation. Will hold off on replacing NG tube.     Neuro checks q2h in daytime and q4h at night. Labetalol gtt discontinued. BP better controlled.     Will go for barium swallow tomorrow.     Review of Systems   Constitutional:  Negative for fever.   Eyes:  Negative for photophobia.   Respiratory:  Negative for shortness of breath.    Cardiovascular:   Negative for chest pain.   Gastrointestinal:  Negative for nausea.   Genitourinary:  Negative for dysuria.   Neurological:  Positive for tremors, facial asymmetry and weakness.     Objective:     Vitals:    Temp: 97.5 °F (36.4 °C)  Pulse: 60  Rhythm: sinus bradycardia, normal sinus rhythm  BP: (!) 141/74  MAP (mmHg): 101  Resp: 17  SpO2: 97 %    Temp  Min: 97.5 °F (36.4 °C)  Max: 100.2 °F (37.9 °C)  Pulse  Min: 58  Max: 77  BP  Min: 110/61  Max: 166/80  MAP (mmHg)  Min: 79  Max: 114  Resp  Min: 14  Max: 24  SpO2  Min: 95 %  Max: 98 %    05/07 0701 - 05/08 0700  In: 2868.3 [P.O.:600; I.V.:708.3]  Out: 1370 [Urine:1370]   Unmeasured Output  Unmeasured Urine Occurrence: 1  Unmeasured Stool Occurrence: 1  Pad Count: 1        Physical Exam  Vitals and nursing note reviewed.   Cardiovascular:      Rate and Rhythm: Normal rate and regular rhythm.      Pulses: Normal pulses.      Heart sounds: Normal heart sounds.   Pulmonary:      Effort: Pulmonary effort is normal.      Breath sounds: Normal breath sounds.   Abdominal:      General: Bowel sounds are normal.      Palpations: Abdomen is soft.   Skin:     General: Skin is warm and dry.      Capillary Refill: Capillary refill takes 2 to 3 seconds.   Neurological:      Comments: E3 V5 M6  Perrla, Open eyes to voice with some lid apraxia, right gaze preference though does cross midline with direction.   Delayed verbal responses to questions, though more responsive today.   Left sided weakness/plegia, slightly increased tone  Left side tito-neglect, improving. Able to point to and reach for his left arm correctly.   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 Neuro Following  -  NSGY following   - Repeat CTH with edema expansion and worsening midline shift. Increased sodium goals.   - Repeat CTH on 5/7 stable.   - Na > 145 to decrease risk of edema.   - Sodium q6h   - Hypertonic saline q6h prn  - MRI brain without evidence of amyloid.   - neuro checks q2h in the daytime and q4h overnight.   - SBP < 150   - losartan 50mg qd   - hydralazine 100mg TID   - hydralazine and labetalol prn  - Coags WNL  - DDAVP given prior to transfer for ASA hx     PT/OT, SLP   - high intensity therapy recommended   - Barium swallow test tomorrow    Vasogenic brain edema  See ICH    Seizure  - Left hand twitching on arrival, did appear rhymic. Resolved at this time.  - EEG without evidence of seizure.  - Holding AEDs at this time.    Dysarthria  SLP consulted   - Keep NPO   - NG tube placed, started tube feeds   - FWF with normal saline 30cc q6h    Acute left-sided weakness  PT/OT    Cardiac/Vascular  Dyslipidemia  Resume statin     Renal/  Urinary retention  Added silodosin to assist with symptoms.           The patient is being Prophylaxed for:  Venous Thromboembolism with: Mechanical or Chemical  Stress Ulcer with: None  Ventilator Pneumonia with: not applicable    Activity Orders            Elevate HOB Elevate HOB 30-45 degrees during feeding unless contraindicated starting at 05/06 0903    Turn patient starting at 05/04 2000    Elevate HOB starting at 05/04 1920    Diet NPO: NPO starting at 05/04 1920          Full Code    William Appiah MD  Neurocritical Care  [facility] - Neuro Critical Care

## 2025-05-08 NOTE — PLAN OF CARE
Problem: Physical Therapy  Goal: Physical Therapy Goal  Description: Goals to be completed by: 5/16/25    Pt will perform sup<>sit transfers w/ moderate assistance  Pt will have sufficient dynamic balance to sit EOB while performing ADLs/therex w/ minimum assistance  Pt will be able to stand up from EOB w/ moderate assistance using LRAD  Pt will transfer from bed to chair w/ moderate assistance using LRAD  Pt will be independent w/ HEP therex on BLE w/ good form and ROM   Outcome: Progressing   Pt's goals remain appropriate and pt will continue to benefit from skilled PT services to work towards improved functional mobility including: bed mobility, transfers, and sitting balance. Alyssia Diane PT  5/8/2025

## 2025-05-08 NOTE — ASSESSMENT & PLAN NOTE
69-year-old male presents to the ER with left-sided weakness and right-sided gaze preference that began abruptly this afternoon around 4:00 p.m.. At OSH found to have R frontal lobar ICH. Given ddAVP for reversal.    ICHS 0    MRI wo 5/4: swi looks larger , there is IVH now too  CTH 5/5: looks same as MRI , larger and IVH  CTH 5/6: stable    --Patient admitted to [facility] on telemetry      -q1h neurochecks in ICU, q2h neurochecks in stepdown, q4h neurochecks on floor  --All labs and diagnostics reviewed  ---150  --Na >145  --Keppra 500 BID   --HOB >30  --No acute neurosurgical intervention at this time  --Continue to monitor clinically, notify NSGY immediately with any changes in neuro status    Dispo: ICU    Plan d/w Dr. Gross.

## 2025-05-08 NOTE — PLAN OF CARE
05/08/25 1535   Post-Acute Status   Post-Acute Authorization Placement   Post-Acute Placement Status Patient List Provided   Patient choice form signed by patient/caregiver List with quality metrics by geographic area provided   Discharge Plan   Discharge Plan A Rehab   Discharge Plan B Home with family     SW pt wife with a list of IPR to review.   SW will continue to monitor pt needs.       Discharge Plan A and Plan B have been determined by review of patient's clinical status, future medical and therapeutic needs, and coverage/benefits for post-acute care in coordination with multidisciplinary team members.    Zamzam Tellez MSW, EVELYNW  [facility]  Case Management Dept.

## 2025-05-08 NOTE — ASSESSMENT & PLAN NOTE
- CTH/CTA with right parietal IPH, no AVM/malformation   - Vasc Neuro Following  - NSGY following   - Repeat CTH with edema expansion and worsening midline shift. Increased sodium goals.   - Repeat CTH on 5/7 stable.   - Na > 145 to decrease risk of edema.   - Sodium q6h   - Hypertonic saline q6h prn  - MRI brain without evidence of amyloid.   - neuro checks q2h in the daytime and q4h overnight.   - SBP < 150   - losartan 50mg qd   - hydralazine 100mg TID   - hydralazine and labetalol prn  - Coags WNL  - DDAVP given prior to transfer for ASA hx     PT/OT, SLP   - high intensity therapy recommended   - Barium swallow test tomorrow

## 2025-05-08 NOTE — SUBJECTIVE & OBJECTIVE
"5/8: DALLIN. Neuro stable. Na 143    Prescriptions Prior to Admission[1]    Review of patient's allergies indicates:   Allergen Reactions    Amlodipine Other (See Comments)     Flushed face, resolved with Hydroxyzine and Pepcid    Benadryl [diphenhydramine hcl] Other (See Comments)     Per wife "pt collapsed and ended up in hospital" ~1995    Pennsaid [diclofenac sodium] Rash and Blisters       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    Cervical disc displacement     Degeneration of lumbar intervertebral disc     GERD (gastroesophageal reflux disease)     gastric polyps    Hip pain 02/22/2016    Hyperlipidemia     Shingles 11/2013    Sleep apnea      Past Surgical History:   Procedure Laterality Date    ABLATION, SVT, ACCESSORY PATHWAY N/A 5/25/2023    Procedure: Ablation, SVT, Accessory Pathway;  Surgeon: Goyo Sky MD;  Location: [facility] EP LAB;  Service: Cardiology;  Laterality: N/A;  PAC, RFA, CARTO, MAC, GP, 3 PREP    CERVICAL FUSION      bone graft    COLONOSCOPY N/A 7/29/2021    Procedure: COLONOSCOPY;  Surgeon: Tyler Story MD;  Location: [facility];  Service: Endoscopy;  Laterality: N/A;    COLONOSCOPY N/A 10/30/2024    Procedure: COLONOSCOPY;  Surgeon: Tyler Story MD;  Location: [facility];  Service: Endoscopy;  Laterality: N/A;  m/ppm    EPIDURAL STEROID INJECTION INTO LUMBAR SPINE N/A 12/5/2019    Procedure: Injection-steroid-epidural-lumbar;  Surgeon: Adarsh Kraft MD;  Location: [facility] OR;  Service: Pain Management;  Laterality: N/A;  L5-S1    ESOPHAGOGASTRODUODENOSCOPY N/A 10/27/2020    Procedure: EGD (ESOPHAGOGASTRODUODENOSCOPY);  Surgeon: Tyler Story MD;  Location: [facility];  Service: Endoscopy;  Laterality: N/A;    ESOPHAGOGASTRODUODENOSCOPY N/A 2/14/2025    Procedure: EGD (ESOPHAGOGASTRODUODENOSCOPY);  Surgeon: Tyler Story MD;  Location: [facility];  Service: Endoscopy;  Laterality: N/A;    INJECTION OF ANESTHETIC AGENT AROUND MEDIAL BRANCH NERVES " INNERVATING LUMBAR FACET JOINT Bilateral 11/18/2020    Procedure: BLOCK, NERVE, LUMBAR, MEDIAL BRANCH Bilateral L3,4,5;  Surgeon: Adarsh Kraft MD;  [facility];  Service: Pain Management;  Laterality: Bilateral;    INJECTION OF ANESTHETIC AGENT AROUND MEDIAL BRANCH NERVES INNERVATING LUMBAR FACET JOINT Left 10/25/2023    Procedure: Block-nerve-medial branch-lumbar;  Surgeon: Adarsh Kraft MD;  Location: [facility] OR;  Service: Anesthesiology;  Laterality: Left;  l4-5, l5-s1 MBB    INJECTION OF ANESTHETIC AGENT AROUND MEDIAL BRANCH NERVES INNERVATING LUMBAR FACET JOINT Bilateral 11/28/2023    Procedure: Block-nerve-medial branch-lumbar;  Surgeon: Adarsh Kraft MD;  Location[facility] OR;  Service: Anesthesiology;  Laterality: Bilateral;  L4.5 and l5/s1 MBB #2    RADIOFREQUENCY ABLATION OF LUMBAR MEDIAL BRANCH NERVE AT SINGLE LEVEL Bilateral 1/29/2021    Procedure: Radiofrequency Ablation, Nerve, Spinal, Lumbar, Medial Branch, 1 Level;  Surgeon: Adarsh Kraft MD;  [facility];  Service: Pain Management;  Laterality: Bilateral;  L3,4,5    TONSILLECTOMY, ADENOIDECTOMY       Family History       Problem Relation (Age of Onset)    Coronary artery disease Mother    Diabetes Mother    Heart disease Brother    Hypertension Mother    Stroke Mother, Father          Tobacco Use    Smoking status: Never    Smokeless tobacco: Never   Substance and Sexual Activity    Alcohol use: Yes     Alcohol/week: 6.0 standard drinks of alcohol     Types: 6 Cans of beer per week     Comment: weekly or less    Drug use: No    Sexual activity: Yes     Partners: Female       Objective:     Weight: 88.5 kg (195 lb 1.7 oz)  Body mass index is 27.99 kg/m².  Vital Signs (Most Recent):  Temp: 98.3 °F (36.8 °C) (05/08/25 1101)  Pulse: 71 (05/08/25 1401)  Resp: 16 (05/08/25 1401)  BP: (!) 142/68 (05/08/25 1301)  SpO2: 97 % (05/08/25 1401) Vital Signs (24h Range):  Temp:  [97.5 °F (36.4 °C)-100.2 °F (37.9 °C)] 98.3 °F (36.8 °C)  Pulse:  [58-77]  "71  Resp:  [14-24] 16  SpO2:  [95 %-98 %] 97 %  BP: (110-166)/(59-80) 142/68     Date 05/08/25 0700 - 05/09/25 0659   Shift 1737-3839 1430-4392 3138-5839 24 Hour Total   INTAKE   Shift Total(mL/kg)       OUTPUT   Urine(mL/kg/hr) 660(0.9)   660   Shift Total(mL/kg) 660(7.5)   660(7.5)   Weight (kg) 88.5 88.5 88.5 88.5                                 Physical Exam         Neurosurgery Physical Exam    GENERAL: resting comfortably  HEENT: NCAT, PERRL, mucous membranes moist  NECK: supple, trachea midline  CV: normal capillary refill  PULM: aerating well, symmetric expansion, no distress  ABD: soft, NT, ND  EXT: no c/c/e    NEURO:    AAO x 3 w choices  CN II-XII grossly intact  RUE/RLE: fc  LUE/LLE: plegic  Decreased sensation L hemibody      Significant Labs:  Recent Labs   Lab 05/07/25  0204 05/07/25  0829 05/08/25  0255 05/08/25  0904 05/08/25  1524   *  --  125*  --   --       < > 143 144 145   K 3.7  --  4.0  --   --    *  --  113*  --   --    CO2 21*  --  21*  --   --    BUN 19  --  21  --   --    CREATININE 0.6  --  0.7  --   --    CALCIUM 8.8  --  8.8  --   --    MG 2.0  --  2.1  --   --     < > = values in this interval not displayed.     Recent Labs   Lab 05/07/25  0204 05/08/25  0255   WBC 11.08 10.35   HGB 13.3* 13.2*   HCT 40.5 41.0    198     No results for input(s): "LABPT", "INR", "APTT" in the last 48 hours.    Microbiology Results (last 7 days)       ** No results found for the last 168 hours. **          All pertinent labs from the last 24 hours have been reviewed.    Significant Diagnostics:  I have reviewed and interpreted all pertinent imaging results/findings within the past 24 hours.  CTA Head and Neck (xpd)  Result Date: 5/4/2025  1. Acute right frontal intraparenchymal hematoma with mild adjacent edema is similar to the recent prior study.  Follow-up recommended. 2. No high-grade stenosis or major vessel occlusion. Electronically signed by: Frandy Landis " Date:    05/04/2025 Time:    16:51    CT HEAD FOR CODE STROKE  Result Date: 5/4/2025  3.3 x 2.4 cm intracranial hemorrhage within the right frontoparietal region with mild vasogenic edema.  There is no midline shift These findings were called to the ordering physician at 16:22 Electronically signed by: Enriqueta De La Vega Date:    05/04/2025 Time:    16:24    CT Head Without Contrast  Result Date: 5/5/2025  1. Right frontal parenchymal hemorrhage with subarachnoid and intraventricular extension appears relatively similar when compared to MRI performed 05/04/2025, but notably increased since initial head CT of 05/04/2025. 2. Relatively similar associated mass effect compared to most recent MRI. Electronically signed by: Colt Ang Date:    05/05/2025 Time:    06:25    CTA Head and Neck (xpd)  Result Date: 5/4/2025  1. Acute right frontal intraparenchymal hematoma with mild adjacent edema is similar to the recent prior study.  Follow-up recommended. 2. No high-grade stenosis or major vessel occlusion. Electronically signed by: Frandy Landis Date:    05/04/2025 Time:    16:51    CT HEAD FOR CODE STROKE  Result Date: 5/4/2025  3.3 x 2.4 cm intracranial hemorrhage within the right frontoparietal region with mild vasogenic edema.  There is no midline shift These findings were called to the ordering physician at 16:22 Electronically signed by: Enriqueta De La Vega Date:    05/04/2025 Time:    16:24    No results found in the last 24 hours.  XR NG/OG tube placement check, non-radiologist performed  Result Date: 5/6/2025  No acute process seen. Electronically signed by: Otto García MD Date:    05/06/2025 Time:    09:43    No results found in the last 24 hours.         [1]   Medications Prior to Admission   Medication Sig Dispense Refill Last Dose/Taking    aspirin (ECOTRIN) 81 MG EC tablet Take 1 tablet (81 mg total) by mouth once daily.  0     atorvastatin (LIPITOR) 20 MG tablet TAKE ONE TABLET BY MOUTH ONCE DAILY 90 tablet  3     ciclopirox (PENLAC) 8 % Soln Apply topically. (Patient not taking: Reported on 1/16/2025)       docosahexaenoic acid/epa (FISH OIL ORAL) Take 1 capsule by mouth once daily.       magnesium oxide (MAG-OX) 400 mg (241.3 mg magnesium) tablet Take 1 tablet (400 mg total) by mouth once daily. 90 tablet 3     meloxicam (MOBIC) 15 MG tablet TAKE 1 TABLET BY MOUTH AT NIGHT  AS NEEDED 90 tablet 3     methocarbamoL (ROBAXIN) 500 MG Tab TAKE ONE TABLET BY MOUTH EVERY 8 HOURS AS NEEDED FOR MUSCLE SPASMS 90 tablet 2     multivitamin (THERAGRAN) per tablet Take 1 tablet by mouth once daily.       omeprazole (PRILOSEC) 40 MG capsule Take 1 capsule (40 mg total) by mouth every morning. 90 capsule 3     pregabalin (LYRICA) 100 MG capsule Take 100 mg by mouth once daily.       triamcinolone acetonide 0.1% (KENALOG) 0.1 % ointment Apply topically 2 (two) times daily. 30 g 0

## 2025-05-08 NOTE — EICU
Intervention Initiated From:  COR / EICU    Praveena intervened regarding:  Rounding (Video assessment)    VICU Night Rounds Checklist  24H Vital Sign Range:  Temp:  [98.2 °F (36.8 °C)-100.2 °F (37.9 °C)]   Pulse:  [66-81]   Resp:  [16-25]   BP: (133-164)/(59-80)   SpO2:  [94 %-99 %]     Video rounds and LDA reconciliation        Nursing orders placed : IP ANAYA Peripheral IV Access

## 2025-05-08 NOTE — PT/OT/SLP PROGRESS
"Physical Therapy Treatment    Patient Name:  Colt Rose   MRN:  [id number]    Recommendations:     Discharge Recommendations: High Intensity Therapy  Discharge Equipment Recommendations: hospital bed, lift device, wheelchair  Barriers to discharge: Inaccessible home and Decreased caregiver support  1 DEVORA and requires assist for mobility  Assessment:     Colt Rose is a 69 y.o. 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  He presents with the following impairments/functional limitations: weakness, impaired endurance, gait instability, impaired functional mobility, impaired balance, impaired self care skills, decreased lower extremity function, decreased upper extremity function, decreased coordination, abnormal tone, decreased safety awareness . Pt is unsafe with functional mobility at this time due to pt requires max assist of 2 for bed mobility, max assist of 2 for transfers, and moderate to max assist for sitting balance due to L sided and posterior/anterior instability. Pt is motivated to progress with functional mobility.     Rehab Prognosis: Good; patient would benefit from acute skilled PT services to address these deficits and reach maximum level of function.    Recent Surgery: * No surgery found *      Plan:     During this hospitalization, patient to be seen 4 x/week to address the identified rehab impairments via gait training, therapeutic activities, therapeutic exercises, neuromuscular re-education and progress toward the following goals:    Plan of Care Expires:  05/16/25    Subjective   Pt motivated to stand and nodded "yes" when asked if it felt good to sit up    Pain/Comfort:  Pain Rating 1: 0/10  Pain Rating Post-Intervention 1: 0/10      Objective:     Communicated with nurse prior to session.  Patient found HOB elevated with NG tube, telemetry, pulse ox (continuous), blood pressure cuff, peripheral IV, pressure relief boots, SCD " upon PT entry to room.     General Precautions: Standard, aspiration, fall  Orthopedic Precautions: N/A  Braces: N/A  Respiratory Status: Room air     Functional Mobility:  Bed Mobility:     Rolling Left:  maximal assistance  Supine to Sit: maximal assistance and of 2 persons  Sit to Supine: maximal assistance and of 2 persons  Transfers:     Sit to Stand:  maximal assistance and of 2 persons with hand-held assist    AM-PAC 6 CLICK MOBILITY  Turning over in bed (including adjusting bedclothes, sheets and blankets)?: 2  Sitting down on and standing up from a chair with arms (e.g., wheelchair, bedside commode, etc.): 2  Moving from lying on back to sitting on the side of the bed?: 2  Moving to and from a bed to a chair (including a wheelchair)?: 1  Need to walk in hospital room?: 1  Climbing 3-5 steps with a railing?: 1  Basic Mobility Total Score: 9     Treatment & Education:   pt sat on the EOB ~ 21 min with moderate to max assist due to L sided and posterior/anterior instability. Pt stood x 3 trials with HHA with max assist of 2 for ~ 30-40 sec each trial. Pt was given verbal and manual cues for weight shifting to find midline and to lift his head with scapular retraction. Pt received verbal and manual cues for midline orientation and upright posture.     Patient left up in chair with all lines intact, call button in reach, bed alarm on, nurse notified, and wife present..    GOALS:   Multidisciplinary Problems       Physical Therapy Goals          Problem: Physical Therapy    Goal Priority Disciplines Outcome Interventions   Physical Therapy Goal     PT, PT/OT Progressing    Description: Goals to be completed by: 5/16/25    Pt will perform sup<>sit transfers w/ moderate assistance  Pt will have sufficient dynamic balance to sit EOB while performing ADLs/therex w/ minimum assistance  Pt will be able to stand up from EOB w/ moderate assistance using LRAD  Pt will transfer from bed to chair w/ moderate assistance  using LRAD  Pt will be independent w/ HEP therex on BLE w/ good form and ROM                      DME Justifications:  Colt requires a hospital bed due to him requiring positioning of the body in ways not feasible with an ordinary bed due to limited ability and cannot independently make changes in body position without the use of the bed.The positioning of the body cannot be sufficiently resolved by the use of pillows and wedges.  Colt Rose has a mobility limitation that significantly impairs his ability to participate in one or more mobility related activities of daily living (MRADLs) such as toileting, feeding, dressing, grooming, and bathing in customary locations in the home.  The mobility limitation cannot be sufficiently resolved by the use of a cane or walker.   The use of a manual wheelchair will significantly improve the patients ability to participate in MRADLS and the patient will use it on regular basis in the home.  Colt Rose has expressed his willingness to use a manual wheelchair in the home. Patients upper body strength is sufficient for propulsion.  He also has a caregiver who is available, willing, and able to provide assistance with the wheelchair when needed.      Time Tracking:     PT Received On: 05/08/25  PT Start Time: 0734     PT Stop Time: 0759  PT Total Time (min): 25 min (time added due to PT returned to assist pt with transfer to the [facility] 2136-6000=41 minutes total time)    Billable Minutes: Therapeutic Activity 41    Treatment Type: Treatment  PT/PTA: PT     Number of PTA visits since last PT visit: 0     05/08/2025

## 2025-05-08 NOTE — EICU
Virtual ICU Quality Rounds    Admit Date: 5/4/2025  Hospital Day: 4    ICU Day: 3d 14h    24H Vital Sign Range:  Temp:  [98.2 °F (36.8 °C)-100.2 °F (37.9 °C)]   Pulse:  [66-77]   Resp:  [14-24]   BP: (133-166)/(59-80)   SpO2:  [94 %-98 %]     VICU Surveillance Screening                    LDA reconciliation : Yes

## 2025-05-08 NOTE — PT/OT/SLP PROGRESS
"Speech Language Pathology Treatment    Patient Name:  Colt Rose   MRN:  [id number]  Admitting 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    Recommendations:                 General Recommendations:  Dysphagia therapy, Speech/language therapy, and Cognitive-linguistic therapy  Diet recommendations:  NPO, Liquid Diet Level: NPO   Aspiration Precautions: Ice chips sparingly, Meds crushed in puree, and Strict aspiration precautions   General Precautions: Standard, aspiration, fall  Communication strategies:  provide increased time to answer and go to room if call light pushed    Assessment:     Colt Rose is a 69 y.o. male with an SLP diagnosis of Dysphagia, Dysarthria, Cognitive-Linguistic Impairment, and Visio-Spatial Impairment.     Subjective     "That's what I've missed the most, ice and water."     Pain/Comfort:  Pain Rating 1: 0/10  Pain Rating Post-Intervention 1: 0/10    Respiratory Status: Room air    Objective:     Has the patient been evaluated by SLP for swallowing?   Yes  Keep patient NPO? Yes     Pt seen bedside with spouse present.  Improved alertness and eye opening today.  Continued L neglect evident.  Given max cues, pt able to localize to midline though did not cross.  Speech intelligibility was adequate given careful listening and occasional repetitions.  Increased vocal intensity evident.  He was Ox4 IND.  HOB raised upright for safety with po trials.  He accepted ice chip x3, thin via tsp x1, nectar thick liquids via tsp and cup, and puree via tsp.  Swallow response slightly more timely today initially though some delay with progression of trails as pt appeared to fatigue.  Overt cough response noted with thin liquid trial only.  Slight wet vocal quality noted with nectar thick liquid x1.  Education provided re: cont npo, meds crushed in puree, ice chips sparingly, aspiration risk, s/s aspiration, swallow precs, and POC.  All questions addressed within SLP " scope.  Wife with no further questions.     Goals:   Multidisciplinary Problems       SLP Goals          Problem: SLP    Goal Priority Disciplines Outcome   SLP Goal     SLP Progressing   Description: Goals due 5/12  1.  Pt. Will participate in ongoing assessment of swallow at bedside  2.  Pt. Will participate in speech, language and cognitive assessment                        Plan:     Patient to be seen:  4 x/week   Plan of Care expires:  06/02/25  Plan of Care reviewed with:  patient, spouse   SLP Follow-Up:  Yes       Discharge recommendations:  High Intensity Therapy   Barriers to Discharge:  Level of Skilled Assistance Needed      Time Tracking:     SLP Treatment Date:   05/08/25  Speech Start Time:  1010  Speech Stop Time:  1038     Speech Total Time (min):  28 min    Billable Minutes: Speech Therapy Individual 8, Treatment Swallowing Dysfunction 10, and Self Care/Home Management Training 10    05/08/2025

## 2025-05-08 NOTE — PLAN OF CARE
[facility] Neuro [facility] Care  Discharge Reassessment    Primary Care Provider: Jeffery Mena MD    Expected Discharge Date: 5/17/2025    Reassessment (most recent)       Discharge Reassessment - 05/08/25 1538          Discharge Reassessment    Assessment Type Discharge Planning Reassessment     Did the patient's condition or plan change since previous assessment? No     Discharge Plan discussed with: Spouse/sig other     Name(s) and Number(s) Yenny Rose  [phone] (P)      Communicated COLBY with patient/caregiver Yes (P)      Discharge Plan A Rehab (P)      Discharge Plan B Home with family (P)      DME Needed Upon Discharge  none (P)      Transition of Care Barriers None (P)      Why the patient remains in the hospital Requires continued medical care (P)         Post-Acute Status    Discharge Delays None known at this time (P)                  Discharge Plan A and Plan B have been determined by review of patient's clinical status, future medical and therapeutic needs, and coverage/benefits for post-acute care in coordination with multidisciplinary team members.    Zamzam Tellez MSW, LCSW  [facility]  Case Management Dept.

## 2025-05-08 NOTE — PROGRESS NOTES
"[facility] - Neuro Critical Care  Neurosurgery  Progress Note    Subjective:     History of Present Illness: 69-year-old male presents to the ER with left-sided weakness and right-sided gaze preference that began abruptly this afternoon around 4:00 p.m.. At OSH found to have R frontal lobar ICH. Given ddAVP for reversal.    Post-Op Info:  * No surgery found *       5/8: NAEON. Neuro stable. Na 143    Prescriptions Prior to Admission[1]    Review of patient's allergies indicates:   Allergen Reactions    Amlodipine Other (See Comments)     Flushed face, resolved with Hydroxyzine and Pepcid    Benadryl [diphenhydramine hcl] Other (See Comments)     Per wife "pt collapsed and ended up in hospital" ~1995    Pennsaid [diclofenac sodium] Rash and Blisters       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    Cervical disc displacement     Degeneration of lumbar intervertebral disc     GERD (gastroesophageal reflux disease)     gastric polyps    Hip pain 02/22/2016    Hyperlipidemia     Shingles 11/2013    Sleep apnea      Past Surgical History:   Procedure Laterality Date    ABLATION, SVT, ACCESSORY PATHWAY N/A 5/25/2023    Procedure: Ablation, SVT, Accessory Pathway;  Surgeon: Goyo Sky MD;  Location: [facility] EP LAB;  Service: Cardiology;  Laterality: N/A;  PAC, RFA, CARTO, MAC, GP, 3 PREP    CERVICAL FUSION      bone graft    COLONOSCOPY N/A 7/29/2021    Procedure: COLONOSCOPY;  Surgeon: Tyler Story MD;  Location: [facility] ENDO;  Service: Endoscopy;  Laterality: N/A;    COLONOSCOPY N/A 10/30/2024    Procedure: COLONOSCOPY;  Surgeon: Tyler Story MD;  Location: [facility] ENDO;  Service: Endoscopy;  Laterality: N/A;  m/ppm    EPIDURAL STEROID INJECTION INTO LUMBAR SPINE N/A 12/5/2019    Procedure: Injection-steroid-epidural-lumbar;  Surgeon: Adarsh Kraft MD;  Location: [facility] OR;  Service: Pain Management;  Laterality: N/A;  L5-S1    ESOPHAGOGASTRODUODENOSCOPY N/A 10/27/2020    Procedure: EGD " (ESOPHAGOGASTRODUODENOSCOPY);  Surgeon: Tyler Story MD;  Location: [facility] ENDO;  Service: Endoscopy;  Laterality: N/A;    ESOPHAGOGASTRODUODENOSCOPY N/A 2/14/2025    Procedure: EGD (ESOPHAGOGASTRODUODENOSCOPY);  Surgeon: Tyler Story MD;  Location: [facility];  Service: Endoscopy;  Laterality: N/A;    INJECTION OF ANESTHETIC AGENT AROUND MEDIAL BRANCH NERVES INNERVATING LUMBAR FACET JOINT Bilateral 11/18/2020    Procedure: BLOCK, NERVE, LUMBAR, MEDIAL BRANCH Bilateral L3,4,5;  Surgeon: Adarsh Kraft MD;  Location: [facility] OR;  Service: Pain Management;  Laterality: Bilateral;    INJECTION OF ANESTHETIC AGENT AROUND MEDIAL BRANCH NERVES INNERVATING LUMBAR FACET JOINT Left 10/25/2023    Procedure: Block-nerve-medial branch-lumbar;  Surgeon: Adarsh Kraft MD;  Location: [facility] OR;  Service: Anesthesiology;  Laterality: Left;  l4-5, l5-s1 MBB    INJECTION OF ANESTHETIC AGENT AROUND MEDIAL BRANCH NERVES INNERVATING LUMBAR FACET JOINT Bilateral 11/28/2023    Procedure: Block-nerve-medial branch-lumbar;  Surgeon: Adarsh Kraft MD;  Location: [facility] OR;  Service: Anesthesiology;  Laterality: Bilateral;  L4.5 and l5/s1 MBB #2    RADIOFREQUENCY ABLATION OF LUMBAR MEDIAL BRANCH NERVE AT SINGLE LEVEL Bilateral 1/29/2021    Procedure: Radiofrequency Ablation, Nerve, Spinal, Lumbar, Medial Branch, 1 Level;  Surgeon: Adarsh Kraft MD;  Location: [facility] OR;  Service: Pain Management;  Laterality: Bilateral;  L3,4,5    TONSILLECTOMY, ADENOIDECTOMY       Family History       Problem Relation (Age of Onset)    Coronary artery disease Mother    Diabetes Mother    Heart disease Brother    Hypertension Mother    Stroke Mother, Father          Tobacco Use    Smoking status: Never    Smokeless tobacco: Never   Substance and Sexual Activity    Alcohol use: Yes     Alcohol/week: 6.0 standard drinks of alcohol     Types: 6 Cans of beer per week     Comment: weekly or less    Drug use: No    Sexual activity: Yes     Partners: Female  "      Objective:     Weight: 88.5 kg (195 lb 1.7 oz)  Body mass index is 27.99 kg/m².  Vital Signs (Most Recent):  Temp: 98.3 °F (36.8 °C) (05/08/25 1101)  Pulse: 71 (05/08/25 1401)  Resp: 16 (05/08/25 1401)  BP: (!) 142/68 (05/08/25 1301)  SpO2: 97 % (05/08/25 1401) Vital Signs (24h Range):  Temp:  [97.5 °F (36.4 °C)-100.2 °F (37.9 °C)] 98.3 °F (36.8 °C)  Pulse:  [58-77] 71  Resp:  [14-24] 16  SpO2:  [95 %-98 %] 97 %  BP: (110-166)/(59-80) 142/68     Date 05/08/25 0700 - 05/09/25 0659   Shift 6923-7285 4699-9934 3704-1940 24 Hour Total   INTAKE   Shift Total(mL/kg)       OUTPUT   Urine(mL/kg/hr) 660(0.9)   660   Shift Total(mL/kg) 660(7.5)   660(7.5)   Weight (kg) 88.5 88.5 88.5 88.5                                 Physical Exam         Neurosurgery Physical Exam    GENERAL: resting comfortably  HEENT: NCAT, PERRL, mucous membranes moist  NECK: supple, trachea midline  CV: normal capillary refill  PULM: aerating well, symmetric expansion, no distress  ABD: soft, NT, ND  EXT: no c/c/e    NEURO:    AAO x 3 w choices  CN II-XII grossly intact  RUE/RLE: fc  LUE/LLE: plegic  Decreased sensation L hemibody      Significant Labs:  Recent Labs   Lab 05/07/25  0204 05/07/25  0829 05/08/25  0255 05/08/25  0904 05/08/25  1524   *  --  125*  --   --       < > 143 144 145   K 3.7  --  4.0  --   --    *  --  113*  --   --    CO2 21*  --  21*  --   --    BUN 19  --  21  --   --    CREATININE 0.6  --  0.7  --   --    CALCIUM 8.8  --  8.8  --   --    MG 2.0  --  2.1  --   --     < > = values in this interval not displayed.     Recent Labs   Lab 05/07/25  0204 05/08/25  0255   WBC 11.08 10.35   HGB 13.3* 13.2*   HCT 40.5 41.0    198     No results for input(s): "LABPT", "INR", "APTT" in the last 48 hours.    Microbiology Results (last 7 days)       ** No results found for the last 168 hours. **          All pertinent labs from the last 24 hours have been reviewed.    Significant Diagnostics:  I have " reviewed and interpreted all pertinent imaging results/findings within the past 24 hours.  CTA Head and Neck (xpd)  Result Date: 5/4/2025  1. Acute right frontal intraparenchymal hematoma with mild adjacent edema is similar to the recent prior study.  Follow-up recommended. 2. No high-grade stenosis or major vessel occlusion. Electronically signed by: Frandy Landis Date:    05/04/2025 Time:    16:51    CT HEAD FOR CODE STROKE  Result Date: 5/4/2025  3.3 x 2.4 cm intracranial hemorrhage within the right frontoparietal region with mild vasogenic edema.  There is no midline shift These findings were called to the ordering physician at 16:22 Electronically signed by: Enriqueta De La Vega Date:    05/04/2025 Time:    16:24    CT Head Without Contrast  Result Date: 5/5/2025  1. Right frontal parenchymal hemorrhage with subarachnoid and intraventricular extension appears relatively similar when compared to MRI performed 05/04/2025, but notably increased since initial head CT of 05/04/2025. 2. Relatively similar associated mass effect compared to most recent MRI. Electronically signed by: Colt Ang Date:    05/05/2025 Time:    06:25    CTA Head and Neck (xpd)  Result Date: 5/4/2025  1. Acute right frontal intraparenchymal hematoma with mild adjacent edema is similar to the recent prior study.  Follow-up recommended. 2. No high-grade stenosis or major vessel occlusion. Electronically signed by: Frandy Landis Date:    05/04/2025 Time:    16:51    CT HEAD FOR CODE STROKE  Result Date: 5/4/2025  3.3 x 2.4 cm intracranial hemorrhage within the right frontoparietal region with mild vasogenic edema.  There is no midline shift These findings were called to the ordering physician at 16:22 Electronically signed by: Enriqueta De La Vega Date:    05/04/2025 Time:    16:24    No results found in the last 24 hours.  XR NG/OG tube placement check, non-radiologist performed  Result Date: 5/6/2025  No acute process seen. Electronically signed  by: Otto García MD Date:    05/06/2025 Time:    09:43    No results found in the last 24 hours.    Assessment/Plan:     * Nontraumatic cortical hemorrhage of right cerebral hemisphere  69-year-old male presents to the ER with left-sided weakness and right-sided gaze preference that began abruptly this afternoon around 4:00 p.m.. At OSH found to have R frontal lobar ICH. Given ddAVP for reversal.    ICHS 0    MRI wo 5/4: swi looks larger , there is IVH now too  CTH 5/5: looks same as MRI , larger and IVH  CTH 5/6: stable    --Patient admitted to [facility] on telemetry      -q1h neurochecks in ICU, q2h neurochecks in stepdown, q4h neurochecks on floor  --All labs and diagnostics reviewed  ---150  --Na >145  --Keppra 500 BID   --HOB >30  --No acute neurosurgical intervention at this time  --Continue to monitor clinically, notify NSGY immediately with any changes in neuro status    Dispo: ICU    Plan d/w Dr. Gross.         Oskar Simms MD  Neurosurgery  [facility] - Neuro Critical Care       [1]   Medications Prior to Admission   Medication Sig Dispense Refill Last Dose/Taking    aspirin (ECOTRIN) 81 MG EC tablet Take 1 tablet (81 mg total) by mouth once daily.  0     atorvastatin (LIPITOR) 20 MG tablet TAKE ONE TABLET BY MOUTH ONCE DAILY 90 tablet 3     ciclopirox (PENLAC) 8 % Soln Apply topically. (Patient not taking: Reported on 1/16/2025)       docosahexaenoic acid/epa (FISH OIL ORAL) Take 1 capsule by mouth once daily.       magnesium oxide (MAG-OX) 400 mg (241.3 mg magnesium) tablet Take 1 tablet (400 mg total) by mouth once daily. 90 tablet 3     meloxicam (MOBIC) 15 MG tablet TAKE 1 TABLET BY MOUTH AT NIGHT  AS NEEDED 90 tablet 3     methocarbamoL (ROBAXIN) 500 MG Tab TAKE ONE TABLET BY MOUTH EVERY 8 HOURS AS NEEDED FOR MUSCLE SPASMS 90 tablet 2     multivitamin (THERAGRAN) per tablet Take 1 tablet by mouth once daily.       omeprazole (PRILOSEC) 40 MG capsule Take 1 capsule (40 mg total) by mouth every morning.  90 capsule 3     pregabalin (LYRICA) 100 MG capsule Take 100 mg by mouth once daily.       triamcinolone acetonide 0.1% (KENALOG) 0.1 % ointment Apply topically 2 (two) times daily. 30 g 0

## 2025-05-09 LAB
ABSOLUTE EOSINOPHIL (OHS): 0.02 K/UL
ABSOLUTE MONOCYTE (OHS): 1.08 K/UL (ref 0.3–1)
ABSOLUTE NEUTROPHIL COUNT (OHS): 7.27 K/UL (ref 1.8–7.7)
ALBUMIN SERPL BCP-MCNC: 3.1 G/DL (ref 3.5–5.2)
ALP SERPL-CCNC: 66 UNIT/L (ref 40–150)
ALT SERPL W/O P-5'-P-CCNC: 30 UNIT/L (ref 10–44)
ANION GAP (OHS): 8 MMOL/L (ref 8–16)
AST SERPL-CCNC: 21 UNIT/L (ref 11–45)
BASOPHILS # BLD AUTO: 0.04 K/UL
BASOPHILS NFR BLD AUTO: 0.4 %
BILIRUB SERPL-MCNC: 0.9 MG/DL (ref 0.1–1)
BUN SERPL-MCNC: 26 MG/DL (ref 8–23)
CALCIUM SERPL-MCNC: 8.6 MG/DL (ref 8.7–10.5)
CHLORIDE SERPL-SCNC: 117 MMOL/L (ref 95–110)
CO2 SERPL-SCNC: 21 MMOL/L (ref 23–29)
CREAT SERPL-MCNC: 0.7 MG/DL (ref 0.5–1.4)
ERYTHROCYTE [DISTWIDTH] IN BLOOD BY AUTOMATED COUNT: 14.6 % (ref 11.5–14.5)
GFR SERPLBLD CREATININE-BSD FMLA CKD-EPI: >60 ML/MIN/1.73/M2
GLUCOSE SERPL-MCNC: 117 MG/DL (ref 70–110)
HCT VFR BLD AUTO: 40.7 % (ref 40–54)
HGB BLD-MCNC: 12.9 GM/DL (ref 14–18)
IMM GRANULOCYTES # BLD AUTO: 0.04 K/UL (ref 0–0.04)
IMM GRANULOCYTES NFR BLD AUTO: 0.4 % (ref 0–0.5)
LYMPHOCYTES # BLD AUTO: 1.69 K/UL (ref 1–4.8)
MAGNESIUM SERPL-MCNC: 2.2 MG/DL (ref 1.6–2.6)
MCH RBC QN AUTO: 31 PG (ref 27–31)
MCHC RBC AUTO-ENTMCNC: 31.7 G/DL (ref 32–36)
MCV RBC AUTO: 98 FL (ref 82–98)
NUCLEATED RBC (/100WBC) (OHS): 0 /100 WBC
PHOSPHATE SERPL-MCNC: 3.6 MG/DL (ref 2.7–4.5)
PLATELET # BLD AUTO: 182 K/UL (ref 150–450)
PMV BLD AUTO: 10.5 FL (ref 9.2–12.9)
POTASSIUM SERPL-SCNC: 3.8 MMOL/L (ref 3.5–5.1)
PROT SERPL-MCNC: 6.6 GM/DL (ref 6–8.4)
RBC # BLD AUTO: 4.16 M/UL (ref 4.6–6.2)
RELATIVE EOSINOPHIL (OHS): 0.2 %
RELATIVE LYMPHOCYTE (OHS): 16.7 % (ref 18–48)
RELATIVE MONOCYTE (OHS): 10.7 % (ref 4–15)
RELATIVE NEUTROPHIL (OHS): 71.6 % (ref 38–73)
SODIUM SERPL-SCNC: 146 MMOL/L (ref 136–145)
SODIUM SERPL-SCNC: 147 MMOL/L (ref 136–145)
SODIUM SERPL-SCNC: 148 MMOL/L (ref 136–145)
WBC # BLD AUTO: 10.14 K/UL (ref 3.9–12.7)

## 2025-05-09 PROCEDURE — 84295 ASSAY OF SERUM SODIUM: CPT

## 2025-05-09 PROCEDURE — 92611 MOTION FLUOROSCOPY/SWALLOW: CPT

## 2025-05-09 PROCEDURE — 85025 COMPLETE CBC W/AUTO DIFF WBC: CPT | Performed by: NURSE PRACTITIONER

## 2025-05-09 PROCEDURE — 97530 THERAPEUTIC ACTIVITIES: CPT

## 2025-05-09 PROCEDURE — 83735 ASSAY OF MAGNESIUM: CPT | Performed by: NURSE PRACTITIONER

## 2025-05-09 PROCEDURE — 20000000 HC ICU ROOM

## 2025-05-09 PROCEDURE — 25000003 PHARM REV CODE 250: Performed by: PSYCHIATRY & NEUROLOGY

## 2025-05-09 PROCEDURE — 99900035 HC TECH TIME PER 15 MIN (STAT)

## 2025-05-09 PROCEDURE — 84100 ASSAY OF PHOSPHORUS: CPT | Performed by: NURSE PRACTITIONER

## 2025-05-09 PROCEDURE — 63600175 PHARM REV CODE 636 W HCPCS: Performed by: PSYCHIATRY & NEUROLOGY

## 2025-05-09 PROCEDURE — 25000003 PHARM REV CODE 250

## 2025-05-09 PROCEDURE — 82435 ASSAY OF BLOOD CHLORIDE: CPT | Performed by: NURSE PRACTITIONER

## 2025-05-09 PROCEDURE — 84295 ASSAY OF SERUM SODIUM: CPT | Performed by: PSYCHIATRY & NEUROLOGY

## 2025-05-09 PROCEDURE — 99232 SBSQ HOSP IP/OBS MODERATE 35: CPT | Mod: GC,,, | Performed by: NEUROLOGICAL SURGERY

## 2025-05-09 PROCEDURE — 99291 CRITICAL CARE FIRST HOUR: CPT | Mod: GC,,, | Performed by: PSYCHIATRY & NEUROLOGY

## 2025-05-09 PROCEDURE — 63600175 PHARM REV CODE 636 W HCPCS

## 2025-05-09 PROCEDURE — 94761 N-INVAS EAR/PLS OXIMETRY MLT: CPT

## 2025-05-09 PROCEDURE — 97535 SELF CARE MNGMENT TRAINING: CPT

## 2025-05-09 PROCEDURE — 83497 ASSAY OF 5-HIAA: CPT

## 2025-05-09 RX ORDER — LOSARTAN POTASSIUM 50 MG/1
100 TABLET ORAL DAILY
Status: DISCONTINUED | OUTPATIENT
Start: 2025-05-10 | End: 2025-05-13 | Stop reason: HOSPADM

## 2025-05-09 RX ORDER — TRIAMCINOLONE ACETONIDE 1 MG/G
OINTMENT TOPICAL 2 TIMES DAILY
Status: DISCONTINUED | OUTPATIENT
Start: 2025-05-09 | End: 2025-05-13 | Stop reason: HOSPADM

## 2025-05-09 RX ADMIN — SENNOSIDES AND DOCUSATE SODIUM 1 TABLET: 50; 8.6 TABLET ORAL at 09:05

## 2025-05-09 RX ADMIN — ACETAMINOPHEN 650 MG: 325 TABLET ORAL at 07:05

## 2025-05-09 RX ADMIN — ATORVASTATIN CALCIUM 20 MG: 20 TABLET, FILM COATED ORAL at 09:05

## 2025-05-09 RX ADMIN — MUPIROCIN: 20 OINTMENT TOPICAL at 07:05

## 2025-05-09 RX ADMIN — LOSARTAN POTASSIUM 50 MG: 50 TABLET, FILM COATED ORAL at 07:05

## 2025-05-09 RX ADMIN — SILODOSIN 4 MG: 4 CAPSULE ORAL at 07:05

## 2025-05-09 RX ADMIN — HYDRALAZINE HYDROCHLORIDE 100 MG: 50 TABLET ORAL at 02:05

## 2025-05-09 RX ADMIN — HYPROMELLOSE 2910 1 DROP: 5 SOLUTION OPHTHALMIC at 07:05

## 2025-05-09 RX ADMIN — HYPROMELLOSE 2910 1 DROP: 5 SOLUTION OPHTHALMIC at 09:05

## 2025-05-09 RX ADMIN — HYDRALAZINE HYDROCHLORIDE 100 MG: 50 TABLET ORAL at 06:05

## 2025-05-09 RX ADMIN — LABETALOL HYDROCHLORIDE 10 MG: 5 INJECTION, SOLUTION INTRAVENOUS at 06:05

## 2025-05-09 RX ADMIN — ACETAMINOPHEN 650 MG: 325 TABLET ORAL at 05:05

## 2025-05-09 RX ADMIN — TRIAMCINOLONE ACETONIDE: 1 OINTMENT TOPICAL at 09:05

## 2025-05-09 RX ADMIN — SENNOSIDES AND DOCUSATE SODIUM 1 TABLET: 50; 8.6 TABLET ORAL at 07:05

## 2025-05-09 RX ADMIN — ENOXAPARIN SODIUM 40 MG: 40 INJECTION SUBCUTANEOUS at 05:05

## 2025-05-09 RX ADMIN — LABETALOL HYDROCHLORIDE 10 MG: 5 INJECTION, SOLUTION INTRAVENOUS at 05:05

## 2025-05-09 RX ADMIN — HYDRALAZINE HYDROCHLORIDE 100 MG: 50 TABLET ORAL at 09:05

## 2025-05-09 NOTE — PLAN OF CARE
05/09/25 1100   Post-Acute Status   Post-Acute Authorization Placement   Post-Acute Placement Status Referrals Sent   Discharge Delays None known at this time   Discharge Plan   Discharge Plan A Rehab   Discharge Plan B Home with family     Met with pt to review discharge recommendation of REHAB and is agreeable to plan    Patient/family provided list of facilities in-network with patient's payor plan. Providers that are owned, operated, or affiliated with [facility] are included on the list.     Notified that referral sent to below listed facilities from in-network list based on proximity to home/family support:   [facility]   2.  3.  4.  5. (can send more than 5)    Patient/family instructed to identify preference.    Preferred Facility: (if more than 1, listed in order of descending preference)  [facility]   OR  Patient has declined to select a preferred provider and elects placement with the first accepting in network provider that is available to provide services as ordered by the physician       If an additional preferred facility not listed above is identified, additional referral to be sent. If above facilities unable to accept, will send additional referrals to in-network providers.       Discharge Plan A and Plan B have been determined by review of patient's clinical status, future medical and therapeutic needs, and coverage/benefits for post-acute care in coordination with multidisciplinary team members.    Zamzam Tellez MSW, EVELYNW  [facility]  Case Management Dept.

## 2025-05-09 NOTE — PLAN OF CARE
[facility] Care Plan  POC reviewed with Colt Rose and family at 1400. Patient and Family verbalized understanding. Questions and concerns addressed. No acute events today. Pt progressing toward goals. Will continue to monitor. See below and flowsheets for full assessment and VS info.     - CT completed, stability scan  - Barium swallow study complete  - 24 h Urine collection in process, start time: 1155 AM   - Pt cleared by SLP for honey thick liquids and puree diet  - Kidney US completed  - Pt has rash/blisters on back of L thigh, cream ordered, wound care consulted, images in Epic  - BM x1    Is this a stroke patient? Yes     Neuro:  Nader Coma Scale  Best Eye Response: 4-->(E4) spontaneous  Best Motor Response: 6-->(M6) obeys commands  Best Verbal Response: 5-->(V5) oriented  [place] Coma Scale Score: 15  Assessment Qualifiers: patient not sedated/intubated  Pupil PERRLA: yes     24 hr Temp:  [98 °F (36.7 °C)-101.2 °F (38.4 °C)]     CV:   Rhythm: normal sinus rhythm  BP goals:   SBP < 160  MAP > 65    Resp:      Oxygen Concentration (%): 21    Plan: N/A    GI/:     Diet/Nutrition Received: other (see comments) (meds crushed in puree/pudding OK per SLP)  Last Bowel Movement: 05/08/25  Voiding Characteristics: external catheter    Intake/Output Summary (Last 24 hours) at 5/9/2025 1639  Last data filed at 5/9/2025 0701  Gross per 24 hour   Intake 744.72 ml   Output 1625 ml   Net -880.28 ml     Unmeasured Output  Unmeasured Urine Occurrence: 1  Unmeasured Stool Occurrence: 1  Pad Count: 1    Labs/Accuchecks:  Recent Labs   Lab 05/09/25 0213   WBC 10.14   RBC 4.16*   HGB 12.9*   HCT 40.7         Recent Labs   Lab 05/09/25  0213 05/09/25  0755 05/09/25  1538   *   < > 148*   K 3.8  --   --    CO2 21*  --   --    *  --   --    BUN 26*  --   --    CREATININE 0.7  --   --    ALKPHOS 66  --   --    ALT 30  --   --    AST 21  --   --    BILITOT 0.9  --   --     < > = values in this interval not  "displayed.      Recent Labs   Lab 05/05/25  0145   PROTIME 11.8   INR 1.1   APTT 22.6    No results for input(s): "CPK", "CPKMB", "TROPONINI", "MB" in the last 168 hours.    Electrolytes: N/A - electrolytes WDL  Accuchecks: none    Gtts:      LDA/Wounds:    Mir Risk Assessment  Sensory Perception: 2-->very limited  Moisture: 3-->occasionally moist  Activity: 1-->bedfast  Mobility: 2-->very limited  Nutrition: 2-->probably inadequate  Friction and Shear: 2-->potential problem  Mir Score: 12    Is your mir score 12 or less? yes enrolled in the peach program and heel boots on            Restraints:        JENNA   "

## 2025-05-09 NOTE — NURSING
Pt transported to CT scan at 0930 by RN along with ambu bag and monitoring. Pt tolerated scan well. Returned to room at 1010.     Of note: transport was ordered for patient barium swallow study and was cancelled 2/2 CT scan. Transport will be reordered.

## 2025-05-09 NOTE — ASSESSMENT & PLAN NOTE
PT/OT   Physical Therapy    Physical Therapy Re-Assessment & Progress Note     Patient Name: Angelina Becerril  MRN: [id number]  Today's Date: 10/24/2023  Time Calculation  Start Time: 1049  Stop Time: 1123  Time Calculation (min): 34 min      Assessment:  Pt presents to physical therapy as a re-evaluation.  Patient has been previously participating in aquatic physical therapy and now presents a referral for physical therapy on land for the same medical diagnosis s/p lumbar fusion. At this time pt continues to demonstrate deficits in BL LE strength, gait dysfunction, stairs, balance, functional mobility, and tolerance with activity. pt would benefit from continued skilled physical therapy to maximize pt safety, increase tolerance to activity and to address impairments to restore PLOF with ADLs, functional mobility and participation in activities.    PT Assessment  PT Assessment Results: Decreased strength, Decreased endurance, Decreased mobility, Decreased coordination  Rehab Prognosis: Excellent  Evaluation/Treatment Tolerance: Patient tolerated treatment well    Plan:  OP PT Plan  Treatment/Interventions: Cryotherapy, Education/ Instruction, Gait training, Manual therapy, Neuromuscular re-education, Self care/ home management, Therapeutic activities, Therapeutic exercises, Biofeedback  PT Plan: Skilled PT, Other (Comment) (Start on strengthening proximal hip musculature program; Nustep; Goal will be eventually for patient to have a full gym at [facility] exercise program)  PT Frequency: 1 time per week  Duration: 6 weeks  Onset Date: 07/17/23  Rehab Potential: Excellent  Plan of Care Agreement: Patient  Visit number: 12/12   Approved number of visits: 12   Authorization date range: 9/14/23-12/2/23   Authorization required after evaluation     Current Problem  1. History of lumbar spinal fusion        2. Spinal stenosis, lumbar region with neurogenic claudication  PT eval and treat      3. Spondylolisthesis, grade 1             Subjective   General  Reason for Referral: Spondylolisthesis, Grade 1; s/p Lumbar Spinal Fusion  Referred By: Terry Moran MD  Past Medical History Relevant to Rehab: DM, HTN, OA, OP  Preferred Learning Style: visual, verbal, written  General Comment: Reports stairs are not good at completing: holds onto the rail to complete - hurts her BL thighs paired with balance. She thinks a lot of it is in her head. Reports no falls, a couple almost with the puppy at home. Per pt report has been bending over a lot, MD is aware and she reports he told her to be careful. Has signed up at the [facility] to complete exercises.  Precautions  Precautions  Medical Precautions: Spinal precautions  Vital Signs  Heart Rate: 68  Heart Rate Source: Monitor  SpO2: 97 %  Pain  Pain Assessment: 0-10  Pain Score: 0 - No pain    Objective   Posture  Posture Comment: Fwd head, rounded shoulders, increased thoracic kyphosis; decreased lumbar lordosis with sitting unsupported    Extremity/Trunk Assessment  Gait: Ambulates in clinic with no assistive device, good jeff, slight trunk lean to the R LE to off weight L LE with swing and slight trunk lean to the L LE to off weight the R LE with Swing phase of gait.     Stairs: ascends/descends 4 6in steps while alt use of handrail, step over step pattern; hesitant with stepping, slower jeff.     RLE   RLE : Exceptions to WFL  Strength RLE  R Hip Flexion: 4/5  R Hip Extension: 3+/5  R Hip ABduction: 4/5  R Hip ADduction: 4-/5  R Knee Flexion: 4+/5  R Knee Extension: 4+/5  R Ankle Dorsiflexion: 5/5  R Ankle Plantar Flexion: 5/5  LLE   LLE : Exceptions to WFL  Strength LLE  L Hip Flexion: 4+/5  L Hip Extension: 3+/5  L Hip ABduction: 4/5  L Hip ADduction: 4-/5  L Knee Flexion: 4+/5  L Knee Extension: 4+/5  L Ankle Dorsiflexion: 5/5  L Ankle Plantar Flexion: 5/5    Lumbar Spine  Lumbar Palpation/Joint Mobility Assessment  Palpation/Joint Mobility Comment: No pain with palpation to the L/S and  T/S  Lumbar AROM  Lumbar AROM WFL: yes  Hip AROM  Hip AROM WFL: yes  Lumbar Myotomes  Lumbar Myotomes WFL: no  R Hip Flex : 4/5  L Hip Flex (L2): (5/5): 4+/5  R Ankle DF (L4): (5/5): 5/5  L Ankle DF (L4): (5/5): 5/5  R Great Toe Ext (L5): (5/5) : 5/5  L Great Toe Ext (L5): (5/5): 5/5  R Ankle EV (S1): (5/5): 5/5  L Ankle EV (S1): (5/5): 5/5  Specific Lower Extremity MMT  Specific Lower Extremity MMT WFL: no  R Iliopsoas: (5/5): 4/5  L Iliopsoas: (5/5): 4+/5  R Gluteals (prone): (5/5): 3+/5  L Gluteals (prone): (5/5): 3+/5  R Hip External Rotation: (5/5): 5/5  L Hip External Rotation: (5/5): 5/5    Dermatomes  Dermatomes WFL: yes  R Anterior / Medial thigh ( L2, L3): WFL  L Anterior / Medial thigh ( L2, L3): WFL  R Medial ankle (L4): WFL  L Medial ankle (L4): WFL  R Lateral calf/dorsal/plantar surface of foot, digit 1-4 (L5): WFL  L Lateral calf/dorsal/plantar surface of foot, digit 1-4 (L5): WFL  R Digit 5 (S1): WFL  L Digit 5 (S1): WFL  Special Tests  Supine SLR: (Negative): Negative BL     Balance: 4 Stage  NBOS: 10s  Tandem: 10s  Semi Tandem: 10s   SLS R: 2s     Outcome Measures:  Timed Up and Go Test  TUG Comment: 8.02s with no AD    Other Measures  5x Sit to Stand: 14.83,standardizd chair, no UE assist  Oswestry Disablity Index (JERMAIN): 14/50    Treatments:  Therapeutic Activity  Therapeutic Activity Performed: Yes  1. Repeated sit to/from stands from standardized chair height. Required VC for no use of hands, nose over toes, full upright stand from chair, use of anterior shift with fwd momentum and eccentric control into chair.  2. Functional Performance via gait analysis of TUG: Verbal cues for sequencing/positioning. 2x10' at SBA.   3. Functional mobility via AROM/PROM of LE; Verbal cues for sequencing/positioning.  4. Functional Performance via MMT of LE: Hip, knee, ankle; Verbal cues for sequencing/positioning.   5. Functional Performance via Stairs  6. Functional Performance of balance via 4 Stage Purdy  7.  Educated pt on continuing with  POC, updated goals of physical therapy, purpose of physical therapy, as well as the benefits in participating in physical therapy to increase functional mobility and maximize pt safety.       OP EDUCATION:  Education  Individual(s) Educated: Patient  Education Provided: Anatomy, Fall Risk, Body Mechanics, Home Exercise Program, Home Safety, POC, Posture, Post-Op Precautions  Risk and Benefits Discussed with Patient/Caregiver/Other: yes  Patient/Caregiver Demonstrated Understanding: yes  Plan of Care Discussed and Agreed Upon: yes  Patient Response to Education: Patient/Caregiver Verbalized Understanding of Information, Patient/Caregiver Performed Return Demonstration of Exercises/Activities, Patient/Caregiver Asked Appropriate Questions    Goals: Re-Check 10/24/23     STG: Pt will be independent in HEP & symptom management    LTGs:  Pt will demonstrate 2pt improvement on the VAS pain scale, allowing for improved tolerance of functional activities.     Pt will demonstrate no losses in all lumbar AROM, without onset of pain, allowing for the ability to perform functional activities.     Pt will not have exacerbations of symptoms during completion of cooking and household chores in standing > 15min (MET)     Pt will meet above goals allowing pt to return to walking >10min. (MET, [id number])     pt will improve Oswestry (JERMAIN) score by at least 12 points (minimal clinically important difference) in order to reflect improvement in ADL's/pain reduction. Baseline 09/14/2023: 16/50, (JERMAIN score of </=10/50 (</=20%) represents minimal to no disability) --> 14/50 (Re-Check)     5xSTS: pt will perform 5x sit to  < 15 seconds to decrease fall risk. Baseline 09/14/2023: 16.44s sec --> 14.83s (Re-Check)     TUG: pt will complete TUG within 12 seconds or less (indicative of higher fall risk) in order to INC balance and enhance safety during ADLs/ IADLs. OR 3.4s MCID (> or equal to 12 seconds  indicates high risk for falls in older adults. 11-20 seconds is normal for the frail and elderly.) Baseline [mm/dd/yyyy]: 8.7s sec  --> 8.02s (Re-Check)

## 2025-05-09 NOTE — PROCEDURES
Modified Barium Swallow    Patient Name:  Colt Rose   MRN:  [id number]      Recommendations:     Recommendations:                General Recommendations:  Dysphagia therapy, Speech/language therapy, and Cognitive-linguistic therapy  Diet recommendations:  Puree Diet - IDDSI Level 4, Liquid Diet Level: Moderately thick liquids - IDDSI Level 3   Aspiration Precautions:   1 bite/sip at a time  Alternating bites/sips-final presentation to be puree when able  Assistance with meals and Assistance with thickening liquids  Check for pocketing/oral residue  Double swallow with each bite/sip  Eliminate distractions  Feed only when awake/alert   HOB to 90 degrees   Ice chips sparingly  Meds crushed in puree  Puree for pleasure  Small bites/sips  Strict aspiration precautions   General Precautions: Standard, aspiration, fall, pureed diet, honey thick  Communication strategies:  provide increased time to answer and go to room if call light pushed    Referral     Reason for Referral  Patient was referred for a Modified Barium Swallow Study to assess the efficiency of his/her swallow function, rule out aspiration and make recommendations regarding safe dietary consistencies, effective compensatory strategies, and safe eating environment.     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       History:     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    Cervical disc displacement     Degeneration of lumbar intervertebral disc     GERD (gastroesophageal reflux disease)     gastric polyps    Hip pain 02/22/2016    Hyperlipidemia     Shingles 11/2013    Sleep apnea        Objective:     Current Respiratory Status: 05/09/25    Alert: yes    Cooperative: yes    Follows Directions: yes given increased time and assist     Visualization  Patient was seen in the lateral view  Max cues for pt to maintain head in midline    Oral Peripheral Examination  Oral Musculature: facial asymmetry  present, left weakness  Dentition: present and adequate  Secretion Management: adequate  Mucosal Quality: adequate  Oral Labial Strength and Mobility: impaired retraction  Lingual Strength and Mobility: impaired strength  Volitional Cough: weak throat clear  Volitional Swallow: not elicited  Voice Prior to PO Intake: low intensity, clear    Consistencies Assessed  Nectar thick tsp x2  Thin-Honey Thick tsp x2, cup x2  Puree tsp x4  Solids cracker portion x2    Oral Preparation/Oral Phase  Anterior loss of liquid bolus  prolonged A-P transfer  prolonged mastication  Impaired rotational chew  Oral residue present post swallow  Decreased base of tongue mobility  Premature spillage  No presence of naso-pharyngeal reflux    Pharyngeal Phase   Delayed initiation of pharyngeal swallow  Premature spillage to pyriform sinuses with nectar thick liquid and intermittently with honey thick liquids  Premature spillage to vallecula with puree, solids and intermittently with honey thick liquids  Decreased hyolaryngeal elevation and excursion  Decreased epiglottic inversion  Trace-moderate vallecular residue   Trace with puree  Mild with nectar thick   Moderate with honey thick and solid  Trace-mild pyriform sinus stasis across consistencies     Nectar thick liquids: delay in initiation of oral transit with delayed initiation of pharyngeal swallow resulting in aspiration prior to and during the swallow on 2/3 tsps.  Weak, delayed sensory response noted which was ineffectual in clearing aspirated material    Honey thick liquids: no penetration/aspiration, moderate stasis cleard with multiple swallows and/or puree wash    Puree: no penetration/aspiration    Solid: bolus noted to rest in vallecula, honey thick liquid wash did not clear bolus though puree presentation did clear majority of bolus      Cervical Esophageal Phase  UES appeared to accommodate all bolus types without stasis or retrograde movement observed     Assessment:      Impressions    Patient demonstrates moderate  Oropharyngeal  dysphagia characterized by slow oral transit with delayed swallow initiation resulting in aspiration of nectar thick liquids prior to/during the swallow. Moderate pharyngeal stasis clears with multiple swallows/puree bolus.  Pt did appear to fatigue over course of evaluation though no increase in aspiration risk with puree/HTL.  Pt at high risk for aspirated related complications 2/2 acute R ICH, decreased sustained alertness, decreased cough strength, dependence for oral care, and decreased sensation.     Prognosis: Good    Barriers:  Fatigue  Cognitive status  Dependent feeding  L neglect    Plan  Initiate puree diet with honey thick liquids with strict precautions as stated above  Ongoing trials with SLP at bedside  Active dysphagia excs as pt able to participate    Education  Results were discussed with patient. Results were discussed with Medical Team who was in agreement with plan.  SLP returned to pt's room following study to review results and recs with pt and his spouse.  Wife writing down recs and precautions for safety.  White board updated.      Goals:   Multidisciplinary Problems       SLP Goals          Problem: SLP    Goal Priority Disciplines Outcome   SLP Goal     SLP Progressing   Description: Goals due 5/12  1.  Pt. Will participate in ongoing assessment of swallow at bedside  2.  Pt. Will participate in speech, language and cognitive assessment                        Plan:   Patient to be seen:  Therapy Frequency: 4 x/week   Plan of Care expires:  06/02/25  Plan of Care reviewed with:  patient, spouse        Discharge recommendations:  High Intensity Therapy   Barriers to Discharge:  Level of Skilled Assistance Needed      Time Tracking:   SLP Treatment Date:   05/09/25  Speech Start Time:  1100 (1135)  Speech Stop Time:  1117 (1145)     Speech Total Time (min):  17 min + 10 mins     05/09/2025

## 2025-05-09 NOTE — PLAN OF CARE
Problem: SLP  Goal: SLP Goal  Description: Goals due 5/12  1.  Pt. Will participate in ongoing assessment of swallow at bedside  2.  Pt. Will participate in speech, language and cognitive assessment   Outcome: Progressing       Modified barium swallow study completed.  Aspiration of nectar thick liquids with weak sensory response observed.  Rec level 4 puree diet with honey thick liquids with strict aspiration precautions, meds crushed in puree, no straws.  Please only feed pt when alert and upright.  Final presentation when able should be puree.

## 2025-05-09 NOTE — SUBJECTIVE & OBJECTIVE
"5/9: Na 146. Talking well this AM. [facility] today surveillance    Prescriptions Prior to Admission[1]    Review of patient's allergies indicates:   Allergen Reactions    Amlodipine Other (See Comments)     Flushed face, resolved with Hydroxyzine and Pepcid    Benadryl [diphenhydramine hcl] Other (See Comments)     Per wife "pt collapsed and ended up in hospital" ~1995    Pennsaid [diclofenac sodium] Rash and Blisters       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    Cervical disc displacement     Degeneration of lumbar intervertebral disc     GERD (gastroesophageal reflux disease)     gastric polyps    Hip pain 02/22/2016    Hyperlipidemia     Shingles 11/2013    Sleep apnea      Past Surgical History:   Procedure Laterality Date    ABLATION, SVT, ACCESSORY PATHWAY N/A 5/25/2023    Procedure: Ablation, SVT, Accessory Pathway;  Surgeon: Goyo Sky MD;  Location: [facility] EP LAB;  Service: Cardiology;  Laterality: N/A;  PAC, RFA, CARTO, MAC, GP, 3 PREP    CERVICAL FUSION      bone graft    COLONOSCOPY N/A 7/29/2021    Procedure: COLONOSCOPY;  Surgeon: Tyler Story MD;  Location: [facility];  Service: Endoscopy;  Laterality: N/A;    COLONOSCOPY N/A 10/30/2024    Procedure: COLONOSCOPY;  Surgeon: Tyler Story MD;  Location: [facility];  Service: Endoscopy;  Laterality: N/A;  m/ppm    EPIDURAL STEROID INJECTION INTO LUMBAR SPINE N/A 12/5/2019    Procedure: Injection-steroid-epidural-lumbar;  Surgeon: Adarsh Kraft MD;  Location: [facility] OR;  Service: Pain Management;  Laterality: N/A;  L5-S1    ESOPHAGOGASTRODUODENOSCOPY N/A 10/27/2020    Procedure: EGD (ESOPHAGOGASTRODUODENOSCOPY);  Surgeon: Tyler Story MD;  Location: [facility];  Service: Endoscopy;  Laterality: N/A;    ESOPHAGOGASTRODUODENOSCOPY N/A 2/14/2025    Procedure: EGD (ESOPHAGOGASTRODUODENOSCOPY);  Surgeon: Tyler Story MD;  Location: [facility];  Service: Endoscopy;  Laterality: N/A;    INJECTION OF ANESTHETIC AGENT " AROUND MEDIAL BRANCH NERVES INNERVATING LUMBAR FACET JOINT Bilateral 11/18/2020    Procedure: BLOCK, NERVE, LUMBAR, MEDIAL BRANCH Bilateral L3,4,5;  Surgeon: Adarsh Kraft MD;  Location: [facility] OR;  Service: Pain Management;  Laterality: Bilateral;    INJECTION OF ANESTHETIC AGENT AROUND MEDIAL BRANCH NERVES INNERVATING LUMBAR FACET JOINT Left 10/25/2023    Procedure: Block-nerve-medial branch-lumbar;  Surgeon: Adarsh Kraft MD;  [facility];  Service: Anesthesiology;  Laterality: Left;  l4-5, l5-s1 MBB    INJECTION OF ANESTHETIC AGENT AROUND MEDIAL BRANCH NERVES INNERVATING LUMBAR FACET JOINT Bilateral 11/28/2023    Procedure: Block-nerve-medial branch-lumbar;  Surgeon: Adarsh Kraft MD;  Location: [facility] OR;  Service: Anesthesiology;  Laterality: Bilateral;  L4.5 and l5/s1 MBB #2    RADIOFREQUENCY ABLATION OF LUMBAR MEDIAL BRANCH NERVE AT SINGLE LEVEL Bilateral 1/29/2021    Procedure: Radiofrequency Ablation, Nerve, Spinal, Lumbar, Medial Branch, 1 Level;  Surgeon: Adarsh Kraft MD;  Location: [facility] OR;  Service: Pain Management;  Laterality: Bilateral;  L3,4,5    TONSILLECTOMY, ADENOIDECTOMY       Family History       Problem Relation (Age of Onset)    Coronary artery disease Mother    Diabetes Mother    Heart disease Brother    Hypertension Mother    Stroke Mother, Father          Tobacco Use    Smoking status: Never    Smokeless tobacco: Never   Substance and Sexual Activity    Alcohol use: Yes     Alcohol/week: 6.0 standard drinks of alcohol     Types: 6 Cans of beer per week     Comment: weekly or less    Drug use: No    Sexual activity: Yes     Partners: Female       Objective:     Weight: 88.5 kg (195 lb 1.7 oz)  Body mass index is 27.99 kg/m².  Vital Signs (Most Recent):  Temp: 99.3 °F (37.4 °C) (05/09/25 0831)  Pulse: 68 (05/09/25 0901)  Resp: 17 (05/09/25 0901)  BP: (!) 146/66 (05/09/25 0901)  SpO2: 97 % (05/09/25 0901) Vital Signs (24h Range):  Temp:  [98 °F (36.7 °C)-101.2 °F (38.4 °C)] 99.3 °F  "(37.4 °C)  Pulse:  [62-80] 68  Resp:  [14-23] 17  SpO2:  [95 %-98 %] 97 %  BP: (133-173)/(63-77) 146/66     Date 05/09/25 0700 - 05/10/25 0659   Shift 5413-8025 2923-5824 9584-1800 24 Hour Total   INTAKE   Shift Total(mL/kg)       OUTPUT   Urine(mL/kg/hr) 175   175   Shift Total(mL/kg) 175(2)   175(2)   Weight (kg) 88.5 88.5 88.5 88.5                Oxygen Concentration (%):  [21] 21                Physical Exam         Neurosurgery Physical Exam    GENERAL: resting comfortably  HEENT: NCAT, PERRL, mucous membranes moist  NECK: supple, trachea midline  CV: normal capillary refill  PULM: aerating well, symmetric expansion, no distress  ABD: soft, NT, ND  EXT: no c/c/e    NEURO:    AAO x 3   CN II-XII grossly intact  RUE/RLE: fc  LUE/LLE: plegic  Decreased sensation L hemibody      Significant Labs:  Recent Labs   Lab 05/08/25 0255 05/08/25  0904 05/08/25 1959 05/09/25 0213 05/09/25  0755   *  --   --  117*  --       < > 143 146* 147*   K 4.0  --   --  3.8  --    *  --   --  117*  --    CO2 21*  --   --  21*  --    BUN 21  --   --  26*  --    CREATININE 0.7  --   --  0.7  --    CALCIUM 8.8  --   --  8.6*  --    MG 2.1  --   --  2.2  --     < > = values in this interval not displayed.     Recent Labs   Lab 05/08/25 0255 05/09/25 0213   WBC 10.35 10.14   HGB 13.2* 12.9*   HCT 41.0 40.7    182     No results for input(s): "LABPT", "INR", "APTT" in the last 48 hours.    Microbiology Results (last 7 days)       ** No results found for the last 168 hours. **          All pertinent labs from the last 24 hours have been reviewed.    Significant Diagnostics:  I have reviewed and interpreted all pertinent imaging results/findings within the past 24 hours.  CTA Head and Neck (xpd)  Result Date: 5/4/2025  1. Acute right frontal intraparenchymal hematoma with mild adjacent edema is similar to the recent prior study.  Follow-up recommended. 2. No high-grade stenosis or major vessel occlusion. " Electronically signed by: Frandy Landis Date:    05/04/2025 Time:    16:51    CT HEAD FOR CODE STROKE  Result Date: 5/4/2025  3.3 x 2.4 cm intracranial hemorrhage within the right frontoparietal region with mild vasogenic edema.  There is no midline shift These findings were called to the ordering physician at 16:22 Electronically signed by: Enriqueta De La Vega Date:    05/04/2025 Time:    16:24    CT Head Without Contrast  Result Date: 5/5/2025  1. Right frontal parenchymal hemorrhage with subarachnoid and intraventricular extension appears relatively similar when compared to MRI performed 05/04/2025, but notably increased since initial head CT of 05/04/2025. 2. Relatively similar associated mass effect compared to most recent MRI. Electronically signed by: Colt Ang Date:    05/05/2025 Time:    06:25    CTA Head and Neck (xpd)  Result Date: 5/4/2025  1. Acute right frontal intraparenchymal hematoma with mild adjacent edema is similar to the recent prior study.  Follow-up recommended. 2. No high-grade stenosis or major vessel occlusion. Electronically signed by: Frandy Landis Date:    05/04/2025 Time:    16:51    CT HEAD FOR CODE STROKE  Result Date: 5/4/2025  3.3 x 2.4 cm intracranial hemorrhage within the right frontoparietal region with mild vasogenic edema.  There is no midline shift These findings were called to the ordering physician at 16:22 Electronically signed by: Enriqueta De La Vega Date:    05/04/2025 Time:    16:24    No results found in the last 24 hours.  XR NG/OG tube placement check, non-radiologist performed  Result Date: 5/6/2025  No acute process seen. Electronically signed by: Otto García MD Date:    05/06/2025 Time:    09:43    No results found in the last 24 hours.         [1]   Medications Prior to Admission   Medication Sig Dispense Refill Last Dose/Taking    aspirin (ECOTRIN) 81 MG EC tablet Take 1 tablet (81 mg total) by mouth once daily.  0     atorvastatin (LIPITOR) 20 MG tablet TAKE  ONE TABLET BY MOUTH ONCE DAILY 90 tablet 3     ciclopirox (PENLAC) 8 % Soln Apply topically. (Patient not taking: Reported on 1/16/2025)       docosahexaenoic acid/epa (FISH OIL ORAL) Take 1 capsule by mouth once daily.       magnesium oxide (MAG-OX) 400 mg (241.3 mg magnesium) tablet Take 1 tablet (400 mg total) by mouth once daily. 90 tablet 3     meloxicam (MOBIC) 15 MG tablet TAKE 1 TABLET BY MOUTH AT NIGHT  AS NEEDED 90 tablet 3     methocarbamoL (ROBAXIN) 500 MG Tab TAKE ONE TABLET BY MOUTH EVERY 8 HOURS AS NEEDED FOR MUSCLE SPASMS 90 tablet 2     multivitamin (THERAGRAN) per tablet Take 1 tablet by mouth once daily.       omeprazole (PRILOSEC) 40 MG capsule Take 1 capsule (40 mg total) by mouth every morning. 90 capsule 3     pregabalin (LYRICA) 100 MG capsule Take 100 mg by mouth once daily.       triamcinolone acetonide 0.1% (KENALOG) 0.1 % ointment Apply topically 2 (two) times daily. 30 g 0

## 2025-05-09 NOTE — EICU
Virtual ICU Quality Rounds    Admit Date: 5/4/2025  Hospital Day: 5    ICU Day: 4d 12h    24H Vital Sign Range:  Temp:  [98 °F (36.7 °C)-101.2 °F (38.4 °C)]   Pulse:  [58-80]   Resp:  [14-23]   BP: (130-173)/(63-77)   SpO2:  [95 %-98 %]     VICU Surveillance Screening    LDA reconciliation : Yes

## 2025-05-09 NOTE — PLAN OF CARE
[facility] Care Plan  POC reviewed with Colt Rose and family at 1400. Patient and Family verbalized understanding. Questions and concerns addressed. No acute events today. Pt progressing toward goals. Will continue to monitor. See below and flowsheets for full assessment and VS info.     -SLP passed pt for meds crushed in pudding  -MBS tomorrow  -pt had another reaction to unknown source- Jayro HA came to bedside, ordered famotidine IVP  -pt stood with PT and sat in cardiac chair for extended time today        Is this a stroke patient? Yes- stroke booklet reviewed with patient and family  CP individualization: pts likes eye drops    Neuro:  Noble Coma Scale  Best Eye Response: 3-->(E3) to speech  Best Motor Response: 6-->(M6) obeys commands  Best Verbal Response: 5-->(V5) oriented  Noble Coma Scale Score: 14  Assessment Qualifiers: patient not sedated/intubated  Pupil PERRLA: yes     24 hr Temp:  [97.5 °F (36.4 °C)-100.2 °F (37.9 °C)]     CV:   Rhythm: normal sinus rhythm  BP goals:   SBP < 160  MAP > 65    Resp:      Oxygen Concentration (%): 24    Plan: N/A    GI/:     Diet/Nutrition Received: NPO  Last Bowel Movement: 05/08/25  Voiding Characteristics: external catheter    Intake/Output Summary (Last 24 hours) at 5/8/2025 1913  Last data filed at 5/8/2025 1838  Gross per 24 hour   Intake 1909.49 ml   Output 2110 ml   Net -200.51 ml     Unmeasured Output  Unmeasured Urine Occurrence: 1  Unmeasured Stool Occurrence: 1  Pad Count: 1    Labs/Accuchecks:  Recent Labs   Lab 05/08/25 0255   WBC 10.35   RBC 4.19*   HGB 13.2*   HCT 41.0         Recent Labs   Lab 05/08/25  0255 05/08/25  0904 05/08/25  1524      < > 145   K 4.0  --   --    CO2 21*  --   --    *  --   --    BUN 21  --   --    CREATININE 0.7  --   --    ALKPHOS 57  --   --    ALT 26  --   --    AST 27  --   --    BILITOT 0.5  --   --     < > = values in this interval not displayed.      Recent Labs   Lab 05/05/25  0145   PROTIME  "11.8   INR 1.1   APTT 22.6    No results for input(s): "CPK", "CPKMB", "TROPONINI", "MB" in the last 168 hours.    Electrolytes: No replacement orders  Accuchecks: none    Gtts:      LDA/Wounds:    Mir Risk Assessment  Sensory Perception: 3-->slightly limited  Moisture: 3-->occasionally moist  Activity: 1-->bedfast  Mobility: 2-->very limited  Nutrition: 3-->adequate  Friction and Shear: 2-->potential problem  Mir Score: 14    Is your mir score 12 or less? no        Restraints:        WCTM    "

## 2025-05-09 NOTE — PROGRESS NOTES
"[facility] - Neuro Critical [facility]  Neurosurgery  Progress Note    Subjective:     History of Present Illness: 69-year-old male presents to the ER with left-sided weakness and right-sided gaze preference that began abruptly this afternoon around 4:00 p.m.. At OSH found to have R frontal lobar ICH. Given ddAVP for reversal.    Post-Op Info:  * No surgery found *       5/9: Na 146. Talking well this AM. [facility] today surveillance    Prescriptions Prior to Admission[1]    Review of patient's allergies indicates:   Allergen Reactions    Amlodipine Other (See Comments)     Flushed face, resolved with Hydroxyzine and Pepcid    Benadryl [diphenhydramine hcl] Other (See Comments)     Per wife "pt collapsed and ended up in hospital" ~1995    Pennsaid [diclofenac sodium] Rash and Blisters       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    Cervical disc displacement     Degeneration of lumbar intervertebral disc     GERD (gastroesophageal reflux disease)     gastric polyps    Hip pain 02/22/2016    Hyperlipidemia     Shingles 11/2013    Sleep apnea      Past Surgical History:   Procedure Laterality Date    ABLATION, SVT, ACCESSORY PATHWAY N/A 5/25/2023    Procedure: Ablation, SVT, Accessory Pathway;  Surgeon: Goyo Sky MD;  Location: [facility] EP LAB;  Service: Cardiology;  Laterality: N/A;  PAC, RFA, CARTO, MAC, GP, 3 PREP    CERVICAL FUSION      bone graft    COLONOSCOPY N/A 7/29/2021    Procedure: COLONOSCOPY;  Surgeon: Tyler Story MD;  Location: [facility] ENDO;  Service: Endoscopy;  Laterality: N/A;    COLONOSCOPY N/A 10/30/2024    Procedure: COLONOSCOPY;  Surgeon: Tyler Story MD;  Location: [facility] ENDO;  Service: Endoscopy;  Laterality: N/A;  m/ppm    EPIDURAL STEROID INJECTION INTO LUMBAR SPINE N/A 12/5/2019    Procedure: Injection-steroid-epidural-lumbar;  Surgeon: Adarsh Kraft MD;  Location: [facility] OR;  Service: Pain Management;  Laterality: N/A;  L5-S1    ESOPHAGOGASTRODUODENOSCOPY N/A " 10/27/2020    Procedure: EGD (ESOPHAGOGASTRODUODENOSCOPY);  Surgeon: Tyler Story MD;  Location: [facility];  Service: Endoscopy;  Laterality: N/A;    ESOPHAGOGASTRODUODENOSCOPY N/A 2/14/2025    Procedure: EGD (ESOPHAGOGASTRODUODENOSCOPY);  Surgeon: Tyler Story MD;  Location: [facility] ENDO;  Service: Endoscopy;  Laterality: N/A;    INJECTION OF ANESTHETIC AGENT AROUND MEDIAL BRANCH NERVES INNERVATING LUMBAR FACET JOINT Bilateral 11/18/2020    Procedure: BLOCK, NERVE, LUMBAR, MEDIAL BRANCH Bilateral L3,4,5;  Surgeon: Adarsh Kraft MD;  Location: [facility] OR;  Service: Pain Management;  Laterality: Bilateral;    INJECTION OF ANESTHETIC AGENT AROUND MEDIAL BRANCH NERVES INNERVATING LUMBAR FACET JOINT Left 10/25/2023    Procedure: Block-nerve-medial branch-lumbar;  Surgeon: Adarsh Kraft MD;  Location: [facility] OR;  Service: Anesthesiology;  Laterality: Left;  l4-5, l5-s1 MBB    INJECTION OF ANESTHETIC AGENT AROUND MEDIAL BRANCH NERVES INNERVATING LUMBAR FACET JOINT Bilateral 11/28/2023    Procedure: Block-nerve-medial branch-lumbar;  Surgeon: Adarsh Kraft MD;  [facility];  Service: Anesthesiology;  Laterality: Bilateral;  L4.5 and l5/s1 MBB #2    RADIOFREQUENCY ABLATION OF LUMBAR MEDIAL BRANCH NERVE AT SINGLE LEVEL Bilateral 1/29/2021    Procedure: Radiofrequency Ablation, Nerve, Spinal, Lumbar, Medial Branch, 1 Level;  Surgeon: Adarsh Kraft MD;  Location: [facility] OR;  Service: Pain Management;  Laterality: Bilateral;  L3,4,5    TONSILLECTOMY, ADENOIDECTOMY       Family History       Problem Relation (Age of Onset)    Coronary artery disease Mother    Diabetes Mother    Heart disease Brother    Hypertension Mother    Stroke Mother, Father          Tobacco Use    Smoking status: Never    Smokeless tobacco: Never   Substance and Sexual Activity    Alcohol use: Yes     Alcohol/week: 6.0 standard drinks of alcohol     Types: 6 Cans of beer per week     Comment: weekly or less    Drug use: No    Sexual activity: Yes  "    Partners: Female       Objective:     Weight: 88.5 kg (195 lb 1.7 oz)  Body mass index is 27.99 kg/m².  Vital Signs (Most Recent):  Temp: 99.3 °F (37.4 °C) (05/09/25 0831)  Pulse: 68 (05/09/25 0901)  Resp: 17 (05/09/25 0901)  BP: (!) 146/66 (05/09/25 0901)  SpO2: 97 % (05/09/25 0901) Vital Signs (24h Range):  Temp:  [98 °F (36.7 °C)-101.2 °F (38.4 °C)] 99.3 °F (37.4 °C)  Pulse:  [62-80] 68  Resp:  [14-23] 17  SpO2:  [95 %-98 %] 97 %  BP: (133-173)/(63-77) 146/66     Date 05/09/25 0700 - 05/10/25 0659   Shift 9954-9062 5353-7124 0705-9450 24 Hour Total   INTAKE   Shift Total(mL/kg)       OUTPUT   Urine(mL/kg/hr) 175   175   Shift Total(mL/kg) 175(2)   175(2)   Weight (kg) 88.5 88.5 88.5 88.5                Oxygen Concentration (%):  [21] 21                Physical Exam         Neurosurgery Physical Exam    GENERAL: resting comfortably  HEENT: NCAT, PERRL, mucous membranes moist  NECK: supple, trachea midline  CV: normal capillary refill  PULM: aerating well, symmetric expansion, no distress  ABD: soft, NT, ND  EXT: no c/c/e    NEURO:    AAO x 3   CN II-XII grossly intact  RUE/RLE: fc  LUE/LLE: plegic  Decreased sensation L hemibody      Significant Labs:  Recent Labs   Lab 05/08/25  0255 05/08/25  0904 05/08/25 1959 05/09/25  0213 05/09/25  0755   *  --   --  117*  --       < > 143 146* 147*   K 4.0  --   --  3.8  --    *  --   --  117*  --    CO2 21*  --   --  21*  --    BUN 21  --   --  26*  --    CREATININE 0.7  --   --  0.7  --    CALCIUM 8.8  --   --  8.6*  --    MG 2.1  --   --  2.2  --     < > = values in this interval not displayed.     Recent Labs   Lab 05/08/25  0255 05/09/25  0213   WBC 10.35 10.14   HGB 13.2* 12.9*   HCT 41.0 40.7    182     No results for input(s): "LABPT", "INR", "APTT" in the last 48 hours.    Microbiology Results (last 7 days)       ** No results found for the last 168 hours. **          All pertinent labs from the last 24 hours have been " reviewed.    Significant Diagnostics:  I have reviewed and interpreted all pertinent imaging results/findings within the past 24 hours.  CTA Head and Neck (xpd)  Result Date: 5/4/2025  1. Acute right frontal intraparenchymal hematoma with mild adjacent edema is similar to the recent prior study.  Follow-up recommended. 2. No high-grade stenosis or major vessel occlusion. Electronically signed by: Frandy Landis Date:    05/04/2025 Time:    16:51    CT HEAD FOR CODE STROKE  Result Date: 5/4/2025  3.3 x 2.4 cm intracranial hemorrhage within the right frontoparietal region with mild vasogenic edema.  There is no midline shift These findings were called to the ordering physician at 16:22 Electronically signed by: Enriqueta De La Vega Date:    05/04/2025 Time:    16:24    CT Head Without Contrast  Result Date: 5/5/2025  1. Right frontal parenchymal hemorrhage with subarachnoid and intraventricular extension appears relatively similar when compared to MRI performed 05/04/2025, but notably increased since initial head CT of 05/04/2025. 2. Relatively similar associated mass effect compared to most recent MRI. Electronically signed by: Colt Ang Date:    05/05/2025 Time:    06:25    CTA Head and Neck (xpd)  Result Date: 5/4/2025  1. Acute right frontal intraparenchymal hematoma with mild adjacent edema is similar to the recent prior study.  Follow-up recommended. 2. No high-grade stenosis or major vessel occlusion. Electronically signed by: Frandy Landis Date:    05/04/2025 Time:    16:51    CT HEAD FOR CODE STROKE  Result Date: 5/4/2025  3.3 x 2.4 cm intracranial hemorrhage within the right frontoparietal region with mild vasogenic edema.  There is no midline shift These findings were called to the ordering physician at 16:22 Electronically signed by: Enriqueta De La Vega Date:    05/04/2025 Time:    16:24    No results found in the last 24 hours.  XR NG/OG tube placement check, non-radiologist performed  Result Date:  5/6/2025  No acute process seen. Electronically signed by: Otto García MD Date:    05/06/2025 Time:    09:43    No results found in the last 24 hours.    Assessment/Plan:     * Nontraumatic cortical hemorrhage of right cerebral hemisphere  69-year-old male presents to the ER with left-sided weakness and right-sided gaze preference that began abruptly this afternoon around 4:00 p.m.. At OSH found to have R frontal lobar ICH. Given ddAVP for reversal.    ICHS 0    MRI wo 5/4: swi looks larger , there is IVH now too  CTH 5/5: looks same as MRI , larger and IVH  CTH 5/6: stable    --Patient admitted to [facility] on telemetry      -q1h neurochecks in ICU, q2h neurochecks in stepdown, q4h neurochecks on floor  --All labs and diagnostics reviewed  ---150  --CTH today surveillance  --Na >145  --Keppra 500 BID   --HOB >30  --No acute neurosurgical intervention at this time  --Continue to monitor clinically, notify NSGY immediately with any changes in neuro status    Dispo: ICU    Plan d/w Dr. Gross.         Oskar Simms MD  Neurosurgery  [facility] - Neuro Critical Care       [1]   Medications Prior to Admission   Medication Sig Dispense Refill Last Dose/Taking    aspirin (ECOTRIN) 81 MG EC tablet Take 1 tablet (81 mg total) by mouth once daily.  0     atorvastatin (LIPITOR) 20 MG tablet TAKE ONE TABLET BY MOUTH ONCE DAILY 90 tablet 3     ciclopirox (PENLAC) 8 % Soln Apply topically. (Patient not taking: Reported on 1/16/2025)       docosahexaenoic acid/epa (FISH OIL ORAL) Take 1 capsule by mouth once daily.       magnesium oxide (MAG-OX) 400 mg (241.3 mg magnesium) tablet Take 1 tablet (400 mg total) by mouth once daily. 90 tablet 3     meloxicam (MOBIC) 15 MG tablet TAKE 1 TABLET BY MOUTH AT NIGHT  AS NEEDED 90 tablet 3     methocarbamoL (ROBAXIN) 500 MG Tab TAKE ONE TABLET BY MOUTH EVERY 8 HOURS AS NEEDED FOR MUSCLE SPASMS 90 tablet 2     multivitamin (THERAGRAN) per tablet Take 1 tablet by mouth once daily.        omeprazole (PRILOSEC) 40 MG capsule Take 1 capsule (40 mg total) by mouth every morning. 90 capsule 3     pregabalin (LYRICA) 100 MG capsule Take 100 mg by mouth once daily.       triamcinolone acetonide 0.1% (KENALOG) 0.1 % ointment Apply topically 2 (two) times daily. 30 g 0

## 2025-05-09 NOTE — ASSESSMENT & PLAN NOTE
69-year-old male presents to the ER with left-sided weakness and right-sided gaze preference that began abruptly this afternoon around 4:00 p.m.. At OSH found to have R frontal lobar ICH. Given ddAVP for reversal.    ICHS 0    MRI wo 5/4: swi looks larger , there is IVH now too  CTH 5/5: looks same as MRI , larger and IVH  CTH 5/6: stable    --Patient admitted to [facility] on telemetry      -q1h neurochecks in ICU, q2h neurochecks in stepdown, q4h neurochecks on floor  --All labs and diagnostics reviewed  ---150  --CTH today surveillance  --Na >145  --Keppra 500 BID   --HOB >30  --No acute neurosurgical intervention at this time  --Continue to monitor clinically, notify NSGY immediately with any changes in neuro status    Dispo: ICU    Plan d/w Dr. Gross.

## 2025-05-09 NOTE — PLAN OF CARE
Problem: Physical Therapy  Goal: Physical Therapy Goal  Description: Goals to be completed by: 5/16/25    Pt will perform sup<>sit transfers w/ moderate assistance  Pt will have sufficient dynamic balance to sit EOB while performing ADLs/therex w/ minimum assistance  Pt will be able to stand up from EOB w/ moderate assistance using LRAD  Pt will transfer from bed to chair w/ moderate assistance using LRAD  Pt will be independent w/ HEP therex on BLE w/ good form and ROM   Outcome: Progressing   Pt's goals remain appropriate and pt will continue to benefit from skilled PT services to work towards improved functional mobility including: bed mobility, transfers, and sitting balance. Alyssia Diane PT    5/9/2025

## 2025-05-09 NOTE — EICU
Intervention Initiated From:  COR / ABHILASHU    Praveena intervened regarding:  Rounding (Video assessment)    VICU Night Rounds Checklist  24H Vital Sign Range:  Temp:  [97.5 °F (36.4 °C)-101.2 °F (38.4 °C)]   Pulse:  [58-80]   Resp:  [14-24]   BP: (110-166)/(61-80)   SpO2:  [95 %-98 %]     Video rounds and LDA reconciliation

## 2025-05-09 NOTE — SUBJECTIVE & OBJECTIVE
Interval History/Significant Events: Fever of 101.2 overnight, resolved with tylenol. Adjusting BP meds for better control. Labs and imaging to rule out carcinoid tumor and pheochromocytoma given recurring flushing and difficult to control BP. Speech cleared for pureed diet.     - Repeat CTH 5/9, improved midline shift.    Review of Systems   Constitutional:  Negative for fever.   Eyes:  Negative for photophobia.   Respiratory:  Negative for shortness of breath.    Cardiovascular:  Negative for chest pain.   Gastrointestinal:  Negative for nausea.   Genitourinary:  Negative for dysuria.   Neurological:  Positive for tremors, facial asymmetry and weakness.     Objective:     Vitals:    Temp: 99.3 °F (37.4 °C)  Pulse: 67  Rhythm: normal sinus rhythm  BP: (!) 147/96  MAP (mmHg): 114  Resp: 20  SpO2: (!) 94 %    Temp  Min: 98 °F (36.7 °C)  Max: 101.2 °F (38.4 °C)  Pulse  Min: 66  Max: 80  BP  Min: 133/63  Max: 173/76  MAP (mmHg)  Min: 91  Max: 114  Resp  Min: 14  Max: 23  SpO2  Min: 94 %  Max: 98 %  Oxygen Concentration (%)  Min: 21  Max: 21    05/08 0701 - 05/09 0700  In: 744.7 [I.V.:744.7]  Out: 2110 [Urine:2110]   Unmeasured Output  Unmeasured Urine Occurrence: 1  Unmeasured Stool Occurrence: 1  Pad Count: 1        Physical Exam  Vitals and nursing note reviewed.   Cardiovascular:      Rate and Rhythm: Normal rate and regular rhythm.      Pulses: Normal pulses.      Heart sounds: Normal heart sounds.   Pulmonary:      Effort: Pulmonary effort is normal.      Breath sounds: Normal breath sounds.   Abdominal:      General: Bowel sounds are normal.      Palpations: Abdomen is soft.   Skin:     General: Skin is warm and dry.      Capillary Refill: Capillary refill takes 2 to 3 seconds.   Neurological:      Comments: E3 V5 M6  Perrla, Open eyes to voice with some lid apraxia, right gaze preference though does cross midline with direction.   Quickly to respond to questions today and able to verbally communicate. However,  after some time, responses become a bit delayed.  Left sided weakness/plegia, slightly increased tone  Left side tito-neglect, improving. Able to identify, point to, and reach for his left arm correctly.   RUE/RLE 5/5 , follows commands             Today I personally reviewed pertinent medications, lines/drains/airways, imaging, cardiology results, laboratory results, microbiology results,

## 2025-05-09 NOTE — PLAN OF CARE
"[facility] Care Plan    POC reviewed with Colt Rose and family at 0300. Patient and Family verbalized understanding. Questions and concerns addressed. No acute events today. Pt progressing toward goals. Will continue to monitor. See below and flowsheets for full assessment and VS info.       -bed bath and linen change  -febrile with Tmax of 101.2 (PRN given at 2019 only, ice packs and fans applied and blankets removed)  -PRN 3% bolus x1  -Na goal >145 reached (Na 146)  -barium swallow at 1030  -PRN for BP x1 for increasing BP      Is this a stroke patient? Yes  Patient likes fans, ice packs, and wet towels on his head    Neuro:  [place] Coma Scale  Best Eye Response: 3-->(E3) to speech  Best Motor Response: 6-->(M6) obeys commands  Best Verbal Response: 5-->(V5) oriented  [place] Coma Scale Score: 14  Assessment Qualifiers: patient not sedated/intubated  Pupil PERRLA: yes     24 hr Temp:  [97.5 °F (36.4 °C)-101.2 °F (38.4 °C)]     CV:   Rhythm: normal sinus rhythm  BP goals:   SBP < 160  MAP > 65    Resp:      Oxygen Concentration (%): 21    Plan: N/A    GI/:     Diet/Nutrition Received: ice chips (meds crushed in pudding)  Last Bowel Movement: 05/08/25  Voiding Characteristics: external catheter    Intake/Output Summary (Last 24 hours) at 5/9/2025 0549  Last data filed at 5/9/2025 0501  Gross per 24 hour   Intake 1019.04 ml   Output 2110 ml   Net -1090.96 ml     Unmeasured Output  Unmeasured Urine Occurrence: 1  Unmeasured Stool Occurrence: 1  Pad Count: 1    Labs/Accuchecks:  Recent Labs   Lab 05/09/25  0213   WBC 10.14   RBC 4.16*   HGB 12.9*   HCT 40.7         Recent Labs   Lab 05/09/25  0213   *   K 3.8   CO2 21*   *   BUN 26*   CREATININE 0.7   ALKPHOS 66   ALT 30   AST 21   BILITOT 0.9      Recent Labs   Lab 05/05/25  0145   PROTIME 11.8   INR 1.1   APTT 22.6    No results for input(s): "CPK", "CPKMB", "TROPONINI", "MB" in the last 168 hours.    Electrolytes: N/A - electrolytes WDL - No " K replacement needed as per provider  Accuchecks: none    Gtts:      LDA/Wounds:    Mir Risk Assessment  Sensory Perception: 3-->slightly limited  Moisture: 3-->occasionally moist  Activity: 1-->bedfast  Mobility: 2-->very limited  Nutrition: 2-->probably inadequate  Friction and Shear: 2-->potential problem  Mir Score: 13  Is your mir score 12 or less? no

## 2025-05-09 NOTE — PROGRESS NOTES
[facility] - Neuro Critical Care  Neurocritical Care  Progress Note    Admit Date: 5/4/2025  Service Date: 05/09/2025  Length of Stay: 5    Subjective:     Chief Complaint: Nontraumatic cortical hemorrhage of right cerebral hemisphere    History of Present Illness: 70 yo M, PMH SVT/ablation, HLD, presents as transfer for Right lobar hemorhage, He originaly presented to OSH ER with left-sided weakness and right-sided gaze preference that began abruptly this afternoon around 4:00 p.m.. CTH with right parietal IPH, CTA without AVM/abnormalities. ASA hx, Given ddAVP for reversal. Transferred to [facility] for management and eval.     Hospital Course: 05/05/2025 CTH this AM stable when compared to MRI. EEG without evidence of seizures. Will hold off AEDs for now. Goal of SBP <150 and Na >140. No NSGY interventions. Will repeat CTH tomorrow AM.   05/06/2025 Repeat CTH shows increased edema with increased midline shift. Increase sodium goal to >145. Amlodipine added for BP support. Patient with flushing after amlodipine given. Hydroxyzine and IV pepcid given as patient reports possible allergy to bendaryl in the past. Losartan added instead. NG tube placed and started tube feeds.  05/07/2025 Repeat CTH head was stable. Blood pressure medication changes for optimization.   05/08/2025 Patient removed CPAP overnight and as a result, the NG tube was also removed. SLP okay with crushed meds on evaluation. Will hold off on replacing NG tube.   05/09/2025 Fever of 101.2 overnight, resolved with tylenol. Adjusting BP meds for better control. Labs and imaging to rule out carcinoid tumor and pheochromocytoma given recurring flushing and difficult to control BP. Speech cleared for pureed diet. Repeat CTH 5/9, improved midline shift.    Interval History/Significant Events: Fever of 101.2 overnight, resolved with tylenol. Adjusting BP meds for better control. Labs and imaging to rule out carcinoid tumor and pheochromocytoma given recurring  flushing and difficult to control BP. Speech cleared for pureed diet.     - Repeat CTH 5/9, improved midline shift.    Review of Systems   Constitutional:  Negative for fever.   Eyes:  Negative for photophobia.   Respiratory:  Negative for shortness of breath.    Cardiovascular:  Negative for chest pain.   Gastrointestinal:  Negative for nausea.   Genitourinary:  Negative for dysuria.   Neurological:  Positive for tremors, facial asymmetry and weakness.     Objective:     Vitals:    Temp: 99.3 °F (37.4 °C)  Pulse: 67  Rhythm: normal sinus rhythm  BP: (!) 147/96  MAP (mmHg): 114  Resp: 20  SpO2: (!) 94 %    Temp  Min: 98 °F (36.7 °C)  Max: 101.2 °F (38.4 °C)  Pulse  Min: 66  Max: 80  BP  Min: 133/63  Max: 173/76  MAP (mmHg)  Min: 91  Max: 114  Resp  Min: 14  Max: 23  SpO2  Min: 94 %  Max: 98 %  Oxygen Concentration (%)  Min: 21  Max: 21    05/08 0701 - 05/09 0700  In: 744.7 [I.V.:744.7]  Out: 2110 [Urine:2110]   Unmeasured Output  Unmeasured Urine Occurrence: 1  Unmeasured Stool Occurrence: 1  Pad Count: 1        Physical Exam  Vitals and nursing note reviewed.   Cardiovascular:      Rate and Rhythm: Normal rate and regular rhythm.      Pulses: Normal pulses.      Heart sounds: Normal heart sounds.   Pulmonary:      Effort: Pulmonary effort is normal.      Breath sounds: Normal breath sounds.   Abdominal:      General: Bowel sounds are normal.      Palpations: Abdomen is soft.   Skin:     General: Skin is warm and dry.      Capillary Refill: Capillary refill takes 2 to 3 seconds.   Neurological:      Comments: E3 V5 M6  Perrla, Open eyes to voice with some lid apraxia, right gaze preference though does cross midline with direction.   Quickly to respond to questions today and able to verbally communicate. However, after some time, responses become a bit delayed.  Left sided weakness/plegia, slightly increased tone  Left side tito-neglect, improving. Able to identify, point to, and reach for his left arm correctly.    RUE/RLE 5/5 , follows commands             Today I personally reviewed pertinent medications, lines/drains/airways, imaging, cardiology results, laboratory results, microbiology results,     Assessment/Plan:     Neuro  * Nontraumatic cortical hemorrhage of right cerebral hemisphere  - CTH/CTA with right parietal IPH, no AVM/malformation   - Vasc Neuro Following  - NSGY following   - Repeat CTH with edema expansion and worsening midline shift. Increased sodium goals.   - Repeat CTH 5/7 stable.    - Repeat CTH 5/9, improved midline shift.  - Na > 145 to decrease risk of edema.   - Sodium q6h   - Hypertonic saline q6h prn  - MRI brain without evidence of amyloid.   - neuro checks q2h in the daytime and q4h overnight.   - SBP < 160   - losartan 100mg qd   - hydralazine 100mg TID   - hydralazine and labetalol prn  - Coags WNL  - DDAVP given prior to transfer for ASA hx     PT/OT, SLP   - high intensity therapy recommended   - Pureed diet and mod thick fluids    Vasogenic brain edema  See ICH    Seizure  - Left hand twitching on arrival, did appear rhymic. Resolved at this time.  - EEG without evidence of seizure.  - Holding AEDs at this time.    Dysarthria  SLP consulted   - Pureed diet     Acute left-sided weakness  PT/OT    Cardiac/Vascular  Dyslipidemia  Resume statin     Renal/  Urinary retention  Added silodosin to assist with symptoms.           The patient is being Prophylaxed for:  Venous Thromboembolism with: Mechanical or Chemical  Stress Ulcer with: None  Ventilator Pneumonia with: not applicable    Activity Orders            Diet Pureed (IDDSI Level 4) Honey Thick (Moderately Thick): Pureed starting at 05/09 1158    Elevate HOB Elevate HOB 30-45 degrees during feeding unless contraindicated starting at 05/06 0903    Turn patient starting at 05/04 2000    Elevate HOB starting at 05/04 1920          Full Code    William Appiah MD  Neurocritical Care  [facility] - Neuro Critical Care

## 2025-05-09 NOTE — ASSESSMENT & PLAN NOTE
- CTH/CTA with right parietal IPH, no AVM/malformation   - Vasc Neuro Following  - NSGY following   - Repeat CTH with edema expansion and worsening midline shift. Increased sodium goals.   - Repeat CTH 5/7 stable.    - Repeat CTH 5/9, improved midline shift.  - Na > 145 to decrease risk of edema.   - Sodium q6h   - Hypertonic saline q6h prn  - MRI brain without evidence of amyloid.   - neuro checks q2h in the daytime and q4h overnight.   - SBP < 160   - losartan 100mg qd   - hydralazine 100mg TID   - hydralazine and labetalol prn  - Coags WNL  - DDAVP given prior to transfer for ASA hx     PT/OT, SLP   - high intensity therapy recommended   - Pureed diet and mod thick fluids

## 2025-05-09 NOTE — PT/OT/SLP PROGRESS
Occupational Therapy      Patient Name:  Colt Rose   MRN:  [id number]    Patient not seen today secondary to Pt DINA for CT scan and MBSS this am. Will follow-up on next scheduled treatment date.    5/9/2025

## 2025-05-09 NOTE — PT/OT/SLP PROGRESS
Physical Therapy Treatment    Patient Name:  Clot Rose   MRN:  [id number]    Recommendations:     Discharge Recommendations: High Intensity Therapy  Discharge Equipment Recommendations: hospital bed, lift device, wheelchair  Barriers to discharge: Inaccessible home and Decreased caregiver support  1 DEVORA and requires assist for mobility  Assessment:     oClt Rose is a 69 y.o. 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  He presents with the following impairments/functional limitations: weakness, impaired self care skills, impaired functional mobility, impaired endurance, decreased lower extremity function, decreased upper extremity function, visual deficits, impaired balance, decreased coordination, decreased safety awareness . Pt is unsafe with functional mobility at this time due to pt requires max assist of 2 for bed mobility, max assist of 2 for transfers, and moderate to max assist for sitting balance due to weakness, lethargy, and pt pushes to the L with his R UE causing LOB to the L and posterior. Pt is motivated to progress with functional mobility.     Rehab Prognosis: Fair; patient would benefit from acute skilled PT services to address these deficits and reach maximum level of function.    Recent Surgery: * No surgery found *      Plan:     During this hospitalization, patient to be seen 4 x/week to address the identified rehab impairments via therapeutic activities, therapeutic exercises, neuromuscular re-education and progress toward the following goals:    Plan of Care Expires:  05/16/25    Subjective   Pt able to follow simple commands throughout treatment    Pain/Comfort:  Pain Rating 1: 0/10  Pain Rating Post-Intervention 1: 0/10      Objective:     Communicated with nurse prior to session.  Patient found HOB elevated with peripheral IV, pulse ox (continuous), telemetry, blood pressure cuff, pressure relief boots upon PT entry to  room.     General Precautions: Standard, aspiration, fall, pureed diet, honey thick  Orthopedic Precautions: N/A  Braces: N/A  Respiratory Status: Room air     Functional Mobility:  Bed Mobility:     Rolling Left:  maximal assistance  Supine to Sit: maximal assistance  Sit to Supine: maximal assistance and of 2 persons  Transfers:     Sit to Stand:  maximal assistance and of 2 persons with hand-held assist      AM-PAC 6 CLICK MOBILITY  Turning over in bed (including adjusting bedclothes, sheets and blankets)?: 2  Sitting down on and standing up from a chair with arms (e.g., wheelchair, bedside commode, etc.): 2  Moving from lying on back to sitting on the side of the bed?: 2  Moving to and from a bed to a chair (including a wheelchair)?: 2  Need to walk in hospital room?: 1  Climbing 3-5 steps with a railing?: 1  Basic Mobility Total Score: 10     Treatment & Education:   pt sat on the EOB ~ 28 min with moderate to max assist due to L sided and posterior/anterior instability. Pt stood x 3 trials with HHA with max assist of 2 for ~ 30-40 sec each trial. Pt requires manual cues for facilitation of L hip/knee ext.  Pt received verbal and manual cues for midline orientation and upright posture. Wedge placed under L UE for support and to promote weight bearing. Pt unable to get to the medichair today due to pt going to CT scan and MBSS soon. Pt's nurse educated in how to use the medichair, she expressed understanding.    Patient left HOB elevated with all lines intact, call button in reach, bed alarm on, nurse notified, and wife present..    GOALS:   Multidisciplinary Problems       Physical Therapy Goals          Problem: Physical Therapy    Goal Priority Disciplines Outcome Interventions   Physical Therapy Goal     PT, PT/OT Progressing    Description: Goals to be completed by: 5/16/25    Pt will perform sup<>sit transfers w/ moderate assistance  Pt will have sufficient dynamic balance to sit EOB while performing  ADLs/therex w/ minimum assistance  Pt will be able to stand up from EOB w/ moderate assistance using LRAD  Pt will transfer from bed to chair w/ moderate assistance using LRAD  Pt will be independent w/ HEP therex on BLE w/ good form and ROM                        DME Justifications:  Colt requires a hospital bed due to him requiring positioning of the body in ways not feasible with an ordinary bed due to limited ability and cannot independently make changes in body position without the use of the bed.The positioning of the body cannot be sufficiently resolved by the use of pillows and wedges.  Colt Rose has a mobility limitation that significantly impairs his ability to participate in one or more mobility related activities of daily living (MRADLs) such as toileting, feeding, dressing, grooming, and bathing in customary locations in the home.  The mobility limitation cannot be sufficiently resolved by the use of a cane or walker.   The use of a manual wheelchair will significantly improve the patients ability to participate in MRADLS and the patient will use it on regular basis in the home.  Colt Rose has expressed his willingness to use a manual wheelchair in the home. Patients upper body strength is sufficient for propulsion.  He also has a caregiver who is available, willing, and able to provide assistance with the wheelchair when needed.      Time Tracking:     PT Received On: 05/09/25  PT Start Time: 0756     PT Stop Time: 0838  PT Total Time (min): 42 min     Billable Minutes: Therapeutic Activity 42    Treatment Type: Treatment  PT/PTA: PT     Number of PTA visits since last PT visit: 0     05/09/2025   normal...

## 2025-05-10 LAB
ABSOLUTE EOSINOPHIL (OHS): 0.05 K/UL
ABSOLUTE MONOCYTE (OHS): 0.92 K/UL (ref 0.3–1)
ABSOLUTE NEUTROPHIL COUNT (OHS): 6.72 K/UL (ref 1.8–7.7)
ALBUMIN SERPL BCP-MCNC: 3 G/DL (ref 3.5–5.2)
ALP SERPL-CCNC: 70 UNIT/L (ref 40–150)
ALT SERPL W/O P-5'-P-CCNC: 32 UNIT/L (ref 10–44)
ANION GAP (OHS): 12 MMOL/L (ref 8–16)
AST SERPL-CCNC: 26 UNIT/L (ref 11–45)
BASOPHILS # BLD AUTO: 0.05 K/UL
BASOPHILS NFR BLD AUTO: 0.5 %
BILIRUB SERPL-MCNC: 0.9 MG/DL (ref 0.1–1)
BUN SERPL-MCNC: 29 MG/DL (ref 8–23)
CALCIUM SERPL-MCNC: 8.4 MG/DL (ref 8.7–10.5)
CHLORIDE SERPL-SCNC: 115 MMOL/L (ref 95–110)
CO2 SERPL-SCNC: 18 MMOL/L (ref 23–29)
CREAT SERPL-MCNC: 0.6 MG/DL (ref 0.5–1.4)
ERYTHROCYTE [DISTWIDTH] IN BLOOD BY AUTOMATED COUNT: 14 % (ref 11.5–14.5)
GFR SERPLBLD CREATININE-BSD FMLA CKD-EPI: >60 ML/MIN/1.73/M2
GLUCOSE SERPL-MCNC: 111 MG/DL (ref 70–110)
HCT VFR BLD AUTO: 43.4 % (ref 40–54)
HGB BLD-MCNC: 13.7 GM/DL (ref 14–18)
IMM GRANULOCYTES # BLD AUTO: 0.02 K/UL (ref 0–0.04)
IMM GRANULOCYTES NFR BLD AUTO: 0.2 % (ref 0–0.5)
LYMPHOCYTES # BLD AUTO: 1.59 K/UL (ref 1–4.8)
MAGNESIUM SERPL-MCNC: 2.1 MG/DL (ref 1.6–2.6)
MCH RBC QN AUTO: 32 PG (ref 27–31)
MCHC RBC AUTO-ENTMCNC: 31.6 G/DL (ref 32–36)
MCV RBC AUTO: 101 FL (ref 82–98)
NUCLEATED RBC (/100WBC) (OHS): 0 /100 WBC
PHOSPHATE SERPL-MCNC: 3.4 MG/DL (ref 2.7–4.5)
PLATELET # BLD AUTO: 194 K/UL (ref 150–450)
PMV BLD AUTO: 10.4 FL (ref 9.2–12.9)
POTASSIUM SERPL-SCNC: 3.8 MMOL/L (ref 3.5–5.1)
PROT SERPL-MCNC: 6.8 GM/DL (ref 6–8.4)
RBC # BLD AUTO: 4.28 M/UL (ref 4.6–6.2)
RELATIVE EOSINOPHIL (OHS): 0.5 %
RELATIVE LYMPHOCYTE (OHS): 17 % (ref 18–48)
RELATIVE MONOCYTE (OHS): 9.8 % (ref 4–15)
RELATIVE NEUTROPHIL (OHS): 72 % (ref 38–73)
SODIUM SERPL-SCNC: 145 MMOL/L (ref 136–145)
SODIUM SERPL-SCNC: 145 MMOL/L (ref 136–145)
SODIUM SERPL-SCNC: 149 MMOL/L (ref 136–145)
WBC # BLD AUTO: 9.35 K/UL (ref 3.9–12.7)

## 2025-05-10 PROCEDURE — 63600175 PHARM REV CODE 636 W HCPCS

## 2025-05-10 PROCEDURE — 20000000 HC ICU ROOM

## 2025-05-10 PROCEDURE — 99900035 HC TECH TIME PER 15 MIN (STAT)

## 2025-05-10 PROCEDURE — 83735 ASSAY OF MAGNESIUM: CPT | Performed by: NURSE PRACTITIONER

## 2025-05-10 PROCEDURE — 94761 N-INVAS EAR/PLS OXIMETRY MLT: CPT

## 2025-05-10 PROCEDURE — 84100 ASSAY OF PHOSPHORUS: CPT | Performed by: NURSE PRACTITIONER

## 2025-05-10 PROCEDURE — 85025 COMPLETE CBC W/AUTO DIFF WBC: CPT | Performed by: NURSE PRACTITIONER

## 2025-05-10 PROCEDURE — 80053 COMPREHEN METABOLIC PANEL: CPT | Performed by: NURSE PRACTITIONER

## 2025-05-10 PROCEDURE — 99291 CRITICAL CARE FIRST HOUR: CPT | Mod: GC,,, | Performed by: PSYCHIATRY & NEUROLOGY

## 2025-05-10 PROCEDURE — 83835 ASSAY OF METANEPHRINES: CPT

## 2025-05-10 PROCEDURE — 94760 N-INVAS EAR/PLS OXIMETRY 1: CPT

## 2025-05-10 PROCEDURE — 94660 CPAP INITIATION&MGMT: CPT

## 2025-05-10 PROCEDURE — 84295 ASSAY OF SERUM SODIUM: CPT

## 2025-05-10 PROCEDURE — 63600175 PHARM REV CODE 636 W HCPCS: Performed by: PSYCHIATRY & NEUROLOGY

## 2025-05-10 PROCEDURE — 25000003 PHARM REV CODE 250: Performed by: PSYCHIATRY & NEUROLOGY

## 2025-05-10 PROCEDURE — 83497 ASSAY OF 5-HIAA: CPT

## 2025-05-10 PROCEDURE — 82384 ASSAY THREE CATECHOLAMINES: CPT

## 2025-05-10 PROCEDURE — 25000003 PHARM REV CODE 250

## 2025-05-10 RX ORDER — METHOCARBAMOL 500 MG/1
500 TABLET, FILM COATED ORAL 3 TIMES DAILY
Status: DISCONTINUED | OUTPATIENT
Start: 2025-05-10 | End: 2025-05-10

## 2025-05-10 RX ORDER — LIDOCAINE 50 MG/G
1 PATCH TOPICAL
Status: DISCONTINUED | OUTPATIENT
Start: 2025-05-10 | End: 2025-05-13 | Stop reason: HOSPADM

## 2025-05-10 RX ORDER — METHOCARBAMOL 500 MG/1
500 TABLET, FILM COATED ORAL 3 TIMES DAILY PRN
Status: DISCONTINUED | OUTPATIENT
Start: 2025-05-10 | End: 2025-05-13 | Stop reason: HOSPADM

## 2025-05-10 RX ADMIN — TRIAMCINOLONE ACETONIDE: 1 OINTMENT TOPICAL at 09:05

## 2025-05-10 RX ADMIN — LOSARTAN POTASSIUM 100 MG: 50 TABLET, FILM COATED ORAL at 09:05

## 2025-05-10 RX ADMIN — ENOXAPARIN SODIUM 40 MG: 40 INJECTION SUBCUTANEOUS at 05:05

## 2025-05-10 RX ADMIN — METHOCARBAMOL 500 MG: 500 TABLET ORAL at 09:05

## 2025-05-10 RX ADMIN — SODIUM CHLORIDE 250 ML: 3 INJECTION, SOLUTION INTRAVENOUS at 11:05

## 2025-05-10 RX ADMIN — HYDRALAZINE HYDROCHLORIDE 100 MG: 50 TABLET ORAL at 09:05

## 2025-05-10 RX ADMIN — LABETALOL HYDROCHLORIDE 10 MG: 5 INJECTION, SOLUTION INTRAVENOUS at 12:05

## 2025-05-10 RX ADMIN — ACETAMINOPHEN 650 MG: 325 TABLET ORAL at 10:05

## 2025-05-10 RX ADMIN — ATORVASTATIN CALCIUM 20 MG: 20 TABLET, FILM COATED ORAL at 09:05

## 2025-05-10 RX ADMIN — SILODOSIN 4 MG: 4 CAPSULE ORAL at 09:05

## 2025-05-10 RX ADMIN — HYDRALAZINE HYDROCHLORIDE 100 MG: 50 TABLET ORAL at 06:05

## 2025-05-10 RX ADMIN — SODIUM CHLORIDE 250 ML: 3 INJECTION, SOLUTION INTRAVENOUS at 01:05

## 2025-05-10 RX ADMIN — LIDOCAINE 1 PATCH: 50 PATCH CUTANEOUS at 01:05

## 2025-05-10 RX ADMIN — HYDRALAZINE HYDROCHLORIDE 100 MG: 50 TABLET ORAL at 01:05

## 2025-05-10 RX ADMIN — SENNOSIDES AND DOCUSATE SODIUM 1 TABLET: 50; 8.6 TABLET ORAL at 09:05

## 2025-05-10 RX ADMIN — ACETAMINOPHEN 650 MG: 325 TABLET ORAL at 03:05

## 2025-05-10 RX ADMIN — HYPROMELLOSE 2910 1 DROP: 5 SOLUTION OPHTHALMIC at 09:05

## 2025-05-10 NOTE — EICU
Intervention Initiated From:  [facility] / ABHILASHU    Praveena intervened regarding:  Rounding (Video assessment)    VICU Night Rounds Checklist  24H Vital Sign Range:  Temp:  [98.3 °F (36.8 °C)-99.9 °F (37.7 °C)]   Pulse:  [62-75]   Resp:  [14-23]   BP: (137-173)/(64-96)   SpO2:  [94 %-99 %]     Video rounds and LDA reconciliation

## 2025-05-10 NOTE — SUBJECTIVE & OBJECTIVE
Interval History/Significant Events: NAEON. Continue to monitor. Patient reports sensation to LLE, new compared to prior.      Review of Systems   Constitutional:  Negative for fever.   Eyes:  Negative for photophobia.   Respiratory:  Negative for shortness of breath.    Cardiovascular:  Negative for chest pain.   Gastrointestinal:  Negative for nausea.   Genitourinary:  Negative for dysuria.   Neurological:  Positive for tremors, facial asymmetry and weakness.     Objective:     Vitals:    Temp: 98.6 °F (37 °C)  Pulse: 69  Rhythm: normal sinus rhythm  BP: (!) 140/68  MAP (mmHg): 97  Resp: 19  SpO2: 97 %    Temp  Min: 98.3 °F (36.8 °C)  Max: 100.2 °F (37.9 °C)  Pulse  Min: 59  Max: 73  BP  Min: 138/68  Max: 169/81  MAP (mmHg)  Min: 96  Max: 117  Resp  Min: 12  Max: 20  SpO2  Min: 95 %  Max: 99 %    05/09 0701 - 05/10 0700  In: 732 [P.O.:486; I.V.:246]  Out: 1160 [Urine:1160]   Unmeasured Output  Unmeasured Urine Occurrence: 1  Unmeasured Stool Occurrence: 1  Pad Count: 1        Physical Exam  Vitals and nursing note reviewed.   Cardiovascular:      Rate and Rhythm: Normal rate and regular rhythm.      Pulses: Normal pulses.      Heart sounds: Normal heart sounds.   Pulmonary:      Effort: Pulmonary effort is normal.      Breath sounds: Normal breath sounds.   Abdominal:      General: Bowel sounds are normal.      Palpations: Abdomen is soft.   Skin:     General: Skin is warm and dry.      Capillary Refill: Capillary refill takes 2 to 3 seconds.   Neurological:      Comments: E4 V5 M6  Perrla, Open eyes spontaneously, right gaze preference though does cross midline with direction.   Quickly to respond to questions today and able to verbally communicate. However, after some time, responses become a bit delayed.  Left sided weakness/plegia, slightly increased tone  Left side tito-neglect, improving. Able to identify, point to, and reach for his left arm correctly.   Now reporting sensation to LLE, new compared to  prior.  RUE/RLE 5/5 , follows commands             Today I personally reviewed pertinent medications, lines/drains/airways, imaging, cardiology results, laboratory results, microbiology results,

## 2025-05-10 NOTE — PLAN OF CARE
"[facility] Care Plan    POC reviewed with Colt Rose and family at 0300. Patient and Family verbalized understanding. Questions and concerns addressed. No acute events today. Pt progressing toward goals. Will continue to monitor. See below and flowsheets for full assessment and VS info.     -PRN for BP x1  -Tylenol for fever and mild headache  -bed bath and linen change  -24 hr urine collect ongoing  -Home CPAP applied  -3% bolus x1 for Na >145      Is this a stroke patient? Yes    Neuro:  Nader Coma Scale  Best Eye Response: 4-->(E4) spontaneous  Best Motor Response: 6-->(M6) obeys commands  Best Verbal Response: 5-->(V5) oriented  Nader Coma Scale Score: 15  Assessment Qualifiers: patient not sedated/intubated  Pupil PERRLA: yes     24 hr Temp:  [98.3 °F (36.8 °C)-100.2 °F (37.9 °C)]     CV:   Rhythm: normal sinus rhythm  BP goals:   SBP < 160  MAP > 65    Resp:      Oxygen Concentration (%): 21    Plan: N/A    GI/:     Diet/Nutrition Received: other (see comments) (honey thick)  Last Bowel Movement: 05/09/25  Voiding Characteristics: external catheter    Intake/Output Summary (Last 24 hours) at 5/10/2025 0437  Last data filed at 5/10/2025 0301  Gross per 24 hour   Intake 732.03 ml   Output 1360 ml   Net -627.97 ml     Unmeasured Output  Unmeasured Urine Occurrence: 1  Unmeasured Stool Occurrence: 1  Pad Count: 1    Labs/Accuchecks:  Recent Labs   Lab 05/10/25  0027   WBC 9.35   RBC 4.28*   HGB 13.7*   HCT 43.4         Recent Labs   Lab 05/10/25  0027      K 3.8   CO2 18*   *   BUN 29*   CREATININE 0.6   ALKPHOS 70   ALT 32   AST 26   BILITOT 0.9      Recent Labs   Lab 05/05/25  0145   PROTIME 11.8   INR 1.1   APTT 22.6    No results for input(s): "CPK", "CPKMB", "TROPONINI", "MB" in the last 168 hours.    Electrolytes: N/A - electrolytes WDL - K replacement as per provider  Accuchecks: none    Gtts:      LDA/Wounds:    Mir Risk Assessment  Sensory Perception: 2-->very " limited  Moisture: 3-->occasionally moist  Activity: 1-->bedfast  Mobility: 2-->very limited  Nutrition: 3-->adequate  Friction and Shear: 2-->potential problem  Mir Score: 13  Is your mir score 12 or less? no

## 2025-05-10 NOTE — PROGRESS NOTES
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 CTH with right parietal IPH, CTA without AVM/abnormalities. ASA hx, Given ddAVP for reversal. [place] [facility] [place].     Hospital Course: 05/05/2025 CTH this AM stable when compared to MRI. EEG without evidence of seizures. Will hold off AEDs for now. Goal of SBP <150 and Na >140. No NSGY interventions. Will repeat CTH tomorrow AM.   05/06/2025 Repeat CTH shows increased edema with increased midline shift. Increase sodium goal to >145. Amlodipine added for BP support. Patient with flushing after amlodipine given. Hydroxyzine and IV pepcid given as patient reports possible allergy to bendaryl in the past. Losartan added instead. NG tube placed and started tube feeds.  05/07/2025 Repeat CTH head was stable. Blood pressure medication changes for optimization.   05/08/2025 Patient removed CPAP overnight and as a result, the NG tube was also removed. SLP okay with crushed meds on evaluation. Will hold off on replacing NG tube.   05/09/2025 Fever of 101.2 overnight, resolved with tylenol. Adjusting BP meds for better control. Labs and imaging to rule out carcinoid tumor and pheochromocytoma given recurring flushing and difficult to control BP. Speech cleared for pureed diet. Repeat CTH 5/9, improved midline shift.  05/10/2025 No events overnight.     Interval History/Significant Events: NAEON. Continue to monitor. Patient reports sensation to LLE, new compared to prior.      Review of Systems   Constitutional:  Negative for  fever.   Eyes:  Negative for photophobia.   Respiratory:  Negative for shortness of breath.    Cardiovascular:  Negative for chest pain.   Gastrointestinal:  Negative for nausea.   Genitourinary:  Negative for dysuria.   Neurological:  Positive for tremors, facial asymmetry and weakness.     Objective:     Vitals:    Temp: 98.6 °F (37 °C)  Pulse: 69  Rhythm: normal sinus rhythm  BP: (!) 140/68  MAP (mmHg): 97  Resp: 19  SpO2: 97 %    Temp  Min: 98.3 °F (36.8 °C)  Max: 100.2 °F (37.9 °C)  Pulse  Min: 59  Max: 73  BP  Min: 138/68  Max: 169/81  MAP (mmHg)  Min: 96  Max: 117  Resp  Min: 12  Max: 20  SpO2  Min: 95 %  Max: 99 %    05/09 0701 - 05/10 0700  In: 732 [P.O.:486; I.V.:246]  Out: 1160 [Urine:1160]   Unmeasured Output  Unmeasured Urine Occurrence: 1  Unmeasured Stool Occurrence: 1  Pad Count: 1        Physical Exam  Vitals and nursing note reviewed.   Cardiovascular:      Rate and Rhythm: Normal rate and regular rhythm.      Pulses: Normal pulses.      Heart sounds: Normal heart sounds.   Pulmonary:      Effort: Pulmonary effort is normal.      Breath sounds: Normal breath sounds.   Abdominal:      General: Bowel sounds are normal.      Palpations: Abdomen is soft.   Skin:     General: Skin is warm and dry.      Capillary Refill: Capillary refill takes 2 to 3 seconds.   Neurological:      Comments: E4 V5 M6  Perrla, Open eyes spontaneously, right gaze preference though does cross midline with direction.   Quickly to respond to questions today and able to verbally communicate. However, after some time, responses become a bit delayed.  Left sided weakness/plegia, slightly increased tone  Left side tito-neglect, improving. Able to identify, point to, and reach for his left arm correctly.   Now reporting sensation to LLE, new compared to prior.  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 Increased sodium goals.   - Repeat CTH 5/7 stable.    - Repeat CTH 5/9, improved midline shift.  - Reduced Na goal to > 140 to decrease risk of edema.   - Sodium q8h   - Hypertonic saline q8h prn  - MRI brain without evidence of amyloid.   - neuro checks q2h in the daytime and q4h overnight.   - SBP < 160   - losartan 100mg qd   - hydralazine 100mg TID   - hydralazine and labetalol prn  - Given difficulty controlling blood pressure, sending off labs for secondary HTN workup to rule out pheochromocytoma and carcinoid tumor.   - Renal US negative for adrenal mass and renal artery stenosis   - Pending urine studies.   - Coags WNL  - DDAVP given prior to transfer for ASA hx     PT/OT, SLP   - high intensity therapy recommended   - Pureed diet and mod thick fluids    Vasogenic brain edema  See ICH    Seizure  - Left hand twitching on arrival, did appear rhymic. Resolved at this time.  - EEG without evidence of seizure.  - Holding AEDs at this time.    Dysarthria  SLP consulted   - Pureed diet     Acute left-sided weakness  PT/OT    Cardiac/Vascular  Dyslipidemia  Resume statin     Renal/  Urinary retention  Added silodosin to assist with symptoms.           The patient is being Prophylaxed for:  Venous Thromboembolism with: Mechanical or Chemical  Stress Ulcer with: Not Applicable   Ventilator Pneumonia with: not applicable    Activity Orders            Diet Pureed (IDDSI Level 4) Honey Thick (Moderately Thick): Pureed starting at 05/09 1158    Elevate HOB Elevate HOB 30-45 degrees during feeding unless contraindicated starting at 05/06 0903    Turn patient starting at 05/04 2000    Elevate HOB starting at 05/04 1920          Full  Code    William Appiah MD  Neurocritical Care  [facility] - [facility]

## 2025-05-10 NOTE — ASSESSMENT & PLAN NOTE
- CTH/CTA with right parietal IPH, no AVM/malformation   - Vasc Neuro Following  - NSGY following   - Repeat CTH with edema expansion and worsening midline shift. Increased sodium goals.   - Repeat CTH 5/7 stable.    - Repeat CTH 5/9, improved midline shift.  - Reduced Na goal to > 140 to decrease risk of edema.   - Sodium q8h   - Hypertonic saline q8h prn  - MRI brain without evidence of amyloid.   - neuro checks q2h in the daytime and q4h overnight.   - SBP < 160   - losartan 100mg qd   - hydralazine 100mg TID   - hydralazine and labetalol prn  - Given difficulty controlling blood pressure, sending off labs for secondary HTN workup to rule out pheochromocytoma and carcinoid tumor.   - Renal US negative for adrenal mass and renal artery stenosis   - Pending urine studies.   - Coags WNL  - DDAVP given prior to transfer for ASA hx     PT/OT, SLP   - high intensity therapy recommended   - Pureed diet and mod thick fluids

## 2025-05-10 NOTE — EICU
Intervention Initiated From:  [facility] / EICU    Virtual ICU Quality Rounds    Admit Date: 5/4/2025  Hospital Day: 6    ICU Day: 5d 15h    24H Vital Sign Range:  Temp:  [98.3 °F (36.8 °C)-100.2 °F (37.9 °C)]   Pulse:  [59-73]   Resp:  [12-20]   BP: (138-169)/(67-81)   SpO2:  [95 %-99 %]     VICU Surveillance Screening

## 2025-05-10 NOTE — PROGRESS NOTES
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 Assessment Details/Treatment   Patient seen for wound care consultation.  Chart reviewed for this encounter.  See flow sheet for findings.    Pt in bed and agreeable to care. Family at the bedside. Intact blister to the left posterior thigh. Recommend aquacel ag foam dressing for protection, antimicrobial properties and comfort. The bilateral heels are intact, pink, dry and blanchable. Supplies at the bedside.     Recommendations:  - Left posterior thigh blister (intact or ruptured): cleanse with NS, pat dry and apply an aquacel ag foam dressing (dressing silicone ag silver 4x4) to the wound bed every 2 days and prn soilage/saturation  - Turning every 2 hours  - Heel lift boots     05/10/25 0933        Wound 05/09/25 1510 Blister(s) Left posterior Thigh #1   Date First Assessed/Time First Assessed: 05/09/25 1510   Primary Wound Type: Blister(s)  Side: Left  Orientation: posterior  Location: Thigh  Wound Number: #1  Is this injury device related?: No   Wound Image    Dressing Appearance Open to air   Drainage Amount None   Appearance Intact;Blistered   Periwound Area Intact;Dry     Recommendations made to primary team for above plan via secure chat. Wound care will follow-up as needed.   05/10/2025         [1]   Social History  Socioeconomic History     Marital status:    Tobacco Use    Smoking status: Never    Smokeless tobacco: Never   Substance and Sexual Activity    Alcohol use: Yes     Alcohol/week: 6.0 standard drinks of alcohol     Types: 6 Cans of beer per week     Comment: weekly or less    Drug use: No    Sexual activity: Yes     Partners: Female     Social Drivers of Health     Financial Resource Strain: Low Risk  (5/5/2025)    Overall Financial Resource Strain (CARDIA)     Difficulty of Paying Living Expenses: Not hard at all   Food Insecurity: No Food Insecurity (5/5/2025)    Hunger Vital Sign     Worried About Running Out of Food in the Last Year: Never true     Ran Out of Food in the Last Year: Never true   Transportation Needs: No Transportation Needs (5/5/2025)    PRAPARE - Transportation     Lack of Transportation (Medical): No     Lack of Transportation (Non-Medical): No   Physical Activity: Sufficiently Active (5/5/2025)    Exercise Vital Sign     Days of Exercise per Week: 7 days     Minutes of Exercise per Session: 60 min   Stress: Patient Unable To Answer (5/5/2025)    [employer] - Occupational Stress Questionnaire     Feeling of Stress : Patient unable to answer   Housing Stability: Low Risk  (5/5/2025)    Housing Stability Vital Sign     Unable to Pay for Housing in the Last Year: No     Homeless in the Last Year: No

## 2025-05-11 LAB
ABSOLUTE EOSINOPHIL (OHS): 0.05 K/UL
ABSOLUTE MONOCYTE (OHS): 0.78 K/UL (ref 0.3–1)
ABSOLUTE NEUTROPHIL COUNT (OHS): 5.96 K/UL (ref 1.8–7.7)
ALBUMIN SERPL BCP-MCNC: 3 G/DL (ref 3.5–5.2)
ALP SERPL-CCNC: 85 UNIT/L (ref 40–150)
ALT SERPL W/O P-5'-P-CCNC: 54 UNIT/L (ref 10–44)
ANION GAP (OHS): 10 MMOL/L (ref 8–16)
AST SERPL-CCNC: 36 UNIT/L (ref 11–45)
BASOPHILS # BLD AUTO: 0.04 K/UL
BASOPHILS NFR BLD AUTO: 0.5 %
BILIRUB SERPL-MCNC: 0.7 MG/DL (ref 0.1–1)
BUN SERPL-MCNC: 36 MG/DL (ref 8–23)
CALCIUM SERPL-MCNC: 8.9 MG/DL (ref 8.7–10.5)
CHLORIDE SERPL-SCNC: 115 MMOL/L (ref 95–110)
CO2 SERPL-SCNC: 22 MMOL/L (ref 23–29)
CREAT SERPL-MCNC: 0.7 MG/DL (ref 0.5–1.4)
ERYTHROCYTE [DISTWIDTH] IN BLOOD BY AUTOMATED COUNT: 13.7 % (ref 11.5–14.5)
GFR SERPLBLD CREATININE-BSD FMLA CKD-EPI: >60 ML/MIN/1.73/M2
GLUCOSE SERPL-MCNC: 122 MG/DL (ref 70–110)
HCT VFR BLD AUTO: 39.7 % (ref 40–54)
HGB BLD-MCNC: 13 GM/DL (ref 14–18)
IMM GRANULOCYTES # BLD AUTO: 0.01 K/UL (ref 0–0.04)
IMM GRANULOCYTES NFR BLD AUTO: 0.1 % (ref 0–0.5)
LYMPHOCYTES # BLD AUTO: 1.43 K/UL (ref 1–4.8)
MAGNESIUM SERPL-MCNC: 2.3 MG/DL (ref 1.6–2.6)
MCH RBC QN AUTO: 31.5 PG (ref 27–31)
MCHC RBC AUTO-ENTMCNC: 32.7 G/DL (ref 32–36)
MCV RBC AUTO: 96 FL (ref 82–98)
NUCLEATED RBC (/100WBC) (OHS): 0 /100 WBC
PHOSPHATE SERPL-MCNC: 4 MG/DL (ref 2.7–4.5)
PLATELET # BLD AUTO: 218 K/UL (ref 150–450)
PMV BLD AUTO: 10.4 FL (ref 9.2–12.9)
POTASSIUM SERPL-SCNC: 3.7 MMOL/L (ref 3.5–5.1)
PROT SERPL-MCNC: 6.9 GM/DL (ref 6–8.4)
RBC # BLD AUTO: 4.13 M/UL (ref 4.6–6.2)
RELATIVE EOSINOPHIL (OHS): 0.6 %
RELATIVE LYMPHOCYTE (OHS): 17.3 % (ref 18–48)
RELATIVE MONOCYTE (OHS): 9.4 % (ref 4–15)
RELATIVE NEUTROPHIL (OHS): 72.1 % (ref 38–73)
SODIUM SERPL-SCNC: 147 MMOL/L (ref 136–145)
SODIUM SERPL-SCNC: 147 MMOL/L (ref 136–145)
SODIUM SERPL-SCNC: 148 MMOL/L (ref 136–145)
SODIUM SERPL-SCNC: 148 MMOL/L (ref 136–145)
WBC # BLD AUTO: 8.27 K/UL (ref 3.9–12.7)

## 2025-05-11 PROCEDURE — 99233 SBSQ HOSP IP/OBS HIGH 50: CPT | Mod: ,,, | Performed by: PSYCHIATRY & NEUROLOGY

## 2025-05-11 PROCEDURE — 85025 COMPLETE CBC W/AUTO DIFF WBC: CPT | Performed by: NURSE PRACTITIONER

## 2025-05-11 PROCEDURE — 84295 ASSAY OF SERUM SODIUM: CPT

## 2025-05-11 PROCEDURE — 20000000 HC ICU ROOM

## 2025-05-11 PROCEDURE — 25000003 PHARM REV CODE 250

## 2025-05-11 PROCEDURE — 94761 N-INVAS EAR/PLS OXIMETRY MLT: CPT

## 2025-05-11 PROCEDURE — 84295 ASSAY OF SERUM SODIUM: CPT | Performed by: PHYSICIAN ASSISTANT

## 2025-05-11 PROCEDURE — 99900035 HC TECH TIME PER 15 MIN (STAT)

## 2025-05-11 PROCEDURE — 63600175 PHARM REV CODE 636 W HCPCS

## 2025-05-11 PROCEDURE — 80053 COMPREHEN METABOLIC PANEL: CPT

## 2025-05-11 PROCEDURE — 63600175 PHARM REV CODE 636 W HCPCS: Performed by: PSYCHIATRY & NEUROLOGY

## 2025-05-11 PROCEDURE — 25000003 PHARM REV CODE 250: Performed by: PHYSICIAN ASSISTANT

## 2025-05-11 PROCEDURE — 83735 ASSAY OF MAGNESIUM: CPT | Performed by: NURSE PRACTITIONER

## 2025-05-11 PROCEDURE — 84100 ASSAY OF PHOSPHORUS: CPT | Performed by: NURSE PRACTITIONER

## 2025-05-11 PROCEDURE — 27100171 HC OXYGEN HIGH FLOW UP TO 24 HOURS

## 2025-05-11 PROCEDURE — 25000003 PHARM REV CODE 250: Performed by: PSYCHIATRY & NEUROLOGY

## 2025-05-11 PROCEDURE — 94660 CPAP INITIATION&MGMT: CPT

## 2025-05-11 PROCEDURE — 84295 ASSAY OF SERUM SODIUM: CPT | Performed by: NURSE PRACTITIONER

## 2025-05-11 PROCEDURE — 99291 CRITICAL CARE FIRST HOUR: CPT | Mod: GC,,, | Performed by: PSYCHIATRY & NEUROLOGY

## 2025-05-11 RX ORDER — FLUOXETINE 20 MG/1
20 CAPSULE ORAL DAILY
Status: DISCONTINUED | OUTPATIENT
Start: 2025-05-11 | End: 2025-05-13 | Stop reason: HOSPADM

## 2025-05-11 RX ADMIN — METHOCARBAMOL 500 MG: 500 TABLET ORAL at 10:05

## 2025-05-11 RX ADMIN — HYPROMELLOSE 2910 1 DROP: 5 SOLUTION OPHTHALMIC at 09:05

## 2025-05-11 RX ADMIN — HYDRALAZINE HYDROCHLORIDE 100 MG: 50 TABLET ORAL at 09:05

## 2025-05-11 RX ADMIN — TRIAMCINOLONE ACETONIDE: 1 OINTMENT TOPICAL at 08:05

## 2025-05-11 RX ADMIN — ACETAMINOPHEN 650 MG: 325 TABLET ORAL at 11:05

## 2025-05-11 RX ADMIN — SILODOSIN 4 MG: 4 CAPSULE ORAL at 08:05

## 2025-05-11 RX ADMIN — ATORVASTATIN CALCIUM 20 MG: 20 TABLET, FILM COATED ORAL at 09:05

## 2025-05-11 RX ADMIN — HYDRALAZINE HYDROCHLORIDE 10 MG: 20 INJECTION, SOLUTION INTRAMUSCULAR; INTRAVENOUS at 06:05

## 2025-05-11 RX ADMIN — SENNOSIDES AND DOCUSATE SODIUM 1 TABLET: 50; 8.6 TABLET ORAL at 08:05

## 2025-05-11 RX ADMIN — FLUOXETINE HYDROCHLORIDE 20 MG: 20 CAPSULE ORAL at 11:05

## 2025-05-11 RX ADMIN — HYPROMELLOSE 2910 1 DROP: 5 SOLUTION OPHTHALMIC at 08:05

## 2025-05-11 RX ADMIN — LABETALOL HYDROCHLORIDE 10 MG: 5 INJECTION, SOLUTION INTRAVENOUS at 07:05

## 2025-05-11 RX ADMIN — HYDRALAZINE HYDROCHLORIDE 100 MG: 50 TABLET ORAL at 05:05

## 2025-05-11 RX ADMIN — LOSARTAN POTASSIUM 100 MG: 50 TABLET, FILM COATED ORAL at 08:05

## 2025-05-11 RX ADMIN — SENNOSIDES AND DOCUSATE SODIUM 1 TABLET: 50; 8.6 TABLET ORAL at 09:05

## 2025-05-11 RX ADMIN — ENOXAPARIN SODIUM 40 MG: 40 INJECTION SUBCUTANEOUS at 06:05

## 2025-05-11 RX ADMIN — POTASSIUM BICARBONATE 50 MEQ: 978 TABLET, EFFERVESCENT ORAL at 05:05

## 2025-05-11 RX ADMIN — TRIAMCINOLONE ACETONIDE: 1 OINTMENT TOPICAL at 09:05

## 2025-05-11 RX ADMIN — LIDOCAINE 1 PATCH: 50 PATCH CUTANEOUS at 03:05

## 2025-05-11 NOTE — ASSESSMENT & PLAN NOTE
Colt Rose is a 69 y.o. male with a significant medical history of HLD, cervical and lumbar DDD, pre-diabetes who presents to the hospital as a transfer from [facility] for evaluation of R ICH. He acutely developed LSW and R gaze around 4 PM today (5/5/2025). CTH was obtained and showed a 3.3 x 2.4 cm R frontoparietal ICH w/mild vasogenic edema. The patient was given DDVAP as he takes ASA daily and transferred to [facility] for higher level of care, further evaluation.    On this exam the patient is lethargic and in need of intermittent stimulation to arouse to participate with exam. He opens his eyes to touch and voice and is noted to be doing a repetitive rubbing/picking motion to his chest with his RUE. Dysconjugate gaze. Withdraws from pain in the left sided extremities. NIHSS 14. Admitted to [facility].     EEG with abnormal brain activity --> focal slowing over the right anterior quadrant consistent with underlying subcortical dysfunction. CTH and MRI w/o contrast on 5/5 with increased size of hemorrhage and midline shift. NGT placed on 5/6 s/p SLP assessment failure.     - NGT removed as Pt tolerating diet   - Serial CTH imaging has been stable, NSGY signing off; no indication for intervention   - No longer requiring Intermittent 3% bolus', Maintaining goal of Na >140  - Pt likely ready for step down to Vascular Neurology   - Recommend MRI Brain w/ Contrast to further assess for underlying etiology of ICH when clinically appropriate  - Keep -150      Antithrombotics for secondary stroke prevention: Antiplatelets: None: Intracerebral Hemorrhage    Statins for secondary stroke prevention and hyperlipidemia, if present:   Statins: Not indicated in acute ICH however, LDL is 102.2. The patient is currently prescribed atorvastatin 20 mg daily, may benefit from increasing to 40 mg daily.     Aggressive risk factor modification: HLD, sleep apnea     Rehab efforts: The patient has been evaluated  by a stroke team provider and the therapy needs have been fully considered based off the presenting complaints and exam findings. The following therapy evaluations are needed: PT evaluate and treat, OT evaluate and treat, SLP evaluate and treat, PM&R evaluate for appropriate placement    Diagnostics ordered/pending: MRI head without contrast to assess brain parenchyma, TTE to assess cardiac function/status , Other: CTH prn    VTE prophylaxis: Mechanical prophylaxis: Place SCDs  None: Reason for No Pharmacological VTE Prophylaxis: History of systemic or intracranial bleeding    BP parameters: ICH: SBP Goal Range 130-150

## 2025-05-11 NOTE — SUBJECTIVE & OBJECTIVE
Interval History:  See ED course  Objective:     Vitals:  Temp: 98.5 °F (36.9 °C)  Pulse: 68  Rhythm: normal sinus rhythm  BP: (!) 145/80  MAP (mmHg): 107  Resp: (!) 23  SpO2: 95 %  Oxygen Concentration (%): 21    Temp  Min: 98 °F (36.7 °C)  Max: 99.1 °F (37.3 °C)  Pulse  Min: 56  Max: 75  BP  Min: 101/58  Max: 160/75  MAP (mmHg)  Min: 72  Max: 110  Resp  Min: 11  Max: 25  SpO2  Min: 94 %  Max: 98 %  Oxygen Concentration (%)  Min: 21  Max: 21    05/10 0701 - 05/11 0700  In: 807.4 [P.O.:478; I.V.:329.4]  Out: 1075 [Urine:1075]   Unmeasured Output  Unmeasured Urine Occurrence: 1 (pt accidentally pulled off condom cath, bed saturated with urine)  Unmeasured Stool Occurrence: 1  Pad Count: 3        Physical Exam  Vitals and nursing note reviewed.   Cardiovascular:      Rate and Rhythm: Normal rate and regular rhythm.      Pulses: Normal pulses.      Heart sounds: Normal heart sounds.   Pulmonary:      Effort: Pulmonary effort is normal.      Breath sounds: Normal breath sounds.   Abdominal:      General: Bowel sounds are normal.      Palpations: Abdomen is soft.   Skin:     General: Skin is warm and dry.      Capillary Refill: Capillary refill takes 2 to 3 seconds.   Neurological:      Comments: E4 V5 M6  Perrla, Open eyes spontaneously, right gaze preference though does cross midline with direction.   Quickly to respond to questions today and able to verbally communicate. However, after some time, responses become a bit delayed.  Left sided weakness/plegia, slightly increased tone  Left side tito-neglect, improving. Able to identify, point to, and reach for his left arm correctly.   Now reporting sensation to LLE, new compared to prior.  RUE/RLE 5/5 , follows commands                Medications:  Continuous Scheduledartificial tears, 1 drop, BID  atorvastatin, 20 mg, QHS  enoxparin, 40 mg, Q24H (prophylaxis, 1700)  FLUoxetine, 20 mg, Daily  hydrALAZINE, 100 mg, Q8H  LIDOcaine, 1 patch, Q24H  losartan, 100 mg,  Daily  senna-docusate, 1 tablet, BID  silodosin, 4 mg, Daily  triamcinolone acetonide 0.1%, , BID    PRNacetaminophen, 650 mg, Q6H PRN  bisacodyL, 10 mg, Daily PRN  hydrALAZINE, 10 mg, Q6H PRN  labetalol, 10 mg, Q4H PRN  magnesium oxide, 800 mg, PRN  magnesium oxide, 800 mg, PRN  methocarbamoL, 500 mg, TID PRN  ondansetron, 4 mg, Q8H PRN  potassium bicarbonate, 35 mEq, PRN  potassium bicarbonate, 50 mEq, PRN  potassium bicarbonate, 60 mEq, PRN  potassium, sodium phosphates, 2 packet, PRN  potassium, sodium phosphates, 2 packet, PRN  potassium, sodium phosphates, 2 packet, PRN  sodium chloride 0.9%, 10 mL, PRN      Today I personally reviewed pertinent medications, lines/drains/airways, imaging, laboratory results, notably:    Diet  Diet Pureed (IDDSI Level 4) Honey Thick (Moderately Thick)  Diet Pureed (IDDSI Level 4) Honey Thick (Moderately Thick)

## 2025-05-11 NOTE — ASSESSMENT & PLAN NOTE
69-year-old male presents to the ER with left-sided weakness and right-sided gaze preference that began abruptly this afternoon around 4:00 p.m.. At OSH found to have R frontal lobar ICH. Given ddAVP for reversal.    ICHS 0    MRI wo 5/4: swi looks larger , there is IVH now too  CTH 5/5: looks same as MRI , larger and IVH  CTH 5/6: stable  CTH 5/9: stable    --Patient admitted to [facility] on telemetry      -q1h neurochecks in ICU, q2h neurochecks in stepdown, q4h neurochecks on floor  --All labs and diagnostics reviewed  ---150  --Na >145  --Keppra 500 BID   --HOB >30  --No acute neurosurgical intervention at this time, NSGY will sign off    Dispo: per ICU    Plan d/w Dr. Gross and on call staff Dr. Polo

## 2025-05-11 NOTE — ASSESSMENT & PLAN NOTE
- CTH/CTA with right parietal IPH, no AVM/malformation   - Vasc Neuro Following  - NSGY following   - Repeat CTH with edema expansion and worsening midline shift. Increased sodium goals.   - Repeat CTH 5/7 stable.    - Repeat CTH 5/9, improved midline shift.  - Reduced Na goal to > 140 to decrease risk of edema.   - Sodium q12h  - MRI brain without evidence of amyloid.   - neuro checks q4h  - SBP < 160   - losartan 100mg qd   - hydralazine 100mg TID   - hydralazine and labetalol prn  - Given difficulty controlling blood pressure, sending off labs for secondary HTN workup to rule out pheochromocytoma and carcinoid tumor.   - Renal US negative for adrenal mass and renal artery stenosis   - Pending urine studies.   - Coags WNL  - DDAVP given prior to transfer for ASA hx     PT/OT, SLP   - high intensity therapy recommended   - Pureed diet and mod thick fluids

## 2025-05-11 NOTE — PROGRESS NOTES
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 male with a significant medical history of HLD, cervical and lumbar DDD, pre-diabetes who presents to the hospital as a transfer from [facility] for evaluation of R ICH. He acutely developed LSW and R gaze around 4 PM today (5/5/2025). CTH was obtained and showed a 3.3 x 2.4 cm R frontoparietal ICH w/mild vasogenic edema. The patient was given DDVAP as he takes ASA daily and transferred to [facility] for higher level of care, further evaluation.    On this exam the patient is lethargic and in need of intermittent stimulation to arouse to participate with exam. He opens his eyes to touch and voice and is noted to be doing a repetitive rubbing/picking motion to his chest with his RUE. Dysconjugate gaze. Withdraws from pain in the left sided extremities. NIHSS 14. Admitted to [facility].     EEG with abnormal brain activity --> focal slowing over the right anterior quadrant consistent with underlying subcortical dysfunction. CTH and MRI w/o contrast on 5/5 with increased size of hemorrhage and midline shift. NGT placed on 5/6 s/p SLP assessment failure.     - NGT removed as Pt tolerating diet   - Serial CTH imaging has been stable, NSGY signing off; no indication for intervention   - No longer requiring Intermittent 3% bolus', Maintaining goal of Na >140  - Pt likely ready for step down to Vascular Neurology   - Recommend MRI Brain w/ Contrast to further assess for underlying etiology of ICH when clinically appropriate  - Keep -150      Antithrombotics for secondary stroke prevention: Antiplatelets: None: Intracerebral Hemorrhage    Statins for secondary stroke prevention and hyperlipidemia, if present:   Statins: Not indicated in acute ICH however, LDL is 102.2. The patient is currently  prescribed atorvastatin 20 mg daily, may benefit from increasing to 40 mg daily.     Aggressive risk factor modification: HLD, sleep apnea     Rehab efforts: The patient has been evaluated by a stroke team provider and the therapy needs have been fully considered based off the presenting complaints and exam findings. The following therapy evaluations are needed: PT evaluate and treat, OT evaluate and treat, SLP evaluate and treat, PM&R evaluate for appropriate placement    Diagnostics ordered/pending: MRI head without contrast to assess brain parenchyma, TTE to assess cardiac function/status , Other: CTH prn    VTE prophylaxis: Mechanical prophylaxis: Place SCDs  None: Reason for No Pharmacological VTE Prophylaxis: History of systemic or intracranial bleeding    BP parameters: ICH: SBP Goal Range 130-150        Vasogenic brain edema  Mild vasogenic edema is noted on imaging in the territory of the R middle cerebral artery with associated mass effect. Initial CTH with no MLS. At this time the etiology of the ICH is unclear. He was not hypertensive on presentation and remains without elevated BP. No aneurysm or AVM noted on CTA head and neck. MRI Brain pending. Neurosurgery following  - See plan for Nontraumatic subcortical hemorrhage of right cerebral hemisphere        Dysarthria  Acute left-sided weakness   -Due to ICH. Therapy evals pending.    Acute left-sided weakness  Therapy following    Typical atrial flutter  -Stroke risk factor.  Sinus rhythm currently on tele and EKG.  -Not on AC  -TTE pending  -Continue tele    Dyslipidemia  -Vascular disease risk factor.  -Patient is prescribed Atorvastatin 20 mg daily. LDL is 102.2. Consider increasing atorvastatin to 40 mg daily.    Recent Labs     05/04/25 2024   CHOL 173   HDL 63   CHOLHDL 36.4   NONHDLCHOL 110   TOTALCHOLEST 2.7   TRIG 39   LDLCALC 102.2              No notes on file    STROKE DOCUMENTATION        NIH Scale:  1a. Level of Consciousness: 0-->Alert,  keenly responsive  1b. LOC Questions: 0-->Answers both questions correctly  1c. LOC Commands: 0-->Performs both tasks correctly  2. Best Gaze: 1-->Partial gaze palsy, gaze is abnormal in one or both eyes, but forced deviation or total gaze paresis is not present  3. Visual: 1-->Partial hemianopia  4. Facial Palsy: 2-->Partial paralysis (total or near-total paralysis of lower face)  5a. Motor Arm, Left: 4-->No movement  5b. Motor Arm, Right: 0-->No drift, limb holds 90 (or 45) degrees for full 10 secs  6a. Motor Leg, Left: 4-->No movement  6b. Motor Leg, Right: 0-->No drift, leg holds 30 degree position for full 5 secs  7. Limb Ataxia: 0-->Absent  8. Sensory: 2-->Severe to total sensory loss, patient is not aware of being touched in the face, arm, and leg  9. Best Language: 1-->Mild-to-moderate aphasia, some obvious loss of fluency or facility of comprehension, without significant limitation on ideas expressed or form of expression. Reduction of speech and/or comprehension, however, makes conversation. . . (see row details)  10. Dysarthria: 1-->Mild-to-moderate dysarthria, patient slurs at least some words and, at worst, can be understood with some difficulty  11. Extinction and Inattention (formerly Neglect): 1-->Visual, tactile, auditory, spatial, or personal inattention or extinction to bilateral simultaneous stimulation in one of the sensory modalities  Total (NIH Stroke Scale): 17       Modified Edwin Score: 1  [place] Coma Scale:    ABCD2 Score:    [id number] Score:   HAS -BLED Score:   ICH Score:1  Hunt & Hull Classification:        Neurologic Chief Complaint: ICH    Subjective:     Interval History: Patient is seen for follow-up neurological assessment and treatment recommendations: See hospital course    HPI, Past Medical, Family, and Social History remains the same as documented in the initial encounter.     Review of Systems   Constitutional:  Negative for fever.   Eyes:  Negative for discharge.    Respiratory:  Negative for apnea, cough and wheezing.    Cardiovascular:  Negative for leg swelling.   Gastrointestinal:  Negative for abdominal distention.   Neurological:  Positive for facial asymmetry, speech difficulty, weakness and numbness.   Psychiatric/Behavioral:  Negative for agitation, behavioral problems and decreased concentration.      Scheduled Meds:   artificial tears  1 drop Both Eyes BID    atorvastatin  20 mg Oral QHS    enoxparin  40 mg Subcutaneous Q24H (prophylaxis, 1700)    FLUoxetine  20 mg Oral Daily    hydrALAZINE  100 mg Oral Q8H    LIDOcaine  1 patch Transdermal Q24H    losartan  100 mg Oral Daily    senna-docusate  1 tablet Oral BID    silodosin  4 mg Oral Daily    triamcinolone acetonide 0.1%   Topical (Top) BID     Continuous Infusions:      PRN Meds:  Current Facility-Administered Medications:     acetaminophen, 650 mg, Oral, Q6H PRN    bisacodyL, 10 mg, Rectal, Daily PRN    hydrALAZINE, 10 mg, Intravenous, Q6H PRN    labetalol, 10 mg, Intravenous, Q4H PRN    magnesium oxide, 800 mg, Oral, PRN    magnesium oxide, 800 mg, Oral, PRN    methocarbamoL, 500 mg, Oral, TID PRN    ondansetron, 4 mg, Intravenous, Q8H PRN    potassium bicarbonate, 35 mEq, Oral, PRN    potassium bicarbonate, 50 mEq, Oral, PRN    potassium bicarbonate, 60 mEq, Oral, PRN    potassium, sodium phosphates, 2 packet, Oral, PRN    potassium, sodium phosphates, 2 packet, Oral, PRN    potassium, sodium phosphates, 2 packet, Oral, PRN    sodium chloride 0.9%, 10 mL, Intravenous, PRN    Objective:     Vital Signs (Most Recent):  Temp: 98.5 °F (36.9 °C) (05/11/25 1101)  Pulse: 68 (05/11/25 1201)  Resp: (!) 23 (05/11/25 1201)  BP: (!) 145/80 (05/11/25 1201)  SpO2: 95 % (05/11/25 1201)  BP Location: Left forearm    Vital Signs Range (Last 24H):  Temp:  [98 °F (36.7 °C)-99.1 °F (37.3 °C)]   Pulse:  [56-75]   Resp:  [11-25]   BP: (101-160)/(55-80)   SpO2:  [94 %-98 %]   BP Location: Left forearm       Physical Exam  Vitals  and nursing note reviewed.   HENT:      Head: Normocephalic and atraumatic.   Cardiovascular:      Rate and Rhythm: Normal rate.      Pulses: Normal pulses.   Pulmonary:      Effort: Pulmonary effort is normal. No respiratory distress.      Breath sounds: No wheezing.   Abdominal:      General: There is no distension.      Palpations: Abdomen is soft.   Musculoskeletal:         General: No swelling.      Right lower leg: No edema.      Left lower leg: No edema.   Skin:     General: Skin is warm and dry.      Capillary Refill: Capillary refill takes 2 to 3 seconds.   Neurological:      Mental Status: He is alert.      Comments: Mild left facial weakness  Right gaze preference  Left sided weakness/plegia, slightly increased tone  Left side tito-neglect  RUE/RLE 5/5 , follows commands                  Neurological Exam:   LOC: alert  Attention Span: Good   Language: Improving  Orientation: Person, Place, Time   Visual Fields: Full and Visual neglect  EOM (CN III, IV, VI): Gaze preference  right  Facial Movement (CN VII): Lower facial weakness on the Left  Motor: Arm left  Plegia 0/5  Leg left  Plegia 0/5  Arm right  Normal 5/5  Leg right Paresis: 3/5  Sensation: decreased on L    Laboratory:  All labs reviewd    Diagnostic Results   All imaging reviewed    Ruddy Medina MD  Comprehensive Stroke Center  Department of Vascular Neurology   [facility] - Neuro Critical Care

## 2025-05-11 NOTE — NURSING
1501: Pt not arousing to voice, requiring sternal rub to open eyes. Briskly following commands on L side, but not answering questions, unable to keep eyes open without sternal rub. Changes reported to team. Dr. Dial at bedside to assess, pt opened eyes spontaneously and nodded to questions. CT scan ordered STAT for changes.

## 2025-05-11 NOTE — EICU
Intervention Initiated From:  [facility] / ABHILASHU    Praveena intervened regarding:  Rounding (Video assessment)    VICU Night Rounds Checklist  24H Vital Sign Range:  Temp:  [97.7 °F (36.5 °C)-100.2 °F (37.9 °C)]   Pulse:  [59-72]   Resp:  [12-26]   BP: (122-169)/(59-81)   SpO2:  [96 %-99 %]     Video rounds

## 2025-05-11 NOTE — PLAN OF CARE
"[facility] Care Plan  POC reviewed with Colt Rose and family at 1400. Patient and Family verbalized understanding. Questions and concerns addressed. No acute events today. Pt progressing toward goals. Will continue to monitor. See below and flowsheets for full assessment and VS info.     - hypotensive episode today, see note  - 1L NS bolus given for hypotension  - CTH completed for altered neuro exam  - up in chair for ~2 h prior to hypotension  - pt tolerating diet well, increased appetite     Is this a stroke patient? no    Neuro:  [place] Coma Scale  Best Eye Response: 4-->(E4) spontaneous  Best Motor Response: 6-->(M6) obeys commands  Best Verbal Response: 5-->(V5) oriented  [place] Coma Scale Score: 15  Assessment Qualifiers: patient not sedated/intubated  Pupil PERRLA: yes     24 hr Temp:  [97.6 °F (36.4 °C)-99.1 °F (37.3 °C)]     CV:   Rhythm: normal sinus rhythm  BP goals:   SBP < 160  MAP > 65    Resp:      Oxygen Concentration (%): 21    Plan: N/A    GI/:     Diet/Nutrition Received: mechanical/dental soft  Last Bowel Movement: 05/10/25  Voiding Characteristics: external catheter    Intake/Output Summary (Last 24 hours) at 5/11/2025 1835  Last data filed at 5/11/2025 1501  Gross per 24 hour   Intake 1785.36 ml   Output 1175 ml   Net 610.36 ml     Unmeasured Output  Unmeasured Urine Occurrence: 1 (pt accidentally pulled off condom cath, bed saturated with urine)  Unmeasured Stool Occurrence: 1  Pad Count: 3    Labs/Accuchecks:  Recent Labs   Lab 05/11/25  0012   WBC 8.27   RBC 4.13*   HGB 13.0*   HCT 39.7*         Recent Labs   Lab 05/11/25  0012 05/11/25  0920   *  148* 147*   K 3.7  --    CO2 22*  --    *  --    BUN 36*  --    CREATININE 0.7  --    ALKPHOS 85  --    ALT 54*  --    AST 36  --    BILITOT 0.7  --       Recent Labs   Lab 05/05/25  0145   PROTIME 11.8   INR 1.1   APTT 22.6    No results for input(s): "CPK", "CPKMB", "TROPONINI", "MB" in the last 168 " hours.    Electrolytes: N/A - electrolytes WDL  Accuchecks: none    Gtts:      LDA/Wounds:    Mir Risk Assessment  Sensory Perception: 2-->very limited  Moisture: 3-->occasionally moist  Activity: 1-->bedfast  Mobility: 2-->very limited  Nutrition: 3-->adequate  Friction and Shear: 2-->potential problem  Mir Score: 13    Is your mir score 12 or less? no            Restraints:        WCTM

## 2025-05-11 NOTE — PLAN OF CARE
"[facility] Care Plan    POC reviewed with Colt Rose and family at 0300. Patient and Family verbalized understanding. Questions and concerns addressed. See below and flowsheets for full assessment and VS info.     - Honey thick liquids with meds crushed in apple sauce.  - Male purewick leaking (2 wet pads). Pt bathed and new condom cath applied.  - Pt's home CPAP applied overnight. O2 goals > 94%  - PRN robaxin given x1.      Is this a stroke patient? Yes - stroke booklet at bedside reviewed with patient and family/    Neuro:  [place]ader Coma Scale  Best Eye Response: 4-->(E4) spontaneous  Best Motor Response: 6-->(M6) obeys commands  Best Verbal Response: 5-->(V5) oriented  [place] Coma Scale Score: 15  Assessment Qualifiers: patient not sedated/intubated, no eye obstruction present  Pupil PERRLA: other (see comments) (JUDY)     24 hr Temp:  [97.7 °F (36.5 °C)-99.1 °F (37.3 °C)]     CV:   Rhythm: sinus bradycardia  BP goals:   SBP < 160  MAP > 65    Resp:      Oxygen Concentration (%): 21    Plan: Home CPAP at night. O2 goals > 94%    GI/:     Diet/Nutrition Received: mechanical/dental soft  Last Bowel Movement: 05/10/25  Voiding Characteristics: external catheter    Intake/Output Summary (Last 24 hours) at 5/11/2025 0431  Last data filed at 5/11/2025 0405  Gross per 24 hour   Intake 547.35 ml   Output 1075 ml   Net -527.65 ml     Unmeasured Output  Unmeasured Urine Occurrence: 1  Unmeasured Stool Occurrence: 1  Pad Count: 1    Labs/Accuchecks:  Recent Labs   Lab 05/11/25  0012   WBC 8.27   RBC 4.13*   HGB 13.0*   HCT 39.7*         Recent Labs   Lab 05/11/25  0012   *  148*   K 3.7   CO2 22*   *   BUN 36*   CREATININE 0.7   ALKPHOS 85   ALT 54*   AST 36   BILITOT 0.7      Recent Labs   Lab 05/05/25  0145   PROTIME 11.8   INR 1.1   APTT 22.6    No results for input(s): "CPK", "CPKMB", "TROPONINI", "MB" in the last 168 hours.    Electrolytes: Electrolytes replaced  Accuchecks: " none    Gtts:      LDA/Wounds:    Mir Risk Assessment  Sensory Perception: 2-->very limited  Moisture: 3-->occasionally moist  Activity: 1-->bedfast  Mobility: 2-->very limited  Nutrition: 3-->adequate  Friction and Shear: 2-->potential problem  Mir Score: 13  Is your mir score 12 or less? no

## 2025-05-11 NOTE — PLAN OF CARE
[facility] Care Plan  POC reviewed with Colt Rose and family at 1400. Patient and Family verbalized understanding. Questions and concerns addressed. No acute events today. Pt progressing toward goals. Will continue to monitor. See below and flowsheets for full assessment and VS info.     - 3% Hypertonic bolus given for Na of 145  - Pt up in cardiac chair from 1000 to 1500  - Increased appetite today, tolerating diet well with RN assistance  - PIV replaced  - 24 h urine collection completed at 1200      Is this a stroke patient? Yes      Neuro:  [place] Coma Scale  Best Eye Response: 4-->(E4) spontaneous  Best Motor Response: 6-->(M6) obeys commands  Best Verbal Response: 5-->(V5) oriented  [place] Coma Scale Score: 15  Assessment Qualifiers: patient not sedated/intubated  Pupil PERRLA: yes     24 hr Temp:  [97.7 °F (36.5 °C)-100.2 °F (37.9 °C)]     CV:   Rhythm: normal sinus rhythm  BP goals:   SBP < 160  MAP > 65    Resp:      Oxygen Concentration (%): 21    Plan: N/A    GI/:     Diet/Nutrition Received: other (see comments) (puree, honey thick)  Last Bowel Movement: 05/10/25  Voiding Characteristics: external catheter    Intake/Output Summary (Last 24 hours) at 5/10/2025 1948  Last data filed at 5/10/2025 1644  Gross per 24 hour   Intake 593.38 ml   Output 975 ml   Net -381.62 ml     Unmeasured Output  Unmeasured Urine Occurrence: 1  Unmeasured Stool Occurrence: 1  Pad Count: 1    Labs/Accuchecks:  Recent Labs   Lab 05/10/25  0027   WBC 9.35   RBC 4.28*   HGB 13.7*   HCT 43.4         Recent Labs   Lab 05/10/25  0027 05/10/25  1022 05/10/25  1558      < > 149*   K 3.8  --   --    CO2 18*  --   --    *  --   --    BUN 29*  --   --    CREATININE 0.6  --   --    ALKPHOS 70  --   --    ALT 32  --   --    AST 26  --   --    BILITOT 0.9  --   --     < > = values in this interval not displayed.      Recent Labs   Lab 05/05/25  0145   PROTIME 11.8   INR 1.1   APTT 22.6    No results for input(s):  ""CPK", "CPKMB", "TROPONINI", "MB" in the last 168 hours.    Electrolytes: N/A - electrolytes WDL  Accuchecks: none    Gtts:      LDA/Wounds:    Mir Risk Assessment  Sensory Perception: 2-->very limited  Moisture: 3-->occasionally moist  Activity: 1-->bedfast  Mobility: 2-->very limited  Nutrition: 3-->adequate  Friction and Shear: 2-->potential problem  Mir Score: 13    Is your mir score 12 or less? no            Restraints:        WCTM   "

## 2025-05-11 NOTE — EICU
Intervention Initiated From:  [facility] / EICU    Virtual ICU Quality Rounds    Admit Date: 5/4/2025  Hospital Day: 7    ICU Day: 6d 17h    24H Vital Sign Range:  Temp:  [98 °F (36.7 °C)-99.1 °F (37.3 °C)]   Pulse:  [56-75]   Resp:  [11-25]   BP: (101-160)/(55-80)   SpO2:  [94 %-98 %]     VICU Surveillance Screening

## 2025-05-11 NOTE — SUBJECTIVE & OBJECTIVE
Neurologic Chief Complaint: ICH    Subjective:     Interval History: Patient is seen for follow-up neurological assessment and treatment recommendations: See hospital course    HPI, Past Medical, Family, and Social History remains the same as documented in the initial encounter.     Review of Systems   Constitutional:  Negative for fever.   Eyes:  Negative for discharge.   Respiratory:  Negative for apnea, cough and wheezing.    Cardiovascular:  Negative for leg swelling.   Gastrointestinal:  Negative for abdominal distention.   Neurological:  Positive for facial asymmetry, speech difficulty, weakness and numbness.   Psychiatric/Behavioral:  Negative for agitation, behavioral problems and decreased concentration.      Scheduled Meds:   artificial tears  1 drop Both Eyes BID    atorvastatin  20 mg Oral QHS    enoxparin  40 mg Subcutaneous Q24H (prophylaxis, 1700)    FLUoxetine  20 mg Oral Daily    hydrALAZINE  100 mg Oral Q8H    LIDOcaine  1 patch Transdermal Q24H    losartan  100 mg Oral Daily    senna-docusate  1 tablet Oral BID    silodosin  4 mg Oral Daily    triamcinolone acetonide 0.1%   Topical (Top) BID     Continuous Infusions:      PRN Meds:  Current Facility-Administered Medications:     acetaminophen, 650 mg, Oral, Q6H PRN    bisacodyL, 10 mg, Rectal, Daily PRN    hydrALAZINE, 10 mg, Intravenous, Q6H PRN    labetalol, 10 mg, Intravenous, Q4H PRN    magnesium oxide, 800 mg, Oral, PRN    magnesium oxide, 800 mg, Oral, PRN    methocarbamoL, 500 mg, Oral, TID PRN    ondansetron, 4 mg, Intravenous, Q8H PRN    potassium bicarbonate, 35 mEq, Oral, PRN    potassium bicarbonate, 50 mEq, Oral, PRN    potassium bicarbonate, 60 mEq, Oral, PRN    potassium, sodium phosphates, 2 packet, Oral, PRN    potassium, sodium phosphates, 2 packet, Oral, PRN    potassium, sodium phosphates, 2 packet, Oral, PRN    sodium chloride 0.9%, 10 mL, Intravenous, PRN    Objective:     Vital Signs (Most Recent):  Temp: 98.5 °F (36.9 °C)  (05/11/25 1101)  Pulse: 68 (05/11/25 1201)  Resp: (!) 23 (05/11/25 1201)  BP: (!) 145/80 (05/11/25 1201)  SpO2: 95 % (05/11/25 1201)  BP Location: Left forearm    Vital Signs Range (Last 24H):  Temp:  [98 °F (36.7 °C)-99.1 °F (37.3 °C)]   Pulse:  [56-75]   Resp:  [11-25]   BP: (101-160)/(55-80)   SpO2:  [94 %-98 %]   BP Location: Left forearm       Physical Exam  Vitals and nursing note reviewed.   HENT:      Head: Normocephalic and atraumatic.   Cardiovascular:      Rate and Rhythm: Normal rate.      Pulses: Normal pulses.   Pulmonary:      Effort: Pulmonary effort is normal. No respiratory distress.      Breath sounds: No wheezing.   Abdominal:      General: There is no distension.      Palpations: Abdomen is soft.   Musculoskeletal:         General: No swelling.      Right lower leg: No edema.      Left lower leg: No edema.   Skin:     General: Skin is warm and dry.      Capillary Refill: Capillary refill takes 2 to 3 seconds.   Neurological:      Mental Status: He is alert.      Comments: Mild left facial weakness  Right gaze preference  Left sided weakness/plegia, slightly increased tone  Left side tito-neglect  RUE/RLE 5/5 , follows commands                  Neurological Exam:   LOC: alert  Attention Span: Good   Language: Improving  Orientation: Person, Place, Time   Visual Fields: Full and Visual neglect  EOM (CN III, IV, VI): Gaze preference  right  Facial Movement (CN VII): Lower facial weakness on the Left  Motor: Arm left  Plegia 0/5  Leg left  Plegia 0/5  Arm right  Normal 5/5  Leg right Paresis: 3/5  Sensation: decreased on L    Laboratory:  All labs reviewd    Diagnostic Results   All imaging reviewed

## 2025-05-11 NOTE — PROGRESS NOTES
[facility] - [facility]  Neurosurgery  Progress Note    Subjective:     History of Present Illness: 69-year-old male presents to the ER with left-sided weakness and right-sided gaze preference that began abruptly this afternoon around 4:00 p.m.. At OSH found to have R frontal lobar ICH. Given ddAVP for reversal.    Post-Op Info:  * No surgery found *       Interval History: 5/10: exam stable, nsgy will sign off    Medications:  Continuous Infusions:  Scheduled Meds:   artificial tears  1 drop Both Eyes BID    atorvastatin  20 mg Oral QHS    enoxparin  40 mg Subcutaneous Q24H (prophylaxis, 1700)    hydrALAZINE  100 mg Oral Q8H    LIDOcaine  1 patch Transdermal Q24H    losartan  100 mg Oral Daily    senna-docusate  1 tablet Oral BID    silodosin  4 mg Oral Daily    triamcinolone acetonide 0.1%   Topical (Top) BID     PRN Meds:  Current Facility-Administered Medications:     acetaminophen, 650 mg, Oral, Q6H PRN    bisacodyL, 10 mg, Rectal, Daily PRN    hydrALAZINE, 10 mg, Intravenous, Q6H PRN    labetalol, 10 mg, Intravenous, Q4H PRN    magnesium oxide, 800 mg, Oral, PRN    magnesium oxide, 800 mg, Oral, PRN    methocarbamoL, 500 mg, Oral, TID PRN    ondansetron, 4 mg, Intravenous, Q8H PRN    potassium bicarbonate, 35 mEq, Oral, PRN    potassium bicarbonate, 50 mEq, Oral, PRN    potassium bicarbonate, 60 mEq, Oral, PRN    potassium, sodium phosphates, 2 packet, Oral, PRN    potassium, sodium phosphates, 2 packet, Oral, PRN    potassium, sodium phosphates, 2 packet, Oral, PRN    sodium chloride 0.9%, 10 mL, Intravenous, PRN    sodium chloride 3% HYPERTONIC, 250 mL, Intravenous, Q8H PRN     Review of Systems  Objective:     Weight: 88.5 kg (195 lb 1.7 oz)  Body mass index is 27.99 kg/m².  Vital Signs (Most Recent):  Temp: 98.8 °F (37.1 °C) (05/11/25 0005)  Pulse: (!) 57 (05/11/25 0205)  Resp: 14 (05/11/25 0205)  BP: (!) 101/58 (05/11/25 0205)  SpO2: 97 % (05/11/25 0205) Vital Signs (24h Range):  Temp:  [97.7 °F  "(36.5 °C)-100.2 °F (37.9 °C)] 98.8 °F (37.1 °C)  Pulse:  [56-75] 57  Resp:  [12-26] 14  SpO2:  [94 %-98 %] 97 %  BP: (101-156)/(55-73) 101/58                Oxygen Concentration (%):  [21] 21        Male External Urinary Catheter 05/09/25 0855 Small (Active)   Collection Container Suction canister 05/11/25 0105   Securement Method secured to top of thigh w/ adhesive device 05/11/25 0105   Skin no redness;no breakdown;penis/scrotum cleansed w/ soap and water 05/11/25 0105   Tolerance no signs/symptoms of discomfort 05/11/25 0105   Output (mL) 500 mL 05/10/25 2305   Catheter Change Date 05/10/25 05/11/25 0105   Catheter Change Time 1905 05/11/25 0105          Physical Exam         Neurosurgery Physical Exam    GENERAL: resting comfortably  HEENT: NCAT, PERRL, mucous membranes moist  NECK: supple, trachea midline  CV: normal capillary refill  PULM: aerating well, symmetric expansion, no distress  ABD: soft, NT, ND  EXT: no c/c/e     NEURO:     AAO x 3   CN II-XII grossly intact  RUE/RLE: fc full strength  LUE/LLE: plegic  Decreased sensation L hemibody    Significant Labs:  Recent Labs   Lab 05/10/25  0027 05/10/25  1022 05/10/25  1558 05/11/25  0012   *  --   --  122*    145 149* 147*  148*   K 3.8  --   --  3.7   *  --   --  115*   CO2 18*  --   --  22*   BUN 29*  --   --  36*   CREATININE 0.6  --   --  0.7   CALCIUM 8.4*  --   --  8.9   MG 2.1  --   --  2.3     Recent Labs   Lab 05/10/25  0027 05/11/25  0012   WBC 9.35 8.27   HGB 13.7* 13.0*   HCT 43.4 39.7*    218     No results for input(s): "LABPT", "INR", "APTT" in the last 48 hours.  Microbiology Results (last 7 days)       ** No results found for the last 168 hours. **          All pertinent labs from the last 24 hours have been reviewed.    Significant Diagnostics:  CT: No results found in the last 24 hours.  MRI: No results found in the last 24 hours.  Assessment/Plan:     * Nontraumatic cortical hemorrhage of right cerebral " hemisphere  69-year-old male presents to the ER with left-sided weakness and right-sided gaze preference that began abruptly this afternoon around 4:00 p.m.. At OSH found to have R frontal lobar ICH. Given ddAVP for reversal.    ICHS 0    MRI wo 5/4: swi looks larger , there is IVH now too  CTH 5/5: looks same as MRI , larger and IVH  CTH 5/6: stable  CTH 5/9: stable    --Patient admitted to [facility] on telemetry      -q1h neurochecks in ICU, q2h neurochecks in stepdown, q4h neurochecks on floor  --All labs and diagnostics reviewed  ---150  --Na >145  --Keppra 500 BID   --HOB >30  --No acute neurosurgical intervention at this time, NSGY will sign off    Dispo: per ICU    Plan d/w Dr. Gross and on call staff Dr. Christ Francisco MD  Neurosurgery  [facility] - Neuro Critical Care

## 2025-05-11 NOTE — NURSING
"At 1316, pt's wife called me into the room saying "something's not right". Pt was up in chair facing the window; reported feeling nauseous, dizzy; BP taken and was 111/56 with MAP of 77. HR 59, O2 94%, pt slightly tachypneic w/ RR of 27. Pt turned pale, sweating profusely, BP on recheck was 78/48 with MAP of 54. Laid patient flat in chair and called team to report changes; pt moved back to bed w/ assist from 3xRNs. 1L bolus ordered and started by second RN. Pt BP after moving to bed 96/54 with a MAP of 66. Pt reported feeling better, color improved, no sweating noted. WCTM.   "

## 2025-05-11 NOTE — NURSING
Travelled to CT scan at 1521 via hospital bed. Transported by RN along with monitoring. Pt tolerated scan well, returned to room at 1540, pt resituated in bed and resting comfortably.

## 2025-05-11 NOTE — PROGRESS NOTES
[facility] - Neuro Critical Care  Neurocritical Care  Progress Note    Admit Date: 5/4/2025  Service Date: 05/11/2025  Length of Stay: 7    Subjective:     Chief Complaint: Nontraumatic cortical hemorrhage of right cerebral hemisphere    History of Present Illness: 68 yo M, PMH SVT/ablation, HLD, presents as transfer for Right lobar hemorhage, He originaly presented to OSH ER with left-sided weakness and right-sided gaze preference that began abruptly this afternoon around 4:00 p.m.. CTH with right parietal IPH, CTA without AVM/abnormalities. ASA hx, Given ddAVP for reversal. [place] [facility] [place]     Hospital Course: 05/05/2025 CTH this AM stable when compared to MRI. EEG without evidence of seizures. Will hold off AEDs for now. Goal of SBP <150 and Na >140. No NSGY interventions. Will repeat CTH tomorrow AM.   05/06/2025 Repeat CTH shows increased edema with increased midline shift. Increase sodium goal to >145. Amlodipine added for BP support. Patient with flushing after amlodipine given. Hydroxyzine and IV pepcid given as patient reports possible allergy to bendaryl in the past. Losartan added instead. NG tube placed and started tube feeds.  05/07/2025 Repeat CTH head was stable. Blood pressure medication changes for optimization.   05/08/2025 Patient removed CPAP overnight and as a result, the NG tube was also removed. SLP okay with crushed meds on evaluation. Will hold off on replacing NG tube.   05/09/2025 Fever of 101.2 overnight, resolved with tylenol. Adjusting BP meds for better control. Labs and imaging to rule out carcinoid tumor and pheochromocytoma given recurring flushing and difficult to control BP. Speech cleared for pureed diet. Repeat CTH 5/9, improved midline shift.  05/10/2025 No events overnight.   05/11/2025 No events overnight, has discomfort with neck. Admits anxiety and sadness about his current illness      Interval History:  See ED course  Objective:      Vitals:  Temp: 98.5 °F (36.9 °C)  Pulse: 68  Rhythm: normal sinus rhythm  BP: (!) 145/80  MAP (mmHg): 107  Resp: (!) 23  SpO2: 95 %  Oxygen Concentration (%): 21    Temp  Min: 98 °F (36.7 °C)  Max: 99.1 °F (37.3 °C)  Pulse  Min: 56  Max: 75  BP  Min: 101/58  Max: 160/75  MAP (mmHg)  Min: 72  Max: 110  Resp  Min: 11  Max: 25  SpO2  Min: 94 %  Max: 98 %  Oxygen Concentration (%)  Min: 21  Max: 21    05/10 0701 - 05/11 0700  In: 807.4 [P.O.:478; I.V.:329.4]  Out: 1075 [Urine:1075]   Unmeasured Output  Unmeasured Urine Occurrence: 1 (pt accidentally pulled off condom cath, bed saturated with urine)  Unmeasured Stool Occurrence: 1  Pad Count: 3        Physical Exam  Vitals and nursing note reviewed.   Cardiovascular:      Rate and Rhythm: Normal rate and regular rhythm.      Pulses: Normal pulses.      Heart sounds: Normal heart sounds.   Pulmonary:      Effort: Pulmonary effort is normal.      Breath sounds: Normal breath sounds.   Abdominal:      General: Bowel sounds are normal.      Palpations: Abdomen is soft.   Skin:     General: Skin is warm and dry.      Capillary Refill: Capillary refill takes 2 to 3 seconds.   Neurological:      Comments: E4 V5 M6  Perrla, Open eyes spontaneously, right gaze preference though does cross midline with direction.   Quickly to respond to questions today and able to verbally communicate. However, after some time, responses become a bit delayed.  Left sided weakness/plegia, slightly increased tone  Left side tito-neglect, improving. Able to identify, point to, and reach for his left arm correctly.   Now reporting sensation to LLE, new compared to prior.  RUE/RLE 5/5 , follows commands                Medications:  Continuous Scheduledartificial tears, 1 drop, BID  atorvastatin, 20 mg, QHS  enoxparin, 40 mg, Q24H (prophylaxis, 1700)  FLUoxetine, 20 mg, Daily  hydrALAZINE, 100 mg, Q8H  LIDOcaine, 1 patch, Q24H  losartan, 100 mg, Daily  senna-docusate, 1 tablet, BID  silodosin, 4 mg,  Daily  triamcinolone acetonide 0.1%, , BID    PRNacetaminophen, 650 mg, Q6H PRN  bisacodyL, 10 mg, Daily PRN  hydrALAZINE, 10 mg, Q6H PRN  labetalol, 10 mg, Q4H PRN  magnesium oxide, 800 mg, PRN  magnesium oxide, 800 mg, PRN  methocarbamoL, 500 mg, TID PRN  ondansetron, 4 mg, Q8H PRN  potassium bicarbonate, 35 mEq, PRN  potassium bicarbonate, 50 mEq, PRN  potassium bicarbonate, 60 mEq, PRN  potassium, sodium phosphates, 2 packet, PRN  potassium, sodium phosphates, 2 packet, PRN  potassium, sodium phosphates, 2 packet, PRN  sodium chloride 0.9%, 10 mL, PRN      Today I personally reviewed pertinent medications, lines/drains/airways, imaging, laboratory results, notably:    Diet  Diet Pureed (IDDSI Level 4) Honey Thick (Moderately Thick)  Diet Pureed (IDDSI Level 4) Honey Thick (Moderately Thick)        Assessment/Plan:     Neuro  * Nontraumatic cortical hemorrhage of right cerebral hemisphere  - CTH/CTA with right parietal IPH, no AVM/malformation   - [facility] Neuro Following  - NSGY following   - Repeat CTH with edema expansion and worsening midline shift. Increased sodium goals.   - Repeat CTH 5/7 stable.    - Repeat CTH 5/9, improved midline shift.  - Reduced Na goal to > 140 to decrease risk of edema.   - Sodium q12h  - MRI brain without evidence of amyloid.   - neuro checks q4h  - SBP < 160   - losartan 100mg qd   - hydralazine 100mg TID   - hydralazine and labetalol prn  - Given difficulty controlling blood pressure, sending off labs for secondary HTN workup to rule out pheochromocytoma and carcinoid tumor.   - Renal US negative for adrenal mass and renal artery stenosis   - Pending urine studies.   - Coags WNL  - DDAVP given prior to transfer for ASA hx     PT/OT, SLP   - high intensity therapy recommended   - Pureed diet and mod thick fluids    Vasogenic brain edema  See ICH    Seizure  - Left hand twitching on arrival, did appear rhymic. Resolved at this time.  - EEG without evidence of seizure.  - Holding AEDs  at this time.    Dysarthria  SLP consulted   - Pureed diet     Acute left-sided weakness  PT/OT    Cardiac/Vascular  Dyslipidemia  Resume statin     Renal/  Urinary retention  Added silodosin to assist with symptoms.           The patient is being Prophylaxed for:  Venous Thromboembolism with: Chemical  Stress Ulcer with: H2B  Ventilator Pneumonia with: not applicable    Activity Orders            Diet Pureed (IDDSI Level 4) Honey Thick (Moderately Thick): Pureed starting at 05/09 1158    Elevate HOB Elevate HOB 30-45 degrees during feeding unless contraindicated starting at 05/06 0903    Turn patient starting at 05/04 2000    Elevate HOB starting at 05/04 1920          Full Code    Prasanna Knight, DO  Neurocritical Care  [facility] - Neuro Critical Care

## 2025-05-11 NOTE — SUBJECTIVE & OBJECTIVE
Interval History: 5/10: exam stable, nsgy will sign off    Medications:  Continuous Infusions:  Scheduled Meds:   artificial tears  1 drop Both Eyes BID    atorvastatin  20 mg Oral QHS    enoxparin  40 mg Subcutaneous Q24H (prophylaxis, 1700)    hydrALAZINE  100 mg Oral Q8H    LIDOcaine  1 patch Transdermal Q24H    losartan  100 mg Oral Daily    senna-docusate  1 tablet Oral BID    silodosin  4 mg Oral Daily    triamcinolone acetonide 0.1%   Topical (Top) BID     PRN Meds:  Current Facility-Administered Medications:     acetaminophen, 650 mg, Oral, Q6H PRN    bisacodyL, 10 mg, Rectal, Daily PRN    hydrALAZINE, 10 mg, Intravenous, Q6H PRN    labetalol, 10 mg, Intravenous, Q4H PRN    magnesium oxide, 800 mg, Oral, PRN    magnesium oxide, 800 mg, Oral, PRN    methocarbamoL, 500 mg, Oral, TID PRN    ondansetron, 4 mg, Intravenous, Q8H PRN    potassium bicarbonate, 35 mEq, Oral, PRN    potassium bicarbonate, 50 mEq, Oral, PRN    potassium bicarbonate, 60 mEq, Oral, PRN    potassium, sodium phosphates, 2 packet, Oral, PRN    potassium, sodium phosphates, 2 packet, Oral, PRN    potassium, sodium phosphates, 2 packet, Oral, PRN    sodium chloride 0.9%, 10 mL, Intravenous, PRN    sodium chloride 3% HYPERTONIC, 250 mL, Intravenous, Q8H PRN     Review of Systems  Objective:     Weight: 88.5 kg (195 lb 1.7 oz)  Body mass index is 27.99 kg/m².  Vital Signs (Most Recent):  Temp: 98.8 °F (37.1 °C) (05/11/25 0005)  Pulse: (!) 57 (05/11/25 0205)  Resp: 14 (05/11/25 0205)  BP: (!) 101/58 (05/11/25 0205)  SpO2: 97 % (05/11/25 0205) Vital Signs (24h Range):  Temp:  [97.7 °F (36.5 °C)-100.2 °F (37.9 °C)] 98.8 °F (37.1 °C)  Pulse:  [56-75] 57  Resp:  [12-26] 14  SpO2:  [94 %-98 %] 97 %  BP: (101-156)/(55-73) 101/58                Oxygen Concentration (%):  [21] 21        Male External Urinary Catheter 05/09/25 0855 Small (Active)   Collection Container Suction canister 05/11/25 0105   Securement Method secured to top of thigh w/ adhesive  "device 05/11/25 0105   Skin no redness;no breakdown;penis/scrotum cleansed w/ soap and water 05/11/25 0105   Tolerance no signs/symptoms of discomfort 05/11/25 0105   Output (mL) 500 mL 05/10/25 2305   Catheter Change Date 05/10/25 05/11/25 0105   Catheter Change Time 1905 05/11/25 0105          Physical Exam         Neurosurgery Physical Exam    GENERAL: resting comfortably  HEENT: NCAT, PERRL, mucous membranes moist  NECK: supple, trachea midline  CV: normal capillary refill  PULM: aerating well, symmetric expansion, no distress  ABD: soft, NT, ND  EXT: no c/c/e     NEURO:     AAO x 3   CN II-XII grossly intact  RUE/RLE: fc full strength  LUE/LLE: plegic  Decreased sensation L hemibody    Significant Labs:  Recent Labs   Lab 05/10/25  0027 05/10/25  1022 05/10/25  1558 05/11/25  0012   *  --   --  122*    145 149* 147*  148*   K 3.8  --   --  3.7   *  --   --  115*   CO2 18*  --   --  22*   BUN 29*  --   --  36*   CREATININE 0.6  --   --  0.7   CALCIUM 8.4*  --   --  8.9   MG 2.1  --   --  2.3     Recent Labs   Lab 05/10/25  0027 05/11/25  0012   WBC 9.35 8.27   HGB 13.7* 13.0*   HCT 43.4 39.7*    218     No results for input(s): "LABPT", "INR", "APTT" in the last 48 hours.  Microbiology Results (last 7 days)       ** No results found for the last 168 hours. **          All pertinent labs from the last 24 hours have been reviewed.    Significant Diagnostics:  CT: No results found in the last 24 hours.  MRI: No results found in the last 24 hours.  "

## 2025-05-12 PROBLEM — R45.89 SADNESS: Status: ACTIVE | Noted: 2025-05-12

## 2025-05-12 LAB
ABSOLUTE EOSINOPHIL (OHS): 0.07 K/UL
ABSOLUTE MONOCYTE (OHS): 0.84 K/UL (ref 0.3–1)
ABSOLUTE NEUTROPHIL COUNT (OHS): 5.24 K/UL (ref 1.8–7.7)
ALBUMIN SERPL BCP-MCNC: 2.9 G/DL (ref 3.5–5.2)
ALP SERPL-CCNC: 83 UNIT/L (ref 40–150)
ALT SERPL W/O P-5'-P-CCNC: 72 UNIT/L (ref 10–44)
ANION GAP (OHS): 10 MMOL/L (ref 8–16)
AST SERPL-CCNC: 43 UNIT/L (ref 11–45)
BASOPHILS # BLD AUTO: 0.04 K/UL
BASOPHILS NFR BLD AUTO: 0.5 %
BILIRUB SERPL-MCNC: 0.6 MG/DL (ref 0.1–1)
BUN SERPL-MCNC: 34 MG/DL (ref 8–23)
CALCIUM SERPL-MCNC: 8.8 MG/DL (ref 8.7–10.5)
CHLORIDE SERPL-SCNC: 113 MMOL/L (ref 95–110)
CO2 SERPL-SCNC: 24 MMOL/L (ref 23–29)
CREAT SERPL-MCNC: 0.7 MG/DL (ref 0.5–1.4)
ERYTHROCYTE [DISTWIDTH] IN BLOOD BY AUTOMATED COUNT: 13.3 % (ref 11.5–14.5)
GFR SERPLBLD CREATININE-BSD FMLA CKD-EPI: >60 ML/MIN/1.73/M2
GLUCOSE SERPL-MCNC: 108 MG/DL (ref 70–110)
HCT VFR BLD AUTO: 40.3 % (ref 40–54)
HGB BLD-MCNC: 13 GM/DL (ref 14–18)
IMM GRANULOCYTES # BLD AUTO: 0.03 K/UL (ref 0–0.04)
IMM GRANULOCYTES NFR BLD AUTO: 0.4 % (ref 0–0.5)
LYMPHOCYTES # BLD AUTO: 1.68 K/UL (ref 1–4.8)
MAGNESIUM SERPL-MCNC: 2.3 MG/DL (ref 1.6–2.6)
MCH RBC QN AUTO: 31.2 PG (ref 27–31)
MCHC RBC AUTO-ENTMCNC: 32.3 G/DL (ref 32–36)
MCV RBC AUTO: 97 FL (ref 82–98)
NUCLEATED RBC (/100WBC) (OHS): 0 /100 WBC
PHOSPHATE SERPL-MCNC: 3.7 MG/DL (ref 2.7–4.5)
PLATELET # BLD AUTO: 211 K/UL (ref 150–450)
PMV BLD AUTO: 10.4 FL (ref 9.2–12.9)
POTASSIUM SERPL-SCNC: 4 MMOL/L (ref 3.5–5.1)
PROT SERPL-MCNC: 6.7 GM/DL (ref 6–8.4)
RBC # BLD AUTO: 4.17 M/UL (ref 4.6–6.2)
RELATIVE EOSINOPHIL (OHS): 0.9 %
RELATIVE LYMPHOCYTE (OHS): 21.3 % (ref 18–48)
RELATIVE MONOCYTE (OHS): 10.6 % (ref 4–15)
RELATIVE NEUTROPHIL (OHS): 66.3 % (ref 38–73)
SODIUM SERPL-SCNC: 144 MMOL/L (ref 136–145)
SODIUM SERPL-SCNC: 146 MMOL/L (ref 136–145)
SODIUM SERPL-SCNC: 147 MMOL/L (ref 136–145)
WBC # BLD AUTO: 7.9 K/UL (ref 3.9–12.7)

## 2025-05-12 PROCEDURE — 85025 COMPLETE CBC W/AUTO DIFF WBC: CPT | Performed by: NURSE PRACTITIONER

## 2025-05-12 PROCEDURE — 92507 TX SP LANG VOICE COMM INDIV: CPT

## 2025-05-12 PROCEDURE — 97110 THERAPEUTIC EXERCISES: CPT

## 2025-05-12 PROCEDURE — 99233 SBSQ HOSP IP/OBS HIGH 50: CPT | Mod: ,,, | Performed by: PSYCHIATRY & NEUROLOGY

## 2025-05-12 PROCEDURE — 84100 ASSAY OF PHOSPHORUS: CPT | Performed by: NURSE PRACTITIONER

## 2025-05-12 PROCEDURE — 63600175 PHARM REV CODE 636 W HCPCS

## 2025-05-12 PROCEDURE — 84295 ASSAY OF SERUM SODIUM: CPT | Performed by: PHYSICIAN ASSISTANT

## 2025-05-12 PROCEDURE — 92526 ORAL FUNCTION THERAPY: CPT

## 2025-05-12 PROCEDURE — 97535 SELF CARE MNGMENT TRAINING: CPT

## 2025-05-12 PROCEDURE — 25000003 PHARM REV CODE 250: Performed by: PHYSICIAN ASSISTANT

## 2025-05-12 PROCEDURE — 25000003 PHARM REV CODE 250

## 2025-05-12 PROCEDURE — 97530 THERAPEUTIC ACTIVITIES: CPT

## 2025-05-12 PROCEDURE — 83735 ASSAY OF MAGNESIUM: CPT | Performed by: NURSE PRACTITIONER

## 2025-05-12 PROCEDURE — 11000001 HC ACUTE MED/SURG PRIVATE ROOM

## 2025-05-12 PROCEDURE — 25000003 PHARM REV CODE 250: Performed by: PSYCHIATRY & NEUROLOGY

## 2025-05-12 PROCEDURE — 80053 COMPREHEN METABOLIC PANEL: CPT | Performed by: NURSE PRACTITIONER

## 2025-05-12 PROCEDURE — 99233 SBSQ HOSP IP/OBS HIGH 50: CPT | Mod: GC,,, | Performed by: STUDENT IN AN ORGANIZED HEALTH CARE EDUCATION/TRAINING PROGRAM

## 2025-05-12 PROCEDURE — 94761 N-INVAS EAR/PLS OXIMETRY MLT: CPT

## 2025-05-12 PROCEDURE — 63600175 PHARM REV CODE 636 W HCPCS: Performed by: PSYCHIATRY & NEUROLOGY

## 2025-05-12 RX ADMIN — HYDRALAZINE HYDROCHLORIDE 100 MG: 50 TABLET ORAL at 06:05

## 2025-05-12 RX ADMIN — HYDRALAZINE HYDROCHLORIDE 100 MG: 50 TABLET ORAL at 10:05

## 2025-05-12 RX ADMIN — ATORVASTATIN CALCIUM 20 MG: 20 TABLET, FILM COATED ORAL at 10:05

## 2025-05-12 RX ADMIN — HYPROMELLOSE 2910 1 DROP: 5 SOLUTION OPHTHALMIC at 09:05

## 2025-05-12 RX ADMIN — LABETALOL HYDROCHLORIDE 10 MG: 5 INJECTION, SOLUTION INTRAVENOUS at 01:05

## 2025-05-12 RX ADMIN — SILODOSIN 4 MG: 4 CAPSULE ORAL at 09:05

## 2025-05-12 RX ADMIN — LOSARTAN POTASSIUM 100 MG: 50 TABLET, FILM COATED ORAL at 09:05

## 2025-05-12 RX ADMIN — TRIAMCINOLONE ACETONIDE: 1 OINTMENT TOPICAL at 09:05

## 2025-05-12 RX ADMIN — LIDOCAINE 1 PATCH: 50 PATCH CUTANEOUS at 02:05

## 2025-05-12 RX ADMIN — ENOXAPARIN SODIUM 40 MG: 40 INJECTION SUBCUTANEOUS at 05:05

## 2025-05-12 RX ADMIN — SENNOSIDES AND DOCUSATE SODIUM 1 TABLET: 50; 8.6 TABLET ORAL at 10:05

## 2025-05-12 RX ADMIN — FLUOXETINE HYDROCHLORIDE 20 MG: 20 CAPSULE ORAL at 09:05

## 2025-05-12 RX ADMIN — HYDRALAZINE HYDROCHLORIDE 100 MG: 50 TABLET ORAL at 02:05

## 2025-05-12 NOTE — PT/OT/SLP PROGRESS
Speech Language Pathology Treatment    Patient Name:  Colt Rose   MRN:  [id number]  [id number]    Admitting 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    Recommendations:                 General Recommendations:  Dysphagia therapy, Speech/language therapy, and Cognitive-linguistic therapy  Diet recommendations:  Puree Diet - IDDSI Level 4, Liquid Diet Level: Moderately thick/Honey thick liquids - IDDSI Level 3   Aspiration Precautions:   1 bite/sip at a time  Alternating bites/sips-final presentation to be puree when able  Assistance with meals and Assistance with thickening liquids  Check for pocketing/oral residue  Double swallow with each bite/sip  Eliminate distractions  Feed only when awake/alert   HOB to 90 degrees   Ice chips sparingly  Meds crushed in puree  Puree for pleasure  Small bites/sips  Strict aspiration precautions   General Precautions: Standard, aspiration, fall, pureed diet, honey thick  Communication strategies:  provide increased time to answer and go to room if call light pushed    Assessment:     Colt Rose is a 69 y.o. male with an SLP diagnosis of Dysphagia, Dysarthria, Cognitive-Linguistic Impairment, and Visio-Spatial Impairment. ST will continue to follow.     Subjective     Patient awake and cooperative. Wife at bedside.     Respiratory Status: Room air    Objective:     Has the patient been evaluated by SLP for swallowing?   Yes  Keep patient NPO? No     Swallowing: Patient seen with puree diet and honey thick liquid breakfast tray. Wife at bedside reports adequate tolerance of diet with increased intake this AM. Patient with no overt s/s of aspiration with all trials. Effortful swallowing exercise introduced. Patient demonstrated understanding. SLP encouraged patient to complete 3 sets of 10 effortful swallows 3x daily. Further exercises to be added as patient presents with consistent independent completion of effortful swallowing exercise.  SLP reviewed MBSS results, recommendations, SLP role, safe swallowing precautions, and importance of independent completion of swallowing exercises. Patient and patient's wife verbalized understanding and are in agreement with POC.    Speech/language/cognition: Patient with continued left gaze preference requiring max cues to attend to SLP closer to midline. Minimal responses to SLP and spontaneous verbal output. He reports speaking is effortful and he is tired of being in the hospital. Per wife, patient with significant difficulty with processing multiple questions/directions at a time. Patient benefits from 1 step commands. Delayed processing appreciated. Patient denies word finding difficulties. SLP reviewed SLP role and goals. Patient in agreement with POC. All questions addressed within SLP scope. ST will continue to follow.     Goals:   Multidisciplinary Problems       SLP Goals          Problem: SLP    Goal Priority Disciplines Outcome   SLP Goal     SLP Progressing   Description: Goals due 5/12  1.  Pt. Will participate in ongoing assessment of swallow at bedside -met  2.  Pt. Will participate in speech, language and cognitive assessment -met    Short Term Goals:   1. Pt will tolerate pureed diet and honey thick liquids with aspiration precautions in place and no overt s/s of aspiration.  2. Pt will complete dysphagia therapy exercises give min cues.   3. Pt will follow multi-unit commands with 75% acc given 1 repetition.   4. Pt will attend to therapist past midline on left side for 1+ minute.  5. Pt will verbalize/demonstrate speech strategies in conversation.                        Plan:     Patient to be seen:  4 x/week   Plan of Care expires:  06/02/25  Plan of Care reviewed with:  patient, spouse   SLP Follow-Up:  Yes       Discharge recommendations:  High Intensity Therapy   Barriers to Discharge:  Level of Skilled Assistance Needed      Time Tracking:     SLP Treatment Date:   05/12/25  Speech Start  Time:  0753  Speech Stop Time:  0819     Speech Total Time (min):  26 min    Billable Minutes: Speech Therapy Individual 8, Treatment Swallowing Dysfunction 10, and Self Care/Home Management Training 8    05/12/2025

## 2025-05-12 NOTE — SUBJECTIVE & OBJECTIVE
Neurologic Chief Complaint: ICH    Subjective:     Interval History: Patient is seen for follow-up neurological assessment and treatment recommendations: see A&P    HPI, Past Medical, Family, and Social History remains the same as documented in the initial encounter.     Review of Systems   Constitutional:  Negative for fever.   Eyes:  Negative for discharge.   Respiratory:  Negative for apnea, cough and wheezing.    Cardiovascular:  Negative for leg swelling.   Gastrointestinal:  Negative for abdominal distention.   Neurological:  Positive for facial asymmetry, speech difficulty, weakness and numbness.   Psychiatric/Behavioral:  Negative for agitation, behavioral problems and decreased concentration.      Scheduled Meds:   artificial tears  1 drop Both Eyes BID    atorvastatin  20 mg Oral QHS    enoxparin  40 mg Subcutaneous Q24H (prophylaxis, 1700)    FLUoxetine  20 mg Oral Daily    hydrALAZINE  100 mg Oral Q8H    LIDOcaine  1 patch Transdermal Q24H    losartan  100 mg Oral Daily    senna-docusate  1 tablet Oral BID    silodosin  4 mg Oral Daily    triamcinolone acetonide 0.1%   Topical (Top) BID     Continuous Infusions:  PRN Meds:  Current Facility-Administered Medications:     acetaminophen, 650 mg, Oral, Q6H PRN    bisacodyL, 10 mg, Rectal, Daily PRN    hydrALAZINE, 10 mg, Intravenous, Q6H PRN    labetalol, 10 mg, Intravenous, Q4H PRN    methocarbamoL, 500 mg, Oral, TID PRN    ondansetron, 4 mg, Intravenous, Q8H PRN    sodium chloride 0.9%, 10 mL, Intravenous, PRN    Objective:     Vital Signs (Most Recent):  Temp: 98.5 °F (36.9 °C) (05/12/25 1102)  Pulse: 69 (05/12/25 1240)  Resp: 18 (05/12/25 1240)  BP: 137/70 (05/12/25 1240)  SpO2: 97 % (05/12/25 1240)  BP Location: Left arm    Vital Signs Range (Last 24H):  Temp:  [97.8 °F (36.6 °C)-98.5 °F (36.9 °C)]   Pulse:  [56-75]   Resp:  [11-26]   BP: (125-168)/(58-80)   SpO2:  [95 %-98 %]   BP Location: Left arm       Physical Exam  Vitals and nursing note  reviewed.   HENT:      Head: Normocephalic and atraumatic.   Cardiovascular:      Rate and Rhythm: Normal rate.      Pulses: Normal pulses.   Pulmonary:      Effort: Pulmonary effort is normal. No respiratory distress.      Breath sounds: No wheezing.   Abdominal:      General: There is no distension.      Palpations: Abdomen is soft.   Musculoskeletal:         General: No swelling.      Right lower leg: No edema.      Left lower leg: No edema.   Skin:     General: Skin is warm and dry.      Capillary Refill: Capillary refill takes 2 to 3 seconds.   Neurological:      Mental Status: He is alert.              Neurological Exam:   LOC: alert  Attention Span: Good   Language: No aphasia  Articulation: Dysarthria  Orientation: Person, Place, Time   Visual Fields: Hemianopsia left  EOM (CN III, IV, VI): Full/intact  Pupils (CN II, III): PERRL  Facial Sensation (CN V): Normal  Facial Movement (CN VII): Lower facial weakness on the Left  Motor: Arm left  Paresis: 1/5  Leg left  Paresis: 1/5  Arm right  Normal 5/5  Leg right Normal 5/5  Cerebellum: No evidence of appendicular or axial ataxia  Sensation: Sanket-hypoesthesia left    Laboratory:  CMP:   Recent Labs   Lab 05/12/25  0016 05/12/25  0921   CALCIUM 8.8  --    ALBUMIN 2.9*  --    PROT 6.7  --    * 144   K 4.0  --    CO2 24  --    *  --    BUN 34*  --    CREATININE 0.7  --    ALKPHOS 83  --    ALT 72*  --    AST 43  --    BILITOT 0.6  --      CBC:   Recent Labs   Lab 05/12/25  0016   WBC 7.90   RBC 4.17*   HGB 13.0*   HCT 40.3      MCV 97   MCH 31.2*   MCHC 32.3       Diagnostic Results     Brain Imaging   CT 5/11/25  Impression:  -Overall stable in size of acute lobar hematoma in the right frontal lobe with minimal increase in mass effect.  Continued short-term follow-up is suggested.  -Associated intraventricular blood products and subarachnoid blood products remain unchanged.  -No new hemorrhage.    MRI Brain without contrast  5/5/25  Impression:  -Increase in size of intraparenchymal hemorrhage with increased mild midline shift.  No hydrocephalus.    CTH 5/4/25  Impression:  -3.3 x 2.4 cm intracranial hemorrhage within the right frontoparietal region with mild vasogenic edema.  There is no midline shift    Vessel Imaging   CTA Head/Neck 5/4/25  Impression:   1. Acute right frontal intraparenchymal hematoma with mild adjacent edema is similar to the recent prior study.  Follow-up recommended.  2. No high-grade stenosis or major vessel occlusion.    Cardiac Imaging   TTE 5/5/25    Left Ventricle: The left ventricle is normal in size. Ventricular mass is normal. Normal wall thickness. There is normal systolic function with a visually estimated ejection fraction of 60 - 65%. There is diastolic dysfunction but grade cannot be determined. Normal left ventricular filling pressure.    Right Ventricle: The right ventricle is normal in size. Wall thickness is normal. Systolic function is normal.    Left Atrium: Moderately dilated    Right Atrium: Normal right atrial size.    Aortic Valve: The aortic valve is a trileaflet valve. There is mild aortic valve sclerosis. Mildly calcified cusps.    Tricuspid Valve: There is mild to moderate regurgitation with a centrally directed jet.    Aorta: The aortic root is normal in size measuring 3.74 cm. The ascending aorta is normal in size measuring 3.0 cm.    Pulmonary Artery: There is mild pulmonary hypertension. The estimated pulmonary artery systolic pressure is 42 mmHg.    IVC/SVC: Intermediate venous pressure at 8 mmHg.

## 2025-05-12 NOTE — ASSESSMENT & PLAN NOTE
-Vascular disease risk factor.  -Patient is prescribed Atorvastatin 20 mg daily. LDL is 102.2. Consider increasing atorvastatin to 40 mg daily.

## 2025-05-12 NOTE — PT/OT/SLP PROGRESS
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  He presents with impaired ADL and mobility performance deficits. Pt found upright in bed and agreeable for therapy. Session focused on toileting, EOB ADLs, and standing exercises at bedside.  At this time, pt is a high fall risk and not at their baseline. Prior to hospitalizations, pt was mod I for ADLs/mobility.  Patient presents with good participation and motivation to return to prior level of function with high intensity therapy.  The patient demonstrates appropriate endurance to participate in up to 3 hours or 15hrs of combined therapy post acute.   Performance deficits affecting function are weakness, gait instability, impaired endurance, impaired balance, decreased lower extremity function, decreased upper extremity function, decreased ROM, impaired self care skills, impaired functional mobility.     Rehab Prognosis:  Good; patient would benefit from acute skilled OT services to address these deficits and reach maximum level of function.       Plan:     Patient to be seen 4 x/week to address the above listed problems via self-care/home management, therapeutic exercises, therapeutic activities, neuromuscular re-education  Plan of Care Expires: 06/05/25  Plan of Care Reviewed with: patient, spouse    Subjective     Chief Complaint: feeling frustrated with weakness   Patient/Family Comments/goals: to get better   Pain/Comfort:  Pain Rating 1: 0/10  Pain Rating Post-Intervention 1: 0/10  Pain Rating Post-Intervention 2:  0/10    Objective:     Communicated with: RN prior to session.  Patient found HOB elevated with peripheral IV, pulse ox (continuous), telemetry, blood pressure cuff, pressure relief boots upon OT entry to room.    General Precautions: Standard, fall    Orthopedic Precautions:N/A  Braces: N/A  Respiratory Status: Room air     Occupational Performance:     Bed Mobility:    Patient completed Rolling/Turning to Left with  maximal assistance and 2 persons  Patient completed Scooting/Bridging with maximal assistance and 2 persons  Patient completed Supine to Sit with maximal assistance and 2 persons  Patient completed Sit to Supine with maximal assistance and 2 persons     Functional Mobility/Transfers:  Patient completed Sit <> Stand Transfer with maximal assistance and of 2 persons  with  hand-held assist   Functional Mobility: Pt stood with max A x2-HHA (LLE blocked), x2 trials. Prior to EOB, pt sat EOB with CGA. Pt completed neck exercises at EOB (cervical flex/ext and lateral rotation)    Activities of Daily Living:  Grooming: minimum assistance using LUE completing oral care and face hygiene at EOB   Upper Body Dressing: total assistance donning gown around back  Lower Body Dressing: total assistance donning socks   Toileting: total assistance for toileting in bed x2       [facility] 6 Click ADL: 10    Treatment & Education:  Pt educated on role of occupational therapy, POC, and safety during ADLs and functional mobility. Pt and OT discussed importance of safe, continued mobility to optimize daily living skills. Pt verbalized understanding.     White board updated during session. Pt given instruction to call for medical staff/nurse for assistance.       Patient left HOB elevated with all lines intact, call button in reach, and RN notified    GOALS:   Multidisciplinary Problems       Occupational Therapy Goals          Problem: Occupational Therapy    Goal Priority Disciplines Outcome Interventions   Occupational Therapy  Goal     OT, PT/OT Progressing    Description: Goals to be met by: 6/5/25     Patient will increase functional independence with ADLs by performing:    UE Dressing with Moderate Assistance.  LE Dressing with Moderate Assistance and Assistive Devices as needed.  Grooming while seated with Stand-by Assistance.  Toileting from bedside commode with Moderate Assistance for hygiene and clothing management.   Sitting at edge of bed x10 minutes with Moderate Assistance.  Supine to sit with Moderate Assistance.  Stand pivot transfers with Moderate Assistance.  Toilet transfer to bedside commode with Moderate Assistance.  Increased functional strength to 1/5 for LUE.  Upper extremity exercise program x10 reps per handout, with assistance as needed.                         DME Justifications:  No DME recommended requiring DME justifications    Time Tracking:     OT Date of Treatment: 05/12/25  OT Start Time: 0832  OT Stop Time: 0910  OT Total Time (min): 38 min    Billable Minutes:Self Care/Home Management 25 min  Therapeutic Activity 13 min    OT/REED: OT          5/12/2025

## 2025-05-12 NOTE — PROGRESS NOTES
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 male with a significant medical history of HLD, cervical and lumbar DDD, pre-diabetes who presents to the hospital as a transfer from [facility] for evaluation of R ICH. He acutely developed LSW and R gaze around 4 PM today (5/5/2025). CTH was obtained and showed a 3.3 x 2.4 cm R frontoparietal ICH w/mild vasogenic edema. The patient was given DDVAP as he takes ASA daily and transferred to [facility] for higher level of care, further evaluation.    On this exam the patient is lethargic and in need of intermittent stimulation to arouse to participate with exam. He opens his eyes to touch and voice and is noted to be doing a repetitive rubbing/picking motion to his chest with his RUE. Dysconjugate gaze. Withdraws from pain in the left sided extremities. NIHSS 14. Admitted to [facility].     DALLIN. Patient stable for stepdown to NPU. One isolated SBP in 90s yesterday, responsive to IVF. Otherwise, SBP remains in goal. Tolerating oral diet.    - Stable for stepdown to NPU  - NSGY signed off; no indication for intervention   - Na >140  - Will consider MRI Brain w/ Contrast to further assess for underlying etiology of ICH when clinically appropriate  - -150  - Fall, delirium, aspiration precautions    Antithrombotics for secondary stroke prevention: Antiplatelets: None: Intracerebral Hemorrhage    Statins for secondary stroke prevention and hyperlipidemia, if present:   Statins: Not indicated in acute ICH however, LDL is 102.2. The patient is currently prescribed atorvastatin 20 mg daily, may benefit from increasing to 40 mg daily.     Aggressive risk factor modification: HLD, sleep apnea     Rehab efforts: The patient has been evaluated by a stroke team provider and the therapy needs have  been fully considered based off the presenting complaints and exam findings. The following therapy evaluations are needed: PT evaluate and treat, OT evaluate and treat, SLP evaluate and treat, PM&R evaluate for appropriate placement    Diagnostics ordered/pending: MRI head without contrast to assess brain parenchyma, TTE to assess cardiac function/status , Other: CTH prn    VTE prophylaxis: Mechanical prophylaxis: Place SCDs  None: Reason for No Pharmacological VTE Prophylaxis: History of systemic or intracranial bleeding    BP parameters: ICH: SBP Goal Range 130-150        Sadness  Patient with depressed mood in setting of acute ICH  Fluoxetine started by NCC in concordance with FLAMES trial  -continue fluoxetine 20mg QD    Urinary retention  Patient with intermittent urinary retention in the setting of acute ICH  -continue silodosin    Vasogenic brain edema  Mild vasogenic edema is noted on imaging in the territory of the R middle cerebral artery with associated mass effect. Initial CTH with no MLS. At this time the etiology of the ICH is unclear. He was not hypertensive on presentation and remains without elevated BP. No aneurysm or AVM noted on CTA head and neck. MRI Brain pending. Neurosurgery following  - See plan for Nontraumatic subcortical hemorrhage of right cerebral hemisphere        Seizure  Concern for seizure like activity with RUE shaking on admission  -EEG revealed generalized slowing with focal slowing over the R anterior quadrant corresponding to known ICH  -No ictal activity on EEG  -Continue to monitor for seizure like activity  -No AEDs indicated at this time    Dysarthria  Acute left-sided weakness   -Due to ICH. Therapy evals pending.    Acute left-sided weakness  Due to stroke  Therapy following    Typical atrial flutter  -Stroke risk factor.  Sinus rhythm currently on tele and EKG.  -Not on AC with current ICH  -Continue tele    Dyslipidemia  -Vascular disease risk factor.  -Patient is  prescribed Atorvastatin 20 mg daily. LDL is 102.2. Consider increasing atorvastatin to 40 mg daily.             05/12/2025 Patient was admitted to NCCU service for medical management and monitoring of ICH. Imaging initially revealed increased cerebral edema with mild increase in MLS, managed with hypertonic sodium with goal Na >145. Since, imaging has remained grossly stable. Hospital course has also been complicated by resistant hypertension and episodic flushing, initially thought to be in reaction to starting amlodipine. Renal artery ultrasound was negative and urine metanephrines are pending for workup of possible pheochromocytoma vs carcinoid tumr. Blood pressure was managed with cardene gtt with transition to oral regimen with IV PRNs. Exam has improved over the course of admission in terms of alertness and ability to follow commands. Remains 0/5 in LUE/LLE. PT/OT recommending HIT.    STROKE DOCUMENTATION        NIH Scale:  1a. Level of Consciousness: 0-->Alert, keenly responsive  1b. LOC Questions: 0-->Answers both questions correctly  1c. LOC Commands: 0-->Performs both tasks correctly  2. Best Gaze: 1-->Partial gaze palsy, gaze is abnormal in one or both eyes, but forced deviation or total gaze paresis is not present  3. Visual: 1-->Partial hemianopia  4. Facial Palsy: 2-->Partial paralysis (total or near-total paralysis of lower face)  5a. Motor Arm, Left: 3-->No effort against gravity, limb falls  5b. Motor Arm, Right: 0-->No drift, limb holds 90 (or 45) degrees for full 10 secs  6a. Motor Leg, Left: 3-->No effort against gravity, leg falls to bed immediately  6b. Motor Leg, Right: 0-->No drift, leg holds 30 degree position for full 5 secs  7. Limb Ataxia: 0-->Absent  8. Sensory: 1-->Mild-to-moderate sensory loss, patient feels pinprick is less sharp or is dull on the affected side, or there is a loss of superficial pain with pinprick, but patient is aware of being touched  9. Best Language: 0-->No  aphasia, normal  10. Dysarthria: 1-->Mild-to-moderate dysarthria, patient slurs at least some words and, at worst, can be understood with some difficulty  11. Extinction and Inattention (formerly Neglect): 1-->Visual, tactile, auditory, spatial, or personal inattention or extinction to bilateral simultaneous stimulation in one of the sensory modalities  Total (NIH Stroke Scale): 13       Modified Edwin Score: 4  Nader Coma Scale:    ABCD2 Score:    [id number] Score:   HAS -BLED Score:   ICH Score:1  Hunt & Hull Classification:      Hemorrhagic change of an Ischemic Stroke: Does this patient have an ischemic stroke with hemorrhagic changes? No     Neurologic Chief Complaint: ICH    Subjective:     Interval History: Patient is seen for follow-up neurological assessment and treatment recommendations: see A&P    HPI, Past Medical, Family, and Social History remains the same as documented in the initial encounter.     Review of Systems   Constitutional:  Negative for fever.   Eyes:  Negative for discharge.   Respiratory:  Negative for apnea, cough and wheezing.    Cardiovascular:  Negative for leg swelling.   Gastrointestinal:  Negative for abdominal distention.   Neurological:  Positive for facial asymmetry, speech difficulty, weakness and numbness.   Psychiatric/Behavioral:  Negative for agitation, behavioral problems and decreased concentration.      Scheduled Meds:   artificial tears  1 drop Both Eyes BID    atorvastatin  20 mg Oral QHS    enoxparin  40 mg Subcutaneous Q24H (prophylaxis, 1700)    FLUoxetine  20 mg Oral Daily    hydrALAZINE  100 mg Oral Q8H    LIDOcaine  1 patch Transdermal Q24H    losartan  100 mg Oral Daily    senna-docusate  1 tablet Oral BID    silodosin  4 mg Oral Daily    triamcinolone acetonide 0.1%   Topical (Top) BID     Continuous Infusions:  PRN Meds:  Current Facility-Administered Medications:     acetaminophen, 650 mg, Oral, Q6H PRN    bisacodyL, 10 mg, Rectal, Daily PRN     hydrALAZINE, 10 mg, Intravenous, Q6H PRN    labetalol, 10 mg, Intravenous, Q4H PRN    methocarbamoL, 500 mg, Oral, TID PRN    ondansetron, 4 mg, Intravenous, Q8H PRN    sodium chloride 0.9%, 10 mL, Intravenous, PRN    Objective:     Vital Signs (Most Recent):  Temp: 98.5 °F (36.9 °C) (05/12/25 1102)  Pulse: 69 (05/12/25 1240)  Resp: 18 (05/12/25 1240)  BP: 137/70 (05/12/25 1240)  SpO2: 97 % (05/12/25 1240)  BP Location: Left arm    Vital Signs Range (Last 24H):  Temp:  [97.8 °F (36.6 °C)-98.5 °F (36.9 °C)]   Pulse:  [56-75]   Resp:  [11-26]   BP: (125-168)/(58-80)   SpO2:  [95 %-98 %]   BP Location: Left arm       Physical Exam  Vitals and nursing note reviewed.   HENT:      Head: Normocephalic and atraumatic.   Cardiovascular:      Rate and Rhythm: Normal rate.      Pulses: Normal pulses.   Pulmonary:      Effort: Pulmonary effort is normal. No respiratory distress.      Breath sounds: No wheezing.   Abdominal:      General: There is no distension.      Palpations: Abdomen is soft.   Musculoskeletal:         General: No swelling.      Right lower leg: No edema.      Left lower leg: No edema.   Skin:     General: Skin is warm and dry.      Capillary Refill: Capillary refill takes 2 to 3 seconds.   Neurological:      Mental Status: He is alert.              Neurological Exam:   LOC: alert  Attention Span: Good   Language: No aphasia  Articulation: Dysarthria  Orientation: Person, Place, Time   Visual Fields: Hemianopsia left  EOM (CN III, IV, VI): Full/intact  Pupils (CN II, III): PERRL  Facial Sensation (CN V): Normal  Facial Movement (CN VII): Lower facial weakness on the Left  Motor: Arm left  Paresis: 1/5  Leg left  Paresis: 1/5  Arm right  Normal 5/5  Leg right Normal 5/5  Cerebellum: No evidence of appendicular or axial ataxia  Sensation: Sanket-hypoesthesia left    Laboratory:  CMP:   Recent Labs   Lab 05/12/25  0016 05/12/25  0921   CALCIUM 8.8  --    ALBUMIN 2.9*  --    PROT 6.7  --    * 144   K 4.0  --     CO2 24  --    *  --    BUN 34*  --    CREATININE 0.7  --    ALKPHOS 83  --    ALT 72*  --    AST 43  --    BILITOT 0.6  --      CBC:   Recent Labs   Lab 05/12/25  0016   WBC 7.90   RBC 4.17*   HGB 13.0*   HCT 40.3      MCV 97   MCH 31.2*   MCHC 32.3       Diagnostic Results     Brain Imaging   CTH 5/11/25  Impression:  -Overall stable in size of acute lobar hematoma in the right frontal lobe with minimal increase in mass effect.  Continued short-term follow-up is suggested.  -Associated intraventricular blood products and subarachnoid blood products remain unchanged.  -No new hemorrhage.    MRI Brain without contrast 5/5/25  Impression:  -Increase in size of intraparenchymal hemorrhage with increased mild midline shift.  No hydrocephalus.    CTH 5/4/25  Impression:  -3.3 x 2.4 cm intracranial hemorrhage within the right frontoparietal region with mild vasogenic edema.  There is no midline shift    Vessel Imaging   CTA Head/Neck 5/4/25  Impression:   1. Acute right frontal intraparenchymal hematoma with mild adjacent edema is similar to the recent prior study.  Follow-up recommended.  2. No high-grade stenosis or major vessel occlusion.    Cardiac Imaging   TTE 5/5/25    Left Ventricle: The left ventricle is normal in size. Ventricular mass is normal. Normal wall thickness. There is normal systolic function with a visually estimated ejection fraction of 60 - 65%. There is diastolic dysfunction but grade cannot be determined. Normal left ventricular filling pressure.    Right Ventricle: The right ventricle is normal in size. Wall thickness is normal. Systolic function is normal.    Left Atrium: Moderately dilated    Right Atrium: Normal right atrial size.    Aortic Valve: The aortic valve is a trileaflet valve. There is mild aortic valve sclerosis. Mildly calcified cusps.    Tricuspid Valve: There is mild to moderate regurgitation with a centrally directed jet.    Aorta: The aortic root is normal in  size measuring 3.74 cm. The ascending aorta is normal in size measuring 3.0 cm.    Pulmonary Artery: There is mild pulmonary hypertension. The estimated pulmonary artery systolic pressure is 42 mmHg.    IVC/SVC: Intermediate venous pressure at 8 mmHg.    Bernard Bowers MD  [facility]  Department of Vascular Neurology   [facility])

## 2025-05-12 NOTE — ASSESSMENT & PLAN NOTE
Symptoms surrounding his diagnosis and progress.     - Fluoxetine added daily  - Psychology consulted; appreciate recs

## 2025-05-12 NOTE — ASSESSMENT & PLAN NOTE
Colt Rose is a 69 y.o. male with a significant medical history of HLD, cervical and lumbar DDD, pre-diabetes who presents to the hospital as a transfer from [facility] for evaluation of R ICH. He acutely developed LSW and R gaze around 4 PM today (5/5/2025). CTH was obtained and showed a 3.3 x 2.4 cm R frontoparietal ICH w/mild vasogenic edema. The patient was given DDVAP as he takes ASA daily and transferred to [facility] for higher level of care, further evaluation.    On this exam the patient is lethargic and in need of intermittent stimulation to arouse to participate with exam. He opens his eyes to touch and voice and is noted to be doing a repetitive rubbing/picking motion to his chest with his RUE. Dysconjugate gaze. Withdraws from pain in the left sided extremities. NIHSS 14. Admitted to [facility].     DALLIN. Patient stable for stepdown to NPU. One isolated SBP in 90s yesterday, responsive to IVF. Otherwise, SBP remains in goal. Tolerating oral diet.    - Stable for stepdown to NPU  - NSGY signed off; no indication for intervention   - Na >140  - Will consider MRI Brain w/ Contrast to further assess for underlying etiology of ICH when clinically appropriate  - -150  - Fall, delirium, aspiration precautions    Antithrombotics for secondary stroke prevention: Antiplatelets: None: Intracerebral Hemorrhage    Statins for secondary stroke prevention and hyperlipidemia, if present:   Statins: Not indicated in acute ICH however, LDL is 102.2. The patient is currently prescribed atorvastatin 20 mg daily, may benefit from increasing to 40 mg daily.     Aggressive risk factor modification: HLD, sleep apnea     Rehab efforts: The patient has been evaluated by a stroke team provider and the therapy needs have been fully considered based off the presenting complaints and exam findings. The following therapy evaluations are needed: PT evaluate and treat, OT evaluate and treat, SLP evaluate and  treat, PM&R evaluate for appropriate placement    Diagnostics ordered/pending: MRI head without contrast to assess brain parenchyma, TTE to assess cardiac function/status , Other: CTH prn    VTE prophylaxis: Mechanical prophylaxis: Place SCDs  None: Reason for No Pharmacological VTE Prophylaxis: History of systemic or intracranial bleeding    BP parameters: ICH: SBP Goal Range 130-150

## 2025-05-12 NOTE — ASSESSMENT & PLAN NOTE
-Stroke risk factor.  Sinus rhythm currently on tele and EKG.  -Not on AC with current ICH  -Continue tele

## 2025-05-12 NOTE — ASSESSMENT & PLAN NOTE
- CTH/CTA with right parietal IPH, no AVM/malformation   - Vasc Neuro Following  - NSGY following   - Repeat CTH with edema expansion and worsening midline shift. Increased sodium goals.   - Repeat CTH 5/7 stable.    - Repeat CTH 5/9, improved midline shift.   - Repeat CTH 5/12, grossly stable.  - Reduced Na goal to > 140 to decrease risk of edema.   - Sodium q12h  - MRI brain without evidence of amyloid.   - neuro checks q4h  - SBP < 160   - losartan 100mg qd   - hydralazine 100mg TID   - hydralazine and labetalol prn  - Given difficulty controlling blood pressure, sending off labs for secondary HTN workup to rule out pheochromocytoma and carcinoid tumor.   - Renal US negative for adrenal mass and renal artery stenosis   - Pending urine studies.   - Coags WNL  - DDAVP given prior to transfer for ASA hx     PT/OT, SLP   - high intensity therapy recommended   - Pureed diet and mod thick fluids    Stepping down to Vascular Neurology today.

## 2025-05-12 NOTE — CARE UPDATE
Nursing Transfer Note       Transfer To NPU    Transfer via bed    Transfer with cardiac monitoring    Transported by LUL Bell    Medicines sent: Yes    Chart sent with patient: Yes    Belongings sent with patient:with wife    Notified: spouse    Bedside Neuro assessment performed: Yes    Does the patient have altered skin integrity? yes, wound care was relayed to the new nurse       Bedside Handoff given to: LUL Cardenas       Upon arrival to floor: cardiac monitor applied, patient oriented to room, call bell in reach, and bed in lowest position

## 2025-05-12 NOTE — ASSESSMENT & PLAN NOTE
Concern for seizure like activity with RUE shaking on admission  -EEG revealed generalized slowing with focal slowing over the R anterior quadrant corresponding to known ICH  -No ictal activity on EEG  -Continue to monitor for seizure like activity  -No AEDs indicated at this time

## 2025-05-12 NOTE — EICU
Virtual ICU Quality Rounds    Admit Date: 5/4/2025  Hospital Day: 8    ICU Day: 7d 13h    24H Vital Sign Range:  Temp:  [97.6 °F (36.4 °C)-98.5 °F (36.9 °C)]   Pulse:  [54-74]   Resp:  [11-27]   BP: ()/(46-80)   SpO2:  [94 %-98 %]     VICU Surveillance Screening                    LDA reconciliation : Yes

## 2025-05-12 NOTE — EICU
Intervention Initiated From:  COR / ABHILASHU    Praveena intervened regarding:  Rounding (Video assessment)  VICU Night Rounds Checklist  24H Vital Sign Range:  Temp:  [97.6 °F (36.4 °C)-98.8 °F (37.1 °C)]   Pulse:  [54-71]   Resp:  [11-27]   BP: ()/(46-80)   SpO2:  [94 %-98 %]     Video rounds

## 2025-05-12 NOTE — PT/OT/SLP PROGRESS
Physical Therapy Co-Treatment with OT    Patient Name:  Colt Rose   MRN:  [id number]    Recent Surgery: * No surgery found *      Patient required co-tx with OT secondary to need for multiple set of skilled hands to provide safest therapy and best outcomes.      Recommendations:     Discharge Recommendations:    High intensity therapy  Discharge Equipment Recommendations: hospital bed, lift device, wheelchair   Barriers to discharge: Increased level of assist    Highest Level of Mobility: STS  Assistance Required: Max(A)X2 persons w/ HHAx2    Assessment:     Colt Rose is a 69 y.o. 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    Pt met with HOB elevated, spouse present and agreeable to PT treatment. Today's PT treatment focus was on continued therapeutic exercise to improve function. Pt participates well and follows all simple commands appropriately. He continues to be limited by dense L hemiparesis, L inattention, and impaired static/dynamic balance. He was able to visually track past midline to the L today with max verbal cues and manually assisted L cervical rotation. Pt completed 2 STS transfers today, but is unable to ambulate at this time due to LSW. He is a high fall risk.    Pt is progressing towards acute PT goals appropriately and continues to benefit from acute PT sessions.    Rehab Prognosis: Good; patient would benefit from acute skilled PT services to address these deficits and reach maximum level of function.      Plan:     During this hospitalization, patient to be seen 4 x/week to address the identified rehab impairments via gait training, therapeutic activities, therapeutic exercises, neuromuscular re-education, wheelchair management/training and progress toward the following goals:    Plan of Care Expires:  05/16/25    This plan of care has been discussed with the patient/caregiver, who was included in its development and is in  agreement with the identified goals and treatment plan.     Subjective     Communicated with RN prior to session.  Patient agreeable to participate.     Pain/Comfort:  Pain Rating 1: 0/10  Pain Rating Post-Intervention 1: 0/10    Chief Complaint: Nontraumatic cortical hemorrhage of R cerebral hemisphere   Patient/Family Comments/goals: None stated      Objective:     Patient found HOB elevated with peripheral IV, pulse ox (continuous), telemetry, blood pressure cuff, pressure relief boots  upon PT entry to room.    General Precautions: Standard, fall   Orthopedic Precautions:N/A   Braces: N/A         Exams:    Cognition:  Patient is oriented to Person, Place, Time, Situation  Follows one-step commands  Insight to deficits/safety awareness: intact    Functional Mobility:    Bed Mobility:  Supine to Sit: Maximum Assistance and 2 persons on L side of bed  Sit to Supine: Maximum Assistance and 2 persons  Rolling L: Minimal Assistance  Rolling R: Moderate Assistance  Scooting anteriorly to EOB to plant feet on floor: Moderate Assistance    Transfers:   Sit to Stand Transfer: Maximum Assistance and 2 persons  from EOB with HHAx2  L UE supported, L LE blocked  X2 trials  Pt has a downward R gaze- PT provided tactile cues for upright posture and cervical rotation to neutral     Balance:  Static Sit:   Mod-max(A) at EOB   L anterior lean  Static Stand:   Max(A)X2 persons with B hand-held support    Therapeutic Activities/Exercises     Patient assisted with functional mobility as noted above  STS x2 from EOB  PROM L LE knee FL/EXT- pt instructed to watch his leg moving through ROM. He required cues to maintain attention   Pt heavily soiled with BM- PT assisted with pericare and bedroll change  Patient educated on the importance of early mobility to prevent functional decline during hospital stay  Patient was instructed to utilize staff assistance for mobility/transfers.  Patient is appropriate to transfer with dependence to  page and RN/PCT assist  Patient educated on PT POC and role of PT in acute care  White board updated regarding patient's safest level of mobility with staff assistance, RN also updated.     AM-PAC 6 CLICK MOBILITY  Turning over in bed (including adjusting bedclothes, sheets and blankets)?: 2  Sitting down on and standing up from a chair with arms (e.g., wheelchair, bedside commode, etc.): 2  Moving from lying on back to sitting on the side of the bed?: 2  Moving to and from a bed to a chair (including a wheelchair)?: 2  Need to walk in hospital room?: 1  Climbing 3-5 steps with a railing?: 1  Basic Mobility Total Score: 10     DME Justifications:  Colt requires a hospital bed due to him requiring positioning of the body in ways not feasible with an ordinary bed due to limited ability and cannot independently make changes in body position without the use of the bed.The positioning of the body cannot be sufficiently resolved by the use of pillows and wedges.  Colt Rose has a mobility limitation that significantly impairs his ability to participate in one or more mobility related activities of daily living (MRADLs) such as toileting, feeding, dressing, grooming, and bathing in customary locations in the home.  The mobility limitation cannot be sufficiently resolved by the use of a cane or walker.   The use of a manual wheelchair will significantly improve the patients ability to participate in MRADLS and the patient will use it on regular basis in the home.  Colt Rose has expressed his willingness to use a manual wheelchair in the home. Patients upper body strength is sufficient for propulsion.  He also has a caregiver who is available, willing, and able to provide assistance with the wheelchair when needed.      Patient left HOB elevated with all lines intact, call button in reach, bed alarm on, and RN notified.        History/Goals:     PAST MEDICAL HISTORY:  Past Medical History:   Diagnosis  Date    Arthritis of hand 08/06/2015    Cervical disc displacement     Degeneration of lumbar intervertebral disc     GERD (gastroesophageal reflux disease)     gastric polyps    Hip pain 02/22/2016    Hyperlipidemia     Shingles 11/2013    Sleep apnea        Past Surgical History:   Procedure Laterality Date    ABLATION, SVT, ACCESSORY PATHWAY N/A 5/25/2023    Procedure: Ablation, SVT, Accessory Pathway;  Surgeon: Goyo Sky MD;  Location: [facility] EP LAB;  Service: Cardiology;  Laterality: N/A;  PAC, RFA, CARTO, MAC, GP, 3 PREP    CERVICAL FUSION      bone graft    COLONOSCOPY N/A 7/29/2021    Procedure: COLONOSCOPY;  Surgeon: Tyler Story MD;  Location: [facility];  Service: Endoscopy;  Laterality: N/A;    COLONOSCOPY N/A 10/30/2024    Procedure: COLONOSCOPY;  Surgeon: Tyler Story MD;  Location: [facility];  Service: Endoscopy;  Laterality: N/A;  m/ppm    EPIDURAL STEROID INJECTION INTO LUMBAR SPINE N/A 12/5/2019    Procedure: Injection-steroid-epidural-lumbar;  Surgeon: Adarsh Kraft MD;  Location: [facility];  Service: Pain Management;  Laterality: N/A;  L5-S1    ESOPHAGOGASTRODUODENOSCOPY N/A 10/27/2020    Procedure: EGD (ESOPHAGOGASTRODUODENOSCOPY);  Surgeon: Tyler Story MD;  Location: [facility];  Service: Endoscopy;  Laterality: N/A;    ESOPHAGOGASTRODUODENOSCOPY N/A 2/14/2025    Procedure: EGD (ESOPHAGOGASTRODUODENOSCOPY);  Surgeon: Tyler Story MD;  Location: [facility];  Service: Endoscopy;  Laterality: N/A;    INJECTION OF ANESTHETIC AGENT AROUND MEDIAL BRANCH NERVES INNERVATING LUMBAR FACET JOINT Bilateral 11/18/2020    Procedure: BLOCK, NERVE, LUMBAR, MEDIAL BRANCH Bilateral L3,4,5;  Surgeon: Adarsh Kraft MD;  Location: [facility] OR;  Service: Pain Management;  Laterality: Bilateral;    INJECTION OF ANESTHETIC AGENT AROUND MEDIAL BRANCH NERVES INNERVATING LUMBAR FACET JOINT Left 10/25/2023    Procedure: Block-nerve-medial branch-lumbar;  Surgeon: Adarsh Kraft MD;  Location: [facility] OR;   Service: Anesthesiology;  Laterality: Left;  l4-5, l5-s1 MBB    INJECTION OF ANESTHETIC AGENT AROUND MEDIAL BRANCH NERVES INNERVATING LUMBAR FACET JOINT Bilateral 11/28/2023    Procedure: Block-nerve-medial branch-lumbar;  Surgeon: Adarsh Kraft MD;  Location: [facility] OR;  Service: Anesthesiology;  Laterality: Bilateral;  L4.5 and l5/s1 MBB #2    RADIOFREQUENCY ABLATION OF LUMBAR MEDIAL BRANCH NERVE AT SINGLE LEVEL Bilateral 1/29/2021    Procedure: Radiofrequency Ablation, Nerve, Spinal, Lumbar, Medial Branch, 1 Level;  Surgeon: Adarsh Kraft MD;  Location: [facility] OR;  Service: Pain Management;  Laterality: Bilateral;  L3,4,5    TONSILLECTOMY, ADENOIDECTOMY         GOALS:   Multidisciplinary Problems       Physical Therapy Goals          Problem: Physical Therapy    Goal Priority Disciplines Outcome Interventions   Physical Therapy Goal     PT, PT/OT Progressing    Description: Goals to be completed by: 5/16/25    Pt will perform sup<>sit transfers w/ moderate assistance  Pt will have sufficient dynamic balance to sit EOB while performing ADLs/therex w/ minimum assistance  Pt will be able to stand up from EOB w/ moderate assistance using LRAD  Pt will transfer from bed to chair w/ moderate assistance using LRAD  Pt will be independent w/ HEP therex on BLE w/ good form and ROM                        Time Tracking:     PT Received On: 05/12/25  PT Start Time: 0832     PT Stop Time: 0910  PT Total Time (min): 38 min     Billable Minutes: Therapeutic Exercise 38      Zaynab Yoon, PT  05/12/2025  Pager# [phone]

## 2025-05-12 NOTE — NURSING
Patient Transferred to NPU Room 931 @231      Upon arrival to the floor, patient greeted and oriented to room. Complete head to toe assessment completed per protocol. VSS, see flowsheet for details. Neuro assessment completed. Cardiac monitoring orders reviewed. Patient added to tele monitor in nursing station, rate and rhythm verified. Primary team notified of patient's transfer to floor. All current and transfer orders reviewed/reconciled per protocol. All emergency equipment set up in patient's room. Fall/seizure precautions initiated per providers orders. 4 Eyes skin assessment performed, see flowsheets for details. Reviewed assessment and rounding frequency with patient and family. Questions were encouraged and addressed. Repositioned patient for comfort with bed locked in lowest position, side rails up x 3, bed alarm set, and call light within reach. Instructed patient to call staff for mobility/assistance, verbalized understanding. No acute signs of distress noted at this time.           +++++ Special Considerations+++++++      +(If any belongings come with patient, make note of them in the patient belongings flow sheet)      +(If patient is with vascular neurology team, make sure to mention swallow assessment completed and, Please add to note that stroke booklet was transferred with patient)  NIHSS assessment completed on floor arrival. Patient's NIHSS score is 14 at this time. SCDs applied to patient per provider's orders. Garcia bedside swallow screen completed per stroke protocol. Patient has passed garcia bedside swallow screen, team notified of results. Stroke book individualized to patient and given to patient upon transfer. Reviewed stroke book with the patient at the bedside, see education flowsheets for details.

## 2025-05-12 NOTE — PROGRESS NOTES
[facility] - Neuro Critical Care  Neurocritical Care  Progress Note    Admit Date: 5/4/2025  Service Date: 05/12/2025  Length of Stay: 8    Subjective:     Chief Complaint: Nontraumatic cortical hemorrhage of right cerebral hemisphere    History of Present Illness: 70 yo M, PMH SVT/ablation, HLD, presents as transfer for Right lobar hemorhage, He originaly presented to OSH ER with left-sided weakness and right-sided gaze preference that began abruptly this afternoon around 4:00 p.m.. CTH with right parietal IPH, CTA without AVM/abnormalities. ASA hx, Given ddAVP for reversal. Transferred to [facility] for management and eval.     Hospital Course: 05/05/2025 CTH this AM stable when compared to MRI. EEG without evidence of seizures. Will hold off AEDs for now. Goal of SBP <150 and Na >140. No NSGY interventions. Will repeat CTH tomorrow AM.   05/06/2025 Repeat CTH shows increased edema with increased midline shift. Increase sodium goal to >145. Amlodipine added for BP support. Patient with flushing after amlodipine given. Hydroxyzine and IV pepcid given as patient reports possible allergy to bendaryl in the past. Losartan added instead. NG tube placed and started tube feeds.  05/07/2025 Repeat CTH head was stable. Blood pressure medication changes for optimization.   05/08/2025 Patient removed CPAP overnight and as a result, the NG tube was also removed. SLP okay with crushed meds on evaluation. Will hold off on replacing NG tube.   05/09/2025 Fever of 101.2 overnight, resolved with tylenol. Adjusting BP meds for better control. Labs and imaging to rule out carcinoid tumor and pheochromocytoma given recurring flushing and difficult to control BP. Speech cleared for pureed diet. Repeat CTH 5/9, improved midline shift.  05/10/2025 No events overnight.   05/11/2025 No events overnight, has discomfort with neck. Admits anxiety and sadness about his current illness  05/12/2025 Late in the afternoon yesterday, patient with a  hypotensive episode while working with PT. Likely vagal. Responded to fluids. Repeat CTH was grossly stable. No exam changes this AM. Otherwise, no significant events from the night team. Stepping down to Vascular Neuro today.       Interval History:  See hospital course  Objective:     Vitals:  Temp: 98.5 °F (36.9 °C)  Pulse: 69  Rhythm: normal sinus rhythm  BP: (!) 141/64  MAP (mmHg): 92  Resp: 18  SpO2: 95 %    Temp  Min: 97.6 °F (36.4 °C)  Max: 98.5 °F (36.9 °C)  Pulse  Min: 54  Max: 75  BP  Min: 78/48  Max: 168/79  MAP (mmHg)  Min: 54  Max: 113  Resp  Min: 11  Max: 27  SpO2  Min: 94 %  Max: 98 %    05/11 0701 - 05/12 0700  In: 2025.4 [P.O.:1115; I.V.:10]  Out: 1840 [Urine:1840]   Unmeasured Output  Unmeasured Urine Occurrence: 1 (pt accidentally pulled off condom cath, bed saturated with urine)  Unmeasured Stool Occurrence: 0  Pad Count: 3        Physical Exam  Vitals and nursing note reviewed.   Cardiovascular:      Rate and Rhythm: Normal rate and regular rhythm.      Pulses: Normal pulses.      Heart sounds: Normal heart sounds.   Pulmonary:      Effort: Pulmonary effort is normal.      Breath sounds: Normal breath sounds.   Abdominal:      General: Bowel sounds are normal.      Palpations: Abdomen is soft.   Skin:     General: Skin is warm and dry.      Capillary Refill: Capillary refill takes 2 to 3 seconds.   Neurological:      Comments: E4 V5 M6  Perrla, Open eyes spontaneously, right gaze preference though does cross midline with direction (improving).  Quickly to respond to questions today and able to verbally communicate. However, after some time, responses become a bit delayed.  Left sided weakness/plegia.  Left side tito-neglect, improving. Able to identify, point to, and reach for his left arm correctly.   Now reporting sensation to left upper and lower extremities, new compared to prior.  RUE/RLE 5/5 , follows commands                Medications:  Continuous Scheduledartificial tears, 1 drop,  BID  atorvastatin, 20 mg, QHS  enoxparin, 40 mg, Q24H (prophylaxis, 1700)  FLUoxetine, 20 mg, Daily  hydrALAZINE, 100 mg, Q8H  LIDOcaine, 1 patch, Q24H  losartan, 100 mg, Daily  senna-docusate, 1 tablet, BID  silodosin, 4 mg, Daily  sodium chloride 0.9%, 1,000 mL, Once  triamcinolone acetonide 0.1%, , BID    PRNacetaminophen, 650 mg, Q6H PRN  bisacodyL, 10 mg, Daily PRN  hydrALAZINE, 10 mg, Q6H PRN  labetalol, 10 mg, Q4H PRN  magnesium oxide, 800 mg, PRN  magnesium oxide, 800 mg, PRN  methocarbamoL, 500 mg, TID PRN  ondansetron, 4 mg, Q8H PRN  potassium bicarbonate, 35 mEq, PRN  potassium bicarbonate, 50 mEq, PRN  potassium bicarbonate, 60 mEq, PRN  potassium, sodium phosphates, 2 packet, PRN  potassium, sodium phosphates, 2 packet, PRN  potassium, sodium phosphates, 2 packet, PRN  sodium chloride 0.9%, 10 mL, PRN      Today I personally reviewed pertinent medications, lines/drains/airways, imaging, laboratory results, notably:    Diet  Diet Pureed (IDDSI Level 4) Honey Thick (Moderately Thick)  Diet Pureed (IDDSI Level 4) Honey Thick (Moderately Thick)        Review of Systems  Assessment/Plan:     Neuro  * Nontraumatic cortical hemorrhage of right cerebral hemisphere  - CTH/CTA with right parietal IPH, no AVM/malformation   - [facility] Neuro Following  - NSGY following   - Repeat CTH with edema expansion and worsening midline shift. Increased sodium goals.   - Repeat CTH 5/7 stable.    - Repeat CTH 5/9, improved midline shift.   - Repeat CTH 5/12, grossly stable.  - Reduced Na goal to > 140 to decrease risk of edema.   - Sodium q12h  - MRI brain without evidence of amyloid.   - neuro checks q4h  - SBP < 160   - losartan 100mg qd   - hydralazine 100mg TID   - hydralazine and labetalol prn  - Given difficulty controlling blood pressure, sending off labs for secondary HTN workup to rule out pheochromocytoma and carcinoid tumor.   - Renal US negative for adrenal mass and renal artery stenosis   - Pending urine studies.   -  Coags WNL  - DDAVP given prior to transfer for ASA hx     PT/OT, SLP   - high intensity therapy recommended   - Pureed diet and mod thick fluids    Stepping down to Vascular Neurology today.     Vasogenic brain edema  See ICH    Seizure  - Left hand twitching on arrival, did appear rhymic. Resolved at this time.  - EEG without evidence of seizure.  - Holding AEDs at this time.    Dysarthria  SLP consulted   - Pureed diet     Acute left-sided weakness  PT/OT    Psychiatric  Sadness  Symptoms surrounding his diagnosis and progress.     - Fluoxetine added daily  - Psychology consulted; appreciate recs    Cardiac/Vascular  Dyslipidemia  Resume statin     Renal/  Urinary retention  Added silodosin to assist with symptoms.           The patient is being Prophylaxed for:  Venous Thromboembolism with: Mechanical or Chemical  Stress Ulcer with: Not Applicable   Ventilator Pneumonia with: not applicable    Activity Orders            Diet Pureed (IDDSI Level 4) Honey Thick (Moderately Thick): Pureed starting at 05/09 1158    Elevate HOB Elevate HOB 30-45 degrees during feeding unless contraindicated starting at 05/06 0903    Turn patient starting at 05/04 2000    Elevate HOB starting at 05/04 1920          Full Code    William Appiah MD  Neurocritical Care  [facility] - Neuro Critical Care

## 2025-05-12 NOTE — SUBJECTIVE & OBJECTIVE
Interval History:  See hospital course  Objective:     Vitals:  Temp: 98.5 °F (36.9 °C)  Pulse: 69  Rhythm: normal sinus rhythm  BP: (!) 141/64  MAP (mmHg): 92  Resp: 18  SpO2: 95 %    Temp  Min: 97.6 °F (36.4 °C)  Max: 98.5 °F (36.9 °C)  Pulse  Min: 54  Max: 75  BP  Min: 78/48  Max: 168/79  MAP (mmHg)  Min: 54  Max: 113  Resp  Min: 11  Max: 27  SpO2  Min: 94 %  Max: 98 %    05/11 0701 - 05/12 0700  In: 2025.4 [P.O.:1115; I.V.:10]  Out: 1840 [Urine:1840]   Unmeasured Output  Unmeasured Urine Occurrence: 1 (pt accidentally pulled off condom cath, bed saturated with urine)  Unmeasured Stool Occurrence: 0  Pad Count: 3        Physical Exam  Vitals and nursing note reviewed.   Cardiovascular:      Rate and Rhythm: Normal rate and regular rhythm.      Pulses: Normal pulses.      Heart sounds: Normal heart sounds.   Pulmonary:      Effort: Pulmonary effort is normal.      Breath sounds: Normal breath sounds.   Abdominal:      General: Bowel sounds are normal.      Palpations: Abdomen is soft.   Skin:     General: Skin is warm and dry.      Capillary Refill: Capillary refill takes 2 to 3 seconds.   Neurological:      Comments: E4 V5 M6  Perrla, Open eyes spontaneously, right gaze preference though does cross midline with direction (improving).  Quickly to respond to questions today and able to verbally communicate. However, after some time, responses become a bit delayed.  Left sided weakness/plegia.  Left side tito-neglect, improving. Able to identify, point to, and reach for his left arm correctly.   Now reporting sensation to left upper and lower extremities, new compared to prior.  RUE/RLE 5/5 , follows commands                Medications:  Continuous Scheduledartificial tears, 1 drop, BID  atorvastatin, 20 mg, QHS  enoxparin, 40 mg, Q24H (prophylaxis, 1700)  FLUoxetine, 20 mg, Daily  hydrALAZINE, 100 mg, Q8H  LIDOcaine, 1 patch, Q24H  losartan, 100 mg, Daily  senna-docusate, 1 tablet, BID  silodosin, 4 mg,  Daily  sodium chloride 0.9%, 1,000 mL, Once  triamcinolone acetonide 0.1%, , BID    PRNacetaminophen, 650 mg, Q6H PRN  bisacodyL, 10 mg, Daily PRN  hydrALAZINE, 10 mg, Q6H PRN  labetalol, 10 mg, Q4H PRN  magnesium oxide, 800 mg, PRN  magnesium oxide, 800 mg, PRN  methocarbamoL, 500 mg, TID PRN  ondansetron, 4 mg, Q8H PRN  potassium bicarbonate, 35 mEq, PRN  potassium bicarbonate, 50 mEq, PRN  potassium bicarbonate, 60 mEq, PRN  potassium, sodium phosphates, 2 packet, PRN  potassium, sodium phosphates, 2 packet, PRN  potassium, sodium phosphates, 2 packet, PRN  sodium chloride 0.9%, 10 mL, PRN      Today I personally reviewed pertinent medications, lines/drains/airways, imaging, laboratory results, notably:    Diet  Diet Pureed (IDDSI Level 4) Honey Thick (Moderately Thick)  Diet Pureed (IDDSI Level 4) Honey Thick (Moderately Thick)        Review of Systems

## 2025-05-12 NOTE — HOSPITAL COURSE
69M with hx of SVT s/p ablation, HLD, PreDM, ARIS, DDD, GERD who was admitted to [facility] as a transfer from [place] for evaluation and management of R ICH. Pt presented to [place] with acute onset LEFT upper and lower extremity weakness, and a marked RIGHT gaze preference which started on 5/4 at ~16:00. Workup at OSH w/ CT head revealed acute R frontoparietal Intraparenchymal hemorrhage for which pt transferred to [facility] for higher level of care, NSGY consultation, and further workup; (f/u imaging with CTA did not reveal aneurysm, AVM, or other structural abnormality explaining his hemorrhage). Further imaging w/ MRI showed increased size of IPH with increased mild midline shift and pt was initiated on intermittent bolus' of hypertonic saline for control of cerebral edema. Neurosurgery consulted and recommended against surgical intervention; plan to follow closely with serial CT head imaging to assess stability of bleed and intervene if necessary. There was initial concern for eppileptiform activity on his EEG monitoring, this was reviewed and it was felt to not represent seizure --> Anti-epilleptics were held. Hospital course has also been complicated by resistant hypertension and episodic flushing, initially thought to be in reaction to starting amlodipine. Renal artery ultrasound was negative and urine metanephrines are pending for workup of possible pheochromocytoma vs carcinoid tumr. Blood pressure was managed with cardene gtt with transition to oral regimen with IV PRNs. Exam has improved over the course of admission in terms of alertness and ability to follow commands. Pt's clinical exam remained stable and serial imaging notable for stable bleed size; Na levels stabilized (goal sodium >140) and when pt was no longer requiring hypertonic saline, he stepped down to neuro hospital floor on 5/12. Pt evaluated by PT/OT and SLP throughout stay. Upon working with SLP, he progressed to tolerating a diet (pureed, nectar  thick) on 5/11. Pt remains 0/5 in LUE/LLE and on PT/OT assessment, felt pt would benefit from high-intensity therapy services. Pt was accepted by [facility] and deemed medically stable for hospital discharge to rehab facility on 5/13. Pt instructed to follow up with PCP and Vascular Neurology. Will need f/u imaging with MRI Brain (in approximately 6 weeks as swelling resolves), to assess for underlying etiology of bleed.

## 2025-05-12 NOTE — PLAN OF CARE
Problem: SLP  Goal: SLP Goal  Description: Goals due 5/12  1.  Pt. Will participate in ongoing assessment of swallow at bedside -met  2.  Pt. Will participate in speech, language and cognitive assessment -met    Short Term Goals:   1. Pt will tolerate pureed diet and honey thick liquids with aspiration precautions in place and no overt s/s of aspiration.  2. Pt will complete dysphagia therapy exercises give min cues.   3. Pt will follow multi-unit commands with 75% acc given 1 repetition.   4. Pt will attend to therapist past midline on left side for 1+ minute.  5. Pt will verbalize/demonstrate speech strategies in conversation.   Outcome: Progressing   Goals updated.

## 2025-05-12 NOTE — ASSESSMENT & PLAN NOTE
Patient with depressed mood in setting of acute ICH  Fluoxetine started by NCC in concordance with FLAMES trial  -continue fluoxetine 20mg QD

## 2025-05-12 NOTE — PLAN OF CARE
[facility] - Neuro Critical Care  Discharge Reassessment    Primary Care Provider: Jeffery Mena MD    Expected Discharge Date: 5/13/2025    Reassessment (most recent)       Discharge Reassessment - 05/12/25 1205          Discharge Reassessment    Assessment Type Discharge Planning Reassessment     Did the patient's condition or plan change since previous assessment? No     Discharge Plan discussed with: Spouse/sig other     Name(s) and Number(s) Yenny Rose      Communicated COLBY with patient/caregiver Yes     Discharge Plan A Rehab     Discharge Plan B Home     DME Needed Upon Discharge  lift device;hospital bed;wheelchair (P)      Transition of Care Barriers None (P)      Why the patient remains in the hospital Requires continued medical care (P)         Post-Acute Status    Patient choice form signed by patient/caregiver List with quality metrics by geographic area provided (P)      Discharge Delays None known at this time (P)                    SW met with Pt at bedside.  [facility] is following.  Pt is not medically ready.   SW will continue to monitor pt needs.     Discharge Plan A and Plan B have been determined by review of patient's clinical status, future medical and therapeutic needs, and coverage/benefits for post-acute care in coordination with multidisciplinary team members.    Zamzam Tellez MSW, EVELYNW  [facility]  Case Management Dept.

## 2025-05-12 NOTE — NURSING
Patient is awake and alert, arrived in room via bed.Oriented to room and to unit.No c/o pain or discomfort.Bed is in low position, bed rails up x3, call light within reach and bed alarm on.Instructed patient to call for assistance.Spouse remains at bedside.

## 2025-05-12 NOTE — PLAN OF CARE
"[facility] Care Plan    POC reviewed with Colt Rose and family at 0300. Patient and Family verbalized understanding. Questions and concerns addressed. No acute events today. Pt progressing toward goals. Will continue to monitor. See below and flowsheets for full assessment and VS info.     - Possible stepdown today  - Labetalol given x2  - PRN Robaxin given x1      Is this a stroke patient? no    Neuro:  Nader Coma Scale  Best Eye Response: 4-->(E4) spontaneous  Best Motor Response: 6-->(M6) obeys commands  Best Verbal Response: 5-->(V5) oriented  [place] Coma Scale Score: 15  Assessment Qualifiers: no eye obstruction present, patient not sedated/intubated  Pupil PERRLA: yes     24 hr Temp:  [97.6 °F (36.4 °C)-98.5 °F (36.9 °C)]     CV:   Rhythm: normal sinus rhythm  BP goals:   SBP < 160  MAP > 65    Resp:      Oxygen Concentration (%): 21    Plan: N/A    GI/:     Diet/Nutrition Received: mechanical/dental soft  Last Bowel Movement: 05/10/25  Voiding Characteristics: external catheter    Intake/Output Summary (Last 24 hours) at 5/12/2025 0551  Last data filed at 5/12/2025 0501  Gross per 24 hour   Intake 2035.36 ml   Output 1840 ml   Net 195.36 ml     Unmeasured Output  Unmeasured Urine Occurrence: 1 (pt accidentally pulled off condom cath, bed saturated with urine)  Unmeasured Stool Occurrence: 0  Pad Count: 3    Labs/Accuchecks:  Recent Labs   Lab 05/12/25  0016   WBC 7.90   RBC 4.17*   HGB 13.0*   HCT 40.3         Recent Labs   Lab 05/12/25  0016   *   K 4.0   CO2 24   *   BUN 34*   CREATININE 0.7   ALKPHOS 83   ALT 72*   AST 43   BILITOT 0.6    No results for input(s): "PROTIME", "INR", "APTT", "HEPANTIXA" in the last 168 hours. No results for input(s): "CPK", "CPKMB", "TROPONINI", "MB" in the last 168 hours.    Electrolytes: N/A - electrolytes WDL  Accuchecks: none    Gtts:      LDA/Wounds:    Mir Risk Assessment  Sensory Perception: 2-->very limited  Moisture: 3-->occasionally " moist  Activity: 1-->bedfast  Mobility: 2-->very limited  Nutrition: 3-->adequate  Friction and Shear: 2-->potential problem  Mir Score: 13  Is your mir score 12 or less? no

## 2025-05-13 VITALS
BODY MASS INDEX: 27.94 KG/M2 | SYSTOLIC BLOOD PRESSURE: 129 MMHG | TEMPERATURE: 98 F | RESPIRATION RATE: 18 BRPM | WEIGHT: 195.13 LBS | DIASTOLIC BLOOD PRESSURE: 73 MMHG | HEART RATE: 68 BPM | HEIGHT: 70 IN | OXYGEN SATURATION: 95 %

## 2025-05-13 PROBLEM — R20.0 SENSORY LOSS: Status: ACTIVE | Noted: 2025-05-13

## 2025-05-13 PROBLEM — F32.A DEPRESSION: Status: RESOLVED | Noted: 2025-05-12 | Resolved: 2025-05-13

## 2025-05-13 PROBLEM — E87.0 HYPERNATREMIA: Status: ACTIVE | Noted: 2025-05-13

## 2025-05-13 PROBLEM — D68.318 HEMORRHAGIC DISORDER DUE TO ANTITHROMBINEMIA: Status: ACTIVE | Noted: 2025-05-13

## 2025-05-13 PROBLEM — Z85.828 HISTORY OF SKIN CANCER: Status: ACTIVE | Noted: 2025-05-13

## 2025-05-13 PROBLEM — I61.5 IVH (INTRAVENTRICULAR HEMORRHAGE): Status: ACTIVE | Noted: 2025-05-13

## 2025-05-13 PROBLEM — G93.5 BRAIN COMPRESSION: Status: ACTIVE | Noted: 2025-05-13

## 2025-05-13 PROBLEM — G81.90 HEMIPLEGIA: Status: ACTIVE | Noted: 2025-05-04

## 2025-05-13 PROBLEM — F32.A DEPRESSION: Status: ACTIVE | Noted: 2025-05-12

## 2025-05-13 PROBLEM — Z74.09 OTHER REDUCED MOBILITY: Status: ACTIVE | Noted: 2025-05-13

## 2025-05-13 LAB
ABSOLUTE EOSINOPHIL (OHS): 0.05 K/UL
ABSOLUTE MONOCYTE (OHS): 0.69 K/UL (ref 0.3–1)
ABSOLUTE NEUTROPHIL COUNT (OHS): 4.88 K/UL (ref 1.8–7.7)
ALBUMIN SERPL BCP-MCNC: 2.9 G/DL (ref 3.5–5.2)
ALP SERPL-CCNC: 90 UNIT/L (ref 40–150)
ALT SERPL W/O P-5'-P-CCNC: 74 UNIT/L (ref 10–44)
ANION GAP (OHS): 10 MMOL/L (ref 8–16)
AST SERPL-CCNC: 37 UNIT/L (ref 11–45)
BASOPHILS # BLD AUTO: 0.06 K/UL
BASOPHILS NFR BLD AUTO: 0.8 %
BILIRUB SERPL-MCNC: 0.7 MG/DL (ref 0.1–1)
BUN SERPL-MCNC: 35 MG/DL (ref 8–23)
CALCIUM SERPL-MCNC: 8.9 MG/DL (ref 8.7–10.5)
CHLORIDE SERPL-SCNC: 112 MMOL/L (ref 95–110)
CO2 SERPL-SCNC: 22 MMOL/L (ref 23–29)
CREAT SERPL-MCNC: 0.7 MG/DL (ref 0.5–1.4)
ERYTHROCYTE [DISTWIDTH] IN BLOOD BY AUTOMATED COUNT: 13.1 % (ref 11.5–14.5)
GFR SERPLBLD CREATININE-BSD FMLA CKD-EPI: >60 ML/MIN/1.73/M2
GLUCOSE SERPL-MCNC: 107 MG/DL (ref 70–110)
HCT VFR BLD AUTO: 41.5 % (ref 40–54)
HGB BLD-MCNC: 13.4 GM/DL (ref 14–18)
IMM GRANULOCYTES # BLD AUTO: 0.03 K/UL (ref 0–0.04)
IMM GRANULOCYTES NFR BLD AUTO: 0.4 % (ref 0–0.5)
LYMPHOCYTES # BLD AUTO: 1.71 K/UL (ref 1–4.8)
MAGNESIUM SERPL-MCNC: 2.1 MG/DL (ref 1.6–2.6)
MCH RBC QN AUTO: 30.9 PG (ref 27–31)
MCHC RBC AUTO-ENTMCNC: 32.3 G/DL (ref 32–36)
MCV RBC AUTO: 96 FL (ref 82–98)
NUCLEATED RBC (/100WBC) (OHS): 0 /100 WBC
PHOSPHATE SERPL-MCNC: 3.6 MG/DL (ref 2.7–4.5)
PLATELET # BLD AUTO: 236 K/UL (ref 150–450)
PMV BLD AUTO: 10.7 FL (ref 9.2–12.9)
POTASSIUM SERPL-SCNC: 3.9 MMOL/L (ref 3.5–5.1)
PROT SERPL-MCNC: 6.8 GM/DL (ref 6–8.4)
RBC # BLD AUTO: 4.34 M/UL (ref 4.6–6.2)
RELATIVE EOSINOPHIL (OHS): 0.7 %
RELATIVE LYMPHOCYTE (OHS): 23 % (ref 18–48)
RELATIVE MONOCYTE (OHS): 9.3 % (ref 4–15)
RELATIVE NEUTROPHIL (OHS): 65.8 % (ref 38–73)
SODIUM SERPL-SCNC: 144 MMOL/L (ref 136–145)
SODIUM SERPL-SCNC: 145 MMOL/L (ref 136–145)
WBC # BLD AUTO: 7.42 K/UL (ref 3.9–12.7)

## 2025-05-13 PROCEDURE — 80053 COMPREHEN METABOLIC PANEL: CPT

## 2025-05-13 PROCEDURE — 92526 ORAL FUNCTION THERAPY: CPT

## 2025-05-13 PROCEDURE — 25000003 PHARM REV CODE 250

## 2025-05-13 PROCEDURE — 97530 THERAPEUTIC ACTIVITIES: CPT | Mod: CQ

## 2025-05-13 PROCEDURE — 97535 SELF CARE MNGMENT TRAINING: CPT

## 2025-05-13 PROCEDURE — 94660 CPAP INITIATION&MGMT: CPT

## 2025-05-13 PROCEDURE — 36415 COLL VENOUS BLD VENIPUNCTURE: CPT

## 2025-05-13 PROCEDURE — 84100 ASSAY OF PHOSPHORUS: CPT

## 2025-05-13 PROCEDURE — 25000003 PHARM REV CODE 250: Performed by: NURSE PRACTITIONER

## 2025-05-13 PROCEDURE — 99900035 HC TECH TIME PER 15 MIN (STAT)

## 2025-05-13 PROCEDURE — 27100171 HC OXYGEN HIGH FLOW UP TO 24 HOURS

## 2025-05-13 PROCEDURE — 27000190 HC CPAP FULL FACE MASK W/VALVE

## 2025-05-13 PROCEDURE — 84295 ASSAY OF SERUM SODIUM: CPT

## 2025-05-13 PROCEDURE — 85025 COMPLETE CBC W/AUTO DIFF WBC: CPT | Performed by: NURSE PRACTITIONER

## 2025-05-13 PROCEDURE — 83735 ASSAY OF MAGNESIUM: CPT

## 2025-05-13 PROCEDURE — 97110 THERAPEUTIC EXERCISES: CPT | Mod: CQ

## 2025-05-13 PROCEDURE — 92507 TX SP LANG VOICE COMM INDIV: CPT

## 2025-05-13 PROCEDURE — 97112 NEUROMUSCULAR REEDUCATION: CPT

## 2025-05-13 RX ORDER — ENOXAPARIN SODIUM 100 MG/ML
40 INJECTION SUBCUTANEOUS EVERY 24 HOURS
Status: ON HOLD
Start: 2025-05-13

## 2025-05-13 RX ORDER — ONDANSETRON HYDROCHLORIDE 2 MG/ML
4 INJECTION, SOLUTION INTRAVENOUS EVERY 8 HOURS PRN
Status: ON HOLD
Start: 2025-05-13

## 2025-05-13 RX ORDER — HYDRALAZINE HYDROCHLORIDE 100 MG/1
100 TABLET, FILM COATED ORAL EVERY 8 HOURS
Status: ON HOLD
Start: 2025-05-13 | End: 2026-05-13

## 2025-05-13 RX ORDER — LIDOCAINE 50 MG/G
1 PATCH TOPICAL DAILY
Status: ON HOLD
Start: 2025-05-13

## 2025-05-13 RX ORDER — SILODOSIN 4 MG/1
4 CAPSULE ORAL DAILY
Status: ON HOLD
Start: 2025-05-14 | End: 2026-05-14

## 2025-05-13 RX ORDER — FLUOXETINE 20 MG/1
20 CAPSULE ORAL DAILY
Status: ON HOLD
Start: 2025-05-14 | End: 2026-05-14

## 2025-05-13 RX ORDER — LOSARTAN POTASSIUM 100 MG/1
100 TABLET ORAL DAILY
Status: ON HOLD
Start: 2025-05-14 | End: 2026-05-14

## 2025-05-13 RX ORDER — MUPIROCIN 20 MG/G
OINTMENT TOPICAL 2 TIMES DAILY
Status: ON HOLD
Start: 2025-05-13

## 2025-05-13 RX ORDER — MUPIROCIN 20 MG/G
OINTMENT TOPICAL 2 TIMES DAILY
Status: DISCONTINUED | OUTPATIENT
Start: 2025-05-13 | End: 2025-05-13 | Stop reason: HOSPADM

## 2025-05-13 RX ORDER — AMOXICILLIN 250 MG
1 CAPSULE ORAL 2 TIMES DAILY
Status: ON HOLD
Start: 2025-05-13

## 2025-05-13 RX ADMIN — HYDRALAZINE HYDROCHLORIDE 100 MG: 50 TABLET ORAL at 01:05

## 2025-05-13 RX ADMIN — SILODOSIN 4 MG: 4 CAPSULE ORAL at 08:05

## 2025-05-13 RX ADMIN — HYPROMELLOSE 2910 1 DROP: 5 SOLUTION OPHTHALMIC at 08:05

## 2025-05-13 RX ADMIN — LIDOCAINE 1 PATCH: 50 PATCH CUTANEOUS at 01:05

## 2025-05-13 RX ADMIN — MUPIROCIN: 20 OINTMENT TOPICAL at 09:05

## 2025-05-13 RX ADMIN — TRIAMCINOLONE ACETONIDE: 1 OINTMENT TOPICAL at 08:05

## 2025-05-13 RX ADMIN — FLUOXETINE HYDROCHLORIDE 20 MG: 20 CAPSULE ORAL at 08:05

## 2025-05-13 RX ADMIN — LOSARTAN POTASSIUM 100 MG: 50 TABLET, FILM COATED ORAL at 08:05

## 2025-05-13 RX ADMIN — HYDRALAZINE HYDROCHLORIDE 100 MG: 50 TABLET ORAL at 05:05

## 2025-05-13 NOTE — PT/OT/SLP PROGRESS
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  He presents with consistent assistance needed for self-care and mobility activities. Pt presents with good motivation and participation in OT session with mildly increased lethargy displayed. Pt with decreased verbalization attempts t/o session though with eyes open 95% of session and pt following commands appropriately. Performance deficits affecting function are weakness, gait instability, decreased upper extremity function, decreased ROM, impaired endurance, impaired balance, impaired coordination, decreased lower extremity function, impaired sensation, visual deficits, decreased safety awareness, impaired fine motor, impaired self care skills, pain, impaired functional mobility, decreased coordination, abnormal tone. Pt remains limited by LSW, decreased L attention, and decreased postural control. Pt benefiting from assistance to place RUE in lap to prevent L pushing. Pt also observed to track to midline though no crossing midline to L observed. Patient has demonstrated sufficient progression to warrant high intensity therapy evidenced by objectives noted below.     Co-treatment with PT performed for patient safety, education, and facilitation of treatment to maximize activity tolerance and progression towards goals from two skilled therapy disciplines.      Rehab Prognosis:  Good; patient would benefit from acute skilled OT services to address these deficits and reach maximum level of  function.       Plan:     Patient to be seen 4 x/week to address the above listed problems via self-care/home management, therapeutic activities, therapeutic exercises, neuromuscular re-education  Plan of Care Expires: 25  Plan of Care Reviewed with: patient    Subjective     Chief Complaint: n/a  Patient/Family Comments/goals: increase independence  Pain/Comfort:  Pain Rating 1: 3/10  Location - Side 1: Bilateral  Location - Orientation 1: lower  Location 1: back  Pain Addressed 1: Reposition, Distraction  Pain Rating Post-Intervention 1: 3/10    Objective:     Communicated with: nursing prior to session.  Patient found HOB elevated with telemetry, bed alarm, Condom Catheter upon OT entry to room.    General Precautions: Standard, fall    Orthopedic Precautions:N/A  Braces: N/A  Respiratory Status: Room air     Occupational Performance:     Bed Mobility:    Patient completed Rolling/Turning to Left with  maximal assistance  Patient completed Scooting/Bridging with maximal assistance and 2 persons  Patient completed Supine to Sit with maximal assistance and 2 persons and assistance at trunk/ BLEs - pt attempting to assist with RLE though with difficulty observed  Patient completed Sit to Supine with maximal assistance and 2 persons  Pt seated EOB with moderate to maximal assistance 2/2 RUE pushing to L with maximal tactile and v/cs to decrease L pushing and increase midline orientation t/o session, pt encouraged to utilize visual feedback to increase midline orientation   While seated EOB pt completin modified sit ups with RUE HHA and moderate assistance with maximal tactile cues to increase core muscle activation  5 dynamic reaching with RUE across midline to visual target with maximal assistance to maintain postural control with maximal v/cs to return to midline between reps    Functional Mobility/Transfers:  Patient completed Sit <> Stand Transfer with maximal assistance and of 2 persons  with   hand-held assist and L knee blocking with maximal tactile and v/cs to increase upright posture, pt maintaining static standing with maximal assistance of 2 persons     Activities of Daily Living:  Grooming: moderate assistance for container management 2/2 LUE deficits with pt assisted to utilize LUE for functional assistance to complete teeth brushing while seated EOB - pt with appropriate sequencing and thoroughness; pt completing face washing while seated EOB with set up assistance and moderate assistance for postural control   Lower Body Dressing: total assistance to alexi/doff footwear while supine and HOB elevated  Toileting: maximal assistance for rear pericare 2/2 minimal BM incontinence      AMPA 6 Click ADL: 12    Treatment & Education:  Session this date targeted self-care and neuromuscular re-education of postural control to increase pt's independence  Pt educated on OT roles, POC, call button for assistance  Pt educated on importance of OOB activity with staff assistance  Pt educated on importance of LUE positioning for joint protection    Patient left HOB elevated with all lines intact, call button in reach, bed alarm on, nursing notified, and spouse present    GOALS:   Multidisciplinary Problems       Occupational Therapy Goals          Problem: Occupational Therapy    Goal Priority Disciplines Outcome Interventions   Occupational Therapy Goal     OT, PT/OT Progressing    Description: Goals to be met by: 6/5/25     Patient will increase functional independence with ADLs by performing:    UE Dressing with Moderate Assistance.  LE Dressing with Moderate Assistance and Assistive Devices as needed.  Grooming while seated with Stand-by Assistance.  Toileting from bedside commode with Moderate Assistance for hygiene and clothing management.   Sitting at edge of bed x10 minutes with Moderate Assistance.  Supine to sit with Moderate Assistance.  Stand pivot transfers with Moderate Assistance.  Toilet transfer  to bedside commode with Moderate Assistance.  Increased functional strength to 1/5 for LUE.  Upper extremity exercise program x10 reps per handout, with assistance as needed.                         DME Justifications:  Colt requires a hospital bed due to him requiring positioning of the body in ways not feasible with an ordinary bed due to limited ability and cannot independently make changes in body position without the use of the bed.The positioning of the body cannot be sufficiently resolved by the use of pillows and wedges.  Colt Rose has a mobility limitation that significantly impairs his ability to participate in one or more mobility related activities of daily living (MRADLs) such as toileting, feeding, dressing, grooming, and bathing in customary locations in the home.  The mobility limitation cannot be sufficiently resolved by the use of a cane or walker.   The use of a manual wheelchair will significantly improve the patients ability to participate in MRADLS and the patient will use it on regular basis in the home.  Colt Rose has expressed his willingness to use a manual wheelchair in the home. Patients upper body strength is sufficient for propulsion.  He also has a caregiver who is available, willing, and able to provide assistance with the wheelchair when needed.      Time Tracking:     OT Date of Treatment: 05/13/25  OT Start Time: 0833  OT Stop Time: 0907  OT Total Time (min): 34 min    Billable Minutes:Self Care/Home Management 20  Neuromuscular Re-education 14    OT/REED: OT          5/13/2025

## 2025-05-13 NOTE — PT/OT/SLP PROGRESS
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    Recommendations:     General Recommendations:  Dysphagia therapy, Speech/language therapy, and Cognitive-linguistic therapy  Diet recommendations:  Puree Diet - IDDSI Level 4, Liquid Diet Level: Moderately thick/Honey thick liquids - IDDSI Level 3   Aspiration Precautions:   1 bite/sip at a time  Alternating bites/sips-final presentation to be puree when able  Assistance with meals and Assistance with thickening liquids  Check for pocketing/oral residue  Double swallow with each bite/sip  Eliminate distractions  Feed only when awake/alert   HOB to 90 degrees   Ice chips sparingly  Meds crushed in puree  Puree for pleasure  Small bites/sips  Strict aspiration precautions   General Precautions: Standard, aspiration, fall, pureed diet, honey thick  Communication strategies:  provide increased time to answer and go to room if call light pushed  Assessment:     Colt Rose is a 69 y.o. male with an SLP diagnosis of Dysphagia, Dysarthria, Cognitive-Linguistic Impairment, and Visio-Spatial Impairment.      Subjective     "He just seems slower."    Pain/Comfort:  Pain Rating 1: 0/10  Pain Rating Post-Intervention 1: 0/10    Respiratory Status: Room air    Objective:     Has the patient been evaluated by SLP for swallowing?   Yes  Keep patient NPO? No     Pt seen bedside with spouse present, he was alert though appeared fatigued.  He answered simple open ended questions given increased time.  Vocal intensity was low with mild articulatory imprecision.  Intelligibility was adequate given careful listening.  No significant change in speech noted.  Continued L gaze evident. Spouse reports increased midline head positioning and attention to L last evening.  She also reports fair-good intake over last day with occasional throat clear/cough noted.  She " assists with feeding 2/2 pt with fast rate of feeding and large bites taken.  Pt accepted 4 oz of puree and approximately 2 oz of honey thick liquids with delayed cough response x1.  Nectar thick liquids presented via tsp x6 with wet vocal quality/throat clear noted on final presentation. SLP reviewed current diet recs, ongoing swallow precs, effortful swallow intermittently during PO though focus on exercise outside of meals to avoid fatigue, overt s/s aspiration, and POC.  All questions addressed within SLP scope. Wife without additional questions. Stroke team entered at end of session.      Goals:   Multidisciplinary Problems       SLP Goals          Problem: SLP    Goal Priority Disciplines Outcome   SLP Goal     SLP Progressing   Description: Goals due 5/12  1.  Pt. Will participate in ongoing assessment of swallow at bedside -met  2.  Pt. Will participate in speech, language and cognitive assessment -met    Short Term Goals:   1. Pt will tolerate pureed diet and honey thick liquids with aspiration precautions in place and no overt s/s of aspiration.  2. Pt will complete dysphagia therapy exercises give min cues.   3. Pt will follow multi-unit commands with 75% acc given 1 repetition.   4. Pt will attend to therapist past midline on left side for 1+ minute.  5. Pt will verbalize/demonstrate speech strategies in conversation.                        Plan:     Patient to be seen:  4 x/week   Plan of Care expires:  06/02/25  Plan of Care reviewed with:  patient, spouse   SLP Follow-Up:  Yes       Discharge recommendations:  High Intensity Therapy   Barriers to Discharge:  Level of Skilled Assistance Needed      Time Tracking:     SLP Treatment Date:   05/13/25  Speech Start Time:  1005  Speech Stop Time:  1029     Speech Total Time (min):  24 min    Billable Minutes: Speech Therapy Individual 8, Treatment Swallowing Dysfunction 8, and Self Care/Home Management Training 8    05/13/2025

## 2025-05-13 NOTE — DISCHARGE SUMMARY
Camacho Kurtz - Neurosurgery ([facility])  Vascular Neurology  Comprehensive Stroke Center  Discharge Summary     Summary:     Admit Date: 5/4/2025  7:18 PM    Discharge Date and Time: 05/13/2025 3:08 PM    Attending Physician: Judy Perez MD     Discharge Provider: Ruddy Mdeina MD    History of Present Illness: Colt Rose is a 69 y.o. male with a significant medical history of HLD, cervical and lumbar DDD, pre-diabetes who presents to the hospital as a transfer from [facility] for evaluation of R ICH. HPI gathered from review of the patient's medical record, family at the bedside. Due to the patient being very drowsy, not fully aware of preceding events. The patient was in his usual state of health and with family when he acutely developed LSW and R gaze preference around 4 pm today (5/4/2025). He was brought to the OSH ED where a CTH was obtained and showed a 3.3 x 2.4 cm R frontoparietal ICH w/mild vasogenic edema. The patient was transferred to [facility] for higher level of care, further evaluation.          Hospital Course (synopsis of major diagnoses, care, treatment, and services provided during the course of the hospital stay): 69M with hx of SVT s/p ablation, HLD, PreDM, ARIS, DDD, GERD who was admitted to [facility] as a transfer from [place] for evaluation and management of R ICH. Pt presented to [place] with acute onset LEFT upper and lower extremity weakness, and a marked RIGHT gaze preference which started on 5/4 at ~16:00. Workup at OSH w/ CT head revealed acute R frontoparietal Intraparenchymal hemorrhage for which pt transferred to [facility] for higher level of care, NSGY consultation, and further workup; (f/u imaging with CTA did not reveal aneurysm, AVM, or other structural abnormality explaining his hemorrhage). Further imaging w/ MRI showed increased size of IPH with increased mild midline shift and pt was initiated on intermittent bolus' of hypertonic saline for control of  cerebral edema. Neurosurgery consulted and recommended against surgical intervention; plan to follow closely with serial CT head imaging to assess stability of bleed and intervene if necessary. There was initial concern for eppileptiform activity on his EEG monitoring, this was reviewed and it was felt to not represent seizure --> Anti-epilleptics were held. Hospital course has also been complicated by resistant hypertension and episodic flushing, initially thought to be in reaction to starting amlodipine. Renal artery ultrasound was negative and urine metanephrines are pending for workup of possible pheochromocytoma vs carcinoid tumr. Blood pressure was managed with cardene gtt with transition to oral regimen with IV PRNs. Exam has improved over the course of admission in terms of alertness and ability to follow commands. Pt's clinical exam remained stable and serial imaging notable for stable bleed size; Na levels stabilized (goal sodium >140) and when pt was no longer requiring hypertonic saline, he stepped down to neuro hospital floor on 5/12. Pt evaluated by PT/OT and SLP throughout stay. Upon working with SLP, he progressed to tolerating a diet (pureed, nectar thick) on 5/11. Pt remains 0/5 in LUE/LLE and on PT/OT assessment, felt pt would benefit from high-intensity therapy services. Pt was accepted by [facility] and deemed medically stable for hospital discharge to rehab facility on 5/13. Pt instructed to follow up with PCP and Vascular Neurology. Will need f/u imaging with MRI Brain (in approximately 6 weeks as swelling resolves), to assess for underlying etiology of bleed.      Goals of Care Treatment Preferences:  Code Status: Full Code      Stroke Etiology: Nontraumatic Crotical Hemorrhage of Right Cerebral Hemisphere    STROKE DOCUMENTATION         NIH Scale:  1a. Level of Consciousness: 0-->Alert, keenly responsive  1b. LOC Questions: 0-->Answers both questions correctly  1c. LOC Commands:  0-->Performs both tasks correctly  2. Best Gaze: 1-->Partial gaze palsy, gaze is abnormal in one or both eyes, but forced deviation or total gaze paresis is not present  3. Visual: 1-->Partial hemianopia  4. Facial Palsy: 2-->Partial paralysis (total or near-total paralysis of lower face)  5a. Motor Arm, Left: 3-->No effort against gravity, limb falls  5b. Motor Arm, Right: 0-->No drift, limb holds 90 (or 45) degrees for full 10 secs  6a. Motor Leg, Left: 3-->No effort against gravity, leg falls to bed immediately  6b. Motor Leg, Right: 0-->No drift, leg holds 30 degree position for full 5 secs  7. Limb Ataxia: 0-->Absent  8. Sensory: 1-->Mild-to-moderate sensory loss, patient feels pinprick is less sharp or is dull on the affected side, or there is a loss of superficial pain with pinprick, but patient is aware of being touched  9. Best Language: 0-->No aphasia, normal  10. Dysarthria: 1-->Mild-to-moderate dysarthria, patient slurs at least some words and, at worst, can be understood with some difficulty  11. Extinction and Inattention (formerly Neglect): 1-->Visual, tactile, auditory, spatial, or personal inattention or extinction to bilateral simultaneous stimulation in one of the sensory modalities  Total (NIH Stroke Scale): 13        Modified Sherrard Score: 4  Nader Coma Scale:    ABCD2 Score:    [id number] Score:   HAS -BLED Score:   ICH Score:1  Hunt & Hull Classification:       Assessment/Plan:     Diagnostic Results:    Brain Imaging   CTH 5/11/25  Impression:  -Overall stable in size of acute lobar hematoma in the right frontal lobe with minimal increase in mass effect.  Continued short-term follow-up is suggested.  -Associated intraventricular blood products and subarachnoid blood products remain unchanged.  -No new hemorrhage.     MRI Brain without contrast 5/5/25  Impression:  -Increase in size of intraparenchymal hemorrhage with increased mild midline shift.  No hydrocephalus.     CTH  5/4/25  Impression:  -3.3 x 2.4 cm intracranial hemorrhage within the right frontoparietal region with mild vasogenic edema.  There is no midline shift     Vessel Imaging   CTA Head/Neck 5/4/25  Impression:   1. Acute right frontal intraparenchymal hematoma with mild adjacent edema is similar to the recent prior study.  Follow-up recommended.  2. No high-grade stenosis or major vessel occlusion.     Cardiac Imaging   TTE 5/5/25    Left Ventricle: The left ventricle is normal in size. Ventricular mass is normal. Normal wall thickness. There is normal systolic function with a visually estimated ejection fraction of 60 - 65%. There is diastolic dysfunction but grade cannot be determined. Normal left ventricular filling pressure.    Right Ventricle: The right ventricle is normal in size. Wall thickness is normal. Systolic function is normal.    Left Atrium: Moderately dilated    Right Atrium: Normal right atrial size.    Aortic Valve: The aortic valve is a trileaflet valve. There is mild aortic valve sclerosis. Mildly calcified cusps.    Tricuspid Valve: There is mild to moderate regurgitation with a centrally directed jet.    Aorta: The aortic root is normal in size measuring 3.74 cm. The ascending aorta is normal in size measuring 3.0 cm.    Pulmonary Artery: There is mild pulmonary hypertension. The estimated pulmonary artery systolic pressure is 42 mmHg.    IVC/SVC: Intermediate venous pressure at 8 mmHg.      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  Service: Vascular Neurology      Recommendations:     Post-discharge complication risks: Falls, Pneumonia    Stroke Education given to: patient and family    Follow-up in Stroke Clinic in 4-6 weeks.     Discharge Plan:  Statin: Atorvastatin 20mg    Follow Up:   Contact information for after-discharge care       Destination       [facility] .    Service: Inpatient Rehabilitation  Contact information:  [address] [address] [facility] [address]  [phone]                                   Patient Instructions:      Ambulatory referral/consult to Vascular Neurology   Standing Status: Future   Referral Priority: Routine Referral Type: Consultation   Referral Reason: Specialty Services Required   Requested Specialty: Vascular Neurology   Number of Visits Requested: 1       Medications:  Reconciled Home Medications:      Medication List        PAUSE taking these medications      FISH OIL ORAL  Wait to take this until your doctor or other care provider tells you to start again.  Take 1 capsule by mouth once daily.     magnesium oxide 400 mg (241.3 mg magnesium) tablet  Wait to take this until your doctor or other care provider tells you to start again.  Commonly known as: MAG-OX  Take 1 tablet (400 mg total) by mouth once daily.     multivitamin per tablet  Wait to take this until your doctor or other care  provider tells you to start again.  Commonly known as: THERAGRAN  Take 1 tablet by mouth once daily.     omeprazole 40 MG capsule  Wait to take this until your doctor or other care provider tells you to start again.  Commonly known as: PRILOSEC  Take 1 capsule (40 mg total) by mouth every morning.     pregabalin 100 MG capsule  Wait to take this until your doctor or other care provider tells you to start again.  Commonly known as: LYRICA  Take 100 mg by mouth once daily.            START taking these medications      artificial tears 0.5 % ophthalmic solution  Commonly known as: ISOPTO TEARS  Place 1 drop into both eyes 2 (two) times a day.     enoxaparin 40 mg/0.4 mL Syrg  Commonly known as: LOVENOX  Inject 0.4 mLs (40 mg total) into the skin Q24H (prophylaxis, 1700).     FLUoxetine 20 MG capsule  Take 1 capsule (20 mg total) by mouth once daily.  Start taking on: May 14, 2025     hydrALAZINE 100 MG tablet  Commonly known as: APRESOLINE  Take 1 tablet (100 mg total) by mouth every 8 (eight) hours.     LIDOcaine 5 %  Commonly known as: LIDODERM  Place 1 patch onto the skin once daily. Remove & Discard patch within 12 hours or as directed by MD     losartan 100 MG tablet  Commonly known as: COZAAR  Take 1 tablet (100 mg total) by mouth once daily.  Start taking on: May 14, 2025     mupirocin 2 % ointment  Commonly known as: BACTROBAN  by Nasal route 2 (two) times daily.     ondansetron 4 mg/2 mL Soln  Inject 4 mg into the vein every 8 (eight) hours as needed.     senna-docusate 8.6-50 mg per tablet  Commonly known as: PERICOLACE  Take 1 tablet by mouth 2 (two) times daily.     silodosin 4 mg Cap capsule  Commonly known as: RAPAFLO  Take 1 capsule (4 mg total) by mouth once daily.  Start taking on: May 14, 2025            CONTINUE taking these medications      atorvastatin 20 MG tablet  Commonly known as: LIPITOR  TAKE ONE TABLET BY MOUTH ONCE DAILY     methocarbamoL 500 MG Tab  Commonly known as: Robaxin  TAKE ONE  TABLET BY MOUTH EVERY 8 HOURS AS NEEDED FOR MUSCLE SPASMS     triamcinolone acetonide 0.1% 0.1 % ointment  Commonly known as: KENALOG  Apply topically 2 (two) times daily.            STOP taking these medications      aspirin 81 MG EC tablet  Commonly known as: ECOTRIN     ciclopirox 8 % Soln  Commonly known as: PENLAC     meloxicam 15 MG tablet  Commonly known as: VIRI Medina MD  [facility]  Department of Vascular Neurology   [facility] Neurosurgery ([facility])

## 2025-05-13 NOTE — PLAN OF CARE
"  [facility]    FACILITY TRANSFER ORDERS      Patient Name: Colt Rose  YOB: 1955    PCP: Jeffery Mena MD   PCP Address: [address] [place] [address] / [place] [address]  PCP Phone Number: [phone]  PCP Fax: [phone]    Encounter Date: 05/13/2025    Admit to: Rehab    Vital Signs:  Routine    Diagnoses:   Active Hospital Problems    Diagnosis  POA    *Nontraumatic cortical hemorrhage of right cerebral hemisphere [I61.1]  Yes    Sadness [R45.89]  No    Urinary retention [R33.9]  No    Dysarthria [R47.1]  Yes    Seizure [R56.9]  Yes    Vasogenic brain edema [G93.6]  Yes    Acute left-sided weakness [R53.1]  Yes    Typical atrial flutter [I48.3]  Yes    Prediabetes [R73.03]  Yes    Dyslipidemia [E78.5]  Yes      Resolved Hospital Problems   No resolved problems to display.       Allergies:  Review of patient's allergies indicates:   Allergen Reactions    Amlodipine Other (See Comments)     Flushed face, resolved with Hydroxyzine and Pepcid    Benadryl [diphenhydramine hcl] Other (See Comments)     Per wife "pt collapsed and ended up in hospital" ~1995    Pennsaid [diclofenac sodium] Rash and Blisters       Diet: Diet Pureed (IDDSI Level 4) Honey Thick (Moderately Thick)     Activities: Activity as tolerated    Goals of Care Treatment Preferences:  Code Status: Full Code      Nursing: Per Facility Protocol     Labs: Per Facility Protocol     CONSULTS:    Physical Therapy to evaluate and treat. , Occupational Therapy to evaluate and treat., and Speech Therapy to evaluate and treat for Language, Swallowing, and Cognition.        WOUND CARE ORDERS  - Left posterior thigh blister (intact or ruptured): cleanse with NS, pat dry and apply an aquacel ag foam dressing (dressing silicone ag silver 4x4) to the wound bed every 2 days and prn soilage/saturation  - Turning every 2 hours  - Heel lift boots    Medications: Review discharge medications with patient and family and provide " education.         Medication List        PAUSE taking these medications      FISH OIL ORAL  Wait to take this until your doctor or other care provider tells you to start again.  Take 1 capsule by mouth once daily.     magnesium oxide 400 mg (241.3 mg magnesium) tablet  Wait to take this until your doctor or other care provider tells you to start again.  Commonly known as: MAG-OX  Take 1 tablet (400 mg total) by mouth once daily.     multivitamin per tablet  Wait to take this until your doctor or other care provider tells you to start again.  Commonly known as: THERAGRAN  Take 1 tablet by mouth once daily.     omeprazole 40 MG capsule  Wait to take this until your doctor or other care provider tells you to start again.  Commonly known as: PRILOSEC  Take 1 capsule (40 mg total) by mouth every morning.     pregabalin 100 MG capsule  Wait to take this until your doctor or other care provider tells you to start again.  Commonly known as: LYRICA  Take 100 mg by mouth once daily.            START taking these medications      artificial tears 0.5 % ophthalmic solution  Commonly known as: ISOPTO TEARS  Place 1 drop into both eyes 2 (two) times a day.     enoxaparin 40 mg/0.4 mL Syrg  Commonly known as: LOVENOX  Inject 0.4 mLs (40 mg total) into the skin Q24H (prophylaxis, 1700).     FLUoxetine 20 MG capsule  Take 1 capsule (20 mg total) by mouth once daily.  Start taking on: May 14, 2025     hydrALAZINE 100 MG tablet  Commonly known as: APRESOLINE  Take 1 tablet (100 mg total) by mouth every 8 (eight) hours.     LIDOcaine 5 %  Commonly known as: LIDODERM  Place 1 patch onto the skin once daily. Remove & Discard patch within 12 hours or as directed by MD     losartan 100 MG tablet  Commonly known as: COZAAR  Take 1 tablet (100 mg total) by mouth once daily.  Start taking on: May 14, 2025     mupirocin 2 % ointment  Commonly known as: BACTROBAN  by Nasal route 2 (two) times daily.     ondansetron 4 mg/2 mL Soln  Inject 4 mg  into the vein every 8 (eight) hours as needed.     senna-docusate 8.6-50 mg per tablet  Commonly known as: PERICOLACE  Take 1 tablet by mouth 2 (two) times daily.     silodosin 4 mg Cap capsule  Commonly known as: RAPAFLO  Take 1 capsule (4 mg total) by mouth once daily.  Start taking on: May 14, 2025            CONTINUE taking these medications      atorvastatin 20 MG tablet  Commonly known as: LIPITOR  TAKE ONE TABLET BY MOUTH ONCE DAILY     methocarbamoL 500 MG Tab  Commonly known as: Robaxin  TAKE ONE TABLET BY MOUTH EVERY 8 HOURS AS NEEDED FOR MUSCLE SPASMS     triamcinolone acetonide 0.1% 0.1 % ointment  Commonly known as: KENALOG  Apply topically 2 (two) times daily.            STOP taking these medications      aspirin 81 MG EC tablet  Commonly known as: ECOTRIN     ciclopirox 8 % Soln  Commonly known as: PENLAC     meloxicam 15 MG tablet  Commonly known as: MOBIC                Immunizations Administered as of 5/13/2025       Name Date Dose VIS Date Route Exp Date    COVID-19, MRNA, LN-S, PF ([employer]) 3/26/2021 11:24 AM 0.5 mL 12/19/2020 Intramuscular 12/31/2069    Site: Left deltoid     Given By: Manjula Martins MA     : [employer]     Lot: [id number]     COVID-19 MRNA, LN-S, PF ([employer]) 2/26/2021 12:53 PM 0.5 mL 12/19/2020 Intramuscular 8/5/2021    Site: Left deltoid     Given By: Carmelita Rogers MA     : [employer]     Lot: [id number]             _________________________________  Ruddy Medina MD  05/13/2025

## 2025-05-13 NOTE — NURSING
Patient is discharged to [facility].Called report to nurse receiving patient and answered all questions.

## 2025-05-13 NOTE — PLAN OF CARE
Problem: Adult Inpatient Plan of Care  Goal: Plan of Care Review  Outcome: Progressing  Goal: Patient-Specific Goal (Individualized)  Outcome: Progressing  Goal: Absence of Hospital-Acquired Illness or Injury  Outcome: Progressing  Goal: Optimal Comfort and Wellbeing  Outcome: Progressing  Goal: Readiness for Transition of Care  Outcome: Progressing   Poc and meds reviewed with patient, no signs of pain or discomfort.Bed is in low position, call light within reach and bed alarm on.

## 2025-05-13 NOTE — CARE UPDATE
I have reviewed the chart of Colt Rose who is hospitalized for the following:    Active Hospital Problems    Diagnosis    *Nontraumatic cortical hemorrhage of right cerebral hemisphere    Sensory loss    Other reduced mobility    History of skin cancer    Hypernatremia     Monitor with labs      Brain compression     Noted with mass effect on imaging and edema  Imaging initially revealed increased cerebral edema with mild increase in MLS, managed with hypertonic sodium with goal Na >145.       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

## 2025-05-13 NOTE — CONSULTS
[facility]  Psychology    Treatment Attempt  Patient Name: Colt Rose   MRN: [id number]      Patient working with other hospital staff. Psychology will return tomorrow, 5/14/25.       Charlie Baird, PhD  Dept. of Psychiatry  [facility]

## 2025-05-13 NOTE — PT/OT/SLP PROGRESS
Physical Therapy Treatment  Co Treatment with Occupational Therapy    Patient Name:  Colt Rose   MRN:  [id number]    Recommendations:     Discharge Recommendations: High Intensity Therapy  Discharge Equipment Recommendations: hospital bed, lift device, wheelchair  Barriers to discharge: increased level of assistance    Assessment:     Colt Rose is a 69 y.o. 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  He presents with the following impairments/functional limitations: weakness, gait instability, impaired endurance, impaired balance, decreased lower extremity function, decreased upper extremity function, decreased ROM, impaired self care skills, impaired functional mobility.    Pt met with HOB elevated and agreeable to session. Pt tolerates session well with emphasis on bed mobility, transfers, and sitting balance. Pt continues to be limited by LSW and L inattention. Pt will continue to benefit from skilled therapy services.    Rehab Prognosis: Good; patient would benefit from acute skilled PT services to address these deficits and reach maximum level of function.    Recent Surgery: * No surgery found *      Plan:     During this hospitalization, patient to be seen 4 x/week to address the identified rehab impairments via gait training, therapeutic activities, therapeutic exercises, neuromuscular re-education, wheelchair management/training and progress toward the following goals:    Plan of Care Expires:  05/16/25    Subjective     Chief Complaint: 3/10 lower back pain  Patient/Family Comments/goals: none stated   Pain/Comfort:  Pain Rating 1: 3/10  Location - Side 1: Bilateral  Location - Orientation 1: lower  Location 1: back  Pain Addressed 1: Reposition, Distraction  Pain Rating Post-Intervention 1: other (see comments) (no further mention of pain)      Objective:     Communicated with RN Storm) prior to session.  Patient found HOB elevated  with telemetry, pressure relief boots, bed alarm upon PTA entry to room.     General Precautions: Standard, fall  Orthopedic Precautions: N/A  Braces: N/A  Respiratory Status: Room air     Functional Mobility:  Bed Mobility:     Rolling Left:  maximal assistance  Scooting: anteriorly to EOB maximal assistance  Supine to Sit: maximal assistance x2 persons for Trunk/BLEs  Sit to Supine: maximal assistance x2 persons for Trunk/BLEs  Transfers:     Sit to Stand:  x2 trials from EOB maximal assistance x2 persons  with B hand-held assist  Balance:   Sitting Balance at EOB:  Level of Assist Required: Moderate Assistance-Maximum Assistance  Deviations noted: poor trunk control, L lateral lean,  RUE pushing  Verbal and tactile cues required for upright posture, correction to midline sitting.  Assistance required for RUE positioning to decrease pushing  Total Time Sitting EOB: ~20 minutes    AM-PAC 6 CLICK MOBILITY  Turning over in bed (including adjusting bedclothes, sheets and blankets)?: 2  Sitting down on and standing up from a chair with arms (e.g., wheelchair, bedside commode, etc.): 2  Moving from lying on back to sitting on the side of the bed?: 2  Moving to and from a bed to a chair (including a wheelchair)?: 2  Need to walk in hospital room?: 1  Climbing 3-5 steps with a railing?: 1  Basic Mobility Total Score: 10       Treatment & Education:  Patient provided with daily orientation and goals of this PT session.     Activities completed at EOB:  X5 modified situps  X5 dynamic reaches     Pt educated to call for assistance and to transfer with hospital staff only.  Also, pt was educated on the effects of prolonged immobility and the importance of performing OOB activity and exercises to promote healing and reduce recovery time.    Co tx performed with OT due to patient need for 2 skilled therapist to ensure patient and staff safety and to accommondate for activity tolerance.    Patient left HOB elevated with all  lines intact, call button in reach, bed alarm on, RN notified, and Wife present.    GOALS:   Multidisciplinary Problems       Physical Therapy Goals          Problem: Physical Therapy    Goal Priority Disciplines Outcome Interventions   Physical Therapy Goal     PT, PT/OT Progressing    Description: Goals to be completed by: 5/16/25    Pt will perform sup<>sit transfers w/ moderate assistance  Pt will have sufficient dynamic balance to sit EOB while performing ADLs/therex w/ minimum assistance  Pt will be able to stand up from EOB w/ moderate assistance using LRAD  Pt will transfer from bed to chair w/ moderate assistance using LRAD  Pt will be independent w/ HEP therex on BLE w/ good form and ROM                        DME Justifications:  Colt requires a hospital bed due to him requiring positioning of the body in ways not feasible with an ordinary bed to alleviate pain and due to limited ability and cannot independently make changes in body position without the use of the bed.The positioning of the body cannot be sufficiently resolved by the use of pillows and wedges.  Colt Rose has a mobility limitation that significantly impairs his ability to participate in one or more mobility related activities of daily living (MRADLs) such as toileting, feeding, dressing, grooming, and bathing in customary locations in the home.  The mobility limitation cannot be sufficiently resolved by the use of a cane or walker.   Patients upper body strength is not sufficient to propel a standard WC. The use of a lightweight wheelchair will significantly improve the patients ability to participate in MRADLS and the patient will use it on regular basis in the home.   He has a caregiver who is available, willing, and able to provide assistance with the wheelchair when needed.     Time Tracking:     PT Received On: 05/13/25  PT Start Time: 0833     PT Stop Time: 0907  PT Total Time (min): 34 min     Billable Minutes:  Therapeutic Activity 20 and Therapeutic Exercise 14    Treatment Type: Treatment  PT/PTA: PTA     Number of PTA visits since last PT visit: 1     05/13/2025

## 2025-05-13 NOTE — PLAN OF CARE
[facility]surgery (Hospital)  Discharge Final Note    Primary Care Provider: Jeffery Mena MD    Expected Discharge Date: 5/13/2025      Future Appointments   Date Time Provider Department Center   5/23/2025  2:00 PM Shelby Byrd NP [facility] [place]   7/21/2025  7:00 AM LAB, [facility] [place]   7/28/2025  9:00 AM Flori Root PA-C [facility] [place]   1/20/2026  8:00 AM Jeffery eMna MD [facility] [place]          Final Discharge Note (most recent)       Final Note - 05/13/25 1555          Final Note    Assessment Type Final Discharge Note     Anticipated Discharge Disposition Rehab Facility     Hospital Resources/Appts/Education Provided Appointments scheduled and added to AVS        Post-Acute Status    Post-Acute Authorization Placement     Post-Acute Placement Status Set-up Complete/Auth obtained   [facility]                    Important Message from Medicare             After-discharge care                Destination       *[facility]   Service: Inpatient Rehabilitation    [id number] [address] FLOOR OF [facility] [address]   Phone: [phone]                         Elaine Cade RN  Ext [phone]

## 2025-05-13 NOTE — PLAN OF CARE
Message received from [facility] informing this CM that they are ready for patient. Ambulance transportation arranged for an estimated pick-up time of 14:00PM. The nurse can call report to [phone]. Patient's spouse Yenny notified of above and she expressed understanding.    Elaine Cade RN  Ext [phone]

## 2025-05-14 LAB
5OH-INDOLEACETATE 24H UR-MRATE: 4.5 MG/24 H
COLLECT DURATION TIME UR: 24 H
SPECIMEN VOL 24H UR: 1250 ML

## 2025-05-15 LAB — 5OH-INDOLEACETATE SERPL-MCNC: 5 NG/ML

## 2025-05-16 LAB
COLLECT DURATION TIME UR: 24 H
COLLECT DURATION TIME UR: 24 H
DOPAMINE 24H UR-MRATE: 289 MCG/24 H (ref 65–400)
EPINEPH 24H UR-MRATE: 34 MCG/24 H
METANEPH 24H UR-MRATE: 341 MCG/24 H
METANEPHS 24H UR-MRATE: 1007 MCG/24 H
NOREPINEPH 24H UR-MRATE: 109 MCG/24 H (ref 15–80)
NORMETANEPHRINE 24H UR-MRATE: 666 MCG/24 H
SPECIMEN VOL 24H UR: 1250 ML
SPECIMEN VOL 24H UR: 1250 ML

## 2025-05-23 ENCOUNTER — PATIENT MESSAGE (OUTPATIENT)
Dept: FAMILY MEDICINE | Facility: CLINIC | Age: 70
End: 2025-05-23
Payer: MEDICARE

## 2025-06-23 ENCOUNTER — RESULTS FOLLOW-UP (OUTPATIENT)
Dept: FAMILY MEDICINE | Facility: CLINIC | Age: 70
End: 2025-06-23

## 2025-06-23 ENCOUNTER — LAB VISIT (OUTPATIENT)
Dept: LAB | Facility: HOSPITAL | Age: 70
End: 2025-06-23
Attending: FAMILY MEDICINE
Payer: MEDICARE

## 2025-06-23 ENCOUNTER — OFFICE VISIT (OUTPATIENT)
Dept: FAMILY MEDICINE | Facility: CLINIC | Age: 70
End: 2025-06-23
Payer: MEDICARE

## 2025-06-23 VITALS
TEMPERATURE: 98 F | RESPIRATION RATE: 17 BRPM | HEART RATE: 74 BPM | SYSTOLIC BLOOD PRESSURE: 124 MMHG | DIASTOLIC BLOOD PRESSURE: 60 MMHG | BODY MASS INDEX: 27.99 KG/M2 | OXYGEN SATURATION: 96 % | HEIGHT: 70 IN

## 2025-06-23 DIAGNOSIS — G81.90 HEMIPLEGIA, UNSPECIFIED ETIOLOGY, UNSPECIFIED HEMIPLEGIA TYPE, UNSPECIFIED LATERALITY: ICD-10-CM

## 2025-06-23 DIAGNOSIS — R19.5 LOOSE STOOLS: ICD-10-CM

## 2025-06-23 DIAGNOSIS — R35.1 NOCTURIA: Primary | ICD-10-CM

## 2025-06-23 DIAGNOSIS — R35.1 NOCTURIA: ICD-10-CM

## 2025-06-23 DIAGNOSIS — G93.6 VASOGENIC BRAIN EDEMA: ICD-10-CM

## 2025-06-23 DIAGNOSIS — Z09 HOSPITAL DISCHARGE FOLLOW-UP: Primary | ICD-10-CM

## 2025-06-23 LAB
BACTERIA #/AREA URNS AUTO: ABNORMAL /HPF
BILIRUB UR QL STRIP.AUTO: NEGATIVE
CLARITY UR: CLEAR
COLOR UR AUTO: YELLOW
GLUCOSE UR QL STRIP: NEGATIVE
HGB UR QL STRIP: NEGATIVE
KETONES UR QL STRIP: NEGATIVE
LEUKOCYTE ESTERASE UR QL STRIP: ABNORMAL
MICROSCOPIC COMMENT: ABNORMAL
NITRITE UR QL STRIP: NEGATIVE
PH UR STRIP: 6 [PH]
PROT UR QL STRIP: NEGATIVE
RBC #/AREA URNS AUTO: 1 /HPF
SP GR UR STRIP: 1.01
UROBILINOGEN UR STRIP-ACNC: NEGATIVE EU/DL
WBC #/AREA URNS AUTO: 37 /HPF

## 2025-06-23 PROCEDURE — 99999 PR PBB SHADOW E&M-EST. PATIENT-LVL V: CPT | Mod: PBBFAC,,, | Performed by: FAMILY MEDICINE

## 2025-06-23 PROCEDURE — G2211 COMPLEX E/M VISIT ADD ON: HCPCS | Mod: ,,, | Performed by: FAMILY MEDICINE

## 2025-06-23 PROCEDURE — 99214 OFFICE O/P EST MOD 30 MIN: CPT | Mod: S$PBB,,, | Performed by: FAMILY MEDICINE

## 2025-06-23 PROCEDURE — 81001 URINALYSIS AUTO W/SCOPE: CPT

## 2025-06-23 PROCEDURE — 99215 OFFICE O/P EST HI 40 MIN: CPT | Mod: PBBFAC,PO | Performed by: FAMILY MEDICINE

## 2025-06-23 RX ORDER — SULFAMETHOXAZOLE AND TRIMETHOPRIM 800; 160 MG/1; MG/1
1 TABLET ORAL 2 TIMES DAILY
Qty: 10 TABLET | Refills: 0 | Status: SHIPPED | OUTPATIENT
Start: 2025-06-23 | End: 2025-06-24 | Stop reason: SDUPTHER

## 2025-06-23 NOTE — PROGRESS NOTES
Subjective:   Patient ID: Colt Rose is a 69 y.o. male     Chief Complaint:Hospital Follow Up ([facility] )      Here for checkup      Review of Systems   Respiratory:  Negative for shortness of breath.    Cardiovascular:  Negative for chest pain.   Gastrointestinal:  Negative for abdominal pain.   Genitourinary:  Negative for dysuria.     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    Cervical disc displacement     Degeneration of lumbar intervertebral disc     GERD (gastroesophageal reflux disease)     gastric polyps    Hip pain 02/22/2016    Hyperlipidemia     Shingles 11/2013    Sleep apnea      Past Surgical History:   Procedure Laterality Date    ABLATION, SVT, ACCESSORY PATHWAY N/A 5/25/2023    Procedure: Ablation, SVT, Accessory Pathway;  Surgeon: Goyo Sky MD;  Location: [facility] EP LAB;  Service: Cardiology;  Laterality: N/A;  PAC, RFA, CARTO, MAC, GP, 3 PREP    CERVICAL FUSION      bone graft    COLONOSCOPY N/A 7/29/2021    Procedure: COLONOSCOPY;  Surgeon: Tyler Story MD;  Location: [facility];  Service: Endoscopy;  Laterality: N/A;    COLONOSCOPY N/A 10/30/2024    Procedure: COLONOSCOPY;  Surgeon: Tyler Story MD;  Location: [facility];  Service: Endoscopy;  Laterality: N/A;  m/ppm    EPIDURAL STEROID INJECTION INTO LUMBAR SPINE N/A 12/5/2019    Procedure: Injection-steroid-epidural-lumbar;  Surgeon: Adarsh Kraft MD;  Location: [facility] OR;  Service: Pain Management;  Laterality: N/A;  L5-S1    ESOPHAGOGASTRODUODENOSCOPY N/A 10/27/2020    Procedure: EGD (ESOPHAGOGASTRODUODENOSCOPY);  Surgeon: Tyler Story MD;  Location: [facility];  Service: Endoscopy;  Laterality: N/A;    ESOPHAGOGASTRODUODENOSCOPY N/A 2/14/2025    Procedure: EGD (ESOPHAGOGASTRODUODENOSCOPY);  Surgeon: Tyler Story MD;  Location: [facility];  Service: Endoscopy;  Laterality: N/A;    INJECTION OF ANESTHETIC AGENT AROUND MEDIAL BRANCH NERVES INNERVATING LUMBAR FACET JOINT Bilateral 11/18/2020     Procedure: BLOCK, NERVE, LUMBAR, MEDIAL BRANCH Bilateral L3,4,5;  Surgeon: Adarsh Kraft MD;  Location: [facility] OR;  Service: Pain Management;  Laterality: Bilateral;    INJECTION OF ANESTHETIC AGENT AROUND MEDIAL BRANCH NERVES INNERVATING LUMBAR FACET JOINT Left 10/25/2023    Procedure: Block-nerve-medial branch-lumbar;  Surgeon: Adarsh Kraft MD;  [facility];  Service: Anesthesiology;  Laterality: Left;  l4-5, l5-s1 MBB    INJECTION OF ANESTHETIC AGENT AROUND MEDIAL BRANCH NERVES INNERVATING LUMBAR FACET JOINT Bilateral 11/28/2023    Procedure: Block-nerve-medial branch-lumbar;  Surgeon: Adarsh Kraft MD;  Location: [facility];  Service: Anesthesiology;  Laterality: Bilateral;  L4.5 and l5/s1 MBB #2    RADIOFREQUENCY ABLATION OF LUMBAR MEDIAL BRANCH NERVE AT SINGLE LEVEL Bilateral 1/29/2021    Procedure: Radiofrequency Ablation, Nerve, Spinal, Lumbar, Medial Branch, 1 Level;  Surgeon: Adarsh Kraft MD;  Location: [facility] OR;  Service: Pain Management;  Laterality: Bilateral;  L3,4,5    TONSILLECTOMY, ADENOIDECTOMY       Objective:     Vitals:    06/23/25 1234   BP: 124/60   Pulse: 74   Resp: 17   Temp: 98.3 °F (36.8 °C)     Body mass index is 27.99 kg/m².  Physical Exam  Vitals and nursing note reviewed.   Constitutional:       Appearance: He is well-developed.   HENT:      Head: Normocephalic and atraumatic.   Eyes:      General: No scleral icterus.     Conjunctiva/sclera: Conjunctivae normal.   Cardiovascular:      Heart sounds: No murmur heard.  Pulmonary:      Effort: Pulmonary effort is normal. No respiratory distress.   Musculoskeletal:         General: No deformity. Normal range of motion.      Cervical back: Normal range of motion and neck supple.   Skin:     Coloration: Skin is not pale.      Findings: No rash.   Neurological:      Mental Status: He is alert and oriented to person, place, and time.   Psychiatric:         Behavior: Behavior normal.         Thought Content: Thought content normal.          Judgment: Judgment normal.       Assessment:     1. Hospital discharge follow-up    2. Vasogenic brain edema    3. Nocturia    4. Loose stools    5. Hemiplegia, unspecified etiology, unspecified hemiplegia type, unspecified laterality      Plan:   Hospital discharge follow-up  Counseled. Discussed current meds as restarted after discharge from inpatient rehabd.   Vasogenic brain edema  -     Ambulatory referral/consult to Neurology; Future; Expected date: 06/30/2025  -     Ambulatory referral/consult to Neurosurgery; Future; Expected date: 06/30/2025    Nocturia  -     Urinalysis; Future; Expected date: 06/23/2025    Loose stools  -     Pancreatic elastase, fecal; Future; Expected date: 06/23/2025  -     Fecal fat, qualitative; Future; Expected date: 06/23/2025  -     Occult blood x 1, stool; Future; Expected date: 06/23/2025  -     WBC, Stool; Future; Expected date: 06/23/2025  -     Rotavirus antigen, stool; Future; Expected date: 06/23/2025  -     Adenovirus Molecular Detection, PCR, Non-Blood Stool; Future; Expected date: 06/23/2025  -     Calprotectin, Stool; Future; Expected date: 06/23/2025  -     Giardia / Cryptosporidum, EIA; Future; Expected date: 06/23/2025  -     Stool Exam-Ova,Cysts,Parasites; Future; Expected date: 06/23/2025  -     Clostridium difficile EIA; Future; Expected date: 06/23/2025  -     Stool culture; Future; Expected date: 06/23/2025    Hemiplegia  - referral to PT    Chronic condition not otherwise mentioned is stable      Total time spent of Greater than 30 minutes minutes on the day of the visit.This includes face to face time and preparing to see the patient, obtaining and reviewing separately obtained history, documenting clinical information in the electronic or other health record, independently interpreting results and communicating results to the patient/family/caregiver, or care coordinator.    Established patient with me has been instructed that must see me at least 1 time  yearly (every 365 days) for refills of medications. Seeing other providers in this clinic is fine but expectation is to see me yearly.    Jeffery Mena MD  06/23/2025    Portions of this note have been dictated with CHRIS Fernandez.

## 2025-06-24 ENCOUNTER — PATIENT MESSAGE (OUTPATIENT)
Dept: FAMILY MEDICINE | Facility: CLINIC | Age: 70
End: 2025-06-24
Payer: MEDICARE

## 2025-06-24 DIAGNOSIS — R35.1 NOCTURIA: ICD-10-CM

## 2025-06-24 RX ORDER — SULFAMETHOXAZOLE AND TRIMETHOPRIM 800; 160 MG/1; MG/1
1 TABLET ORAL 2 TIMES DAILY
Qty: 10 TABLET | Refills: 0 | Status: SHIPPED | OUTPATIENT
Start: 2025-06-24 | End: 2025-06-29

## 2025-06-25 ENCOUNTER — LAB VISIT (OUTPATIENT)
Dept: LAB | Facility: HOSPITAL | Age: 70
End: 2025-06-25
Attending: FAMILY MEDICINE
Payer: MEDICARE

## 2025-06-25 DIAGNOSIS — R19.5 LOOSE STOOLS: ICD-10-CM

## 2025-06-25 PROCEDURE — 83993 ASSAY FOR CALPROTECTIN FECAL: CPT

## 2025-06-25 PROCEDURE — 87449 NOS EACH ORGANISM AG IA: CPT

## 2025-06-25 PROCEDURE — 87329 GIARDIA AG IA: CPT

## 2025-06-25 PROCEDURE — 87046 STOOL CULTR AEROBIC BACT EA: CPT

## 2025-06-25 PROCEDURE — 82653 EL-1 FECAL QUANTITATIVE: CPT

## 2025-06-25 PROCEDURE — 89055 LEUKOCYTE ASSESSMENT FECAL: CPT

## 2025-06-25 PROCEDURE — 87046 STOOL CULTR AEROBIC BACT EA: CPT | Mod: 59

## 2025-06-25 PROCEDURE — 87427 SHIGA-LIKE TOXIN AG IA: CPT

## 2025-06-25 PROCEDURE — 87425 ROTAVIRUS AG IA: CPT

## 2025-06-25 PROCEDURE — 87177 OVA AND PARASITES SMEARS: CPT

## 2025-06-25 PROCEDURE — 82705 FATS/LIPIDS FECES QUAL: CPT

## 2025-06-26 LAB
C DIFF GDH STL QL: NEGATIVE
C DIFF TOX A+B STL QL IA: NEGATIVE
WBC #/AREA STL HPF: NORMAL /[HPF]

## 2025-06-27 ENCOUNTER — CLINICAL SUPPORT (OUTPATIENT)
Dept: REHABILITATION | Facility: HOSPITAL | Age: 70
End: 2025-06-27
Attending: FAMILY MEDICINE
Payer: MEDICARE

## 2025-06-27 VITALS — HEART RATE: 62 BPM | SYSTOLIC BLOOD PRESSURE: 114 MMHG | DIASTOLIC BLOOD PRESSURE: 69 MMHG

## 2025-06-27 DIAGNOSIS — Z91.81 AT HIGH RISK FOR FALLS: ICD-10-CM

## 2025-06-27 DIAGNOSIS — Z74.09 IMPAIRED FUNCTIONAL MOBILITY, BALANCE, GAIT, AND ENDURANCE: ICD-10-CM

## 2025-06-27 DIAGNOSIS — G81.90 HEMIPLEGIA, UNSPECIFIED ETIOLOGY, UNSPECIFIED HEMIPLEGIA TYPE, UNSPECIFIED LATERALITY: ICD-10-CM

## 2025-06-27 DIAGNOSIS — G81.94 LEFT HEMIPLEGIA: Primary | ICD-10-CM

## 2025-06-27 LAB
C COLI+JEJ+UPSA DNA STL QL NAA+NON-PROBE: NEGATIVE
CALPROTECTIN INTERP (OHS): ABNORMAL
CALPROTECTIN STOOL (OHS): 91.9 ΜG/G
CRYPTOSP AG SPEC QL: NEGATIVE
E COLI SXT1 STL QL IA: NEGATIVE
E COLI SXT2 STL QL IA: NEGATIVE
ELASTASE, STOOL INTERPRETATION (OHS): NORMAL
G LAMBLIA AG STL QL IA: NEGATIVE
PANCREATIC ELASTASE, FECAL (OHS): >800 ΜG/G
RV AG STL QL IA.RAPID: NEGATIVE

## 2025-06-27 PROCEDURE — 97162 PT EVAL MOD COMPLEX 30 MIN: CPT | Mod: PO

## 2025-06-28 ENCOUNTER — HOSPITAL ENCOUNTER (EMERGENCY)
Facility: HOSPITAL | Age: 70
Discharge: HOME | End: 2025-06-28
Attending: STUDENT IN AN ORGANIZED HEALTH CARE EDUCATION/TRAINING PROGRAM
Payer: MEDICARE

## 2025-06-28 VITALS
OXYGEN SATURATION: 99 % | BODY MASS INDEX: 27.98 KG/M2 | HEART RATE: 72 BPM | DIASTOLIC BLOOD PRESSURE: 70 MMHG | WEIGHT: 195 LBS | TEMPERATURE: 97 F | SYSTOLIC BLOOD PRESSURE: 131 MMHG | RESPIRATION RATE: 16 BRPM

## 2025-06-28 DIAGNOSIS — S00.03XA CONTUSION OF SCALP, INITIAL ENCOUNTER: ICD-10-CM
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
## 2025-06-28 LAB
BACTERIA STL CULT: NORMAL
FAT STL QL: NORMAL
NEUTRAL FAT STL QL: NORMAL

## 2025-06-28 PROCEDURE — 99284 EMERGENCY DEPT VISIT MOD MDM: CPT | Mod: 25

## 2025-06-28 RX ORDER — MUPIROCIN 20 MG/G
OINTMENT TOPICAL 3 TIMES DAILY
Qty: 30 G | Refills: 0 | Status: SHIPPED | OUTPATIENT
Start: 2025-06-28

## 2025-06-28 RX ORDER — MUPIROCIN 20 MG/G
OINTMENT TOPICAL
Status: DISCONTINUED | OUTPATIENT
Start: 2025-06-28 | End: 2025-06-28 | Stop reason: HOSPADM

## 2025-06-29 LAB
HADV DNA SPEC QL NAA+PROBE: NEGATIVE
SPECIMEN SOURCE: NORMAL

## 2025-06-30 ENCOUNTER — CLINICAL SUPPORT (OUTPATIENT)
Dept: REHABILITATION | Facility: HOSPITAL | Age: 70
End: 2025-06-30
Payer: MEDICARE

## 2025-06-30 DIAGNOSIS — G81.94 LEFT HEMIPLEGIA: Primary | ICD-10-CM

## 2025-06-30 DIAGNOSIS — Z74.09 IMPAIRED FUNCTIONAL MOBILITY, BALANCE, GAIT, AND ENDURANCE: ICD-10-CM

## 2025-06-30 DIAGNOSIS — Z91.81 AT HIGH RISK FOR FALLS: ICD-10-CM

## 2025-06-30 PROCEDURE — 97112 NEUROMUSCULAR REEDUCATION: CPT | Mod: PO

## 2025-06-30 NOTE — PROGRESS NOTES
Outpatient Rehab    Physical Therapy Evaluation (only)    Patient Name: Colt Rose  MRN: [id number]  YOB: 1955  Encounter Date: 6/27/2025    Therapy Diagnosis:   Encounter Diagnoses   Name Primary?    Hemiplegia, unspecified etiology, unspecified hemiplegia type, unspecified laterality     Left hemiplegia Yes    Impaired functional mobility, balance, gait, and endurance     At high risk for falls      Physician: Jeffery Mena MD    Physician Orders: Eval and Treat  Medical Diagnosis: Hemiplegia, unspecified etiology, unspecified hemiplegia type, unspecified laterality  Surgical Diagnosis: Not applicable for this Episode  Surgical Date: Not applicable for this Episode  Days Since Last Surgery: Not applicable for this Episode    Visit # / Visits Authorized:  1 / 1  Insurance Authorization Period: 6/23/2025 to 6/23/2026  Date of Evaluation: 6/27/2025  Plan of Care Certification: 6/27/2025 to 10/1/2025     Time In: 0825   Time Out: 0920  Total Time (in minutes): 55   Total Billable Time (in minutes): 55    Intake Outcome Measure for FOTO Survey    Therapist reviewed FOTO scores for Colt Rose on 6/27/2025.   FOTO report - see Media section or FOTO account episode details.     Intake Score: 50%    Precautions:     Left tito, Fall Risk, impulsive      Subjective   History of Present Illness  Colt is a 69 y.o. male      The patient reports a medical diagnosis of G81.90 (ICD-10-CM) - Hemiplegia, unspecified etiology, unspecified hemiplegia type, unspecified laterality.            Dominant Hand: Right  History of Present Condition/Illness: Pt reports stroke on 5/5/2025 with left sided weakness. He was in the hospital for a few days and then was at inpatient for 3 weeks. Pt arrives in transport chair but states he has a wheelchair at home. Wife indicates the WC is heavy to get into the car so she uses transport chair for outings. He was playing pickle ball daily prior to stroke. Denies any  "blood pressure issues prior. Reports one fall when he "rolled off of the couch" at home. Requires assistance for transfer, ADLs, and mobility.    Activities of Daily Living  Social history was obtained from Patient.    General Prior Level of Function Comments: independent, playing pickle ball daily     Patient Responsibilities: Driving, Financial management, Community mobility, Home management, Health management, Personal ADL, Yard work, Laundry, Meal prep    Previously independent with activities of daily living? Yes     Currently independent with activities of daily living? No  Activities currently needing assistance include Bathing, Bed mobility, Dressing - lower body, Dressing - upper body, Swallowing/eating, Toileting, Personal device care, Grooming, Functional mobility, and Transfers.   Difficulty swallowing pills    Previously independent with instrumental activities of daily living? Yes     Currently independent with instrumental activities of daily living? No  Activities currently needing assistance include: Community mobility, Driving, Financial management, Grocery/shopping, Medication management, Meal prep, Home establishment and management, and Health management.            Pain     Patient reports a current pain level of 0/10. Pain at best is reported as 0/10. Pain at worst is reported as 10/10.   Location: left shoulder,  Clinical Progression (since onset): Stable  Pain Qualities: Other (Comment)  Other Pain Qualities: tingling  Pain-Relieving Factors: Rest  Pain-Aggravating Factors: Movement         Treatment History  Treatments  Discharged From Past 30 Days: Inpatient acute facility, Inpatient rehab facility  Previously Received Treatments: Yes  Previous Treatments: Exercise, Physical therapy, Occupational therapy, Speech language therapy  Currently Receiving Treatments: No    Living Arrangements  Living Situation  Housing: Home independently  Living Arrangements: Spouse/significant other    Home " Setup  Type of Structure: House  Home Access: Level entry, Other (Comment)  Other Home Access Comment: friends built a ramp to exit the backdoor  Number of Levels in Home: One level  Primary Bedroom: 1st floor  Primary Bathroom: 1st floor  Bathroom Shower/Tub: Other (Comment)  Other Shower/Tub Comment: wet room  Bathroom Equipment: Grab bars in shower, Grab bars around toilet, Shower chair with back    Equipment/Treatments  Mobility Equipment: Sanket walker, Gait belt, Standard walker  Patient Expressive Communication Modes: Verbal        Employment  Patient reports: Does the patient's condition impact their ability to work?  Employment Status: Retired          Past Medical History/Physical Systems Review:   Colt Rose  has a past medical history of Arthritis of hand, Cervical disc displacement, Degeneration of lumbar intervertebral disc, GERD (gastroesophageal reflux disease), Hip pain, Hyperlipidemia, Shingles, and Sleep apnea.    Colt Rose  has a past surgical history that includes TONSILLECTOMY, ADENOIDECTOMY; Cervical fusion; Epidural steroid injection into lumbar spine (N/A, 12/5/2019); Esophagogastroduodenoscopy (N/A, 10/27/2020); Injection of anesthetic agent around medial branch nerves innervating lumbar facet joint (Bilateral, 11/18/2020); Radiofrequency ablation of lumbar medial branch nerve at single level (Bilateral, 1/29/2021); Colonoscopy (N/A, 7/29/2021); ablation, svt, accessory pathway (N/A, 5/25/2023); Injection of anesthetic agent around medial branch nerves innervating lumbar facet joint (Left, 10/25/2023); Injection of anesthetic agent around medial branch nerves innervating lumbar facet joint (Bilateral, 11/28/2023); Colonoscopy (N/A, 10/30/2024); and Esophagogastroduodenoscopy (N/A, 2/14/2025).    Colt has a current medication list which includes the following prescription(s): artificial tears, atorvastatin, [Paused] docosahexaenoic acid/epa, lidocaine, losartan, [Paused]  "magnesium oxide, methocarbamol, [Paused] multivitamin, mupirocin, [Paused] omeprazole, ondansetron, [Paused] pregabalin, senna-docusate, silodosin, and triamcinolone acetonide 0.1%, and the following Facility-Administered Medications: lactated ringers, lactated ringers, lidocaine (pf) 10 mg/ml (1%), and lidocaine (pf) 10 mg/ml (1%).    Review of patient's allergies indicates:   Allergen Reactions    Amlodipine Other (See Comments)     Flushed face, resolved with Hydroxyzine and Pepcid    Benadryl [diphenhydramine hcl] Other (See Comments)     Per wife "pt collapsed and ended up in hospital" ~1995    Pennsaid [diclofenac sodium] Rash and Blisters        Objective   Vital Signs  /69   Pulse 62   BP Location: Right arm  BP Position: Sitting  BP Cuff Size: Adult         Communication  The patient's current expressive communication modes are Verbal.          Lower Extremity Sensation  General Lumbar/Lower Extremity Sensation  Intact: Right  Impaired: Left  Right Lumbar/Lower Extremity Sensation  Intact: Light Touch, Sharp/Dull Discrimination, Static Two Point Discrimination, Dynamic Two Point Discrimination, Kinesthesia, and Proprioception  Right Lumbar/Lower Extremity Sensation Stocking Glove Pattern: No    Left Lumbar/Lower Extremity Sensation  Diminished: Light Touch  Left Lumbar/Lower Extremity Sensation Light Touch Comment: left upper and lower extremity             Left Lower Extremity Tone  Patient presents with Hypotonic left lower extremity tone.               Hip Strength - Planes of Motion   Right Strength Right Pain Left Strength Left  Pain   Flexion (L2) 4+   1     Extension           ABduction 4+   0     ADduction           Internal Rotation           External Rotation               Knee Strength   Right Strength Right Pain Left Strength Left  Pain   Flexion (S2) 5   2-     Prone Flexion           Extension (L3) 5   2-            Ankle/Foot Strength - Planes of Motion   Right Strength Right Pain Left " Strength Left  Pain   Dorsiflexion (L4) 4+   0     Plantar Flexion (S1) 4   0     Inversion           Eversion           Great Toe Flexion           Great Toe Extension (L5)           Lesser Toes Flexion           Lesser Toes Extension                  Coordination  Balance  Intact: Static Sitting  Impaired: Dynamic Sitting, Static Standing, and Dynamic Standing               Transfers Assessment  Sit to Stand Assistance: Contact guard assist  Chair to Bed Assistance: Moderate assist  Chair to Bed Assistance Details: for LLE placement and balance with turn  Bed to Chair Assistance: Moderate assist    Bed Mobility Assessment  Rolling Assistance: Minimal assist  Rolling Assistance Details: pt with tendency to forget about LUE and LLE  Sidelying to Sit Assistance: Minimal assist  Sidelying to Sit Assistance Details: LLE/LUE management  Sit to Sidelying Assistance: Minimal assist  Sit to Sidelying Assistance Details: LUE/LLE management  Scooting to Edge of Bed Assistance: Minimal assist  Scooting to Edge of Bed Assistance Details: LLE management  Bridge/Boost to Head of Bed Assistance: Minimal assist  Bridge/Boost to Head of Bed Assistance Details: LLE management      Ambulation Assistance Required  Surface With  Assistive Device Without Assistive Device Details   Level Moderate assist Unable  To progress LLE. Pt does not have AFO   Uneven         Curb           Ambulation Details    20 ft with tito walker and Mod A    Gait Analysis  Gait Pattern: Hemiplegic                   Time Entry(in minutes):  PT Evaluation (Moderate) Time Entry: 55    Assessment & Plan   Assessment  Colt presents with a condition of Moderate complexity.   Presentation of Symptoms: Stable  Will Comorbidities Impact Care: Yes  Sleep apnea, UTI, history of back and neck pain    Functional Limitations: Activity tolerance, Ambulating on uneven surfaces, Bed mobility, Completing self-care activities, Decreased ambulation distance/endurance,  Driving, Functional mobility, Gait limitations, Getting off the floor, Gross motor coordination, Increased risk of fall, Maintaining balance, Proprioception, Squatting, Standing tolerance, Transfers  Impairments: Abnormal coordination, Abnormal gait, Abnormal muscle tone, Abnormal muscle firing, Impaired balance, Activity intolerance, Impaired physical strength, Lack of appropriate home exercise program, Safety issue, Weight-bearing intolerance    Patient Goal for Therapy (PT): Return to walking  Prognosis: Good  Assessment Details: Colt is a 69 year old male with the medical diagnosis of hemiplegia. He presents to therapy in a transport chair using right lower extremity to support left extremity when wife is propelling wheelchair. He demonstrates impaired sensation to left upper and lower extremities. He is hypotonic on left lower extremity and upper extremity. He requires moderate assistance for stand pivot transfers due to need for left lower extremity management and balance during turns. Minimal assistance for bed mobility to address left lower and upper extremity management. He has good strength on right side demonstrating sit to stand with contact guard assistance, however pt is impulsive and will begin task before proper set up of wheelchair and limbs. He ambulated 20 ft using tito walker and moderate assistance from PT for LLE progression. He is emotional during evaluation when discussing stroke and lifestyle prior to stroke onset. He is motivated to improve his functional mobility. Colt is a good candidate for physical therapy at this time to address functional limitations, strength, bed mobility, transfers, and reduce fall risk to improve overall quality of life.     Plan  From a physical therapy perspective, the patient would benefit from: Skilled Rehab Services    Planned therapy interventions include: Therapeutic exercise, Therapeutic activities, Neuromuscular re-education, Manual therapy,  Orthotic management and training, Gait training, and Wheelchair management.    Planned modalities to include: Cryotherapy (cold pack), Electrical stimulation - attended, Electrical stimulation - passive/unattended, and Thermotherapy (hot pack).        Visit Frequency: 3 times Per Week for 12 Weeks.       This plan was discussed with Patient and Family.   Discussion participants: Agreed Upon Plan of Care  Plan details: Plan of Care: 6/27/2025 to 10/1/2025.          The patient's spiritual, cultural, and educational needs were considered, and the patient is agreeable to the plan of care and goals.     Education  Education was done with Patient. The patient's learning style includes Listening. The patient Verbalizes understanding.         PT Plan of Care and Goals for PT.       Goals:   Active       Long Term Goals       Patient will ascend/descend four 6 inch stairs safely using single handrail and standby assistance       Start:  06/27/25    Expected End:  10/01/25            Patient will demonstrate improved gait endurance by ambulating 200 ft during 2 minute walk test with use of LRAD and without seated rest break.        Start:  06/27/25    Expected End:  10/01/25            Pt will ambulate using tito walker with standby assistance to improve mobility in household.        Start:  06/27/25    Expected End:  10/01/25            Patient will demonstrate ability to transition floor to stand with external support to reduce assistance needed in case of fall.       Start:  06/27/25    Expected End:  10/01/25               Short Term Goals       Patient will be given initial HEP and report compliance with home exercise program 5 days a week to improve carry over.        Start:  06/27/25    Expected End:  10/01/25            Patient will demonstrate all bed mobility with supervision and awareness of left upper extremity to improve independence.        Start:  06/27/25    Expected End:  10/01/25            Pt will  demonstrate sit to stand from wheelchair using tito walker for stability with supervision to improve independence at home.        Start:  06/27/25    Expected End:  10/01/25                Judy Ford PT

## 2025-06-30 NOTE — PROGRESS NOTES
"  Outpatient Rehab    Physical Therapy Visit    Patient Name: Colt oRse  MRN: [id number]  YOB: 1955  Encounter Date: 6/30/2025    Therapy Diagnosis:   Encounter Diagnoses   Name Primary?    Left hemiplegia Yes    Impaired functional mobility, balance, gait, and endurance     At high risk for falls      Physician: Jeffery Mena MD    Physician Orders: Eval and Treat  Medical Diagnosis: Hemiplegia, unspecified etiology, unspecified hemiplegia type, unspecified laterality  Surgical Diagnosis: Not applicable for this Episode   Surgical Date: Not applicable for this Episode  Days Since Last Surgery: Not applicable for this Episode    Visit # / Visits Authorized:  1 / 20  Insurance Authorization Period: 6/25/2025 to 12/31/2025  Date of Evaluation: 6/27/2025  Plan of Care Certification: 6/30/2025 to 10/1/2025      PT/PTA: PT   Number of PTA visits since last PT visit:0  Time In: 0802   Time Out: 0847  Total Time (in minutes): 45   Total Billable Time (in minutes): 45    FOTO:  Intake Score: 50%  Survey Score 2:  %  Survey Score 3:  %    Precautions:     Left tito, Fall Risk, impulsive      Subjective   He arrives with wife stating he fell out of chair when reaching forward and hit his head. They went to ER to get assessed and had imaging done without new abnormalities..  Family / care giver present for this visit:  (wife present in waiting room 3)    He indicates "his usual" pain but does not rate. Left upper and lower extremities    Objective            Treatment:  Balance/Neuromuscular Re-Education  NMR 1: NuStep level 2 x 5 minutes with strap around knees to prevent left hip abd, right upper extremity and BLE only  NMR 2: Bioness unit donned to left lower extremity to improve anterior tibialis activation and foot clearance during stride. Pt able to ambulate using tito walker with Bioness donned with min-mod assistance and wheelchair follow for safety. PT adjusted amplitude of Bioness during " gait to improve clearance.  NMR 3: Supine with legs over bolster: donned ESTIM electrodes to left quadricep and left anterior tibialis. mA for quads and anterior tibialis set to 78-80. Ramp time of 2 seconds. Time on/off at 10/10. Performed for 8 minutes with minimal assistance from PT to increase end knee extension ROM during active time. Light red markings where electrodes were placed. Educated wife and pt that these are normal and should resolve in about 30 minutes.  Therapeutic Activity  TA 1: wheelchair<>NuStep with mod A, PT assisting with pivot on left lower extremity and balance  TA 2: sit to stand with CGA  TA 3: wheelchair<>mat with Mod A  TA 4: Sit<>supine with minimal assistance, LUE/LLE management    Time Entry(in minutes):  Neuromuscular Re-Education Time Entry: 40  Therapeutic Activity Time Entry: 5    Assessment & Plan   Assessment: Colt tolerated initial treatment session well. Focus of session was muscle activation and motor control to improve transfers and functional mobility. Use of Bioness donned to left lower leg for foot clearance while ambulating, minimal assistance and wheelchair follow for safety. NMES donned to left quadriceps and anterior tibialis with minimal assistance provided to improve knee extension end range of motion during active time on ESTIM. He remains appropriate for PT at this time.   Evaluation/Treatment Tolerance: Patient tolerated treatment well    The patient will continue to benefit from skilled outpatient physical therapy in order to address the deficits listed in the problem list on the initial evaluation, provide patient and family education, and maximize the patients level of independence in the home and community environments.     The patient's spiritual, cultural, and educational needs were considered, and the patient is agreeable to the plan of care and goals.     Education  Education was done with Patient. The patient's learning style includes Listening. The  patient Verbalizes understanding.         Reasoning and use of Bioness unit and ESTIM to left lower extremity. Answered AFO questions from wife and will send referral request to MD.       Plan: Progression of transfers, motor control, ESTIM for muscle activation of LLE, standing in parallel bars, gait training with tito walker    Goals:   Active       Long Term Goals       Patient will ascend/descend four 6 inch stairs safely using single handrail and standby assistance (Progressing)       Start:  06/27/25    Expected End:  10/01/25            Patient will demonstrate improved gait endurance by ambulating 200 ft during 2 minute walk test with use of LRAD and without seated rest break.  (Progressing)       Start:  06/27/25    Expected End:  10/01/25            Pt will ambulate using tito walker with standby assistance to improve mobility in household.  (Progressing)       Start:  06/27/25    Expected End:  10/01/25            Patient will demonstrate ability to transition floor to stand with external support to reduce assistance needed in case of fall. (Progressing)       Start:  06/27/25    Expected End:  10/01/25               Short Term Goals       Patient will be given initial HEP and report compliance with home exercise program 5 days a week to improve carry over.  (Progressing)       Start:  06/27/25    Expected End:  10/01/25            Patient will demonstrate all bed mobility with supervision and awareness of left upper extremity to improve independence.  (Progressing)       Start:  06/27/25    Expected End:  10/01/25            Pt will demonstrate sit to stand from wheelchair using tito walker for stability with supervision to improve independence at home.  (Progressing)       Start:  06/27/25    Expected End:  10/01/25                Judy Ford, PT

## 2025-07-01 ENCOUNTER — TELEPHONE (OUTPATIENT)
Dept: FAMILY MEDICINE | Facility: CLINIC | Age: 70
End: 2025-07-01
Payer: MEDICARE

## 2025-07-01 ENCOUNTER — PATIENT MESSAGE (OUTPATIENT)
Dept: FAMILY MEDICINE | Facility: CLINIC | Age: 70
End: 2025-07-01
Payer: MEDICARE

## 2025-07-01 ENCOUNTER — CLINICAL SUPPORT (OUTPATIENT)
Dept: REHABILITATION | Facility: HOSPITAL | Age: 70
End: 2025-07-01
Payer: MEDICARE

## 2025-07-01 DIAGNOSIS — Z91.81 AT HIGH RISK FOR FALLS: ICD-10-CM

## 2025-07-01 DIAGNOSIS — Z74.09 IMPAIRED FUNCTIONAL MOBILITY, BALANCE, GAIT, AND ENDURANCE: ICD-10-CM

## 2025-07-01 DIAGNOSIS — R47.1 DYSARTHRIA: Primary | ICD-10-CM

## 2025-07-01 DIAGNOSIS — G81.94 LEFT HEMIPLEGIA: Primary | ICD-10-CM

## 2025-07-01 DIAGNOSIS — G81.90 HEMIPLEGIA, UNSPECIFIED ETIOLOGY, UNSPECIFIED HEMIPLEGIA TYPE, UNSPECIFIED LATERALITY: ICD-10-CM

## 2025-07-01 LAB — O+P STL MICRO: NORMAL

## 2025-07-01 PROCEDURE — 97530 THERAPEUTIC ACTIVITIES: CPT | Mod: PO

## 2025-07-01 PROCEDURE — 97112 NEUROMUSCULAR REEDUCATION: CPT | Mod: PO

## 2025-07-03 ENCOUNTER — CLINICAL SUPPORT (OUTPATIENT)
Dept: REHABILITATION | Facility: HOSPITAL | Age: 70
End: 2025-07-03
Payer: MEDICARE

## 2025-07-03 ENCOUNTER — CLINICAL SUPPORT (OUTPATIENT)
Dept: REHABILITATION | Facility: HOSPITAL | Age: 70
End: 2025-07-03
Attending: FAMILY MEDICINE
Payer: MEDICARE

## 2025-07-03 DIAGNOSIS — Z91.81 AT HIGH RISK FOR FALLS: ICD-10-CM

## 2025-07-03 DIAGNOSIS — G81.94 LEFT HEMIPLEGIA: Primary | ICD-10-CM

## 2025-07-03 DIAGNOSIS — G81.90 HEMIPLEGIA, UNSPECIFIED ETIOLOGY, UNSPECIFIED HEMIPLEGIA TYPE, UNSPECIFIED LATERALITY: ICD-10-CM

## 2025-07-03 DIAGNOSIS — Z74.09 IMPAIRED FUNCTIONAL MOBILITY, BALANCE, GAIT, AND ENDURANCE: ICD-10-CM

## 2025-07-03 DIAGNOSIS — R20.0 SENSORY LOSS: ICD-10-CM

## 2025-07-03 DIAGNOSIS — R68.89 IMPAIRED FUNCTION OF UPPER EXTREMITY: ICD-10-CM

## 2025-07-03 PROCEDURE — 97163 PT EVAL HIGH COMPLEX 45 MIN: CPT | Mod: PO

## 2025-07-03 NOTE — PROGRESS NOTES
"  Outpatient Rehab    Physical Therapy Visit    Patient Name: Colt Rose  MRN: [id number]  YOB: 1955  Encounter Date: 7/3/2025    Therapy Diagnosis:   Encounter Diagnoses   Name Primary?    Left hemiplegia Yes    Impaired functional mobility, balance, gait, and endurance     At high risk for falls      Physician: Jeffery Mena MD    Physician Orders: Eval and Treat  Medical Diagnosis: Hemiplegia, unspecified etiology, unspecified hemiplegia type, unspecified laterality  Surgical Diagnosis: Not applicable for this Episode   Surgical Date: Not applicable for this Episode  Days Since Last Surgery: Not applicable for this Episode    Visit # / Visits Authorized:  3 / 20  Insurance Authorization Period: 6/25/2025 to 12/31/2025  Date of Evaluation: 6/27/2025  Plan of Care Certification: 6/30/2025 to 10/1/2025      PT/PTA: PTA   Number of PTA visits since last PT visit:1  Time In: 1605   Time Out: 1645  Total Time (in minutes): 40   Total Billable Time (in minutes): 40    FOTO:  Intake Score:  %  Survey Score 2:  %  Survey Score 3:  %    Precautions:     Left tito, Fall Risk, impulsive       Subjective   Wife stated "we're getting out of the house faster"..  Family / care giver present for this visit:  (Wife in waiting area.)  Pain reported as 0/10.      Objective            Treatment:  Balance/Neuromuscular Re-Education  NMR 1: SciFit Stepper Level 2 12 minutes B LE only  Therapeutic Activity  TA 1: Ambulation with  feet minimal/moderate assist wc to follow, after placement of Bioness to L LE to improve anterior tibialis activation/foot clearance during L swing phase, VC to increase Right step length   TA 2: donned/doffed Left sling TA  TA 3: Sit<>Stand with VC for Left foot placement and Right hand placement    Time Entry(in minutes):  Neuromuscular Re-Education Time Entry: 15  Therapeutic Activity Time Entry: 25    Assessment & Plan   Assessment: Colt with good participation and effort " with focus on neuromuscular re-education and functional mobility to improve standing balance.  Colt with increased tolerance to ambulation and Stepper. Reported no pain with Left arm sling in place.  Evaluation/Treatment Tolerance: Patient tolerated treatment well    The patient will continue to benefit from skilled outpatient physical therapy in order to address the deficits listed in the problem list on the initial evaluation, provide patient and family education, and maximize the patients level of independence in the home and community environments.     The patient's spiritual, cultural, and educational needs were considered, and the patient is agreeable to the plan of care and goals.           Plan: Continue with POC to increase activities as patient tolerates.    Goals:   Active       Long Term Goals       Patient will ascend/descend four 6 inch stairs safely using single handrail and standby assistance (Progressing)       Start:  06/27/25    Expected End:  10/01/25            Patient will demonstrate improved gait endurance by ambulating 200 ft during 2 minute walk test with use of LRAD and without seated rest break.  (Progressing)       Start:  06/27/25    Expected End:  10/01/25            Pt will ambulate using tito walker with standby assistance to improve mobility in household.  (Progressing)       Start:  06/27/25    Expected End:  10/01/25            Patient will demonstrate ability to transition floor to stand with external support to reduce assistance needed in case of fall. (Progressing)       Start:  06/27/25    Expected End:  10/01/25               Short Term Goals       Patient will be given initial HEP and report compliance with home exercise program 5 days a week to improve carry over.  (Progressing)       Start:  06/27/25    Expected End:  10/01/25            Patient will demonstrate all bed mobility with supervision and awareness of left upper extremity to improve independence.   (Progressing)       Start:  06/27/25    Expected End:  10/01/25            Pt will demonstrate sit to stand from wheelchair using tito walker for stability with supervision to improve independence at home.  (Progressing)       Start:  06/27/25    Expected End:  10/01/25                Angie Castro, PTA

## 2025-07-03 NOTE — PROGRESS NOTES
"  Outpatient Rehab    Physical Therapy Visit    Patient Name: Colt Rose  MRN: [id number]  YOB: 1955  Encounter Date: 7/1/2025    Therapy Diagnosis:   Encounter Diagnoses   Name Primary?    Left hemiplegia Yes    Impaired functional mobility, balance, gait, and endurance     At high risk for falls      Physician: Jeffery Mena MD    Physician Orders: Eval and Treat  Medical Diagnosis: Hemiplegia, unspecified etiology, unspecified hemiplegia type, unspecified laterality  Surgical Diagnosis: Not applicable for this Episode   Surgical Date: Not applicable for this Episode  Days Since Last Surgery: Not applicable for this Episode    Visit # / Visits Authorized:  2 / 20  Insurance Authorization Period: 6/25/2025 to 12/31/2025  Date of Evaluation: 6/27/2025  Plan of Care Certification: 6/30/2025 to 10/1/2025      PT/PTA: PT   Number of PTA visits since last PT visit:0  Time In: 0800   Time Out: 0845  Total Time (in minutes): 45   Total Billable Time (in minutes): 45    FOTO:  Intake Score: 50%  Survey Score 2:  %  Survey Score 3:  %    Precautions:     Left tito, Fall Risk, impulsive      Subjective   He arrives stating some soreness after walking yesterday but no other complaints. Wife states she has been using a belt around his chair to prevent falling forward. Wife is wanting patient to receive all three disciplines for therapy; PT did inform pt and pt's wife he sent a referral request in and once it is placed they will receive a call for scheduling..  Family / care giver present for this visit:  (wife present in wiating room 3)    He indicates "his usual" pain but does not rate. Left upper and lower extremities    Objective            Treatment:  Balance/Neuromuscular Re-Education  NMR 1: NuStep level 2 x 6 minutes with strap around knees to prevent left hip abd, right upper extremity and BLE only  NMR 2: Bioness unit donned to left lower extremity to improve anterior tibialis activation and " foot clearance during stride. Pt able to ambulate using tito walker with Bioness donned with min-mod assistance and wheelchair follow for safety. PT adjusted amplitude of Bioness during gait to improve clearance. He ambulated x 160 ft and 120 ft.  NMR 3: Seated AAROM left knee flexion/ext with foot placed on skateboard 2 x 10  Therapeutic Activity  TA 1: wheelchair<>NuStep with min A, PT assisting with pivot on left lower extremity and balance  TA 2: sit to stand with CGA  TA 3: Ambulated 140 ft with tito walker and AFO donned to left leg to improve foot clearance during swing phase of gait. Intermittent mod assistance for left knee flexion during swing phase. Wheelchair follow for safety.  TA 4: Standing in parallel bars with right foot taps on 3 inch step, PT blocking left knee and Mod assistance 2 x 10    Time Entry(in minutes):  Neuromuscular Re-Education Time Entry: 20  Therapeutic Activity Time Entry: 25    Assessment & Plan   Assessment: Colt tolerated treatment session well. Continued gait with Bioness donned to left lower leg with improved foot clearance. Attempted gait donning left AFO with assistance required for knee flexion during swing phase. Pt with tendency during gait to occasionally perform anterior trunk lean requiring Mod A for regaining balance. He is impulsive when discussing tasks prior to start of exercise with pt wanting to stand immediately before therapist is ready. He remains appropriate for PT at this time.  Evaluation/Treatment Tolerance: Patient tolerated treatment well    The patient will continue to benefit from skilled outpatient physical therapy in order to address the deficits listed in the problem list on the initial evaluation, provide patient and family education, and maximize the patients level of independence in the home and community environments.     The patient's spiritual, cultural, and educational needs were considered, and the patient is agreeable to the plan of  care and goals.     Education  Education was done with Patient. The patient's learning style includes Listening. The patient Verbalizes understanding.         Reasoning and use of Bioness unit and ESTIM to left lower extremity. Answered AFO questions from wife and will send referral request to MD.       Plan: Progression of transfers, motor control, ESTIM for muscle activation of LLE, standing in parallel bars, gait training with tito walker    Goals:   Active       Long Term Goals       Patient will ascend/descend four 6 inch stairs safely using single handrail and standby assistance (Progressing)       Start:  06/27/25    Expected End:  10/01/25            Patient will demonstrate improved gait endurance by ambulating 200 ft during 2 minute walk test with use of LRAD and without seated rest break.  (Progressing)       Start:  06/27/25    Expected End:  10/01/25            Pt will ambulate using tito walker with standby assistance to improve mobility in household.  (Progressing)       Start:  06/27/25    Expected End:  10/01/25            Patient will demonstrate ability to transition floor to stand with external support to reduce assistance needed in case of fall. (Progressing)       Start:  06/27/25    Expected End:  10/01/25               Short Term Goals       Patient will be given initial HEP and report compliance with home exercise program 5 days a week to improve carry over.  (Progressing)       Start:  06/27/25    Expected End:  10/01/25            Patient will demonstrate all bed mobility with supervision and awareness of left upper extremity to improve independence.  (Progressing)       Start:  06/27/25    Expected End:  10/01/25            Pt will demonstrate sit to stand from wheelchair using tito walker for stability with supervision to improve independence at home.  (Progressing)       Start:  06/27/25    Expected End:  10/01/25                Judy Ford, PT

## 2025-07-03 NOTE — PROGRESS NOTES
Outpatient Rehab    Occupational Therapy Evaluation    Patient Name: Colt Rose  MRN: [id number]  YOB: 1955  Encounter Date: 7/3/2025    Therapy Diagnosis:   Encounter Diagnoses   Name Primary?    Hemiplegia, unspecified etiology, unspecified hemiplegia type, unspecified laterality     Left hemiplegia Yes    At high risk for falls     Impaired function of upper extremity     Sensory loss      Physician: Jeffery Mena MD    Physician Orders: Eval and Treat  Medical Diagnosis: Hemiplegia, unspecified etiology, unspecified hemiplegia type, unspecified laterality  Surgical Diagnosis: Not applicable for this Episode   Surgical Date: Not applicable for this Episode  Days Since Last Surgery: Not applicable for this Episode    Visit # / Visits Authorized: 1 / 1  Insurance Authorization Period: 7/2/2025 to 7/2/2026  Date of Evaluation: 7/3/2025  Plan of Care Certification: 7/3/2025 to 8/28/2025     Time In: 1524   Time Out: 1600  Total Time (in minutes): 36   Total Billable Time (in minutes): 36    Intake Outcome Measure for FOTO Survey    Therapist reviewed FOTO scores for Colt Rose on 7/3/2025.   FOTO report - see Media section or FOTO account episode details. FOTO will be performed at follow-up treatment visits    Intake Score:  %    Precautions:     Left tito, Fall Risk, impulsive      Subjective   History of Present Illness  Colt is a 70 y.o. male who reports to occupational therapy with a chief concern of L-side inattention, L-hemiplasia, hx of rotator cuff injury/pain.     The patient reports a medical diagnosis of G81.90 (ICD-10-CM) - Hemiplegia, unspecified etiology, unspecified hemiplegia type, unspecified laterality.            Dominant Hand: Right  History of Present Condition/Illness: Patient is a pleasant 68 y/o male with a history remarkable for recent IVH resulting in left hemiplegia, dysphagia, and cognitive communication deficits, seizure, GERD, Independent prior;  struggling on computer at times; difficulty with ADLs, Dep for IADLs    Activities of Daily Living  Social history was obtained from Patient.    General Prior Level of Function Comments: independent, playing pickle ball daily  General Current Level of Function Comments: Requires assistance for med management and ADL/IADLs  Patient Roles: Caregiver for pet    Previously independent with activities of daily living? Yes     Currently independent with activities of daily living? No  Activities currently needing assistance include Bathing, Bed mobility, Dressing - lower body, Dressing - upper body, Functional mobility, Grooming, Toileting, and Transfers.   Difficulty swallowing pills    Previously independent with instrumental activities of daily living? Yes     Currently independent with instrumental activities of daily living? No  Activities currently needing assistance include: Health management, Meal prep, Medication management, Grocery/shopping, Financial management, Driving, and Community mobility.   Wife is responsible for al IADLs        Pain     Patient reports a current pain level of 0/10. Pain at best is reported as 6/10. Pain at worst is reported as 8/10.   Location: L-shoulder  Clinical Progression (since onset): Stable  Pain Qualities: Discomfort  Pain-Relieving Factors: Change in position  Pain-Aggravating Factors: Movement, Other (Comment)  Other Pain-Aggravating Factors: positioning  In L-hip; pain in LUE with movement      Treatment History  Treatments  Discharged From Past 30 Days: Inpatient acute facility  Previously Received Treatments: Yes  Previous Treatments: Speech language therapy, Physical therapy, Occupational therapy  Currently Receiving Treatments: No    Living Arrangements  Living Situation  Housing: Home independently  Living Arrangements: Spouse/significant other  Support Systems: Spouse/significant other    Home Setup  Type of Structure: House  Home Access: Level entry  Number of Levels  in Home: One level  Patient is able to live on main floor of home: Yes  Primary Bedroom: 1st floor  Primary Bathroom: 1st floor  Bathroom Toilet: Raised  Bathroom Shower/Tub: Walk-in shower  Bathroom Equipment: Handheld shower hose, Shower chair with back, Grab bars in shower, Grab bars around toilet  Kitchen: 1st floor  Laundry: 1st floor    Equipment/Treatments  Mobility Equipment: Transfer chair, Wheeled walker  Cognitive Equipment/Strategies: Pill organizer  Patient Expressive Communication Modes: Verbal  Current Home Medical Treatments: Blood pressure monitoring, CPAP        Employment  Patient reports: Does the patient's condition impact their ability to work?  Employment Status: Retired   Patient worked in building maintenance prior to retiring      Past Medical History/Physical Systems Review:   Colt Rose  has a past medical history of Arthritis of hand, Cervical disc displacement, Degeneration of lumbar intervertebral disc, GERD (gastroesophageal reflux disease), Hip pain, Hyperlipidemia, Shingles, Sleep apnea, and Stroke.    Colt Rose  has a past surgical history that includes TONSILLECTOMY, ADENOIDECTOMY; Cervical fusion; Epidural steroid injection into lumbar spine (N/A, 12/5/2019); Esophagogastroduodenoscopy (N/A, 10/27/2020); Injection of anesthetic agent around medial branch nerves innervating lumbar facet joint (Bilateral, 11/18/2020); Radiofrequency ablation of lumbar medial branch nerve at single level (Bilateral, 1/29/2021); Colonoscopy (N/A, 7/29/2021); ablation, svt, accessory pathway (N/A, 5/25/2023); Injection of anesthetic agent around medial branch nerves innervating lumbar facet joint (Left, 10/25/2023); Injection of anesthetic agent around medial branch nerves innervating lumbar facet joint (Bilateral, 11/28/2023); Colonoscopy (N/A, 10/30/2024); and Esophagogastroduodenoscopy (N/A, 2/14/2025).    Colt has a current medication list which includes the following  "prescription(s): atorvastatin, theratears, cyclobenzaprine, d-mannose, [Paused] docosahexaenoic acid/epa, escitalopram oxalate, levetiracetam, lidocaine, [Paused] magnesium oxide, meloxicam, [Paused] multivitamin, and omeprazole, and the following Facility-Administered Medications: lactated ringers, lactated ringers, lidocaine (pf) 10 mg/ml (1%), and lidocaine (pf) 10 mg/ml (1%).    Review of patient's allergies indicates:   Allergen Reactions    Amlodipine Other (See Comments)     Flushed face, resolved with Hydroxyzine and Pepcid    Aspirin     Benadryl [diphenhydramine hcl] Other (See Comments)     Per wife "pt collapsed and ended up in hospital" ~1995    Pennsaid [diclofenac sodium] Rash and Blisters        Objective   Communication  The patient's current expressive communication modes are Verbal.          Shoulder Range of Motion     Shoulder, Elbow, or Forearm Range of Motion Details: Right-shoulder ROM WFL; Left-shoulder involved pain with movement    Wrist Range of Motion     Right-WFL; LUE -affected; pain with movement Impaired       Right-WFL; Left-impaired              Coordination  Balance  Impaired: Static Sitting, Dynamic Sitting, Static Standing, and Dynamic Standing               Activities of Daily Living (ADL) Assessment  Bathing/Showering Assistance: Moderate assist  Upper Body Dressing Assistance: Moderate assist  Lower Body Dressing Assistance: Maximal assist  Feeding Assistance: Independent  Functional Mobility Assistance: Moderate assist  Personal Device Care Assistance: Moderate assist  Personal Hygiene and Grooming Assistance: Moderate assist  Toileting and Toilet Hygiene Assistance: Moderate assist    Instrumental Activities of Daily Living (IADL) Assessment  Care of Others or Pets Assistance: Supervision  Community Mobility Assistance: Maximal assist  Financial Management Assistance: Dependent  Home Establishment and Maintenance Assistance: Dependent  Meal Prep Assistance: " "Dependent  Medication Management Assistance: Dependent  Shopping Assistance: Dependent      Ambulation Assistance Required  Surface With  Assistive Device Without Assistive Device Details   Level Moderate assist Unable      Uneven Moderate assist Unable     Curb           Gait Analysis  Gait Pattern: Hemiplegic                   Treatment:       Time Entry(in minutes):  OT Evaluation (Complex) Time Entry: 36    Assessment & Plan   Assessment  Colt presents with a condition of High complexity.   Presentation of Symptoms: Stable  Will Comorbidities Impact Care: Yes  Sleep apnea, UTI, history of back and neck pain    ADL Limitations : Bathing/showering, Dressing, Functional mobility, Personal hygiene and grooming, Toileting and toilet hygiene  IADL Limitations: Care of pets, Driving, Financial management, Grocery/shopping, Community mobility, Health management and maintenance, Meal preparation and cleanup, Home establishment and management  Leisure Limitations: Leisure participation  Functional Limitations: Functional mobility, Maintaining balance, Range of motion, Standing tolerance, Increased risk of fall, Bed mobility, Carrying objects, Fine motor coordination, Gross motor coordination, Praxis, Transfers         Personal Factors Affecting Prognosis: Pain, Transportation    Occupational profile: Colt was independent at his PLOF and remains highly motivated to regain functional independence to enhance his quality of life and reduce caregiver burden. He enjoys playing pickleball and lives with his wife, with whom he shares caregiving responsibilities for their pet dog. Since experiencing a CVA, Colt has been supported by a strong community network. He reports difficulty swallowing pills but continues to engage actively in his recovery process..   Evaluation/Treatment Response: Patient responded to treatment well  Patient Goal for Therapy (OT): "Just make me better."  Prognosis: Good  Assessment Details: " Colt presents to outpatient occupational therapy following a recent CVA. He demonstrates left-sided neglect, which is more pronounced during functional activities at home. His processing speed is notably delayed in both conversational interactions and structured language-based tasks. During the evaluation, Colt became emotional when reflecting on his prior level of function (PLOF). He currently exhibits deficits across all areas of ADLs, IADLs, and functional mobility. He relies on a transport chair as his primary mode of mobility. Due to time constraints, the evaluation was abbreviated; additional information will be gathered through ongoing treatment sessions. His current impairments are significantly impacting executive function, and Colt is expected to benefit from continued skilled occupational therapy to address cognitive, motor, and functional performance deficits      Plan  From an occupational therapy perspective, the patient would benefit from: Skilled Rehab Services    Planned therapy interventions include: Therapeutic exercise, Therapeutic activities, Neuromuscular re-education, Manual therapy, ADLs/IADLs, and Cognitive functional training.            Visit Frequency: 2 times Per Week for 8 Weeks.       This plan was discussed with Patient.   Discussion participants: Agreed Upon Plan of Care  Plan details: POC: 7/3/2025-8/28/2025          The patient's spiritual, cultural, and educational needs were considered, and the patient is agreeable to the plan of care and goals.     Education  Education was done with Patient. The patient's learning style includes Listening. The patient Verbalizes understanding and Requires continuing/additional education.         Education on frequency of OT and benefits of participation       Goals: Goals to be added or adjusted as patient progresses.  Active       LTG       With MI, patient will perform all basic self-care tasks (grooming, bathing, dressing, toileting)  using adaptive strategies or assistive devices as needed, demonstrating safe sequencing and minimal need for external support.       Start:  07/03/25    Expected End:  08/28/25            Patient will achieve full PROM in affected upper extremity to support participation in ADLs and therapeutic exercises without pain.       Start:  07/03/25    Expected End:  08/28/25            Patient will perform functional transfers to multiple surfaces (bed, toilet, chair) with supervision or less, demonstrating improved balance, coordination, and safety awareness.       Start:  07/03/25    Expected End:  08/28/25               STG       Patient will complete UB dressing with Mod A using adaptive techniques or modified strategies.       Start:  07/03/25    Expected End:  07/31/25            Patient will tolerate PROM in affected upper extremity with a report of a decrease in pain to support participation in daily grooming tasks.       Start:  07/03/25    Expected End:  07/31/25            Patient will perform sit-to-stand transfers from transport wheelchair with minimal assistance and use of proper body mechanics, demonstrating progress toward safe mobility and reduced fall risk.       Start:  07/03/25    Expected End:  07/31/25                Damaris Hernandez OT

## 2025-07-07 ENCOUNTER — CLINICAL SUPPORT (OUTPATIENT)
Dept: REHABILITATION | Facility: HOSPITAL | Age: 70
End: 2025-07-07
Payer: MEDICARE

## 2025-07-07 DIAGNOSIS — Z74.09 IMPAIRED FUNCTIONAL MOBILITY, BALANCE, GAIT, AND ENDURANCE: ICD-10-CM

## 2025-07-07 DIAGNOSIS — Z91.81 AT HIGH RISK FOR FALLS: ICD-10-CM

## 2025-07-07 DIAGNOSIS — G81.94 LEFT HEMIPLEGIA: Primary | ICD-10-CM

## 2025-07-07 PROCEDURE — 97530 THERAPEUTIC ACTIVITIES: CPT | Mod: PO

## 2025-07-07 PROCEDURE — 97112 NEUROMUSCULAR REEDUCATION: CPT | Mod: PO

## 2025-07-07 NOTE — PROGRESS NOTES
Outpatient Rehab    Physical Therapy Visit    Patient Name: Colt Rose  MRN: [id number]  YOB: 1955  Encounter Date: 7/7/2025    Therapy Diagnosis:   Encounter Diagnoses   Name Primary?    Left hemiplegia Yes    Impaired functional mobility, balance, gait, and endurance     At high risk for falls      Physician: Jeffery Mena MD    Physician Orders: Eval and Treat  Medical Diagnosis: Hemiplegia, unspecified etiology, unspecified hemiplegia type, unspecified laterality  Surgical Diagnosis: Not applicable for this Episode   Surgical Date: Not applicable for this Episode  Days Since Last Surgery: Not applicable for this Episode    Visit # / Visits Authorized:  4 / 20  Insurance Authorization Period: 6/25/2025 to 12/31/2025  Date of Evaluation: 6/27/2025  Plan of Care Certification: 6/30/2025 to 10/1/2025      PT/PTA: PT   Number of PTA visits since last PT visit:0  Time In: 1425   Time Out: 1515  Total Time (in minutes): 50   Total Billable Time (in minutes):      FOTO:  Intake Score:  %  Survey Score 2:  %  Survey Score 3:  %    Precautions:     Left tito, Fall Risk, impulsive      Subjective   I'm good. Physical therapist adjusted givmohr sling..    No pain    Objective            Treatment:  Balance/Neuromuscular Re-Education  NMR 1: captain allen sit to stands with left leg on 4in step 2sets of 10 reps with min assist to improve forced use of left leg  NMR 2: squats with min assist [id number] to improve forced use of left lower extremity and motor control and standing balance  NMR 3: standing forward stepping with weightshift with hemiwalker with contact to min assist with bioness on left anterior tib in training mode (5on, 8 off) for approx 5 mins to improve left dorsiflexion activation, weightshift, and standing tolerance  Therapeutic Activity  TA 1: Ambulation with  feet contact to min assist for balance and occasional assist for left foot clearance wc to follow, after placement  of Bioness to L LE to improve anterior tibialis activation/foot clearance during L swing phase  TA 2: sit to stand with CGA  TA 3: stand pivot transfers wheelchair to mat to right wiht min assist and cues to take time and pivot to lift leg leg  TA 4: mat to wheelchair to left min assist with cues for pivoting and lifting left leg  TA 5: ambulatory transfer with hemiwalker to mat with contact guard and cues    Time Entry(in minutes):  Neuromuscular Re-Education Time Entry: 25  Therapeutic Activity Time Entry: 25    Assessment & Plan   Assessment: Colt tolerated session well. Today focused on force use of left lower extremity and ambulation with bioness. He is improving with transfers and ambulation. Physical Therapist adjusted givmohr sling and educated patient on proper fit and usage. He will continue to benefit from therapy to improve transfers, balance, ambulation, and safety.   Evaluation/Treatment Tolerance: Patient tolerated treatment well    The patient will continue to benefit from skilled outpatient physical therapy in order to address the deficits listed in the problem list on the initial evaluation, provide patient and family education, and maximize the patients level of independence in the home and community environments.     The patient's spiritual, cultural, and educational needs were considered, and the patient is agreeable to the plan of care and goals.     Education  Education was done with Patient. The patient's learning style includes Listening. The patient Verbalizes understanding.         Educated on purpose of bioness, fit of givmohr sling.        Plan: Continue with POC to improve left sided motor return, balance, transfers, and ambulation.    Goals:   Active       Long Term Goals       Patient will ascend/descend four 6 inch stairs safely using single handrail and standby assistance (Progressing)       Start:  06/27/25    Expected End:  10/01/25            Patient will demonstrate improved  gait endurance by ambulating 200 ft during 2 minute walk test with use of LRAD and without seated rest break.  (Progressing)       Start:  06/27/25    Expected End:  10/01/25            Pt will ambulate using tito walker with standby assistance to improve mobility in household.  (Progressing)       Start:  06/27/25    Expected End:  10/01/25            Patient will demonstrate ability to transition floor to stand with external support to reduce assistance needed in case of fall. (Progressing)       Start:  06/27/25    Expected End:  10/01/25               Short Term Goals       Patient will be given initial HEP and report compliance with home exercise program 5 days a week to improve carry over.  (Progressing)       Start:  06/27/25    Expected End:  10/01/25            Patient will demonstrate all bed mobility with supervision and awareness of left upper extremity to improve independence.  (Progressing)       Start:  06/27/25    Expected End:  10/01/25            Pt will demonstrate sit to stand from wheelchair using tito walker for stability with supervision to improve independence at home.  (Progressing)       Start:  06/27/25    Expected End:  10/01/25                Alyssia Leon, PT

## 2025-07-08 ENCOUNTER — CLINICAL SUPPORT (OUTPATIENT)
Dept: REHABILITATION | Facility: HOSPITAL | Age: 70
End: 2025-07-08
Payer: MEDICARE

## 2025-07-08 ENCOUNTER — CLINICAL SUPPORT (OUTPATIENT)
Dept: REHABILITATION | Facility: HOSPITAL | Age: 70
End: 2025-07-08
Attending: FAMILY MEDICINE
Payer: MEDICARE

## 2025-07-08 VITALS — DIASTOLIC BLOOD PRESSURE: 71 MMHG | HEART RATE: 59 BPM | SYSTOLIC BLOOD PRESSURE: 125 MMHG

## 2025-07-08 DIAGNOSIS — G81.94 LEFT HEMIPLEGIA: Primary | ICD-10-CM

## 2025-07-08 DIAGNOSIS — R41.841 COGNITIVE COMMUNICATION DEFICIT: ICD-10-CM

## 2025-07-08 DIAGNOSIS — R68.89 IMPAIRED FUNCTION OF UPPER EXTREMITY: ICD-10-CM

## 2025-07-08 DIAGNOSIS — R47.1 DYSARTHRIA: Primary | ICD-10-CM

## 2025-07-08 DIAGNOSIS — R20.0 SENSORY LOSS: Primary | ICD-10-CM

## 2025-07-08 DIAGNOSIS — R13.12 OROPHARYNGEAL DYSPHAGIA: ICD-10-CM

## 2025-07-08 DIAGNOSIS — Z91.81 AT HIGH RISK FOR FALLS: ICD-10-CM

## 2025-07-08 DIAGNOSIS — Z74.09 IMPAIRED FUNCTIONAL MOBILITY, BALANCE, GAIT, AND ENDURANCE: ICD-10-CM

## 2025-07-08 DIAGNOSIS — G81.94 LEFT HEMIPLEGIA: ICD-10-CM

## 2025-07-08 PROCEDURE — 97110 THERAPEUTIC EXERCISES: CPT | Mod: PO

## 2025-07-08 PROCEDURE — 97112 NEUROMUSCULAR REEDUCATION: CPT | Mod: PO

## 2025-07-08 PROCEDURE — 97530 THERAPEUTIC ACTIVITIES: CPT | Mod: PO,CQ

## 2025-07-08 PROCEDURE — 97530 THERAPEUTIC ACTIVITIES: CPT | Mod: PO

## 2025-07-08 PROCEDURE — 97112 NEUROMUSCULAR REEDUCATION: CPT | Mod: PO,CQ

## 2025-07-08 PROCEDURE — 96125 COGNITIVE TEST BY HC PRO: CPT | Mod: PO

## 2025-07-08 PROCEDURE — 92610 EVALUATE SWALLOWING FUNCTION: CPT | Mod: PO

## 2025-07-08 NOTE — PROGRESS NOTES
Outpatient Rehab    Physical Therapy Visit    Patient Name: Colt Rose  MRN: [id number]  YOB: 1955  Encounter Date: 7/8/2025    Therapy Diagnosis:   Encounter Diagnoses   Name Primary?    Left hemiplegia Yes    Impaired functional mobility, balance, gait, and endurance     At high risk for falls      Physician: Jeffery Mena MD    Physician Orders: Eval and Treat  Medical Diagnosis: Hemiplegia, unspecified etiology, unspecified hemiplegia type, unspecified laterality  Surgical Diagnosis: Not applicable for this Episode   Surgical Date: Not applicable for this Episode  Days Since Last Surgery: Not applicable for this Episode    Visit # / Visits Authorized:  5 / 20  Insurance Authorization Period: 6/25/2025 to 12/31/2025  Date of Evaluation: 6/27/2025  Plan of Care Certification: 6/30/2025 to 10/1/2025      PT/PTA: PTA   Number of PTA visits since last PT visit:1  Time In: 0715   Time Out: 0800  Total Time (in minutes): 45   Total Billable Time (in minutes): 45    FOTO:  Intake Score:  %  Survey Score 2:  %  Survey Score 3:  %    Precautions:     Left tito, Fall Risk, impulsive       Subjective   Patient without complaints, wife present and stating doing standing exercises daily but Colt not doing anything else during the day and has been unable to encourage more activity..  Family / care giver present for this visit:  (Wife in waiting area)  Pain reported as 0/10.      Objective            Treatment:  Balance/Neuromuscular Re-Education  NMR 1: Recumbent bike Level 3 10 minutes with L foot secured  NMR 2: Supine L Hip/knee flexion/extension 2 sets of 30, Max VC for increased participation, occasional resistance on extension  NMR 3: Supine B hip IR with knees extended  NMR 4: Seated L hip flexion with DF  Therapeutic Activity  TA 1: wc<>Recumbent Bike/mat CGA SPT after assistance of L foot set up  TA 2: Sit<>Supine Maynor    Time Entry(in minutes):  Neuromuscular Re-Education Time Entry:  30  Therapeutic Activity Time Entry: 15    Assessment & Plan   Assessment: Colt provided good participation and effort with cues during today's session with treatment focused on neuromuscular re-education and functional ability as well as maintaining HEP.  Colt with improved Left lower extremity activity with increased repetitions and with verbal cues.  Evaluation/Treatment Tolerance: Patient tolerated treatment well    The patient will continue to benefit from skilled outpatient physical therapy in order to address the deficits listed in the problem list on the initial evaluation, provide patient and family education, and maximize the patients level of independence in the home and community environments.     The patient's spiritual, cultural, and educational needs were considered, and the patient is agreeable to the plan of care and goals.     Education  Education was done with Patient and Other recipient present. The patient's learning style includes Listening and Demonstration. The patient Demonstrates understanding. Wife participated in education. They identified as Caregiver and Spouse/significant other. The reported learning style is Listening. The recipient Verbalizes understanding.     Do exercises while sitting in recliner when commercial comes on or set smart watch to alert every 20 minutes to do an exercise to increase Left lower extremity activity.        Plan: Continue with POC to increae activities as patient tolerates.    Goals:   Active       Long Term Goals       Patient will ascend/descend four 6 inch stairs safely using single handrail and standby assistance (Progressing)       Start:  06/27/25    Expected End:  10/01/25            Patient will demonstrate improved gait endurance by ambulating 200 ft during 2 minute walk test with use of LRAD and without seated rest break.  (Progressing)       Start:  06/27/25    Expected End:  10/01/25            Pt will ambulate using tito walker with  standby assistance to improve mobility in household.  (Progressing)       Start:  06/27/25    Expected End:  10/01/25            Patient will demonstrate ability to transition floor to stand with external support to reduce assistance needed in case of fall. (Progressing)       Start:  06/27/25    Expected End:  10/01/25               Short Term Goals       Patient will be given initial HEP and report compliance with home exercise program 5 days a week to improve carry over.  (Progressing)       Start:  06/27/25    Expected End:  10/01/25            Patient will demonstrate all bed mobility with supervision and awareness of left upper extremity to improve independence.  (Progressing)       Start:  06/27/25    Expected End:  10/01/25            Pt will demonstrate sit to stand from wheelchair using tito walker for stability with supervision to improve independence at home.  (Progressing)       Start:  06/27/25    Expected End:  10/01/25                Angie Castro, PTA

## 2025-07-08 NOTE — PROGRESS NOTES
Outpatient Rehab  Speech Language Pathology Initial Evaluation    Patient Name: Colt Rose  MRN: [id number]  YOB: 1955  Encounter Date: 7/8/2025    Therapy Diagnosis:   Encounter Diagnoses   Name Primary?    Dysarthria Yes    Oropharyngeal dysphagia     Cognitive communication deficit      Physician: Jeffery Mena MD    Physician Orders: Eval and Treat  Medical Diagnosis: Dysarthria  Surgical Diagnosis: Not applicable for this Episode   Surgical Date: Not applicable for this Episode  Days Since Last Surgery: Not applicable for this Episode    Visit # / Visits Authorized: 1 / 1   Insurance Authorization Period: 7/2/2025 to 7/2/2026  Date of Evaluation: 7/8/2025      Time In: 0845   Time Out: 0935  Total Time (in minutes): 50   Total Billable Time (in minutes): 50    NEEDS INITIAL FOTO    Precautions:  General precautions include: Aspiration/choking.      Standard, Fall Risk, cognitive    Subjective   History of Present Illness  Colt is a 69 y.o. male who reports to Speech-Language Pathology with a chief concern of dysphagia and attention impairments.     The patient reports a medical diagnosis of Dysarthria (R47.1).         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 Patient recently discharged home from inpatient rehabilitation. Stroke was in May 2025. Patient was independent prior to stroke and very active. He reports struggling on the computer at times and difficulty with attention. He and spouse report coughing with both liquids and solids as well as pill dysphagia. Patient was initially on a IDDSI 4/3 (pureed/moderately thick) diet after Modified  "Barium Swallow Study in May. His diet was advanced to regular with thin liquids (IDDSI 7/0) at rehabilitation, however, no repeat instrumental assessment was completed. He additionally reports concerns for emotional lability.       Modified Barium Swallow Study completed 5/9/25 reported, "Patient demonstrates moderate  Oropharyngeal  dysphagia characterized by slow oral transit with delayed swallow initiation resulting in aspiration of nectar thick liquids prior to/during the swallow. Moderate pharyngeal stasis clears with multiple swallows/puree bolus.  Pt did appear to fatigue over course of evaluation though no increase in aspiration risk with puree/HTL.  Pt at high risk for aspirated related complications 2/2 acute R ICH, decreased sustained alertness, decreased cough strength, dependence for oral care, and decreased sensation."      Patient presents in clinic breathing on Room air.       Dominant Hand: Right  Prior Level of Function: independent, playing pickle ball daily        Current Swallow Symptoms and Diet  Associated Swallowing Symptoms: Coughing, Difficulty swallowing food, Difficulty swallowing liquids, Difficulty swallowing pills, Drooling  Current Mode of Intake: Oral diet  Oral Intake Details: Primary source of nutrition  Current Drink Consistency: Thin (IDDSI 0)  Current Food Consistency: Regular (IDDSI 7)  Ability to Self-Feed: Needs some assistance         Pain  No Pain Reported: Yes    Treatment History  Discharged From Past 30 Days: Inpatient acute facility, Inpatient rehab facility    Previous Treatments: Speech language therapy, Physical therapy, Occupational therapy, Diet modifications, Dysphagia management, Cognitive training     No: Currently Receiving Treatments       Living Arrangements  Living Situation  Housing: Home independently  Living Arrangements: Spouse/significant other  Support Systems: Family members      Employment  Patient reports: Does the patient's condition impact their " ability to work?  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    Colt Rose  has a past surgical history that includes TONSILLECTOMY, ADENOIDECTOMY; Cervical fusion; Epidural steroid injection into lumbar spine (N/A, 12/5/2019); Esophagogastroduodenoscopy (N/A, 10/27/2020); Injection of anesthetic agent around medial branch nerves innervating lumbar facet joint (Bilateral, 11/18/2020); Radiofrequency ablation of lumbar medial branch nerve at single level (Bilateral, 1/29/2021); Colonoscopy (N/A, 7/29/2021); ablation, svt, accessory pathway (N/A, 5/25/2023); Injection of anesthetic agent around medial branch nerves innervating lumbar facet joint (Left, 10/25/2023); Injection of anesthetic agent around medial branch nerves innervating lumbar facet joint (Bilateral, 11/28/2023); Colonoscopy (N/A, 10/30/2024); and Esophagogastroduodenoscopy (N/A, 2/14/2025).    Colt has a current medication list which includes the following prescription(s): artificial tears, atorvastatin, [Paused] docosahexaenoic acid/epa, lidocaine, losartan, [Paused] magnesium oxide, methocarbamol, [Paused] multivitamin, mupirocin, [Paused] omeprazole, ondansetron, [Paused] pregabalin, senna-docusate, silodosin, and triamcinolone acetonide 0.1%, and the following Facility-Administered Medications: lactated ringers, lactated ringers, lidocaine (pf) 10 mg/ml (1%), and lidocaine (pf) 10 mg/ml (1%).    Review of patient's allergies indicates:   Allergen Reactions    Amlodipine Other (See Comments)     Flushed face, resolved with Hydroxyzine and Pepcid    Benadryl [diphenhydramine hcl] Other (See Comments)     Per wife  ""pt collapsed and ended up in hospital" ~1995    [place] [diclofenac sodium] Rash and Blisters        Objective   Oral Peripheral Exam  Dentition and Oral Hygiene  The patient's current dental condition: Adequate.   Oral hygiene is Good.     Buccal and Oral Mucosa Exam  Buccal sensation-CN V-is Decreased left. Buccal strength-CN VII-is Decreased left. Oral mucosa observations: Normal  pt reported occasional pooling of secretions and anterior loss of secretions on the left    Labial Exam  Range of motion-CN VII-is Decreased left upper and Decreased left lower.   Strength-CN VII-is Decreased left. Tone is Normal. Coordination is Impaired. Sensation-CN V-is Normal.   Seal is Normal.  labial seal was normal for oral motor tasks but reduced on the left during cup drinking    Lingual Exam  Range of motion-CN XII-is Normal. Strength-CN XII-is Decreased left. Symmetry-CN XII-is Normal. Tone is Normal. Speed is Intact. Coordination is Intact. Sensation-CN V & IX-is Normal. Taste-CN VII, IX, & X-is Normal.      Velar and Uvular Exam  Velar elevation during phonation-CN X-is Normal. Sustained velar elevation is Intact.   Velum at rest is Intact.          Jaw Exam  Range of motion-CN V-is Normal.   Strength-CN V-is Normal. Tone is Normal.         Face and Neck Exam  Facial symmetry-CN VII-is Lower facial droop.   Facial sensation-CN V-is Normal.         Verbal Expression  Verbal Initiation, Termination, and Diffuse Language Characteristics  Diffuse Language Characteristics: Delayed     Confrontation Naming  Impaired: Line Drawing     Divergent Naming  Impaired: [place]     Narrative Speech  Intact: Conversational  pt with delayed responses to questions/conversations though responses were appropriate and accurate      Cognition  Observed memory impairments include Decreased immediate memory and Decreased working memory.   Observed attention impairments include Sustained attention, Alternating attention, and Divided attention.  "  Processing speed is Delayed.    Observations of functional cognition include L neglect noted with pt and spouse reporting functional impact of neglect in the home        Cognitive-Communication  Social Communication  Intact: Response to Humor/Figurative Language    Executive Function  Impaired: Planning and Organization     The Repeatable Battery for the Assessment of Neuropsychological Status (RBANS) Version C was administered to measure the patient's attention, language, visuospatial/constructional abilities, and immediate and delayed memory. The results are outlined below:    Domain Subtest Total Score Index Score   Immediate Memory List Learning 24   73    Story Memory 10    Visuospatial/  Constructional Figure Copy 13   64    Line Orientation 11    Language Picture Naming 8   68    Semantic Fluency 13    Attention Digit Span 10   79    Coding 21      Delayed Memory List Recall 4     98    List Recognition 20     Story Recall 6     Figure Recall 14        Total Scale   70     Percentile   2     Descriptor   Borderline   Interpretation:  Scaled score: mean of 10, standard deviation of 3. Therefore, 16+ = Very Superior; 14-15 = Superior; 12-13 = High Average; 8-11 = Average; 6-7 = Low Average; 4-5 = Borderline; 3 and below = Extremely Low    Index score: mean of 100, standard deviation of 15. Therefore, 130+ = Very Superior; 120-129 = Superior; 110-119 = High average;  = Average; 80-89 = Low Average; 70-79 = Borderline; 69 and below = Extremely Low. Apparently the most reliable score; factor in education level.    Immediate Memory Score: Recalling information following immediate presentation is assessed through the List Learning and Story Memory subtests. In the List Learning subtest, the patient is given 10 words to remember. This list is presented four times overall. In this subtest, the patient did demonstrate learning over the 4 trials. The patient recalled 4 items on trial one, 6 items on trial two, 7  items on trial three, and 7 items in trial 4. In the Story Memory subtest, the patient recalled 3 details on the first presentation and 7 details on the second presentation. Results of this domain indicate Borderline  performance.  Visuospatial score: Perceiving spatial relations and constructing a spatially accurate copy of a drawing is assessed through the Figure Copy and Line Orientation subtests. The Figure Copy subtest asks the patient to copy a complex line drawing. The patient correctly copied 13/20 details. The Line Orientation subtest the presents the patient with 12 line displays and asks the patient to match two given lines at the bottom to the display at the top.  The patient correctly identified 11/20. Results of this domain indicate Extremely low performance.  Language score: Naming common items and retrieving learned material is assessed through the Picture Naming and Semantic Fluency subtests. The Picture Naming subtest asks the patient to name 10 line drawings. The patient was able to accurately name 8/10 items. The Semantic Fluency subtest asks the patient to name as many musical instruments as he can in 60 seconds. The patient was able to name 9 musical instruments. These results indicate Extremely low performance.   Attention score: Attending to, holding and manipulating information presented visually and orally in working memory is assessed with use of the Digit Span and Coding subtests. The Digit Span subtest asks the patient to repeat progressively lengthening strings of numbers. The Coding subtest asks the patient to alternate attention between a key the given work and then to decode symbols to numbers. The patients results on these subtest indicate Borderline  performance.  Delayed Memory score: Anterograde memory capacity is assessed through the List Recall, List Recognition, Story Recall, and Figure Recall subtests. The List Recall subtest asks the patient to recall items from the list  presented at the beginning of the test. The patient was able to recall 4/10 items. The List Recognition subtest has the patient recall whether a word was or was not in the original list. The patient accurately identified whether a word was or was not on the list in 20 of 20 items. On the Story Recall subtest, the patient was able to recall 6 details. Finally, on the Figure Recall, the patient recalled 14 details. Results of this domain indicate Average performance.    Overall, according to the RBANS research, total Scale index is a good indicator of general cognitive functioning. The patient presents with a mild cognitive communication disorder charaterized by deficits in visuospatial awareness, immediate memory, attention, and language. Current deficits are negatively impacting overall executive functions.              Communication Modes and Devices  Patient Expressive Communication Modes: Verbal    Current Receptive Communication Modes: Auditory  Caregiver/Familiar Person Required to Support Communication: No         Non-Instrumental Swallow Exam  Trials presented by: Self    Drink Consistencies Presented: Thin (IDDSI 0)   Food Consistencies Presented: Pureed (IDDSI 4), Soft and bite-sized (IDDSI 6), Regular (IDDSI 7)     Consistency Trials  Thin, IDDSI 0 - Presented single cup sips, consecutive cup sip, single straw sips     Extremely thick/pureed, IDDSI 4 - Presented tsp trials x2     Soft and bite-sized, IDDSI 6 - Presented self-regulated bites x2     Regular, IDDSI 7 - Presented self-regulated bite       Oral and Pharyngeal Phase  Oral phase: Lingual residue and Spillage left  Pharyngeal phase: Laryngeal movement present on palpation, Multiple swallows, Cough - delayed, and Throat clearing - immediate         Time Entry(in minutes):  Standardized Cog Perf Testing w/ Interp Time Entry: 30  Clinical Swallow Eval Time Entry: 20    Assessment & Plan   Assessment   Cognition: Patient participated in cognitive  communication assessment via case history, Repeatable Battery for the Assessment of Neuropsychological Status, and informal measures. Patient presents with a mild cognitive communication disorder characterized primarily with deficits in immediate/working memory, attention, language, and visuospatial awareness. He presents with left neglect which is more apparent in functional tasks at home per patient's spouse (not exhibited on figure copy task or coding task). His processing speed is delayed in conversation and structured language based tasks, though he denied concerns for language at this time. Current deficits are negatively impacting overall executive functions and patient will benefit from skilled Speech Therapy intervention to address.     Swallow: The patient had mild anterior loss of the bolus on the left with adequate closure of the lips around the cup edge and spoon. Moderate residue remained in the oral cavity following the swallow with trials of solids which was able to clear with a thin liquid wash. Patient with overt or delayed clinical sign/symptoms of aspiration with thin liquids and dry solids. Patient presents with a possibly inefficient swallow as indicated by multiple swallows per bolus concerning for difficulty clearing bolus from the pharynx. Contributing risk factors for dysphagia include cognitive deficits. Patient with increased risk for silent aspiration given potential sensory deficits related to stroke.    Oropharyngeal dysphagia suspected based on clinical bedside evaluation, likely Acute on chronic related to IVH in May of 2025.  A repeat instrumental swallow study via Modified Barium Swallow Study (MBSS) recommended to visualize oropharyngeal swallow function, determine least restrictive diet and appropriate treatment plan. Moderate oropharyngeal dysphagia noted on initial Modified Barium Swallow Study completed in May of 2025 with patient's diet advanced to regular at Inpatient Rehab,  however, patient continuing to report and show signs of dysphagia. Given prolonged wait time for outpatient instrumental studies, patient should continue current diet prior to instrumental study.      Personal Factors Affecting Prognosis: Cognitive impairment, Transportation          Prognosis: Good      Education  Education was done with Patient and Other recipient present. The patient's learning style includes Listening. The patient Verbalizes understanding. spouse participated in education. They identified as Spouse/significant other. The reported learning style is Listening. The recipient Verbalizes understanding.          Plan  From a speech language pathology perspective, the patient would benefit from: Skilled Rehab Services  Planned therapy interventions and modalities include: Cognitive therapy, Swallowing/dysphagia therapy, and Patient/family education.        Planned instrumental assessments to include: Videofluoroscopic swallow study.      Visit Frequency: 2 times Per Week for 10 Weeks.       This plan was discussed with Patient and Family.   Discussion participants: Agreed Upon Plan of Care  Plan details: Plan of Care 7/8/2025-9/16/2025. Reached out to referring MD for orders for Modified Barium Swallow Study. Further dysphagia goals TBD pending outcome of Modified Barium Swallow Study when orders received.       The patient's spiritual, cultural, and educational needs were considered, and the patient is agreeable to the plan of care and goals.     Goals:   Active       1. Short term goals       STG 1       Start:  07/09/25    Expected End:  09/16/25       Patient will participate in Modified Barium Swallow Study to instrumentally assess current swallow status to guide Speech Therapy Plan of Care and swallow recommendations         STG 2       Start:  07/09/25    Expected End:  09/16/25       Patient will recall 4/4 memory strategies independently 3 times overall.         STG 3       Start:  07/09/25     Expected End:  09/16/25       Patient will complete alternating and divided attention tasks with natural background noise and 80% accuracy independently         STG 4       Start:  07/09/25    Expected End:  09/16/25       Patient will complete moderate level problem solving tasks with 90% accuracy and minimal cues.         STG 5       Start:  07/09/25    Expected End:  09/16/25       Patient will determine a list of possible solutions, given a problem scenario in a structured setting with 90% accuracy         STG 6       Start:  07/09/25    Expected End:  09/16/25       Patient will independently use a compensatory strategy (e.g., anchoring line, tactile cue, finger scanning) to aid in visual scanning to the left during reading or functional tasks in 3 out of 4 opportunities.            2. Long term goals       LTG 1       Start:  07/09/25    Expected End:  09/16/25       Patient will maintain adequate hydration/nutrition with optimum safety and efficiency of swallowing function on PO intake without overt signs and symptoms of aspiration for regular diet level.          LTG 2       Start:  07/09/25    Expected End:  09/16/25       Pt will improve  attention skills to effectively attend to and communicate in complex daily living tasks in functional living environment.           LTG 3       Start:  07/09/25    Expected End:  09/16/25       Patient will use appropriate memory strategies to schedule and recall weekly activities, express needs and recall names to maintain safety and participate socially in functional living environment.           LTG 4       Start:  07/09/25    Expected End:  09/16/25       Pt will demonstrate use of self awareness, planning, and  self-monitoring during daily living activities to improve safety and awareness in functional living environment.             Macrina Ladd, L-SLP, CCC-SLP

## 2025-07-08 NOTE — PROGRESS NOTES
"  Outpatient Rehab    Occupational Therapy Visit    Patient Name: Colt Rose  MRN: [id number]  YOB: 1955  Encounter Date: 7/8/2025    Therapy Diagnosis:   Encounter Diagnoses   Name Primary?    Sensory loss Yes    Left hemiplegia     Impaired function of upper extremity      Physician: Jeffery Mena MD    Physician Orders: Eval and Treat  Medical Diagnosis: Hemiplegia, unspecified etiology, unspecified hemiplegia type, unspecified laterality  Surgical Diagnosis: Not applicable for this Episode   Surgical Date: Not applicable for this Episode  Days Since Last Surgery: Not applicable for this Episode    Visit # / Visits Authorized: 3 / 12  Insurance Authorization Period: 7/3/2025 to 12/31/2025  Date of Evaluation: 7/3/2025  Plan of Care Certification: 7/3/2025 to 8/28/2025      Time In: 0802   Time Out: 0845  Total Time (in minutes): 43   Total Billable Time (in minutes): 43    FOTO:  Intake Score:  %  Survey Score 2:  %  Survey Score 3:  %    Precautions:     Left tito, Fall Risk, impulsive       Subjective   "I was supposed to have rotator cuff surgery, but I had the stroke instead.".  Pain reported as 0/10.      Objective   Vital Signs  /71   Pulse (!) 59   BP Location: Right arm  BP Position: Sitting  BP Cuff Size: Adult                     Treatment:  Therapeutic Exercise  TE 1: Gentle stretching of the soft tissues of the left  wrist and hand (including gentle mobilization of the related joints as appropriate/ necessary) to decrease edema and improve PROM, potentially allowing for increases in active movement.  Therapeutic Activity  TA 1: SPT w/c<>mat w/ CGA and mod cues for sequencing the set-up; scooting at edge of mat with SBA  TA 2: sit<>supine w/ min A.  Cues given the cross the right ankle over the left and facilitate hip flexion bringing knees to chest as he layed down.  TA 3: Pt ed re: positioning in bed for protection of the left shoulder.  Educated patient on how to make " a ramp pillow and how to position at laying on his back his right side in his left side in order to protect the left shoulder and minimize increased time and internal rotation.  TA 4: Adjusted givMohr w/ sit<>stand x2, center of gravity noted to be on the right while he was standing.  Balance/Neuromuscular Re-Education  NMR 1: Facilitation of anterior/ posterior pelvic tilt, right/ left lateral pelvic tilt. Points of contact at low back for anterior tilt, ASIS and superior shoulder for posterior tilt, appropriate QL and opposite ribcage for lateral tilting. Hands at sides in position of support to facilitate scapulo-pelvic rhythm, with left hand position on wedge.  NMR 2: Attempted active scapular retraction, protraction, elevation, and depression with no movement noted.    Time Entry(in minutes):  Neuromuscular Re-Education Time Entry: 15  Therapeutic Activity Time Entry: 15  Therapeutic Exercise Time Entry: 13    Assessment & Plan   Assessment: Mynor appears to have improved pain compared to the previous session.  However inconsistent positioning during sleep may affect his pain in the future.  He was open to changes in positioning is in a ramp pillow or wedge in order to keep his shoulder at neutral rotation and with increased support.  Evaluation/Treatment Tolerance: Patient tolerated treatment well    The patient will continue to benefit from skilled outpatient occupational therapy in order to address the deficits listed in the problem list on the initial evaluation, provide patient and family education, and maximize the patients level of independence in the home and community environments.     The patient's spiritual, cultural, and educational needs were considered, and the patient is agreeable to the plan of care and goals.           Plan: facilitate weight-bearing through the left arm, facilitate pelvic movements, assess benefit from ed regarding sleep positions    Goals:   Active       LTG       With MI,  patient will perform all basic self-care tasks (grooming, bathing, dressing, toileting) using adaptive strategies or assistive devices as needed, demonstrating safe sequencing and minimal need for external support. (Ongoing)       Start:  07/03/25    Expected End:  08/28/25            Patient will achieve full PROM in affected upper extremity to support participation in ADLs and therapeutic exercises without pain. (Ongoing)       Start:  07/03/25    Expected End:  08/28/25            Patient will perform functional transfers to multiple surfaces (bed, toilet, chair) with supervision or less, demonstrating improved balance, coordination, and safety awareness. (Ongoing)       Start:  07/03/25    Expected End:  08/28/25               STG       Patient will complete UB dressing with Mod A using adaptive techniques or modified strategies. (Ongoing)       Start:  07/03/25    Expected End:  07/31/25            Patient will tolerate PROM in affected upper extremity with a report of a decrease in pain to support participation in daily grooming tasks. (Ongoing)       Start:  07/03/25    Expected End:  07/31/25            Patient will perform sit-to-stand transfers from transport wheelchair with minimal assistance and use of proper body mechanics, demonstrating progress toward safe mobility and reduced fall risk. (Ongoing)       Start:  07/03/25    Expected End:  07/31/25                Jazzy Wilkins OT

## 2025-07-09 ENCOUNTER — RESULTS FOLLOW-UP (OUTPATIENT)
Dept: UROLOGY | Facility: CLINIC | Age: 70
End: 2025-07-09

## 2025-07-09 ENCOUNTER — CLINICAL SUPPORT (OUTPATIENT)
Dept: REHABILITATION | Facility: HOSPITAL | Age: 70
End: 2025-07-09
Payer: MEDICARE

## 2025-07-09 ENCOUNTER — LAB VISIT (OUTPATIENT)
Dept: LAB | Facility: HOSPITAL | Age: 70
End: 2025-07-09
Attending: NURSE PRACTITIONER
Payer: MEDICARE

## 2025-07-09 ENCOUNTER — OFFICE VISIT (OUTPATIENT)
Dept: UROLOGY | Facility: CLINIC | Age: 70
End: 2025-07-09
Payer: MEDICARE

## 2025-07-09 VITALS — HEART RATE: 56 BPM | DIASTOLIC BLOOD PRESSURE: 52 MMHG | SYSTOLIC BLOOD PRESSURE: 120 MMHG

## 2025-07-09 VITALS — BODY MASS INDEX: 22.9 KG/M2 | WEIGHT: 160 LBS | HEIGHT: 70 IN

## 2025-07-09 DIAGNOSIS — Z74.09 IMPAIRED FUNCTIONAL MOBILITY, BALANCE, GAIT, AND ENDURANCE: ICD-10-CM

## 2025-07-09 DIAGNOSIS — R20.0 SENSORY LOSS: Primary | ICD-10-CM

## 2025-07-09 DIAGNOSIS — R35.1 NOCTURIA: Primary | ICD-10-CM

## 2025-07-09 DIAGNOSIS — R68.89 IMPAIRED FUNCTION OF UPPER EXTREMITY: ICD-10-CM

## 2025-07-09 DIAGNOSIS — N40.0 BPH WITH ELEVATED PSA: Primary | ICD-10-CM

## 2025-07-09 DIAGNOSIS — N39.0 URINARY TRACT INFECTION WITHOUT HEMATURIA, SITE UNSPECIFIED: ICD-10-CM

## 2025-07-09 DIAGNOSIS — G81.94 LEFT HEMIPLEGIA: ICD-10-CM

## 2025-07-09 DIAGNOSIS — R97.20 BPH WITH ELEVATED PSA: Primary | ICD-10-CM

## 2025-07-09 DIAGNOSIS — G81.94 LEFT HEMIPLEGIA: Primary | ICD-10-CM

## 2025-07-09 DIAGNOSIS — R35.1 NOCTURIA: ICD-10-CM

## 2025-07-09 DIAGNOSIS — Z91.81 AT HIGH RISK FOR FALLS: ICD-10-CM

## 2025-07-09 PROBLEM — R41.841 COGNITIVE COMMUNICATION DEFICIT: Status: ACTIVE | Noted: 2025-07-09

## 2025-07-09 PROBLEM — R13.12 OROPHARYNGEAL DYSPHAGIA: Status: ACTIVE | Noted: 2025-07-09

## 2025-07-09 LAB
BILIRUBIN, UA POC OHS: NEGATIVE
BLOOD, UA POC OHS: NEGATIVE
CLARITY, UA POC OHS: CLEAR
COLOR, UA POC OHS: YELLOW
GLUCOSE, UA POC OHS: NEGATIVE
KETONES, UA POC OHS: ABNORMAL
LEUKOCYTES, UA POC OHS: NEGATIVE
NITRITE, UA POC OHS: NEGATIVE
PH, UA POC OHS: 5.5
POC RESIDUAL URINE VOLUME: 18 ML (ref 0–100)
PROTEIN, UA POC OHS: 30
PSA FREE MFR SERPL: 5.08 %
PSA FREE SERPL-MCNC: 0.24 NG/ML
PSA SERPL-MCNC: 4.72 NG/ML (ref ?–4)
SPECIFIC GRAVITY, UA POC OHS: 1.02
UROBILINOGEN, UA POC OHS: 0.2

## 2025-07-09 PROCEDURE — 51798 US URINE CAPACITY MEASURE: CPT | Mod: PBBFAC,PO | Performed by: NURSE PRACTITIONER

## 2025-07-09 PROCEDURE — 81003 URINALYSIS AUTO W/O SCOPE: CPT | Mod: PBBFAC,PO | Performed by: NURSE PRACTITIONER

## 2025-07-09 PROCEDURE — 99999 PR PBB SHADOW E&M-EST. PATIENT-LVL IV: CPT | Mod: PBBFAC,,, | Performed by: NURSE PRACTITIONER

## 2025-07-09 PROCEDURE — 84153 ASSAY OF PSA TOTAL: CPT

## 2025-07-09 PROCEDURE — 36415 COLL VENOUS BLD VENIPUNCTURE: CPT

## 2025-07-09 PROCEDURE — 99999PBSHW POCT BLADDER SCAN: Mod: PBBFAC,,,

## 2025-07-09 PROCEDURE — 97110 THERAPEUTIC EXERCISES: CPT | Mod: PO

## 2025-07-09 PROCEDURE — 99999PBSHW POCT URINALYSIS(INSTRUMENT): Mod: PBBFAC,,,

## 2025-07-09 PROCEDURE — 97530 THERAPEUTIC ACTIVITIES: CPT | Mod: PO

## 2025-07-09 PROCEDURE — 99214 OFFICE O/P EST MOD 30 MIN: CPT | Mod: PBBFAC,PO | Performed by: NURSE PRACTITIONER

## 2025-07-09 NOTE — PROGRESS NOTES
Outpatient Rehab    Physical Therapy Visit    Patient Name: Colt Rose  MRN: [id number]  YOB: 1955  Encounter Date: 7/9/2025    Therapy Diagnosis:   Encounter Diagnoses   Name Primary?    Left hemiplegia Yes    Impaired functional mobility, balance, gait, and endurance     At high risk for falls      Physician: Jeffery Mena MD    Physician Orders: Eval and Treat  Medical Diagnosis: Hemiplegia, unspecified etiology, unspecified hemiplegia type, unspecified laterality  Surgical Diagnosis: Not applicable for this Episode   Surgical Date: Not applicable for this Episode  Days Since Last Surgery: Not applicable for this Episode    Visit # / Visits Authorized:  6 / 20  Insurance Authorization Period: 6/25/2025 to 12/31/2025  Date of Evaluation: 6/27/2025  Plan of Care Certification: 6/30/2025 to 10/1/2025      PT/PTA: PT   Number of PTA visits since last PT visit:0  Time In: 0850   Time Out: 0930  Total Time (in minutes): 40   Total Billable Time (in minutes):      FOTO:  Intake Score:  %  Survey Score 2:  %  Survey Score 3:  %    Precautions:     Standard, Fall Risk, cognitive      Subjective   Patient in wheelchair after OT. Hey.  Pain reported as 0/10.      Objective            Treatment:  Balance/Neuromuscular Re-Education  NMR 1: standing at high mat with bilateral upper extremity on wedge on high mat patient performs squats with contact guard and verbal cues for more hip hinge x 10 trials  NMR 2: standing at high mat with left hand on ball and right hand on top rolling forward and to left for weightbearing on left side and to improve standing balance x 15trials  Therapeutic Activity  TA 1: ambulated with hemiwalker and bioness on left anterior tib with contact guard to min assist 240 feet (no wheelchair follow) to improve ambulation, left foot clearance and mobility  TA 2: ambulatory transfer with hemiwalker with min assist  TA 3: sit to stand with contact guard from chair  TA 4:  sit<>stand from mat without hand support with focus on weight shift to left side x 10 trials with min assist and cues for weighshift to left and lean forward    Time Entry(in minutes):  Therapeutic Activity Time Entry: 30  Therapeutic Exercise Time Entry: 10    Assessment & Plan   Assessment: Colt tolerated session well. He was able to ambulate without a wheelchair follow today.He is demonstrating good progress and will continue to benefit from therapy to improve strength of left side, balance, endurance, and safety. Wife reports that they are ambulating at home with hemiwalker and sometimes has trouble clearing left foot and is better with bioness. .Patient will continue to benefit from physical therapy to improve balance, gait, endurance, and strength.    Evaluation/Treatment Tolerance: Patient tolerated treatment well    The patient will continue to benefit from skilled outpatient physical therapy in order to address the deficits listed in the problem list on the initial evaluation, provide patient and family education, and maximize the patients level of independence in the home and community environments.     The patient's spiritual, cultural, and educational needs were considered, and the patient is agreeable to the plan of care and goals.     Education  Education was done with Patient. The patient's learning style includes Listening. The patient Verbalizes understanding.         Educated on putting weight through left side and shifting forward with stands.        Plan: Continue with POC to improve left sided motor return, balance, transfers, and ambulation.    Goals:   Active       Long Term Goals       Patient will ascend/descend four 6 inch stairs safely using single handrail and standby assistance (Progressing)       Start:  06/27/25    Expected End:  10/01/25            Patient will demonstrate improved gait endurance by ambulating 200 ft during 2 minute walk test with use of LRAD and without seated  rest break.  (Progressing)       Start:  06/27/25    Expected End:  10/01/25            Pt will ambulate using tito walker with standby assistance to improve mobility in household.  (Progressing)       Start:  06/27/25    Expected End:  10/01/25            Patient will demonstrate ability to transition floor to stand with external support to reduce assistance needed in case of fall. (Progressing)       Start:  06/27/25    Expected End:  10/01/25               Short Term Goals       Patient will be given initial HEP and report compliance with home exercise program 5 days a week to improve carry over.  (Progressing)       Start:  06/27/25    Expected End:  10/01/25            Patient will demonstrate all bed mobility with supervision and awareness of left upper extremity to improve independence.  (Progressing)       Start:  06/27/25    Expected End:  10/01/25            Pt will demonstrate sit to stand from wheelchair using tito walker for stability with supervision to improve independence at home.  (Progressing)       Start:  06/27/25    Expected End:  10/01/25                Alyssia Leon, PT

## 2025-07-09 NOTE — PROGRESS NOTES
Outpatient Rehab    Occupational Therapy Visit    Patient Name: Colt Rose  MRN: [id number]  YOB: 1955  Encounter Date: 7/9/2025    Therapy Diagnosis:   Encounter Diagnoses   Name Primary?    Sensory loss Yes    Left hemiplegia     Impaired function of upper extremity      Physician: Jeffery Mena MD    Physician Orders: Eval and Treat  Medical Diagnosis: Hemiplegia, unspecified etiology, unspecified hemiplegia type, unspecified laterality  Surgical Diagnosis: Not applicable for this Episode   Surgical Date: Not applicable for this Episode  Days Since Last Surgery: Not applicable for this Episode    Visit # / Visits Authorized: 3 / 12  Insurance Authorization Period: 7/3/2025 to 12/31/2025  Date of Evaluation: 7/3/2025  Plan of Care Certification: 7/3/2025 to 8/28/2025      Time In: 0802   Time Out: 0845  Total Time (in minutes): 43   Total Billable Time (in minutes): 40    FOTO:  Intake Score (%): Not applicable for this Episode  Survey Score 2 (%): Not applicable for this Episode  Survey Score 3 (%): Not applicable for this Episode    Precautions:  Additional Precautions and Protocol Details: Standard, Fall Risk, cognitive, seizure    Subjective   Doing OK this morning, but forgot his GivMohr. Used pillow last night, but kind of vague about how well he was able to tolerate the positioning..  Pain reported as 0/10. No pain at start of session - L shoulder    Objective   Vital Signs  BP (!) 120/52   Pulse (!) 56   BP Location: Right arm  BP Position: Sitting  BP Cuff Size: Adult               Treatment:  Therapeutic Exercise  TE 1: Gentle stretching of the soft tissues of the left  wrist and hand (including gentle mobilization of the related joints as appropriate/ necessary) to decrease edema and improve PROM, potentially allowing for increases in active movement.  TE 2: Placed flat paddle on pt's left hand for duration of treatment to inhibit flexor tone and provide LLPS to long finger  "flexors.  TE 3: After pelvic facilitation, added 70* docking station w/ small proximal wedge as appropriate to inhibit over-recruitment of flexors and provide LLPS to wrist and long finger flexors.  TE 4: Gentle stretching and mobilization of the soft-tissues throughout the left  shoulder, including levator, serratus anterior, subscapularis, anterior complex, pec major, lats and combined internal rotators to allow for increased PROM.  TE 5: At end of session, pt was placed in clinic GivMohr w/ paddle/ docking station to allow for sufficient support to the L sh during his PT session.  Balance/Neuromuscular Re-Education  NMR 1: Facilitation of anterior/ posterior pelvic tilt, right/ left lateral pelvic tilt. Points of contact at low back for anterior tilt, ASIS and superior shoulder for posterior tilt, appropriate QL and opposite ribcage for lateral tilting. Hands at sides in position of support to facilitate scapulo-pelvic rhythm, with left hand position on wedge.  NMR 2: Again attempted active scapular retraction, protraction, elevation, and depression with no movement noted.  NMR 3: KT to facilitate supraspinatus (I-cut, applied from origin to insertion, ~35% tension in therapeutic range and no tension in 1" tails).    Time Entry(in minutes):  Neuromuscular Re-Education Time Entry: 15  Therapeutic Exercise Time Entry: 25    Assessment & Plan   Assessment: Mynor tolerated today's session well with no complaints of pain. He has no active movement in the left upper extremity, but the GivMohr appears to approximate the subluxation well. Recommend he continue to wear that. Poor attention to the left upper extremity limits potential functional improvement in that arm. Will continue to address.   Evaluation/Treatment Tolerance: Patient tolerated treatment well    The patient will continue to benefit from skilled outpatient occupational therapy in order to address the deficits listed in the problem list on the initial " evaluation, provide patient and family education, and maximize the patients level of independence in the home and community environments.     The patient's spiritual, cultural, and educational needs were considered, and the patient is agreeable to the plan of care and goals.     Education  Education was done with Patient.  The patient Verbalizes understanding and Requires continuing/additional education.         Kinesiotape wear/care with handout.       Plan: ADLs and positioning of L UE during function/ attempt to use it for support.    Goals:   Active       LTG       With MI, patient will perform all basic self-care tasks (grooming, bathing, dressing, toileting) using adaptive strategies or assistive devices as needed, demonstrating safe sequencing and minimal need for external support. (Ongoing)       Start:  07/03/25    Expected End:  08/28/25            Patient will achieve full PROM in affected upper extremity to support participation in ADLs and therapeutic exercises without pain. (Ongoing)       Start:  07/03/25    Expected End:  08/28/25            Patient will perform functional transfers to multiple surfaces (bed, toilet, chair) with supervision or less, demonstrating improved balance, coordination, and safety awareness. (Ongoing)       Start:  07/03/25    Expected End:  08/28/25               STG       Patient will complete UB dressing with Mod A using adaptive techniques or modified strategies. (Ongoing)       Start:  07/03/25    Expected End:  07/31/25            Patient will tolerate PROM in affected upper extremity with a report of a decrease in pain to support participation in daily grooming tasks. (Ongoing)       Start:  07/03/25    Expected End:  07/31/25            Patient will perform sit-to-stand transfers from transport wheelchair with minimal assistance and use of proper body mechanics, demonstrating progress toward safe mobility and reduced fall risk. (Ongoing)       Start:  07/03/25     Expected End:  07/31/25                Jazzy Wilkins OT

## 2025-07-09 NOTE — PATIENT INSTRUCTIONS
Kinesio Tape Instructions        Kinesio Tape is latex-free, hypoallergenic and wearable for days at a time. The tape can be worn during showers and during physical activity.    Purposes of Taping:  Provide sensory input and increase awareness of a muscle. This usually help you recruit & strengthen that muscle more easily.  To assist a joint to hold a position so an overstretched muscle is provided time to shorten, allowing it to work more effectively.  To assist in the release of fascial restrictions or to relax an overused muscle. This allows more optimal alignment and decreased pain in an area.  To position a part of the body in better alignment & allow muscles to contract & work in a better position.  To decrease swelling, edema & bruising. This will allow for more rapid healing & decreased pain.    Wearing Kinesio Tape:  This tape is a therapeutic treatment and should be left in place x3-5 days if possible. Please watch the area closely and remove the tape should any irritation occur; including redness, swelling or itching.   Remove the tape in time to allow a 24-hour break before returning to therapy to be taped again.  Tape can get wet in pools or baths.   If corners or edges of the tape begin to roll up, it can be trimmed with scissors. Simply trim the edges to prevent them from getting caught of clothes & being pulled off more.   DO NOT USE HEAT ON TOP OF THE TAPE (heating pad, hair-dryer) as it will make removal of the tape more difficult and painful.    Removal:   The tape can be removed early if any pain, skin irritation, or other adverse reaction is experienced.  Please take time when removing the tape. Place a thin layer of baby oil, vegetable oil or hair conditioner over the entire area of tape and let it soak for at least 5 minutes. These products will help soften the adhesive, making tape removal a more pleasant experience. Getting the tape wet and/or removing it in the shower can also help.    Loosen one end of the tape & begin slowly peeling the tape from the skin. Stabilize the skin as you move the tape away. Moving in the direction of hair growth may cause less discomfort.   DO NOT PULL THE TAPE OFF IN A QUICK MOTION.   After removal, use plenty of lotion to hydrate the skin & relieve any irritation. Please do not apply lotion right before re-application of tape, as it decreases the ability of the tape to adhere to the skin.           PELVIC MOVEMENTS  Sit on a hard surface and position your hands at your sides. Try and keep both hands flat on the surface at your side.  (Elevate them if necessary using yoga blocks or shoe boxes stuffed with a pillow or blanket and taped up. Place a piece of  under and over the box/block to make sure it doesnt slip.)       Anterior and Posterior Pelvic Tilt  Roll your pelvis forward so that you sit up tall (anterior tilt) then roll your pelvis backward so youre slouched (posterior tilt). Make your movements as big as possible. Repeat about 20 times. Do 5 sets throughout the day.                            Lateral Pelvic Tilt  Sitting up tall with an anterior pelvic tilt, lift your right hip up toward your right armpit (DO NOT lean way over to the side). Then return to neutral. Lift the left hip up toward your left armpit. Repeat about 20 times. Do 5 sets throughout the day.

## 2025-07-09 NOTE — PROGRESS NOTES
[facility] Note  Staff: MALINI MendozaC    PCP: JASWANT Mena    Chief Complaint: Nocturia    Subjective:        HPI: Colt Rose is a 69 y.o. male NEW PT presents today for evaluation of UTI symptoms at this time.  Pt is accompanied by significant other during ov today.    Initial OV w/ NP-Enma  7/9/25:  Pt arrives today for evaluation of recent UTI/Lower urinary tract symptoms.  Pt/SO states he was recently treated for UTI infection but no urine culture seen within [facility].  UA on 6/23 shows +2 Leuks.  UA on arrival showed 30 protein, trace ketones, otherwise normal findings.  PVR by bladder scan is 18 mL  No gross hematuria  No urinary incontinence issues.  Family Hx of  Cancers?:None  Personal Hx of Kidney Stones?: None  Hx of Tobacco Use?: None    AUA SS:  17/3 Mixed  (5-Intermittency; 3-ICBE, Urgency, Weak stream; 2-Frequency; Straining-1)    Pt has been evaluated over 10 years ago by an Urologist for BPH symptoms.  Was on BPH meds over 10 years ago but currently not taking any prostate/bladder meds verbalized by pt and family.  However MAR states he currently is on Silodosin 4 mg one capsule daily?    Pt recently had a stroke May 2025.    Last PSA Screening:   Lab Results   Component Value Date    PSA 0.39 07/12/2024    PSA 0.50 06/14/2023    PSA 0.54 06/14/2022     REVIEW OF SYSTEMS:  General ROS: no fevers, no chills  Psychological ROS: no depression  Endocrine ROS: no diabetes  Respiratory ROS: no SOB  Cardiovascular ROS: no CP  Gastrointestinal ROS: no abdominal pain, no constipation, no diarrhea  Musculoskeletal ROS: no muscle pain  Neurological ROS: no headaches  Dermatological ROS: no rashes  HEENT: + glasses, no sinus   ROS: per HPI    Colt Rose is a 69-year-old male with past medical history of cervical disc displacement, GERD, hyperlipidemia, CVA (complicated by left-sided paralysis and wheelchair-bound) who recently was evaluated on 6/28/25 in ED after a  fall out of his wheelchair.      PMHx:  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    Cervical disc displacement     Degeneration of lumbar intervertebral disc     GERD (gastroesophageal reflux disease)     gastric polyps    Hip pain 02/22/2016    Hyperlipidemia     Shingles 11/2013    Sleep apnea     Stroke 05/05/2025     PSHx:  Past Surgical History:   Procedure Laterality Date    ABLATION, SVT, ACCESSORY PATHWAY N/A 5/25/2023    Procedure: Ablation, SVT, Accessory Pathway;  Surgeon: Goyo Sky MD;  Location: [facility] EP LAB;  Service: Cardiology;  Laterality: N/A;  PAC, RFA, CARTO, MAC, GP, 3 PREP    CERVICAL FUSION      bone graft    COLONOSCOPY N/A 7/29/2021    Procedure: COLONOSCOPY;  Surgeon: Tyler Story MD;  Location: [facility];  Service: Endoscopy;  Laterality: N/A;    COLONOSCOPY N/A 10/30/2024    Procedure: COLONOSCOPY;  Surgeon: Tyler Story MD;  Location: [facility];  Service: Endoscopy;  Laterality: N/A;  m/ppm    EPIDURAL STEROID INJECTION INTO LUMBAR SPINE N/A 12/5/2019    Procedure: Injection-steroid-epidural-lumbar;  Surgeon: Adarsh Kraft MD;  Location: [facility] OR;  Service: Pain Management;  Laterality: N/A;  L5-S1    ESOPHAGOGASTRODUODENOSCOPY N/A 10/27/2020    Procedure: EGD (ESOPHAGOGASTRODUODENOSCOPY);  Surgeon: Tyler Story MD;  Location: [facility];  Service: Endoscopy;  Laterality: N/A;    ESOPHAGOGASTRODUODENOSCOPY N/A 2/14/2025    Procedure: EGD (ESOPHAGOGASTRODUODENOSCOPY);  Surgeon: Tyler Story MD;  Location: [facility];  Service: Endoscopy;  Laterality: N/A;    INJECTION OF ANESTHETIC AGENT AROUND MEDIAL BRANCH NERVES INNERVATING LUMBAR FACET JOINT Bilateral 11/18/2020    Procedure: BLOCK, NERVE, LUMBAR, MEDIAL BRANCH Bilateral L3,4,5;  Surgeon: Adarsh Kraft MD;  Location: [facility] OR;  Service: Pain Management;  Laterality: Bilateral;    INJECTION OF ANESTHETIC AGENT AROUND MEDIAL BRANCH NERVES INNERVATING LUMBAR FACET JOINT Left 10/25/2023     Procedure: Block-nerve-medial branch-lumbar;  Surgeon: Adarsh Kraft MD;  Location: [facility];  Service: Anesthesiology;  Laterality: Left;  l4-5, l5-s1 MBB    INJECTION OF ANESTHETIC AGENT AROUND MEDIAL BRANCH NERVES INNERVATING LUMBAR FACET JOINT Bilateral 11/28/2023    Procedure: Block-nerve-medial branch-lumbar;  Surgeon: Adarsh Kraft MD;  Location: [facility];  Service: Anesthesiology;  Laterality: Bilateral;  L4.5 and l5/s1 MBB #2    RADIOFREQUENCY ABLATION OF LUMBAR MEDIAL BRANCH NERVE AT SINGLE LEVEL Bilateral 1/29/2021    Procedure: Radiofrequency Ablation, Nerve, Spinal, Lumbar, Medial Branch, 1 Level;  Surgeon: Adarsh Kraft MD;  Location: [facility] OR;  Service: Pain Management;  Laterality: Bilateral;  L3,4,5    TONSILLECTOMY, ADENOIDECTOMY         Allergies:  Amlodipine, Benadryl [diphenhydramine hcl], and Pennsaid [diclofenac sodium]    Medications: reviewed     Objective:   There were no vitals filed for this visit.    General:WDWN in NAD  Eyes: PERRLA, normal conjunctiva  Respiratory: no increased work on breathing, clear to auscultation  Cardiovascular: regular rate and rhythm. No obvious extremity edema.  GI: palpation of masses. No tenderness. No hepatosplenomegaly to palpation.  Musculoskeletal: normal range of motion of bilateral upper extremities. Normal muscle strength and tone.  Skin: no obvious rashes or lesions. No tightening of skin noted.  Neurologic: CN grossly normal. Normal sensation.   Psychiatric: awake, alert and oriented x 3. Mood and affect normal. Cooperative.    Assessment:       1. Nocturia    2. Urinary tract infection without hematuria, site unspecified          Plan:     PSA, Total and Free to be scheduled for annual recheck at this time.  No signs/symptoms of abnormal urine today, pt emptying bladder without difficulty at this time.  Unsure if pt taking Silodosin daily from previous MD.  However, due to past low BP and HR would defer any alpha-blocker meds at this time and  start with conservative measures.    WHETHER YOU FEEL AN URGE TO URINATE OR NOT, YOU SHOULD BE FREQUENTING THE TOILET AND ATTEMPT TO URINATE EVERY 3 HOURS!!  NEVER POSTPONE URINATING OR HOLD YOUR URINE.    MAKE SURE YOU ARE HAVING REGULAR/NORMAL BOWEL MOVEMENTS AS THIS ALSO WILL HAVE AN EFFECT ON HOW YOUR BLADDER FUNCTIONS.    NOTHING TO EAT OR DRINK BY MOUTH (THIS INCLUDES WATER) AT LEAST 1-2 HOURS PRIOR TO YOUR BEDTIME ROUTINE.     F/u 2-3 months to recheck UA and PVR.  Pt and family verbalized understanding upon conclusion of ov today.  All questions answered at this time.      Helen Norwood, PORFIRIOP-C

## 2025-07-10 ENCOUNTER — PATIENT MESSAGE (OUTPATIENT)
Dept: FAMILY MEDICINE | Facility: CLINIC | Age: 70
End: 2025-07-10
Payer: MEDICARE

## 2025-07-10 DIAGNOSIS — R19.5 ABNORMAL STOOL TEST: Primary | ICD-10-CM

## 2025-07-13 ENCOUNTER — PATIENT MESSAGE (OUTPATIENT)
Dept: REHABILITATION | Facility: HOSPITAL | Age: 70
End: 2025-07-13
Payer: MEDICARE

## 2025-07-13 ENCOUNTER — HOSPITAL ENCOUNTER (INPATIENT)
Facility: HOSPITAL | Age: 70
LOS: 1 days | Discharge: HOME | DRG: 101 | End: 2025-07-15
Attending: EMERGENCY MEDICINE | Admitting: INTERNAL MEDICINE
Payer: MEDICARE

## 2025-07-13 DIAGNOSIS — R47.1 DYSARTHRIA: ICD-10-CM

## 2025-07-13 DIAGNOSIS — R56.9 SEIZURE: Primary | ICD-10-CM

## 2025-07-13 DIAGNOSIS — I61.1 NONTRAUMATIC CORTICAL HEMORRHAGE OF RIGHT CEREBRAL HEMISPHERE: ICD-10-CM

## 2025-07-13 DIAGNOSIS — R07.9 CHEST PAIN: ICD-10-CM

## 2025-07-13 DIAGNOSIS — R56.9 SEIZURE-LIKE ACTIVITY: ICD-10-CM

## 2025-07-13 PROBLEM — G47.30 SLEEP APNEA: Status: ACTIVE | Noted: 2025-01-01

## 2025-07-13 PROBLEM — I10 ESSENTIAL (PRIMARY) HYPERTENSION: Status: ACTIVE | Noted: 2025-01-01

## 2025-07-13 LAB
ABSOLUTE EOSINOPHIL (SMH): 0.05 K/UL
ABSOLUTE MONOCYTE (SMH): 0.45 K/UL (ref 0.3–1)
ABSOLUTE NEUTROPHIL COUNT (SMH): 2.9 K/UL (ref 1.8–7.7)
ALBUMIN SERPL-MCNC: 4 G/DL (ref 3.5–5.2)
ALP SERPL-CCNC: 59 UNIT/L (ref 55–135)
ALT SERPL-CCNC: 22 UNIT/L (ref 10–44)
AMPHET UR QL SCN: NEGATIVE
ANION GAP (SMH): 9 MMOL/L (ref 8–16)
AST SERPL-CCNC: 20 UNIT/L (ref 10–40)
BARBITURATE SCN PRESENT UR: NEGATIVE
BASOPHILS # BLD AUTO: 0.05 K/UL
BASOPHILS NFR BLD AUTO: 1 %
BENZODIAZ UR QL SCN: NEGATIVE
BILIRUB SERPL-MCNC: 0.5 MG/DL (ref 0.1–1)
BILIRUB UR QL STRIP.AUTO: NEGATIVE
BNP SERPL-MCNC: 19 PG/ML
BUN SERPL-MCNC: 12 MG/DL (ref 8–23)
CALCIUM SERPL-MCNC: 9.2 MG/DL (ref 8.7–10.5)
CANNABINOIDS UR QL SCN: NEGATIVE
CHLORIDE SERPL-SCNC: 106 MMOL/L (ref 95–110)
CK SERPL-CCNC: 54 U/L (ref 20–200)
CLARITY UR: CLEAR
CO2 SERPL-SCNC: 24 MMOL/L (ref 23–29)
COCAINE UR QL SCN: NEGATIVE
COLOR UR AUTO: YELLOW
CREAT SERPL-MCNC: 0.6 MG/DL (ref 0.5–1.4)
CREAT UR-MCNC: 47.4 MG/DL (ref 23–375)
ERYTHROCYTE [DISTWIDTH] IN BLOOD BY AUTOMATED COUNT: 13.6 % (ref 11.5–14.5)
GFR SERPLBLD CREATININE-BSD FMLA CKD-EPI: >60 ML/MIN/1.73/M2
GLUCOSE SERPL-MCNC: 119 MG/DL (ref 70–110)
GLUCOSE UR QL STRIP: NEGATIVE
HCT VFR BLD AUTO: 39.6 % (ref 40–54)
HCV AB SERPL QL IA: NORMAL
HGB BLD-MCNC: 13.1 GM/DL (ref 14–18)
HGB UR QL STRIP: NEGATIVE
HIV 1+2 AB+HIV1 P24 AG SERPL QL IA: NORMAL
IMM GRANULOCYTES # BLD AUTO: 0.02 K/UL (ref 0–0.04)
IMM GRANULOCYTES NFR BLD AUTO: 0.4 % (ref 0–0.5)
KETONES UR QL STRIP: NEGATIVE
LEUKOCYTE ESTERASE UR QL STRIP: NEGATIVE
LIPASE SERPL-CCNC: 10 U/L (ref 4–60)
LYMPHOCYTES # BLD AUTO: 1.51 K/UL (ref 1–4.8)
MAGNESIUM SERPL-MCNC: 1.9 MG/DL (ref 1.6–2.6)
MCH RBC QN AUTO: 31.6 PG (ref 27–31)
MCHC RBC AUTO-ENTMCNC: 33.1 G/DL (ref 32–36)
MCV RBC AUTO: 95 FL (ref 82–98)
NITRITE UR QL STRIP: NEGATIVE
NUCLEATED RBC (/100WBC) (SMH): 0 /100 WBC
OPIATES UR QL SCN: NEGATIVE
PCP UR QL: NEGATIVE
PH UR STRIP: 7 [PH]
PLATELET # BLD AUTO: 230 K/UL (ref 150–450)
PMV BLD AUTO: 10.2 FL (ref 9.2–12.9)
POTASSIUM SERPL-SCNC: 4.2 MMOL/L (ref 3.5–5.1)
PROT SERPL-MCNC: 6.8 GM/DL (ref 6–8.4)
PROT UR QL STRIP: NEGATIVE
RBC # BLD AUTO: 4.15 M/UL (ref 4.6–6.2)
RELATIVE EOSINOPHIL (SMH): 1 % (ref 0–8)
RELATIVE LYMPHOCYTE (SMH): 30.4 % (ref 18–48)
RELATIVE MONOCYTE (SMH): 9.1 % (ref 4–15)
RELATIVE NEUTROPHIL (SMH): 58.1 % (ref 38–73)
SODIUM SERPL-SCNC: 139 MMOL/L (ref 136–145)
SP GR UR STRIP: 1.01
TROPONIN HIGH SENSITIVE (SMH): 2.8 PG/ML
TROPONIN HIGH SENSITIVE (SMH): 8 PG/ML
TSH SERPL-ACNC: 4.02 UIU/ML (ref 0.34–5.6)
UROBILINOGEN UR STRIP-ACNC: NEGATIVE EU/DL
WBC # BLD AUTO: 4.97 K/UL (ref 3.9–12.7)

## 2025-07-13 PROCEDURE — G0378 HOSPITAL OBSERVATION PER HR: HCPCS

## 2025-07-13 PROCEDURE — 83690 ASSAY OF LIPASE: CPT | Performed by: EMERGENCY MEDICINE

## 2025-07-13 PROCEDURE — 86803 HEPATITIS C AB TEST: CPT | Performed by: EMERGENCY MEDICINE

## 2025-07-13 PROCEDURE — 81003 URINALYSIS AUTO W/O SCOPE: CPT | Mod: 59 | Performed by: EMERGENCY MEDICINE

## 2025-07-13 PROCEDURE — 82040 ASSAY OF SERUM ALBUMIN: CPT | Performed by: EMERGENCY MEDICINE

## 2025-07-13 PROCEDURE — 94799 UNLISTED PULMONARY SVC/PX: CPT

## 2025-07-13 PROCEDURE — 99285 EMERGENCY DEPT VISIT HI MDM: CPT | Mod: 25

## 2025-07-13 PROCEDURE — 82550 ASSAY OF CK (CPK): CPT | Performed by: EMERGENCY MEDICINE

## 2025-07-13 PROCEDURE — 83735 ASSAY OF MAGNESIUM: CPT | Performed by: EMERGENCY MEDICINE

## 2025-07-13 PROCEDURE — 63600175 PHARM REV CODE 636 W HCPCS: Performed by: EMERGENCY MEDICINE

## 2025-07-13 PROCEDURE — 84443 ASSAY THYROID STIM HORMONE: CPT | Performed by: EMERGENCY MEDICINE

## 2025-07-13 PROCEDURE — 83880 ASSAY OF NATRIURETIC PEPTIDE: CPT | Performed by: EMERGENCY MEDICINE

## 2025-07-13 PROCEDURE — 63600175 PHARM REV CODE 636 W HCPCS

## 2025-07-13 PROCEDURE — 84484 ASSAY OF TROPONIN QUANT: CPT | Performed by: EMERGENCY MEDICINE

## 2025-07-13 PROCEDURE — 80307 DRUG TEST PRSMV CHEM ANLYZR: CPT | Performed by: EMERGENCY MEDICINE

## 2025-07-13 PROCEDURE — 99900035 HC TECH TIME PER 15 MIN (STAT)

## 2025-07-13 PROCEDURE — 25000003 PHARM REV CODE 250

## 2025-07-13 PROCEDURE — 87389 HIV-1 AG W/HIV-1&-2 AB AG IA: CPT | Performed by: EMERGENCY MEDICINE

## 2025-07-13 PROCEDURE — 85025 COMPLETE CBC W/AUTO DIFF WBC: CPT | Performed by: EMERGENCY MEDICINE

## 2025-07-13 PROCEDURE — 99900031 HC PATIENT EDUCATION (STAT)

## 2025-07-13 PROCEDURE — 94761 N-INVAS EAR/PLS OXIMETRY MLT: CPT

## 2025-07-13 RX ORDER — IBUPROFEN 200 MG
16 TABLET ORAL
Status: DISCONTINUED | OUTPATIENT
Start: 2025-07-13 | End: 2025-07-15 | Stop reason: HOSPADM

## 2025-07-13 RX ORDER — ONDANSETRON HYDROCHLORIDE 2 MG/ML
4 INJECTION, SOLUTION INTRAVENOUS EVERY 6 HOURS PRN
Status: DISCONTINUED | OUTPATIENT
Start: 2025-07-13 | End: 2025-07-15 | Stop reason: HOSPADM

## 2025-07-13 RX ORDER — LANOLIN ALCOHOL/MO/W.PET/CERES
800 CREAM (GRAM) TOPICAL
Status: DISCONTINUED | OUTPATIENT
Start: 2025-07-13 | End: 2025-07-15 | Stop reason: HOSPADM

## 2025-07-13 RX ORDER — LORAZEPAM 2 MG/ML
2 INJECTION INTRAMUSCULAR EVERY 5 MIN PRN
Status: DISCONTINUED | OUTPATIENT
Start: 2025-07-13 | End: 2025-07-15 | Stop reason: HOSPADM

## 2025-07-13 RX ORDER — NALOXONE HCL 0.4 MG/ML
0.02 VIAL (ML) INJECTION
Status: DISCONTINUED | OUTPATIENT
Start: 2025-07-13 | End: 2025-07-15 | Stop reason: HOSPADM

## 2025-07-13 RX ORDER — SODIUM,POTASSIUM PHOSPHATES 280-250MG
2 POWDER IN PACKET (EA) ORAL
Status: DISCONTINUED | OUTPATIENT
Start: 2025-07-13 | End: 2025-07-15 | Stop reason: HOSPADM

## 2025-07-13 RX ORDER — LEVETIRACETAM 500 MG/5ML
1000 INJECTION, SOLUTION, CONCENTRATE INTRAVENOUS
Status: COMPLETED | OUTPATIENT
Start: 2025-07-13 | End: 2025-07-13

## 2025-07-13 RX ORDER — GLUCAGON 1 MG
1 KIT INJECTION
Status: DISCONTINUED | OUTPATIENT
Start: 2025-07-13 | End: 2025-07-15 | Stop reason: HOSPADM

## 2025-07-13 RX ORDER — POLYETHYLENE GLYCOL 3350 17 G/17G
17 POWDER, FOR SOLUTION ORAL DAILY PRN
COMMUNITY
Start: 2025-06-04 | End: 2025-07-13

## 2025-07-13 RX ORDER — TALC
6 POWDER (GRAM) TOPICAL NIGHTLY PRN
Status: DISCONTINUED | OUTPATIENT
Start: 2025-07-13 | End: 2025-07-15 | Stop reason: HOSPADM

## 2025-07-13 RX ORDER — SODIUM CHLORIDE 0.9 % (FLUSH) 0.9 %
10 SYRINGE (ML) INJECTION EVERY 12 HOURS PRN
Status: DISCONTINUED | OUTPATIENT
Start: 2025-07-13 | End: 2025-07-15 | Stop reason: HOSPADM

## 2025-07-13 RX ORDER — MELOXICAM 15 MG/1
15 TABLET ORAL DAILY
COMMUNITY

## 2025-07-13 RX ORDER — ESCITALOPRAM OXALATE 10 MG/1
10 TABLET ORAL DAILY
Status: DISCONTINUED | OUTPATIENT
Start: 2025-07-14 | End: 2025-07-13

## 2025-07-13 RX ORDER — CARBOXYMETHYLCELLULOSE SODIUM 5 MG/ML
2 SOLUTION/ DROPS OPHTHALMIC 2 TIMES DAILY PRN
Status: DISCONTINUED | OUTPATIENT
Start: 2025-07-13 | End: 2025-07-15 | Stop reason: HOSPADM

## 2025-07-13 RX ORDER — ATORVASTATIN CALCIUM 20 MG/1
20 TABLET, FILM COATED ORAL DAILY
Status: DISCONTINUED | OUTPATIENT
Start: 2025-07-14 | End: 2025-07-15 | Stop reason: HOSPADM

## 2025-07-13 RX ORDER — AMOXICILLIN 250 MG
1 CAPSULE ORAL 2 TIMES DAILY PRN
Status: DISCONTINUED | OUTPATIENT
Start: 2025-07-13 | End: 2025-07-15 | Stop reason: HOSPADM

## 2025-07-13 RX ORDER — LEVETIRACETAM 500 MG/5ML
500 INJECTION, SOLUTION, CONCENTRATE INTRAVENOUS EVERY 12 HOURS
Status: DISCONTINUED | OUTPATIENT
Start: 2025-07-13 | End: 2025-07-15 | Stop reason: HOSPADM

## 2025-07-13 RX ORDER — ESCITALOPRAM OXALATE 10 MG/1
10 TABLET ORAL EVERY MORNING
COMMUNITY
Start: 2025-06-05

## 2025-07-13 RX ORDER — ALUMINUM HYDROXIDE, MAGNESIUM HYDROXIDE, AND SIMETHICONE 1200; 120; 1200 MG/30ML; MG/30ML; MG/30ML
30 SUSPENSION ORAL 4 TIMES DAILY PRN
Status: DISCONTINUED | OUTPATIENT
Start: 2025-07-13 | End: 2025-07-15 | Stop reason: HOSPADM

## 2025-07-13 RX ORDER — LIDOCAINE 50 MG/G
1 PATCH TOPICAL DAILY
Status: DISCONTINUED | OUTPATIENT
Start: 2025-07-13 | End: 2025-07-15 | Stop reason: HOSPADM

## 2025-07-13 RX ORDER — IBUPROFEN 200 MG
24 TABLET ORAL
Status: DISCONTINUED | OUTPATIENT
Start: 2025-07-13 | End: 2025-07-15 | Stop reason: HOSPADM

## 2025-07-13 RX ORDER — CARBOXYMETHYLCELLULOSE SODIUM 2.5 MG/ML
0.05 SOLUTION/ DROPS OPHTHALMIC 2 TIMES DAILY
COMMUNITY
Start: 2025-06-04

## 2025-07-13 RX ORDER — LANOLIN ALCOHOL/MO/W.PET/CERES
400 CREAM (GRAM) TOPICAL DAILY
Status: DISCONTINUED | OUTPATIENT
Start: 2025-07-14 | End: 2025-07-15 | Stop reason: HOSPADM

## 2025-07-13 RX ORDER — PANTOPRAZOLE SODIUM 40 MG/1
40 TABLET, DELAYED RELEASE ORAL DAILY
Status: DISCONTINUED | OUTPATIENT
Start: 2025-07-14 | End: 2025-07-15 | Stop reason: HOSPADM

## 2025-07-13 RX ORDER — ACETAMINOPHEN 325 MG/1
650 TABLET ORAL EVERY 4 HOURS PRN
Status: DISCONTINUED | OUTPATIENT
Start: 2025-07-13 | End: 2025-07-15 | Stop reason: HOSPADM

## 2025-07-13 RX ADMIN — LEVETIRACETAM 500 MG: 100 INJECTION INTRAVENOUS at 09:07

## 2025-07-13 RX ADMIN — LEVETIRACETAM 1000 MG: 100 INJECTION INTRAVENOUS at 03:07

## 2025-07-13 RX ADMIN — LIDOCAINE 1 PATCH: 50 PATCH TOPICAL at 06:07

## 2025-07-13 NOTE — CARE UPDATE
07/13/25 1645   Patient Assessment/Suction   Level of Consciousness (AVPU) alert   Respiratory Effort Normal;Unlabored   Expansion/Accessory Muscles/Retractions no use of accessory muscles   All Lung Fields Breath Sounds clear   Rhythm/Pattern, Respiratory unlabored   Cough Frequency no cough   Skin Integrity   $ Wound Care Tech Time 15 min   Area Observed Bridge of nose   Skin Appearance without discoloration   PRE-TX-O2   Device (Oxygen Therapy) room air   SpO2 96 %   Pulse Oximetry Type Continuous   $ Pulse Oximetry - Multiple Charge Pulse Oximetry - Multiple   Pulse 70   Resp (!) 22   Education   $ Education Other (see comment);15 min  (SATS)   Tobacco Cessation Intervention   Do you use any type of tobacco product? No   Respiratory Evaluation   $ Care Plan Tech Time 15 min   $ Respiratory Evaluation Complete   Evaluation For New Orders   Admitting Diagnosis SEIZURE   Cardiac Diagnosis NA   Pulmonary Diagnosis ARIS   Home Oxygen   Has Home Oxygen? No   Home Aerosol, MDI, DPI, and Other Treatments/Therapies   Home Respiratory Therapy Per Patient/Review of Chart Yes   Other Home Respiratory Therapies CPAP   Oxygen Care Plan   Rationale No rational found   Bronchodilator Care Plan   Rationale No Rationale found   Atelectasis Care Plan   Rationale No Rational Found   Airway Clearance Care Plan   Rationale No rationale found

## 2025-07-13 NOTE — SUBJECTIVE & OBJECTIVE
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    Cervical disc displacement     Degeneration of lumbar intervertebral disc     GERD (gastroesophageal reflux disease)     gastric polyps    Hip pain 02/22/2016    Hyperlipidemia     Shingles 11/2013    Sleep apnea     Stroke 05/05/2025       Past Surgical History:   Procedure Laterality Date    ABLATION, SVT, ACCESSORY PATHWAY N/A 5/25/2023    Procedure: Ablation, SVT, Accessory Pathway;  Surgeon: Goyo Sky MD;  Location: [facility] EP LAB;  Service: Cardiology;  Laterality: N/A;  PAC, RFA, CARTO, MAC, GP, 3 PREP    CERVICAL FUSION      bone graft    COLONOSCOPY N/A 7/29/2021    Procedure: COLONOSCOPY;  Surgeon: Tyler Story MD;  Location: [facility] ENDO;  Service: Endoscopy;  Laterality: N/A;    COLONOSCOPY N/A 10/30/2024    Procedure: COLONOSCOPY;  Surgeon: Tyler Story MD;  Location: [facility];  Service: Endoscopy;  Laterality: N/A;  m/ppm    EPIDURAL STEROID INJECTION INTO LUMBAR SPINE N/A 12/5/2019    Procedure: Injection-steroid-epidural-lumbar;  Surgeon: Adarsh Kraft MD;  Location: [facility] OR;  Service: Pain Management;  Laterality: N/A;  L5-S1    ESOPHAGOGASTRODUODENOSCOPY N/A 10/27/2020    Procedure: EGD (ESOPHAGOGASTRODUODENOSCOPY);  Surgeon: Tyler Story MD;  Location: [facility];  Service: Endoscopy;  Laterality: N/A;    ESOPHAGOGASTRODUODENOSCOPY N/A 2/14/2025    Procedure: EGD (ESOPHAGOGASTRODUODENOSCOPY);  Surgeon: Tyler Story MD;  Location: [facility];  Service: Endoscopy;  Laterality: N/A;    INJECTION OF ANESTHETIC AGENT AROUND MEDIAL BRANCH NERVES INNERVATING LUMBAR FACET JOINT Bilateral 11/18/2020    Procedure: BLOCK, NERVE, LUMBAR, MEDIAL BRANCH Bilateral L3,4,5;  Surgeon: Adarsh Kraft MD;  Location: [facility] OR;  Service: Pain Management;  Laterality: Bilateral;    INJECTION OF ANESTHETIC AGENT AROUND MEDIAL BRANCH NERVES INNERVATING LUMBAR FACET JOINT Left 10/25/2023    Procedure: Block-nerve-medial branch-lumbar;   "Surgeon: Adarsh Kraft MD;  Location: [facility];  Service: Anesthesiology;  Laterality: Left;  l4-5, l5-s1 MBB    INJECTION OF ANESTHETIC AGENT AROUND MEDIAL BRANCH NERVES INNERVATING LUMBAR FACET JOINT Bilateral 11/28/2023    Procedure: Block-nerve-medial branch-lumbar;  Surgeon: Adarsh Kraft MD;  Location: [facility];  Service: Anesthesiology;  Laterality: Bilateral;  L4.5 and l5/s1 MBB #2    RADIOFREQUENCY ABLATION OF LUMBAR MEDIAL BRANCH NERVE AT SINGLE LEVEL Bilateral 1/29/2021    Procedure: Radiofrequency Ablation, Nerve, Spinal, Lumbar, Medial Branch, 1 Level;  Surgeon: Adarsh Kraft MD;  Location: [facility] OR;  Service: Pain Management;  Laterality: Bilateral;  L3,4,5    TONSILLECTOMY, ADENOIDECTOMY         Review of patient's allergies indicates:   Allergen Reactions    Amlodipine Other (See Comments)     Flushed face, resolved with Hydroxyzine and Pepcid    Aspirin     Benadryl [diphenhydramine hcl] Other (See Comments)     Per wife "pt collapsed and ended up in hospital" ~1995    Pennsaid [diclofenac sodium] Rash and Blisters       Current Facility-Administered Medications on File Prior to Encounter   Medication    lactated ringers infusion    lactated ringers infusion    LIDOcaine (PF) 10 mg/ml (1%) injection 10 mg    LIDOcaine (PF) 10 mg/ml (1%) injection 10 mg     Current Outpatient Medications on File Prior to Encounter   Medication Sig    atorvastatin (LIPITOR) 20 MG tablet TAKE ONE TABLET BY MOUTH ONCE DAILY (Patient taking differently: Take 20 mg by mouth once daily.)    carboxymethylcellulose sodium (THERATEARS) 0.25 % Drop Place 0.05 mLs into both eyes 2 (two) times a day.    D-MANNOSE ORAL Take 3 capsules by mouth once daily. 2 caps QAM and 1 cap QHS    [Paused] docosahexaenoic acid/epa (FISH OIL ORAL) Take 1 capsule by mouth once daily.    EScitalopram oxalate (LEXAPRO) 10 MG tablet Take 10 mg by mouth every morning.    LIDOcaine (LIDODERM) 5 % Place 1 patch onto the skin once daily. Remove & " Discard patch within 12 hours or as directed by MD (Patient taking differently: Place 1 patch onto the skin once daily. NEW Rx - NOT YET STARTEDRemove & Discard patch within 12 hours or as directed by MD)    [Paused] magnesium oxide (MAG-OX) 400 mg (241.3 mg magnesium) tablet Take 1 tablet (400 mg total) by mouth once daily. (Patient taking differently: Take 400 mg by mouth once daily.)    meloxicam (MOBIC) 15 MG tablet Take 15 mg by mouth once daily.    [Paused] multivitamin (THERAGRAN) per tablet Take 1 tablet by mouth once daily.    [Paused] omeprazole (PRILOSEC) 40 MG capsule Take 1 capsule (40 mg total) by mouth every morning.    [DISCONTINUED] mupirocin (BACTROBAN) 2 % ointment Apply topically 3 (three) times daily. (Patient taking differently: Apply 1 g topically 3 (three) times daily.)    [DISCONTINUED] ondansetron 4 mg/2 mL Soln Inject 4 mg into the vein every 8 (eight) hours as needed.    [DISCONTINUED] polyethylene glycol (GLYCOLAX) 17 gram PwPk Take 17 g by mouth daily as needed.    [DISCONTINUED] artificial tears (ISOPTO TEARS) 0.5 % ophthalmic solution Place 1 drop into both eyes 2 (two) times a day.    [DISCONTINUED] losartan (COZAAR) 100 MG tablet Take 1 tablet (100 mg total) by mouth once daily.    [DISCONTINUED] methocarbamoL (ROBAXIN) 500 MG Tab TAKE ONE TABLET BY MOUTH EVERY 8 HOURS AS NEEDED FOR MUSCLE SPASMS (Patient taking differently: Take 500 mg by mouth every 8 (eight) hours as needed.)    [DISCONTINUED] pregabalin (LYRICA) 100 MG capsule Take 100 mg by mouth once daily.    [DISCONTINUED] senna-docusate (PERICOLACE) 8.6-50 mg per tablet Take 1 tablet by mouth 2 (two) times daily.    [DISCONTINUED] silodosin (RAPAFLO) 4 mg Cap capsule Take 1 capsule (4 mg total) by mouth once daily.    [DISCONTINUED] triamcinolone acetonide 0.1% (KENALOG) 0.1 % ointment Apply topically 2 (two) times daily. (Patient taking differently: Apply 1 application  topically 2 (two) times daily.)     Family History        Problem Relation (Age of Onset)    Coronary artery disease Mother    Diabetes Mother    Heart disease Brother    Hypertension Mother    Stroke Mother, Father          Tobacco Use    Smoking status: Never    Smokeless tobacco: Never   Substance and Sexual Activity    Alcohol use: Yes     Alcohol/week: 6.0 standard drinks of alcohol     Types: 6 Cans of beer per week     Comment: weekly or less    Drug use: No    Sexual activity: Yes     Partners: Female     Review of Systems   Constitutional:  Negative for chills and fever.   Respiratory:  Negative for shortness of breath.    Cardiovascular:  Negative for chest pain.   Gastrointestinal:  Negative for abdominal pain, diarrhea, nausea and vomiting.   Genitourinary:  Negative for dysuria and hematuria.   Neurological:  Positive for seizures. Negative for dizziness and light-headedness.     Objective:     Vital Signs (Most Recent):  Temp: 98.6 °F (37 °C) (07/13/25 0948)  Pulse: 71 (07/13/25 1108)  Resp: (!) 21 (07/13/25 1108)  BP: 128/74 (07/13/25 1008)  SpO2: 97 % (07/13/25 0948) Vital Signs (24h Range):  Temp:  [98.6 °F (37 °C)] 98.6 °F (37 °C)  Pulse:  [71-85] 71  Resp:  [20-23] 21  SpO2:  [97 %] 97 %  BP: (128-136)/(67-74) 128/74     Weight: 72.6 kg (160 lb)  Body mass index is 22.96 kg/m².     Physical Exam  Vitals reviewed.   Constitutional:       General: He is awake. He is not in acute distress.     Appearance: Normal appearance. He is not ill-appearing or diaphoretic.   HENT:      Mouth/Throat:      Comments: Appears the patient has bite marks to both sides of his lateral tongue.  Cardiovascular:      Rate and Rhythm: Normal rate.      Heart sounds: Normal heart sounds. No murmur heard.     No friction rub. No gallop.   Pulmonary:      Effort: Pulmonary effort is normal. No accessory muscle usage or respiratory distress.      Breath sounds: Normal breath sounds. No wheezing, rhonchi or rales.   Abdominal:      General: Bowel sounds are normal.       Palpations: Abdomen is soft.      Tenderness: There is no abdominal tenderness. There is no guarding or rebound.   Musculoskeletal:      Right lower leg: No edema.      Left lower leg: No edema.   Skin:     General: Skin is warm and dry.   Neurological:      Mental Status: He is alert and oriented to person, place, and time.      Comments: Residual left-sided hemiplegia from prior stroke appreciated on exam.   Psychiatric:         Behavior: Behavior is cooperative.                Significant Labs: All pertinent labs within the past 24 hours have been reviewed.  CBC:   Recent Labs   Lab 07/13/25  1037   WBC 4.97   HGB 13.1*   HCT 39.6*        CMP:   Recent Labs   Lab 07/13/25  1037      K 4.2      CO2 24   *   BUN 12   CREATININE 0.6   CALCIUM 9.2   PROT 6.8   ALBUMIN 4.0   BILITOT 0.5   ALKPHOS 59   AST 20   ALT 22   ANIONGAP 9     Cardiac Markers:   Recent Labs   Lab 07/13/25  1113   BNP 19     Lipase:   Recent Labs   Lab 07/13/25  1037   LIPASE 10     Magnesium:   Recent Labs   Lab 07/13/25  1037   MG 1.9     TSH:   Recent Labs   Lab 07/13/25  1037   TSH 4.016     Urine Studies:   Recent Labs   Lab 07/13/25  1258   APPEARANCEUA Clear   SPECGRAV 1.010   PROTEINUA Negative   BILIRUBINUA Negative   UROBILINOGEN Negative   LEUKOCYTESUR Negative       Significant Imaging:   Imaging Results              X-Ray Chest AP Portable (Final result)  Result time 07/13/25 11:51:32      Final result by Enriqueta De La Vega MD (07/13/25 11:51:32)                   Impression:      No acute cardiopulmonary abnormality.      Electronically signed by: Enriqueta De La Vega  Date:    07/13/2025  Time:    11:51               Narrative:    EXAMINATION:  XR CHEST AP PORTABLE    CLINICAL HISTORY:  seizure like activity;    FINDINGS:  Portable chest at 11:36 is compared to 05/09/2025 shows normal cardiomediastinal silhouette.    Lungs are clear. Pulmonary vasculature is normal. No acute osseous abnormality.                                        CT Cervical Spine Without Contrast (Final result)  Result time 07/13/25 11:51:02      Final result by Enriqueta De La Vega MD (07/13/25 11:51:02)                   Impression:      No evidence of acute fracture or traumatic malalignment of the cervical spine.    Multilevel degenerative disc disease and facet hypertrophy      Electronically signed by: Enriqueta De La Vega  Date:    07/13/2025  Time:    11:51               Narrative:    EXAMINATION:  CT CERVICAL SPINE WITHOUT CONTRAST    CLINICAL HISTORY:  Neck trauma (Age >= 65y);    TECHNIQUE:  Low dose axial CT images through the cervical spine, with sagittal and coronal reformations.  Contrast was not administered.    COMPARISON:  None    FINDINGS:  The vertebral bodies are normal in height and morphology without evidence of fracture or osseous destructive process.  There is reversal of normal cervical lordosis.  The vertebral bodies are of normal height.  There is bony fusion anteriorly at C5-6.  The odontoid process is intact the lateral masses of C1 are symmetrical.  There is bony bridging at the cranial cervical junction involving the lateral masses of C1 and occipital condyles.    Multi multilevel disc space narrowing and facet hypertrophy resulting in multilevel foraminal narrowing on the left at C3-4, bilaterally at C4-5.    Limited evaluation of the intraspinal contents demonstrates no hematoma or mass.Paraspinal soft tissues exhibit no acute abnormalities.                                       CT Head Without Contrast (Final result)  Result time 07/13/25 11:48:28      Final result by Enriqueta De La Vega MD (07/13/25 11:48:28)                   Impression:      1. Resolution of the right frontal hemorrhage with low attenuation remaining in the right temporal lobe compatible with encephalomalacia and resolving hemorrhage  No mass effect or midline shift  2. No new hemorrhage      Electronically signed by: Enriqueta  Alixrenetta  Date:    07/13/2025  Time:    11:48               Narrative:    EXAMINATION:  CT HEAD WITHOUT CONTRAST    CLINICAL HISTORY:  Seizure, new-onset, no history of trauma;.  Prior right frontalintraparenchymal hemorrhage    TECHNIQUE:  CMS MANDATED QUALITY DATA - CT RADIATION - 436    All CT scans at this facility utilize dose modulation, iterative reconstruction, and/or weight based dosing when appropriate to reduce radiation dose to as low as reasonably achievable.    Non infusion images were obtained from the skull base to the vertex.    COMPARISON:  06/28/2025 and 05/09/2025    FINDINGS:  The ventricles and sulci are normal in size and configuration for age.  There is no residual hemorrhage within the right frontal lobe..  There is no new hemorrhage or midline shift.    There is low-attenuation within the right frontal lobe there is persistent low attenuation in the right temporal lobe compatible with encephalomalacia and resolving hemorrhage.  The gray-white differentiation is maintained.  Cerebellum and brainstem are normal.  The orbits are unremarkable.

## 2025-07-13 NOTE — ASSESSMENT & PLAN NOTE
- Denies hx of seizures  - Neuro checks Q4H  - Continuous tele monitoring and pulse oximetry  - Seizure precautions  - Keppra 1000 mg loading dose given in ED  - Start Keppra 500 mg BID for prevention of seizures  - PRN lorazepam for breakthrough seizures  - Bedside jose passed  - Neurology consulted, appreciate recs  - Holding home escitalopram for now as may lower seizure threshold, appreciate neuro recs  - PT/OT consulted, appreciate recs

## 2025-07-13 NOTE — ED NOTES
"SZ PRECAUTIONS. RESUCITATIVE EQUIPMENT AND SUCTION AT BS. RAILS PADDED. REPORTED SZ PTA. PRIVATE ROOM. EVEN AND NON LABORED RESPIRATIONS.  AIRWAY CLEAR.  PULSES REGULAR.  EDEMA AT LUE AND LLE.  FLACCID PARALYSIS AT BOTH LUE AND LLE.< 3" CAPILLARY REFILL. SKIN WDI. NON DISTENDED ABDOMEN. ALERT, ORIENTED AND CALM.  HOB ELEVATED. NAD AT THIS TIME.  FALLS PRECAUTIONS. CALL LIGHT IN REACH.  "

## 2025-07-13 NOTE — H&P
[facility] - Emergency Dept  Hospital Medicine  History & Physical    Patient Name: Colt Rose  MRN: [id number]  Patient Class: OP- Observation  Admission Date: 7/13/2025  Attending Physician: Nile Whitehead MD   Primary Care Provider: Jeffery Mena MD         Patient information was obtained from patient, spouse/SO, past medical records, and ER records.     Subjective:     Principal Problem:Seizure    Chief Complaint:   Chief Complaint   Patient presents with    Seizures     Tonic Clonic seizure this am around 9, sitting on toilet when it happened, did not fall. No hx of seizures. Had Hemorrhagic stroke in May        HPI: Mr. 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  Patient's wife is at bedside and helps provide history.  Patient and wife state that around 9:00 a.m. the patient had just finished riding his exercise bike when he went to use the restroom to urinate.  He states that while sitting on the toilet his right lower extremity began shaking uncontrollably which progressed than to his left lower extremity.  He states that he tried for some time to get the shaking to resolve and when unsuccessful he tried calling out to his wife.  His wife states that she thought she heard something so she went to check on him in as she was approaching she heard thumping so she states that she rushed in and noticed he was having a seizure.  She states that he was hitting his head against the back of the wall.  She endorses that she tried to hold him steady until the seizure passed, but when she noticed it was not resolving quickly she went to call  EMS.  She states that he seized for approximately 2-5 minutes but really she states that she does not know the exact timeframe.  He denies tasting seeing smelling anything out of the ordinary prior to having a seizure.  He does endorse biting his tongue.  It is unclear if he urinated on himself as he was already in the process of using the restroom when the seizure began.  Of note, patient did have a cortical hemorrhage of the right cerebellar hemisphere in May of 2025 and has residual left-sided deficits of hemiplegia.  He denies tobacco and alcohol use.  He denies fever, chills, dyspnea, chest pain, abdominal pain, nausea, vomiting, diarrhea, dysuria, hematuria, lightheadedness, dizziness, and syncope.    Upon arrival to ED, patient afebrile, HR of 85, RR of 20, BP of 136/67, satting 97% on RA.  Workup in the ED revealed RBC of 4.15, H/H of 13.1/39.6, glucose of 119.  Remainder of labs unremarkable.  CXR shows no acute cardiopulmonary abnormality.  CT head without contrast shows resolution of the right frontal hemorrhage with low-attenuation remaining in the right temporal lobe compatible with encephalomalacia and resolving hemorrhage.  No mass effect or midline shift.  No new hemorrhage.  CT cervical spine shows no evidence of acute fracture or traumatic malalignment of the cervical spine.  Multilevel degenerative disc disease and facet hypertrophy.  Patient was given levetiracetam 1000 mg IV while in the ED.  Case discussed with ED provider and patient will be placed under observation for further management.    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    Cervical disc displacement     Degeneration of lumbar intervertebral disc     GERD (gastroesophageal reflux disease)     gastric polyps    Hip pain 02/22/2016    Hyperlipidemia     Shingles 11/2013    Sleep apnea     Stroke 05/05/2025       Past Surgical History:   Procedure Laterality Date    ABLATION, SVT, ACCESSORY PATHWAY N/A  5/25/2023    Procedure: Ablation, SVT, Accessory Pathway;  Surgeon: Goyo Sky MD;  Location: [facility] EP LAB;  Service: Cardiology;  Laterality: N/A;  PAC, RFA, CARTO, MAC, GP, 3 PREP    CERVICAL FUSION      bone graft    COLONOSCOPY N/A 7/29/2021    Procedure: COLONOSCOPY;  Surgeon: Tyler Story MD;  Location: [facility] ENDO;  Service: Endoscopy;  Laterality: N/A;    COLONOSCOPY N/A 10/30/2024    Procedure: COLONOSCOPY;  Surgeon: Tylre Story MD;  Location: [facility];  Service: Endoscopy;  Laterality: N/A;  m/ppm    EPIDURAL STEROID INJECTION INTO LUMBAR SPINE N/A 12/5/2019    Procedure: Injection-steroid-epidural-lumbar;  Surgeon: Adarsh Kraft MD;  Location: [facility] OR;  Service: Pain Management;  Laterality: N/A;  L5-S1    ESOPHAGOGASTRODUODENOSCOPY N/A 10/27/2020    Procedure: EGD (ESOPHAGOGASTRODUODENOSCOPY);  Surgeon: Tyler Story MD;  Location: [facility];  Service: Endoscopy;  Laterality: N/A;    ESOPHAGOGASTRODUODENOSCOPY N/A 2/14/2025    Procedure: EGD (ESOPHAGOGASTRODUODENOSCOPY);  Surgeon: Tyler Story MD;  Location: [facility];  Service: Endoscopy;  Laterality: N/A;    INJECTION OF ANESTHETIC AGENT AROUND MEDIAL BRANCH NERVES INNERVATING LUMBAR FACET JOINT Bilateral 11/18/2020    Procedure: BLOCK, NERVE, LUMBAR, MEDIAL BRANCH Bilateral L3,4,5;  Surgeon: Adarsh Kraft MD;  Location: [facility] OR;  Service: Pain Management;  Laterality: Bilateral;    INJECTION OF ANESTHETIC AGENT AROUND MEDIAL BRANCH NERVES INNERVATING LUMBAR FACET JOINT Left 10/25/2023    Procedure: Block-nerve-medial branch-lumbar;  Surgeon: Adarsh Kraft MD;  Location: [facility] OR;  Service: Anesthesiology;  Laterality: Left;  l4-5, l5-s1 MBB    INJECTION OF ANESTHETIC AGENT AROUND MEDIAL BRANCH NERVES INNERVATING LUMBAR FACET JOINT Bilateral 11/28/2023    Procedure: Block-nerve-medial branch-lumbar;  Surgeon: Adarsh Kraft MD;  Location: [facility] OR;  Service: Anesthesiology;  Laterality: Bilateral;  L4.5 and l5/s1 MBB #2     "RADIOFREQUENCY ABLATION OF LUMBAR MEDIAL BRANCH NERVE AT SINGLE LEVEL Bilateral 1/29/2021    Procedure: Radiofrequency Ablation, Nerve, Spinal, Lumbar, Medial Branch, 1 Level;  Surgeon: Adarsh Kraft MD;  Location: [facility] OR;  Service: Pain Management;  Laterality: Bilateral;  L3,4,5    TONSILLECTOMY, ADENOIDECTOMY         Review of patient's allergies indicates:   Allergen Reactions    Amlodipine Other (See Comments)     Flushed face, resolved with Hydroxyzine and Pepcid    Aspirin     Benadryl [diphenhydramine hcl] Other (See Comments)     Per wife "pt collapsed and ended up in hospital" ~1995    Pennsaid [diclofenac sodium] Rash and Blisters       Current Facility-Administered Medications on File Prior to Encounter   Medication    lactated ringers infusion    lactated ringers infusion    LIDOcaine (PF) 10 mg/ml (1%) injection 10 mg    LIDOcaine (PF) 10 mg/ml (1%) injection 10 mg     Current Outpatient Medications on File Prior to Encounter   Medication Sig    atorvastatin (LIPITOR) 20 MG tablet TAKE ONE TABLET BY MOUTH ONCE DAILY (Patient taking differently: Take 20 mg by mouth once daily.)    carboxymethylcellulose sodium (THERATEARS) 0.25 % Drop Place 0.05 mLs into both eyes 2 (two) times a day.    D-MANNOSE ORAL Take 3 capsules by mouth once daily. 2 caps QAM and 1 cap QHS    [Paused] docosahexaenoic acid/epa (FISH OIL ORAL) Take 1 capsule by mouth once daily.    EScitalopram oxalate (LEXAPRO) 10 MG tablet Take 10 mg by mouth every morning.    LIDOcaine (LIDODERM) 5 % Place 1 patch onto the skin once daily. Remove & Discard patch within 12 hours or as directed by MD (Patient taking differently: Place 1 patch onto the skin once daily. NEW Rx - NOT YET STARTEDRemove & Discard patch within 12 hours or as directed by MD)    [Paused] magnesium oxide (MAG-OX) 400 mg (241.3 mg magnesium) tablet Take 1 tablet (400 mg total) by mouth once daily. (Patient taking differently: Take 400 mg by mouth once daily.)    meloxicam " (MOBIC) 15 MG tablet Take 15 mg by mouth once daily.    [Paused] multivitamin (THERAGRAN) per tablet Take 1 tablet by mouth once daily.    [Paused] omeprazole (PRILOSEC) 40 MG capsule Take 1 capsule (40 mg total) by mouth every morning.    [DISCONTINUED] mupirocin (BACTROBAN) 2 % ointment Apply topically 3 (three) times daily. (Patient taking differently: Apply 1 g topically 3 (three) times daily.)    [DISCONTINUED] ondansetron 4 mg/2 mL Soln Inject 4 mg into the vein every 8 (eight) hours as needed.    [DISCONTINUED] polyethylene glycol (GLYCOLAX) 17 gram PwPk Take 17 g by mouth daily as needed.    [DISCONTINUED] artificial tears (ISOPTO TEARS) 0.5 % ophthalmic solution Place 1 drop into both eyes 2 (two) times a day.    [DISCONTINUED] losartan (COZAAR) 100 MG tablet Take 1 tablet (100 mg total) by mouth once daily.    [DISCONTINUED] methocarbamoL (ROBAXIN) 500 MG Tab TAKE ONE TABLET BY MOUTH EVERY 8 HOURS AS NEEDED FOR MUSCLE SPASMS (Patient taking differently: Take 500 mg by mouth every 8 (eight) hours as needed.)    [DISCONTINUED] pregabalin (LYRICA) 100 MG capsule Take 100 mg by mouth once daily.    [DISCONTINUED] senna-docusate (PERICOLACE) 8.6-50 mg per tablet Take 1 tablet by mouth 2 (two) times daily.    [DISCONTINUED] silodosin (RAPAFLO) 4 mg Cap capsule Take 1 capsule (4 mg total) by mouth once daily.    [DISCONTINUED] triamcinolone acetonide 0.1% (KENALOG) 0.1 % ointment Apply topically 2 (two) times daily. (Patient taking differently: Apply 1 application  topically 2 (two) times daily.)     Family History       Problem Relation (Age of Onset)    Coronary artery disease Mother    Diabetes Mother    Heart disease Brother    Hypertension Mother    Stroke Mother, Father          Tobacco Use    Smoking status: Never    Smokeless tobacco: Never   Substance and Sexual Activity    Alcohol use: Yes     Alcohol/week: 6.0 standard drinks of alcohol     Types: 6 Cans of beer per week     Comment: weekly or less     Drug use: No    Sexual activity: Yes     Partners: Female     Review of Systems   Constitutional:  Negative for chills and fever.   Respiratory:  Negative for shortness of breath.    Cardiovascular:  Negative for chest pain.   Gastrointestinal:  Negative for abdominal pain, diarrhea, nausea and vomiting.   Genitourinary:  Negative for dysuria and hematuria.   Neurological:  Positive for seizures. Negative for dizziness and light-headedness.     Objective:     Vital Signs (Most Recent):  Temp: 98.6 °F (37 °C) (07/13/25 0948)  Pulse: 71 (07/13/25 1108)  Resp: (!) 21 (07/13/25 1108)  BP: 128/74 (07/13/25 1008)  SpO2: 97 % (07/13/25 0948) Vital Signs (24h Range):  Temp:  [98.6 °F (37 °C)] 98.6 °F (37 °C)  Pulse:  [71-85] 71  Resp:  [20-23] 21  SpO2:  [97 %] 97 %  BP: (128-136)/(67-74) 128/74     Weight: 72.6 kg (160 lb)  Body mass index is 22.96 kg/m².     Physical Exam  Vitals reviewed.   Constitutional:       General: He is awake. He is not in acute distress.     Appearance: Normal appearance. He is not ill-appearing or diaphoretic.   HENT:      Mouth/Throat:      Comments: Appears the patient has bite marks to both sides of his lateral tongue.  Cardiovascular:      Rate and Rhythm: Normal rate.      Heart sounds: Normal heart sounds. No murmur heard.     No friction rub. No gallop.   Pulmonary:      Effort: Pulmonary effort is normal. No accessory muscle usage or respiratory distress.      Breath sounds: Normal breath sounds. No wheezing, rhonchi or rales.   Abdominal:      General: Bowel sounds are normal.      Palpations: Abdomen is soft.      Tenderness: There is no abdominal tenderness. There is no guarding or rebound.   Musculoskeletal:      Right lower leg: No edema.      Left lower leg: No edema.   Skin:     General: Skin is warm and dry.   Neurological:      Mental Status: He is alert and oriented to person, place, and time.      Comments: Residual left-sided hemiplegia from prior stroke appreciated on exam.    Psychiatric:         Behavior: Behavior is cooperative.                Significant Labs: All pertinent labs within the past 24 hours have been reviewed.  CBC:   Recent Labs   Lab 07/13/25  1037   WBC 4.97   HGB 13.1*   HCT 39.6*        CMP:   Recent Labs   Lab 07/13/25  1037      K 4.2      CO2 24   *   BUN 12   CREATININE 0.6   CALCIUM 9.2   PROT 6.8   ALBUMIN 4.0   BILITOT 0.5   ALKPHOS 59   AST 20   ALT 22   ANIONGAP 9     Cardiac Markers:   Recent Labs   Lab 07/13/25  1113   BNP 19     Lipase:   Recent Labs   Lab 07/13/25  1037   LIPASE 10     Magnesium:   Recent Labs   Lab 07/13/25  1037   MG 1.9     TSH:   Recent Labs   Lab 07/13/25  1037   TSH 4.016     Urine Studies:   Recent Labs   Lab 07/13/25  1258   APPEARANCEUA Clear   SPECGRAV 1.010   PROTEINUA Negative   BILIRUBINUA Negative   UROBILINOGEN Negative   LEUKOCYTESUR Negative       Significant Imaging:   Imaging Results              X-Ray Chest AP Portable (Final result)  Result time 07/13/25 11:51:32      Final result by Enriqueta De La Vega MD (07/13/25 11:51:32)                   Impression:      No acute cardiopulmonary abnormality.      Electronically signed by: Enriqueta De La Vega  Date:    07/13/2025  Time:    11:51               Narrative:    EXAMINATION:  XR CHEST AP PORTABLE    CLINICAL HISTORY:  seizure like activity;    FINDINGS:  Portable chest at 11:36 is compared to 05/09/2025 shows normal cardiomediastinal silhouette.    Lungs are clear. Pulmonary vasculature is normal. No acute osseous abnormality.                                       CT Cervical Spine Without Contrast (Final result)  Result time 07/13/25 11:51:02      Final result by Enriqueta De La Vega MD (07/13/25 11:51:02)                   Impression:      No evidence of acute fracture or traumatic malalignment of the cervical spine.    Multilevel degenerative disc disease and facet hypertrophy      Electronically signed by: Enriqueta  Ceferino  Date:    07/13/2025  Time:    11:51               Narrative:    EXAMINATION:  CT CERVICAL SPINE WITHOUT CONTRAST    CLINICAL HISTORY:  Neck trauma (Age >= 65y);    TECHNIQUE:  Low dose axial CT images through the cervical spine, with sagittal and coronal reformations.  Contrast was not administered.    COMPARISON:  None    FINDINGS:  The vertebral bodies are normal in height and morphology without evidence of fracture or osseous destructive process.  There is reversal of normal cervical lordosis.  The vertebral bodies are of normal height.  There is bony fusion anteriorly at C5-6.  The odontoid process is intact the lateral masses of C1 are symmetrical.  There is bony bridging at the cranial cervical junction involving the lateral masses of C1 and occipital condyles.    Multi multilevel disc space narrowing and facet hypertrophy resulting in multilevel foraminal narrowing on the left at C3-4, bilaterally at C4-5.    Limited evaluation of the intraspinal contents demonstrates no hematoma or mass.Paraspinal soft tissues exhibit no acute abnormalities.                                       CT Head Without Contrast (Final result)  Result time 07/13/25 11:48:28      Final result by Enriqueta De La Vega MD (07/13/25 11:48:28)                   Impression:      1. Resolution of the right frontal hemorrhage with low attenuation remaining in the right temporal lobe compatible with encephalomalacia and resolving hemorrhage  No mass effect or midline shift  2. No new hemorrhage      Electronically signed by: Enriqueta De La Vega  Date:    07/13/2025  Time:    11:48               Narrative:    EXAMINATION:  CT HEAD WITHOUT CONTRAST    CLINICAL HISTORY:  Seizure, new-onset, no history of trauma;.  Prior right frontalintraparenchymal hemorrhage    TECHNIQUE:  CMS MANDATED QUALITY DATA - CT RADIATION - 436    All CT scans at this facility utilize dose modulation, iterative reconstruction, and/or weight based dosing when  appropriate to reduce radiation dose to as low as reasonably achievable.    Non infusion images were obtained from the skull base to the vertex.    COMPARISON:  06/28/2025 and 05/09/2025    FINDINGS:  The ventricles and sulci are normal in size and configuration for age.  There is no residual hemorrhage within the right frontal lobe..  There is no new hemorrhage or midline shift.    There is low-attenuation within the right frontal lobe there is persistent low attenuation in the right temporal lobe compatible with encephalomalacia and resolving hemorrhage.  The gray-white differentiation is maintained.  Cerebellum and brainstem are normal.  The orbits are unremarkable.                                     Assessment/Plan:     Assessment & Plan  Seizure  - Denies hx of seizures  - Neuro checks Q4H  - Continuous tele monitoring and pulse oximetry  - Seizure precautions  - Keppra 1000 mg loading dose given in ED  - Start Keppra 500 mg BID for prevention of seizures  - PRN lorazepam for breakthrough seizures  - Bedside garcia passed  - Neurology consulted, appreciate recs  - Holding home escitalopram for now as may lower seizure threshold, appreciate neuro recs  - PT/OT consulted, appreciate recs  Nontraumatic cortical hemorrhage of right cerebral hemisphere  - Hx of ICH in May 2025  - CT head today shows resolution of the right frontal hemorrhage with low-attenuation remaining in the right temporal lobe compatible with encephalomalacia and resolving hemorrhage.  No mass effect or midline shift.  No new hemorrhage.  - Holding VTE prophylaxis at this time pending neurology eval and recommendations  - See plan for seizure  Prediabetes  - Hx noted    Sleep apnea  - CPAP QHS    Essential (primary) hypertension  Chronic, controlled.  Latest blood pressure and vitals reviewed-     Temp:  [98.6 °F (37 °C)]   Pulse:  [71-85]   Resp:  [20-23]   BP: (128-136)/(67-74)   SpO2:  [97 %] .   Home meds for hypertension were reviewed and  noted below-  Hypertension Medications             While in the hospital, will manage blood pressure as follows; Continue home antihypertensive regimen    Will utilize p.r.n. blood pressure medication only if patient's blood pressure greater than 180/110 and he develops symptoms such as worsening chest pain or shortness of breath.    - Hx noted  - Not currently on antihypertensives outpatient  - Trend BP while hospitalized  VTE Risk Mitigation (From admission, onward)           Ordered     Reason for No Pharmacological VTE Prophylaxis  Once        Comments: Recent hemorrhagic stroke, awaiting neurology recommendations   Question:  Reasons:  Answer:  Physician Provided (leave comment)    07/13/25 1448     IP VTE HIGH RISK PATIENT  Once         07/13/25 1448     Place sequential compression device  Until discontinued         07/13/25 1448                    On 07/13/2025, patient should be placed in hospital observation services under my care in collaboration with Nile Whitehead MD.           Sharmin Bauer PA-C  Department of Hospital Medicine  [facility] - Emergency Dept

## 2025-07-13 NOTE — ED NOTES
PRESSURE RELIEF MAINTAINED.  REPOSITIONED Q 2' WHILE IN ED. L SHOULDE/ARM SUPPORTED WITH PILLOW.  FAMILY REMAINS AT BS.

## 2025-07-13 NOTE — HPI
Mr. 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  Patient's wife is at bedside and helps provide history.  Patient and wife state that around 9:00 a.m. the patient had just finished riding his exercise bike when he went to use the restroom to urinate.  He states that while sitting on the toilet his right lower extremity began shaking uncontrollably which progressed than to his left lower extremity.  He states that he tried for some time to get the shaking to resolve and when unsuccessful he tried calling out to his wife.  His wife states that she thought she heard something so she went to check on him in as she was approaching she heard thumping so she states that she rushed in and noticed he was having a seizure.  She states that he was hitting his head against the back of the wall.  She endorses that she tried to hold him steady until the seizure passed, but when she noticed it was not resolving quickly she went to call EMS.  She states that he seized for approximately 2-5 minutes but really she states that she does not know the exact timeframe.  He denies tasting seeing smelling anything out of the ordinary prior to having a seizure.  He does endorse biting his tongue.  It is unclear if he urinated on himself as he was already in the process of using the restroom when the seizure began.  Of note, patient did have a cortical hemorrhage of the right cerebellar hemisphere in May of 2025 and has residual left-sided deficits of hemiplegia.  He denies tobacco and alcohol use.  He denies fever, chills, dyspnea, chest pain, abdominal pain, nausea, vomiting, diarrhea,  dysuria, hematuria, lightheadedness, dizziness, and syncope.    Upon arrival to ED, patient afebrile, HR of 85, RR of 20, BP of 136/67, satting 97% on RA.  Workup in the ED revealed RBC of 4.15, H/H of 13.1/39.6, glucose of 119.  Remainder of labs unremarkable.  CXR shows no acute cardiopulmonary abnormality.  CT head without contrast shows resolution of the right frontal hemorrhage with low-attenuation remaining in the right temporal lobe compatible with encephalomalacia and resolving hemorrhage.  No mass effect or midline shift.  No new hemorrhage.  CT cervical spine shows no evidence of acute fracture or traumatic malalignment of the cervical spine.  Multilevel degenerative disc disease and facet hypertrophy.  Patient was given levetiracetam 1000 mg IV while in the ED.  Case discussed with ED provider and patient will be placed under observation for further management.

## 2025-07-13 NOTE — ASSESSMENT & PLAN NOTE
- Hx of ICH in May 2025  - CT head today shows resolution of the right frontal hemorrhage with low-attenuation remaining in the right temporal lobe compatible with encephalomalacia and resolving hemorrhage.  No mass effect or midline shift.  No new hemorrhage.  - Holding VTE prophylaxis at this time pending neurology eval and recommendations  - See plan for seizure

## 2025-07-13 NOTE — ASSESSMENT & PLAN NOTE
Chronic, controlled.  Latest blood pressure and vitals reviewed-     Temp:  [98.6 °F (37 °C)]   Pulse:  [71-85]   Resp:  [20-23]   BP: (128-136)/(67-74)   SpO2:  [97 %] .   Home meds for hypertension were reviewed and noted below-  Hypertension Medications             While in the hospital, will manage blood pressure as follows; Continue home antihypertensive regimen    Will utilize p.r.n. blood pressure medication only if patient's blood pressure greater than 180/110 and he develops symptoms such as worsening chest pain or shortness of breath.    - Hx noted  - Not currently on antihypertensives outpatient  - Trend BP while hospitalized

## 2025-07-14 ENCOUNTER — PATIENT OUTREACH (OUTPATIENT)
Dept: ADMINISTRATIVE | Facility: HOSPITAL | Age: 70
End: 2025-07-14
Payer: MEDICARE

## 2025-07-14 PROBLEM — I61.1 NONTRAUMATIC CORTICAL HEMORRHAGE OF RIGHT CEREBRAL HEMISPHERE: Chronic | Status: ACTIVE | Noted: 2025-05-04

## 2025-07-14 PROBLEM — R56.9 SEIZURE: Chronic | Status: ACTIVE | Noted: 2025-05-05

## 2025-07-14 LAB
ABSOLUTE EOSINOPHIL (SMH): 0.06 K/UL
ABSOLUTE MONOCYTE (SMH): 0.66 K/UL (ref 0.3–1)
ABSOLUTE NEUTROPHIL COUNT (SMH): 3.3 K/UL (ref 1.8–7.7)
ANION GAP (SMH): 6 MMOL/L (ref 8–16)
BASOPHILS # BLD AUTO: 0.05 K/UL
BASOPHILS NFR BLD AUTO: 0.9 %
BUN SERPL-MCNC: 11 MG/DL (ref 8–23)
CALCIUM SERPL-MCNC: 9 MG/DL (ref 8.7–10.5)
CHLORIDE SERPL-SCNC: 105 MMOL/L (ref 95–110)
CK SERPL-CCNC: 80 U/L (ref 20–200)
CO2 SERPL-SCNC: 29 MMOL/L (ref 23–29)
CREAT SERPL-MCNC: 0.6 MG/DL (ref 0.5–1.4)
ERYTHROCYTE [DISTWIDTH] IN BLOOD BY AUTOMATED COUNT: 13.4 % (ref 11.5–14.5)
GFR SERPLBLD CREATININE-BSD FMLA CKD-EPI: >60 ML/MIN/1.73/M2
GLUCOSE SERPL-MCNC: 111 MG/DL (ref 70–110)
HCT VFR BLD AUTO: 38.9 % (ref 40–54)
HGB BLD-MCNC: 12.8 GM/DL (ref 14–18)
IMM GRANULOCYTES # BLD AUTO: 0.02 K/UL (ref 0–0.04)
IMM GRANULOCYTES NFR BLD AUTO: 0.3 % (ref 0–0.5)
LYMPHOCYTES # BLD AUTO: 1.72 K/UL (ref 1–4.8)
MAGNESIUM SERPL-MCNC: 1.8 MG/DL (ref 1.6–2.6)
MCH RBC QN AUTO: 31.4 PG (ref 27–31)
MCHC RBC AUTO-ENTMCNC: 32.9 G/DL (ref 32–36)
MCV RBC AUTO: 96 FL (ref 82–98)
NUCLEATED RBC (/100WBC) (SMH): 0 /100 WBC
PHOSPHATE SERPL-MCNC: 4.5 MG/DL (ref 2.7–4.5)
PLATELET # BLD AUTO: 229 K/UL (ref 150–450)
PMV BLD AUTO: 10.3 FL (ref 9.2–12.9)
POTASSIUM SERPL-SCNC: 3.9 MMOL/L (ref 3.5–5.1)
RBC # BLD AUTO: 4.07 M/UL (ref 4.6–6.2)
RELATIVE EOSINOPHIL (SMH): 1 % (ref 0–8)
RELATIVE LYMPHOCYTE (SMH): 29.8 % (ref 18–48)
RELATIVE MONOCYTE (SMH): 11.4 % (ref 4–15)
RELATIVE NEUTROPHIL (SMH): 56.6 % (ref 38–73)
SODIUM SERPL-SCNC: 140 MMOL/L (ref 136–145)
WBC # BLD AUTO: 5.77 K/UL (ref 3.9–12.7)

## 2025-07-14 PROCEDURE — 82550 ASSAY OF CK (CPK): CPT

## 2025-07-14 PROCEDURE — 84100 ASSAY OF PHOSPHORUS: CPT

## 2025-07-14 PROCEDURE — 63600175 PHARM REV CODE 636 W HCPCS

## 2025-07-14 PROCEDURE — 80048 BASIC METABOLIC PNL TOTAL CA: CPT

## 2025-07-14 PROCEDURE — 85025 COMPLETE CBC W/AUTO DIFF WBC: CPT

## 2025-07-14 PROCEDURE — 97166 OT EVAL MOD COMPLEX 45 MIN: CPT

## 2025-07-14 PROCEDURE — 97116 GAIT TRAINING THERAPY: CPT

## 2025-07-14 PROCEDURE — 36415 COLL VENOUS BLD VENIPUNCTURE: CPT

## 2025-07-14 PROCEDURE — 94761 N-INVAS EAR/PLS OXIMETRY MLT: CPT

## 2025-07-14 PROCEDURE — 97535 SELF CARE MNGMENT TRAINING: CPT

## 2025-07-14 PROCEDURE — 83735 ASSAY OF MAGNESIUM: CPT

## 2025-07-14 PROCEDURE — 25000003 PHARM REV CODE 250

## 2025-07-14 PROCEDURE — 97161 PT EVAL LOW COMPLEX 20 MIN: CPT

## 2025-07-14 PROCEDURE — 11000001 HC ACUTE MED/SURG PRIVATE ROOM

## 2025-07-14 PROCEDURE — 95819 EEG AWAKE AND ASLEEP: CPT

## 2025-07-14 RX ADMIN — LEVETIRACETAM 500 MG: 100 INJECTION INTRAVENOUS at 09:07

## 2025-07-14 RX ADMIN — LIDOCAINE 1 PATCH: 50 PATCH TOPICAL at 05:07

## 2025-07-14 RX ADMIN — ACETAMINOPHEN 650 MG: 325 TABLET ORAL at 04:07

## 2025-07-14 RX ADMIN — PANTOPRAZOLE SODIUM 40 MG: 40 TABLET, DELAYED RELEASE ORAL at 04:07

## 2025-07-14 RX ADMIN — THERA TABS 1 TABLET: TAB at 09:07

## 2025-07-14 RX ADMIN — Medication 400 MG: at 09:07

## 2025-07-14 RX ADMIN — ATORVASTATIN CALCIUM 20 MG: 20 TABLET, FILM COATED ORAL at 09:07

## 2025-07-14 NOTE — PT/OT/SLP EVAL
Occupational Therapy   Evaluation    Name: Colt Rose  MRN: [id number]  Admitting Diagnosis: Seizure  Recent Surgery: * No surgery found *      Recommendations:     Discharge Recommendations: High Intensity Therapy  Discharge Equipment Recommendations:  to be determined by next level of care  Barriers to discharge:   medical status    Assessment:     Colt Rose is a 69 y.o. male with a medical diagnosis of Seizure.  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 Performance deficits affecting function: weakness, impaired endurance, impaired self care skills, impaired functional mobility, gait instability, impaired balance, decreased upper extremity function, decreased lower extremity function, decreased safety awareness, decreased ROM, abnormal tone.      Rehab Prognosis: Good; patient would benefit from acute skilled OT services to address these deficits and reach maximum level of function.       Plan:     Patient to be seen 6 x/week to address the above listed problems via self-care/home management, therapeutic activities, therapeutic exercises, neuromuscular re-education  Plan of Care Expires: 08/14/25  Plan of Care Reviewed with: patient, spouse    Subjective     Chief Complaint: Weakness and loss of function of left UE  Patient/Family Comments/goals: to continue rehab to regain function    Occupational Profile:  Living Environment: patient lives with his wife  Previous level of function: prior to CVA in May patient was independent and playing pickleball several times per week, post CVA patient able to perform t/f's with Maynor from wife using gaitbelt. ModA for dressing and toileting.   Equipment Used at Home: walker, tito, wheelchair, bedside commode  Assistance upon Discharge: patient will have assistance from spouse or facility staff upon discharge.    Pain/Comfort:  Pain Rating 1: 0/10    Patients cultural, spiritual, Islam conflicts given the current situation:      Objective:      Communicated with: nurse prior to session.  Patient found up in chair with telemetry, chair check, peripheral IV upon OT entry to room.    General Precautions: Standard, aspiration, fall, seizure  Orthopedic Precautions: N/A  Braces: N/A  Respiratory Status: Room air    Occupational Performance:    Bed Mobility:    Patient completed Sit to Supine with moderate assistance    Functional Mobility/Transfers:  Patient completed Sit <> Stand Transfer with minimum assistance  with  hemiwalker       Activities of Daily Living:  Grooming: stand by assistance for washing face, combing hair, oral care  Lower Body Dressing: moderate assistance for donning socks    Cognitive/Visual Perceptual:  Cognitive/Psychosocial Skills:     -       Oriented to: Person, Place, Time, and Situation   -       Follows Commands/attention:Follows multistep  commands  -       Communication: clear/fluent  -       Memory: No Deficits noted  -       Safety awareness/insight to disability: intact   -       Mood/Affect/Coping skills/emotional control: Appropriate to situation      Physical Exam:  Upper Extremity Range of Motion:     -       Right Upper Extremity: WFL  -       Left Upper Extremity: flaccid, no AROM, PROM WFL  Upper Extremity Strength:    -       Right Upper Extremity: WFL  -       Left Upper Extremity: 0/5, flaccid   Strength:    -       Right Upper Extremity: WFL  -       Left Upper Extremity: 0/5  Fine Motor Coordination:    -       Intact on R, impaired on Left      AMPAC 6 Click ADL:  AMPAC Total Score: 13    Treatment & Education:  Therapist provided facilitation and instruction of proper body mechanics and fall prevention strategies during tasks listed above.   Instructed patient to sit in bedside chair as tolerated daily to increase OOB/activity tolerance.   Instructed patient to use call light to have nursing staff assist with needs/transfers.   Discussed OT POC and answered all questions within OT scope of  practice.        Patient left up in chair with all lines intact, call button in reach, chair alarm on, and spouse present    GOALS:   Multidisciplinary Problems       Occupational Therapy Goals          Problem: Occupational Therapy    Goal Priority Disciplines Outcome Interventions   Occupational Therapy Goal     OT, PT/OT     Description: Goals to be met by: 8/14/2025     Patient will increase functional independence with ADLs by performing:    UE Dressing with Supervision.  LE Dressing with Minimal Assistance and Assistive Devices as needed.  Grooming while seated at sink with Supervision.  Toileting from bedside commode with Supervision for hygiene and clothing management.   Toilet transfer to bedside commode with Contact Guard Assistance.                             History:     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    Cervical disc displacement     Degeneration of lumbar intervertebral disc     GERD (gastroesophageal reflux disease)     gastric polyps    Hip pain 02/22/2016    Hyperlipidemia     Shingles 11/2013    Sleep apnea     Stroke 05/05/2025         Past Surgical History:   Procedure Laterality Date    ABLATION, SVT, ACCESSORY PATHWAY N/A 5/25/2023    Procedure: Ablation, SVT, Accessory Pathway;  Surgeon: Goyo Sky MD;  Location: [facility] EP LAB;  Service: Cardiology;  Laterality: N/A;  PAC, RFA, CARTO, MAC, GP, 3 PREP    CERVICAL FUSION      bone graft    COLONOSCOPY N/A 7/29/2021    Procedure: COLONOSCOPY;  Surgeon: Tyler Story MD;  Location: [facility] ENDO;  Service: Endoscopy;  Laterality: N/A;    COLONOSCOPY N/A 10/30/2024    Procedure: COLONOSCOPY;  Surgeon: Tyler Story MD;  Location: [facility] ENDO;  Service: Endoscopy;  Laterality: N/A;  m/ppm    EPIDURAL STEROID INJECTION INTO LUMBAR SPINE N/A 12/5/2019    Procedure: Injection-steroid-epidural-lumbar;  Surgeon: Adarsh Kraft MD;  Location: [facility] OR;  Service: Pain Management;  Laterality: N/A;  L5-S1     ESOPHAGOGASTRODUODENOSCOPY N/A 10/27/2020    Procedure: EGD (ESOPHAGOGASTRODUODENOSCOPY);  Surgeon: Tyler Story MD;  Location: [facility];  Service: Endoscopy;  Laterality: N/A;    ESOPHAGOGASTRODUODENOSCOPY N/A 2/14/2025    Procedure: EGD (ESOPHAGOGASTRODUODENOSCOPY);  Surgeon: Tyler Story MD;  Location: [facility];  Service: Endoscopy;  Laterality: N/A;    INJECTION OF ANESTHETIC AGENT AROUND MEDIAL BRANCH NERVES INNERVATING LUMBAR FACET JOINT Bilateral 11/18/2020    Procedure: BLOCK, NERVE, LUMBAR, MEDIAL BRANCH Bilateral L3,4,5;  Surgeon: Adarsh Kraft MD;  Location: [facility];  Service: Pain Management;  Laterality: Bilateral;    INJECTION OF ANESTHETIC AGENT AROUND MEDIAL BRANCH NERVES INNERVATING LUMBAR FACET JOINT Left 10/25/2023    Procedure: Block-nerve-medial branch-lumbar;  Surgeon: Adarsh Kraft MD;  Location: [facility] OR;  Service: Anesthesiology;  Laterality: Left;  l4-5, l5-s1 MBB    INJECTION OF ANESTHETIC AGENT AROUND MEDIAL BRANCH NERVES INNERVATING LUMBAR FACET JOINT Bilateral 11/28/2023    Procedure: Block-nerve-medial branch-lumbar;  Surgeon: Adarsh Kraft MD;  Location: [facility] OR;  Service: Anesthesiology;  Laterality: Bilateral;  L4.5 and l5/s1 MBB #2    RADIOFREQUENCY ABLATION OF LUMBAR MEDIAL BRANCH NERVE AT SINGLE LEVEL Bilateral 1/29/2021    Procedure: Radiofrequency Ablation, Nerve, Spinal, Lumbar, Medial Branch, 1 Level;  Surgeon: Adarsh Kraft MD;  Location: [facility];  Service: Pain Management;  Laterality: Bilateral;  L3,4,5    TONSILLECTOMY, ADENOIDECTOMY         Time Tracking:     OT Date of Treatment: 07/14/25  OT Start Time: 1239  OT Stop Time: 1257  OT Total Time (min): 18 min    Billable Minutes:Evaluation 10  Self Care/Home Management 8    7/14/2025

## 2025-07-14 NOTE — SUBJECTIVE & OBJECTIVE
Interval History: PT seen and examined. No further seizure activity. L sided weakness at baseline. EEG and neurology consults pending. No complaints at this time.     Review of Systems   Constitutional:  Negative for fever.   Respiratory:  Negative for shortness of breath.    Cardiovascular:  Negative for chest pain.   Gastrointestinal:  Negative for nausea and vomiting.   Neurological:  Positive for weakness.     Objective:     Vital Signs (Most Recent):  Temp: 97.3 °F (36.3 °C) (07/14/25 0752)  Pulse: 69 (07/14/25 0840)  Resp: 18 (07/14/25 0752)  BP: 106/69 (07/14/25 0752)  SpO2: 96 % (07/14/25 0840) Vital Signs (24h Range):  Temp:  [97.3 °F (36.3 °C)-98.6 °F (37 °C)] 97.3 °F (36.3 °C)  Pulse:  [62-85] 69  Resp:  [15-28] 18  SpO2:  [93 %-100 %] 96 %  BP: ()/(56-79) 106/69     Weight: 72.5 kg (159 lb 13.3 oz)  Body mass index is 22.93 kg/m².  No intake or output data in the 24 hours ending 07/14/25 0941      Physical Exam  Constitutional:       General: He is not in acute distress.     Appearance: He is well-developed.   HENT:      Head: Normocephalic and atraumatic.      Right Ear: External ear normal.      Left Ear: External ear normal.   Eyes:      Conjunctiva/sclera: Conjunctivae normal.      Pupils: Pupils are equal, round, and reactive to light.   Neck:      Thyroid: No thyromegaly.   Cardiovascular:      Rate and Rhythm: Normal rate and regular rhythm.      Pulses: Normal pulses.      Heart sounds: Normal heart sounds, S1 normal and S2 normal.   Pulmonary:      Effort: Pulmonary effort is normal. No respiratory distress.      Breath sounds: Normal breath sounds.   Chest:      Chest wall: No tenderness.   Musculoskeletal:         General: No swelling or tenderness. Normal range of motion.      Cervical back: Normal range of motion and neck supple.   Skin:     General: Skin is warm and dry.      Coloration: Skin is not jaundiced or pale.   Neurological:      General: No focal deficit present.      Mental  Status: He is alert and oriented to person, place, and time.      Cranial Nerves: No cranial nerve deficit.      Comments: Residual left-sided hemiplegia   Psychiatric:         Mood and Affect: Mood normal.         Behavior: Behavior normal.               Significant Labs: All pertinent labs within the past 24 hours have been reviewed.  CBC:   Recent Labs   Lab 07/13/25  1037 07/14/25  0525   WBC 4.97 5.77   HGB 13.1* 12.8*   HCT 39.6* 38.9*    229     CMP:   Recent Labs   Lab 07/13/25  1037 07/14/25  0525    140   K 4.2 3.9    105   CO2 24 29   * 111*   BUN 12 11   CREATININE 0.6 0.6   CALCIUM 9.2 9.0   PROT 6.8  --    ALBUMIN 4.0  --    BILITOT 0.5  --    ALKPHOS 59  --    AST 20  --    ALT 22  --    ANIONGAP 9 6*       Significant Imaging: I have reviewed all pertinent imaging results/findings within the past 24 hours.

## 2025-07-14 NOTE — PROGRESS NOTES
Population Health Chart Review & Patient Outreach Details    Rising risk of ED/ Admissions report.    Patient currently admitted in hospital.          Updates Requested / Reviewed:      Updated Care Coordination Note, Care Everywhere, and Immunizations Reconciliation Completed or Queried: [facility] Topics Overdue:      [facility] Score: 0     Patient is not due for any topics at this time.    RSV Vaccine

## 2025-07-14 NOTE — SUBJECTIVE & OBJECTIVE
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 He attempted to stop the shaking and called out to his wife for assistance. He denies any loss of consciousness. His wife, who witnessed the event, estimates the episode lasted approximately 2-5 minutes. The patient endorses tongue biting but denies confusion or other postictal symptoms. In the ED, he was alert and oriented, with no postictal state observed. He was administered 1g of levetiracetam (Keppra) and started on 500 mg BID.  Of note, during a prior hospitalization in May 2025 for intracerebral hemorrhage, he underwent EEG for right upper extremity rhythmic movements. That EEG showed no epileptiform activity, and antiepileptic medications were not initiated at that time.     Images personally reviewed and interpreted:  Study: Head CT  Study Interpretation: "1. Resolution of the right frontal hemorrhage with low attenuation remaining in the right temporal lobe compatible with encephalomalacia and resolving hemorrhage. No mass effect or midline shift 2. No new hemorrhage"    Additional studies reviewed:   Study: Cardiac_Neuro: EEG  Study Interpretation: pending    Laboratory studies reviewed:  BMP:   Lab Results   Component Value Date     07/14/2025    K 3.9 07/14/2025     07/14/2025    CO2 29 07/14/2025    BUN 11 07/14/2025    BUN 35 (H) 05/13/2025    CREATININE 0.6 07/14/2025    CREATININE 0.7 05/13/2025    CALCIUM 9.0 07/14/2025     CBC:   Lab Results   Component Value Date    WBC 5.77 07/14/2025    WBC 7.42 05/13/2025    RBC 4.07 (L) " 07/14/2025    HGB 12.8 (L) 07/14/2025    HCT 38.9 (L) 07/14/2025     07/14/2025    MCV 96 07/14/2025    MCH 31.4 (H) 07/14/2025    MCHC 32.9 07/14/2025       Documentation personally reviewed:  Notes: Notes: ED notes and H&P     Assessment and plan:  69-year-old male with a history of prediabetes, hypertension, hyperlipidemia, ARIS on CPAP, and prior right cerebellar ICH with residual left-sided hemiplegia, presenting with a witnessed seizure. The episode involved bilateral lower extremity rhythmic movements, tongue biting, and no loss of consciousness. No prior seizure history. No postictal confusion was noted. Notably, a prior EEG in May 2025 during hospitalization for ICH was negative for epileptiform activity. Given the clinical presentation and supportive findings, this is concerning for new-onset seizure.    -Continue Keppra 500mg BID for seizure prophylaxis.  -Monitor for side effects.   -Advised to adhere strictly to medication and follow-up schedule.  -Encourage consistent use of CPAP given potential role of sleep disturbance in seizure threshold  -Seizure precautions discussed with patient, patient verbalized understanding.    It is [place] state law that you do not drive for the next six months as you have had an episode of loss of consciousness.     Please do not take baths, swim alone, climb heights or operate heavy machinery as you have had an episode of loss of consciousness.        Post charge discharge plan:  Clinic follow up: General Neurology    Visit Type: in person or virtual  Timeframe: 4 weeks

## 2025-07-14 NOTE — CARE UPDATE
07/13/25 2200   Patient Assessment/Suction   Level of Consciousness (AVPU) alert   Respiratory Effort Normal;Unlabored   Expansion/Accessory Muscles/Retractions no use of accessory muscles   All Lung Fields Breath Sounds equal bilaterally;clear   Rhythm/Pattern, Respiratory assisted mechanically   Cough Frequency no cough   Skin Integrity   $ Wound Care Tech Time 15 min   Area Observed Left;Right;Cheek;Bridge of nose;Nares   Skin Appearance without discoloration   PRE-TX-O2   Device (Oxygen Therapy) CPAP  (Home unit in use)   SpO2 96 %   Pulse Oximetry Type Intermittent   $ Pulse Oximetry - Multiple Charge Pulse Oximetry - Multiple   Pulse 66   Resp 20   Preset CPAP/BiPAP Settings   Mode Of Delivery CPAP;home unit   CPAP/BIPAP charged w/in last 24 h NO   Education   $ Education Other (see comment);15 min  (CPAP)   Respiratory Evaluation   $ Care Plan Tech Time 15 min

## 2025-07-14 NOTE — PROCEDURES
VIDEO ELECTROENCEPHALOGRAM  REPORT    DATE OF SERVICE:7/14/25  REQUESTED BY:  Dr Vincent  LOCATION OF SERVICE:  [facility]   Electroencephalographic (EEG) recording is with electrodes placed according to the International 10-20 placement system.  Thirty two (32) channels of digital signal (sampling rate of 512/sec) including T1 and T2 was simultaneously recorded from the scalp and may include  EKG, EMG, and/or eye monitors.  Recording band pass was 0.1 to 512 hz.  Digital video recording of the patient is simultaneously recorded with the EEG.  The patient is instructed report clinical symptoms which may occur during the recording session.  EEG and video recording is stored and archived in digital format.  Activation procedures which include photic stimulation, hyperventilation and instructing patients to perform simple task are done in selected patients.   The EEG is displayed on a monitor screen and can be reviewed using different montages.  Computer assisted analysis is employed to detect spike and electrographic seizure activity.   The entire record is submitted for computer analysis.  The entire recording is visually reviewed and the times identified by computer analysis as being spikes or seizures are reviewed again.  Compresses spectral analysis (CSA) is also performed on the activity recorded from each individual channel.  This is displayed as a power display of frequencies from 0 to 30 Hz over time.   The CSA is reviewed looking for asymmetries in power between homologous areas of the scalp and then compared with the original EEG recording.     [employer] software was also utilized in the review of this study.  This software suite analyzes the EEG recording in multiple domains.  Coherence and rhythmicity is computed to identify EEG sections which may contain organized seizures.  Each channel undergoes analysis to detect presence of spike and sharp waves which have special and morphological  characteristic of epileptic activity.  The routine EEG recording is converted from spacial into frequency domain.  This is then displayed comparing homologous areas to identify areas of significant asymmetry.  Algorithm to identify non-cortically generated artifact is used to separate eye movement, EMG and other artifact from the EEG.      Indication: 70 yo male with prior hemorrhagic CVA presenting with new onset seizure activity.  Patient is not on ASMs as an outpatient.     State of Consciousness:   Awake and asleep    Background:   The background is continuous and asymmetric.  During brief periods of wakefulness the left hemisphere shows a normal anterior posterior gradient with a 7-8 hz posterior dominant rhythm.  The right hemisphere by comparison consists of diffuse theta and delta slowing, with the most prominent delta slowing seen in the right frontal/central region.    Sleep:   Majority of the record takes place during sleep, with symmetric sleep spindles noted    Epileptiform Abnormalities  None    Seizures/Events:   None    EKG:   Regular rate and rhythm on single lead EKG    Activating procedures:   - Hyperventilation: not done  - Photic stimulation: no photic driving, no epileptiform discharges elicited     Impression:   Abnormal study. There is continuous right hemispheric slowing consistent with the patient's history of prior right sided nontraumatic cerebral hemorrhage.  Additionally, there is mild background slowing consistent with a mild diffuse encephalopathy (or with excessive drowsiness given wakefulness is only briefly noted).  No epileptiform activity is seen.      Vivian Luz MD  [facility]   Department of Neurology/Epilepsy

## 2025-07-14 NOTE — PT/OT/SLP EVAL
Physical Therapy Evaluation    Patient Name:  Colt Rose   MRN:  [id number]    Recommendations:     Discharge Recommendations: High Intensity Therapy   Discharge Equipment Recommendations: to be determined by next level of care   Barriers to discharge: medical status, need for assist with mobility, increased fall risk    Assessment:     Colt Rose is a 69 y.o. male admitted with a medical diagnosis of Seizure.  He presents with the following impairments/functional limitations: weakness, impaired endurance, impaired self care skills, impaired functional mobility, gait instability, impaired balance, impaired cognition, decreased coordination, decreased upper extremity function, decreased lower extremity function, decreased safety awareness, abnormal tone, impaired coordination, impaired cardiopulmonary response to activity, edema Patient agreed to PT evaluation. Patient required verbal cues for technique and hand placement for bed mobility with care given to protect left UE. Patient with fair + balance. During ambulation, patient needed increased assist with turning left. Patient with decreased advancement of left > right LE and has decreased coordination of left >right LE.     Rehab Prognosis: Fair; patient would benefit from acute skilled PT services to address these deficits and reach maximum level of function.    Recent Surgery: * No surgery found *      Plan:     During this hospitalization, patient to be seen 6 x/week to address the identified rehab impairments via gait training, therapeutic activities, therapeutic exercises, neuromuscular re-education and progress toward the following goals:    Plan of Care Expires:  08/14/25    Subjective     Chief Complaint: Patient's wife reports stroke on 5/5/25 and went to inpt rehab. He is now in outpatient therapy for PT, OT, and ST.   Patient/Family Comments/goals: go home  Pain/Comfort:  Pain Rating 1: 0/10    Patients cultural, spiritual, Orthodoxy conflicts  given the current situation: no    Living Environment:  Lives with spouse, no DEVORA  Prior to admission, patients level of function was assistance from spouse for transfers with gait belt and used WC in home; he walks only at therapy.  Equipment used at home: walker, sanket, wheelchair, bedside commode.  DME owned (not currently used): none.  Upon discharge, patient will have assistance from spouse.    Objective:     Communicated with nurse prior to session.  Patient found HOB elevated with telemetry, bed alarm, peripheral IV  upon PT entry to room.    General Precautions: Standard, fall, seizure  Orthopedic Precautions:N/A   Braces: N/A  Respiratory Status: Room air    Exams:  Gross Motor Coordination:  trace at left  RLE ROM: WNL  RLE Strength: WNL  LLE ROM: diminished  LLE Strength: 2-/5    Functional Mobility:  Bed Mobility:     Supine to Sit: moderate assistance  Transfers:     Sit to Stand:  minimum assistance with hemiwalker  Gait: 20ft modA sanket walker      AM-PAC 6 CLICK MOBILITY  Total Score:12       Treatment & Education:  Pt educated on POC, discharge recommendation, need for assist with mobility, use of call bell to seek assistance as needed and fall prevention      Patient left up in chair with all lines intact, call button in reach, chair alarm on, and spouse present.    GOALS:   Multidisciplinary Problems       Physical Therapy Goals          Problem: Physical Therapy    Goal Priority Disciplines Outcome Interventions   Physical Therapy Goal     PT, PT/OT     Description: Goals to be met by: 25     Patient will increase functional independence with mobility by performin. Supine to sit with Contact Guard Assistance  2. Sit to supine with Contact Guard Assistance  3. Sit to stand transfer with Contact Guard Assistance  4. Bed to chair transfer with Contact Guard Assistance using Sanket-walker  5. Gait  x 50 feet with Contact Guard Assistance using Sanket-walker.                          DME  Justifications:  No DME recommended requiring DME justifications    History:     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    Cervical disc displacement     Degeneration of lumbar intervertebral disc     GERD (gastroesophageal reflux disease)     gastric polyps    Hip pain 02/22/2016    Hyperlipidemia     Shingles 11/2013    Sleep apnea     Stroke 05/05/2025       Past Surgical History:   Procedure Laterality Date    ABLATION, SVT, ACCESSORY PATHWAY N/A 5/25/2023    Procedure: Ablation, SVT, Accessory Pathway;  Surgeon: Goyo Sky MD;  Location: [facility] EP LAB;  Service: Cardiology;  Laterality: N/A;  PAC, RFA, CARTO, MAC, GP, 3 PREP    CERVICAL FUSION      bone graft    COLONOSCOPY N/A 7/29/2021    Procedure: COLONOSCOPY;  Surgeon: Tyler Story MD;  Location: [facility];  Service: Endoscopy;  Laterality: N/A;    COLONOSCOPY N/A 10/30/2024    Procedure: COLONOSCOPY;  Surgeon: Tyler Story MD;  Location: [facility];  Service: Endoscopy;  Laterality: N/A;  m/ppm    EPIDURAL STEROID INJECTION INTO LUMBAR SPINE N/A 12/5/2019    Procedure: Injection-steroid-epidural-lumbar;  Surgeon: Adarsh Kraft MD;  Location: [facility];  Service: Pain Management;  Laterality: N/A;  L5-S1    ESOPHAGOGASTRODUODENOSCOPY N/A 10/27/2020    Procedure: EGD (ESOPHAGOGASTRODUODENOSCOPY);  Surgeon: Tyler Story MD;  Location: [facility];  Service: Endoscopy;  Laterality: N/A;    ESOPHAGOGASTRODUODENOSCOPY N/A 2/14/2025    Procedure: EGD (ESOPHAGOGASTRODUODENOSCOPY);  Surgeon: Tyler Story MD;  Location: [facility];  Service: Endoscopy;  Laterality: N/A;    INJECTION OF ANESTHETIC AGENT AROUND MEDIAL BRANCH NERVES INNERVATING LUMBAR FACET JOINT Bilateral 11/18/2020    Procedure: BLOCK, NERVE, LUMBAR, MEDIAL BRANCH Bilateral L3,4,5;  Surgeon: Adarsh Kraft MD;  Location: [facility] OR;  Service: Pain Management;  Laterality: Bilateral;    INJECTION OF ANESTHETIC AGENT AROUND MEDIAL BRANCH NERVES INNERVATING  LUMBAR FACET JOINT Left 10/25/2023    Procedure: Block-nerve-medial branch-lumbar;  Surgeon: Adarsh Kraft MD;  [facility];  Service: Anesthesiology;  Laterality: Left;  l4-5, l5-s1 MBB    INJECTION OF ANESTHETIC AGENT AROUND MEDIAL BRANCH NERVES INNERVATING LUMBAR FACET JOINT Bilateral 11/28/2023    Procedure: Block-nerve-medial branch-lumbar;  Surgeon: Adarsh Kraft MD;  [facility];  Service: Anesthesiology;  Laterality: Bilateral;  L4.5 and l5/s1 MBB #2    RADIOFREQUENCY ABLATION OF LUMBAR MEDIAL BRANCH NERVE AT SINGLE LEVEL Bilateral 1/29/2021    Procedure: Radiofrequency Ablation, Nerve, Spinal, Lumbar, Medial Branch, 1 Level;  Surgeon: Adarsh Kraft MD;  Location: [facility] OR;  Service: Pain Management;  Laterality: Bilateral;  L3,4,5    TONSILLECTOMY, ADENOIDECTOMY         Time Tracking:     PT Received On: 07/14/25  PT Start Time: 0948     PT Stop Time: 1021  PT Total Time (min): 33 min     Billable Minutes: Evaluation 18 and Gait Training 15      07/14/2025

## 2025-07-14 NOTE — PROGRESS NOTES
[facility] Medicine  Progress Note    Patient Name: Colt Rose  MRN: [id number]  Patient Class: OP- Observation   Admission Date: 7/13/2025  Length of Stay: 0 days  Attending Physician: Marce Dorantes MD  Primary Care Provider: Jeffery Mena MD        Subjective     Principal Problem:Seizure        HPI:  Mr. 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  Patient's wife is at bedside and helps provide history.  Patient and wife state that around 9:00 a.m. the patient had just finished riding his exercise bike when he went to use the restroom to urinate.  He states that while sitting on the toilet his right lower extremity began shaking uncontrollably which progressed than to his left lower extremity.  He states that he tried for some time to get the shaking to resolve and when unsuccessful he tried calling out to his wife.  His wife states that she thought she heard something so she went to check on him in as she was approaching she heard thumping so she states that she rushed in and noticed he was having a seizure.  She states that he was hitting his head against the back of the wall.  She endorses that she tried to hold him steady until the seizure passed, but when she noticed it was not resolving quickly she went to call EMS.  She states that he seized for approximately 2-5 minutes but really she states that she does not know the exact timeframe.  He denies tasting seeing smelling anything out of the ordinary prior to having a seizure.  He does endorse biting his tongue.  It is unclear if he urinated on himself as he was  already in the process of using the restroom when the seizure began.  Of note, patient did have a cortical hemorrhage of the right cerebellar hemisphere in May of 2025 and has residual left-sided deficits of hemiplegia.  He denies tobacco and alcohol use.  He denies fever, chills, dyspnea, chest pain, abdominal pain, nausea, vomiting, diarrhea, dysuria, hematuria, lightheadedness, dizziness, and syncope.    Upon arrival to ED, patient afebrile, HR of 85, RR of 20, BP of 136/67, satting 97% on RA.  Workup in the ED revealed RBC of 4.15, H/H of 13.1/39.6, glucose of 119.  Remainder of labs unremarkable.  CXR shows no acute cardiopulmonary abnormality.  CT head without contrast shows resolution of the right frontal hemorrhage with low-attenuation remaining in the right temporal lobe compatible with encephalomalacia and resolving hemorrhage.  No mass effect or midline shift.  No new hemorrhage.  CT cervical spine shows no evidence of acute fracture or traumatic malalignment of the cervical spine.  Multilevel degenerative disc disease and facet hypertrophy.  Patient was given levetiracetam 1000 mg IV while in the ED.  Case discussed with ED provider and patient will be placed under observation for further management.    Overview/Hospital Course:  Patient was monitored closely throughout course of hospital stay by hospital medicine team. Started on IV keppra for seizure prophylaxis and seizure precautions. Neurology consulted on admission. EEG ordered and pending. No further seizure activity since admission. CT head today showed resolution of the right frontal hemorrhage with low-attenuation remaining in the right temporal lobe compatible with encephalomalacia and resolving hemorrhage thus VTE prophylaxis was held. Pt/OT consulted for further evaluation and treatment.     Interval History: PT seen and examined. No further seizure activity. L sided weakness at baseline. EEG and neurology consults pending. No complaints at  this time.     Review of Systems   Constitutional:  Negative for fever.   Respiratory:  Negative for shortness of breath.    Cardiovascular:  Negative for chest pain.   Gastrointestinal:  Negative for nausea and vomiting.   Neurological:  Positive for weakness.     Objective:     Vital Signs (Most Recent):  Temp: 97.3 °F (36.3 °C) (07/14/25 0752)  Pulse: 69 (07/14/25 0840)  Resp: 18 (07/14/25 0752)  BP: 106/69 (07/14/25 0752)  SpO2: 96 % (07/14/25 0840) Vital Signs (24h Range):  Temp:  [97.3 °F (36.3 °C)-98.6 °F (37 °C)] 97.3 °F (36.3 °C)  Pulse:  [62-85] 69  Resp:  [15-28] 18  SpO2:  [93 %-100 %] 96 %  BP: ()/(56-79) 106/69     Weight: 72.5 kg (159 lb 13.3 oz)  Body mass index is 22.93 kg/m².  No intake or output data in the 24 hours ending 07/14/25 0941      Physical Exam  Constitutional:       General: He is not in acute distress.     Appearance: He is well-developed.   HENT:      Head: Normocephalic and atraumatic.      Right Ear: External ear normal.      Left Ear: External ear normal.   Eyes:      Conjunctiva/sclera: Conjunctivae normal.      Pupils: Pupils are equal, round, and reactive to light.   Neck:      Thyroid: No thyromegaly.   Cardiovascular:      Rate and Rhythm: Normal rate and regular rhythm.      Pulses: Normal pulses.      Heart sounds: Normal heart sounds, S1 normal and S2 normal.   Pulmonary:      Effort: Pulmonary effort is normal. No respiratory distress.      Breath sounds: Normal breath sounds.   Chest:      Chest wall: No tenderness.   Musculoskeletal:         General: No swelling or tenderness. Normal range of motion.      Cervical back: Normal range of motion and neck supple.   Skin:     General: Skin is warm and dry.      Coloration: Skin is not jaundiced or pale.   Neurological:      General: No focal deficit present.      Mental Status: He is alert and oriented to person, place, and time.      Cranial Nerves: No cranial nerve deficit.      Comments: Residual left-sided  hemiplegia   Psychiatric:         Mood and Affect: Mood normal.         Behavior: Behavior normal.               Significant Labs: All pertinent labs within the past 24 hours have been reviewed.  CBC:   Recent Labs   Lab 07/13/25  1037 07/14/25  0525   WBC 4.97 5.77   HGB 13.1* 12.8*   HCT 39.6* 38.9*    229     CMP:   Recent Labs   Lab 07/13/25  1037 07/14/25  0525    140   K 4.2 3.9    105   CO2 24 29   * 111*   BUN 12 11   CREATININE 0.6 0.6   CALCIUM 9.2 9.0   PROT 6.8  --    ALBUMIN 4.0  --    BILITOT 0.5  --    ALKPHOS 59  --    AST 20  --    ALT 22  --    ANIONGAP 9 6*       Significant Imaging: I have reviewed all pertinent imaging results/findings within the past 24 hours.      Assessment & Plan  Seizure  - Denies hx of seizures  - Neuro checks Q4H  - Continuous tele monitoring and pulse oximetry  - Seizure precautions  - Keppra 1000 mg loading dose given in ED  - Start Keppra 500 mg BID for prevention of seizures  - PRN lorazepam for breakthrough seizures  - Bedside garcia passed  - Neurology consulted, appreciate recs  - Holding home escitalopram for now as may lower seizure threshold, appreciate neuro recs  - PT/OT consulted, appreciate recs  - EEG pending  Nontraumatic cortical hemorrhage of right cerebral hemisphere  - Hx of ICH in May 2025  - CT head today shows resolution of the right frontal hemorrhage with low-attenuation remaining in the right temporal lobe compatible with encephalomalacia and resolving hemorrhage.  No mass effect or midline shift.  No new hemorrhage.  - Holding VTE prophylaxis at this time pending neurology eval and recommendations  - See plan for seizure  Prediabetes  - Hx noted    Sleep apnea  - CPAP QHS    Essential (primary) hypertension  Chronic, controlled.  Latest blood pressure and vitals reviewed-     Temp:  [97.3 °F (36.3 °C)-98.6 °F (37 °C)]   Pulse:  [62-85]   Resp:  [15-28]   BP: ()/(56-79)   SpO2:  [93 %-100 %] .   Home meds for  hypertension were reviewed and noted below-  Hypertension Medications             While in the hospital, will manage blood pressure as follows; Continue home antihypertensive regimen    Will utilize p.r.n. blood pressure medication only if patient's blood pressure greater than 180/110 and he develops symptoms such as worsening chest pain or shortness of breath.    - Hx noted  - Not currently on antihypertensives outpatient  - Trend BP while hospitalized  VTE Risk Mitigation (From admission, onward)           Ordered     Reason for No Pharmacological VTE Prophylaxis  Once        Comments: Recent hemorrhagic stroke, awaiting neurology recommendations   Question:  Reasons:  Answer:  Physician Provided (leave comment)    07/13/25 1448     IP VTE HIGH RISK PATIENT  Once         07/13/25 1448     Place sequential compression device  Until discontinued         07/13/25 1448                    Discharge Planning   COLBY: 7/15/2025     Code Status: Full Code   Medical Readiness for Discharge Date:                      Please place Justification for DME      Arsenio Jones PA-C  Department of Hospital Medicine   [facility]

## 2025-07-14 NOTE — TELEMEDICINE CONSULT
"[facility]   General Neurology  Consult Note      Consult Information  Inpatient Consult to Neurology Services (General Neurology)  Consult performed by: Rand Medrano FNP  Consult ordered by: Sharmin Bauer PA-C  Reason for consult: seizure          Consulting Provider: TUTU LEVINE.   Current Providers  No providers found    Patient Location:  [place] [address] [facility]    Spoke hospital nurse at bedside with patient assisting consultant.  Patient information was obtained from patient, ER records, and primary team.       Neurology Documentation       Blood pressure 110/69, pulse 73, temperature 97.6 °F (36.4 °C), temperature source Oral, resp. rate 18, height 5' 10" (1.778 m), weight 72.5 kg (159 lb 13.3 oz), SpO2 97%.    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 He attempted to stop the shaking and called out to his wife for assistance. He denies any loss of consciousness. His wife, who witnessed the event, estimates the episode lasted approximately 2-5 minutes. The patient endorses tongue biting but denies confusion or other postictal symptoms. In the ED, he was alert and oriented, with no postictal state observed. He was administered 1g of levetiracetam (Keppra) and started on 500 mg BID.  Of note, during a prior hospitalization in May 2025 for intracerebral hemorrhage, he underwent EEG for right upper extremity rhythmic movements. That EEG showed no epileptiform activity, and antiepileptic medications were not initiated at that time.     Images personally " "reviewed and interpreted:  Study: Head CT  Study Interpretation: "1. Resolution of the right frontal hemorrhage with low attenuation remaining in the right temporal lobe compatible with encephalomalacia and resolving hemorrhage. No mass effect or midline shift 2. No new hemorrhage"    Additional studies reviewed:   Study: Cardiac_Neuro: EEG  Study Interpretation: pending    Laboratory studies reviewed:  BMP:   Lab Results   Component Value Date     07/14/2025    K 3.9 07/14/2025     07/14/2025    CO2 29 07/14/2025    BUN 11 07/14/2025    BUN 35 (H) 05/13/2025    CREATININE 0.6 07/14/2025    CREATININE 0.7 05/13/2025    CALCIUM 9.0 07/14/2025     CBC:   Lab Results   Component Value Date    WBC 5.77 07/14/2025    WBC 7.42 05/13/2025    RBC 4.07 (L) 07/14/2025    HGB 12.8 (L) 07/14/2025    HCT 38.9 (L) 07/14/2025     07/14/2025    MCV 96 07/14/2025    MCH 31.4 (H) 07/14/2025    MCHC 32.9 07/14/2025       Documentation personally reviewed:  Notes: Notes: ED notes and H&P     Assessment and plan:  69-year-old male with a history of prediabetes, hypertension, hyperlipidemia, ARIS on CPAP, and prior right cerebellar ICH with residual left-sided hemiplegia, presenting with a witnessed seizure. The episode involved bilateral lower extremity rhythmic movements, tongue biting, and no loss of consciousness. No prior seizure history. No postictal confusion was noted. Notably, a prior EEG in May 2025 during hospitalization for ICH was negative for epileptiform activity. Given the clinical presentation and supportive findings, this is concerning for new-onset seizure.     -EEG pending  -Continue Keppra 500mg BID for seizure prophylaxis.  -Monitor for side effects.   -Advised to adhere strictly to medication and follow-up schedule.  -Encourage consistent use of CPAP given potential role of sleep disturbance in seizure threshold  -Seizure precautions discussed with patient, patient verbalized understanding.    It " is [place] state law that you do not drive for the next six months as you have had an episode of loss of consciousness.     Please do not take baths, swim alone, climb heights or operate heavy machinery as you have had an episode of loss of consciousness.        Post charge discharge plan:  Clinic follow up: General Neurology    Visit Type: in person or virtual  Timeframe: 4 weeks        Additional Physical Exam, History, & ROS  Review of Systems   Neurological:  Positive for seizures and weakness (left sided hemiplegia from previous stroke). Negative for dizziness, loss of consciousness and headaches.     Physical Exam  Vitals and nursing note reviewed.   Constitutional:       Appearance: Normal appearance.   HENT:      Head: Normocephalic and atraumatic.      Nose: Nose normal.      Mouth/Throat:      Mouth: Mucous membranes are moist.      Pharynx: Oropharynx is clear.   Eyes:      Extraocular Movements: Extraocular movements intact.      Pupils: Pupils are equal, round, and reactive to light.   Pulmonary:      Effort: Pulmonary effort is normal.   Musculoskeletal:      Cervical back: Normal range of motion.      Comments: Left sided hemiplegia   Skin:     General: Skin is warm and dry.      Capillary Refill: Capillary refill takes less than 2 seconds.   Neurological:      Mental Status: He is alert. Mental status is at baseline.      GCS: GCS eye subscore is 4. GCS verbal subscore is 5. GCS motor subscore is 6.      Cranial Nerves: Dysarthria and facial asymmetry present.      Sensory: Sensory deficit (more dull to left side, when compared to right side) present.      Motor: Weakness (Left sided) present.      Coordination: Finger-Nose-Finger Test abnormal and Heel to Ellison Test abnormal.       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    Cervical disc displacement     Degeneration of lumbar intervertebral disc     GERD (gastroesophageal reflux disease)     gastric polyps    Hip  pain 02/22/2016    Hyperlipidemia     Shingles 11/2013    Sleep apnea     Stroke 05/05/2025     Past Surgical History:   Procedure Laterality Date    ABLATION, SVT, ACCESSORY PATHWAY N/A 5/25/2023    Procedure: Ablation, SVT, Accessory Pathway;  Surgeon: Goyo Sky MD;  Location: [facility] EP LAB;  Service: Cardiology;  Laterality: N/A;  PAC, RFA, CARTO, MAC, GP, 3 PREP    CERVICAL FUSION      bone graft    COLONOSCOPY N/A 7/29/2021    Procedure: COLONOSCOPY;  Surgeon: Tyler Story MD;  Location: [facility] ENDO;  Service: Endoscopy;  Laterality: N/A;    COLONOSCOPY N/A 10/30/2024    Procedure: COLONOSCOPY;  Surgeon: Tyler Story MD;  Location: [facility];  Service: Endoscopy;  Laterality: N/A;  m/ppm    EPIDURAL STEROID INJECTION INTO LUMBAR SPINE N/A 12/5/2019    Procedure: Injection-steroid-epidural-lumbar;  Surgeon: Adarsh Kraft MD;  Location: [facility] OR;  Service: Pain Management;  Laterality: N/A;  L5-S1    ESOPHAGOGASTRODUODENOSCOPY N/A 10/27/2020    Procedure: EGD (ESOPHAGOGASTRODUODENOSCOPY);  Surgeon: Tyler Story MD;  Location[facility];  Service: Endoscopy;  Laterality: N/A;    ESOPHAGOGASTRODUODENOSCOPY N/A 2/14/2025    Procedure: EGD (ESOPHAGOGASTRODUODENOSCOPY);  Surgeon: Tyler Story MD;  Location: [facility];  Service: Endoscopy;  Laterality: N/A;    INJECTION OF ANESTHETIC AGENT AROUND MEDIAL BRANCH NERVES INNERVATING LUMBAR FACET JOINT Bilateral 11/18/2020    Procedure: BLOCK, NERVE, LUMBAR, MEDIAL BRANCH Bilateral L3,4,5;  Surgeon: Adarsh Kraft MD;  Location: [facility] OR;  Service: Pain Management;  Laterality: Bilateral;    INJECTION OF ANESTHETIC AGENT AROUND MEDIAL BRANCH NERVES INNERVATING LUMBAR FACET JOINT Left 10/25/2023    Procedure: Block-nerve-medial branch-lumbar;  Surgeon: Adarsh Kraft MD;  Location: [facility] OR;  Service: Anesthesiology;  Laterality: Left;  l4-5, l5-s1 MBB    INJECTION OF ANESTHETIC AGENT AROUND MEDIAL BRANCH NERVES INNERVATING LUMBAR FACET JOINT Bilateral  11/28/2023    Procedure: Block-nerve-medial branch-lumbar;  Surgeon: Adarsh Kraft MD;  Location: [facility] OR;  Service: Anesthesiology;  Laterality: Bilateral;  L4.5 and l5/s1 MBB #2    RADIOFREQUENCY ABLATION OF LUMBAR MEDIAL BRANCH NERVE AT SINGLE LEVEL Bilateral 1/29/2021    Procedure: Radiofrequency Ablation, Nerve, Spinal, Lumbar, Medial Branch, 1 Level;  Surgeon: Adarsh Kraft MD;  Location: [facility] OR;  Service: Pain Management;  Laterality: Bilateral;  L3,4,5    TONSILLECTOMY, ADENOIDECTOMY       Family History   Problem Relation Name Age of Onset    Diabetes Mother      Hypertension Mother      Stroke Mother      Coronary artery disease Mother      Stroke Father      Heart disease Brother          CAD       Diagnoses  Problem Noted   Seizure 5/5/2025   Nontraumatic Cortical Hemorrhage of Right Cerebral Hemisphere 5/4/2025       ASHLEY Aguirre    Discussed case with Dr. Heller, chart reviewed. Any change to plan along with cosign to appear in the EMR.      Neurology consultation requested by spoke provider. Audiovisual encounter with the patient performed using a secure connection.  Results and impressions from the visit are documented on this note and were communicated to the consulting provider/team via direct communication. The note has been shared for addition to the patients electronic medical record.

## 2025-07-14 NOTE — PLAN OF CARE
07/14/25 1520   THRASHER Message   Medicare Outpatient and Observation Notification regarding financial responsibility Given to patient/caregiver;Explained to patient/caregiver;Signed/date by patient/caregiver   Date THRASHER was signed 07/14/25   Time THRASHER was signed [id number]

## 2025-07-14 NOTE — ASSESSMENT & PLAN NOTE
- Denies hx of seizures  - Neuro checks Q4H  - Continuous tele monitoring and pulse oximetry  - Seizure precautions  - Keppra 1000 mg loading dose given in ED  - Start Keppra 500 mg BID for prevention of seizures  - PRN lorazepam for breakthrough seizures  - Bedside jose passed  - Neurology consulted, appreciate recs  - Holding home escitalopram for now as may lower seizure threshold, appreciate neuro recs  - PT/OT consulted, appreciate recs  - EEG pending

## 2025-07-14 NOTE — ASSESSMENT & PLAN NOTE
Chronic, controlled.  Latest blood pressure and vitals reviewed-     Temp:  [97.3 °F (36.3 °C)-98.6 °F (37 °C)]   Pulse:  [62-85]   Resp:  [15-28]   BP: ()/(56-79)   SpO2:  [93 %-100 %] .   Home meds for hypertension were reviewed and noted below-  Hypertension Medications             While in the hospital, will manage blood pressure as follows; Continue home antihypertensive regimen    Will utilize p.r.n. blood pressure medication only if patient's blood pressure greater than 180/110 and he develops symptoms such as worsening chest pain or shortness of breath.    - Hx noted  - Not currently on antihypertensives outpatient  - Trend BP while hospitalized

## 2025-07-14 NOTE — PLAN OF CARE
[facility]  Initial Discharge Assessment       Primary Care Provider: Jeffery Mena MD    Admission Diagnosis: Seizure [R56.9]  Seizure-like activity [R56.9]    Admission Date: 7/13/2025  Expected Discharge Date: 7/16/2025    Assessment done at bedside with patient and Yenny Rose ( spouse ) [phone] . Per spouse , all demographic information is correct in chart. Prior to admission , patient was mostly dependent for ADL's .  Current DME : hospital bed , tripod , gait belt , wc , tito-walker , bsc , shower chair and CPAP machine . Patient has ramp for home entrance and is able to live on 1st floor , and it is wc accessible . Denies HH , Home O2 , Coumadin or Dialysis . Patient does currently receive Outpatient PT , OT and ST .     Anticipated Needs for DC : TBD     Transition of Care Barriers: None    Payor: MEDICARE / Plan: MEDICARE PART A & B / Product Type: Government /     Extended Emergency Contact Information  Primary Emergency Contact: Yenny Rose  Address: [address] [place] [address] [place]  Mobile Phone: [phone]  Relation: Spouse  Preferred language: English   needed? No  Secondary Emergency Contact: Rajan Rose  Mobile Phone: [phone]  Relation: Son  Preferred language: English   needed? No    Discharge Plan A: Rehab  Discharge Plan B: Skilled Nursing Facility      [facility] [address] [facility] [address]  [address]  [place] [address]  Phone: [phone] Fax: [phone]    [facility] - [place], [place] - [address]  [address]  [facility] [address]  [facility] [address]  Phone: [phone] Fax: [phone]    [facility] ([facility]) - [address]  [address] [address]  Phone: [phone] Fax: [phone]      Initial Assessment (most recent)       Adult Discharge  Assessment - 07/14/25 1520          Discharge Assessment    Assessment Type Discharge Planning Assessment     Confirmed/corrected address, phone number and insurance Yes     Confirmed Demographics Correct on Facesheet     Source of Information patient     Communicated COLBY with patient/caregiver Date not available/Unable to determine     People in Home spouse     Name(s) of People in Home Yenny Rose ( spouse ) [phone]     Do you have help at home or someone to help you manage your care at home? Yes     Who are your caregiver(s) and their phone number(s)? Yenny Rose ( spouse ) [phone]     Prior to hospitilization cognitive status: Alert/Oriented     Current cognitive status: Alert/Oriented     Walking or Climbing Stairs Difficulty yes     Walking or Climbing Stairs ambulation difficulty, requires equipment     Dressing/Bathing Difficulty yes     Dressing/Bathing bathing difficulty, requires equipment;bathing difficulty, assistance 1 person     Home Accessibility wheelchair accessible     Home Layout Able to live on 1st floor     Equipment Currently Used at Home wheelchair;other (see comments);shower chair;hospital bed;CPAP;bedside commode   itto walker , tripod , gait belt    Readmission within 30 days? No     Patient currently being followed by outpatient case management? Unable to determine (comments)     Do you currently have service(s) that help you manage your care at home? No     Do you take prescription medications? Yes     Do you have prescription coverage? Yes     Do you have any problems affording any of your prescribed medications? No     Is the patient taking medications as prescribed? yes     Who is going to help you get home at discharge? Yenny Rose ( spouse ) [phone]     How do you get to doctors appointments? family or friend will provide     Are you on dialysis? No     Do you take coumadin? No     Discharge Plan A Rehab     Discharge Plan B Skilled Nursing Facility      Discharge Plan discussed with: Patient;Spouse/sig other     Name(s) and Number(s) Yenny Rose ( spouse ) [phone]     Transition of Care Barriers None

## 2025-07-14 NOTE — HOSPITAL COURSE
Patient was monitored closely throughout course of hospital stay by hospital medicine team. Started on IV keppra for seizure prophylaxis and seizure precautions. Neurology consulted on admission. EEG showing no acute epileptic activity. No further seizure activity since admission. CT head today showed resolution of the right frontal hemorrhage with low-attenuation remaining in the right temporal lobe compatible with encephalomalacia and resolving hemorrhage thus VTE prophylaxis was held. Pt/OT consulted for further evaluation and treatment. PT/Ot recommended high intensity therapy however patient declined rehab placement. He elected for outpatient therapy. Patient to discharge on Keppra 500 mg BID. Patient seen and examined on day of discharge and is medically stable for DC.

## 2025-07-14 NOTE — PLAN OF CARE
SS order for Rehab placed at this time . SC sent to Lina with [facility] for review.        07/14/25 0078   Post-Acute Status   Post-Acute Authorization Placement   Discharge Plan   Discharge Plan A Rehab

## 2025-07-14 NOTE — ED PROVIDER NOTES
"Encounter Date: 7/13/2025       History     Chief Complaint   Patient presents with    Seizures     Tonic Clonic seizure this am around 9, sitting on toilet when it happened, did not fall. No hx of seizures. Had Hemorrhagic stroke in May     HPI  Review of patient's allergies indicates:   Allergen Reactions    Amlodipine Other (See Comments)     Flushed face, resolved with Hydroxyzine and Pepcid    Aspirin     Benadryl [diphenhydramine hcl] Other (See Comments)     Per wife "pt collapsed and ended up in hospital" ~1995    Pennsaid [diclofenac sodium] Rash and Blisters     Past Medical History:   Diagnosis Date    Arthritis of hand 08/06/2015    Cervical disc displacement     Degeneration of lumbar intervertebral disc     GERD (gastroesophageal reflux disease)     gastric polyps    Hip pain 02/22/2016    Hyperlipidemia     Shingles 11/2013    Sleep apnea     Stroke 05/05/2025     Past Surgical History:   Procedure Laterality Date    ABLATION, SVT, ACCESSORY PATHWAY N/A 5/25/2023    Procedure: Ablation, SVT, Accessory Pathway;  Surgeon: Goyo Sky MD;  Location: [facility] EP LAB;  Service: Cardiology;  Laterality: N/A;  PAC, RFA, CARTO, MAC, GP, 3 PREP    CERVICAL FUSION      bone graft    COLONOSCOPY N/A 7/29/2021    Procedure: COLONOSCOPY;  Surgeon: Tyler Story MD;  Location: [facility];  Service: Endoscopy;  Laterality: N/A;    COLONOSCOPY N/A 10/30/2024    Procedure: COLONOSCOPY;  Surgeon: Tyler Story MD;  Location: [facility];  Service: Endoscopy;  Laterality: N/A;  m/ppm    EPIDURAL STEROID INJECTION INTO LUMBAR SPINE N/A 12/5/2019    Procedure: Injection-steroid-epidural-lumbar;  Surgeon: Adarsh Kraft MD;  Location: [facility] OR;  Service: Pain Management;  Laterality: N/A;  L5-S1    ESOPHAGOGASTRODUODENOSCOPY N/A 10/27/2020    Procedure: EGD (ESOPHAGOGASTRODUODENOSCOPY);  Surgeon: Tyler Story MD;  Location: [facility];  Service: Endoscopy;  Laterality: N/A;    " ESOPHAGOGASTRODUODENOSCOPY N/A 2/14/2025    Procedure: EGD (ESOPHAGOGASTRODUODENOSCOPY);  Surgeon: Tyler Story MD;  Location[facility];  Service: Endoscopy;  Laterality: N/A;    INJECTION OF ANESTHETIC AGENT AROUND MEDIAL BRANCH NERVES INNERVATING LUMBAR FACET JOINT Bilateral 11/18/2020    Procedure: BLOCK, NERVE, LUMBAR, MEDIAL BRANCH Bilateral L3,4,5;  Surgeon: Adarsh Kraft MD;  Location: [facility];  Service: Pain Management;  Laterality: Bilateral;    INJECTION OF ANESTHETIC AGENT AROUND MEDIAL BRANCH NERVES INNERVATING LUMBAR FACET JOINT Left 10/25/2023    Procedure: Block-nerve-medial branch-lumbar;  Surgeon: Adarsh Kraft MD;  Location: [facility] OR;  Service: Anesthesiology;  Laterality: Left;  l4-5, l5-s1 MBB    INJECTION OF ANESTHETIC AGENT AROUND MEDIAL BRANCH NERVES INNERVATING LUMBAR FACET JOINT Bilateral 11/28/2023    Procedure: Block-nerve-medial branch-lumbar;  Surgeon: Adarsh Kraft MD;  Location: [facility] OR;  Service: Anesthesiology;  Laterality: Bilateral;  L4.5 and l5/s1 MBB #2    RADIOFREQUENCY ABLATION OF LUMBAR MEDIAL BRANCH NERVE AT SINGLE LEVEL Bilateral 1/29/2021    Procedure: Radiofrequency Ablation, Nerve, Spinal, Lumbar, Medial Branch, 1 Level;  Surgeon: Adarsh Kraft MD;  Location: [facility] of [facility];  Service: Pain Management;  Laterality: Bilateral;  L3,4,5    TONSILLECTOMY, ADENOIDECTOMY       Family History   Problem Relation Name Age of Onset    Diabetes Mother      Hypertension Mother      Stroke Mother      Coronary artery disease Mother      Stroke Father      Heart disease Brother          CAD     Social History[1]  Review of Systems    Physical Exam     Initial Vitals [07/13/25 0948]   BP Pulse Resp Temp SpO2   136/67 85 20 98.6 °F (37 °C) 97 %      MAP       --         Physical Exam    ED Course   Procedures  Labs Reviewed   COMPREHENSIVE METABOLIC PANEL - Abnormal       Result Value    Sodium 139      Potassium 4.2      Chloride 106      CO2 24      Glucose 119 (*)     BUN  12      Creatinine 0.6      Calcium 9.2      Protein Total 6.8      Albumin 4.0      Bilirubin Total 0.5      ALP 59      AST 20      ALT 22      Anion Gap 9      eGFR >60     CBC WITH DIFFERENTIAL - Abnormal    WBC 4.97      RBC 4.15 (*)     Hgb 13.1 (*)     Hct 39.6 (*)     MCV 95      MCH 31.6 (*)     MCHC 33.1      RDW 13.6      Platelet Count 230      MPV 10.2      Nucleated RBC 0      Neut % 58.1      Lymph % 30.4      Mono % 9.1      Eos % 1.0      Basophil % 1.0      Imm Grans % 0.4      Neut # 2.9      Lymph # 1.51      Mono # 0.45      Eos # 0.05      Baso # 0.05      Imm Grans # 0.02     LIPASE - Normal    Lipase Level 10     DRUG SCREEN PANEL, URINE EMERGENCY - Normal    Benzodiazepine, Urine Negative      Cocaine, Urine Negative      Opiates, Urine Negative      Barbituates, Urine Negative      Amphetamines, Urine Negative      THC Negative      Phencyclidine, Urine Negative      Urine Creatinine 47.4      Narrative:     This screen includes the following classes of drugs at the   listed cut-off:    Benzodiazepines                  200 ng/ml  Cocaine metabolite               300 ng/ml  Opiates                          300 ng/ml  Barbiturates                     200 ng/ml  Amphetamines                    1000 ng/ml  Marijuana metabs (THC)            50 ng/ml  Phencyclidine (PCP)               25 ng/ml    High concentrations of Methylenedioxymethamphetamine (MDMA aka  Ectasy) and other structurally similar compounds may cross-   react with the Amphetamine/Methamphetamine screening   immunoassay giving a false positive result.    Note: This exception list includes only more common   interferants in toxicology screen testing.  Because of many cross-reactantspositive results on toxicology drug screens   should be confirmed whenever results do not correlate with   clinical presentation.    This report is intended for use in clinical monitoring and  management of patients. It is not intended for use in    employment related drug testing.    Because of any cross-reactants, positive results on toxicology  drug screens should be confirmed whenever results do not  correlate with clinical presentation.    Presumptive positive results are unconfirmed and may be used   only for medical purposes.   URINALYSIS, REFLEX TO URINE CULTURE - Normal    Color, UA Yellow      Appearance, UA Clear      Spec Grav UA 1.010      pH, UA 7.0      Protein, UA Negative      Glucose, UA Negative      Ketones, UA Negative      Blood, UA Negative      Bilirubin, UA Negative      Urobilinogen, UA Negative      Nitrites, UA Negative      Leukocyte Esterase, UA Negative     CK - Normal    CPK 54     MAGNESIUM - Normal    Magnesium 1.9     TSH - Normal    TSH 4.016     TROPONIN I HIGH SENSITIVITY - Normal    Troponin High Sensitive 2.8     TROPONIN I HIGH SENSITIVITY - Normal    Troponin High Sensitive 8.0     B-TYPE NATRIURETIC PEPTIDE - Normal    BNP 19     CBC W/ AUTO DIFFERENTIAL    Narrative:     The following orders were created for panel order CBC auto differential.  Procedure                               Abnormality         Status                     ---------                               -----------         ------                     CBC with Differential[[id number]]       Abnormal            Final result                 Please view results for these tests on the individual orders.   HEPATITIS C ANTIBODY   HEP C VIRUS HOLD SPECIMEN   HIV 1 / 2 ANTIBODY   HIV VIRUS CONFIRMATION HOLD SPECIMEN          Imaging Results              X-Ray Chest AP Portable (Final result)  Result time 07/13/25 11:51:32      Final result by Enriqueta De La Vega MD (07/13/25 11:51:32)                   Impression:      No acute cardiopulmonary abnormality.      Electronically signed by: Enriqueta De La Vega  Date:    07/13/2025  Time:    11:51               Narrative:    EXAMINATION:  XR CHEST AP PORTABLE    CLINICAL HISTORY:  seizure like  activity;    FINDINGS:  Portable chest at 11:36 is compared to 05/09/2025 shows normal cardiomediastinal silhouette.    Lungs are clear. Pulmonary vasculature is normal. No acute osseous abnormality.                                       CT Cervical Spine Without Contrast (Final result)  Result time 07/13/25 11:51:02      Final result by Enriqueta De La Vega MD (07/13/25 11:51:02)                   Impression:      No evidence of acute fracture or traumatic malalignment of the cervical spine.    Multilevel degenerative disc disease and facet hypertrophy      Electronically signed by: Enriqueta De La Vega  Date:    07/13/2025  Time:    11:51               Narrative:    EXAMINATION:  CT CERVICAL SPINE WITHOUT CONTRAST    CLINICAL HISTORY:  Neck trauma (Age >= 65y);    TECHNIQUE:  Low dose axial CT images through the cervical spine, with sagittal and coronal reformations.  Contrast was not administered.    COMPARISON:  None    FINDINGS:  The vertebral bodies are normal in height and morphology without evidence of fracture or osseous destructive process.  There is reversal of normal cervical lordosis.  The vertebral bodies are of normal height.  There is bony fusion anteriorly at C5-6.  The odontoid process is intact the lateral masses of C1 are symmetrical.  There is bony bridging at the cranial cervical junction involving the lateral masses of C1 and occipital condyles.    Multi multilevel disc space narrowing and facet hypertrophy resulting in multilevel foraminal narrowing on the left at C3-4, bilaterally at C4-5.    Limited evaluation of the intraspinal contents demonstrates no hematoma or mass.Paraspinal soft tissues exhibit no acute abnormalities.                                       CT Head Without Contrast (Final result)  Result time 07/13/25 11:48:28      Final result by Enriqueta De La Vega MD (07/13/25 11:48:28)                   Impression:      1. Resolution of the right frontal hemorrhage with low attenuation  remaining in the right temporal lobe compatible with encephalomalacia and resolving hemorrhage  No mass effect or midline shift  2. No new hemorrhage      Electronically signed by: Enriqueta De La Vega  Date:    07/13/2025  Time:    11:48               Narrative:    EXAMINATION:  CT HEAD WITHOUT CONTRAST    CLINICAL HISTORY:  Seizure, new-onset, no history of trauma;.  Prior right frontalintraparenchymal hemorrhage    TECHNIQUE:  CMS MANDATED QUALITY DATA - CT RADIATION - 436    All CT scans at this facility utilize dose modulation, iterative reconstruction, and/or weight based dosing when appropriate to reduce radiation dose to as low as reasonably achievable.    Non infusion images were obtained from the skull base to the vertex.    COMPARISON:  06/28/2025 and 05/09/2025    FINDINGS:  The ventricles and sulci are normal in size and configuration for age.  There is no residual hemorrhage within the right frontal lobe..  There is no new hemorrhage or midline shift.    There is low-attenuation within the right frontal lobe there is persistent low attenuation in the right temporal lobe compatible with encephalomalacia and resolving hemorrhage.  The gray-white differentiation is maintained.  Cerebellum and brainstem are normal.  The orbits are unremarkable.                                       Medications   sodium chloride 0.9% flush 10 mL (has no administration in time range)   melatonin tablet 6 mg (has no administration in time range)   ondansetron injection 4 mg (has no administration in time range)   senna-docusate 8.6-50 mg per tablet 1 tablet (has no administration in time range)   aluminum-magnesium hydroxide-simethicone 200-200-20 mg/5 mL suspension 30 mL (has no administration in time range)   acetaminophen tablet 650 mg (has no administration in time range)   naloxone 0.4 mg/mL injection 0.02 mg (has no administration in time range)   potassium bicarbonate disintegrating tablet 50 mEq (has no administration in  time range)   potassium bicarbonate disintegrating tablet 35 mEq (has no administration in time range)   potassium bicarbonate disintegrating tablet 60 mEq (has no administration in time range)   magnesium oxide tablet 800 mg (has no administration in time range)   magnesium oxide tablet 800 mg (has no administration in time range)   potassium, sodium phosphates 280-160-250 mg packet 2 packet (has no administration in time range)   potassium, sodium phosphates 280-160-250 mg packet 2 packet (has no administration in time range)   potassium, sodium phosphates 280-160-250 mg packet 2 packet (has no administration in time range)   glucose chewable tablet 16 g (has no administration in time range)   glucose chewable tablet 24 g (has no administration in time range)   dextrose 50% injection 12.5 g (has no administration in time range)   dextrose 50% injection 25 g (has no administration in time range)   glucagon (human recombinant) injection 1 mg (has no administration in time range)   LORazepam injection 2 mg (has no administration in time range)   levETIRAcetam injection 500 mg (has no administration in time range)   atorvastatin tablet 20 mg (has no administration in time range)   carboxymethylcellulose 0.5 % ophthalmic solution 2 drop (has no administration in time range)   LIDOcaine 5 % patch 1 patch (1 patch Transdermal Patch Applied 7/13/25 1805)   multivitamin tablet (has no administration in time range)   pantoprazole EC tablet 40 mg (has no administration in time range)   magnesium oxide tablet 400 mg (has no administration in time range)   levETIRAcetam injection 1,000 mg (1,000 mg Intravenous Given 7/13/25 1524)     Medical Decision Making  Amount and/or Complexity of Data Reviewed  Labs: ordered.  Radiology: ordered.    Risk  Prescription drug management.                                          Clinical Impression:  Final diagnoses:  [R56.9] Seizure-like activity  [R56.9] Seizure          ED Disposition  Condition    Observation                         [1]  Social History  Tobacco Use    Smoking status: Never    Smokeless tobacco: Never   Substance Use Topics    Alcohol use: Yes     Alcohol/week: 6.0 standard drinks of alcohol     Types: 6 Cans of beer per week     Comment: weekly or less    Drug use: No

## 2025-07-14 NOTE — PLAN OF CARE
Met with Clot Rose to review discharge recommendation of Inpt Rehab and is agreeable to plan    Patient/family provided list of facilities in-network with patient's payor plan. Providers that are owned, operated, or affiliated with [facility] are included on the list.     Notified that referral sent to below listed facilities from in-network list based on proximity to home/family support:   [facility]   2. [facility]   3. [facility]  4.  5. (can send more than 5)    Patient/family instructed to identify preference.    Preferred Facility: (if more than 1, listed in order of descending preference)  [facility]  OR  Patient has declined to select a preferred provider and elects placement with the first accepting in network provider that is available to provide services as ordered by the physician       If an additional preferred facility not listed above is identified, additional referral to be sent. If above facilities unable to accept, will send additional referrals to in-network providers.

## 2025-07-14 NOTE — PLAN OF CARE
Goals to be met by: 8/14/2025     Patient will increase functional independence with ADLs by performing:    UE Dressing with Supervision.  LE Dressing with Minimal Assistance and Assistive Devices as needed.  Grooming while seated at sink with Supervision.  Toileting from bedside commode with Supervision for hygiene and clothing management.   Toilet transfer to bedside commode with Contact Guard Assistance.

## 2025-07-15 ENCOUNTER — TELEPHONE (OUTPATIENT)
Dept: FAMILY MEDICINE | Facility: CLINIC | Age: 70
End: 2025-07-15
Payer: MEDICARE

## 2025-07-15 ENCOUNTER — PATIENT MESSAGE (OUTPATIENT)
Dept: FAMILY MEDICINE | Facility: CLINIC | Age: 70
End: 2025-07-15
Payer: MEDICARE

## 2025-07-15 VITALS
DIASTOLIC BLOOD PRESSURE: 72 MMHG | TEMPERATURE: 98 F | BODY MASS INDEX: 22.88 KG/M2 | HEIGHT: 70 IN | SYSTOLIC BLOOD PRESSURE: 130 MMHG | HEART RATE: 87 BPM | RESPIRATION RATE: 18 BRPM | WEIGHT: 159.81 LBS | OXYGEN SATURATION: 93 %

## 2025-07-15 PROBLEM — R56.9 SEIZURE-LIKE ACTIVITY: Status: ACTIVE | Noted: 2025-06-30

## 2025-07-15 LAB
ABSOLUTE EOSINOPHIL (SMH): 0.1 K/UL
ABSOLUTE MONOCYTE (SMH): 0.7 K/UL (ref 0.3–1)
ABSOLUTE NEUTROPHIL COUNT (SMH): 3.5 K/UL (ref 1.8–7.7)
ANION GAP (SMH): 7 MMOL/L (ref 8–16)
BASOPHILS # BLD AUTO: 0.06 K/UL
BASOPHILS NFR BLD AUTO: 1 %
BUN SERPL-MCNC: 13 MG/DL (ref 8–23)
CALCIUM SERPL-MCNC: 9.2 MG/DL (ref 8.7–10.5)
CHLORIDE SERPL-SCNC: 107 MMOL/L (ref 95–110)
CO2 SERPL-SCNC: 27 MMOL/L (ref 23–29)
CREAT SERPL-MCNC: 0.6 MG/DL (ref 0.5–1.4)
ERYTHROCYTE [DISTWIDTH] IN BLOOD BY AUTOMATED COUNT: 13.5 % (ref 11.5–14.5)
GFR SERPLBLD CREATININE-BSD FMLA CKD-EPI: >60 ML/MIN/1.73/M2
GLUCOSE SERPL-MCNC: 112 MG/DL (ref 70–110)
HCT VFR BLD AUTO: 39.2 % (ref 40–54)
HGB BLD-MCNC: 13 GM/DL (ref 14–18)
IMM GRANULOCYTES # BLD AUTO: 0.02 K/UL (ref 0–0.04)
IMM GRANULOCYTES NFR BLD AUTO: 0.3 % (ref 0–0.5)
LYMPHOCYTES # BLD AUTO: 1.75 K/UL (ref 1–4.8)
MAGNESIUM SERPL-MCNC: 1.8 MG/DL (ref 1.6–2.6)
MCH RBC QN AUTO: 31.6 PG (ref 27–31)
MCHC RBC AUTO-ENTMCNC: 33.2 G/DL (ref 32–36)
MCV RBC AUTO: 95 FL (ref 82–98)
NUCLEATED RBC (/100WBC) (SMH): 0 /100 WBC
PHOSPHATE SERPL-MCNC: 4.6 MG/DL (ref 2.7–4.5)
PLATELET # BLD AUTO: 235 K/UL (ref 150–450)
PMV BLD AUTO: 10.3 FL (ref 9.2–12.9)
POTASSIUM SERPL-SCNC: 3.9 MMOL/L (ref 3.5–5.1)
RBC # BLD AUTO: 4.12 M/UL (ref 4.6–6.2)
RELATIVE EOSINOPHIL (SMH): 1.6 % (ref 0–8)
RELATIVE LYMPHOCYTE (SMH): 28.5 % (ref 18–48)
RELATIVE MONOCYTE (SMH): 11.4 % (ref 4–15)
RELATIVE NEUTROPHIL (SMH): 57.2 % (ref 38–73)
SODIUM SERPL-SCNC: 141 MMOL/L (ref 136–145)
WBC # BLD AUTO: 6.15 K/UL (ref 3.9–12.7)

## 2025-07-15 PROCEDURE — 97530 THERAPEUTIC ACTIVITIES: CPT

## 2025-07-15 PROCEDURE — 25000003 PHARM REV CODE 250

## 2025-07-15 PROCEDURE — 99900035 HC TECH TIME PER 15 MIN (STAT)

## 2025-07-15 PROCEDURE — 80048 BASIC METABOLIC PNL TOTAL CA: CPT

## 2025-07-15 PROCEDURE — 94761 N-INVAS EAR/PLS OXIMETRY MLT: CPT

## 2025-07-15 PROCEDURE — 83735 ASSAY OF MAGNESIUM: CPT

## 2025-07-15 PROCEDURE — 97530 THERAPEUTIC ACTIVITIES: CPT | Mod: CQ

## 2025-07-15 PROCEDURE — 36415 COLL VENOUS BLD VENIPUNCTURE: CPT

## 2025-07-15 PROCEDURE — 85025 COMPLETE CBC W/AUTO DIFF WBC: CPT

## 2025-07-15 PROCEDURE — 63600175 PHARM REV CODE 636 W HCPCS

## 2025-07-15 PROCEDURE — 94660 CPAP INITIATION&MGMT: CPT

## 2025-07-15 PROCEDURE — 99900031 HC PATIENT EDUCATION (STAT)

## 2025-07-15 PROCEDURE — 84100 ASSAY OF PHOSPHORUS: CPT

## 2025-07-15 RX ORDER — CYCLOBENZAPRINE HCL 10 MG
10 TABLET ORAL 3 TIMES DAILY
Status: DISCONTINUED | OUTPATIENT
Start: 2025-07-15 | End: 2025-07-15 | Stop reason: HOSPADM

## 2025-07-15 RX ORDER — LEVETIRACETAM 500 MG/1
500 TABLET ORAL 2 TIMES DAILY
Qty: 60 TABLET | Refills: 11 | Status: SHIPPED | OUTPATIENT
Start: 2025-07-15 | End: 2026-07-15

## 2025-07-15 RX ORDER — CYCLOBENZAPRINE HCL 10 MG
10 TABLET ORAL 3 TIMES DAILY PRN
Qty: 30 TABLET | Refills: 0 | Status: SHIPPED | OUTPATIENT
Start: 2025-07-15 | End: 2025-07-25

## 2025-07-15 RX ADMIN — THERA TABS 1 TABLET: TAB at 09:07

## 2025-07-15 RX ADMIN — PANTOPRAZOLE SODIUM 40 MG: 40 TABLET, DELAYED RELEASE ORAL at 06:07

## 2025-07-15 RX ADMIN — Medication 400 MG: at 09:07

## 2025-07-15 RX ADMIN — ATORVASTATIN CALCIUM 20 MG: 20 TABLET, FILM COATED ORAL at 09:07

## 2025-07-15 RX ADMIN — LEVETIRACETAM 500 MG: 100 INJECTION INTRAVENOUS at 09:07

## 2025-07-15 NOTE — PLAN OF CARE
Patient's spouse has requested a list of additional [facility] facilities to review @ this time .    07/15/25 0903   Rounds   Attendance Provider;Nurse    Discharge Plan A Rehab   Why the patient remains in the hospital Placement issues   Transition of Care Barriers None

## 2025-07-15 NOTE — PLAN OF CARE
Per [facility] , they will need new outpatient PT, OT and ST order to resume services. Order placed @ this time .      Request sent for PCP hospital follow up appt . Office staff will contact patient / family to schedule.   Added contact information to AVS        07/15/25 7207   Post-Acute Status   Hospital Resources/Appts/Education Provided Appointment suggestion unavailable   Discharge Plan   Discharge Plan A Home with family   Discharge Plan B Home with family

## 2025-07-15 NOTE — TELEMEDICINE CONSULT
[facility]  Neurology  Tele-Neurology Interprofessional Consult Note           Consult Information  Consults    Consulting Provider: TUTU LEVINE   Patient [facility]     Summary of patient's symptoms:  The patient exhibits persistent, baseline left-sided hemiplegia following a non-traumatic cortical hemorrhage localized to the right cerebellar hemisphere. On examination, there is partial antigravity to LLE 3/5, unable to lift LUE 0/5. The patient remains alert, follows commands appropriately, and is fully oriented to person, place, time, and situation. No seizure-like activity has been observed during the current admission.      Images personally reviewed and interpreted:  Study: Other: EEG  Study Interpretation:   Impression:   Abnormal study. There is continuous right hemispheric slowing consistent with the patient's history of prior right sided nontraumatic cerebral hemorrhage.  Additionally, there is mild background slowing consistent with a mild diffuse encephalopathy (or with excessive drowsiness given wakefulness is only briefly noted).  No epileptiform activity is seen.     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 The episode involved bilateral lower extremity rhythmic movements, tongue biting, and no loss of consciousness. No prior seizure history. No postictal confusion was noted. Notably, a prior EEG in May 2025 during hospitalization for ICH was negative for epileptiform activity. Given the clinical presentation and supportive findings, this is concerning for new-onset seizure.      -EEG reviewed with patient and wife  -Continue Keppra 500mg BID for seizure prophylaxis.  -Monitor for side effects.   -Advised to adhere strictly to medication and follow-up schedule.  -Encourage consistent use of CPAP given  potential role of sleep disturbance in seizure threshold  -Seizure precautions discussed with patient, patient verbalized understanding.     It is [place] state law that you do not drive for the next six months as you have had an episode of loss of consciousness.      Please do not take baths, swim alone, climb heights or operate heavy machinery as you have had an episode of loss of consciousness.      I spent approximately 45 minutes on this encounter. More than half of that time was spent communicating with the consulting provider and coordinating patient care.       ASHLEY Aguirre     Discussed case with Dr. Heller, chart reviewed. Any change to plan along with cosign to appear in the EMR.       This encounter was conducted as an interprofessional communication between providers at the [facility] and vascular neurologist. The interaction was completed over the phone or via secure messaging (electronic medical record - [facility] Secure Chat).     Once this note was completed, a written copy was sent back to the provider via fax or electronic medical record.

## 2025-07-15 NOTE — PLAN OF CARE
Problem: Adult Inpatient Plan of Care  Goal: Plan of Care Review  Outcome: Ongoing  Goal: Patient-Specific Goal (Individualized)  Outcome: Ongoing  Goal: Absence of Hospital-Acquired Illness or Injury  Outcome: Ongoing  Goal: Optimal Comfort and Wellbeing  Outcome: Ongoing  Goal: Readiness for Transition of Care  Outcome: Ongoing     Problem: Wound  Goal: Optimal Coping  Outcome: Ongoing  Goal: Optimal Functional Ability  Outcome: Ongoing  Goal: Absence of Infection Signs and Symptoms  Outcome: Ongoing  Goal: Improved Oral Intake  Outcome: Ongoing  Goal: Optimal Pain Control and Function  Outcome: Ongoing  Goal: Skin Health and Integrity  Outcome: Ongoing  Goal: Optimal Wound Healing  Outcome: Ongoing     Problem: Fall Injury Risk  Goal: Absence of Fall and Fall-Related Injury  Outcome: Ongoing     Problem: Skin Injury Risk Increased  Goal: Skin Health and Integrity  Outcome: Ongoing     Problem: Wound  Goal: Optimal Coping  Outcome: Ongoing     Problem: Fall Injury Risk  Goal: Absence of Fall and Fall-Related Injury  Outcome: Ongoing     Problem: Skin Injury Risk Increased  Goal: Skin Health and Integrity  Outcome: Ongoing   Patient attempted to get out of the bed and slide to the floor, patient stable, no injuries noted ,bed alarm was set , patient stated he was going to the couch, made Dr marina aware

## 2025-07-15 NOTE — TELEPHONE ENCOUNTER
Attempted to reach patient via phone to schedule hospital follow up visit. No answer. LVM to call back to clinic for assistance scheduling appointment.

## 2025-07-15 NOTE — PT/OT/SLP PROGRESS
Occupational Therapy   Treatment    Name: Colt Rose  MRN: [id number]  Admitting Diagnosis:  Seizure       Recommendations:     Discharge Recommendations: High Intensity Therapy  Discharge Equipment Recommendations:  to be determined by next level of care  Barriers to discharge:   (Increased assist with ADLs, IADLs, and mobility)    Assessment:     Colt Rose is a 69 y.o. male with a medical diagnosis of Seizure. Pt is agreeable to OT treatment session this AM. Performance deficits affecting function are weakness, impaired endurance, impaired self care skills, impaired functional mobility, gait instability, impaired balance, decreased coordination, decreased upper extremity function, decreased lower extremity function, decreased safety awareness, abnormal tone, impaired fine motor.     Rehab Prognosis:  Good; patient would benefit from acute skilled OT services to address these deficits and reach maximum level of function.       Plan:     Patient to be seen 6 x/week to address the above listed problems via self-care/home management, therapeutic activities, therapeutic exercises, neuromuscular re-education  Plan of Care Expires: 08/14/25  Plan of Care Reviewed with: patient, spouse    Subjective     Chief Complaint: none stated  Patient/Family Comments/goals: improve function   Pain/Comfort:  Pain Rating 1:  (not rated)  Location 1: neck (skin)  Pain Addressed 1: Reposition, Distraction    Objective:     Communicated with: nursing prior to session.  Patient found HOB elevated with telemetry, bed alarm, peripheral IV upon OT entry to room.    General Precautions: Standard, fall    Orthopedic Precautions:N/A  Braces: N/A  Respiratory Status: Room air     Occupational Performance:     Bed Mobility:    Patient completed Supine to Sit with minimum assistance     Functional Mobility/Transfers:  Patient completed Bed <> Chair Transfer using Stand Pivot technique with moderate assistance with no assistive  device    Activities of Daily Living:  Grooming: stand by assistance to brush teeth and wash face seated EOB w/ RUE      Treatment & Education:  Pt educated on role of OT/POC, importance of OOB/EOB activity, use of call bell, and safety during ADLs, transfers, and functional mobility.    Patient left up in chair with all lines intact, call button in reach, nursing notified, and spouse present    GOALS:   Multidisciplinary Problems       Occupational Therapy Goals          Problem: Occupational Therapy    Goal Priority Disciplines Outcome Interventions   Occupational Therapy Goal     OT, PT/OT Progressing    Description: Goals to be met by: 8/14/2025     Patient will increase functional independence with ADLs by performing:    UE Dressing with Supervision.  LE Dressing with Minimal Assistance and Assistive Devices as needed.  Grooming while seated at sink with Supervision.  Toileting from bedside commode with Supervision for hygiene and clothing management.   Toilet transfer to bedside commode with Contact Guard Assistance.                         DME Justifications:  No DME recommended requiring DME justifications    Time Tracking:     OT Date of Treatment: 07/15/25  OT Start Time: 1024  OT Stop Time: 1036  OT Total Time (min): 12 min    Billable Minutes:Therapeutic Activity 12    OT/REED: OT          7/15/2025

## 2025-07-15 NOTE — DISCHARGE INSTRUCTIONS
Our goal at [facility] is to always give you outstanding care and exceptional service. You may receive a survey from [employer] by mail, text or e-mail in the next 24-48 hours asking about the care you received with us. The survey should only take 5-10 minutes to complete and is very important to us.     Your feedback provides us with a way to recognize our staff who work tirelessly to provide the best care! Also, your responses help us learn how to improve when your experience was below our aspiration of excellence. We are always looking for ways to improve your stay. We WILL use your feedback to continue making improvements to help us provide the highest quality care. We keep your personal information and feedback confidential. We appreciate your time completing this survey and can't wait to hear from you!!!    We look forward to your continued care with us! Thanks so much for choosing [facility] for your healthcare needs!

## 2025-07-15 NOTE — PLAN OF CARE
Patient's spouse , Yenny , contacted CM regarding placement. She states that they do not want IPR at this time and would like to resume outpatient PT / OT and ST . Notified provider at this time .        07/15/25 1207   Post-Acute Status   Post-Acute Authorization Placement   Post-Acute Placement Status Patient declined/refused   Discharge Plan   Discharge Plan A Home with family   Discharge Plan B Home with family

## 2025-07-15 NOTE — PLAN OF CARE
Pt clear for DC from case management standpoint. Discharging to Home with NN . Spouse will transport patient home from facility .        07/15/25 1429   Final Note   Assessment Type Final Discharge Note   Anticipated Discharge Disposition Home

## 2025-07-15 NOTE — ASSESSMENT & PLAN NOTE
Chronic, controlled.  Latest blood pressure and vitals reviewed-     Temp:  [97.6 °F (36.4 °C)-98.3 °F (36.8 °C)]   Pulse:  [59-87]   Resp:  [15-18]   BP: ()/(52-74)   SpO2:  [93 %-97 %] .   Home meds for hypertension were reviewed and noted below-  Hypertension Medications             While in the hospital, will manage blood pressure as follows; Continue home antihypertensive regimen    Will utilize p.r.n. blood pressure medication only if patient's blood pressure greater than 180/110 and he develops symptoms such as worsening chest pain or shortness of breath.    - Hx noted  - Not currently on antihypertensives outpatient  - Trend BP while hospitalized

## 2025-07-15 NOTE — DISCHARGE SUMMARY
[facility]  Discharge Summary      Patient Name: Colt Rose  MRN: [id number]  [facility]: [id number]  Patient Class: IP- Inpatient  Admission Date: 7/13/2025  Hospital Length of Stay: 1 days  Discharge Date and Time: 07/15/2025 3:46 PM  Attending Physician: Marce Dorantes MD   Discharging Provider: Arsenio Jones PA-C  Primary Care Provider: Jeffery Mena MD    Primary Care Team: Networked reference to record PCT     HPI:   Mr. 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  Patient's wife is at bedside and helps provide history.  Patient and wife state that around 9:00 a.m. the patient had just finished riding his exercise bike when he went to use the restroom to urinate.  He states that while sitting on the toilet his right lower extremity began shaking uncontrollably which progressed than to his left lower extremity.  He states that he tried for some time to get the shaking to resolve and when unsuccessful he tried calling out to his wife.  His wife states that she thought she heard something so she went to check on him in as she was approaching she heard thumping so she states that she rushed in and noticed he was having a seizure.  She states that he was hitting his head against the back of the wall.  She endorses that she tried to hold him steady until the seizure passed, but when she noticed it was not resolving quickly she went to call EMS.  She states that he seized for approximately 2-5 minutes but really she states that she does not know the exact timeframe.  He denies tasting seeing smelling anything out of the  ordinary prior to having a seizure.  He does endorse biting his tongue.  It is unclear if he urinated on himself as he was already in the process of using the restroom when the seizure began.  Of note, patient did have a cortical hemorrhage of the right cerebellar hemisphere in May of 2025 and has residual left-sided deficits of hemiplegia.  He denies tobacco and alcohol use.  He denies fever, chills, dyspnea, chest pain, abdominal pain, nausea, vomiting, diarrhea, dysuria, hematuria, lightheadedness, dizziness, and syncope.    Upon arrival to ED, patient afebrile, HR of 85, RR of 20, BP of 136/67, satting 97% on RA.  Workup in the ED revealed RBC of 4.15, H/H of 13.1/39.6, glucose of 119.  Remainder of labs unremarkable.  CXR shows no acute cardiopulmonary abnormality.  CT head without contrast shows resolution of the right frontal hemorrhage with low-attenuation remaining in the right temporal lobe compatible with encephalomalacia and resolving hemorrhage.  No mass effect or midline shift.  No new hemorrhage.  CT cervical spine shows no evidence of acute fracture or traumatic malalignment of the cervical spine.  Multilevel degenerative disc disease and facet hypertrophy.  Patient was given levetiracetam 1000 mg IV while in the ED.  Case discussed with ED provider and patient will be placed under observation for further management.    * No surgery found *      Hospital Course:   Patient was monitored closely throughout course of hospital stay by hospital medicine team. Started on IV keppra for seizure prophylaxis and seizure precautions. Neurology consulted on admission. EEG showing no acute epileptic activity. No further seizure activity since admission. CT head today showed resolution of the right frontal hemorrhage with low-attenuation remaining in the right temporal lobe compatible with encephalomalacia and resolving hemorrhage thus VTE prophylaxis was held. Pt/OT consulted for further evaluation and treatment.  PT/Ot recommended high intensity therapy however patient declined rehab placement. He elected for outpatient therapy. Patient to discharge on Keppra 500 mg BID. Patient seen and examined on day of discharge and is medically stable for DC.      Goals of Care Treatment Preferences:  Code Status: Full Code         Consults:   Consults (From admission, onward)          Status Ordering Provider     Inpatient consult to   Once        Provider:  (Not yet assigned)    Acknowledged GIA FONG     Inpatient Consult to Neurology Services (General Neurology)  Once        Provider:  (Not yet assigned)    Completed ALEKSANDRA GOODMAN     Case Management/  Once        Provider:  (Not yet assigned)    Acknowledged ALEKSANDRA GOODMAN            Assessment & Plan  Seizure  - Denies hx of seizures  - Neuro checks Q4H  - Continuous tele monitoring and pulse oximetry  - Seizure precautions  - Keppra 1000 mg loading dose given in ED  - Start Keppra 500 mg BID for prevention of seizures  - PRN lorazepam for breakthrough seizures  - Bedside garcia passed  - Neurology consulted, appreciate recs  - Holding home escitalopram for now as may lower seizure threshold, appreciate neuro recs  - PT/OT consulted, appreciate recs  - EEG reviewed  Nontraumatic cortical hemorrhage of right cerebral hemisphere  - Hx of ICH in May 2025  - CT head today shows resolution of the right frontal hemorrhage with low-attenuation remaining in the right temporal lobe compatible with encephalomalacia and resolving hemorrhage.  No mass effect or midline shift.  No new hemorrhage.  - Holding VTE prophylaxis  - See plan for seizure  Prediabetes  - Hx noted    Sleep apnea  - CPAP QHS    Essential (primary) hypertension  Chronic, controlled.  Latest blood pressure and vitals reviewed-     Temp:  [97.6 °F (36.4 °C)-98.3 °F (36.8 °C)]   Pulse:  [59-87]   Resp:  [15-18]   BP: ()/(52-74)   SpO2:  [93 %-97 %] .   Home meds for  hypertension were reviewed and noted below-  Hypertension Medications             While in the hospital, will manage blood pressure as follows; Continue home antihypertensive regimen    Will utilize p.r.n. blood pressure medication only if patient's blood pressure greater than 180/110 and he develops symptoms such as worsening chest pain or shortness of breath.    - Hx noted  - Not currently on antihypertensives outpatient  - Trend BP while hospitalized  Final Active Diagnoses:    Diagnosis Date Noted POA    PRINCIPAL PROBLEM:  Seizure [R56.9] 05/05/2025 Yes    Nontraumatic cortical hemorrhage of right cerebral hemisphere [I61.1] 05/04/2025 Yes    Sleep apnea [G47.30] 01/01/2025 Yes    Essential (primary) hypertension [I10] 01/01/2025 Yes    Prediabetes [R73.03] 12/01/2022 Yes      Problems Resolved During this Admission:       Discharged Condition: fair    Disposition: Home or Self Care    Follow Up:   Follow-up Information       Jeffery Mena MD. Call in 1 week(s).    Specialty: Family Medicine  Why: Office staff will contact patient to schedule.  Contact information:  [address] [facility] [id number] [phone]                           Patient Instructions:      Ambulatory referral/consult to Outpatient Case Management   Referral Priority: Routine Referral Type: Consultation   Referral Reason: Specialty Services Required   Number of Visits Requested: 1     Ambulatory Referral/Consult to Physical Therapy   Referral Priority: Routine Referral Type: Physical Therapy   Referral Reason: Specialty Services Required   Number of Visits Requested: 1     Ambulatory Referral/Consult to Occupational Therapy   Referral Priority: Routine Referral Type: Occupational Therapy   Referral Reason: Specialty Services Required   Requested Specialty: Occupational Therapy   Number of Visits Requested: 1     Ambulatory Referral/Consult to Speech Therapy   Referral Priority: Routine Referral Type: Speech Therapy    Referral Reason: Specialty Services Required   Requested Specialty: Speech Pathology   Number of Visits Requested: 1     No driving until:     Notify your health care provider if you experience any of the following:  temperature >100.4     Notify your health care provider if you experience any of the following:  persistent nausea and vomiting or diarrhea     Notify your health care provider if you experience any of the following:  persistent dizziness, light-headedness, or visual disturbances     Notify your health care provider if you experience any of the following:  increased confusion or weakness     Notify your health care provider if you experience any of the following:  severe persistent headache     Activity as tolerated       Significant Diagnostic Studies: Labs: CMP   Recent Labs   Lab 07/14/25  0525 07/15/25  0435    141   K 3.9 3.9    107   CO2 29 27   * 112*   BUN 11 13   CREATININE 0.6 0.6   CALCIUM 9.0 9.2   ANIONGAP 6* 7*    and CBC   Recent Labs   Lab 07/14/25  0525 07/15/25  0435   WBC 5.77 6.15   HGB 12.8* 13.0*   HCT 38.9* 39.2*    235         Pending Diagnostic Studies:       Procedure Component Value Units Date/Time    HCV Virus Hold Specimen [[id number]] Collected: 07/13/25 1025    Order Status: Sent Lab Status: In process Updated: 07/13/25 1037    Specimen: Blood     HIV Virus Confirmation Hold Specimen [[id number]] Collected: 07/13/25 1025    Order Status: Sent Lab Status: In process Updated: 07/13/25 1037    Specimen: Blood            Medications:  Reconciled Home Medications:      Medication List        PAUSE taking these medications      FISH OIL ORAL  Wait to take this until your doctor or other care provider tells you to start again.  Take 1 capsule by mouth once daily.     magnesium oxide 400 mg (241.3 mg magnesium) tablet  Wait to take this until your doctor or other care provider tells you to start again.  Commonly known as: MAG-OX  Take 1 tablet (400 mg  total) by mouth once daily.  What changed: when to take this     meloxicam 15 MG tablet  Wait to take this until your doctor or other care provider tells you to start again.  Commonly known as: MOBIC  Take 15 mg by mouth once daily.     multivitamin per tablet  Wait to take this until your doctor or other care provider tells you to start again.  Commonly known as: THERAGRAN  Take 1 tablet by mouth once daily.     omeprazole 40 MG capsule  Wait to take this until your doctor or other care provider tells you to start again.  Commonly known as: PRILOSEC  Take 1 capsule (40 mg total) by mouth every morning.            START taking these medications      cyclobenzaprine 10 MG tablet  Commonly known as: FLEXERIL  Take 1 tablet (10 mg total) by mouth 3 (three) times daily as needed for Muscle spasms.     levETIRAcetam 500 MG Tab  Commonly known as: KEPPRA  Take 1 tablet (500 mg total) by mouth 2 (two) times daily.            CONTINUE taking these medications      atorvastatin 20 MG tablet  Commonly known as: LIPITOR  TAKE ONE TABLET BY MOUTH ONCE DAILY     D-MANNOSE ORAL  Take 3 capsules by mouth once daily. 2 caps QAM and 1 cap QHS     EScitalopram oxalate 10 MG tablet  Commonly known as: LEXAPRO  Take 10 mg by mouth every morning.     LIDOcaine 5 %  Commonly known as: LIDODERM  Place 1 patch onto the skin once daily. Remove & Discard patch within 12 hours or as directed by MD     THERATEARS 0.25 % Drop  Generic drug: carboxymethylcellulose sodium  Place 0.05 mLs into both eyes 2 (two) times a day.              Indwelling Lines/Drains at time of discharge:   Lines/Drains/Airways       None                       Time spent on the discharge of patient: 35 minutes         Arsenio Jones PA-C  Department of Hospital Medicine  [facility]

## 2025-07-15 NOTE — PLAN OF CARE
07/15/25 0916   Rounds   Attendance Provider;Nurse    Discharge Plan A Long-term acute care facility (LT[facility])   Transition of Care Barriers None

## 2025-07-15 NOTE — PT/OT/SLP PROGRESS
Physical Therapy Treatment    Patient Name:  Colt Rose   MRN:  [id number]    Recommendations:     Discharge Recommendations: High Intensity Therapy   Discharge Equipment Recommendations: to be determined by next level of care  Barriers to discharge: decreased mobility from baseline; post-CVA deficits increasing fall risk    Assessment:     Colt Rose is a 69 y.o. male admitted with a medical diagnosis of Seizure.  He presents with the following impairments/functional limitations: weakness, impaired functional mobility, gait instability, impaired balance, decreased upper extremity function, decreased lower extremity function, pain, abnormal tone.    Pt required decreased assist for ambulation today and participated well in standing balance/gait activities. Wife and brother present in room. Pt with good tolerance to activity and remained up in chair.     Rehab Prognosis: Good; patient would benefit from acute skilled PT services to address these deficits and reach maximum level of function.    Recent Surgery: * No surgery found *      Plan:     During this hospitalization, patient to be seen 6 x/week to address the identified rehab impairments via gait training, therapeutic activities, therapeutic exercises, neuromuscular re-education and progress toward the following goals:    Plan of Care Expires:  08/14/25    Subjective     Chief Complaint: R posterior neck pain   Patient/Family Comments/goals: go home  Pain/Comfort:  Pain Rating 1:  (unrated)  Location - Side 1: Right  Location - Orientation 1: posterior  Location 1: neck  Pain Addressed 1: Reposition, Distraction (ice pack at end of session)      Objective:     Communicated with nurse prior to session.  Patient found up in chair with telemetry, peripheral IV upon PT entry to room.     General Precautions: Standard, fall, seizure  Orthopedic Precautions: N/A  Braces: N/A  Respiratory Status: Room air     Functional Mobility:  Transfers:     Sit to Stand:   ranging CG-Godwin with hemiwalker  Gait: 20' hemiwalker, Godwin, chair follow; gait discontinued due to notable increase fatigue and compensatory mechanisms for LLE advancement       AM-PAC 6 CLICK MOBILITY          Treatment & Education:    -Pt education: benefits of participation with therapy, OOB to chair during the day and for all meals as tolerated, staff assist for mobility, fall prevention, call light, review of discharge recommendation  -Standing pre-gait training and progressions with HW support and Godwin for balance:    -LLE heel lifts > LLE knee lifts > L/R wt shifts w/ emphasis on L knee stability in WB > wt shifts with R heel lifts  -5X STS chair <> HW, CGA and VC/TC      Patient left up in chair with all lines intact, call button in reach, chair alarm on, and family present..    GOALS:   Multidisciplinary Problems       Physical Therapy Goals          Problem: Physical Therapy    Goal Priority Disciplines Outcome Interventions   Physical Therapy Goal     PT, PT/OT     Description: Goals to be met by: 25     Patient will increase functional independence with mobility by performin. Supine to sit with Contact Guard Assistance  2. Sit to supine with Contact Guard Assistance  3. Sit to stand transfer with Contact Guard Assistance  4. Bed to chair transfer with Contact Guard Assistance using Sanket-walker  5. Gait  x 50 feet with Contact Guard Assistance using Sanket-walker.                            Time Tracking:     PT Received On: 07/15/25  PT Start Time: 1054     PT Stop Time: 1118  PT Total Time (min): 24 min     Billable Minutes: Therapeutic Activity 24    Treatment Type: Treatment  PT/PTA: PTA     Number of PTA visits since last PT visit: 1     07/15/2025

## 2025-07-15 NOTE — SUBJECTIVE & OBJECTIVE
Interval History: Pt seen and examined. He complains of paracervical pain following his seizure. Will start muscle relaxers. Awaiting rehab placement.    Review of Systems   Constitutional:  Negative for fever.   Respiratory:  Negative for shortness of breath.    Cardiovascular:  Negative for chest pain.   Gastrointestinal:  Negative for nausea and vomiting.   Musculoskeletal:  Positive for neck pain.   Neurological:  Positive for weakness.     Objective:     Vital Signs (Most Recent):  Temp: 97.9 °F (36.6 °C) (07/15/25 0817)  Pulse: 69 (07/15/25 0817)  Resp: 18 (07/15/25 0817)  BP: 117/73 (07/15/25 0817)  SpO2: 97 % (07/15/25 0817) Vital Signs (24h Range):  Temp:  [97.6 °F (36.4 °C)-98.4 °F (36.9 °C)] 97.9 °F (36.6 °C)  Pulse:  [59-73] 69  Resp:  [15-18] 18  SpO2:  [95 %-97 %] 97 %  BP: ()/(52-74) 117/73     Weight: 72.5 kg (159 lb 13.3 oz)  Body mass index is 22.93 kg/m².    Intake/Output Summary (Last 24 hours) at 7/15/2025 0953  Last data filed at 7/15/2025 0438  Gross per 24 hour   Intake 240 ml   Output 400 ml   Net -160 ml         Physical Exam  Constitutional:       General: He is not in acute distress.     Appearance: He is well-developed.   HENT:      Head: Normocephalic and atraumatic.      Right Ear: External ear normal.      Left Ear: External ear normal.   Eyes:      Conjunctiva/sclera: Conjunctivae normal.      Pupils: Pupils are equal, round, and reactive to light.   Neck:      Thyroid: No thyromegaly.   Cardiovascular:      Rate and Rhythm: Normal rate and regular rhythm.      Pulses: Normal pulses.      Heart sounds: Normal heart sounds, S1 normal and S2 normal.   Pulmonary:      Effort: Pulmonary effort is normal. No respiratory distress.      Breath sounds: Normal breath sounds.   Chest:      Chest wall: No tenderness.   Musculoskeletal:         General: Tenderness present. No swelling. Normal range of motion.      Cervical back: Normal range of motion and neck supple.      Comments: Right  sided para cervical tenderness   Skin:     General: Skin is warm and dry.      Coloration: Skin is not jaundiced or pale.   Neurological:      General: No focal deficit present.      Mental Status: He is alert and oriented to person, place, and time.      Cranial Nerves: No cranial nerve deficit.      Motor: Weakness present.      Comments: L sided hemiplegic at baseline   Psychiatric:         Mood and Affect: Mood normal.         Behavior: Behavior normal.               Significant Labs: All pertinent labs within the past 24 hours have been reviewed.  CBC:   Recent Labs   Lab 07/13/25  1037 07/14/25  0525 07/15/25  0435   WBC 4.97 5.77 6.15   HGB 13.1* 12.8* 13.0*   HCT 39.6* 38.9* 39.2*    229 235     CMP:   Recent Labs   Lab 07/13/25  1037 07/14/25  0525 07/15/25  0435    140 141   K 4.2 3.9 3.9    105 107   CO2 24 29 27   * 111* 112*   BUN 12 11 13   CREATININE 0.6 0.6 0.6   CALCIUM 9.2 9.0 9.2   PROT 6.8  --   --    ALBUMIN 4.0  --   --    BILITOT 0.5  --   --    ALKPHOS 59  --   --    AST 20  --   --    ALT 22  --   --    ANIONGAP 9 6* 7*       Significant Imaging: I have reviewed all pertinent imaging results/findings within the past 24 hours.

## 2025-07-15 NOTE — ASSESSMENT & PLAN NOTE
- Hx of ICH in May 2025  - CT head today shows resolution of the right frontal hemorrhage with low-attenuation remaining in the right temporal lobe compatible with encephalomalacia and resolving hemorrhage.  No mass effect or midline shift.  No new hemorrhage.  - Holding VTE prophylaxis  - See plan for seizure

## 2025-07-15 NOTE — ASSESSMENT & PLAN NOTE
Chronic, controlled.  Latest blood pressure and vitals reviewed-     Temp:  [97.6 °F (36.4 °C)-98.4 °F (36.9 °C)]   Pulse:  [59-73]   Resp:  [15-18]   BP: ()/(52-74)   SpO2:  [95 %-97 %] .   Home meds for hypertension were reviewed and noted below-  Hypertension Medications             While in the hospital, will manage blood pressure as follows; Continue home antihypertensive regimen    Will utilize p.r.n. blood pressure medication only if patient's blood pressure greater than 180/110 and he develops symptoms such as worsening chest pain or shortness of breath.    - Hx noted  - Not currently on antihypertensives outpatient  - Trend BP while hospitalized

## 2025-07-15 NOTE — PLAN OF CARE
Problem: Occupational Therapy  Goal: Occupational Therapy Goal  Description: Goals to be met by: 8/14/2025     Patient will increase functional independence with ADLs by performing:    UE Dressing with Supervision.  LE Dressing with Minimal Assistance and Assistive Devices as needed.  Grooming while seated at sink with Supervision.  Toileting from bedside commode with Supervision for hygiene and clothing management.   Toilet transfer to bedside commode with Contact Guard Assistance.    Outcome: Progressing

## 2025-07-15 NOTE — ASSESSMENT & PLAN NOTE
- Denies hx of seizures  - Neuro checks Q4H  - Continuous tele monitoring and pulse oximetry  - Seizure precautions  - Keppra 1000 mg loading dose given in ED  - Start Keppra 500 mg BID for prevention of seizures  - PRN lorazepam for breakthrough seizures  - Bedside jose passed  - Neurology consulted, appreciate recs  - Holding home escitalopram for now as may lower seizure threshold, appreciate neuro recs  - PT/OT consulted, appreciate recs  - EEG reviewed

## 2025-07-15 NOTE — PLAN OF CARE
Problem: Adult Inpatient Plan of Care  Goal: Plan of Care Review  Outcome: Adequate for Care Transition  Goal: Patient-Specific Goal (Individualized)  Outcome: Adequate for Care Transition  Goal: Absence of Hospital-Acquired Illness or Injury  Outcome: Adequate for Care Transition  Goal: Optimal Comfort and Wellbeing  Outcome: Adequate for Care Transition  Goal: Readiness for Transition of Care  Outcome: Adequate for Care Transition     Problem: Wound  Goal: Optimal Coping  Outcome: Adequate for Care Transition

## 2025-07-15 NOTE — NURSING
Patient IV removed and telebox removed. No signs of infection noted. Discharge instructions given and patient is to be discharged home with wife

## 2025-07-15 NOTE — TELEPHONE ENCOUNTER
----- Message from  Patricia sent at 7/15/2025  2:27 PM CDT -----  Regarding: patient discharging today . will need f/u appt  Date: 07/15/2025      Patient: Colt Rose  YOB: 1955    RE: [facility] Admission     Admit Diagnosis: Seizure      Colt Rose is being discharged from the hospital today.       Transportation Needs:  @[facility]@        A follow-up appointment has been recommended within 3 days of discharge to support post-discharge management and reduce risk of admission.       Sincerely,   Patricia Appiah

## 2025-07-15 NOTE — NURSING
RN went to take patient's vital signs.  Patient sitting on side of bed, patient then stood up, setting off the bed alarm.  RN caught patient and lowered to the floor.  No injury to patient.  Hospitalist Dr. Colón notified.

## 2025-07-15 NOTE — PROGRESS NOTES
[facility] Medicine  Progress Note    Patient Name: Colt Rose  MRN: [id number]  Patient Class: IP- Inpatient   Admission Date: 7/13/2025  Length of Stay: 1 days  Attending Physician: Marce Dorantes MD  Primary Care Provider: Jeffery Mena MD        Subjective     Principal Problem:Seizure        HPI:  Mr. 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  Patient's wife is at bedside and helps provide history.  Patient and wife state that around 9:00 a.m. the patient had just finished riding his exercise bike when he went to use the restroom to urinate.  He states that while sitting on the toilet his right lower extremity began shaking uncontrollably which progressed than to his left lower extremity.  He states that he tried for some time to get the shaking to resolve and when unsuccessful he tried calling out to his wife.  His wife states that she thought she heard something so she went to check on him in as she was approaching she heard thumping so she states that she rushed in and noticed he was having a seizure.  She states that he was hitting his head against the back of the wall.  She endorses that she tried to hold him steady until the seizure passed, but when she noticed it was not resolving quickly she went to call EMS.  She states that he seized for approximately 2-5 minutes but really she states that she does not know the exact timeframe.  He denies tasting seeing smelling anything out of the ordinary prior to having a seizure.  He does endorse biting his tongue.  It is unclear if he urinated on himself as he was already  in the process of using the restroom when the seizure began.  Of note, patient did have a cortical hemorrhage of the right cerebellar hemisphere in May of 2025 and has residual left-sided deficits of hemiplegia.  He denies tobacco and alcohol use.  He denies fever, chills, dyspnea, chest pain, abdominal pain, nausea, vomiting, diarrhea, dysuria, hematuria, lightheadedness, dizziness, and syncope.    Upon arrival to ED, patient afebrile, HR of 85, RR of 20, BP of 136/67, satting 97% on RA.  Workup in the ED revealed RBC of 4.15, H/H of 13.1/39.6, glucose of 119.  Remainder of labs unremarkable.  CXR shows no acute cardiopulmonary abnormality.  CT head without contrast shows resolution of the right frontal hemorrhage with low-attenuation remaining in the right temporal lobe compatible with encephalomalacia and resolving hemorrhage.  No mass effect or midline shift.  No new hemorrhage.  CT cervical spine shows no evidence of acute fracture or traumatic malalignment of the cervical spine.  Multilevel degenerative disc disease and facet hypertrophy.  Patient was given levetiracetam 1000 mg IV while in the ED.  Case discussed with ED provider and patient will be placed under observation for further management.    Overview/Hospital Course:  Patient was monitored closely throughout course of hospital stay by hospital medicine team. Started on IV keppra for seizure prophylaxis and seizure precautions. Neurology consulted on admission. EEG showing no acute epileptic activity. No further seizure activity since admission. CT head today showed resolution of the right frontal hemorrhage with low-attenuation remaining in the right temporal lobe compatible with encephalomalacia and resolving hemorrhage thus VTE prophylaxis was held. Pt/OT consulted for further evaluation and treatment.     Interval History: Pt seen and examined. He complains of paracervical pain following his seizure. Will start muscle relaxers. Awaiting rehab  placement.    Review of Systems   Constitutional:  Negative for fever.   Respiratory:  Negative for shortness of breath.    Cardiovascular:  Negative for chest pain.   Gastrointestinal:  Negative for nausea and vomiting.   Musculoskeletal:  Positive for neck pain.   Neurological:  Positive for weakness.     Objective:     Vital Signs (Most Recent):  Temp: 97.9 °F (36.6 °C) (07/15/25 0817)  Pulse: 69 (07/15/25 0817)  Resp: 18 (07/15/25 0817)  BP: 117/73 (07/15/25 0817)  SpO2: 97 % (07/15/25 0817) Vital Signs (24h Range):  Temp:  [97.6 °F (36.4 °C)-98.4 °F (36.9 °C)] 97.9 °F (36.6 °C)  Pulse:  [59-73] 69  Resp:  [15-18] 18  SpO2:  [95 %-97 %] 97 %  BP: ()/(52-74) 117/73     Weight: 72.5 kg (159 lb 13.3 oz)  Body mass index is 22.93 kg/m².    Intake/Output Summary (Last 24 hours) at 7/15/2025 0953  Last data filed at 7/15/2025 0438  Gross per 24 hour   Intake 240 ml   Output 400 ml   Net -160 ml         Physical Exam  Constitutional:       General: He is not in acute distress.     Appearance: He is well-developed.   HENT:      Head: Normocephalic and atraumatic.      Right Ear: External ear normal.      Left Ear: External ear normal.   Eyes:      Conjunctiva/sclera: Conjunctivae normal.      Pupils: Pupils are equal, round, and reactive to light.   Neck:      Thyroid: No thyromegaly.   Cardiovascular:      Rate and Rhythm: Normal rate and regular rhythm.      Pulses: Normal pulses.      Heart sounds: Normal heart sounds, S1 normal and S2 normal.   Pulmonary:      Effort: Pulmonary effort is normal. No respiratory distress.      Breath sounds: Normal breath sounds.   Chest:      Chest wall: No tenderness.   Musculoskeletal:         General: Tenderness present. No swelling. Normal range of motion.      Cervical back: Normal range of motion and neck supple.      Comments: Right sided para cervical tenderness   Skin:     General: Skin is warm and dry.      Coloration: Skin is not jaundiced or pale.   Neurological:       General: No focal deficit present.      Mental Status: He is alert and oriented to person, place, and time.      Cranial Nerves: No cranial nerve deficit.      Motor: Weakness present.      Comments: L sided hemiplegic at baseline   Psychiatric:         Mood and Affect: Mood normal.         Behavior: Behavior normal.               Significant Labs: All pertinent labs within the past 24 hours have been reviewed.  CBC:   Recent Labs   Lab 07/13/25  1037 07/14/25  0525 07/15/25  0435   WBC 4.97 5.77 6.15   HGB 13.1* 12.8* 13.0*   HCT 39.6* 38.9* 39.2*    229 235     CMP:   Recent Labs   Lab 07/13/25  1037 07/14/25  0525 07/15/25  0435    140 141   K 4.2 3.9 3.9    105 107   CO2 24 29 27   * 111* 112*   BUN 12 11 13   CREATININE 0.6 0.6 0.6   CALCIUM 9.2 9.0 9.2   PROT 6.8  --   --    ALBUMIN 4.0  --   --    BILITOT 0.5  --   --    ALKPHOS 59  --   --    AST 20  --   --    ALT 22  --   --    ANIONGAP 9 6* 7*       Significant Imaging: I have reviewed all pertinent imaging results/findings within the past 24 hours.      Assessment & Plan  Seizure  - Denies hx of seizures  - Neuro checks Q4H  - Continuous tele monitoring and pulse oximetry  - Seizure precautions  - Keppra 1000 mg loading dose given in ED  - Start Keppra 500 mg BID for prevention of seizures  - PRN lorazepam for breakthrough seizures  - Bedside garcia passed  - Neurology consulted, appreciate recs  - Holding home escitalopram for now as may lower seizure threshold, appreciate neuro recs  - PT/OT consulted, appreciate recs  - EEG reviewed  Nontraumatic cortical hemorrhage of right cerebral hemisphere  - Hx of ICH in May 2025  - CT head today shows resolution of the right frontal hemorrhage with low-attenuation remaining in the right temporal lobe compatible with encephalomalacia and resolving hemorrhage.  No mass effect or midline shift.  No new hemorrhage.  - Holding VTE prophylaxis  - See plan for seizure  Prediabetes  - Hx  noted    Sleep apnea  - CPAP QHS    Essential (primary) hypertension  Chronic, controlled.  Latest blood pressure and vitals reviewed-     Temp:  [97.6 °F (36.4 °C)-98.4 °F (36.9 °C)]   Pulse:  [59-73]   Resp:  [15-18]   BP: ()/(52-74)   SpO2:  [95 %-97 %] .   Home meds for hypertension were reviewed and noted below-  Hypertension Medications             While in the hospital, will manage blood pressure as follows; Continue home antihypertensive regimen    Will utilize p.r.n. blood pressure medication only if patient's blood pressure greater than 180/110 and he develops symptoms such as worsening chest pain or shortness of breath.    - Hx noted  - Not currently on antihypertensives outpatient  - Trend BP while hospitalized  VTE Risk Mitigation (From admission, onward)           Ordered     Reason for No Pharmacological VTE Prophylaxis  Once        Comments: Recent hemorrhagic stroke, awaiting neurology recommendations   Question:  Reasons:  Answer:  Physician Provided (leave comment)    07/13/25 1448     IP VTE HIGH RISK PATIENT  Once         07/13/25 1448     Place sequential compression device  Until discontinued         07/13/25 1448                    Discharge Planning   COLBY: 7/16/2025     Code Status: Full Code   Medical Readiness for Discharge Date:   Discharge Plan A: Rehab                Arsenio Jones PA-C  Department of Hospital Medicine   [facility]

## 2025-07-16 ENCOUNTER — OFFICE VISIT (OUTPATIENT)
Dept: FAMILY MEDICINE | Facility: CLINIC | Age: 70
End: 2025-07-16
Payer: MEDICARE

## 2025-07-16 ENCOUNTER — PATIENT OUTREACH (OUTPATIENT)
Dept: ADMINISTRATIVE | Facility: CLINIC | Age: 70
End: 2025-07-16
Payer: MEDICARE

## 2025-07-16 ENCOUNTER — PATIENT MESSAGE (OUTPATIENT)
Dept: FAMILY MEDICINE | Facility: CLINIC | Age: 70
End: 2025-07-16
Payer: MEDICARE

## 2025-07-16 DIAGNOSIS — E78.2 MIXED HYPERLIPIDEMIA: ICD-10-CM

## 2025-07-16 DIAGNOSIS — Z09 HOSPITAL DISCHARGE FOLLOW-UP: Primary | ICD-10-CM

## 2025-07-16 DIAGNOSIS — R73.03 PREDIABETES: ICD-10-CM

## 2025-07-16 DIAGNOSIS — I10 ESSENTIAL (PRIMARY) HYPERTENSION: ICD-10-CM

## 2025-07-16 LAB — HOLD SPECIMEN: NORMAL

## 2025-07-16 NOTE — PROGRESS NOTES
The patient location is:  [place]  The chief complaint leading to consultation is: Hospital Follow Up  Visit type: Virtual visit with synchronous audio and video  Total time spent with patient:  Greater than 30 minutes  Each patient to whom he or she provides medical services by telemedicine is:  (1) informed of the relationship between the physician and patient and the respective role of any other health care provider with respect to management of the patient; and (2) notified that he or she may decline to receive medical services by telemedicine and may withdraw from such care at any time. Vital signs recorded were provided by the patient.    Notes:  See below    Subjective:   Patient ID: Colt Rose is a 69 y.o. male     Chief Complaint: Hospital Follow Up    Here for f/u      Review of Systems   Respiratory:  Negative for shortness of breath.    Cardiovascular:  Negative for chest pain.   Gastrointestinal:  Negative for abdominal pain.   Genitourinary:  Negative for dysuria.     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    Cervical disc displacement     Degeneration of lumbar intervertebral disc     GERD (gastroesophageal reflux disease)     gastric polyps    Hip pain 02/22/2016    Hyperlipidemia     Shingles 11/2013    Sleep apnea     Stroke 05/05/2025     Past Surgical History:   Procedure Laterality Date    ABLATION, SVT, ACCESSORY PATHWAY N/A 5/25/2023    Procedure: Ablation, SVT, Accessory Pathway;  Surgeon: Goyo Sky MD;  Location: [facility] EP LAB;  Service: Cardiology;  Laterality: N/A;  PAC, RFA, CARTO, MAC, GP, 3 PREP    CERVICAL FUSION      bone graft    COLONOSCOPY N/A 7/29/2021    Procedure: COLONOSCOPY;  Surgeon: Tyler Story MD;  Location: [facility] ENDO;  Service: Endoscopy;  Laterality: N/A;    COLONOSCOPY N/A 10/30/2024    Procedure: COLONOSCOPY;  Surgeon: Tyler Story MD;  Location: [facility];  Service: Endoscopy;  Laterality: N/A;  m/ppm    EPIDURAL  STEROID INJECTION INTO LUMBAR SPINE N/A 12/5/2019    Procedure: Injection-steroid-epidural-lumbar;  Surgeon: Adarsh Kraft MD;  Location: [facility] OR;  Service: Pain Management;  Laterality: N/A;  L5-S1    ESOPHAGOGASTRODUODENOSCOPY N/A 10/27/2020    Procedure: EGD (ESOPHAGOGASTRODUODENOSCOPY);  Surgeon: Tyler Story MD;  Location: [facility];  Service: Endoscopy;  Laterality: N/A;    ESOPHAGOGASTRODUODENOSCOPY N/A 2/14/2025    Procedure: EGD (ESOPHAGOGASTRODUODENOSCOPY);  Surgeon: Tyler Story MD;  Location: [facility];  Service: Endoscopy;  Laterality: N/A;    INJECTION OF ANESTHETIC AGENT AROUND MEDIAL BRANCH NERVES INNERVATING LUMBAR FACET JOINT Bilateral 11/18/2020    Procedure: BLOCK, NERVE, LUMBAR, MEDIAL BRANCH Bilateral L3,4,5;  Surgeon: Adarsh Kraft MD;  Location: [facility] OR;  Service: Pain Management;  Laterality: Bilateral;    INJECTION OF ANESTHETIC AGENT AROUND MEDIAL BRANCH NERVES INNERVATING LUMBAR FACET JOINT Left 10/25/2023    Procedure: Block-nerve-medial branch-lumbar;  Surgeon: Adarsh Kraft MD;  Location: [facility] OR;  Service: Anesthesiology;  Laterality: Left;  l4-5, l5-s1 MBB    INJECTION OF ANESTHETIC AGENT AROUND MEDIAL BRANCH NERVES INNERVATING LUMBAR FACET JOINT Bilateral 11/28/2023    Procedure: Block-nerve-medial branch-lumbar;  Surgeon: Adarsh Kraft MD;  Location: [facility] OR;  Service: Anesthesiology;  Laterality: Bilateral;  L4.5 and l5/s1 MBB #2    RADIOFREQUENCY ABLATION OF LUMBAR MEDIAL BRANCH NERVE AT SINGLE LEVEL Bilateral 1/29/2021    Procedure: Radiofrequency Ablation, Nerve, Spinal, Lumbar, Medial Branch, 1 Level;  Surgeon: Adarsh Kraft MD;  Location: [facility] OR;  Service: Pain Management;  Laterality: Bilateral;  L3,4,5    TONSILLECTOMY, ADENOIDECTOMY       Objective:   There were no vitals filed for this visit.  There is no height or weight on file to calculate BMI.  Physical Exam  Vitals and nursing note reviewed.   Constitutional:       Appearance: He is well-developed.    HENT:      Head: Normocephalic and atraumatic.   Eyes:      General: No scleral icterus.     Conjunctiva/sclera: Conjunctivae normal.   Pulmonary:      Effort: Pulmonary effort is normal. No respiratory distress.   Musculoskeletal:         General: No deformity. Normal range of motion.      Cervical back: Normal range of motion and neck supple.   Skin:     Coloration: Skin is not pale.      Findings: No rash.   Neurological:      Mental Status: He is alert and oriented to person, place, and time.   Psychiatric:         Behavior: Behavior normal.         Thought Content: Thought content normal.         Judgment: Judgment normal.       Assessment:     1. Hospital discharge follow-up    2. Prediabetes    3. Mixed hyperlipidemia    4. Essential (primary) hypertension      Plan:   Hospital discharge follow-up  Counseled. Notes some sedation with new seizure meds. Has f/u with neurology. Possibly related to ICH in may 2025?   Prediabetes  Review of labs from 5/2025 shows A1c well controlled   Mixed hyperlipidemia  Review of labs from 5/2025 shows ldl cholesterol well controlled.   Essential (primary) hypertension  Reports adequate control at home    Notes loose stools have resolved.     Chronic condition not otherwise mentioned is stable      Total time spent of Greater than 30 minutes minutes on the day of the visit.This includes face to face time and preparing to see the patient, obtaining and reviewing separately obtained history, documenting clinical information in the electronic or other health record, independently interpreting results and communicating results to the patient/family/caregiver, or care coordinator.    Established patient with me has been instructed that must see me at least 1 time yearly (every 365 days) for refills of medications. Seeing other providers in this clinic is fine but expectation is to see me yearly.    Jeffery Mena MD  07/16/2025    Portions of this note have been dictated with CHRIS  Modal.

## 2025-07-16 NOTE — TELEPHONE ENCOUNTER
Notification provided  via e-mail due to this being initial point of contact regarding this matter.

## 2025-07-16 NOTE — TELEPHONE ENCOUNTER
Spoke to patients wife VV has been set up for today to discuss her husbands medications after being discharged from the hospital

## 2025-07-17 ENCOUNTER — CLINICAL SUPPORT (OUTPATIENT)
Dept: REHABILITATION | Facility: HOSPITAL | Age: 70
End: 2025-07-17
Payer: MEDICARE

## 2025-07-17 VITALS — DIASTOLIC BLOOD PRESSURE: 62 MMHG | SYSTOLIC BLOOD PRESSURE: 115 MMHG | HEART RATE: 68 BPM

## 2025-07-17 DIAGNOSIS — R47.1 DYSARTHRIA: ICD-10-CM

## 2025-07-17 DIAGNOSIS — Z91.81 AT HIGH RISK FOR FALLS: ICD-10-CM

## 2025-07-17 DIAGNOSIS — R13.12 OROPHARYNGEAL DYSPHAGIA: ICD-10-CM

## 2025-07-17 DIAGNOSIS — R41.841 COGNITIVE COMMUNICATION DEFICIT: Primary | ICD-10-CM

## 2025-07-17 DIAGNOSIS — G81.94 LEFT HEMIPLEGIA: Primary | ICD-10-CM

## 2025-07-17 PROCEDURE — 97129 THER IVNTJ 1ST 15 MIN: CPT | Mod: PO

## 2025-07-17 PROCEDURE — 97530 THERAPEUTIC ACTIVITIES: CPT | Mod: PO

## 2025-07-17 PROCEDURE — 97140 MANUAL THERAPY 1/> REGIONS: CPT | Mod: PO

## 2025-07-17 PROCEDURE — 97130 THER IVNTJ EA ADDL 15 MIN: CPT | Mod: PO

## 2025-07-17 NOTE — PROGRESS NOTES
C3 nurse spoke with patient who is unable to complete the call at this time. Patient Requested a Callback.

## 2025-07-17 NOTE — PROGRESS NOTES
C3 nurse spoke with patient who is unable to complete the call at this time. Patient Requested a Callback. Patient has a HOSFU with Flori Root on 07/28/25 @ 0900.

## 2025-07-17 NOTE — PROGRESS NOTES
Outpatient Rehab    Physical Therapy Visit    Patient Name: Colt Rose  MRN: [id number]  YOB: 1955  Encounter Date: 7/17/2025    Therapy Diagnosis:   Encounter Diagnoses   Name Primary?    Left hemiplegia Yes    At high risk for falls      Physician: Jeffery Mena MD    Physician Orders: Eval and Treat  Medical Diagnosis: Hemiplegia, unspecified etiology, unspecified hemiplegia type, unspecified laterality  Surgical Diagnosis: Not applicable for this Episode   Surgical Date: Not applicable for this Episode  Days Since Last Surgery: Not applicable for this Episode    Visit # / Visits Authorized:  7 / 20  Insurance Authorization Period: 6/25/2025 to 12/31/2025  Date of Evaluation: 6/27/2025  Plan of Care Certification: 6/30/2025 to 10/1/2025      PT/PTA: PT   Number of PTA visits since last PT visit:0  Time In: 0846   Time Out: 0930  Total Time (in minutes): 44   Total Billable Time (in minutes):      FOTO:  Intake Score: 50%  Survey Score 2: Not applicable for this Episode%  Survey Score 3: Not applicable for this Episode%    Precautions:  Additional Precautions and Protocol Details: Standard, Fall Risk, cognitive      Subjective   Wife reports to PT that he had a seizure they started him on medicine and he is drowsy. Also been having muscle spasms..  Pain reported as 0/10.      Objective   Vital Signs  /62   Pulse 68   BP Location: Right arm  BP Position: Sitting  BP Cuff Size: Adult               Treatment:  Manual Therapy  MT 1: soft tissue massage to right upper trap and rhomboids with neck flexion and rotation to increase range of motion for approx 10 minutes to decrease pain and improve range of motion  Therapeutic Activity  TA 1: stand pivot to left with mod assist (cues for left foot placement), positioned right foot very posterior and left foot too far and front and had to sit back down and adjust feet  TA 2: sit to supine min assist right leg  TA 3: supine to sit mod assist  from left side  TA 4: roll to right min assist with cues for bending left knee  TA 5: roll to left with supervision and cues  TA 6: sit to stand from mat contact guard with increased weight on right side with hemiwalker x 12 trials with focus on improving weightbearing through left side and forward weightshift  TA 7: ambulation with hemiwalker (no AFO or bioness) with min assist and wheelchair follow 57 feet  TA 8: transfer from mat to wheelchair stand pivot mod assist to left and min assist to right (cues for foot placement and to lean forward)    Time Entry(in minutes):  Manual Therapy Time Entry: 10  Therapeutic Activity Time Entry: 34    Assessment & Plan   Assessment: Colt tolerated session well. He appears more fatigued/groggy and wife reports the same. She reports they started him on new seizure medication and that he has been having muscle spasms. His first transfer today more mod assist for stand pivot but improved towards end of session more min assist. His sit to stand transfers contact guard with hemiwalker. He was able to ambulate 57ft with hemiwalker and w/c follow with min assist for balance. Left neglect and cues to pay attention to leftside with all mobility. He required supervision-mod for bed mobility, contact-mod assist for transfers. So overall some decrease in function, more fatigued, some more difficulty with balance with ambulation and transfers. He will continue to benefit from therapy to improve function, endurance, strength of left side, and balance.   Evaluation/Treatment Tolerance: Patient tolerated treatment well    The patient will continue to benefit from skilled outpatient physical therapy in order to address the deficits listed in the problem list on the initial evaluation, provide patient and family education, and maximize the patients level of independence in the home and community environments.     The patient's spiritual, cultural, and educational needs were considered, and  the patient is agreeable to the plan of care and goals.     Education  Education was done with Patient. The patient's learning style includes Listening. The patient Verbalizes understanding.         Educated on how todays session was, foot placement and leaning forward for transfers       Plan:      Goals:   Active       Long Term Goals       Patient will ascend/descend four 6 inch stairs safely using single handrail and standby assistance (Progressing)       Start:  06/27/25    Expected End:  10/01/25            Patient will demonstrate improved gait endurance by ambulating 200 ft during 2 minute walk test with use of LRAD and without seated rest break.  (Progressing)       Start:  06/27/25    Expected End:  10/01/25            Pt will ambulate using tito walker with standby assistance to improve mobility in household.  (Progressing)       Start:  06/27/25    Expected End:  10/01/25            Patient will demonstrate ability to transition floor to stand with external support to reduce assistance needed in case of fall. (Progressing)       Start:  06/27/25    Expected End:  10/01/25               Short Term Goals       Patient will be given initial HEP and report compliance with home exercise program 5 days a week to improve carry over.  (Progressing)       Start:  06/27/25    Expected End:  10/01/25            Patient will demonstrate all bed mobility with supervision and awareness of left upper extremity to improve independence.  (Progressing)       Start:  06/27/25    Expected End:  10/01/25            Pt will demonstrate sit to stand from wheelchair using tito walker for stability with supervision to improve independence at home.  (Progressing)       Start:  06/27/25    Expected End:  10/01/25                Alyssia Leon, PT

## 2025-07-17 NOTE — PROGRESS NOTES
C3 nurse spoke with Colt Rose's wife for a [facility] post hospital discharge follow up call. The patient has a scheduled [facility] appointment with Jeffery Mena on 07/16/25 per virtual visit.

## 2025-07-17 NOTE — PROGRESS NOTES
"  Outpatient Rehab    Speech-Language Pathology Visit    Patient Name: Colt Rose  MRN: [id number]  YOB: 1955  Encounter Date: 7/17/2025    Therapy Diagnosis:   Encounter Diagnoses   Name Primary?    Cognitive communication deficit Yes    Oropharyngeal dysphagia     Dysarthria      Physician: Jeffery Mena MD    Physician Orders: Eval and Treat  Medical Diagnosis: Dysarthria  Surgical Diagnosis: Not applicable for this Episode   Surgical Date: Not applicable for this Episode  Days Since Last Surgery: Not applicable for this Episode    Visit # / Visits Authorized: 1 / 20   Insurance Authorization Period: 7/3/2025 to 12/31/2025  Date of Evaluation: 7/8/2025   Plan of Care Certification: 7/8/2025 to 9/16/2025      Time In: 0931   Time Out: 1015  Total Time (in minutes): 44   Total Billable Time (in minutes): 44    NEEDS FIRST FOTO    Precautions:  General precautions include: Aspiration/choking.      Standard, Fall Risk, cognitive, seizure    Subjective   Pt arrived on time from PT session. Had a seizure and was hospitalized 7/13-7/15. Pt did not receive ST services while hospitalized..  Pain reported as 0/10. pt denied any current pain but reported occasional spasm type pain between shoulder blades and in L hip since d/c from hospital after seizure      Objective            Treatment  Short Term Goals:  Current Progress:   Patient will participate in Modified Barium Swallow Study to instrumentally assess current swallow status to guide Speech Therapy Plan of Care and swallow recommendations     Progressing/ Not Met 7/17/2025   -Requested orders from MD, however, no orders received yet.    -CXR completed while patient was hospitalized reported, "No acute cardiopulmonary abnormality"    -Patient denied any worsening of his swallow function since he had his seizure     Patient will recall 4/4 memory strategies independently 3 times overall.      Progressing/ Not Met 7/17/2025   -Introduced WRAP " "(Dvucz-Mvtsxd-Yymuucypp-Picture) strategies at start of session; reviewed each individual strategy and discussed applications of strategies    -Patient to bring phone to next session to work on utilizing for note taking and calendar     Patient will complete alternating and divided attention tasks with natural background noise and 80% accuracy independently      Progressing/ Not Met 7/17/2025   -Not addressed this date    Patient will complete moderate level problem solving tasks with 90% accuracy and minimal cues.      Progressing/ Not Met 7/17/2025   -See below      Patient will determine a list of possible solutions, given a problem scenario in a structured setting with 90% accuracy      Progressing/ Not Met 7/17/2025   -Patient completed moderate level problem solving related to home safety, medication management, etc with 90% accuracy and minimal-moderate cues this date        Patient will independently use a compensatory strategy (e.g., anchoring line, tactile cue, finger scanning) to aid in visual scanning to the left during reading or functional tasks in 3 out of 4 opportunities.      Progressing/ Not Met 7/17/2025   -Not addressed this date, however, patient needed multiple cues throughout session to attend to clinician on the left side during conversation        Time Entry(in minutes):  Cognitive Skill Development (Medicare) Time Entry: 44    Assessment & Plan   Assessment  Patient participated well in today's session focused on introduction of WRAP (Iiwhi-Fivqus-Pzuaynvck-Picture) strategies for memory, functional problem solving, and increasing awareness of left neglect. Patient had a seizure on 7/13 and was hospitalized until 7/15. Patient's cognitive communication function appears the same compared to initial evaluation. Patient reported his seizure medication does make him feel "groggy" but no significant changes noted to his communication function. Patient will continue to benefit from skilled Speech " Therapy intervention to improve his cognitive communication skills. Current goals remain appropriate. Will update as needed.   Evaluation/Treatment Tolerance: Patient tolerated treatment well    The patient will continue to benefit from skilled outpatient speech therapy in order to address the deficits listed in the problem list on the initial evaluation, provide patient and family education, and maximize the patients level of independence in the home and community environments.     The patient's spiritual, cultural, and educational needs were considered, and the patient is agreeable to the plan of care and goals.     Education  Education was done with Patient. The patient's learning style includes Listening and Reading. The patient Verbalizes understanding and Requires continuing/additional education.                  Plan  Continue ST POC          Goals:   Active       1. Short term goals       STG 1 (Progressing)       Start:  07/09/25    Expected End:  09/16/25       Patient will participate in Modified Barium Swallow Study to instrumentally assess current swallow status to guide Speech Therapy Plan of Care and swallow recommendations         STG 2 (Progressing)       Start:  07/09/25    Expected End:  09/16/25       Patient will recall 4/4 memory strategies independently 3 times overall.         STG 3 (Progressing)       Start:  07/09/25    Expected End:  09/16/25       Patient will complete alternating and divided attention tasks with natural background noise and 80% accuracy independently         STG 4 (Progressing)       Start:  07/09/25    Expected End:  09/16/25       Patient will complete moderate level problem solving tasks with 90% accuracy and minimal cues.         STG 5 (Progressing)       Start:  07/09/25    Expected End:  09/16/25       Patient will determine a list of possible solutions, given a problem scenario in a structured setting with 90% accuracy         STG 6 (Progressing)       Start:   07/09/25    Expected End:  09/16/25       Patient will independently use a compensatory strategy (e.g., anchoring line, tactile cue, finger scanning) to aid in visual scanning to the left during reading or functional tasks in 3 out of 4 opportunities.            2. Long term goals       LTG 1 (Progressing)       Start:  07/09/25    Expected End:  09/16/25       Patient will maintain adequate hydration/nutrition with optimum safety and efficiency of swallowing function on PO intake without overt signs and symptoms of aspiration for regular diet level.          LTG 2 (Progressing)       Start:  07/09/25    Expected End:  09/16/25       Pt will improve  attention skills to effectively attend to and communicate in complex daily living tasks in functional living environment.           LTG 3 (Progressing)       Start:  07/09/25    Expected End:  09/16/25       Patient will use appropriate memory strategies to schedule and recall weekly activities, express needs and recall names to maintain safety and participate socially in functional living environment.           LTG 4 (Progressing)       Start:  07/09/25    Expected End:  09/16/25       Pt will demonstrate use of self awareness, planning, and  self-monitoring during daily living activities to improve safety and awareness in functional living environment.             Macrina Ladd, L-SLP, CCC-SLP

## 2025-07-18 ENCOUNTER — HOSPITAL ENCOUNTER (OUTPATIENT)
Dept: RADIOLOGY | Facility: HOSPITAL | Age: 70
Discharge: HOME | End: 2025-07-18
Attending: NURSE PRACTITIONER
Payer: MEDICARE

## 2025-07-18 DIAGNOSIS — N40.0 BPH WITH ELEVATED PSA: ICD-10-CM

## 2025-07-18 DIAGNOSIS — R97.20 BPH WITH ELEVATED PSA: ICD-10-CM

## 2025-07-18 PROCEDURE — 72197 MRI PELVIS W/O & W/DYE: CPT | Mod: TC

## 2025-07-18 PROCEDURE — 72197 MRI PELVIS W/O & W/DYE: CPT | Mod: 26,,, | Performed by: RADIOLOGY

## 2025-07-18 PROCEDURE — A9585 GADOBUTROL INJECTION: HCPCS

## 2025-07-18 PROCEDURE — 25500020 PHARM REV CODE 255

## 2025-07-18 RX ORDER — GADOBUTROL 604.72 MG/ML
INJECTION INTRAVENOUS
Status: COMPLETED
Start: 2025-07-18 | End: 2025-07-18

## 2025-07-18 RX ADMIN — GADOBUTROL 7 ML: 604.72 INJECTION INTRAVENOUS at 09:07

## 2025-07-21 ENCOUNTER — CLINICAL SUPPORT (OUTPATIENT)
Dept: REHABILITATION | Facility: HOSPITAL | Age: 70
End: 2025-07-21
Payer: MEDICARE

## 2025-07-21 ENCOUNTER — LAB VISIT (OUTPATIENT)
Dept: LAB | Facility: HOSPITAL | Age: 70
End: 2025-07-21
Attending: FAMILY MEDICINE
Payer: MEDICARE

## 2025-07-21 VITALS — DIASTOLIC BLOOD PRESSURE: 67 MMHG | HEART RATE: 69 BPM | SYSTOLIC BLOOD PRESSURE: 127 MMHG

## 2025-07-21 DIAGNOSIS — E78.2 MIXED HYPERLIPIDEMIA: ICD-10-CM

## 2025-07-21 DIAGNOSIS — R68.89 IMPAIRED FUNCTION OF UPPER EXTREMITY: ICD-10-CM

## 2025-07-21 DIAGNOSIS — Z91.81 AT HIGH RISK FOR FALLS: ICD-10-CM

## 2025-07-21 DIAGNOSIS — G81.94 LEFT HEMIPLEGIA: Primary | ICD-10-CM

## 2025-07-21 DIAGNOSIS — R20.0 SENSORY LOSS: Primary | ICD-10-CM

## 2025-07-21 DIAGNOSIS — G81.94 LEFT HEMIPLEGIA: ICD-10-CM

## 2025-07-21 DIAGNOSIS — R79.9 ABNORMAL BLOOD FINDING: ICD-10-CM

## 2025-07-21 LAB
ALBUMIN SERPL BCP-MCNC: 3.6 G/DL (ref 3.5–5.2)
ALP SERPL-CCNC: 81 UNIT/L (ref 40–150)
ALT SERPL W/O P-5'-P-CCNC: 33 UNIT/L (ref 10–44)
ANION GAP (OHS): 11 MMOL/L (ref 8–16)
AST SERPL-CCNC: 25 UNIT/L (ref 11–45)
BILIRUB SERPL-MCNC: 0.4 MG/DL (ref 0.1–1)
BUN SERPL-MCNC: 18 MG/DL (ref 8–23)
CALCIUM SERPL-MCNC: 9.5 MG/DL (ref 8.7–10.5)
CHLORIDE SERPL-SCNC: 105 MMOL/L (ref 95–110)
CHOLEST SERPL-MCNC: 168 MG/DL (ref 120–199)
CHOLEST/HDLC SERPL: 3.9 {RATIO} (ref 2–5)
CO2 SERPL-SCNC: 24 MMOL/L (ref 23–29)
CREAT SERPL-MCNC: 0.6 MG/DL (ref 0.5–1.4)
EAG (OHS): 111 MG/DL (ref 68–131)
GFR SERPLBLD CREATININE-BSD FMLA CKD-EPI: >60 ML/MIN/1.73/M2
GLUCOSE SERPL-MCNC: 95 MG/DL (ref 70–110)
HBA1C MFR BLD: 5.5 % (ref 4–5.6)
HDLC SERPL-MCNC: 43 MG/DL (ref 40–75)
HDLC SERPL: 25.6 % (ref 20–50)
LDLC SERPL CALC-MCNC: 106 MG/DL (ref 63–159)
NONHDLC SERPL-MCNC: 125 MG/DL
POTASSIUM SERPL-SCNC: 4 MMOL/L (ref 3.5–5.1)
PROT SERPL-MCNC: 7.1 GM/DL (ref 6–8.4)
SODIUM SERPL-SCNC: 140 MMOL/L (ref 136–145)
TRIGL SERPL-MCNC: 95 MG/DL (ref 30–150)

## 2025-07-21 PROCEDURE — 97112 NEUROMUSCULAR REEDUCATION: CPT | Mod: PO

## 2025-07-21 PROCEDURE — 97530 THERAPEUTIC ACTIVITIES: CPT | Mod: PO

## 2025-07-21 PROCEDURE — 83036 HEMOGLOBIN GLYCOSYLATED A1C: CPT

## 2025-07-21 PROCEDURE — 36415 COLL VENOUS BLD VENIPUNCTURE: CPT | Mod: PO

## 2025-07-21 PROCEDURE — 97110 THERAPEUTIC EXERCISES: CPT | Mod: PO

## 2025-07-21 PROCEDURE — 97140 MANUAL THERAPY 1/> REGIONS: CPT | Mod: PO

## 2025-07-21 PROCEDURE — 80061 LIPID PANEL: CPT

## 2025-07-21 PROCEDURE — 82040 ASSAY OF SERUM ALBUMIN: CPT

## 2025-07-21 NOTE — PROGRESS NOTES
Outpatient Rehab    Occupational Therapy Visit    Patient Name: Colt Rose  MRN: [id number]  YOB: 1955  Encounter Date: 7/21/2025    Therapy Diagnosis:   Encounter Diagnoses   Name Primary?    Sensory loss Yes    Left hemiplegia     Impaired function of upper extremity      Physician: Arsenio Jones, EREN    Physician Orders: Eval and Treat  Medical Diagnosis: Hemiplegia, unspecified etiology, unspecified hemiplegia type, unspecified laterality  Surgical Diagnosis: Not applicable for this Episode   Surgical Date: Not applicable for this Episode  Days Since Last Surgery: Not applicable for this Episode    Visit # / Visits Authorized: 3 / 12  Insurance Authorization Period: 7/3/2025 to 12/31/2025  Date of Evaluation: 7/3/2025  Plan of Care Certification: 7/3/2025 to 8/28/2025      Time In: 0805   Time Out: 0850  Total Time (in minutes): 45   Total Billable Time (in minutes): 45    FOTO:  Intake Score (%): Not applicable for this Episode  Survey Score 2 (%): Not applicable for this Episode  Survey Score 3 (%): Not applicable for this Episode    Precautions:  Additional Precautions and Protocol Details: Standard, Fall Risk, cognitive, seizure    Subjective   Patient's wif reports that jono nhas been having L-hip pain and is barely been able to stand since his on the July 13.2025. Also reoprts that he has been been having L-shoulder pain as well..  Pain reported as 5/10. In L-hip; pain in LUE with movement    Objective   Vital Signs  /67   Pulse 69   BP Location: Right arm  BP Position: Sitting  BP Cuff Size: Adult               Treatment:  Therapeutic Exercise  TE 1: Gentle stretching of the soft tissues of the left  wrist and hand (including gentle mobilization of the related joints as appropriate/ necessary) to decrease edema and improve PROM, potentially allowing for increases in active movement. Pain elicited at 75* shoulder flex, 68* shoulder abd, and with flex flex/ext.  TE 2:  Strengthening of RUE using a 3# dumbbell for 1 x 15 rep, targeting increased muscular endurance and functional arm mobility, also incorporating core strengthing/control for upright sitting posture when sitting in transport chair. Min verbal cues provided for sitting posture and body mechanics during exercise.  Therapeutic Activity  TA 1: SPT w/c<>mat w/ CGA and mod cues for sequencing the set-up; scooting at edge of mat with SBA  TA 2: sit<>supine w/ min A.  Cues given the cross the right ankle over the left and facilitate hip flexion bringing knees to chest as he layed down. Assist required to adjust/center upperbody while in supine.  TA 5: Patient placed pillowcase onto pillow requiring Mod A and vc to complete while sitting edge of mat.    Time Entry(in minutes):  Therapeutic Activity Time Entry: 30  Therapeutic Exercise Time Entry: 15    Assessment & Plan   Assessment: Mynor tolerated treatment session well overall. Increased pain today since seizure per wife's report. Wife spoke with wife post treatment session about concerns of LUE positioning during functional mobility. She feels as though his arm is pulling on his shoulder and that his RLE is getting worse (suggested to speak with physical therapy concern BLE). Wife was invited back on next treatment session for positioning techniques and how to don Mynor's GivMhor sling.   Evaluation/Treatment Tolerance: Patient tolerated treatment well    The patient will continue to benefit from skilled outpatient occupational therapy in order to address the deficits listed in the problem list on the initial evaluation, provide patient and family education, and maximize the patients level of independence in the home and community environments.     The patient's spiritual, cultural, and educational needs were considered, and the patient is agreeable to the plan of care and goals.     Education  Education was done with Patient and Other recipient present. The patient's  learning style includes Listening and Demonstration. The patient Verbalizes understanding and Requires continuing/additional education.  They identified as Spouse/significant other. The reported learning style is Listening. The recipient Verbalizes understanding and Requires continuing/additional education.             Plan: facilitate managing LUE during functional mobility, posutral control, and positioning of LUE in sling.    Goals:   Active       LTG       With MI, patient will perform all basic self-care tasks (grooming, bathing, dressing, toileting) using adaptive strategies or assistive devices as needed, demonstrating safe sequencing and minimal need for external support.       Start:  07/03/25    Expected End:  08/28/25            Patient will achieve full PROM in affected upper extremity to support participation in ADLs and therapeutic exercises without pain.       Start:  07/03/25    Expected End:  08/28/25            Patient will perform functional transfers to multiple surfaces (bed, toilet, chair) with supervision or less, demonstrating improved balance, coordination, and safety awareness.       Start:  07/03/25    Expected End:  08/28/25               STG       Patient will complete UB dressing with Mod A using adaptive techniques or modified strategies.       Start:  07/03/25    Expected End:  07/31/25            Patient will tolerate PROM in affected upper extremity with a report of a decrease in pain to support participation in daily grooming tasks.       Start:  07/03/25    Expected End:  07/31/25            Patient will perform sit-to-stand transfers from transport wheelchair with minimal assistance and use of proper body mechanics, demonstrating progress toward safe mobility and reduced fall risk.       Start:  07/03/25    Expected End:  07/31/25                Damaris Hernandez OT

## 2025-07-23 ENCOUNTER — CLINICAL SUPPORT (OUTPATIENT)
Dept: REHABILITATION | Facility: HOSPITAL | Age: 70
End: 2025-07-23
Payer: MEDICARE

## 2025-07-23 ENCOUNTER — RESULTS FOLLOW-UP (OUTPATIENT)
Dept: FAMILY MEDICINE | Facility: CLINIC | Age: 70
End: 2025-07-23
Payer: MEDICARE

## 2025-07-23 ENCOUNTER — RESULTS FOLLOW-UP (OUTPATIENT)
Dept: UROLOGY | Facility: CLINIC | Age: 70
End: 2025-07-23
Payer: MEDICARE

## 2025-07-23 DIAGNOSIS — Z91.81 AT HIGH RISK FOR FALLS: ICD-10-CM

## 2025-07-23 DIAGNOSIS — G81.94 LEFT HEMIPLEGIA: Primary | ICD-10-CM

## 2025-07-23 DIAGNOSIS — M47.26 OSTEOARTHRITIS OF SPINE WITH RADICULOPATHY, LUMBAR REGION: ICD-10-CM

## 2025-07-23 PROCEDURE — 97530 THERAPEUTIC ACTIVITIES: CPT | Mod: PO

## 2025-07-23 PROCEDURE — 97112 NEUROMUSCULAR REEDUCATION: CPT | Mod: PO

## 2025-07-23 RX ORDER — METHOCARBAMOL 500 MG/1
TABLET, FILM COATED ORAL
Qty: 90 TABLET | Refills: 2 | OUTPATIENT
Start: 2025-07-23

## 2025-07-23 NOTE — TELEPHONE ENCOUNTER
Received refill request for Methocarbamol per [employer]. Upon further assessment it was noted this order has been discontinued.  Please see below for further details.       This Order Has Been Discontinued    Order Status Reason By On   Discontinued Patient no longer taking Justine Lorenzo 7/13/25 1437            methocarbamoL (ROBAXIN) 500 MG Tab [[id number]]

## 2025-07-24 ENCOUNTER — OFFICE VISIT (OUTPATIENT)
Dept: NEUROLOGY | Facility: CLINIC | Age: 70
End: 2025-07-24
Payer: MEDICARE

## 2025-07-24 VITALS
HEART RATE: 60 BPM | HEIGHT: 70 IN | DIASTOLIC BLOOD PRESSURE: 70 MMHG | RESPIRATION RATE: 14 BRPM | BODY MASS INDEX: 22.93 KG/M2 | SYSTOLIC BLOOD PRESSURE: 124 MMHG

## 2025-07-24 DIAGNOSIS — I10 ESSENTIAL (PRIMARY) HYPERTENSION: ICD-10-CM

## 2025-07-24 DIAGNOSIS — G81.94 LEFT HEMIPLEGIA: Primary | ICD-10-CM

## 2025-07-24 DIAGNOSIS — D68.318: ICD-10-CM

## 2025-07-24 DIAGNOSIS — I69.154: ICD-10-CM

## 2025-07-24 DIAGNOSIS — Z86.79 HISTORY OF SPONTANEOUS INTRAPARENCHYMAL INTRACRANIAL HEMORRHAGE: ICD-10-CM

## 2025-07-24 DIAGNOSIS — I61.0 NONTRAUMATIC SUBCORTICAL HEMORRHAGE OF RIGHT CEREBRAL HEMISPHERE: ICD-10-CM

## 2025-07-24 DIAGNOSIS — I69.398 SEIZURE AS LATE EFFECT OF CEREBROVASCULAR ACCIDENT (CVA): ICD-10-CM

## 2025-07-24 DIAGNOSIS — R56.9 SEIZURE AS LATE EFFECT OF CEREBROVASCULAR ACCIDENT (CVA): ICD-10-CM

## 2025-07-24 DIAGNOSIS — G47.33 OBSTRUCTIVE SLEEP APNEA SYNDROME: ICD-10-CM

## 2025-07-24 DIAGNOSIS — I63.89 OTHER CEREBRAL INFARCTION: ICD-10-CM

## 2025-07-24 PROBLEM — I61.5 IVH (INTRAVENTRICULAR HEMORRHAGE): Status: RESOLVED | Noted: 2025-05-13 | Resolved: 2025-07-24

## 2025-07-24 PROBLEM — G47.8 SLEEP AROUSAL DISORDER: Status: RESOLVED | Noted: 2022-01-13 | Resolved: 2025-07-24

## 2025-07-24 PROBLEM — G93.5 BRAIN COMPRESSION: Status: RESOLVED | Noted: 2025-05-13 | Resolved: 2025-07-24

## 2025-07-24 PROBLEM — R94.39 ABNORMAL FINDING ON CARDIOVASCULAR STRESS TEST: Status: RESOLVED | Noted: 2022-01-13 | Resolved: 2025-07-24

## 2025-07-24 PROBLEM — G81.90 HEMIPLEGIA: Status: RESOLVED | Noted: 2025-05-04 | Resolved: 2025-07-24

## 2025-07-24 PROBLEM — R94.31 NONSPECIFIC ABNORMAL ELECTROCARDIOGRAM (ECG) (EKG): Status: RESOLVED | Noted: 2022-01-13 | Resolved: 2025-07-24

## 2025-07-24 PROBLEM — Z01.818 PREOPERATIVE EVALUATION OF A MEDICAL CONDITION TO RULE OUT SURGICAL CONTRAINDICATIONS (TAR REQUIRED): Status: RESOLVED | Noted: 2025-01-29 | Resolved: 2025-07-24

## 2025-07-24 PROBLEM — R20.0 SENSORY LOSS: Status: RESOLVED | Noted: 2025-05-13 | Resolved: 2025-07-24

## 2025-07-24 PROBLEM — G93.6 VASOGENIC BRAIN EDEMA: Status: RESOLVED | Noted: 2025-05-05 | Resolved: 2025-07-24

## 2025-07-24 PROBLEM — R00.2 PALPITATIONS: Status: RESOLVED | Noted: 2020-09-28 | Resolved: 2025-07-24

## 2025-07-24 PROBLEM — E87.0 HYPERNATREMIA: Status: RESOLVED | Noted: 2025-05-13 | Resolved: 2025-07-24

## 2025-07-24 PROBLEM — R10.13 EPIGASTRIC PAIN: Status: RESOLVED | Noted: 2020-10-27 | Resolved: 2025-07-24

## 2025-07-24 PROBLEM — M54.16 LUMBAR RADICULITIS: Status: RESOLVED | Noted: 2019-12-05 | Resolved: 2025-07-24

## 2025-07-24 PROBLEM — Z01.810 ENCOUNTER FOR PREOPERATIVE ASSESSMENT FOR NONCORONARY CARDIAC SURGERY: Status: RESOLVED | Noted: 2021-07-29 | Resolved: 2025-07-24

## 2025-07-24 PROBLEM — I69.322 DYSARTHRIA AS LATE EFFECT OF CEREBROVASCULAR ACCIDENT (CVA): Status: ACTIVE | Noted: 2025-05-05

## 2025-07-24 PROBLEM — R06.83 SNORING: Status: RESOLVED | Noted: 2022-01-13 | Resolved: 2025-07-24

## 2025-07-24 PROBLEM — M47.896 OTHER SPONDYLOSIS, LUMBAR REGION: Status: RESOLVED | Noted: 2020-11-18 | Resolved: 2025-07-24

## 2025-07-24 PROCEDURE — 99214 OFFICE O/P EST MOD 30 MIN: CPT | Mod: PBBFAC,PO | Performed by: NURSE PRACTITIONER

## 2025-07-24 PROCEDURE — 99999 PR PBB SHADOW E&M-EST. PATIENT-LVL IV: CPT | Mod: PBBFAC,,, | Performed by: NURSE PRACTITIONER

## 2025-07-24 NOTE — TELEPHONE ENCOUNTER
----- Message from Cristal Ortiz PA-C sent at 7/23/2025  1:54 PM CDT -----  Fecal calprotectin is normal. No further recommendations at this time.     Cristal Ortiz PA-C    ----- Message -----  From: Lab, Background User  Sent: 7/22/2025   2:40 PM CDT  To: Jeffery Mena MD

## 2025-07-24 NOTE — PROGRESS NOTES
NEUROLOGY  Outpatient Consultation Visit     [facility]  [address] [facility], [place] [address]  [phone] (office) / [phone] (fax)    Patient Name:  Colt Rose  :  1955  MR #:  [id number]  Acct #:  [id number]    Date of  Visit: 2025    Other Physicians:  Jeffery Mena MD (Primary Care Physician)      CHIEF COMPLAINT: Stroke (Hospital follow up / sz on 25 put on keppra )    HISTORY OF PRESENT ILLNESS:  Colt Rose is a 70 y.o. R-handed male seen in hospital follow up for new onset seizure and recent R parietal ICH     Medical history is significant for WAP / Aflutter s/p ablation, pre-DM, HLD, ARIS on CPAP, lumbar & cervical spondylosis with radiculopathy    Here with his wife, who assists with history.     Admitted to [facility] in early May 2025 with nontraumatic R lobar ICH. Symptoms at onset were LSW and R gaze. CTA negative for vascular etiology. He was on ASA prophylaxis at the time. He did not require NSGY intervention. He went to [facility] afterwards.     He presented back to the ED at [facility] on  after suspected seizure at home. After exercising on his bike and using a vibration plate, he went to the restroom. While sitting on the toilet, his RLE began to shake uncontrollably, which progressed to the LLE as well. He recalls his leg extending out and then blacking out. Wife heard strange noise and found him sitting on the toilet with his extremities in flexion, convulsing. His eyes were open, but he didn't respond. It lasted approximately 5 minutes. He reports bilateral tongue bite with the event. Of note, during prior hospitalization for intracerebral hemorrhage, he underwent EEG for right upper extremity rhythmic movements that did not show epileptic correlates. He was not on any AED during that admission.     He was dc with Keppra 500 mg BID. Initially, he had a lot of drowsiness, which has improved somewhat. He is napping during the day. He is not  "sleeping well overnight. He wakes after a few hours, perhaps due to L shoulder pain. He can sleep longer in the recliner. He has ARIS and reports CPAP compliance. He has had some emotional lability since starting Keppra, which is described as frequent crying spells. He also recently had a vision of his  father while having a prostate MRI. He had some depression after the stroke and was on Prozac / Lexapro for a while, but has stopped these.     He hasn't been able to do much outpatient therapy due to construction at the facility. He had regained some LLE strength, but feels that he has regressed since the seizure. They do have PT/OT/ST scheduled starting in the next week. He has had 4 falls at home without significant injury. They renovated their bathroom to accommodate him.      Allergies:  Review of patient's allergies indicates:   Allergen Reactions    Amlodipine Other (See Comments)     Flushed face, resolved with Hydroxyzine and Pepcid    Aspirin     Benadryl [diphenhydramine hcl] Other (See Comments)     Per wife "pt collapsed and ended up in hospital" ~    Pennsaid [diclofenac sodium] Rash and Blisters       Current Medications:  Current Medications[1]    Past Medical History:  Past Medical History:   Diagnosis Date    Arthritis of hand 2015    Cervical disc displacement     Degeneration of lumbar intervertebral disc     GERD (gastroesophageal reflux disease)     gastric polyps    Hip pain 2016    Hyperlipidemia     Shingles 2013    Sleep apnea     Stroke 2025       Past Surgical History:  Past Surgical History:   Procedure Laterality Date    ABLATION, SVT, ACCESSORY PATHWAY N/A 2023    Procedure: Ablation, SVT, Accessory Pathway;  Surgeon: Goyo Sky MD;  Location: [facility] EP LAB;  Service: Cardiology;  Laterality: N/A;  PAC, RFA, CARTO, MAC, GP, 3 PREP    CERVICAL FUSION      bone graft    COLONOSCOPY N/A 2021    Procedure: COLONOSCOPY;  Surgeon: Tyler Story, " MD;  Location: [facility];  Service: Endoscopy;  Laterality: N/A;    COLONOSCOPY N/A 10/30/2024    Procedure: COLONOSCOPY;  Surgeon: Tyler Story MD;  Location: [facility] ENDO;  Service: Endoscopy;  Laterality: N/A;  m/ppm    EPIDURAL STEROID INJECTION INTO LUMBAR SPINE N/A 12/5/2019    Procedure: Injection-steroid-epidural-lumbar;  Surgeon: Adarsh Kraft MD;  Location: [facility] OR;  Service: Pain Management;  Laterality: N/A;  L5-S1    ESOPHAGOGASTRODUODENOSCOPY N/A 10/27/2020    Procedure: EGD (ESOPHAGOGASTRODUODENOSCOPY);  Surgeon: Tyler Story MD;  Location: [facility];  Service: Endoscopy;  Laterality: N/A;    ESOPHAGOGASTRODUODENOSCOPY N/A 2/14/2025    Procedure: EGD (ESOPHAGOGASTRODUODENOSCOPY);  Surgeon: Tyler Story MD;  Location: [facility];  Service: Endoscopy;  Laterality: N/A;    INJECTION OF ANESTHETIC AGENT AROUND MEDIAL BRANCH NERVES INNERVATING LUMBAR FACET JOINT Bilateral 11/18/2020    Procedure: BLOCK, NERVE, LUMBAR, MEDIAL BRANCH Bilateral L3,4,5;  Surgeon: Adarsh Kraft MD;  Location: [facility] OR;  Service: Pain Management;  Laterality: Bilateral;    INJECTION OF ANESTHETIC AGENT AROUND MEDIAL BRANCH NERVES INNERVATING LUMBAR FACET JOINT Left 10/25/2023    Procedure: Block-nerve-medial branch-lumbar;  Surgeon: Adarsh Kraft MD;  Location: [facility] OR;  Service: Anesthesiology;  Laterality: Left;  l4-5, l5-s1 MBB    INJECTION OF ANESTHETIC AGENT AROUND MEDIAL BRANCH NERVES INNERVATING LUMBAR FACET JOINT Bilateral 11/28/2023    Procedure: Block-nerve-medial branch-lumbar;  Surgeon: Adarsh Kraft MD;  Location: [facility] OR;  Service: Anesthesiology;  Laterality: Bilateral;  L4.5 and l5/s1 MBB #2    RADIOFREQUENCY ABLATION OF LUMBAR MEDIAL BRANCH NERVE AT SINGLE LEVEL Bilateral 1/29/2021    Procedure: Radiofrequency Ablation, Nerve, Spinal, Lumbar, Medial Branch, 1 Level;  Surgeon: Adarsh Kraft MD;  Location: [facility] OR;  Service: Pain Management;  Laterality: Bilateral;  L3,4,5    TONSILLECTOMY,  "ADENOIDECTOMY         Family History:  family history includes Coronary artery disease in his mother; Diabetes in his mother; Heart disease in his brother; Hypertension in his mother; Stroke in his father and mother.    Social History:   reports that he has never smoked. He has never used smokeless tobacco. He reports that he does not currently use alcohol after a past usage of about 6.0 standard drinks of alcohol per week. He reports that he does not use drugs.      REVIEW OF SYSTEMS:  As per HPI    PHYSICAL EXAM:  /70 (BP Location: Left arm, Patient Position: Sitting)   Pulse 60   Resp 14   Ht 5' 10" (1.778 m)   BMI 22.93 kg/m²     General: Well groomed. No acute distress. In WC.   Pulmonary: Normal effort and rate.   Musculoskeletal: No obvious joint deformities, moves all extremities well.  Extremities: No clubbing, cyanosis. Mild dependent edema in L extremities    Neurological Exam  Mental Status  Awake and alert. Oriented to person, place, time and situation. Mild dysarthria present. Able to name objects and repeat. Follows two-step commands. Fund of knowledge is appropriate for level of education.    Cranial Nerves  CN II: Right visual acuity: Finger movement. Left visual acuity: Finger movement. Visual fields full to confrontation.  CN III, IV, VI: Extraocular movements intact bilaterally. Normal lids and orbits bilaterally.  CN V: Facial sensation is normal.  CN VII:  Left: There is central facial weakness.  CN VIII: Hearing is normal.  CN IX, X: Palate elevates symmetrically. Normal gag reflex.  CN XI:  Left: Trapezius strength is weak.  CN XII: Tongue midline without atrophy or fasciculations.    Motor  Normal muscle bulk throughout. No fasciculations present. Increased muscle tone. Mild at L wrist . Strength is 5/5 in all four extremities except as noted. Left hemiparesis.  Contraction noted in L IP, otherwise flaccid .    Sensory  Light touch is normal in upper and lower extremities.   + L " neglect with simultaneous stimulation.    Reflexes                                            Right                      Left  Brachioradialis                    2+                         3+  Biceps                                 2+                         3+  Patellar                                1+                         1+    Coordination  Right: Finger-to-nose normal.    Gait   Unable to rise from chair without using arms.  Deferred due to fall risk .    DIAGNOSTIC DATA:  I have personally reviewed provider notes, labs and imaging made available to me today.     Imaging:  Results for orders placed during the hospital encounter of 05/04/25    MRI Brain Without Contrast        Narrative  EXAMINATION:  MRI BRAIN WITHOUT CONTRAST    CLINICAL HISTORY:  Stroke, follow up;eval for amyloid, NEED SWI;    TECHNIQUE:  Multiplanar multisequence MR imaging of the brain was performed without contrast.    COMPARISON:  CT 05/04/2025    FINDINGS:  Allowing for difference in technique, there is an increase in size of the intraparenchymal hemorrhage particularly along the posterior margin.  Blood products grossly measure 53 x 41 mm in transverse dimensions by 15 mm in vertical dimension.    5 mm midline shift to the left has mildly progressed.  The basal cisterns are patent.    Susceptibility within the right temporal sulci may likely reflects very mild adjacent subarachnoid hemorrhage.  Stable mild intraventricular hemorrhage.    No acute territorial infarct.  No hydrocephalus.    Normal arterial flow voids are preserved at the skull base.    The visualized sinuses and mastoid air cells are clear.    Impression  Increase in size of intraparenchymal hemorrhage with increased mild midline shift.  No hydrocephalus.      Electronically signed by: Geoff Tavarez  Date:    05/05/2025  Time:    08:18    Vascular imaging:  CTA head & neck:  Impression:  1. Acute right frontal intraparenchymal hematoma with mild adjacent edema is  similar to the recent prior study.  Follow-up recommended.  2. No high-grade stenosis or major vessel occlusion.     EE25:  Impression:   Abnormal study. There is continuous right hemispheric slowing consistent with the patient's history of prior right sided nontraumatic cerebral hemorrhage.  Additionally, there is mild background slowing consistent with a mild diffuse encephalopathy (or with excessive drowsiness given wakefulness is only briefly noted).  No epileptiform activity is seen.    Cardiac:  Results for orders placed or performed during the hospital encounter of 25   EKG, 12 - Lead    Collection Time: 25 10:29 AM   Result Value Ref Range    QRS Duration 104 ms    OHS QTC Calculation 412 ms    Narrative    Test Reason : I61.9,    Vent. Rate :  67 BPM     Atrial Rate :  67 BPM     P-R Int : 170 ms          QRS Dur : 104 ms      QT Int : 390 ms       P-R-T Axes :  54  12  40 degrees    QTcB Int : 412 ms    Normal sinus rhythm  Normal ECG  When compared with ECG of 04-May-2025 16:16,  No significant change was found  Confirmed by Colt Alba ([id number]) on 2025 2:30:00 PM    Referred By: TUTU LEVINE           Confirmed By: Colt Alba       Labs:  Lab Results   Component Value Date    WBC 6.15 07/15/2025    HGB 13.0 (L) 07/15/2025    HCT 39.2 (L) 07/15/2025     07/15/2025    MCV 95 07/15/2025    RDW 13.5 07/15/2025     Lab Results   Component Value Date     2025    K 4.0 2025     2025    CO2 24 2025    BUN 18 2025    CREATININE 0.6 2025    GLU 95 2025    CALCIUM 9.5 2025    MG 1.8 07/15/2025    PHOS 4.6 (H) 07/15/2025     Lab Results   Component Value Date    PROT 7.1 2025    ALBUMIN 3.6 2025    BILITOT 0.4 2025    AST 25 2025    ALKPHOS 81 2025    ALT 33 2025     Lab Results   Component Value Date    INR 1.1 2025    PROTIME 11.8 2025     Lab Results   Component Value  "Date    CHOL 168 07/21/2025    HDL 43 07/21/2025    LDLCALC 106.0 07/21/2025    TRIG 95 07/21/2025    CHOLHDL 25.6 07/21/2025     Lab Results   Component Value Date    HGBA1C 5.5 07/21/2025      Lab Results   Component Value Date    QHMBXRDU93 505 06/05/2010     No results found for: "FOLATE"  Lab Results   Component Value Date    TSH 4.016 07/13/2025       ASSESSMENT & PLAN:  Colt Rose is a 70 y.o. R-handed male seen in hospital follow up for new onset seizure and R ICH.     Problem List Items Addressed This Visit          Neuro    History of spontaneous intraparenchymal intracranial hemorrhage    Overview   5/2025  No e/o vascular malformation   No history of HTN  ?underlying CAA         Current Assessment & Plan   Will plan for repeat MRI brain w wo at [facility] for SWI sequence for further evaluation.   Discussed possible underlying CAA as etiology -- based on MRI, will discuss referral to Dr. Loera in CAA clinic.   Discussed the need to continue to avoid blood thinning agents   -- reviewed use of Mobic for MSK pain in light of this. Recommend trial of at least reducing the dose and using PRN rather than scheduled but ultimately would recommend dc in favor of neuropathic agents or Tylenol.   BP at goal today  Pt is a non smoker  He is on atorvastatin with last LDL noted to be 106 - ok to continue          Seizure as late effect of cerebrovascular accident (CVA)    Overview   Onset 7/2025         Current Assessment & Plan   Discussed seizure as late effect of R hemispheric encephalomalacia / resolving ICH  Assessed tolerance to Keppra -- noting potential SE of emotional lability. Will try converting to Briviact 50 mg BID. Discussed possibility that he may have PBA post CVA, but he does not want to try another SSRI at this point.   Seizure education provided to pt and wife. He understands 6 month driving restriction in accordance with [place] state laws. See AVS information provided with regards to potential " triggers, etc.          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    This includes face to face time with the patient, as well as non-face to face time preparing for and completing the visit (review of prior diagnostic testing and clinical notes, obtaining or reviewing history, documenting clinical information in the EMR, independently interpreting and communicating results to the patient/family and coordinating ongoing care).       I appreciate the opportunity to participate in the care of this patient. Please feel free to contact me with any concerns or questions.       Fernanda Reilly, ACNPC-AG  [facility]  [address] [place] [address]         [1]   Current Outpatient Medications   Medication Sig Dispense Refill    atorvastatin (LIPITOR) 20 MG tablet TAKE ONE TABLET BY MOUTH ONCE DAILY 90 tablet 3    carboxymethylcellulose sodium (THERATEARS) 0.25 % Drop Place 0.05 mLs into both eyes 2 (two) times a day.      D-MANNOSE ORAL Take 3 capsules by mouth once daily. 2 caps QAM and 1 cap QHS      meloxicam (MOBIC) 15 MG tablet Take 15 mg by mouth once daily.      omeprazole (PRILOSEC) 40 MG capsule  Take 1 capsule (40 mg total) by mouth every morning. 90 capsule 3    brivaracetam (BRIVIACT) 50 mg Tab Take 1 tablet (50 mg total) by mouth 2 (two) times daily. 60 tablet 2    LIDOcaine (LIDODERM) 5 % Place 1 patch onto the skin once daily. Remove & Discard patch within 12 hours or as directed by MD (Patient not taking: Reported on 7/17/2025)      [Paused] magnesium oxide (MAG-OX) 400 mg (241.3 mg magnesium) tablet Take 1 tablet (400 mg total) by mouth once daily. (Patient not taking: Reported on 7/17/2025) 90 tablet 3     No current facility-administered medications for this visit.     Facility-Administered Medications Ordered in Other Visits   Medication Dose Route Frequency Provider Last Rate Last Admin    lactated ringers infusion   Intravenous Continuous Adarsh Kraft MD        lactated ringers infusion   Intravenous Continuous Adarsh Kraft MD        LIDOcaine (PF) 10 mg/ml (1%) injection 10 mg  1 mL Intradermal Once Adarsh Kraft MD        LIDOcaine (PF) 10 mg/ml (1%) injection 10 mg  1 mL Intradermal Once Adarsh Kraft MD          stated

## 2025-07-24 NOTE — ASSESSMENT & PLAN NOTE
Continue plan for outpatient therapies  L AFO provided today   Consider referral to PMR if needed in the future

## 2025-07-24 NOTE — ASSESSMENT & PLAN NOTE
Will plan for repeat MRI brain w wo at [facility] for SWI sequence for further evaluation.   Discussed possible underlying CAA as etiology -- based on MRI, will discuss referral to Dr. Loera in CAA clinic.   Discussed the need to continue to avoid blood thinning agents   -- reviewed use of Mobic for MSK pain in light of this. Recommend trial of at least reducing the dose and using PRN rather than scheduled but ultimately would recommend dc in favor of neuropathic agents or Tylenol.   BP at goal today  Pt is a non smoker  He is on atorvastatin with last LDL noted to be 106 - ok to continue

## 2025-07-24 NOTE — ASSESSMENT & PLAN NOTE
Discussed seizure as late effect of R hemispheric encephalomalacia / resolving ICH  Assessed tolerance to Keppra -- noting potential SE of emotional lability. Will try converting to Briviact 50 mg BID. Discussed possibility that he may have PBA post CVA, but he does not want to try another SSRI at this point.   Seizure education provided to pt and wife. He understands 6 month driving restriction in accordance with [place] state laws. See AVS information provided with regards to potential triggers, etc.

## 2025-07-24 NOTE — PATIENT INSTRUCTIONS
Try reducing Meloxicam to 1/2 dose and if able, use as needed rather than on schedule daily due to potential for increase in bleeding risk   Ok to use Tylenol as needed for pain   Continue to avoid blood thinners like aspirin   Goal normal BP -- < 130/80     Try switching seizure medication to help with emotional lability  Switch directly to Brivact 50 mg twice per day   This was sent to [facility] pharmacy for help in getting the prior authorization. Let me know if the cost is an issue and we can consider an alternative seizure medication.     Order placed for repeat MRI of the brain -- needs to be done at [facility]     Seizure education:  During a seizure:  - Ensure the person is in a safe place, remove hard or sharp objects from the area  - Turn the person on their side  - Never force anything into their mouth  - Keep on eye on the time, if the jerking/shaking/tensing of the muscles lasts > 5 minutes, call 911.    After a seizure:  - Allow them to lie quietly, gently call them to see if they wake up  - If there is injury or if they are not waking up within 5 minutes, call 911    Safety:  - Take showers, not baths  - Take care when around bodies of water (swimming pools, lakes, etc) and wear protective gear. Do not swim alone  - Use equipment with automatic shutoff safety features  - Do not climb ladders or use heavy machinery until seizure-free for 6 months  - Use the back burners when cooking  - If you have children, change diapers on the floor and avoid holding them while taking stairs  - Do not drive until seizure-free for 6 months    Triggers:  - Be sure to take you medication as scheduled without missed doses  - Be sure to get 8 hours of sleep each night  - Certain medications to avoid:   - Benadryl (diphenhydramine)   - Tramadol (Ultram)   - Certain antibiotics in the fluoroquinolone class (levaquin or cipro)   - Wellbutrin    - Chantix   - Alcohol   - Other illegal drugs or stimulant medications  - Herbal  "supplements to avoid that can lower the seizure threshold:   - Asafoetida, Borage, Ephedra, Ergot, Eucalyptus, Evening primrose, Ginkgo biloba, Ginseng, Pennyroyal, Hever, Shankpushpi, Star anise, Star fruit, Wormwood, and Yohimbine    STROKE EDUCATION:    During a stroke, blood stops flowing to part of the brain. This can damage areas in the brain that control the rest of the body. Symptoms after a stroke depend on which part of the brain has been affected. A TIA is often called a "mini stroke" and can be a warning sign for future strokes.     Stroke Risk Factors:  - Previous stroke  - High blood pressure  - High cholesterol  - Cigarette/cigar smoking  - Diabetes  - Carotid or other artery disease  - Atrial fibrillation/flutter  - Obesity  - Blood clotting disorders  - Excessive alcohol use  - Street drug use  - Family history of stroke    Call 911 right away if you have any of the following symptoms of stroke:  Weakness, tingling, or loss of feeling on one side of your face or body  Sudden double vision or trouble seeing in one or both eyes  Sudden trouble talking or slurred speech  Trouble understanding others  Sudden, severe headache  Dizziness, loss of balance, or a sense of falling  Blackouts or seizures     F.A.S.T. is an easy way to remember the signs of stroke. When you see these signs, you know that you need to call 911 FAST!   F is for face drooping. One side of the face is drooping or numb. When the person smiles, the smile is uneven.   A is for arm weakness. One arm is weak or numb. When the person lifts both arms at the same time, one arm may drift downward.   S is for speech difficulty. You may notice slurred speech or trouble speaking. The person can't repeat a simple sentence correctly when asked.   T is for time to call 911. If someone shows any of these symptoms, even if they go away, call 911 immediately. Make note of the time the symptoms first appeared.        Follow up with me in 6 weeks -- can " do a virtual visit    Please call our clinic at [phone] or send a message on the [employer] portal if there are any changes to the plan discussed today. For example, if you are not contacted for the requested tests, referral(s) within one week, if you are unable to receive the medications prescribed, or if you feel you need to change the treatment course for any reason.

## 2025-07-25 ENCOUNTER — CLINICAL SUPPORT (OUTPATIENT)
Dept: REHABILITATION | Facility: HOSPITAL | Age: 70
End: 2025-07-25
Payer: MEDICARE

## 2025-07-25 VITALS — HEART RATE: 67 BPM | DIASTOLIC BLOOD PRESSURE: 66 MMHG | SYSTOLIC BLOOD PRESSURE: 138 MMHG

## 2025-07-25 DIAGNOSIS — I69.154: Primary | ICD-10-CM

## 2025-07-25 DIAGNOSIS — Z91.81 AT HIGH RISK FOR FALLS: ICD-10-CM

## 2025-07-25 DIAGNOSIS — R68.89 IMPAIRED FUNCTION OF UPPER EXTREMITY: ICD-10-CM

## 2025-07-25 PROCEDURE — 97110 THERAPEUTIC EXERCISES: CPT | Mod: KX,PO

## 2025-07-25 PROCEDURE — 97110 THERAPEUTIC EXERCISES: CPT | Mod: PO

## 2025-07-25 PROCEDURE — 97112 NEUROMUSCULAR REEDUCATION: CPT | Mod: PO

## 2025-07-25 PROCEDURE — 97530 THERAPEUTIC ACTIVITIES: CPT | Mod: KX,PO

## 2025-07-25 PROCEDURE — 97112 NEUROMUSCULAR REEDUCATION: CPT | Mod: KX,PO

## 2025-07-25 PROCEDURE — 97535 SELF CARE MNGMENT TRAINING: CPT | Mod: PO

## 2025-07-25 NOTE — PROGRESS NOTES
Outpatient Rehab    Occupational Therapy Visit    Patient Name: Colt Rose  MRN: [id number]  YOB: 1955  Encounter Date: 7/25/2025    Therapy Diagnosis:   Encounter Diagnoses   Name Primary?    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 Yes    Impaired function of upper extremity      Physician: Jeffery Mena MD    Physician Orders: Eval and Treat  Medical Diagnosis: Hemiplegia, unspecified etiology, unspecified hemiplegia type, unspecified laterality  Surgical Diagnosis: Not applicable for this Episode   Surgical Date: Not applicable for this Episode  Days Since Last Surgery: Not applicable for this Episode    Visit # / Visits Authorized: 5 / 12  Insurance Authorization Period: 7/3/2025 to 12/31/2025  Date of Evaluation: 7/3/2025  Plan of Care Certification: 7/3/2025 to 8/28/2025      Time In: 0932   Time Out: 1015  Total Time (in minutes): 43   Total Billable Time (in minutes): 40    FOTO:  Intake Score (%): Not applicable for this Episode  Survey Score 2 (%): Not applicable for this Episode  Survey Score 3 (%): Not applicable for this Episode    Precautions:  Additional Precautions and Protocol Details: Standard, Fall Risk, cognitive, seizure    Subjective   He has a loaner AFO to wear for a couple of days. He says he hasn't been doing much at home, but now that he has the AFO he pictures himself doing more. October is speckled trout season and he wants to be able to fish. He states wife is helping with UB dressing..  Pain reported as 2/10. left shoulder    Objective   Vital Signs  /66   Pulse 67   BP Location: Right arm  BP Position: Sitting  BP Cuff Size: Adult               Treatment:  Therapeutic Exercise  TE 1: Gentle stretching of the soft tissues of the left  wrist and hand (including gentle mobilization of the related joints as appropriate/ necessary) to decrease edema and improve PROM, potentially  allowing for increases in active movement.  TE 2: Placed flat paddle on pt's left hand for duration of treatment to inhibit flexor tone and provide LLPS to long finger flexors.  Self Care and ADLs  ADL 1: vitals  ADL 2: SPT w/c->mat to the L w/ CGA and mod cues for completing the turn before sitting.  ADL 3: Doffed t-shirt w/ supervision and increased time at EOM.  ADL 4: Donned t-shirt w/ SBA-supervision and min cues; visual feedback from mirror to straighten shirt.  Balance/Neuromuscular Re-Education  NMR 1: Facilitation of anterior/ posterior pelvic tilt, right/ left lateral pelvic tilt. Points of contact at low back and sternum for anterior tilt, ASIS and superior shoulder for posterior tilt, appropriate QL and opposite ribcage/shoulder for lateral tilting. Hands at sides in position of support to facilitate scapulo-pelvic rhythm with facilitation of downward push of hands into blocks to increase upper extremity incorporation into the task.    Time Entry(in minutes):  Neuromuscular Re-Education Time Entry: 15  Self Care/Home Management (ADLs) Time Entry: 10  Therapeutic Exercise Time Entry: 15    Assessment & Plan   Assessment: Colt is easily distracted but was able to participate in all of today's tasks with reminders to focus on task at hand. During dressing, he needed verbal and visual cues for his t-shirt but no physical assistance. Recommend that he be given daily opportunities for problem-solving during upper body dressing and wife can assist with lower body dressing due to concerns for safety. Will continue to address dressing to increase patient's independence and safety with the task.   Evaluation/Treatment Tolerance: Patient tolerated treatment well    The patient will continue to benefit from skilled outpatient occupational therapy in order to address the deficits listed in the problem list on the initial evaluation, provide patient and family education, and maximize the patients level of  independence in the home and community environments.     The patient's spiritual, cultural, and educational needs were considered, and the patient is agreeable to the plan of care and goals.     Education  Education was done with Patient and Other recipient present. The patient's learning style includes Listening and Demonstration. The patient Verbalizes understanding and Requires continuing/additional education. spouse participated in education.   The recipient Verbalizes understanding and Requires continuing/additional education.     HEP for pelvic tilting; maximizing Colt's active participation in upper body dressing       Plan: ADLs and positioning of L UE during function/ attempt to use it for support. transfers    Goals:   Active       LTG       With MI, patient will perform all basic self-care tasks (grooming, bathing, dressing, toileting) using adaptive strategies or assistive devices as needed, demonstrating safe sequencing and minimal need for external support. (Progressing)       Start:  07/03/25    Expected End:  08/28/25            Patient will achieve full PROM in affected upper extremity to support participation in ADLs and therapeutic exercises without pain. (Ongoing)       Start:  07/03/25    Expected End:  08/28/25            Patient will perform functional transfers to multiple surfaces (bed, toilet, chair) with supervision or less, demonstrating improved balance, coordination, and safety awareness. (Progressing)       Start:  07/03/25    Expected End:  08/28/25               STG       Patient will complete UB dressing with Mod A using adaptive techniques or modified strategies. (Progressing)       Start:  07/03/25    Expected End:  07/31/25            Patient will tolerate PROM in affected upper extremity with a report of a decrease in pain to support participation in daily grooming tasks. (Ongoing)       Start:  07/03/25    Expected End:  07/31/25            Patient will perform sit-to-stand  transfers from transport wheelchair with minimal assistance and use of proper body mechanics, demonstrating progress toward safe mobility and reduced fall risk. (Progressing)       Start:  07/03/25    Expected End:  07/31/25                Jazzy Wilkins OT

## 2025-07-25 NOTE — PATIENT INSTRUCTIONS
Sit on a hard surface and position your hands at your sides. Try and keep both hands flat on the surface at your side.  (Elevate them if necessary using yoga blocks or shoe boxes stuffed with a pillow or blanket and taped up. Place a piece of  under and over the box/block to make sure it doesnt slip.)       Anterior and Posterior Pelvic Tilt  Roll your pelvis forward so that you sit up tall (anterior tilt) then roll your pelvis backward so youre slouched (posterior tilt). Make your movements as big as possible. Repeat about 20 times. Do 5 sets throughout the day.                              Lateral Pelvic Tilt  Sitting up tall with an anterior pelvic tilt, lift your right hip up toward your right armpit (DO NOT lean way over to the side). Then return to neutral. Lift the left hip up toward your left armpit. Repeat about 20 times. Do 5 sets throughout the day.

## 2025-07-28 ENCOUNTER — TELEPHONE (OUTPATIENT)
Dept: FAMILY MEDICINE | Facility: CLINIC | Age: 70
End: 2025-07-28
Payer: MEDICARE

## 2025-07-28 ENCOUNTER — CLINICAL SUPPORT (OUTPATIENT)
Dept: REHABILITATION | Facility: HOSPITAL | Age: 70
End: 2025-07-28
Payer: MEDICARE

## 2025-07-28 ENCOUNTER — PATIENT MESSAGE (OUTPATIENT)
Dept: FAMILY MEDICINE | Facility: CLINIC | Age: 70
End: 2025-07-28

## 2025-07-28 ENCOUNTER — OFFICE VISIT (OUTPATIENT)
Dept: FAMILY MEDICINE | Facility: CLINIC | Age: 70
End: 2025-07-28
Payer: MEDICARE

## 2025-07-28 VITALS
HEIGHT: 70 IN | RESPIRATION RATE: 16 BRPM | SYSTOLIC BLOOD PRESSURE: 118 MMHG | BODY MASS INDEX: 25.34 KG/M2 | HEART RATE: 60 BPM | WEIGHT: 177 LBS | OXYGEN SATURATION: 97 % | DIASTOLIC BLOOD PRESSURE: 66 MMHG | TEMPERATURE: 98 F

## 2025-07-28 DIAGNOSIS — E78.2 MIXED HYPERLIPIDEMIA: Primary | ICD-10-CM

## 2025-07-28 DIAGNOSIS — R73.03 PREDIABETES: ICD-10-CM

## 2025-07-28 DIAGNOSIS — I69.154: ICD-10-CM

## 2025-07-28 DIAGNOSIS — I69.154: Primary | ICD-10-CM

## 2025-07-28 DIAGNOSIS — R97.20 ELEVATED PSA: Primary | ICD-10-CM

## 2025-07-28 DIAGNOSIS — R97.20 ELEVATED PSA: ICD-10-CM

## 2025-07-28 DIAGNOSIS — K21.9 GASTROESOPHAGEAL REFLUX DISEASE WITHOUT ESOPHAGITIS: ICD-10-CM

## 2025-07-28 DIAGNOSIS — I10 ESSENTIAL (PRIMARY) HYPERTENSION: ICD-10-CM

## 2025-07-28 DIAGNOSIS — R56.9 SEIZURE AS LATE EFFECT OF CEREBROVASCULAR ACCIDENT (CVA): ICD-10-CM

## 2025-07-28 DIAGNOSIS — Z91.81 AT HIGH RISK FOR FALLS: ICD-10-CM

## 2025-07-28 DIAGNOSIS — I69.398 SEIZURE AS LATE EFFECT OF CEREBROVASCULAR ACCIDENT (CVA): ICD-10-CM

## 2025-07-28 PROCEDURE — 99214 OFFICE O/P EST MOD 30 MIN: CPT | Mod: S$PBB,,,

## 2025-07-28 PROCEDURE — 99215 OFFICE O/P EST HI 40 MIN: CPT | Mod: PBBFAC,PO

## 2025-07-28 PROCEDURE — 99999 PR PBB SHADOW E&M-EST. PATIENT-LVL V: CPT | Mod: PBBFAC,,,

## 2025-07-28 PROCEDURE — G2211 COMPLEX E/M VISIT ADD ON: HCPCS | Mod: ,,,

## 2025-07-28 PROCEDURE — 97110 THERAPEUTIC EXERCISES: CPT | Mod: KX,PO

## 2025-07-28 PROCEDURE — 97530 THERAPEUTIC ACTIVITIES: CPT | Mod: KX,PO

## 2025-07-28 RX ORDER — MELOXICAM 15 MG/1
15 TABLET ORAL DAILY
Qty: 90 TABLET | Refills: 3 | Status: SHIPPED | OUTPATIENT
Start: 2025-07-28 | End: 2025-10-26

## 2025-07-28 RX ORDER — CIPROFLOXACIN 500 MG/1
500 TABLET, FILM COATED ORAL 2 TIMES DAILY
Qty: 60 TABLET | Refills: 0 | Status: SHIPPED | OUTPATIENT
Start: 2025-07-28 | End: 2025-08-27

## 2025-07-28 NOTE — TELEPHONE ENCOUNTER
Spoke w pts wife she message with concern of pts mri results   She also noted that pt needed abx   Upon chart review no meds prescribed     I did review chart where its noted that NP would get Urologist to review MRI and labs   But , pts wife voiced no one ever contacted her    Please review and give further recs  As per Dr Huang office we had been trying to reach them about a abx.    No documentation in chart or medications prescribed

## 2025-07-28 NOTE — TELEPHONE ENCOUNTER
Can we please try to call [facility] to check on his Briviact? Apparently was sent to wrong pharmacy - pt prefers [facility]. I am not sure if this rx will have to be transferred as it is controlled.

## 2025-07-28 NOTE — PROGRESS NOTES
Outpatient Rehab    Physical Therapy Visit    Patient Name: Colt Rose  MRN: [id number]  YOB: 1955  Encounter Date: 7/21/2025    Therapy Diagnosis:   Encounter Diagnoses   Name Primary?    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  Surgical Diagnosis: Not applicable for this Episode   Surgical Date: Not applicable for this Episode  Days Since Last Surgery: Not applicable for this Episode    Visit # / Visits Authorized:  9 / 20  Insurance Authorization Period: 6/25/2025 to 12/31/2025  Date of Evaluation: 6/27/2025  Plan of Care Certification: 6/30/2025 to 10/1/2025      PT/PTA:     Number of PTA visits since last PT visit:   Time In: 1101   Time Out: 1145  Total Time (in minutes): 44   Total Billable Time (in minutes): 44    FOTO:  Intake Score (%): 50  Survey Score 2 (%): Not applicable for this Episode  Survey Score 3 (%): Not applicable for this Episode    Precautions:  Additional Precautions and Protocol Details: Standard, Fall Risk, cognitive, seizure      Subjective   He had a seizure the MD believes is likely from his stroke. He was in the hospital for observation. He has had some low back and hip soreness post seizures..  Family / care giver present for this visit:  (wife present in lobby and observed part of treatment)  Pain reported as 3/10. low back and left hip    Objective            Treatment:  Therapeutic Exercise  TE 1: QL stretch with knees to right side 3 x 30 sec  TE 2: SKTC 3 x 20 sec  Manual Therapy  MT 1: soft tissue massage to left QL and gluteals to increase range of motion for approx 10 minutes to decrease pain and improve range of motion  Balance/Neuromuscular Re-Education  NMR 1: standing at high mat with left upper  extremity supported on yoga block and right UE performing Blazepod taps 2 x 3 minutes  NMR 2: supine posterior pelvic tilts 15 x 3 sec holds  NMR 3: PPT + glute bridging 2 x 10  NMR 4: glute bridges with posterior knees on peanut x 10    Time Entry(in minutes):  Manual Therapy Time Entry: 10  Neuromuscular Re-Education Time Entry: 20  Therapeutic Exercise Time Entry: 13    Assessment & Plan   Assessment: Colt tolerated treatment session well. He reports soreness post seizure and hospitalization previous week. Performed mobility stretching for low back and left hip with supine stability exercises to improve tolerance to activities and exercises in future. Wife present for portion of supine exercises to be able to assist in technique at home. Given home exercise program to perform daily as tolerated.   Evaluation/Treatment Tolerance: Patient tolerated treatment well    The patient will continue to benefit from skilled outpatient physical therapy in order to address the deficits listed in the problem list on the initial evaluation, provide patient and family education, and maximize the patients level of independence in the home and community environments.     The patient's spiritual, cultural, and educational needs were considered, and the patient is agreeable to the plan of care and goals.     Education  Education was done with Patient and Other recipient present. The patient's learning style includes Listening and Demonstration. The patient Verbalizes understanding and Requires continuing/additional education. spouse participated in education. They identified as Spouse/significant other. The reported learning style is Listening. The recipient Verbalizes understanding and Requires continuing/additional education.     Home exercise program for low back and hip pain.       Plan: Continue with POC to improve left sided motor return, balance, transfers, and ambulation.    Goals:   Active       Long Term Goals        Patient will ascend/descend four 6 inch stairs safely using single handrail and standby assistance (Progressing)       Start:  06/27/25    Expected End:  10/01/25            Patient will demonstrate improved gait endurance by ambulating 200 ft during 2 minute walk test with use of LRAD and without seated rest break.  (Progressing)       Start:  06/27/25    Expected End:  10/01/25            Pt will ambulate using tito walker with standby assistance to improve mobility in household.  (Progressing)       Start:  06/27/25    Expected End:  10/01/25            Patient will demonstrate ability to transition floor to stand with external support to reduce assistance needed in case of fall. (Progressing)       Start:  06/27/25    Expected End:  10/01/25               Short Term Goals       Patient will be given initial HEP and report compliance with home exercise program 5 days a week to improve carry over.  (Progressing)       Start:  06/27/25    Expected End:  10/01/25            Patient will demonstrate all bed mobility with supervision and awareness of left upper extremity to improve independence.  (Progressing)       Start:  06/27/25    Expected End:  10/01/25            Pt will demonstrate sit to stand from wheelchair using tito walker for stability with supervision to improve independence at home.  (Progressing)       Start:  06/27/25    Expected End:  10/01/25                Judy Ford, PT

## 2025-07-28 NOTE — PROGRESS NOTES
Subjective:       Patient ID: Colt Rose is a 70 y.o. male.    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 Pt reports no acute complaints. Denies CP, palpitations, SOB, abdominal pain, nausea, vomiting, diarrhea, constipation, hematochezia, melena, dizziness, headaches, LOC, falls, fever. 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 He denies any seizure-like activity since his one seizure that resulted in hospitalization earlier this month. He has continued on Keppra as prescribed by neurology however notes side-effect of emotional lability. Due to emotional lability, neurology has recently switched him from Keppra to Briviact; although, he has been unable to start Briviact due to the medication being sent to the wrong pharmacy per patient's spouse. Pt is also following with urology for increased PSA levels (4.72 on 7/18/25, 4.28 on 7/9/25). He had a MRI of prostate on 7/18/25 with findings of PI-RADS 2, diffuse heterogeneous signal in the prostate without a discrete lesion with diffuse prostatitis as a consideration in the appropriate clinical setting. He has appt with urology scheduled in 10/2025 but would like a sooner appt. After contacting the urology clinic, it appears they plan to start him on abx and repeat the PSA in 3 months. Currently continuing with PT/OT for L hemiplegia inconsistently due to difficulties with scheduling. Pt is highly motivated to partake in PT/OT and voices concerns for the lack of access he has experienced with PT/OT. Would like to try aquatic PT as well.         Review of Systems   Constitutional:  Negative for chills and fever.   Respiratory:  Negative for cough and shortness of breath.   "  Cardiovascular:  Negative for chest pain and palpitations.   Gastrointestinal:  Negative for abdominal pain, blood in stool, diarrhea, nausea and vomiting.   Neurological:  Negative for dizziness, seizures, syncope and headaches.   Psychiatric/Behavioral:  Positive for dysphoric mood.          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    Cervical disc displacement     Degeneration of lumbar intervertebral disc     GERD (gastroesophageal reflux disease)     gastric polyps    Hip pain 02/22/2016    Hyperlipidemia     Shingles 11/2013    Sleep apnea     Stroke 05/05/2025       Review of patient's allergies indicates:   Allergen Reactions    Amlodipine Other (See Comments)     Flushed face, resolved with Hydroxyzine and Pepcid    Benadryl [diphenhydramine hcl] Other (See Comments)     Per wife "pt collapsed and ended up in hospital" ~1995    Diclofenac Other (See Comments) and Rash    Pennsaid [diclofenac sodium] Rash and Blisters       Current Medications[1]    Objective:        Physical Exam  Constitutional:       General: He is not in acute distress.     Appearance: Normal appearance. He is not ill-appearing.   HENT:      Head: Normocephalic and atraumatic.   Eyes:      General: No scleral icterus.     Extraocular Movements: Extraocular movements intact.      Pupils: Pupils are equal, round, and reactive to light.   Cardiovascular:      Rate and Rhythm: Normal rate and regular rhythm.   Pulmonary:      Effort: Pulmonary effort is normal.      Breath sounds: Normal breath sounds.   Abdominal:      General: Bowel sounds are normal.      Tenderness: There is no abdominal tenderness.   Neurological:      Mental Status: He is alert and oriented to person, place, and time.      Comments: Pt presents in wheelchair, residual L hemiplegia    Psychiatric:         Mood and Affect: Mood normal.         Behavior: Behavior normal.           Visit Vitals  /66 (BP Location: Right arm, Patient " "Position: Sitting)   Pulse 60   Temp 98.2 °F (36.8 °C) (Oral)   Resp 16   Ht 5' 10" (1.778 m)   Wt 80.3 kg (177 lb 0.5 oz)   SpO2 97%   BMI 25.40 kg/m²      Assessment:         1. Mixed hyperlipidemia    2. Essential (primary) hypertension    3. Prediabetes    4. Gastroesophageal reflux disease without esophagitis    5. Seizure as late effect of cerebrovascular accident (CVA)    6. 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 Elevated PSA        Plan:         Colt was seen today for results.    Diagnoses and all orders for this visit:    Mixed hyperlipidemia  -     Lipid Panel; Future  -     Controlled.  -     Continue atorvastatin.   -     Return to clinic for routine follow-up/labs in 6 months.     Essential (primary) hypertension  -     CBC Auto Differential; Future  -     Comprehensive Metabolic Panel; Future  -     Controlled.     Prediabetes  -     HEMOGLOBIN A1C; Future  -     Controlled. A1c 7/21 at 5.5.     Gastroesophageal reflux disease without esophagitis   -     Controlled.   -     Fecal calprotectin normal. No acute complaints.    -     Continue PPI.     Seizure as late effect of cerebrovascular accident (CVA)   -    As below.     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  -    Following with neurology which plans to switch him to Briviact from Keppra due to emotional lability on Keppra. Difficulties obtaining the medication so has yet to start Briviact, continuing on Keppra for now.   -    We will attempt to reach pharmacy regarding the Briviact.  -    Continues with PT/OT but having difficulties scheduling. " Would like to try aquatic PT. A referral has been sent for aquatic PT, pt's wife told to reach out if she would like an outside referral instead.     Elevated PSA   -     Elevated PSA to 4.72 (7/9) >> 4.28 (7/18).   -     MRI prostate concerning for diffuse prostatitis.   -     Discussed with Helen Norwood. Plan per urology is long term abx, repeat PSA in 3 months.   -     Urology follow up scheduled in October 2025, Urology plans to reach out today to patient's preferred number.       Follow up in 6 months with PCP/ labs before.     Patient evaluated with ALEXANDRA Og. I agree with physical exam findings and documentation of ALEXANDRA.          Family Medicine Physician Assistant     Future Appointments       Next 10 Appointments       Date Provider Specialty Appt Notes    7/28/2025 Judy Ford, PT Outpatient Rehab Neuro PT 2-3w12   POC END 10/1/25  Angie Antoine Lauren-Primary  REF#[id number]    7/29/2025 Macrina Ladd L-SLP, CCC-SLP Outpatient Rehab 1:1 EST NEURO ST 2W10    7/30/2025 Judy Ford, PT Outpatient Rehab Neuro PT 2-3w12   POC END 10/1/25  Angie Antoine Lauren-Primary  REF#[id number]    7/30/2025 Zoë Rajan, OTR Outpatient Rehab EST NEURO OT 3W8 POC ENDS 8/28/25  ref# [id number]    7/31/2025 Macrina Ladd L-SLP, CCC-SLP Outpatient Rehab 1:1 EST NEURO ST 2W10    8/1/2025 Judy Ford, PT Outpatient Rehab Neuro PT 2-3w12   POC END 10/1/25  Angie Antoine Lauren-Primary  REF#[id number]    8/1/2025 Jazzy Wilkins, OT Outpatient Rehab EST NEURO OT 3W8 POC ENDS 8/28/25  ref# [id number]    8/4/2025 Jazzy Wilkins, OT Outpatient Rehab EST NEURO OT 3W8 POC ENDS 8/28/25  ref# [id number]    8/4/2025 Judy Ford, PT Outpatient Rehab EST NEURO PT 2-3W12  POC ENDS 10/1/25   Angie Antoine Lauren-Primary   REF#[id number]    8/4/2025 Kacy Nance, KOLTON-SLP Outpatient Rehab 1:1 EST NEURO ST 2W10              Displaying the next 10 appointments. This patient has additional appointments  scheduled.             All of your core healthy metrics are met.       I spent a total of 30 minutes on the day of the visit.This includes face to face time and non-face to face time preparing to see the patient (eg, review of tests), obtaining and/or reviewing separately obtained history, documenting clinical information in the electronic or other health record, independently interpreting results and communicating results to the patient/family/caregiver, or care coordinator.         [1]   Current Outpatient Medications:     atorvastatin (LIPITOR) 20 MG tablet, TAKE ONE TABLET BY MOUTH ONCE DAILY, Disp: 90 tablet, Rfl: 3    brivaracetam (BRIVIACT) 50 mg Tab, Take 1 tablet (50 mg total) by mouth 2 (two) times daily., Disp: 60 tablet, Rfl: 2    carboxymethylcellulose sodium (THERATEARS) 0.25 % Drop, Place 0.05 mLs into both eyes 2 (two) times a day., Disp: , Rfl:     D-MANNOSE ORAL, Take 3 capsules by mouth once daily. 2 caps QAM and 1 cap QHS, Disp: , Rfl:     LIDOcaine (LIDODERM) 5 %, Place 1 patch onto the skin once daily. Remove & Discard patch within 12 hours or as directed by MD, Disp: , Rfl:     meloxicam (MOBIC) 15 MG tablet, Take 15 mg by mouth once daily., Disp: , Rfl:     omeprazole (PRILOSEC) 40 MG capsule, Take 1 capsule (40 mg total) by mouth every morning., Disp: 90 capsule, Rfl: 3    [Paused] magnesium oxide (MAG-OX) 400 mg (241.3 mg magnesium) tablet, Take 1 tablet (400 mg total) by mouth once daily. (Patient not taking: Reported on 7/17/2025), Disp: 90 tablet, Rfl: 3  No current facility-administered medications for this visit.    Facility-Administered Medications Ordered in Other Visits:     lactated ringers infusion, , Intravenous, Continuous, Adarsh Kraft MD    lactated ringers infusion, , Intravenous, Continuous, Adarsh Kraft MD    LIDOcaine (PF) 10 mg/ml (1%) injection 10 mg, 1 mL, Intradermal, Once, Adarsh Kraft MD    LIDOcaine (PF) 10 mg/ml (1%) injection 10 mg, 1 mL, Intradermal, Once, Swapnil  Adarsh ARRINGTON MD

## 2025-07-28 NOTE — TELEPHONE ENCOUNTER
Call placed to [facility], was advised Briviact prescription has been placed on hold as patient's insurance does not cover for patient to receive this medication per mail order.  Was advised to contact [facility] to confirm if they have this medication in stock or and if not can it be ordered.  Call placed to [facility] was advised prescription can be transferred to their location internally, also advised cost of medication is $664.90 and patient can contact medicare to be placed on a payment plan.  Also was advised there is no discount coupon that can be applied. Call placed to patient. Call answered by Yenny (who states she is able to speak on the patient's behalf).  Explained above mentioned information.  Mrs. Ramon verbalized understanding and states patient will not be able to pay $600 for prescription. Will send follow up message to DANIEL Root for notification.

## 2025-07-28 NOTE — TELEPHONE ENCOUNTER
Call placed to patient. Call answered by patient's wife (Yenny) who verbalized understanding.  Mrs. Ramon states she was with her  at his third medical appointment for today, and that she would call the insurance as soon as she had a chance to look into the payment plan.

## 2025-07-28 NOTE — TELEPHONE ENCOUNTER
Spoke w pts wife and gave detailed message and np/md recs  Pts wife vu   Will also send to portal for follow up communication as pts wife is saying no one contacted her prior to todays conversing.

## 2025-07-28 NOTE — TELEPHONE ENCOUNTER
Okay thank you so much for checking on it! The patient will have to discuss alternatives with neurology.

## 2025-07-28 NOTE — TELEPHONE ENCOUNTER
Per Dr. Nicholas after reviewing MRI and prior notes:    No further procedure needed at this time.  He has chronic prostatitis at this time which was visualized on recent MRI imaging.    Therefore will send in a RX for Cipro 500 mg BID x 30 days and long term anti-inflammatory for the Prostate (Meloxicam) which it looks as if he is already taking Meloxicam 15 mg daily from another provider.    We would just continue the Meloxicam daily for chronic prostate inflammation issues. Refills sent in today.  Schedule pt to repeat PSA again in 3 months to make sure his PSA goes back to baseline.    Then if he continues to have LUTS after this treatment, we will set him up for further evaluation with Cysto by Dr. Nicholas.

## 2025-07-29 ENCOUNTER — CLINICAL SUPPORT (OUTPATIENT)
Dept: REHABILITATION | Facility: HOSPITAL | Age: 70
End: 2025-07-29
Payer: MEDICARE

## 2025-07-29 ENCOUNTER — TELEPHONE (OUTPATIENT)
Dept: FAMILY MEDICINE | Facility: CLINIC | Age: 70
End: 2025-07-29
Payer: MEDICARE

## 2025-07-29 DIAGNOSIS — R47.1 DYSARTHRIA: ICD-10-CM

## 2025-07-29 DIAGNOSIS — R41.841 COGNITIVE COMMUNICATION DEFICIT: Primary | ICD-10-CM

## 2025-07-29 DIAGNOSIS — R13.12 OROPHARYNGEAL DYSPHAGIA: ICD-10-CM

## 2025-07-29 PROCEDURE — 97129 THER IVNTJ 1ST 15 MIN: CPT | Mod: PO

## 2025-07-29 PROCEDURE — 97130 THER IVNTJ EA ADDL 15 MIN: CPT | Mod: PO

## 2025-07-29 NOTE — PROGRESS NOTES
"  Outpatient Rehab  Speech-Language Pathology Visit    Patient Name: Colt Rose  MRN: [id number]  YOB: 1955  Encounter Date: 7/29/2025    Therapy Diagnosis:   Encounter Diagnoses   Name Primary?    Cognitive communication deficit Yes    Oropharyngeal dysphagia     Dysarthria      Physician: Jeffery Mena MD    Physician Orders: Eval and Treat  Medical Diagnosis: Dysarthria  Surgical Diagnosis: Not applicable for this Episode   Surgical Date: Not applicable for this Episode  Days Since Last Surgery: Not applicable for this Episode    Visit # / Visits Authorized: 2 / 20   Insurance Authorization Period: 7/3/2025 to 12/31/2025  Date of Evaluation: 7/8/2025   Plan of Care Certification: 7/8/2025 to 9/16/2025      Time In: 0846   Time Out: 0931  Total Time (in minutes): 45   Total Billable Time (in minutes): 45      Precautions:  General Precautions: Aspiration/choking  Additional Precautions and Protocol Details: Standard, Fall Risk, cognitive, seizure    Subjective   Pt arrived on time and in pleasant spirits. No complaints reported.  Pain reported as 0/10. No pain reported      Objective            Treatment  Short Term Goals:  Current Progress:   Patient will participate in Modified Barium Swallow Study to instrumentally assess current swallow status to guide Speech Therapy Plan of Care and swallow recommendations      Progressing/ Not Met 7/17/2025   -Requested orders from MD, however, no orders received yet. Will reach out to MD again     -CXR completed while patient was hospitalized reported, "No acute cardiopulmonary abnormality"     -Patient denied any worsening of his swallow function since he had his seizure      Patient will recall 4/4 memory strategies independently 3 times overall.       Progressing/ Not Met 7/17/2025   -Patient recalled 0/4 WRAP (Mpsqw-Edbmdt-Yfamrpqqx-Picture) strategies at start of session. With written cue of "wrap" patient still recalled 0/4 strategies. " Reviewed each individual strategy again in session     -Patient did not bring phone to session this date. Reviewed rationale for bringing phone to session for note taking and calendar applications. Discussed with spouse regarding bringing phone to next scheduled session      Patient will complete alternating and divided attention tasks with natural background noise and 80% accuracy independently       Progressing/ Not Met 7/17/2025   -Not addressed this date    Patient will complete moderate level problem solving tasks with 90% accuracy and minimal cues.       Progressing/ Not Met 7/17/2025   -Indirectly addressed through attempts for patient to independently generate strategies for improving attention during transfers, as patient reported getting significantly distracted by his dog barking when trying to transfer from his couch to wheelchair with his spouse's assistance; patient needed maximal cues to generate strategies to improve attention and reduce distractions          Patient will determine a list of possible solutions, given a problem scenario in a structured setting with 90% accuracy       Progressing/ Not Met 7/17/2025   -see above         Patient will independently use a compensatory strategy (e.g., anchoring line, tactile cue, finger scanning) to aid in visual scanning to the left during reading or functional tasks in 3 out of 4 opportunities.       Progressing/ Not Met 7/17/2025   -Discussed some compensatory strategies including anchor lines and tactile cues to improve visual scanning during reading tasks. Patient benefited from use of anchor line to scan to left for reading but would lose attention and place due to sidebar conversation. Patient benefited from reading aloud to improve attention to reading task    -Consistent cueing needed to scan to the left during conversation this date         Time Entry(in minutes):  Cognitive Skill Development (Medicare) Time Entry: 45    Assessment & Plan    Assessment  Patient participated well in today's session focused on review of WRAP (Tcigw-Mpofnc-Ldbcujqjy-Picture) strategies for memory, functional problem solving, and increasing awareness of left neglect. Patient needed significant cueing to generate strategies for improving attention with background noise (patient reports difficulty with transfers when his dog is barking). Discussed attention strategies and strategies for left neglect in session as above.  Patient will continue to benefit from skilled Speech Therapy intervention to improve his cognitive communication skills. Current goals remain appropriate. Will update as needed.   Evaluation/Treatment Tolerance: Patient tolerated treatment well    The patient will continue to benefit from skilled outpatient speech therapy in order to address the deficits listed in the problem list on the initial evaluation, provide patient and family education, and maximize the patients level of independence in the home and community environments.     The patient's spiritual, cultural, and educational needs were considered, and the patient is agreeable to the plan of care and goals.     Education  Education was done with Patient. The patient's learning style includes Listening. The patient Verbalizes understanding and Requires continuing/additional education.                  Plan  Continue ST POC          Goals:   Active       1. Short term goals       STG 1 (Progressing)       Start:  07/09/25    Expected End:  09/16/25       Patient will participate in Modified Barium Swallow Study to instrumentally assess current swallow status to guide Speech Therapy Plan of Care and swallow recommendations         STG 2 (Progressing)       Start:  07/09/25    Expected End:  09/16/25       Patient will recall 4/4 memory strategies independently 3 times overall.         STG 3 (Progressing)       Start:  07/09/25    Expected End:  09/16/25       Patient will complete alternating and  divided attention tasks with natural background noise and 80% accuracy independently         STG 4 (Progressing)       Start:  07/09/25    Expected End:  09/16/25       Patient will complete moderate level problem solving tasks with 90% accuracy and minimal cues.         STG 5 (Progressing)       Start:  07/09/25    Expected End:  09/16/25       Patient will determine a list of possible solutions, given a problem scenario in a structured setting with 90% accuracy         STG 6 (Progressing)       Start:  07/09/25    Expected End:  09/16/25       Patient will independently use a compensatory strategy (e.g., anchoring line, tactile cue, finger scanning) to aid in visual scanning to the left during reading or functional tasks in 3 out of 4 opportunities.            2. Long term goals       LTG 1 (Progressing)       Start:  07/09/25    Expected End:  09/16/25       Patient will maintain adequate hydration/nutrition with optimum safety and efficiency of swallowing function on PO intake without overt signs and symptoms of aspiration for regular diet level.          LTG 2 (Progressing)       Start:  07/09/25    Expected End:  09/16/25       Pt will improve  attention skills to effectively attend to and communicate in complex daily living tasks in functional living environment.           LTG 3 (Progressing)       Start:  07/09/25    Expected End:  09/16/25       Patient will use appropriate memory strategies to schedule and recall weekly activities, express needs and recall names to maintain safety and participate socially in functional living environment.           LTG 4 (Progressing)       Start:  07/09/25    Expected End:  09/16/25       Pt will demonstrate use of self awareness, planning, and  self-monitoring during daily living activities to improve safety and awareness in functional living environment.             Macrina Ladd, L-SLP, CCC-SLP

## 2025-07-30 ENCOUNTER — CLINICAL SUPPORT (OUTPATIENT)
Dept: REHABILITATION | Facility: HOSPITAL | Age: 70
End: 2025-07-30
Payer: MEDICARE

## 2025-07-30 ENCOUNTER — PATIENT MESSAGE (OUTPATIENT)
Dept: NEUROLOGY | Facility: CLINIC | Age: 70
End: 2025-07-30
Payer: MEDICARE

## 2025-07-30 ENCOUNTER — PATIENT MESSAGE (OUTPATIENT)
Dept: FAMILY MEDICINE | Facility: CLINIC | Age: 70
End: 2025-07-30
Payer: MEDICARE

## 2025-07-30 VITALS — DIASTOLIC BLOOD PRESSURE: 63 MMHG | HEART RATE: 58 BPM | SYSTOLIC BLOOD PRESSURE: 138 MMHG

## 2025-07-30 DIAGNOSIS — R68.89 IMPAIRED FUNCTION OF UPPER EXTREMITY: ICD-10-CM

## 2025-07-30 DIAGNOSIS — I69.154: Primary | ICD-10-CM

## 2025-07-30 DIAGNOSIS — Z91.81 AT HIGH RISK FOR FALLS: ICD-10-CM

## 2025-07-30 PROCEDURE — 97530 THERAPEUTIC ACTIVITIES: CPT | Mod: PO

## 2025-07-30 PROCEDURE — 97110 THERAPEUTIC EXERCISES: CPT | Mod: PO

## 2025-07-30 PROCEDURE — 97112 NEUROMUSCULAR REEDUCATION: CPT | Mod: PO

## 2025-07-30 NOTE — PROGRESS NOTES
Outpatient Rehab    Physical Therapy Visit    Patient Name: Colt Rose  MRN: [id number]  YOB: 1955  Encounter Date: 7/25/2025    Therapy Diagnosis:   Encounter Diagnoses   Name Primary?    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 Yes    At high risk for falls      Physician: Jeffery Mena MD    Physician Orders: Eval and Treat  Medical Diagnosis: Hemiplegia, unspecified etiology, unspecified hemiplegia type, unspecified laterality  Surgical Diagnosis: Not applicable for this Episode   Surgical Date: Not applicable for this Episode  Days Since Last Surgery: Not applicable for this Episode    Visit # / Visits Authorized:  11 / 20  Insurance Authorization Period: 6/25/2025 to 12/31/2025  Date of Evaluation: 6/27/2025  Plan of Care Certification: 6/30/2025 to 10/1/2025      PT/PTA:     Number of PTA visits since last PT visit:   Time In: 0847   Time Out: 0930  Total Time (in minutes): 43   Total Billable Time (in minutes): 43    FOTO:  Intake Score (%): 50  Survey Score 2 (%): Not applicable for this Episode  Survey Score 3 (%): Not applicable for this Episode    Precautions:  Additional Precautions and Protocol Details: Standard, Fall Risk, cognitive, seizure      Subjective   trialing AFO given by neurologist. He has 5 days to determine if he likes it.  Family / care giver present for this visit:  (wife in lobby)  Pain reported as 0/10.      Objective            Treatment:  Therapeutic Exercise  TE 1: SciFit level 1 x 5 minutes with skilled set up for positioning  Balance/Neuromuscular Re-Education  NMR 1: standing at high mat with left upper extremity supported on yoga block and right UE performing Blazepod taps 2 x 3 minutes  NMR 2: sit to stand to mat x 10, Maynor  Therapeutic Activity  TA 1: gait with tito walker donning new AFO for trial from neurologist 2 x 120 ft with minimal vaulting for clearance once fatigued, but  otherwise good technique    Time Entry(in minutes):  Neuromuscular Re-Education Time Entry: 15  Therapeutic Activity Time Entry: 20  Therapeutic Exercise Time Entry: 8    Assessment & Plan   Assessment: Colt tolerated treatment session well. Trialed AFO with minimal vaulting when fatigued due to weakness of hip and knee flexors during swing phase, but otherwise good technique and gait pattern using tito walker and wheelchair follow for safety. Educated in difference between trial AFO and ground reaction AFO discussed with  Clinic. Pt able to stabilize knee using quadriceps at this time and would recommend staying with trial AFO. He remains appropriate for PT.  Evaluation/Treatment Tolerance: Patient tolerated treatment well    The patient will continue to benefit from skilled outpatient physical therapy in order to address the deficits listed in the problem list on the initial evaluation, provide patient and family education, and maximize the patients level of independence in the home and community environments.     The patient's spiritual, cultural, and educational needs were considered, and the patient is agreeable to the plan of care and goals.     Education  Education was done with Patient and Other recipient present. The patient's learning style includes Listening. The patient Verbalizes understanding and Requires continuing/additional education. spouse participated in education. They identified as Spouse/significant other. The reported learning style is Listening. The recipient Verbalizes understanding and Requires continuing/additional education.     Differences between AFOs to determine which brace is most beneficial.       Plan: Continue with POC to improve left sided motor return, balance, transfers, and ambulation.    Goals:   Active       Long Term Goals       Patient will ascend/descend four 6 inch stairs safely using single handrail and standby assistance (Progressing)       Start:  06/27/25     Expected End:  10/01/25            Patient will demonstrate improved gait endurance by ambulating 200 ft during 2 minute walk test with use of LRAD and without seated rest break.  (Progressing)       Start:  06/27/25    Expected End:  10/01/25            Pt will ambulate using tito walker with standby assistance to improve mobility in household.  (Progressing)       Start:  06/27/25    Expected End:  10/01/25            Patient will demonstrate ability to transition floor to stand with external support to reduce assistance needed in case of fall. (Progressing)       Start:  06/27/25    Expected End:  10/01/25               Short Term Goals       Patient will be given initial HEP and report compliance with home exercise program 5 days a week to improve carry over.  (Met)       Start:  06/27/25    Expected End:  10/01/25    Resolved:  07/30/25         Patient will demonstrate all bed mobility with supervision and awareness of left upper extremity to improve independence.  (Progressing)       Start:  06/27/25    Expected End:  10/01/25            Pt will demonstrate sit to stand from wheelchair using tito walker for stability with supervision to improve independence at home.  (Progressing)       Start:  06/27/25    Expected End:  10/01/25                Judy Ford, PT

## 2025-07-30 NOTE — PROGRESS NOTES
Outpatient Rehab    Physical Therapy Visit    Patient Name: Colt Rose  MRN: [id number]  YOB: 1955  Encounter Date: 7/28/2025    Therapy Diagnosis:   Encounter Diagnoses   Name Primary?    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  Surgical Diagnosis: Not applicable for this Episode   Surgical Date: Not applicable for this Episode  Days Since Last Surgery: Not applicable for this Episode    Visit # / Visits Authorized:  11 / 20  Insurance Authorization Period: 6/25/2025 to 12/31/2025  Date of Evaluation: 6/27/2025  Plan of Care Certification: 6/30/2025 to 10/1/2025      PT/PTA: PT   Number of PTA visits since last PT visit:0  Time In: 0145   Time Out: 0230  Total Time (in minutes): 45   Total Billable Time (in minutes): 45    FOTO:  Intake Score (%): 50  Survey Score 2 (%): Not applicable for this Episode  Survey Score 3 (%): Not applicable for this Episode    Precautions:  Additional Precautions and Protocol Details: Standard, Fall Risk, cognitive, seizure      Subjective   No new seizures or falls since last time. Continues to have some hip and back pain.  Family / care giver present for this visit:  (wife present in lobby)  Pain reported as 2/10. low back and left hip    Objective            Treatment:  Therapeutic Exercise  TE 1: Shuttle squats 50# 3 x 10  TE 2: Shuttle single leg 37# each 3 x 10  Therapeutic Activity  TA 1: Gait using large base quad cane and donning AFO from neurologist x 240 ft, 200 ft , and 130 ft with minimal vaulting for clearance due to lack of hip and knee fledxion once fatigued.  Wheelchair follow for safety and contact guard assistance with one instance of minimal assistance.    Time Entry(in minutes):  Therapeutic Activity Time Entry: 25  Therapeutic Exercise Time Entry: 20    Assessment & Plan   Assessment: Colt tolerated treatment session well with focus on functional mobility and gait using AFO. Continues to display some vaulting when fatigued, but otherwise has good technique using AFO. Progression of gait training using large base quad cane instead of tito walker with good stability and contact guard assistance provided, wheelchair follow for safety continued. Performed strength training to isolate left lower extremity on shuttle press with good tolerance and pt motivated to continue with forced use of left side.  Evaluation/Treatment Tolerance: Patient tolerated treatment well    The patient will continue to benefit from skilled outpatient physical therapy in order to address the deficits listed in the problem list on the initial evaluation, provide patient and family education, and maximize the patients level of independence in the home and community environments.     The patient's spiritual, cultural, and educational needs were considered, and the patient is agreeable to the plan of care and goals.     Education  Education was done with Patient. The patient's learning style includes Listening. The patient Verbalizes understanding.         Benefits of forced use on LLE. Reasoning between progression to LBQC.       Plan: Continue with POC to improve left sided motor return, balance, transfers, and ambulation.    Goals:   Active       Long Term Goals       Patient will ascend/descend four 6 inch stairs safely using single handrail and standby assistance (Progressing)       Start:  06/27/25    Expected End:  10/01/25            Patient will demonstrate improved gait endurance by ambulating 200 ft during 2 minute walk test with use of LRAD and without seated rest break.   (Progressing)       Start:  06/27/25    Expected End:  10/01/25            Pt will ambulate using tito walker with standby assistance to improve mobility in household.  (Progressing)       Start:  06/27/25    Expected End:  10/01/25            Patient will demonstrate ability to transition floor to stand with external support to reduce assistance needed in case of fall. (Progressing)       Start:  06/27/25    Expected End:  10/01/25               Short Term Goals       Patient will be given initial HEP and report compliance with home exercise program 5 days a week to improve carry over.  (Met)       Start:  06/27/25    Expected End:  10/01/25    Resolved:  07/30/25         Patient will demonstrate all bed mobility with supervision and awareness of left upper extremity to improve independence.  (Progressing)       Start:  06/27/25    Expected End:  10/01/25            Pt will demonstrate sit to stand from wheelchair using tito walker for stability with supervision to improve independence at home.  (Progressing)       Start:  06/27/25    Expected End:  10/01/25                Judy Ford, PT

## 2025-07-30 NOTE — PROGRESS NOTES
Outpatient Rehab    Occupational Therapy Visit    Patient Name: Colt Rose  MRN: [id number]  YOB: 1955  Encounter Date: 7/30/2025    Therapy Diagnosis:   Encounter Diagnoses   Name Primary?    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 Yes    Impaired function of upper extremity      Physician: Jeffery Mena MD    Physician Orders: Eval and Treat  Medical Diagnosis: Hemiplegia, unspecified etiology, unspecified hemiplegia type, unspecified laterality  Surgical Diagnosis: Not applicable for this Episode   Surgical Date: Not applicable for this Episode  Days Since Last Surgery: Not applicable for this Episode    Visit # / Visits Authorized: 6 / 12  Insurance Authorization Period: 7/3/2025 to 12/31/2025  Date of Evaluation: 7/3/2025  Plan of Care Certification: 7/3/2025 to 8/28/2025      Time In: 0934   Time Out: 1015  Total Time (in minutes): 41   Total Billable Time (in minutes): 41    FOTO:  Intake Score (%): Not applicable for this Episode  Survey Score 2 (%): Not applicable for this Episode  Survey Score 3 (%): Not applicable for this Episode    Precautions:  Additional Precautions and Protocol Details: Standard, Fall Risk, cognitive, seizure    Subjective   Colt's wife was present at the end of the session for assistance with donning his brace..  Family / care giver present for this visit:   Pain reported as 0/10.      Objective   Vital Signs  /63   Pulse (!) 58   BP Location: Right arm  BP Position: Sitting  BP Cuff Size: Adult               Treatment:  Therapeutic Activity  TA 1: w/c<>mat with CGA and verbal cues for scooting and positioning of his feet  TA 2: L hand thigh <> mat x 10  TA 3: facliated L shoulder and elbow flexion AAROM/PROM; B hand holding ball x 10 each  TA 4: Patient and his wife were provided with instruction on the [employer] purpose and wear schedule for L UE positioning. Patient with  good fit and positioning of L UE in [employer]r with adjustments as needed to loosen straps.    Time Entry(in minutes):  Therapeutic Activity Time Entry: 41    Assessment & Plan   Assessment: Colt continues with L hand impaired motor control and continues to require min A and verbal cues with transport chair <> mat transfers. Will continue to address dressing to increase patient's independence and safety with the task.  Evaluation/Treatment Tolerance: Patient tolerated treatment well. Colt will continue to benefit from skilled outpatient occupational therapy in order to address the deficits listed in the problem list on the initial evaluation, provide patient and family education, and maximize the patients level of independence in the home and community environments. The patient's spiritual, cultural, and educational needs were considered, and the patient is agreeable to the plan of care and goals.         Plan: ADLs and positioning of L UE during function/ attempt to use it for support. transfers    Goals:   Active       LTG       With MI, patient will perform all basic self-care tasks (grooming, bathing, dressing, toileting) using adaptive strategies or assistive devices as needed, demonstrating safe sequencing and minimal need for external support. (Progressing)       Start:  07/03/25    Expected End:  08/28/25            Patient will achieve full PROM in affected upper extremity to support participation in ADLs and therapeutic exercises without pain. (Progressing)       Start:  07/03/25    Expected End:  08/28/25            Patient will perform functional transfers to multiple surfaces (bed, toilet, chair) with supervision or less, demonstrating improved balance, coordination, and safety awareness. (Progressing)       Start:  07/03/25    Expected End:  08/28/25               STG       Patient will complete UB dressing with Mod A using adaptive techniques or modified strategies. (Progressing)       Start:   07/03/25    Expected End:  07/31/25            Patient will tolerate PROM in affected upper extremity with a report of a decrease in pain to support participation in daily grooming tasks. (Progressing)       Start:  07/03/25    Expected End:  07/31/25            Patient will perform sit-to-stand transfers from transport wheelchair with minimal assistance and use of proper body mechanics, demonstrating progress toward safe mobility and reduced fall risk. (Progressing)       Start:  07/03/25    Expected End:  07/31/25                  GABRIELA Gambino, ODILON

## 2025-07-30 NOTE — PROGRESS NOTES
Outpatient Rehab    Physical Therapy Visit    Patient Name: Colt Rose  MRN: [id number]  YOB: 1955  Encounter Date: 7/30/2025    Therapy Diagnosis:   Encounter Diagnoses   Name Primary?    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 Yes    At high risk for falls      Physician: Jeffery Mena MD    Physician Orders: Eval and Treat  Medical Diagnosis: Hemiplegia, unspecified etiology, unspecified hemiplegia type, unspecified laterality  Surgical Diagnosis: Not applicable for this Episode   Surgical Date: Not applicable for this Episode  Days Since Last Surgery: Not applicable for this Episode    Visit # / Visits Authorized:  11 / 20  Insurance Authorization Period: 6/25/2025 to 12/31/2025  Date of Evaluation: 6/27/2025  Plan of Care Certification: 6/30/2025 to 10/1/2025      PT/PTA: PT   Number of PTA visits since last PT visit:0  Time In: 0845   Time Out: 0930  Total Time (in minutes): 45   Total Billable Time (in minutes): 45    FOTO:  Intake Score (%): 50  Survey Score 2 (%): Not applicable for this Episode  Survey Score 3 (%): Not applicable for this Episode    Precautions:  Additional Precautions and Protocol Details: Standard, Fall Risk, cognitive, seizure      Subjective   Pt states he was sore after strength training on the machine the other day. Today he has a 2/10 in pain as it improves..  Family / care giver present for this visit:  (wife present in lobby.)  Pain reported as 0/10. No pain reported    Objective            Treatment:  Therapeutic Exercise  TE 1: SciFit level 2 x 8 minutes with BLE and RUE. Strap around knees to prevent abduction of left hip.  Balance/Neuromuscular Re-Education  NMR 1: supine knee/hip flexion to extension working on hip flexor x 10  NMR 2: Supine SAQ to SLR 3 x 5 with extensor lag noted and unable to perform end range of motion without assistance  NMR 3: NMES to left quad performing  SAQ, (mA at 80, 10/10 on/off time, 2 sec ramp) x 8 minutes with improved knee extension  NMR 4: sit to stand working on midline once in standing x 5    Time Entry(in minutes):  Neuromuscular Re-Education Time Entry: 37  Therapeutic Exercise Time Entry: 8    Assessment & Plan   Assessment: Colt tolerated treatment session well. Arrived with reported soreness after shuttle exercises at previous session. He indicates difficulty propelling wheelchair at home due to weakness in left lower extremity to perform hip and knee flexion. Focused on motor activation and control in supine today with addition of ESTIM. He remains appropriate for PT at this time.  Evaluation/Treatment Tolerance: Patient tolerated treatment well    The patient will continue to benefit from skilled outpatient physical therapy in order to address the deficits listed in the problem list on the initial evaluation, provide patient and family education, and maximize the patients level of independence in the home and community environments.     The patient's spiritual, cultural, and educational needs were considered, and the patient is agreeable to the plan of care and goals.           Plan: Continue with POC to improve left sided motor return, balance, transfers, and ambulation.    Goals:   Active       Long Term Goals       Patient will ascend/descend four 6 inch stairs safely using single handrail and standby assistance (Progressing)       Start:  06/27/25    Expected End:  10/01/25            Patient will demonstrate improved gait endurance by ambulating 200 ft during 2 minute walk test with use of LRAD and without seated rest break.  (Progressing)       Start:  06/27/25    Expected End:  10/01/25            Pt will ambulate using tito walker with standby assistance to improve mobility in household.  (Progressing)       Start:  06/27/25    Expected End:  10/01/25            Patient will demonstrate ability to transition floor to stand with external  support to reduce assistance needed in case of fall. (Progressing)       Start:  06/27/25    Expected End:  10/01/25               Short Term Goals       Patient will be given initial HEP and report compliance with home exercise program 5 days a week to improve carry over.  (Met)       Start:  06/27/25    Expected End:  10/01/25    Resolved:  07/30/25         Patient will demonstrate all bed mobility with supervision and awareness of left upper extremity to improve independence.  (Progressing)       Start:  06/27/25    Expected End:  10/01/25            Pt will demonstrate sit to stand from wheelchair using tito walker for stability with supervision to improve independence at home.  (Progressing)       Start:  06/27/25    Expected End:  10/01/25                Judy Ford, PT

## 2025-07-31 ENCOUNTER — CLINICAL SUPPORT (OUTPATIENT)
Dept: REHABILITATION | Facility: HOSPITAL | Age: 70
End: 2025-07-31
Payer: MEDICARE

## 2025-07-31 DIAGNOSIS — R13.12 OROPHARYNGEAL DYSPHAGIA: ICD-10-CM

## 2025-07-31 DIAGNOSIS — R47.1 DYSARTHRIA: ICD-10-CM

## 2025-07-31 DIAGNOSIS — R41.841 COGNITIVE COMMUNICATION DEFICIT: Primary | ICD-10-CM

## 2025-07-31 PROCEDURE — 97129 THER IVNTJ 1ST 15 MIN: CPT | Mod: PO

## 2025-07-31 PROCEDURE — 97130 THER IVNTJ EA ADDL 15 MIN: CPT | Mod: PO

## 2025-07-31 NOTE — PROGRESS NOTES
"  Outpatient Rehab  Speech-Language Pathology Visit    Patient Name: Colt Rose  MRN: [id number]  YOB: 1955  Encounter Date: 7/31/2025    Therapy Diagnosis:   Encounter Diagnoses   Name Primary?    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  Dysarthria  Surgical Diagnosis: Not applicable for this Episode   Surgical Date: Not applicable for this Episode  Days Since Last Surgery: Not applicable for this Episode    Visit # / Visits Authorized: 3 / 20   Insurance Authorization Period: 7/3/2025 to 12/31/2025  Date of Evaluation: 7/8/2025   Plan of Care Certification: 7/8/2025 to 9/16/2025      Time In: 0920   Time Out: 1006  Total Time (in minutes): 46   Total Billable Time (in minutes): 46      Precautions:  General Precautions: Aspiration/choking  Additional Precautions and Protocol Details: Standard, Fall Risk, cognitive, seizure    Subjective   Pt arrived on time and in pleasant spirits. No complaints reported.  Pain reported as 2/10. L shoulder      Objective            Treatment  Short Term Goals:  Current Progress:   Patient will participate in Modified Barium Swallow Study to instrumentally assess current swallow status to guide Speech Therapy Plan of Care and swallow recommendations       -Requested orders from MD, however, no orders received yet   Patient will recall 4/4 memory strategies independently 3 times overall.        -Patient recalled 0/4 WRAP (Dvjcb-Mpmijo-Sprefopru-Picture) strategies at start of session. With written cue of "wrap" patient still recalled 0/4 strategies. Reviewed each individual strategy again in session     -Patient brought phone to session this date. Discussed utilizing calendar nicole and notes nicole for keeping track of appointments and daily activities. " Discussed use of voice to text for calendar entries. Patient able to navigate to calendar and notes nicole independently. Patient's spouse entered all therapy appointments in calendar previously.       Patient will complete alternating and divided attention tasks with natural background noise and 80% accuracy independently        -Not addressed this date    Patient will complete moderate level problem solving tasks with 90% accuracy and minimal cues.        -Not addressed this date            Patient will determine a list of possible solutions, given a problem scenario in a structured setting with 90% accuracy        -see above         Patient will independently use a compensatory strategy (e.g., anchoring line, tactile cue, finger scanning) to aid in visual scanning to the left during reading or functional tasks in 3 out of 4 opportunities.        -Discussed some compensatory strategies including anchor lines, highlighting, and finger tracking to improve visual scanning during reading tasks     -Consistent cueing needed to scan to the left during conversation this date         Time Entry(in minutes):  Cognitive Skill Development (Medicare) Time Entry: 46    Assessment & Plan   Assessment  Patient participated well in today's session focused on review of WRAP (Sawin-Brfpsf-Eahlueuqz-Picture) strategies for memory, increasing awareness of left neglect, and utilization of smart phone to support memory and reducing cognitive load. Discussed Spoon Theory and impact of cognitive overload and fatigue on overall cognitive functioning. Discussed how routine and utilizing smart phone for note taking and calendar use can facilitate reducing cognitive load. Patient did not like keyboard adjustments made in session and benefited more from use of voice to text for note taking.  Patient needed consistent cueing to attend to left during conversational exchanges this date. Patient will continue to benefit from skilled Speech Therapy  intervention to improve his cognitive communication skills. Current goals remain appropriate. Will update as needed.   Evaluation/Treatment Tolerance: Patient tolerated treatment well    The patient will continue to benefit from skilled outpatient speech therapy in order to address the deficits listed in the problem list on the initial evaluation, provide patient and family education, and maximize the patients level of independence in the home and community environments.     The patient's spiritual, cultural, and educational needs were considered, and the patient is agreeable to the plan of care and goals.     Education  Education was done with Patient. The patient's learning style includes Listening. The patient Verbalizes understanding and Requires continuing/additional education.                  Plan  Continue ST POC          Goals:   Active       1. Short term goals       STG 1 (Progressing)       Start:  07/09/25    Expected End:  09/16/25       Patient will participate in Modified Barium Swallow Study to instrumentally assess current swallow status to guide Speech Therapy Plan of Care and swallow recommendations         STG 2 (Progressing)       Start:  07/09/25    Expected End:  09/16/25       Patient will recall 4/4 memory strategies independently 3 times overall.         STG 3 (Progressing)       Start:  07/09/25    Expected End:  09/16/25       Patient will complete alternating and divided attention tasks with natural background noise and 80% accuracy independently         STG 4 (Progressing)       Start:  07/09/25    Expected End:  09/16/25       Patient will complete moderate level problem solving tasks with 90% accuracy and minimal cues.         STG 5 (Progressing)       Start:  07/09/25    Expected End:  09/16/25       Patient will determine a list of possible solutions, given a problem scenario in a structured setting with 90% accuracy         STG 6 (Progressing)       Start:  07/09/25    Expected  End:  09/16/25       Patient will independently use a compensatory strategy (e.g., anchoring line, tactile cue, finger scanning) to aid in visual scanning to the left during reading or functional tasks in 3 out of 4 opportunities.            2. Long term goals       LTG 1 (Progressing)       Start:  07/09/25    Expected End:  09/16/25       Patient will maintain adequate hydration/nutrition with optimum safety and efficiency of swallowing function on PO intake without overt signs and symptoms of aspiration for regular diet level.          LTG 2 (Progressing)       Start:  07/09/25    Expected End:  09/16/25       Pt will improve  attention skills to effectively attend to and communicate in complex daily living tasks in functional living environment.           LTG 3 (Progressing)       Start:  07/09/25    Expected End:  09/16/25       Patient will use appropriate memory strategies to schedule and recall weekly activities, express needs and recall names to maintain safety and participate socially in functional living environment.           LTG 4 (Progressing)       Start:  07/09/25    Expected End:  09/16/25       Pt will demonstrate use of self awareness, planning, and  self-monitoring during daily living activities to improve safety and awareness in functional living environment.             Macrina Ladd, L-SLP, CCC-SLP

## 2025-08-01 ENCOUNTER — TELEPHONE (OUTPATIENT)
Dept: NEUROLOGY | Facility: CLINIC | Age: 70
End: 2025-08-01
Payer: MEDICARE

## 2025-08-01 ENCOUNTER — CLINICAL SUPPORT (OUTPATIENT)
Dept: REHABILITATION | Facility: HOSPITAL | Age: 70
End: 2025-08-01
Payer: MEDICARE

## 2025-08-01 VITALS — HEART RATE: 63 BPM | DIASTOLIC BLOOD PRESSURE: 69 MMHG | SYSTOLIC BLOOD PRESSURE: 134 MMHG

## 2025-08-01 DIAGNOSIS — I69.154: Primary | ICD-10-CM

## 2025-08-01 DIAGNOSIS — Z91.81 AT HIGH RISK FOR FALLS: ICD-10-CM

## 2025-08-01 DIAGNOSIS — R68.89 IMPAIRED FUNCTION OF UPPER EXTREMITY: ICD-10-CM

## 2025-08-01 PROCEDURE — 97110 THERAPEUTIC EXERCISES: CPT | Mod: PO

## 2025-08-01 PROCEDURE — 97530 THERAPEUTIC ACTIVITIES: CPT | Mod: PO

## 2025-08-01 NOTE — TELEPHONE ENCOUNTER
Copied from CRM #[id number]. Topic: Medications - Medication Authorization  >> Jul 31, 2025  4:17 PM Queta wrote:  Type:  RX Refill Request/prior authorization with questions    Who Called:  Aline with [facility] at [phone] fax #[phone]    Additional Information:  Need to verify diagnosis for patient and if there have been other medications tried for this diagnosis. Prior authorization expires on 08/12. Ref#[id number]    Please call and advise. Thank you    brivaracetam (BRIVIACT) 50 mg Tab 60 tablet 2 7/24/2025 10/22/2025   Sig - Route: Take 1 tablet (50 mg total) by mouth 2 (two) times daily. - Oral   Sent to pharmacy as: brivaracetam (BRIVIACT) 50 mg Tab

## 2025-08-01 NOTE — PROGRESS NOTES
Outpatient Rehab    Occupational Therapy Progress Note    Patient Name: Colt Rose  MRN: [id number]  YOB: 1955  Encounter Date: 8/1/2025    Therapy Diagnosis:   Encounter Diagnoses   Name Primary?    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 Yes    Impaired function of upper extremity      Physician: Jeffery Mena MD    Physician Orders: Eval and Treat  Medical Diagnosis: Hemiplegia, unspecified etiology, unspecified hemiplegia type, unspecified laterality  Surgical Diagnosis: Not applicable for this Episode   Surgical Date: Not applicable for this Episode  Days Since Last Surgery: Not applicable for this Episode    Visit # / Visits Authorized: 8 / 12  Insurance Authorization Period: 7/3/2025 to 12/31/2025  Date of Evaluation: 7/3/2025  Plan of Care Certification: 7/3/2025 to 8/28/2025      Time In: 0935   Time Out: 1019  Total Time (in minutes): 44   Total Billable Time (in minutes): 44    FOTO:  Intake Score (%): Not applicable for this Episode  Survey Score 2 (%): Not applicable for this Episode  Survey Score 3 (%): Not applicable for this Episode    Precautions:  Additional Precautions and Protocol Details: Standard, Fall Risk, cognitive, seizure    Subjective   Jay reports he is actively working on putting his own shirt on at home,.  Pain reported as 0/10. L shoulder    Objective   Vital Signs  /69   Pulse 63   BP Location: Right arm  BP Position: Sitting  BP Cuff Size: Adult         Activities of Daily Living (ADL) Assessment  Upper Body Dressing Assistance Details: Doff/don t-shirt with supervision seated edge of mat.            Treatment:  Therapeutic Exercise  TE 1: Gentle stretching of the soft tissues of the left  wrist and hand (including gentle mobilization of the related joints as appropriate/ necessary) to decrease edema and improve PROM, potentially allowing for increases in active movement.  TE  2: Active triceps x2 trials, though he did complain of pain.  TE 3: PROM throughout the right upper extremity without complaint of pain  TE 4: Gentle stretching and mobilization of the soft-tissues throughout the left  shoulder, including levator, serratus anterior, subscapularis, anterior complex, pec major, lats and combined internal rotators to allow for increased PROM.  Therapeutic Activity  TA 1: vitals  TA 2: Min A w/ mod cues for SPT w/c<>mat  TA 3: OT tightened right w/c brake to increase safety during transfers.    Time Entry(in minutes):  Therapeutic Activity Time Entry: 13  Therapeutic Exercise Time Entry: 31    Assessment & Plan   Assessment: Patient has met goal for upper body dressing with a T-shirt and for PROM without pain.  He demonstrates progress towards all other goals as well.  Today he also demonstrated active engagement of his triceps, demonstrating increased awareness and activity in the left upper extremity.  Evaluation/Treatment Tolerance: Patient tolerated treatment well    The patient will continue to benefit from skilled outpatient occupational therapy in order to address the deficits listed in the problem list on the initial evaluation, provide patient and family education, and maximize the patients level of independence in the home and community environments.     The patient's spiritual, cultural, and educational needs were considered, and the patient is agreeable to the plan of care and goals.           Plan: positioning of L UE during function/ attempt to use it for support. transfers    Goals:   Active       LTG       With MI, patient will perform all basic self-care tasks (grooming, bathing, dressing, toileting) using adaptive strategies or assistive devices as needed, demonstrating safe sequencing and minimal need for external support. (Progressing)       Start:  07/03/25    Expected End:  08/28/25            Patient will achieve full PROM in affected upper extremity to support  participation in ADLs and therapeutic exercises without pain. (Progressing)       Start:  07/03/25    Expected End:  08/28/25            Patient will perform functional transfers to multiple surfaces (bed, toilet, chair) with supervision or less, demonstrating improved balance, coordination, and safety awareness. (Progressing)       Start:  07/03/25    Expected End:  08/28/25               STG       Patient will complete UB dressing with Mod A using adaptive techniques or modified strategies. (Met)       Start:  07/03/25    Expected End:  07/31/25    Resolved:  08/04/25         Patient will tolerate PROM in affected upper extremity with a report of a decrease in pain to support participation in daily grooming tasks. (Met)       Start:  07/03/25    Expected End:  07/31/25    Resolved:  08/04/25         Patient will perform sit-to-stand transfers from transport wheelchair with minimal assistance and use of proper body mechanics, demonstrating progress toward safe mobility and reduced fall risk. (Progressing)       Start:  07/03/25    Expected End:  07/31/25                Jazzy Wilkins OT            T/f's

## 2025-08-04 ENCOUNTER — PATIENT MESSAGE (OUTPATIENT)
Dept: FAMILY MEDICINE | Facility: CLINIC | Age: 70
End: 2025-08-04
Payer: MEDICARE

## 2025-08-04 ENCOUNTER — CLINICAL SUPPORT (OUTPATIENT)
Dept: REHABILITATION | Facility: HOSPITAL | Age: 70
End: 2025-08-04
Payer: MEDICARE

## 2025-08-04 VITALS — HEART RATE: 55 BPM | DIASTOLIC BLOOD PRESSURE: 67 MMHG | SYSTOLIC BLOOD PRESSURE: 115 MMHG

## 2025-08-04 DIAGNOSIS — Z91.81 AT HIGH RISK FOR FALLS: ICD-10-CM

## 2025-08-04 DIAGNOSIS — I69.154: Primary | ICD-10-CM

## 2025-08-04 DIAGNOSIS — R41.841 COGNITIVE COMMUNICATION DEFICIT: Primary | ICD-10-CM

## 2025-08-04 DIAGNOSIS — R68.89 IMPAIRED FUNCTION OF UPPER EXTREMITY: ICD-10-CM

## 2025-08-04 DIAGNOSIS — R47.1 DYSARTHRIA: ICD-10-CM

## 2025-08-04 DIAGNOSIS — R13.12 OROPHARYNGEAL DYSPHAGIA: ICD-10-CM

## 2025-08-04 PROCEDURE — 97129 THER IVNTJ 1ST 15 MIN: CPT | Mod: PO

## 2025-08-04 PROCEDURE — 97530 THERAPEUTIC ACTIVITIES: CPT | Mod: PO,CQ

## 2025-08-04 PROCEDURE — 97112 NEUROMUSCULAR REEDUCATION: CPT | Mod: PO,CQ

## 2025-08-04 PROCEDURE — 97130 THER IVNTJ EA ADDL 15 MIN: CPT | Mod: PO

## 2025-08-04 PROCEDURE — 97110 THERAPEUTIC EXERCISES: CPT | Mod: PO

## 2025-08-04 PROCEDURE — 97112 NEUROMUSCULAR REEDUCATION: CPT | Mod: PO

## 2025-08-04 NOTE — PROGRESS NOTES
Outpatient Rehab    Physical Therapy Progress Note    Patient Name: Colt Rose  MRN: [id number]  YOB: 1955  Encounter Date: 8/1/2025    Therapy Diagnosis:   Encounter Diagnoses   Name Primary?    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 Yes    At high risk for falls      Physician: Jeffery Mena MD    Physician Orders: Eval and Treat  Medical Diagnosis: Hemiplegia, unspecified etiology, unspecified hemiplegia type, unspecified laterality  Surgical Diagnosis: Not applicable for this Episode   Surgical Date: Not applicable for this Episode  Days Since Last Surgery: Not applicable for this Episode    Visit # / Visits Authorized:  12 / 20  Insurance Authorization Period: 6/25/2025 to 12/31/2025  Date of Evaluation: 6/27/2025  Plan of Care Certification: 6/30/2025 to 10/1/2025   Progress Note Date Range: 6/27/2025 to 8/1/2025     PT/PTA: PT   Number of PTA visits since last PT visit:0  Time In: 0845   Time Out: 0930  Total Time (in minutes): 45   Total Billable Time (in minutes): 45    FOTO:  Intake Score (%): 50  Survey Score 2 (%): Not applicable for this Episode  Survey Score 3 (%): Not applicable for this Episode    Precautions:  Additional Precautions and Protocol Details: Standard, Fall Risk, cognitive, seizure    Subjective   He is doing okay today, still frustrated that his leg is not cooperating with him..  Family / care giver present for this visit:  (wife present in lobby)  Pain reported as 2/10. L hip    Objective         Ambulation Details    2 minute walk test: 350 ft with LBQC with CGA-Maynor. 10 MWT: 0.17 m/s    Gait Analysis  Gait Pattern: Hemiplegic                   Treatment:  Therapeutic Exercise  TE 1: SciFit level 2 x 10 minutes with BLE and RUE. Strap around knees to prevent abduction of left hip.  Therapeutic Activity  TA 1: Gait using large base quad cane and donning AFO from neurologist 2 x 240 ft  with minimal vaulting for clearance due to lack of hip and knee fledxion once fatigued. Wheelchair follow for safety and contact guard assistance with one instance of minimal assistance.  TA 2: lateral stepping x 3 laps  TA 3: squats in standing x 10    Time Entry(in minutes):  Therapeutic Activity Time Entry: 35  Therapeutic Exercise Time Entry: 10    Assessment & Plan   Assessment: Colt tolerated treatment session well. Progress note performed with gait speed of 0.17 m/s using LBQC taken. He was able to ambulate 350 ft in 2 minutes using LBQC.Focus remains on functional mobility and gait to reduce vaulting during swing phase. Requires maximal assistance for donning L AFO. Weakness in hip flexors and knee flexors effecting clearance of LLE. Improvement in overall quad strength during stance phase with occasional buckling. He remains appropriate for PT at this time.  Evaluation/Treatment Tolerance: Patient tolerated treatment well    The patient will continue to benefit from skilled outpatient physical therapy in order to address the deficits listed in the problem list on the initial evaluation, provide patient and family education, and maximize the patients level of independence in the home and community environments.     The patient's spiritual, cultural, and educational needs were considered, and the patient is agreeable to the plan of care and goals.     Education  Education was done with Patient. The patient's learning style includes Listening. The patient Verbalizes understanding and Requires continuing/additional education.         Progress with PT.       Plan: Continue with POC to improve left sided motor return, balance, transfers, and ambulation.    Goals:   Active       Long Term Goals       Patient will ascend/descend four 6 inch stairs safely using single handrail and standby assistance (Progressing)       Start:  06/27/25    Expected End:  10/01/25            Patient will demonstrate improved gait  endurance by ambulating 200 ft during 2 minute walk test with use of LRAD and without seated rest break.  (Progressing)       Start:  06/27/25    Expected End:  10/01/25            Pt will ambulate using tito walker with standby assistance to improve mobility in household.  (Progressing)       Start:  06/27/25    Expected End:  10/01/25            Patient will demonstrate ability to transition floor to stand with external support to reduce assistance needed in case of fall. (Progressing)       Start:  06/27/25    Expected End:  10/01/25               Short Term Goals       Patient will be given initial HEP and report compliance with home exercise program 5 days a week to improve carry over.  (Met)       Start:  06/27/25    Expected End:  10/01/25    Resolved:  07/30/25         Patient will demonstrate all bed mobility with supervision and awareness of left upper extremity to improve independence.  (Progressing)       Start:  06/27/25    Expected End:  10/01/25            Pt will demonstrate sit to stand from wheelchair using tito walker for stability with supervision to improve independence at home.  (Progressing)       Start:  06/27/25    Expected End:  10/01/25                Judy Ford, PT

## 2025-08-04 NOTE — PROGRESS NOTES
"  Outpatient Rehab    Physical Therapy Visit    Patient Name: Colt Rose  MRN: [id number]  YOB: 1955  Encounter Date: 8/4/2025    Therapy Diagnosis:   Encounter Diagnoses   Name Primary?    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 Yes    At high risk for falls      Physician: Jeffery Mena MD    Physician Orders: Eval and Treat  Medical Diagnosis: Hemiplegia, unspecified etiology, unspecified hemiplegia type, unspecified laterality  Surgical Diagnosis: Not applicable for this Episode   Surgical Date: Not applicable for this Episode  Days Since Last Surgery: Not applicable for this Episode    Visit # / Visits Authorized:  13 / 20  Insurance Authorization Period: 6/25/2025 to 12/31/2025  Date of Evaluation: 6/27/2025  Plan of Care Certification: 6/30/2025 to 10/1/2025      PT/PTA: PTA   Number of PTA visits since last PT visit:1  Time In: 0845   Time Out: 0930  Total Time (in minutes): 45   Total Billable Time (in minutes): 45    FOTO:  Intake Score (%): 50  Survey Score 2 (%): Not applicable for this Episode  Survey Score 3 (%): Not applicable for this Episode    Precautions:  Additional Precautions and Protocol Details: Additional Precautions and Protocol Details: Standard, Fall Risk, cognitive, seizure         Subjective   without complaints, reports doing exercises "some".  Family / care giver present for this visit:  (wife in waiting area)  Pain reported as 0/10.      Objective            Treatment:  Balance/Neuromuscular Re-Education  NMR 1: SciFit Stepper Level 2 11 minutes B LE only with L foot strapped in positioned to decrease L hip abduction with VC for correction  NMR 2: Supine: LTR 2 minutes with assist for L LE set up  and assist for decreased range to SBA  NMR 3: Supine L hip/knee flexion/extension 30 times Maynor for increased range  NMR 4: Seated L LAQ 3x10 with Maynor to increase range  Therapeutic " Activity  TA 1: WC<>SciFit Stepper/mat SPT to R CGA and assist for L LE setup and L UE placement  TA 2: Sit>Supine Maynor for L LE  TA 3: Supine to sit Maynor for L LE and use of R UE    Time Entry(in minutes):  Neuromuscular Re-Education Time Entry: 30  Therapeutic Activity Time Entry: 15    Assessment & Plan   Assessment: Colt provided good participation and effort during today's session with treatment focused on neuromuscular re-education and functional mobility. Colt tolerated activities with occasional assistance for increased range. Did well with stepping during transfers without loss of balance.  Evaluation/Treatment Tolerance: Patient tolerated treatment well    The patient will continue to benefit from skilled outpatient physical therapy in order to address the deficits listed in the problem list on the initial evaluation, provide patient and family education, and maximize the patients level of independence in the home and community environments.     The patient's spiritual, cultural, and educational needs were considered, and the patient is agreeable to the plan of care and goals.     Education  Education was done with Patient and Other recipient present. The patient's learning style includes Listening. The patient Demonstrates understanding and Verbalizes understanding. Wife participated in education. They identified as Caregiver and Spouse/significant other. The reported learning style is Listening. The recipient Verbalizes understanding.     HEP       Plan: Continue with POC to increase activities as patient tolerates.    Goals:   Active       Long Term Goals       Patient will ascend/descend four 6 inch stairs safely using single handrail and standby assistance (Progressing)       Start:  06/27/25    Expected End:  10/01/25            Patient will demonstrate improved gait endurance by ambulating 200 ft during 2 minute walk test with use of LRAD and without seated rest break.  (Progressing)        Start:  06/27/25    Expected End:  10/01/25            Pt will ambulate using tito walker with standby assistance to improve mobility in household.  (Progressing)       Start:  06/27/25    Expected End:  10/01/25            Patient will demonstrate ability to transition floor to stand with external support to reduce assistance needed in case of fall. (Progressing)       Start:  06/27/25    Expected End:  10/01/25               Short Term Goals       Patient will be given initial HEP and report compliance with home exercise program 5 days a week to improve carry over.  (Met)       Start:  06/27/25    Expected End:  10/01/25    Resolved:  07/30/25         Patient will demonstrate all bed mobility with supervision and awareness of left upper extremity to improve independence.  (Progressing)       Start:  06/27/25    Expected End:  10/01/25            Pt will demonstrate sit to stand from wheelchair using tito walker for stability with supervision to improve independence at home.  (Progressing)       Start:  06/27/25    Expected End:  10/01/25                Angie Castro, PTA

## 2025-08-04 NOTE — PROGRESS NOTES
"  Outpatient Rehab  Speech-Language Pathology Visit    Patient Name: Colt Rose  MRN: [id number]  YOB: 1955  Encounter Date: 8/4/2025    Therapy Diagnosis:   Encounter Diagnoses   Name Primary?    Cognitive communication deficit Yes    Oropharyngeal dysphagia     Dysarthria        Physician: Jeffery Mena MD    Physician Orders: Eval and Treat  Medical Diagnosis: Dysarthria  Surgical Diagnosis: Not applicable for this Episode   Surgical Date: Not applicable for this Episode  Days Since Last Surgery: Not applicable for this Episode    Visit # / Visits Authorized: 4 / 20   Insurance Authorization Period: 7/3/2025 to 12/31/2025  Date of Evaluation: 7/8/2025   Plan of Care Certification: 7/8/2025 to 9/16/2025      Time In: 0930   Time Out: 1015  Total Time (in minutes): 45   Total Billable Time (in minutes): 45      Precautions:  General Precautions: Aspiration/choking  Additional Precautions and Protocol Details: Standard, Fall Risk, cognitive, seizure    Subjective   Patient arrived on time and in pleasant spirits.  Pain reported as 2/10. Left shoulder      Objective            Treatment  Short Term Goals:  Current Progress:   Patient will participate in Modified Barium Swallow Study to instrumentally assess current swallow status to guide Speech Therapy Plan of Care and swallow recommendations       -Requested orders from MD, however, no orders received yet   Patient will recall 4/4 memory strategies independently 3 times overall.        -Patient recalled 3/4 WRAP (Lxolv-Huhkcj-Czqjqbral-Picture) strategies at start of session with written cue of "wrap." Reviewed each individual strategy again in session     -Patient reports phone gives him frustration and would like to focus more on using a notebook/folder for writing down information to aid in recall       Patient will complete alternating and divided attention tasks with natural background noise and 80% accuracy independently        " -Patient demonstrated 92% accuracy and required minimal cues during alternating attention task using a medication error box paper completed amid natural background noise (e.g., open therapy door)     Patient will complete moderate level problem solving tasks with 90% accuracy and minimal cues.        -Not addressed this date            Patient will determine a list of possible solutions, given a problem scenario in a structured setting with 90% accuracy        -Not addressed in today's session.           Patient will independently use a compensatory strategy (e.g., anchoring line, tactile cue, finger scanning) to aid in visual scanning to the left during reading or functional tasks in 3 out of 4 opportunities.        -Patient used a pink sticky note as a visual anchor on the left margin during a structured attention task. Strategy was implemented with minimal cues to facilitate scanning to the left, targeting left-sided neglect           Time Entry(in minutes):  Cognitive Skill Development (Medicare) Time Entry: 45    Assessment & Plan   Assessment  Patient participated well in today's session focused on review of WRAP (Dlyng-Ifkygt-Smspankec-Picture) strategies for memory, increasing awareness of left neglect, and attention. Discussed use of notebook to implement WRAP (Sqrll-Dsvjka-Zlrmbmdok-Picture) due to patient's expressed frustration when using his phone. Alternating attention task completed with minimal difficulties; additionally used sticky note for anchoring to assist in scanning to left side of page. Patient needed less cueing to attend to left during structured tasks this date. Patient will continue to benefit from skilled Speech Therapy intervention to improve his cognitive communication skills. Current goals remain appropriate. Will update as needed.   Evaluation/Treatment Tolerance: Patient tolerated treatment well    The patient will continue to benefit from skilled outpatient speech therapy in order  to address the deficits listed in the problem list on the initial evaluation, provide patient and family education, and maximize the patients level of independence in the home and community environments.     The patient's spiritual, cultural, and educational needs were considered, and the patient is agreeable to the plan of care and goals.            Plan  Continue ST POC          Goals:   Active       1. Short term goals       STG 1 (Progressing)       Start:  07/09/25    Expected End:  09/16/25       Patient will participate in Modified Barium Swallow Study to instrumentally assess current swallow status to guide Speech Therapy Plan of Care and swallow recommendations         STG 2 (Progressing)       Start:  07/09/25    Expected End:  09/16/25       Patient will recall 4/4 memory strategies independently 3 times overall.         STG 3 (Progressing)       Start:  07/09/25    Expected End:  09/16/25       Patient will complete alternating and divided attention tasks with natural background noise and 80% accuracy independently         STG 4 (Progressing)       Start:  07/09/25    Expected End:  09/16/25       Patient will complete moderate level problem solving tasks with 90% accuracy and minimal cues.         STG 5 (Progressing)       Start:  07/09/25    Expected End:  09/16/25       Patient will determine a list of possible solutions, given a problem scenario in a structured setting with 90% accuracy         STG 6 (Progressing)       Start:  07/09/25    Expected End:  09/16/25       Patient will independently use a compensatory strategy (e.g., anchoring line, tactile cue, finger scanning) to aid in visual scanning to the left during reading or functional tasks in 3 out of 4 opportunities.            2. Long term goals       LTG 1 (Progressing)       Start:  07/09/25    Expected End:  09/16/25       Patient will maintain adequate hydration/nutrition with optimum safety and efficiency of swallowing function on PO  intake without overt signs and symptoms of aspiration for regular diet level.          LTG 2 (Progressing)       Start:  07/09/25    Expected End:  09/16/25       Pt will improve  attention skills to effectively attend to and communicate in complex daily living tasks in functional living environment.           LTG 3 (Progressing)       Start:  07/09/25    Expected End:  09/16/25       Patient will use appropriate memory strategies to schedule and recall weekly activities, express needs and recall names to maintain safety and participate socially in functional living environment.           LTG 4 (Progressing)       Start:  07/09/25    Expected End:  09/16/25       Pt will demonstrate use of self awareness, planning, and  self-monitoring during daily living activities to improve safety and awareness in functional living environment.             Kacy Nance, CF-SLP

## 2025-08-04 NOTE — PROGRESS NOTES
Outpatient Rehab    Occupational Therapy Visit    Patient Name: Colt Rose  MRN: [id number]  YOB: 1955  Encounter Date: 8/4/2025    Therapy Diagnosis:   Encounter Diagnoses   Name Primary?    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 Yes    Impaired function of upper extremity      Physician: Jeffery Mena MD    Physician Orders: Eval and Treat  Medical Diagnosis: Hemiplegia, unspecified etiology, unspecified hemiplegia type, unspecified laterality  Surgical Diagnosis: Not applicable for this Episode   Surgical Date: Not applicable for this Episode  Days Since Last Surgery: Not applicable for this Episode    Visit # / Visits Authorized: 8 / 12  Insurance Authorization Period: 7/3/2025 to 12/31/2025  Date of Evaluation: 7/3/2025  Plan of Care Certification: 7/3/2025 to 8/28/2025      Time In: 0805   Time Out: 0845  Total Time (in minutes): 40   Total Billable Time (in minutes): 40    FOTO:  Intake Score (%): Not applicable for this Episode  Survey Score 2 (%): Not applicable for this Episode  Survey Score 3 (%): Not applicable for this Episode    Precautions:  Additional Precautions and Protocol Details: Standard, Fall Risk, cognitive, seizure       Subjective   Colt's wife reports increased left upper extremity movement at home this weekend..  Family / care giver present for this visit:   Pain reported as 0/10. Left shoulder    Objective   Vital Signs  /67   Pulse (!) 55   BP Location: Right arm  BP Position: Sitting  BP Cuff Size: Adult               Treatment:  Therapeutic Exercise  TE 1: Gentle stretching of the soft tissues of the left  wrist and hand (including gentle mobilization of the related joints as appropriate/ necessary) to decrease edema and improve PROM, potentially allowing for increases in active movement.  TE 2: Placed flat paddle on pt's left hand for duration of treatment to inhibit flexor  tone and provide LLPS to long finger flexors.  TE 3: After pelvic tilting, added 70* docking station as appropriate to inhibit over-recruitment of flexors and provide LLPS to wrist and long finger flexors.  TE 4: Gentle stretching and mobilization of the soft-tissues throughout the left shoulder, including levator, serratus anterior, subscapularis, anterior complex, lats and combined internal rotators to allow for increased PROM.  TE 5: Replaced GivMohr at end of session with paddle and docking station still in place to allow for appropriate support during PT.  Balance/Neuromuscular Re-Education  NMR 1: Vitals  NMR 2: Stand pivot transfer, mat <> wheelchair with min A.  NMR 3: Adjustment to GivMohr to improve fit.  NMR 4: Facilitation of anterior/ posterior pelvic tilt, right/ left lateral pelvic tilt. Points of contact at low back and anterior left shoulder for anterior tilt; ASIS and superior right shoulder for posterior tilt, appropriate QL and contralateral ribcage for lateral tilting. Hands at sides in position of support to facilitate scapulo-pelvic rhythm.  Completed 2 sets of 10 in each direction.  Moved hands more posteriorly on 2nd set to increase passive scapular movement.  NMR 5: Attempted triceps with trace movement noted on approximately 50% of trials.  NMR 6: With arm supported on table at about 90° flexion, OT facilitated scapular protraction and retraction.  Patient required tactile facilitation, as well as max verbal and physical cues to limit compensatory twist.  Scapular pro/re on table    Time Entry(in minutes):  Neuromuscular Re-Education Time Entry: 15  Therapeutic Exercise Time Entry: 25    Assessment & Plan   Assessment: Jay demonstrated improved control during his stand pivot transfer today.  However he continues to require mod cues to safely complete all steps of the transfer.  Inconsistent movement today noted in triceps.  Evaluation/Treatment Tolerance: Patient tolerated treatment  well    The patient will continue to benefit from skilled outpatient occupational therapy in order to address the deficits listed in the problem list on the initial evaluation, provide patient and family education, and maximize the patients level of independence in the home and community environments.     The patient's spiritual, cultural, and educational needs were considered, and the patient is agreeable to the plan of care and goals.           Plan: positioning of L UE during function/ attempt to use it for support. transfers    Goals:   Active       LTG       With MI, patient will perform all basic self-care tasks (grooming, bathing, dressing, toileting) using adaptive strategies or assistive devices as needed, demonstrating safe sequencing and minimal need for external support. (Ongoing)       Start:  07/03/25    Expected End:  08/28/25            Patient will achieve full PROM in affected upper extremity to support participation in ADLs and therapeutic exercises without pain. (Ongoing)       Start:  07/03/25    Expected End:  08/28/25            Patient will perform functional transfers to multiple surfaces (bed, toilet, chair) with supervision or less, demonstrating improved balance, coordination, and safety awareness. (Ongoing)       Start:  07/03/25    Expected End:  08/28/25               STG       Patient will complete UB dressing with Mod A using adaptive techniques or modified strategies. (Met)       Start:  07/03/25    Expected End:  07/31/25    Resolved:  08/04/25         Patient will tolerate PROM in affected upper extremity with a report of a decrease in pain to support participation in daily grooming tasks. (Met)       Start:  07/03/25    Expected End:  07/31/25    Resolved:  08/04/25         Patient will perform sit-to-stand transfers from transport wheelchair with minimal assistance and use of proper body mechanics, demonstrating progress toward safe mobility and reduced fall risk. (Ongoing)        Start:  07/03/25    Expected End:  07/31/25                Jazzy Wilkins OT

## 2025-08-05 ENCOUNTER — PATIENT MESSAGE (OUTPATIENT)
Dept: NEUROLOGY | Facility: CLINIC | Age: 70
End: 2025-08-05
Payer: MEDICARE

## 2025-08-06 ENCOUNTER — CLINICAL SUPPORT (OUTPATIENT)
Dept: REHABILITATION | Facility: HOSPITAL | Age: 70
End: 2025-08-06
Payer: MEDICARE

## 2025-08-06 ENCOUNTER — TELEPHONE (OUTPATIENT)
Dept: PHARMACY | Facility: CLINIC | Age: 70
End: 2025-08-06
Payer: MEDICARE

## 2025-08-06 VITALS — SYSTOLIC BLOOD PRESSURE: 112 MMHG | DIASTOLIC BLOOD PRESSURE: 63 MMHG | HEART RATE: 56 BPM

## 2025-08-06 DIAGNOSIS — Z91.81 AT HIGH RISK FOR FALLS: ICD-10-CM

## 2025-08-06 DIAGNOSIS — I69.154: Primary | ICD-10-CM

## 2025-08-06 DIAGNOSIS — R13.12 OROPHARYNGEAL DYSPHAGIA: ICD-10-CM

## 2025-08-06 DIAGNOSIS — R41.841 COGNITIVE COMMUNICATION DEFICIT: Primary | ICD-10-CM

## 2025-08-06 DIAGNOSIS — R68.89 IMPAIRED FUNCTION OF UPPER EXTREMITY: ICD-10-CM

## 2025-08-06 DIAGNOSIS — R47.1 DYSARTHRIA: ICD-10-CM

## 2025-08-06 PROCEDURE — 97530 THERAPEUTIC ACTIVITIES: CPT | Mod: PO

## 2025-08-06 PROCEDURE — 97110 THERAPEUTIC EXERCISES: CPT | Mod: PO

## 2025-08-06 PROCEDURE — 97130 THER IVNTJ EA ADDL 15 MIN: CPT | Mod: PO

## 2025-08-06 PROCEDURE — 97112 NEUROMUSCULAR REEDUCATION: CPT | Mod: PO

## 2025-08-06 PROCEDURE — 97129 THER IVNTJ 1ST 15 MIN: CPT | Mod: PO

## 2025-08-06 NOTE — PROGRESS NOTES
Outpatient Rehab    Occupational Therapy Visit    Patient Name: Colt Rose  MRN: [id number]  YOB: 1955  Encounter Date: 8/6/2025    Therapy Diagnosis:   Encounter Diagnoses   Name Primary?    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 Yes    Impaired function of upper extremity      Physician: Jeffery Mena MD    Physician Orders: Eval and Treat  Medical Diagnosis: Hemiplegia, unspecified etiology, unspecified hemiplegia type, unspecified laterality  Surgical Diagnosis: Not applicable for this Episode   Surgical Date: Not applicable for this Episode  Days Since Last Surgery: Not applicable for this Episode    Visit # / Visits Authorized: 9 / 24  Insurance Authorization Period: 7/3/2025 to 12/31/2025  Date of Evaluation: 7/3/2025  Plan of Care Certification: 7/3/2025 to 8/28/2025      Time In: 0801   Time Out: 0843  Total Time (in minutes): 42   Total Billable Time (in minutes): 40    FOTO:  Intake Score (%): Not applicable for this Episode  Survey Score 2 (%): Not applicable for this Episode  Survey Score 3 (%): Not applicable for this Episode    Precautions:  Additional Precautions and Protocol Details: Standard, Fall Risk, cognitive, seizure       Subjective   Wife continues to report difficulty with fitting GivMohr..    Pain reported as 2/10. left shoulder    Objective   Vital Signs  /63   Pulse (!) 56   BP Location: Right arm  BP Position: Sitting  BP Cuff Size: Adult                 Treatment:  Therapeutic Exercise  TE 1: Gentle stretching of the soft tissues of the left wrist and hand (including gentle mobilization of the related joints as appropriate/ necessary) to decrease edema and improve PROM, potentially allowing for increases in active movement.  TE 2: Placed flat paddle on pt's left hand for duration of treatment to inhibit flexor tone and provide LLPS to long finger flexor  TE 3: Gentle stretching of  "hips towards external rotation, working towards figure 4 positioning to increase independence with lower body dressing.  TE 5: At end of session added 70* docking station as appropriate to inhibit over-recruitment of flexors and provide LLPS to wrist and long finger flexors. Also replaced GivMohr to allow for appropriate support during PT.  Balance/Neuromuscular Re-Education  NMR 1: Vitals  NMR 2: Stand pivot transfer, mat <> wheelchair with min A.  Mod cues for sequencing of repositioning feet during transfer.  NMR 3: KT to facilitate left triceps (I-cut, applied from origin to insertion, ~35% tension in therapeutic range and no tension in 1" tails).  NMR 4: Initiated use of the left arm for support via weight-bearing.  Facilitation of triceps during weight-bearing with vibration and tapping.  Max cues to prevent patient from leaning or hunching down to compensate for difficulty with triceps extension.  NMR 5: Facilitation of active triceps with a-frame to provide increased visual feedback of successful elbow extension, using vibration and tapping on triceps.  NMR 6: Use of left arm support during small to medium head movements and small to medium right arm movements as well as trying to reposition the right leg into a figure 4, allowing him to divide his attention between left arm positioning and all the other activities.    Time Entry(in minutes):  Neuromuscular Re-Education Time Entry: 25  Therapeutic Exercise Time Entry: 15    Assessment & Plan   Assessment: Jay reports wanting to improve function in his left arm.  However he has significant difficulty pain attention to the left and requires max cues to sustain left arm positioning during other body movements.  We will complete the CAHAI-[id number] next session not only as an assessment, but also to demonstrate to patient numerous activities in which he can incorporate the left hand.  Evaluation/Treatment Tolerance: Patient tolerated treatment well    The patient " will continue to benefit from skilled outpatient occupational therapy in order to address the deficits listed in the problem list on the initial evaluation, provide patient and family education, and maximize the patients level of independence in the home and community environments.     The patient's spiritual, cultural, and educational needs were considered, and the patient is agreeable to the plan of care and goals.           Plan: [id number]    Goals:   Active       LTG       With MI, patient will perform all basic self-care tasks (grooming, bathing, dressing, toileting) using adaptive strategies or assistive devices as needed, demonstrating safe sequencing and minimal need for external support. (Ongoing)       Start:  07/03/25    Expected End:  08/28/25            Patient will achieve full PROM in affected upper extremity to support participation in ADLs and therapeutic exercises without pain. (Ongoing)       Start:  07/03/25    Expected End:  08/28/25            Patient will perform functional transfers to multiple surfaces (bed, toilet, chair) with supervision or less, demonstrating improved balance, coordination, and safety awareness. (Ongoing)       Start:  07/03/25    Expected End:  08/28/25               STG       Patient will complete UB dressing with Mod A using adaptive techniques or modified strategies. (Met)       Start:  07/03/25    Expected End:  07/31/25    Resolved:  08/04/25         Patient will tolerate PROM in affected upper extremity with a report of a decrease in pain to support participation in daily grooming tasks. (Met)       Start:  07/03/25    Expected End:  07/31/25    Resolved:  08/04/25         Patient will perform sit-to-stand transfers from transport wheelchair with minimal assistance and use of proper body mechanics, demonstrating progress toward safe mobility and reduced fall risk. (Ongoing)       Start:  07/03/25    Expected End:  07/31/25                Jazzy Wilkins OT

## 2025-08-06 NOTE — PROGRESS NOTES
Outpatient Rehab    Speech-Language Pathology Visit    Patient Name: Colt Rose  MRN: [id number]  YOB: 1955  Encounter Date: 8/6/2025    Therapy Diagnosis:   Encounter Diagnoses   Name Primary?    Cognitive communication deficit Yes    Dysarthria     Oropharyngeal dysphagia      Physician: Jeffery Mena MD    Physician Orders: Eval and Treat  Medical Diagnosis: Dysarthria  Surgical Diagnosis: Not applicable for this Episode   Surgical Date: Not applicable for this Episode  Days Since Last Surgery: Not applicable for this Episode    Visit # / Visits Authorized: 5 / 20   Insurance Authorization Period: 7/3/2025 to 12/31/2025  Date of Evaluation: 7/8/2025   Plan of Care Certification: 7/8/2025 to 9/16/2025      Time In: 1001   Time Out: 1046  Total Time (in minutes): 45   Total Billable Time (in minutes): 45    FOTO:  Intake Score (%): Not applicable for this Episode  Survey Score 2 (%): Not applicable for this Episode  Survey Score 3 (%): Not applicable for this Episode    Precautions:  General Precautions: Aspiration/choking  Additional Precautions and Protocol Details: Standard, Fall Risk, cognitive, seizure       Subjective   Pt arrived on time with no reported complaints.  Pain reported as 2/10. left shoulder      Objective          Treatment  Short Term Goals:  Current Progress:   Patient will participate in Modified Barium Swallow Study to instrumentally assess current swallow status to guide Speech Therapy Plan of Care and swallow recommendations        -Requested orders from MD, however, no orders received yet   Patient will recall 4/4 memory strategies independently 3 times overall.         -Patient recalled 3/4 WRAP (Dtncb-Siktag-Wyxzodjne-Picture) strategies at start of session independently.  With verbal cue, patient recalled 4/4 strategies. Reviewed each individual strategy again in session     -Patient reports phone gives him frustration but this improved significantly with  clinician relocating notes nicole to home screen. Patient successfully navigated calendar nicole to look ahead at his schedule for tomorrow in session and created a list of questions for his doctor in his notes nicole with minimal cues      Patient will complete alternating and divided attention tasks with natural background noise and 80% accuracy independently         -Patient completed alternating attention task of reading an article and discussing topics from the article in session. Minimal cues needed to redirect attention to last place in article at times      Patient will complete moderate level problem solving tasks with 90% accuracy and minimal cues.         -Not addressed this date            Patient will determine a list of possible solutions, given a problem scenario in a structured setting with 90% accuracy         -Patient independently generated solutions to functional daily problems in the home with 100% accuracy this date      Met x1 session    Patient will independently use a compensatory strategy (e.g., anchoring line, tactile cue, finger scanning) to aid in visual scanning to the left during reading or functional tasks in 3 out of 4 opportunities.         -Moderate cues needed to utilize anchor line for reading task in session and to scan to anchor line              Time Entry(in minutes):  Cognitive Skill Development (Medicare) Time Entry: 45    Assessment & Plan   Assessment  Patient participated well in today's session focused on review of WRAP (Kttgx-Tmsuaw-Yealatjzv-Picture) strategies for memory, utilizing phone to create notes for recall, increasing awareness of left neglect, and attention. Improved frustration with use of notes nicole in phone with clinician moving nicole to the home screen. Patient created a list of questions for his doctors with minimal cues in session and preferred use of voice to text opposed to typing in notes. Patient needed less cueing to attend to left during structured tasks  this date, but consistent cueing needed during unstructured conversation. Patient will continue to benefit from skilled Speech Therapy intervention to improve his cognitive communication skills. Current goals remain appropriate. Will update as needed.   Evaluation/Treatment Tolerance: Patient tolerated treatment well    The patient will continue to benefit from skilled outpatient speech therapy in order to address the deficits listed in the problem list on the initial evaluation, provide patient and family education, and maximize the patients level of independence in the home and community environments.     The patient's spiritual, cultural, and educational needs were considered, and the patient is agreeable to the plan of care and goals.     Education  Education was done with Patient. The patient's learning style includes Listening. The patient Verbalizes understanding.                  Plan  Continue ST POC          Goals:   Active       1. Short term goals       STG 1 (Progressing)       Start:  07/09/25    Expected End:  09/16/25       Patient will participate in Modified Barium Swallow Study to instrumentally assess current swallow status to guide Speech Therapy Plan of Care and swallow recommendations         STG 2 (Progressing)       Start:  07/09/25    Expected End:  09/16/25       Patient will recall 4/4 memory strategies independently 3 times overall.         STG 3 (Progressing)       Start:  07/09/25    Expected End:  09/16/25       Patient will complete alternating and divided attention tasks with natural background noise and 80% accuracy independently         STG 4 (Progressing)       Start:  07/09/25    Expected End:  09/16/25       Patient will complete moderate level problem solving tasks with 90% accuracy and minimal cues.         STG 5 (Progressing)       Start:  07/09/25    Expected End:  09/16/25       Patient will determine a list of possible solutions, given a problem scenario in a structured  setting with 90% accuracy         STG 6 (Progressing)       Start:  07/09/25    Expected End:  09/16/25       Patient will independently use a compensatory strategy (e.g., anchoring line, tactile cue, finger scanning) to aid in visual scanning to the left during reading or functional tasks in 3 out of 4 opportunities.            2. Long term goals       LTG 1 (Progressing)       Start:  07/09/25    Expected End:  09/16/25       Patient will maintain adequate hydration/nutrition with optimum safety and efficiency of swallowing function on PO intake without overt signs and symptoms of aspiration for regular diet level.          LTG 2 (Progressing)       Start:  07/09/25    Expected End:  09/16/25       Pt will improve  attention skills to effectively attend to and communicate in complex daily living tasks in functional living environment.           LTG 3 (Progressing)       Start:  07/09/25    Expected End:  09/16/25       Patient will use appropriate memory strategies to schedule and recall weekly activities, express needs and recall names to maintain safety and participate socially in functional living environment.           LTG 4 (Progressing)       Start:  07/09/25    Expected End:  09/16/25       Pt will demonstrate use of self awareness, planning, and  self-monitoring during daily living activities to improve safety and awareness in functional living environment.             Macrina Ladd, L-SLP, CCC-SLP

## 2025-08-06 NOTE — TELEPHONE ENCOUNTER
----- Message from Mireya Kitchen sent at 8/6/2025 11:43 AM CDT -----  Regarding: RE: FINANCIAL ASSISTANCE FOR BRIVIACT  Hello,     Mr. Rose's case has been assigned to Asia Beltran @[phone]. Provider may review progress notes by typing pharmacy patient assistance in Epic search box.      What happens next? Assigned advocate will review your patients chart and research available options.  Patient and Provider may be asked to provide specific documentation to facilitate the PAP process. Failure to provide the requested documentation will delay assistance.    Please note: epic chart must reflect a current order for the requested medication written by an [facility] provider to begin PAP process.   Please note: all requests are subject to program availability and patient eligibility verification.   Please note: each program has it's own unique eligibility criteria (e.g., income limits, insurance status medical condition, residency).Therefore eligibility is determined by the specific program being applied to not by the Pharmacy Patient Assistance Team.         Thank you,   [facility]  [address]  [place] [address]  Fax: [phone]  Email: [email or web address]  ----- Message -----  From: Evonne Colón [facility]  Sent: 8/6/2025   9:39 AM CDT  To: Desiree Villa, Kina; Marce Rivas; Judy PEREZ#  Subject: FINANCIAL ASSISTANCE FOR BRIVIACT                Could you please research and see if Mr Rose qualifies for financial assistance for his Briviact? His copay is over $600.00. [facility] reached out to me today. Thank you.  
First contact attempt has been made via letter and portal message  . Welcome letter and MAC Enrollment Packet has been sent via letter and portal message . Follow-up will be made in approximately 5 to 10 business days.      Asia Beltran  Pharmacy Patient Assistance Team   
Hello,     The pharmacy patient assistance team is working to help your patient enroll in the [facility] Patient Assistance Program. Before we can proceed with submitting the application, we kindly request the following documentation from you:    Digital-Signature, Please respond to this message providing your [facility] email to receive instructions on how to digitally sign your patients PAP application(s).       Please provide this information at your earliest convenience so we can ensure timely processing and shipment of the patient's medication. Let me know if you have any questions.     Thank you,  Asia Beltran @[phone]  Pharmacy Patient Assistance  [address] [place] [address], [address]  [place] [address]  Fax: [phone]  Email: [email or web address]    
Shivam, Alan Simon

## 2025-08-06 NOTE — LETTER
August 6, 2025    Colt PEREZ [place]  [address] [place] [address]           Dear Mr. Rose,    My name is Asia Beltran, and I am reaching out on behalf of [facility]s Pharmacy Patient Assistance Team regarding your request for medication assistance. Our goal is to help qualified [facility] patients obtain financial assistance for prescribed medications.    Please note that enrollment into available support may require the following documents:    Completed Medication Access [facility] authorization forms  Copy of all insurance cards (front and back)  Proof of household income (such as social security award letter, pension statement or 3 consecutive pay stubs)    If you still need assistance with your medications, please reach out to the phone number listed below. If we do not hear back from you, a second contact attempt will be made via mail or your [facility] portal in 5 to 10 business days.    Thank you for giving us the opportunity to assist you with your healthcare needs. We look forward to working with you.      Sincerely  Asia Beltran @[phone]  Pharmacy Patient Assistance Team  [address] [place] [address]  [id number] [place] [address]  Fax: [phone]  Email: [email or web address]

## 2025-08-07 NOTE — PROGRESS NOTES
Outpatient Rehab    Physical Therapy Visit    Patient Name: Colt Rose  MRN: [id number]  YOB: 1955  Encounter Date: 8/6/2025    Therapy Diagnosis:   Encounter Diagnoses   Name Primary?    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 Yes    At high risk for falls      Physician: Jeffery Mena MD    Physician Orders: Eval and Treat  Medical Diagnosis: Hemiplegia, unspecified etiology, unspecified hemiplegia type, unspecified laterality  Surgical Diagnosis: Not applicable for this Episode   Surgical Date: Not applicable for this Episode  Days Since Last Surgery: Not applicable for this Episode    Visit # / Visits Authorized:  14 / 30  Insurance Authorization Period: 6/25/2025 to 12/31/2025  Date of Evaluation: 6/27/2025  Plan of Care Certification: 6/30/2025 to 10/1/2025      PT/PTA: PT   Number of PTA visits since last PT visit:1  Time In: 0850   Time Out: 0933  Total Time (in minutes): 43   Total Billable Time (in minutes): 43    FOTO:  Intake Score (%): 50  Survey Score 2 (%): Not applicable for this Episode  Survey Score 3 (%): Not applicable for this Episode    Precautions:  Additional Precautions and Protocol Details: Standard, Fall Risk, cognitive, seizure         Subjective   He is doing okay today. He arrives from [facility] paddle on left hand and wrist..  Family / care giver present for this visit:  (wife present in lobby)  Pain reported as 2/10. left shoulder    Objective            Treatment:  Therapeutic Activity  TA 1: sit to stand from wheelchair with CGA-Maynor x 5  TA 2: Treadmill training with safety switch and harness donned for safety. Treadmill speed of 0.4-0.5 mph with PT intermittently facilitating knee and hip flexion for left foot clearance during swing phase when fatigued. Verbal cues for increasing stride on right foot. He tolerated 7 minutes and 6 minutes with one seated rest break  between.  TA 3: squats in parallel bars 2 x 10 with PT assist to shift weight over onto left lower extremity. Min A  TA 4: doffing L neuro fei paddle on wrist and hand with total assist    Time Entry(in minutes):  Therapeutic Activity Time Entry: 43    Assessment & Plan   Assessment: Colt tolerated treatment session well. He demonstrates good tolerance to treadmill training with ability for PT to assist in left foot clearance and placement as needed. Verbal cues to increase step length on right lower extremity to increase weight bearing time on left. Able to tolerate 0.5 mph today on treadmill with single UE support. He remains appropriate for PT at this time.  Evaluation/Treatment Tolerance: Patient tolerated treatment well    The patient will continue to benefit from skilled outpatient physical therapy in order to address the deficits listed in the problem list on the initial evaluation, provide patient and family education, and maximize the patients level of independence in the home and community environments.     The patient's spiritual, cultural, and educational needs were considered, and the patient is agreeable to the plan of care and goals.     Education  Education was done with Patient. The patient's learning style includes Listening. The patient Verbalizes understanding.         Benefits of treadmill training at increased pace post stroke.       Plan: Continue with POC to increase activities as patient tolerates.    Goals:   Active       Long Term Goals       Patient will ascend/descend four 6 inch stairs safely using single handrail and standby assistance (Progressing)       Start:  06/27/25    Expected End:  10/01/25            Patient will demonstrate improved gait endurance by ambulating 200 ft during 2 minute walk test with use of LRAD and without seated rest break.  (Progressing)       Start:  06/27/25    Expected End:  10/01/25            Pt will ambulate using tito walker with standby  assistance to improve mobility in household.  (Progressing)       Start:  06/27/25    Expected End:  10/01/25            Patient will demonstrate ability to transition floor to stand with external support to reduce assistance needed in case of fall. (Progressing)       Start:  06/27/25    Expected End:  10/01/25               Short Term Goals       Patient will be given initial HEP and report compliance with home exercise program 5 days a week to improve carry over.  (Met)       Start:  06/27/25    Expected End:  10/01/25    Resolved:  07/30/25         Patient will demonstrate all bed mobility with supervision and awareness of left upper extremity to improve independence.  (Progressing)       Start:  06/27/25    Expected End:  10/01/25            Pt will demonstrate sit to stand from wheelchair using tito walker for stability with supervision to improve independence at home.  (Progressing)       Start:  06/27/25    Expected End:  10/01/25                Judy Ford, PT

## 2025-08-08 ENCOUNTER — CLINICAL SUPPORT (OUTPATIENT)
Dept: REHABILITATION | Facility: HOSPITAL | Age: 70
End: 2025-08-08
Payer: MEDICARE

## 2025-08-08 VITALS — HEART RATE: 58 BPM | SYSTOLIC BLOOD PRESSURE: 136 MMHG | DIASTOLIC BLOOD PRESSURE: 69 MMHG

## 2025-08-08 DIAGNOSIS — Z91.81 AT HIGH RISK FOR FALLS: ICD-10-CM

## 2025-08-08 DIAGNOSIS — I69.154: Primary | ICD-10-CM

## 2025-08-08 DIAGNOSIS — R68.89 IMPAIRED FUNCTION OF UPPER EXTREMITY: ICD-10-CM

## 2025-08-08 PROCEDURE — 97530 THERAPEUTIC ACTIVITIES: CPT | Mod: PO

## 2025-08-08 PROCEDURE — 97112 NEUROMUSCULAR REEDUCATION: CPT | Mod: PO

## 2025-08-08 PROCEDURE — 97110 THERAPEUTIC EXERCISES: CPT | Mod: PO

## 2025-08-08 NOTE — PROGRESS NOTES
Outpatient Rehab    Physical Therapy Visit    Patient Name: Colt Rose  MRN: [id number]  YOB: 1955  Encounter Date: 8/8/2025    Therapy Diagnosis:   Encounter Diagnoses   Name Primary?    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 Yes    At high risk for falls      Physician: Jeffery Mena MD    Physician Orders: Eval and Treat  Medical Diagnosis: Hemiplegia, unspecified etiology, unspecified hemiplegia type, unspecified laterality  Surgical Diagnosis: Not applicable for this Episode   Surgical Date: Not applicable for this Episode  Days Since Last Surgery: Not applicable for this Episode    Visit # / Visits Authorized:  15 / 30  Insurance Authorization Period: 6/25/2025 to 12/31/2025  Date of Evaluation: 6/27/2025  Plan of Care Certification: 6/30/2025 to 10/1/2025      PT/PTA: PT   Number of PTA visits since last PT visit:0  Time In: 0845   Time Out: 0930  Total Time (in minutes): 45   Total Billable Time (in minutes): 45    FOTO:  Intake Score (%): 50  Survey Score 2 (%): Not applicable for this Episode  Survey Score 3 (%): Not applicable for this Episode    Precautions:  Additional Precautions and Protocol Details: Standard, Fall Risk, cognitive, seizure         Subjective   His wife reports he is feeling better today but having more trouble moving around today..  Family / care giver present for this visit:  (wife in lobby)  Pain reported as 2/10. left shoulder    Objective            Treatment:  Therapeutic Exercise  TE 1: SciFit level 4 x 10 minutes with BLE and RUE. Strap around knees to prevent abduction of left hip.  Balance/Neuromuscular Re-Education  NMR 1: Standing in parallel bars with ESTIM assisting left hip flexion and hamstring activation with patient performing tap onto 6 inch step during active time for 5 minutes, and then switched to stepping forward when activated. mA ~35, ramp of 2 sec, 10/10 on off  time.  Therapeutic Activity  TA 1: Sit to stand from wheelchair with SBA  TA 2: GAit using personal SBQC with CGA for 240 ft with wheelchair follow for safety    Time Entry(in minutes):  Neuromuscular Re-Education Time Entry: 15  Therapeutic Activity Time Entry: 20  Therapeutic Exercise Time Entry: 10    Assessment & Plan   Assessment: Colt tolerated treatment session well. He brought personal small base quad cane in for adjustment of height and assessment of safety with use of AD. He demonstrates good balance only requiring contact guard assistance during gait trial. He continues to display vaulting intermittently during gait to clear left lower extremity. Discussed he can use SBQC for home with wife present and gait belt donned for safety. Use of ESTIM to improve motor activation of hamstring and hip flexors on left leg for swing phase of gait. He remains appropriate for PT.  Evaluation/Treatment Tolerance: Patient tolerated treatment well    The patient will continue to benefit from skilled outpatient physical therapy in order to address the deficits listed in the problem list on the initial evaluation, provide patient and family education, and maximize the patients level of independence in the home and community environments.     The patient's spiritual, cultural, and educational needs were considered, and the patient is agreeable to the plan of care and goals.     Education  Education was done with Patient. The patient's learning style includes Listening. The patient Verbalizes understanding.         Use of SBQC at home with wife present. Avoid vaulting during gait to focus on hip and knee flexion.       Plan: Continue with POC to increase activities as patient tolerates.    Goals:   Active       Long Term Goals       Patient will ascend/descend four 6 inch stairs safely using single handrail and standby assistance (Progressing)       Start:  06/27/25    Expected End:  10/01/25            Patient will  demonstrate improved gait endurance by ambulating 200 ft during 2 minute walk test with use of LRAD and without seated rest break.  (Progressing)       Start:  06/27/25    Expected End:  10/01/25            Pt will ambulate using tito walker with standby assistance to improve mobility in household.  (Progressing)       Start:  06/27/25    Expected End:  10/01/25            Patient will demonstrate ability to transition floor to stand with external support to reduce assistance needed in case of fall. (Progressing)       Start:  06/27/25    Expected End:  10/01/25               Short Term Goals       Patient will be given initial HEP and report compliance with home exercise program 5 days a week to improve carry over.  (Met)       Start:  06/27/25    Expected End:  10/01/25    Resolved:  07/30/25         Patient will demonstrate all bed mobility with supervision and awareness of left upper extremity to improve independence.  (Progressing)       Start:  06/27/25    Expected End:  10/01/25            Pt will demonstrate sit to stand from wheelchair using tito walker for stability with supervision to improve independence at home.  (Progressing)       Start:  06/27/25    Expected End:  10/01/25                Judy Ford, PT

## 2025-08-08 NOTE — PROGRESS NOTES
Outpatient Rehab    Occupational Therapy Visit    Patient Name: Colt Rose  MRN: [id number]  YOB: 1955  Encounter Date: 8/8/2025    Therapy Diagnosis:   Encounter Diagnoses   Name Primary?    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 Yes    Impaired function of upper extremity      Physician: Jeffery Mena MD    Physician Orders: Eval and Treat  Medical Diagnosis: Hemiplegia, unspecified etiology, unspecified hemiplegia type, unspecified laterality  Surgical Diagnosis: Not applicable for this Episode   Surgical Date: Not applicable for this Episode  Days Since Last Surgery: Not applicable for this Episode    Visit # / Visits Authorized: 10 / 24  Insurance Authorization Period: 7/3/2025 to 12/31/2025  Date of Evaluation: 7/3/2025  Plan of Care Certification: 7/3/2025 to 8/28/2025      Time In: 0935   Time Out: 1015  Total Time (in minutes): 40   Total Billable Time (in minutes): 40    FOTO:  Intake Score (%): Not applicable for this Episode  Survey Score 2 (%): Not applicable for this Episode  Survey Score 3 (%): Not applicable for this Episode    Precautions:  Additional Precautions and Protocol Details: Standard, Fall Risk, cognitive, seizure       Subjective   Wife continues to report difficulty with fitting GivMohr.  Wife expressed concern about needing to have a hernia surgery and how she would be able to care for patient during that recovery time.  She was agreeable to consultation with outpatient case management..    Pain reported as 0/10. No complaints of pain today.    Objective   Vital Signs  /69   Pulse (!) 58   BP Location: Right arm  BP Position: Sitting  BP Cuff Size: Adult         [place] Arm & Hand Activity Inventory: Score Form  CAHAI-13 Version   Activity Scale   Total Assist (weak U/L<25%)  Maximal Assist (weak U/L=25-49%)  Moderate Assist (weak U/L=50-74%)  Minimal Assist (weak U/L>75%)  Supervision  Modified Keyser (device)  Complete independence (timely, safely)   Date: Affected Limb: Score   Open jar of coffee [x] Holds Jar - max cues to incorporate the left [] Holds Lid 2   Call 911 [x] Holds  - max cues to use the left; picked up  with the right; placed it on the left while he dialed with the right and then picked  back up [] Dials Phone 1   Draw a line with a ruler [x] Holds Ruler - arm sliding, max cues [] Holds Pen 1   Pour a glass of water [] Holds Glass [] Holds Water    Wring out washcloth     Do up 5 buttons     Dry back with towel [] Reaches for Towel [] Grasps towel End    Put toothpaste on toothbrush [] Holds Toothpaste [] Holds Brush    Cut medium resistance putty [] Hold Knife [] Holds Fork    Zip up the zipper [] Holds Zipper [] Holds Zipper Pull    Clean a pair of eyeglasses [] Holds Glasses [] Wipes Lenses    Place container on table     Carry bag up the stairs     Total Score  /91   Comments:         Treatment:  Therapeutic Activity  TA 1: vitals  TA 2: Min A w/ mod cues for SPT w/c<>mat w/ SBQC.  During functional ambulation from mat to table, he required mod cues for taking the easiest path instead of trying to walk through an area with multiple obstacles that increased his fall risk.  When sitting in both the chair and on the mat, he required cues for positioning himself to lean forward and shift his hips back to increase success and safety during stand->sit.  TA 3: Adjustment to GivMohr:  Decreased length of left arm straps and added circumferential strap around proximal forearm, improving position of humeral head in the joint. Wife donned x2 to ensure accuracy.  TA 4: Completed the baking tray task to assess inattention/neglect to the left.  Results as pictured above.  On the 1st trial he placed all pieces in the middle and right except for 2 in the upper left.  He was able to recognize that his performance was not accurate but was unable to  correct it.  Before the 2nd trial he was again encouraged to use the entire tray and to spread out the pieces evenly.  He increased the number of pieces he positioned to the left but they were not evenly spread.  TA 5: Initiated the [place] Arm & Hand Activity Inventory (CAHAI-13) but was unable to complete the entire assessment due to the time required.  For the 3 tasks we were able to complete, he required max to total assist for positioning the left arm and he required max cues for functionally incorporating the left arm.    Time Entry(in minutes):  Therapeutic Activity Time Entry: 40    Assessment & Plan   Assessment: Mynor's wife demonstrated ability to don GivMohr accurately and positioning of humeral head improved after adjustments.  Baking tray task revealed persistent left inattention-1st trial with minimal placement on left, partial improvement in 2nd trial after verbal cues, though pieces remained unevenly distributed, indicating deficits in problem-solving. Initiated CAHAI-13; unable to complete due to time limits. For 3 completed tasks, patient required max-total assist for left arm positioning and max cues for functional incorporation of left UE.  Safety concerns remain during transfers.  Continued skilled OT warranted to address left inattention, safety awareness, functional use of left UE, and caregiver training for orthosis use.  Evaluation/Treatment Tolerance: Patient tolerated treatment well    The patient will continue to benefit from skilled outpatient occupational therapy in order to address the deficits listed in the problem list on the initial evaluation, provide patient and family education, and maximize the patients level of independence in the home and community environments.     The patient's spiritual, cultural, and educational needs were considered, and the patient is agreeable to the plan of care and goals.           Plan: Continue OT to address visual neglect, promote functional  integration of left UE, and reinforce orthosis use in ADLs; complete CAHAI-13 next session.    Goals:   Active       LTG       With MI, patient will perform all basic self-care tasks (grooming, bathing, dressing, toileting) using adaptive strategies or assistive devices as needed, demonstrating safe sequencing and minimal need for external support. (Ongoing)       Start:  07/03/25    Expected End:  08/28/25            Patient will achieve full PROM in affected upper extremity to support participation in ADLs and therapeutic exercises without pain. (Ongoing)       Start:  07/03/25    Expected End:  08/28/25            Patient will perform functional transfers to multiple surfaces (bed, toilet, chair) with supervision or less, demonstrating improved balance, coordination, and safety awareness. (Ongoing)       Start:  07/03/25    Expected End:  08/28/25               STG       Patient will complete UB dressing with Mod A using adaptive techniques or modified strategies. (Met)       Start:  07/03/25    Expected End:  07/31/25    Resolved:  08/04/25         Patient will tolerate PROM in affected upper extremity with a report of a decrease in pain to support participation in daily grooming tasks. (Met)       Start:  07/03/25    Expected End:  07/31/25    Resolved:  08/04/25         Patient will perform sit-to-stand transfers from transport wheelchair with minimal assistance and use of proper body mechanics, demonstrating progress toward safe mobility and reduced fall risk. (Ongoing)       Start:  07/03/25    Expected End:  07/31/25                Jazzy Wilkins OT

## 2025-08-11 ENCOUNTER — CLINICAL SUPPORT (OUTPATIENT)
Dept: REHABILITATION | Facility: HOSPITAL | Age: 70
End: 2025-08-11
Payer: MEDICARE

## 2025-08-11 ENCOUNTER — OUTPATIENT CASE MANAGEMENT (OUTPATIENT)
Dept: ADMINISTRATIVE | Facility: OTHER | Age: 70
End: 2025-08-11
Payer: MEDICARE

## 2025-08-11 VITALS — SYSTOLIC BLOOD PRESSURE: 133 MMHG | DIASTOLIC BLOOD PRESSURE: 73 MMHG | HEART RATE: 63 BPM

## 2025-08-11 DIAGNOSIS — R68.89 IMPAIRED FUNCTION OF UPPER EXTREMITY: ICD-10-CM

## 2025-08-11 DIAGNOSIS — I69.154: Primary | ICD-10-CM

## 2025-08-11 DIAGNOSIS — Z91.81 AT HIGH RISK FOR FALLS: ICD-10-CM

## 2025-08-11 DIAGNOSIS — R13.12 OROPHARYNGEAL DYSPHAGIA: ICD-10-CM

## 2025-08-11 DIAGNOSIS — R41.841 COGNITIVE COMMUNICATION DEFICIT: Primary | ICD-10-CM

## 2025-08-11 DIAGNOSIS — R47.1 DYSARTHRIA: ICD-10-CM

## 2025-08-11 PROCEDURE — 97112 NEUROMUSCULAR REEDUCATION: CPT | Mod: PO

## 2025-08-11 PROCEDURE — 97110 THERAPEUTIC EXERCISES: CPT | Mod: PO

## 2025-08-11 PROCEDURE — 97130 THER IVNTJ EA ADDL 15 MIN: CPT | Mod: PO

## 2025-08-11 PROCEDURE — 97530 THERAPEUTIC ACTIVITIES: CPT | Mod: PO

## 2025-08-13 ENCOUNTER — CLINICAL SUPPORT (OUTPATIENT)
Dept: REHABILITATION | Facility: HOSPITAL | Age: 70
End: 2025-08-13
Payer: MEDICARE

## 2025-08-13 VITALS — DIASTOLIC BLOOD PRESSURE: 78 MMHG | SYSTOLIC BLOOD PRESSURE: 120 MMHG | HEART RATE: 64 BPM

## 2025-08-13 DIAGNOSIS — R41.841 COGNITIVE COMMUNICATION DEFICIT: Primary | ICD-10-CM

## 2025-08-13 DIAGNOSIS — R47.1 DYSARTHRIA: ICD-10-CM

## 2025-08-13 DIAGNOSIS — R68.89 IMPAIRED FUNCTION OF UPPER EXTREMITY: ICD-10-CM

## 2025-08-13 DIAGNOSIS — I69.154: Primary | ICD-10-CM

## 2025-08-13 DIAGNOSIS — Z91.81 AT HIGH RISK FOR FALLS: ICD-10-CM

## 2025-08-13 DIAGNOSIS — R13.12 OROPHARYNGEAL DYSPHAGIA: ICD-10-CM

## 2025-08-13 PROCEDURE — 97112 NEUROMUSCULAR REEDUCATION: CPT | Mod: PO,CQ

## 2025-08-13 PROCEDURE — 97130 THER IVNTJ EA ADDL 15 MIN: CPT | Mod: PO

## 2025-08-13 PROCEDURE — 97530 THERAPEUTIC ACTIVITIES: CPT | Mod: PO

## 2025-08-13 PROCEDURE — 97112 NEUROMUSCULAR REEDUCATION: CPT | Mod: PO

## 2025-08-13 PROCEDURE — 97530 THERAPEUTIC ACTIVITIES: CPT | Mod: PO,CQ

## 2025-08-13 PROCEDURE — 97129 THER IVNTJ 1ST 15 MIN: CPT | Mod: PO

## 2025-08-14 ENCOUNTER — OUTPATIENT CASE MANAGEMENT (OUTPATIENT)
Dept: ADMINISTRATIVE | Facility: OTHER | Age: 70
End: 2025-08-14
Payer: MEDICARE

## 2025-08-14 ENCOUNTER — PATIENT MESSAGE (OUTPATIENT)
Dept: NEUROLOGY | Facility: CLINIC | Age: 70
End: 2025-08-14
Payer: MEDICARE

## 2025-08-15 ENCOUNTER — CLINICAL SUPPORT (OUTPATIENT)
Dept: REHABILITATION | Facility: HOSPITAL | Age: 70
End: 2025-08-15
Payer: MEDICARE

## 2025-08-15 VITALS — DIASTOLIC BLOOD PRESSURE: 65 MMHG | HEART RATE: 61 BPM | SYSTOLIC BLOOD PRESSURE: 115 MMHG

## 2025-08-15 DIAGNOSIS — Z91.81 AT HIGH RISK FOR FALLS: ICD-10-CM

## 2025-08-15 DIAGNOSIS — I69.154: Primary | ICD-10-CM

## 2025-08-15 DIAGNOSIS — R68.89 IMPAIRED FUNCTION OF UPPER EXTREMITY: ICD-10-CM

## 2025-08-15 PROCEDURE — 97112 NEUROMUSCULAR REEDUCATION: CPT | Mod: PO

## 2025-08-15 PROCEDURE — 97530 THERAPEUTIC ACTIVITIES: CPT | Mod: KX,PO

## 2025-08-15 PROCEDURE — 97110 THERAPEUTIC EXERCISES: CPT | Mod: PO

## 2025-08-18 ENCOUNTER — CLINICAL SUPPORT (OUTPATIENT)
Dept: REHABILITATION | Facility: HOSPITAL | Age: 70
End: 2025-08-18
Payer: MEDICARE

## 2025-08-18 ENCOUNTER — PATIENT MESSAGE (OUTPATIENT)
Dept: FAMILY MEDICINE | Facility: CLINIC | Age: 70
End: 2025-08-18
Payer: MEDICARE

## 2025-08-18 VITALS — HEART RATE: 61 BPM | SYSTOLIC BLOOD PRESSURE: 134 MMHG | DIASTOLIC BLOOD PRESSURE: 73 MMHG

## 2025-08-18 DIAGNOSIS — I69.154: Primary | ICD-10-CM

## 2025-08-18 DIAGNOSIS — R68.89 IMPAIRED FUNCTION OF UPPER EXTREMITY: ICD-10-CM

## 2025-08-18 DIAGNOSIS — R41.841 COGNITIVE COMMUNICATION DEFICIT: Primary | ICD-10-CM

## 2025-08-18 DIAGNOSIS — R13.12 OROPHARYNGEAL DYSPHAGIA: ICD-10-CM

## 2025-08-18 DIAGNOSIS — R47.1 DYSARTHRIA: ICD-10-CM

## 2025-08-18 DIAGNOSIS — Z91.81 AT HIGH RISK FOR FALLS: ICD-10-CM

## 2025-08-18 PROCEDURE — 97530 THERAPEUTIC ACTIVITIES: CPT | Mod: KX,PO

## 2025-08-18 PROCEDURE — 96125 COGNITIVE TEST BY HC PRO: CPT | Mod: KX,PO

## 2025-08-18 PROCEDURE — 97110 THERAPEUTIC EXERCISES: CPT | Mod: KX,PO

## 2025-08-18 PROCEDURE — 97535 SELF CARE MNGMENT TRAINING: CPT | Mod: PO

## 2025-08-18 PROCEDURE — 97530 THERAPEUTIC ACTIVITIES: CPT | Mod: PO

## 2025-08-18 PROCEDURE — 97130 THER IVNTJ EA ADDL 15 MIN: CPT | Mod: KX,PO

## 2025-08-20 ENCOUNTER — OFFICE VISIT (OUTPATIENT)
Dept: NEUROSURGERY | Facility: CLINIC | Age: 70
End: 2025-08-20
Payer: MEDICARE

## 2025-08-20 ENCOUNTER — PATIENT MESSAGE (OUTPATIENT)
Dept: NEUROLOGY | Facility: CLINIC | Age: 70
End: 2025-08-20
Payer: MEDICARE

## 2025-08-20 VITALS — HEART RATE: 66 BPM | SYSTOLIC BLOOD PRESSURE: 121 MMHG | DIASTOLIC BLOOD PRESSURE: 69 MMHG

## 2025-08-20 DIAGNOSIS — I69.398 SEIZURE AS LATE EFFECT OF CEREBROVASCULAR ACCIDENT (CVA): ICD-10-CM

## 2025-08-20 DIAGNOSIS — R56.9 SEIZURE AS LATE EFFECT OF CEREBROVASCULAR ACCIDENT (CVA): ICD-10-CM

## 2025-08-20 DIAGNOSIS — Z86.79 HISTORY OF SPONTANEOUS INTRAPARENCHYMAL INTRACRANIAL HEMORRHAGE: Primary | ICD-10-CM

## 2025-08-20 PROCEDURE — 99213 OFFICE O/P EST LOW 20 MIN: CPT | Mod: PBBFAC | Performed by: PHYSICIAN ASSISTANT

## 2025-08-20 PROCEDURE — 99999 PR PBB SHADOW E&M-EST. PATIENT-LVL III: CPT | Mod: PBBFAC,,, | Performed by: PHYSICIAN ASSISTANT

## 2025-08-20 RX ORDER — LEVETIRACETAM 500 MG/1
500 TABLET ORAL 2 TIMES DAILY
COMMUNITY
Start: 2025-08-08

## 2025-08-22 ENCOUNTER — CLINICAL SUPPORT (OUTPATIENT)
Dept: REHABILITATION | Facility: HOSPITAL | Age: 70
End: 2025-08-22
Payer: MEDICARE

## 2025-08-22 VITALS — SYSTOLIC BLOOD PRESSURE: 131 MMHG | DIASTOLIC BLOOD PRESSURE: 69 MMHG | HEART RATE: 69 BPM

## 2025-08-22 DIAGNOSIS — Z91.81 AT HIGH RISK FOR FALLS: ICD-10-CM

## 2025-08-22 DIAGNOSIS — R68.89 IMPAIRED FUNCTION OF UPPER EXTREMITY: ICD-10-CM

## 2025-08-22 DIAGNOSIS — I69.154: Primary | ICD-10-CM

## 2025-08-22 PROCEDURE — 97530 THERAPEUTIC ACTIVITIES: CPT | Mod: KX,PO

## 2025-08-22 PROCEDURE — 97530 THERAPEUTIC ACTIVITIES: CPT | Mod: PO

## 2025-08-24 ENCOUNTER — PATIENT MESSAGE (OUTPATIENT)
Dept: REHABILITATION | Facility: HOSPITAL | Age: 70
End: 2025-08-24
Payer: MEDICARE

## 2025-08-25 ENCOUNTER — OUTPATIENT CASE MANAGEMENT (OUTPATIENT)
Dept: ADMINISTRATIVE | Facility: OTHER | Age: 70
End: 2025-08-25
Payer: MEDICARE

## 2025-08-25 ENCOUNTER — CLINICAL SUPPORT (OUTPATIENT)
Dept: REHABILITATION | Facility: HOSPITAL | Age: 70
End: 2025-08-25
Payer: MEDICARE

## 2025-08-25 ENCOUNTER — PATIENT MESSAGE (OUTPATIENT)
Dept: NEUROLOGY | Facility: CLINIC | Age: 70
End: 2025-08-25
Payer: MEDICARE

## 2025-08-25 VITALS — DIASTOLIC BLOOD PRESSURE: 55 MMHG | SYSTOLIC BLOOD PRESSURE: 114 MMHG | HEART RATE: 56 BPM

## 2025-08-25 DIAGNOSIS — R47.1 DYSARTHRIA: ICD-10-CM

## 2025-08-25 DIAGNOSIS — R68.89 IMPAIRED FUNCTION OF UPPER EXTREMITY: ICD-10-CM

## 2025-08-25 DIAGNOSIS — R41.841 COGNITIVE COMMUNICATION DEFICIT: Primary | ICD-10-CM

## 2025-08-25 DIAGNOSIS — R13.12 OROPHARYNGEAL DYSPHAGIA: ICD-10-CM

## 2025-08-25 DIAGNOSIS — I69.154: Primary | ICD-10-CM

## 2025-08-25 DIAGNOSIS — I61.0 NONTRAUMATIC SUBCORTICAL HEMORRHAGE OF RIGHT CEREBRAL HEMISPHERE: Primary | ICD-10-CM

## 2025-08-25 PROCEDURE — 97110 THERAPEUTIC EXERCISES: CPT | Mod: PO

## 2025-08-25 PROCEDURE — 97130 THER IVNTJ EA ADDL 15 MIN: CPT | Mod: KX,PO

## 2025-08-25 PROCEDURE — 97530 THERAPEUTIC ACTIVITIES: CPT | Mod: PO

## 2025-08-26 ENCOUNTER — OFFICE VISIT (OUTPATIENT)
Dept: FAMILY MEDICINE | Facility: CLINIC | Age: 70
End: 2025-08-26
Payer: MEDICARE

## 2025-08-26 ENCOUNTER — OFFICE VISIT (OUTPATIENT)
Dept: PULMONOLOGY | Facility: CLINIC | Age: 70
End: 2025-08-26
Payer: MEDICARE

## 2025-08-26 VITALS
BODY MASS INDEX: 26.32 KG/M2 | OXYGEN SATURATION: 98 % | SYSTOLIC BLOOD PRESSURE: 108 MMHG | HEIGHT: 70 IN | HEART RATE: 60 BPM | DIASTOLIC BLOOD PRESSURE: 64 MMHG

## 2025-08-26 VITALS
HEART RATE: 58 BPM | DIASTOLIC BLOOD PRESSURE: 64 MMHG | TEMPERATURE: 98 F | BODY MASS INDEX: 26.26 KG/M2 | OXYGEN SATURATION: 97 % | SYSTOLIC BLOOD PRESSURE: 108 MMHG | WEIGHT: 183.44 LBS | HEIGHT: 70 IN

## 2025-08-26 DIAGNOSIS — Z86.73 HISTORY OF CVA (CEREBROVASCULAR ACCIDENT): ICD-10-CM

## 2025-08-26 DIAGNOSIS — I69.322 DYSARTHRIA AS LATE EFFECT OF CEREBROVASCULAR ACCIDENT (CVA): ICD-10-CM

## 2025-08-26 DIAGNOSIS — G47.33 OSA (OBSTRUCTIVE SLEEP APNEA): Primary | ICD-10-CM

## 2025-08-26 DIAGNOSIS — I69.154: ICD-10-CM

## 2025-08-26 DIAGNOSIS — I10 ESSENTIAL (PRIMARY) HYPERTENSION: Primary | ICD-10-CM

## 2025-08-26 DIAGNOSIS — I69.398 SEIZURE AS LATE EFFECT OF CEREBROVASCULAR ACCIDENT (CVA): Primary | ICD-10-CM

## 2025-08-26 DIAGNOSIS — G47.00 INSOMNIA, UNSPECIFIED TYPE: ICD-10-CM

## 2025-08-26 DIAGNOSIS — I69.398 SEIZURE AS LATE EFFECT OF CEREBROVASCULAR ACCIDENT (CVA): ICD-10-CM

## 2025-08-26 DIAGNOSIS — R56.9 SEIZURE AS LATE EFFECT OF CEREBROVASCULAR ACCIDENT (CVA): Primary | ICD-10-CM

## 2025-08-26 DIAGNOSIS — R56.9 SEIZURE AS LATE EFFECT OF CEREBROVASCULAR ACCIDENT (CVA): ICD-10-CM

## 2025-08-26 PROCEDURE — 99213 OFFICE O/P EST LOW 20 MIN: CPT | Mod: PBBFAC,27,PO | Performed by: NURSE PRACTITIONER

## 2025-08-26 PROCEDURE — 99999 PR PBB SHADOW E&M-EST. PATIENT-LVL III: CPT | Mod: PBBFAC,,, | Performed by: NURSE PRACTITIONER

## 2025-08-26 PROCEDURE — 99213 OFFICE O/P EST LOW 20 MIN: CPT | Mod: S$PBB,,, | Performed by: NURSE PRACTITIONER

## 2025-08-26 PROCEDURE — 99999 PR PBB SHADOW E&M-EST. PATIENT-LVL III: CPT | Mod: PBBFAC,,,

## 2025-08-26 PROCEDURE — 99214 OFFICE O/P EST MOD 30 MIN: CPT | Mod: S$PBB,,,

## 2025-08-26 PROCEDURE — 99213 OFFICE O/P EST LOW 20 MIN: CPT | Mod: PBBFAC,PO

## 2025-08-26 PROCEDURE — G2211 COMPLEX E/M VISIT ADD ON: HCPCS | Mod: ,,,

## 2025-08-26 RX ORDER — RAMELTEON 8 MG/1
8 TABLET ORAL NIGHTLY
Qty: 30 TABLET | Refills: 0 | Status: SHIPPED | OUTPATIENT
Start: 2025-08-26

## 2025-08-27 ENCOUNTER — CLINICAL SUPPORT (OUTPATIENT)
Dept: REHABILITATION | Facility: HOSPITAL | Age: 70
End: 2025-08-27
Payer: MEDICARE

## 2025-08-27 DIAGNOSIS — Z91.81 AT HIGH RISK FOR FALLS: ICD-10-CM

## 2025-08-27 DIAGNOSIS — R68.89 IMPAIRED FUNCTION OF UPPER EXTREMITY: ICD-10-CM

## 2025-08-27 DIAGNOSIS — R13.12 OROPHARYNGEAL DYSPHAGIA: ICD-10-CM

## 2025-08-27 DIAGNOSIS — R41.841 COGNITIVE COMMUNICATION DEFICIT: Primary | ICD-10-CM

## 2025-08-27 DIAGNOSIS — I69.154: Primary | ICD-10-CM

## 2025-08-27 DIAGNOSIS — R47.1 DYSARTHRIA: ICD-10-CM

## 2025-08-27 PROCEDURE — 97129 THER IVNTJ 1ST 15 MIN: CPT | Mod: KX,PO

## 2025-08-27 PROCEDURE — 97112 NEUROMUSCULAR REEDUCATION: CPT | Mod: KX,PO,CQ

## 2025-08-27 PROCEDURE — 97530 THERAPEUTIC ACTIVITIES: CPT | Mod: KX,PO,CQ

## 2025-08-27 PROCEDURE — 97530 THERAPEUTIC ACTIVITIES: CPT | Mod: PO

## 2025-08-27 PROCEDURE — 97112 NEUROMUSCULAR REEDUCATION: CPT | Mod: PO

## 2025-08-27 PROCEDURE — 97130 THER IVNTJ EA ADDL 15 MIN: CPT | Mod: KX,PO

## 2025-08-29 DIAGNOSIS — N35.919 STRICTURE, URETHRA: ICD-10-CM

## 2025-08-29 DIAGNOSIS — N41.0 ACUTE PROSTATITIS: ICD-10-CM

## 2025-08-29 DIAGNOSIS — N39.0 URINARY TRACT INFECTION, SITE NOT SPECIFIED: Primary | ICD-10-CM

## 2025-09-01 ENCOUNTER — TELEPHONE (OUTPATIENT)
Dept: NEUROLOGY | Facility: CLINIC | Age: 70
End: 2025-09-01
Payer: MEDICARE

## 2025-09-02 ENCOUNTER — HOSPITAL ENCOUNTER (OUTPATIENT)
Dept: RADIOLOGY | Facility: HOSPITAL | Age: 70
Discharge: HOME | End: 2025-09-02
Attending: SPECIALIST
Payer: MEDICARE

## 2025-09-02 DIAGNOSIS — N35.919 STRICTURE, URETHRA: ICD-10-CM

## 2025-09-02 DIAGNOSIS — N39.0 URINARY TRACT INFECTION, SITE NOT SPECIFIED: ICD-10-CM

## 2025-09-02 DIAGNOSIS — N41.0 ACUTE PROSTATITIS: ICD-10-CM

## 2025-09-02 PROCEDURE — 76770 US EXAM ABDO BACK WALL COMP: CPT | Mod: 26,,, | Performed by: RADIOLOGY

## 2025-09-02 PROCEDURE — 76857 US EXAM PELVIC LIMITED: CPT | Mod: 26,,, | Performed by: RADIOLOGY

## 2025-09-02 PROCEDURE — 76770 US EXAM ABDO BACK WALL COMP: CPT | Mod: TC

## 2025-09-02 PROCEDURE — 76857 US EXAM PELVIC LIMITED: CPT | Mod: TC

## 2025-09-03 ENCOUNTER — CLINICAL SUPPORT (OUTPATIENT)
Dept: REHABILITATION | Facility: HOSPITAL | Age: 70
End: 2025-09-03
Payer: MEDICARE

## 2025-09-03 VITALS — DIASTOLIC BLOOD PRESSURE: 64 MMHG | SYSTOLIC BLOOD PRESSURE: 121 MMHG | HEART RATE: 57 BPM

## 2025-09-03 DIAGNOSIS — Z91.81 AT HIGH RISK FOR FALLS: ICD-10-CM

## 2025-09-03 DIAGNOSIS — I69.154: Primary | ICD-10-CM

## 2025-09-03 DIAGNOSIS — R68.89 IMPAIRED FUNCTION OF UPPER EXTREMITY: ICD-10-CM

## 2025-09-03 PROCEDURE — 97112 NEUROMUSCULAR REEDUCATION: CPT | Mod: KX,PO,CQ

## 2025-09-03 PROCEDURE — 97112 NEUROMUSCULAR REEDUCATION: CPT | Mod: KX,PO

## 2025-09-03 PROCEDURE — 97530 THERAPEUTIC ACTIVITIES: CPT | Mod: KX,PO,CQ

## 2025-09-03 PROCEDURE — 97110 THERAPEUTIC EXERCISES: CPT | Mod: KX,PO

## 2025-09-05 ENCOUNTER — CLINICAL SUPPORT (OUTPATIENT)
Dept: REHABILITATION | Facility: HOSPITAL | Age: 70
End: 2025-09-05
Payer: MEDICARE

## 2025-09-05 DIAGNOSIS — I69.154: Primary | ICD-10-CM

## 2025-09-05 DIAGNOSIS — Z91.81 AT HIGH RISK FOR FALLS: ICD-10-CM

## 2025-09-05 PROCEDURE — 97530 THERAPEUTIC ACTIVITIES: CPT | Mod: KX,PO

## (undated) DEVICE — CHLORAPREP 10.5 ML APPLICATOR

## (undated) DEVICE — NDL HYPODERMIC BLUNT 18G 1.5IN

## (undated) DEVICE — PAD DEFIB CADENCE ADULT R2

## (undated) DEVICE — SYS LABEL CORRECT MED

## (undated) DEVICE — SYR 10CC LUER LOCK

## (undated) DEVICE — CANNULA RADIOPAQUE 20G CURVED

## (undated) DEVICE — R CATH BIDIRECTIONL DF CRV 7FR

## (undated) DEVICE — CATH PENTARY F 2-6-2MM 115CM

## (undated) DEVICE — GLOVE SURGEONS ULTRA TOUCH 6.5

## (undated) DEVICE — SYR GLASS 5CC LUER LOK

## (undated) DEVICE — INTRODUCER HEMOSTASIS 7.5F

## (undated) DEVICE — CATH THERMOCOOL SMTCH SF D F

## (undated) DEVICE — SET SMARTABLATE IRR TUBE

## (undated) DEVICE — ELECTRODE REM PLYHSV RETURN 9

## (undated) DEVICE — INTRO 8.5FR 63CM SRO

## (undated) DEVICE — NDL SPINAL 25GX3.5 SPINOCAN

## (undated) DEVICE — NDL TUOHY EPIDURAL 20G X 3.5

## (undated) DEVICE — SYR DISP LL 5CC

## (undated) DEVICE — GLOVE SENSICARE PI ALOE 7.5

## (undated) DEVICE — NDL SAFETY 25G X 1.5 ECLIPSE

## (undated) DEVICE — PAD GROUNDING DISPER ELECTRODE

## (undated) DEVICE — GLOVE SURG ULTRA TOUCH 7.5

## (undated) DEVICE — SHEET DRAPE MEDIUM

## (undated) DEVICE — TUBING MINIBORE EXTENSION

## (undated) DEVICE — PATCH CARTO REFERENCE

## (undated) DEVICE — CATH TRICUSPID HALO XP 7FRX110

## (undated) DEVICE — SHEATH HEMOSTASIS 8.5FR

## (undated) DEVICE — PACK EP DRAPE OMC